# Patient Record
Sex: FEMALE | Race: WHITE | NOT HISPANIC OR LATINO | Employment: OTHER | URBAN - METROPOLITAN AREA
[De-identification: names, ages, dates, MRNs, and addresses within clinical notes are randomized per-mention and may not be internally consistent; named-entity substitution may affect disease eponyms.]

---

## 2023-09-05 ENCOUNTER — HOSPITAL ENCOUNTER (OUTPATIENT)
Dept: DATA CONVERSION | Facility: HOSPITAL | Age: 63
Discharge: HOME | End: 2023-09-05
Payer: COMMERCIAL

## 2023-09-05 DIAGNOSIS — I27.20 PULMONARY HYPERTENSION, UNSPECIFIED (MULTI): ICD-10-CM

## 2023-10-12 ENCOUNTER — PATIENT OUTREACH (OUTPATIENT)
Dept: CASE MANAGEMENT | Facility: HOSPITAL | Age: 63
End: 2023-10-12
Payer: COMMERCIAL

## 2023-10-12 NOTE — PROGRESS NOTES
Closed HF case today. No readmits for HF in past 33 days. Ronna has been doing well with managing her HF.

## 2023-11-10 ENCOUNTER — TELEPHONE (OUTPATIENT)
Dept: PULMONOLOGY | Facility: HOSPITAL | Age: 63
End: 2023-11-10
Payer: COMMERCIAL

## 2023-11-22 ENCOUNTER — APPOINTMENT (OUTPATIENT)
Dept: PULMONOLOGY | Facility: HOSPITAL | Age: 63
End: 2023-11-22
Payer: COMMERCIAL

## 2023-11-22 ENCOUNTER — HOSPITAL ENCOUNTER (OUTPATIENT)
Dept: RESPIRATORY THERAPY | Facility: HOSPITAL | Age: 63
End: 2023-11-22
Payer: COMMERCIAL

## 2024-01-07 ENCOUNTER — HOSPITAL ENCOUNTER (OUTPATIENT)
Facility: HOSPITAL | Age: 64
Setting detail: OBSERVATION
Discharge: HOME | End: 2024-01-09
Attending: EMERGENCY MEDICINE | Admitting: INTERNAL MEDICINE
Payer: COMMERCIAL

## 2024-01-07 ENCOUNTER — APPOINTMENT (OUTPATIENT)
Dept: RADIOLOGY | Facility: HOSPITAL | Age: 64
End: 2024-01-07
Payer: COMMERCIAL

## 2024-01-07 ENCOUNTER — APPOINTMENT (OUTPATIENT)
Dept: CARDIOLOGY | Facility: HOSPITAL | Age: 64
End: 2024-01-07
Payer: COMMERCIAL

## 2024-01-07 DIAGNOSIS — I21.4 NSTEMI (NON-ST ELEVATED MYOCARDIAL INFARCTION) (MULTI): ICD-10-CM

## 2024-01-07 DIAGNOSIS — R79.89 ELEVATED TROPONIN: ICD-10-CM

## 2024-01-07 DIAGNOSIS — R06.02 SHORTNESS OF BREATH: Primary | ICD-10-CM

## 2024-01-07 LAB
ALBUMIN SERPL-MCNC: 3.8 G/DL (ref 3.5–5)
ALP BLD-CCNC: 98 U/L (ref 35–125)
ALT SERPL-CCNC: 7 U/L (ref 5–40)
ANION GAP SERPL CALC-SCNC: 13 MMOL/L
AST SERPL-CCNC: 13 U/L (ref 5–40)
BILIRUB SERPL-MCNC: 1 MG/DL (ref 0.1–1.2)
BUN SERPL-MCNC: 15 MG/DL (ref 8–25)
CALCIUM SERPL-MCNC: 7.3 MG/DL (ref 8.5–10.4)
CHLORIDE SERPL-SCNC: 102 MMOL/L (ref 97–107)
CO2 SERPL-SCNC: 20 MMOL/L (ref 24–31)
CREAT SERPL-MCNC: 1.2 MG/DL (ref 0.4–1.6)
ERYTHROCYTE [DISTWIDTH] IN BLOOD BY AUTOMATED COUNT: 14.2 % (ref 11.5–14.5)
GFR SERPL CREATININE-BSD FRML MDRD: 52 ML/MIN/1.73M*2
GLUCOSE SERPL-MCNC: 149 MG/DL (ref 65–99)
HCT VFR BLD AUTO: 37.8 % (ref 36–46)
HGB BLD-MCNC: 11.9 G/DL (ref 12–16)
MCH RBC QN AUTO: 26 PG (ref 26–34)
MCHC RBC AUTO-ENTMCNC: 31.5 G/DL (ref 32–36)
MCV RBC AUTO: 83 FL (ref 80–100)
NRBC BLD-RTO: 0 /100 WBCS (ref 0–0)
NT-PROBNP SERPL-MCNC: 4260 PG/ML (ref 0–226)
PLATELET # BLD AUTO: 128 X10*3/UL (ref 150–450)
POTASSIUM SERPL-SCNC: 4.5 MMOL/L (ref 3.4–5.1)
PROT SERPL-MCNC: 6.5 G/DL (ref 5.9–7.9)
RBC # BLD AUTO: 4.57 X10*6/UL (ref 4–5.2)
SODIUM SERPL-SCNC: 135 MMOL/L (ref 133–145)
TROPONIN T SERPL-MCNC: 20 NG/L
TROPONIN T SERPL-MCNC: 60 NG/L
TROPONIN T SERPL-MCNC: 62 NG/L
TROPONIN T SERPL-MCNC: 62 NG/L
WBC # BLD AUTO: 5.2 X10*3/UL (ref 4.4–11.3)

## 2024-01-07 PROCEDURE — 2550000001 HC RX 255 CONTRASTS: Performed by: EMERGENCY MEDICINE

## 2024-01-07 PROCEDURE — 71045 X-RAY EXAM CHEST 1 VIEW: CPT

## 2024-01-07 PROCEDURE — 84484 ASSAY OF TROPONIN QUANT: CPT | Performed by: NURSE PRACTITIONER

## 2024-01-07 PROCEDURE — 99285 EMERGENCY DEPT VISIT HI MDM: CPT | Performed by: EMERGENCY MEDICINE

## 2024-01-07 PROCEDURE — 2500000001 HC RX 250 WO HCPCS SELF ADMINISTERED DRUGS (ALT 637 FOR MEDICARE OP): Performed by: NURSE PRACTITIONER

## 2024-01-07 PROCEDURE — 71275 CT ANGIOGRAPHY CHEST: CPT

## 2024-01-07 PROCEDURE — 85027 COMPLETE CBC AUTOMATED: CPT | Performed by: NURSE PRACTITIONER

## 2024-01-07 PROCEDURE — 93010 ELECTROCARDIOGRAM REPORT: CPT | Performed by: INTERNAL MEDICINE

## 2024-01-07 PROCEDURE — 80053 COMPREHEN METABOLIC PANEL: CPT | Performed by: NURSE PRACTITIONER

## 2024-01-07 PROCEDURE — 36415 COLL VENOUS BLD VENIPUNCTURE: CPT | Performed by: NURSE PRACTITIONER

## 2024-01-07 PROCEDURE — 93005 ELECTROCARDIOGRAM TRACING: CPT

## 2024-01-07 PROCEDURE — 83880 ASSAY OF NATRIURETIC PEPTIDE: CPT | Performed by: NURSE PRACTITIONER

## 2024-01-07 PROCEDURE — G0378 HOSPITAL OBSERVATION PER HR: HCPCS

## 2024-01-07 PROCEDURE — 71275 CT ANGIOGRAPHY CHEST: CPT | Mod: FOREIGN READ | Performed by: RADIOLOGY

## 2024-01-07 RX ORDER — ACETAMINOPHEN 500 MG
5 TABLET ORAL NIGHTLY
COMMUNITY

## 2024-01-07 RX ORDER — ACETAMINOPHEN 325 MG/1
650 TABLET ORAL EVERY 4 HOURS PRN
Status: DISCONTINUED | OUTPATIENT
Start: 2024-01-07 | End: 2024-01-09 | Stop reason: HOSPADM

## 2024-01-07 RX ORDER — VENLAFAXINE HYDROCHLORIDE 150 MG/1
150 CAPSULE, EXTENDED RELEASE ORAL DAILY
COMMUNITY

## 2024-01-07 RX ORDER — LOPERAMIDE HYDROCHLORIDE 2 MG/1
2 CAPSULE ORAL 4 TIMES DAILY PRN
Status: DISCONTINUED | OUTPATIENT
Start: 2024-01-07 | End: 2024-01-09 | Stop reason: HOSPADM

## 2024-01-07 RX ORDER — PANTOPRAZOLE SODIUM 40 MG/1
40 TABLET, DELAYED RELEASE ORAL
Status: DISCONTINUED | OUTPATIENT
Start: 2024-01-08 | End: 2024-01-09 | Stop reason: HOSPADM

## 2024-01-07 RX ORDER — ASPIRIN 81 MG/1
81 TABLET ORAL DAILY
Status: DISCONTINUED | OUTPATIENT
Start: 2024-01-08 | End: 2024-01-09 | Stop reason: HOSPADM

## 2024-01-07 RX ORDER — TALC
3 POWDER (GRAM) TOPICAL NIGHTLY
Status: DISCONTINUED | OUTPATIENT
Start: 2024-01-07 | End: 2024-01-09 | Stop reason: HOSPADM

## 2024-01-07 RX ORDER — CHOLESTYRAMINE 4 G/9G
4 POWDER, FOR SUSPENSION ORAL
Status: DISCONTINUED | OUTPATIENT
Start: 2024-01-08 | End: 2024-01-09 | Stop reason: HOSPADM

## 2024-01-07 RX ORDER — HYDROXYCHLOROQUINE SULFATE 200 MG/1
200 TABLET, FILM COATED ORAL 2 TIMES DAILY
COMMUNITY

## 2024-01-07 RX ORDER — HEPARIN SODIUM 5000 [USP'U]/ML
5000 INJECTION, SOLUTION INTRAVENOUS; SUBCUTANEOUS EVERY 8 HOURS SCHEDULED
Status: DISCONTINUED | OUTPATIENT
Start: 2024-01-07 | End: 2024-01-09 | Stop reason: HOSPADM

## 2024-01-07 RX ORDER — VENLAFAXINE HYDROCHLORIDE 150 MG/1
150 CAPSULE, EXTENDED RELEASE ORAL DAILY
Status: DISCONTINUED | OUTPATIENT
Start: 2024-01-08 | End: 2024-01-09 | Stop reason: HOSPADM

## 2024-01-07 RX ORDER — HYDROXYCHLOROQUINE SULFATE 200 MG/1
200 TABLET, FILM COATED ORAL 2 TIMES DAILY
Status: DISCONTINUED | OUTPATIENT
Start: 2024-01-07 | End: 2024-01-08

## 2024-01-07 RX ORDER — NAPROXEN SODIUM 220 MG/1
324 TABLET, FILM COATED ORAL ONCE
Status: COMPLETED | OUTPATIENT
Start: 2024-01-07 | End: 2024-01-07

## 2024-01-07 RX ORDER — MACITENTAN 10 MG/1
10 TABLET, FILM COATED ORAL DAILY
COMMUNITY
End: 2024-03-11

## 2024-01-07 RX ORDER — LOPERAMIDE HCL 2 MG
2 TABLET ORAL 4 TIMES DAILY PRN
COMMUNITY
End: 2024-05-28 | Stop reason: SDUPTHER

## 2024-01-07 RX ORDER — GABAPENTIN 600 MG/1
600 TABLET ORAL 3 TIMES DAILY
COMMUNITY
End: 2024-06-02 | Stop reason: HOSPADM

## 2024-01-07 RX ORDER — ONDANSETRON HYDROCHLORIDE 2 MG/ML
4 INJECTION, SOLUTION INTRAVENOUS EVERY 8 HOURS PRN
Status: DISCONTINUED | OUTPATIENT
Start: 2024-01-07 | End: 2024-01-09 | Stop reason: HOSPADM

## 2024-01-07 RX ORDER — GABAPENTIN 300 MG/1
300 CAPSULE ORAL 2 TIMES DAILY
Status: DISCONTINUED | OUTPATIENT
Start: 2024-01-07 | End: 2024-01-08

## 2024-01-07 RX ORDER — MAGNESIUM CHLORIDE 64 MG
64 TABLET, DELAYED RELEASE (ENTERIC COATED) ORAL 2 TIMES DAILY
Status: DISCONTINUED | OUTPATIENT
Start: 2024-01-07 | End: 2024-01-08

## 2024-01-07 RX ORDER — OMEPRAZOLE 40 MG/1
40 CAPSULE, DELAYED RELEASE ORAL
COMMUNITY

## 2024-01-07 RX ORDER — ASPIRIN 81 MG/1
81 TABLET ORAL DAILY
COMMUNITY

## 2024-01-07 RX ADMIN — IOHEXOL 75 ML: 350 INJECTION, SOLUTION INTRAVENOUS at 21:42

## 2024-01-07 RX ADMIN — ASPIRIN 324 MG: 81 TABLET, CHEWABLE ORAL at 20:30

## 2024-01-07 RX ADMIN — GABAPENTIN 300 MG: 300 CAPSULE ORAL at 21:20

## 2024-01-07 RX ADMIN — HYDROXYCHLOROQUINE SULFATE 200 MG: 200 TABLET ORAL at 21:20

## 2024-01-07 RX ADMIN — MAGNESIUM 64 MG (MAGNESIUM CHLORIDE) TABLET,DELAYED RELEASE 64 MG: at 21:20

## 2024-01-07 RX ADMIN — MELATONIN 3 MG: at 21:20

## 2024-01-07 ASSESSMENT — PAIN SCALES - GENERAL
PAINLEVEL_OUTOF10: 0 - NO PAIN
PAINLEVEL_OUTOF10: 0 - NO PAIN

## 2024-01-07 ASSESSMENT — COLUMBIA-SUICIDE SEVERITY RATING SCALE - C-SSRS
1. IN THE PAST MONTH, HAVE YOU WISHED YOU WERE DEAD OR WISHED YOU COULD GO TO SLEEP AND NOT WAKE UP?: NO
6. HAVE YOU EVER DONE ANYTHING, STARTED TO DO ANYTHING, OR PREPARED TO DO ANYTHING TO END YOUR LIFE?: NO
2. HAVE YOU ACTUALLY HAD ANY THOUGHTS OF KILLING YOURSELF?: NO

## 2024-01-07 ASSESSMENT — PAIN - FUNCTIONAL ASSESSMENT: PAIN_FUNCTIONAL_ASSESSMENT: 0-10

## 2024-01-07 ASSESSMENT — ENCOUNTER SYMPTOMS
SHORTNESS OF BREATH: 1
DIAPHORESIS: 1

## 2024-01-07 NOTE — ED PROVIDER NOTES
This patient was seen by the advanced practice provider.  I have personally performed a substantial portion of the encounter.  I have seen and examined the patient; agree with the workup, evaluation, MDM, management and diagnosis.  The care plan has been discussed.    I personally saw the patient and made/approve the management plan and take responsibility for the patient management.    History:    This is a 63-year-old female with a history of pulmonary hypertension who presents to the emergency department with increased shortness of breath starting about an hour prior to arrival.  Patient states she improved quickly after being placed on 100% oxygen.  The patient normally is on 4 L of oxygen via nasal cannula.  Patient is currently on a constant infusion of medication for pulmonary hypertension.  Patient states there has been no problems with the pump and administration of this medication.  Exam:  Patient appears in no acute distress  Lungs are clear to auscultation bilaterally pulse ox is 95% on 4 L nasal cannula patient is not tachypneic  MDM:  The case was reviewed with the physician assistant I agree with workup and management.  Currently we are waiting for second troponin this is negative or normal patient will be discharged home.         Marco A Figueredo,   01/08/24 0124

## 2024-01-07 NOTE — ED PROVIDER NOTES
HPI   No chief complaint on file.      HPI  See my MDM                  No data recorded                Patient History   Past Medical History:   Diagnosis Date    Personal history of other specified conditions 2020    History of seizure     Past Surgical History:   Procedure Laterality Date    OTHER SURGICAL HISTORY  2020    Cholecystectomy    OTHER SURGICAL HISTORY  2020    Esophagogastroduodenoscopy    OTHER SURGICAL HISTORY  2020    Colonoscopy    OTHER SURGICAL HISTORY  2020     section    OTHER SURGICAL HISTORY  2020    Hysterectomy     No family history on file.  Social History     Tobacco Use    Smoking status: Not on file    Smokeless tobacco: Not on file   Substance Use Topics    Alcohol use: Not on file    Drug use: Not on file       Physical Exam   ED Triage Vitals   Temp Pulse Resp BP   -- -- -- --      SpO2 Temp src Heart Rate Source Patient Position   -- -- -- --      BP Location FiO2 (%)     -- --       Physical Exam  CONSTITUTIONAL: Vital signs reviewed as charted, well-developed and in no distress  Eyes: Extraocular muscles are intact. Pupils equal round and reactive to light. Conjunctiva are pink.    ENT: Mucous membranes are moist. Tongue in the midline. Pharynx was without erythema or exudates, uvula midline  LUNGS: Breath sounds equal and clear to auscultation. Good air exchange, no wheezes rales or retractions, pulse oximetry is charted.  HEART: Regular rate and rhythm without murmur thrill or rub, strong tones, auscultation is normal.  ABDOMEN: Soft and nontender without guarding rebound rigidity or mass. Bowel sounds are present and normal in all quadrants. There is no palpable masses or aneurysms identified. No hepatosplenomegaly, normal abdominal exam.  Neuro: The patient is awake, alert and oriented ×3. Moving all 4 extremities and answering questions appropriately.   MUSCULOSKELETAL: The calves are nontender to palpation. Full gross active  range of motion.   PSYCH: Awake alert oriented, normal mood and affect.  Skin:  Dry, normal color, warm to the touch, no rash present.      ED Course & MDM   ED Course as of 01/07/24 1933   Sun Jan 07, 2024   1645 EKG from 1559 shows normal sinus rhythm with ventricular rate of 95 bpm, QTc is prolonged at 522 ms, right axis, T wave inversion in the anterior leads with some ST depression.  There is no evidence of STEMI.  This EKG was independently reviewed and interpreted by myself at 1645 [TH]   1648 Todays EKG was compared with an EKG from April 2022 which showed similar ST changes as today's EKG [TH]      ED Course User Index  [TH] Marco A Figueredo DO         Diagnoses as of 01/07/24 1933   Shortness of breath   Elevated troponin       Medical Decision Making  History obtained from: patient    Vital signs, nursing notes, current medications, past medical history, Surgical history, allergies, social history, family History were reviewed.         HPI:  Patient 63-year-old female known history of pulmonary hypertension presenting emergency room today complaining of shortness of breath started about an hour prior to arrival.  Was brought in by EMS they put her on 100% oxygen and she states she is feeling back to baseline at this time.  No other intervention.  Denies dizziness, chest pain, abdominal pain or extremity edema.  Denies cough or congestion.  Denies nausea vomiting diarrhea.  She is nontoxic well-appearing she does wear 4 L of oxygen via nasal cannula normally at home.      10 point ROS was reviewed and negative except Noted above in HPI.  DDX: as listed above    Chest x-ray interpreted by the radiologist shows  Impression:    Mild cardiomegaly and perihilar congestion and pulmonary edema,  unchanged. No consolidation.              Medications administered during this visit (name and route): Oral aspirin  EKG interpretted by my attending physician    POPEYE Summary/considerations:  I spoke with   Sheba. We thoroughly discussed the history, physical exam, laboratory and imaging studies, as well as, emergency department course. Based upon that discussion, we've decided to admit for further observation and evaluation of their chest pain.  As I have deemed necessary from their history, physical, and studies, I have considered and evaluated for the following diagnoses: ACUTE CORONARY SYNDROME, PERICARDIAL TAMPONADE, PNEUMOTHORAX, P ULMONARY EMBOLISM, and THORACIC DISSECTION.     Patient's symptoms have resolved and did just prior to coming to the ER.  Her initial troponin was 20, repeat delta was 60.  EKG nonischemic.  Given her extensive medical history and elevation of the troponin will admit for further evaluation and care.  She did tell me her cardiologist is Dr. Calvillo.        After reviewing patient's comorbidities, severity of history of presenting illness, labs and imaging if obtained in conjunction with physical exam and course in emergency department, deemed to have potential for deterioration/progression of symptoms that could lead to multiple morbidities or mortality, decision made that patient requires further observation/evaluation/treatment and patient admitted to appropriate service, patient/family understand and agree with plan.      Critical Care: Not warranted at this time    Prescriptions provided include: none    This chart was completed using voice recognition transcription software. Please excuse any errors of transcription including grammatical, punctuation, syntax and spelling errors.  Please contact me with any questions regarding this chart.    Procedure  Procedures     MICHEL Busby-CNP  01/07/24 1934

## 2024-01-08 ENCOUNTER — HOSPITAL ENCOUNTER (OUTPATIENT)
Facility: HOSPITAL | Age: 64
Setting detail: OUTPATIENT SURGERY
Discharge: ANOTHER HOSPITAL | End: 2024-01-08
Attending: INTERNAL MEDICINE | Admitting: INTERNAL MEDICINE
Payer: COMMERCIAL

## 2024-01-08 VITALS
TEMPERATURE: 98 F | SYSTOLIC BLOOD PRESSURE: 107 MMHG | DIASTOLIC BLOOD PRESSURE: 70 MMHG | HEART RATE: 85 BPM | OXYGEN SATURATION: 94 % | RESPIRATION RATE: 18 BRPM

## 2024-01-08 DIAGNOSIS — I21.4 NSTEMI (NON-ST ELEVATED MYOCARDIAL INFARCTION) (MULTI): ICD-10-CM

## 2024-01-08 LAB
ALBUMIN SERPL-MCNC: 3.5 G/DL (ref 3.5–5)
ALP BLD-CCNC: 82 U/L (ref 35–125)
ALT SERPL-CCNC: 6 U/L (ref 5–40)
ANION GAP SERPL CALC-SCNC: 8 MMOL/L
AST SERPL-CCNC: 8 U/L (ref 5–40)
BILIRUB SERPL-MCNC: 0.9 MG/DL (ref 0.1–1.2)
BUN SERPL-MCNC: 15 MG/DL (ref 8–25)
CALCIUM SERPL-MCNC: 6.9 MG/DL (ref 8.5–10.4)
CHLORIDE SERPL-SCNC: 107 MMOL/L (ref 97–107)
CO2 SERPL-SCNC: 24 MMOL/L (ref 24–31)
CREAT SERPL-MCNC: 1.2 MG/DL (ref 0.4–1.6)
ERYTHROCYTE [DISTWIDTH] IN BLOOD BY AUTOMATED COUNT: 14.3 % (ref 11.5–14.5)
GFR SERPL CREATININE-BSD FRML MDRD: 52 ML/MIN/1.73M*2
GLUCOSE SERPL-MCNC: 106 MG/DL (ref 65–99)
HCT VFR BLD AUTO: 32.3 % (ref 36–46)
HGB BLD-MCNC: 10 G/DL (ref 12–16)
MCH RBC QN AUTO: 26 PG (ref 26–34)
MCHC RBC AUTO-ENTMCNC: 31 G/DL (ref 32–36)
MCV RBC AUTO: 84 FL (ref 80–100)
NRBC BLD-RTO: 0 /100 WBCS (ref 0–0)
PLATELET # BLD AUTO: 113 X10*3/UL (ref 150–450)
POTASSIUM SERPL-SCNC: 4.5 MMOL/L (ref 3.4–5.1)
PROT SERPL-MCNC: 6 G/DL (ref 5.9–7.9)
RBC # BLD AUTO: 3.85 X10*6/UL (ref 4–5.2)
SODIUM SERPL-SCNC: 139 MMOL/L (ref 133–145)
WBC # BLD AUTO: 4 X10*3/UL (ref 4.4–11.3)

## 2024-01-08 PROCEDURE — 96372 THER/PROPH/DIAG INJ SC/IM: CPT | Performed by: NURSE PRACTITIONER

## 2024-01-08 PROCEDURE — 99221 1ST HOSP IP/OBS SF/LOW 40: CPT | Performed by: INTERNAL MEDICINE

## 2024-01-08 PROCEDURE — 2720000007 HC OR 272 NO HCPCS: Performed by: INTERNAL MEDICINE

## 2024-01-08 PROCEDURE — 99152 MOD SED SAME PHYS/QHP 5/>YRS: CPT | Performed by: INTERNAL MEDICINE

## 2024-01-08 PROCEDURE — 93458 L HRT ARTERY/VENTRICLE ANGIO: CPT | Performed by: INTERNAL MEDICINE

## 2024-01-08 PROCEDURE — 94760 N-INVAS EAR/PLS OXIMETRY 1: CPT

## 2024-01-08 PROCEDURE — G0378 HOSPITAL OBSERVATION PER HR: HCPCS

## 2024-01-08 PROCEDURE — 2500000004 HC RX 250 GENERAL PHARMACY W/ HCPCS (ALT 636 FOR OP/ED): Performed by: INTERNAL MEDICINE

## 2024-01-08 PROCEDURE — 1850000001 HC LEAVE OF ABSENCE - HOSPITAL SERVICES

## 2024-01-08 PROCEDURE — C1769 GUIDE WIRE: HCPCS | Performed by: INTERNAL MEDICINE

## 2024-01-08 PROCEDURE — 2500000001 HC RX 250 WO HCPCS SELF ADMINISTERED DRUGS (ALT 637 FOR MEDICARE OP): Performed by: NURSE PRACTITIONER

## 2024-01-08 PROCEDURE — 7100000010 HC PHASE TWO TIME - EACH INCREMENTAL 1 MINUTE: Performed by: INTERNAL MEDICINE

## 2024-01-08 PROCEDURE — 2500000001 HC RX 250 WO HCPCS SELF ADMINISTERED DRUGS (ALT 637 FOR MEDICARE OP): Performed by: INTERNAL MEDICINE

## 2024-01-08 PROCEDURE — 36415 COLL VENOUS BLD VENIPUNCTURE: CPT | Performed by: NURSE PRACTITIONER

## 2024-01-08 PROCEDURE — 2500000005 HC RX 250 GENERAL PHARMACY W/O HCPCS: Performed by: INTERNAL MEDICINE

## 2024-01-08 PROCEDURE — 85027 COMPLETE CBC AUTOMATED: CPT | Performed by: NURSE PRACTITIONER

## 2024-01-08 PROCEDURE — 7100000009 HC PHASE TWO TIME - INITIAL BASE CHARGE: Performed by: INTERNAL MEDICINE

## 2024-01-08 PROCEDURE — 2500000004 HC RX 250 GENERAL PHARMACY W/ HCPCS (ALT 636 FOR OP/ED): Performed by: NURSE PRACTITIONER

## 2024-01-08 PROCEDURE — 80053 COMPREHEN METABOLIC PANEL: CPT | Performed by: NURSE PRACTITIONER

## 2024-01-08 PROCEDURE — 2550000001 HC RX 255 CONTRASTS: Performed by: INTERNAL MEDICINE

## 2024-01-08 PROCEDURE — C1894 INTRO/SHEATH, NON-LASER: HCPCS | Performed by: INTERNAL MEDICINE

## 2024-01-08 PROCEDURE — 99232 SBSQ HOSP IP/OBS MODERATE 35: CPT | Performed by: NURSE PRACTITIONER

## 2024-01-08 RX ORDER — GABAPENTIN 300 MG/1
300 CAPSULE ORAL 2 TIMES DAILY
Status: DISCONTINUED | OUTPATIENT
Start: 2024-01-08 | End: 2024-01-09 | Stop reason: HOSPADM

## 2024-01-08 RX ORDER — IODIXANOL 320 MG/ML
INJECTION, SOLUTION INTRAVASCULAR AS NEEDED
Status: DISCONTINUED | OUTPATIENT
Start: 2024-01-08 | End: 2024-01-08 | Stop reason: HOSPADM

## 2024-01-08 RX ORDER — SODIUM CHLORIDE 9 MG/ML
INJECTION, SOLUTION INTRAVENOUS CONTINUOUS PRN
Status: COMPLETED | OUTPATIENT
Start: 2024-01-08 | End: 2024-01-08

## 2024-01-08 RX ORDER — HYDROXYCHLOROQUINE SULFATE 200 MG/1
200 TABLET, FILM COATED ORAL 2 TIMES DAILY
Status: DISCONTINUED | OUTPATIENT
Start: 2024-01-08 | End: 2024-01-09 | Stop reason: HOSPADM

## 2024-01-08 RX ORDER — NITROGLYCERIN 5 MG/ML
INJECTION, SOLUTION INTRAVENOUS AS NEEDED
Status: DISCONTINUED | OUTPATIENT
Start: 2024-01-08 | End: 2024-01-08 | Stop reason: HOSPADM

## 2024-01-08 RX ORDER — MIDAZOLAM HYDROCHLORIDE 1 MG/ML
INJECTION, SOLUTION INTRAMUSCULAR; INTRAVENOUS AS NEEDED
Status: DISCONTINUED | OUTPATIENT
Start: 2024-01-08 | End: 2024-01-08 | Stop reason: HOSPADM

## 2024-01-08 RX ORDER — LIDOCAINE HYDROCHLORIDE 10 MG/ML
INJECTION, SOLUTION EPIDURAL; INFILTRATION; INTRACAUDAL; PERINEURAL AS NEEDED
Status: DISCONTINUED | OUTPATIENT
Start: 2024-01-08 | End: 2024-01-08 | Stop reason: HOSPADM

## 2024-01-08 RX ORDER — MAGNESIUM CHLORIDE 64 MG
64 TABLET, DELAYED RELEASE (ENTERIC COATED) ORAL 2 TIMES DAILY
Status: DISCONTINUED | OUTPATIENT
Start: 2024-01-08 | End: 2024-01-09 | Stop reason: HOSPADM

## 2024-01-08 RX ORDER — VERAPAMIL HYDROCHLORIDE 2.5 MG/ML
INJECTION, SOLUTION INTRAVENOUS AS NEEDED
Status: DISCONTINUED | OUTPATIENT
Start: 2024-01-08 | End: 2024-01-08 | Stop reason: HOSPADM

## 2024-01-08 RX ORDER — FENTANYL CITRATE 50 UG/ML
INJECTION, SOLUTION INTRAMUSCULAR; INTRAVENOUS AS NEEDED
Status: DISCONTINUED | OUTPATIENT
Start: 2024-01-08 | End: 2024-01-08 | Stop reason: HOSPADM

## 2024-01-08 RX ORDER — SODIUM CHLORIDE 9 MG/ML
100 INJECTION, SOLUTION INTRAVENOUS CONTINUOUS
Status: CANCELLED | OUTPATIENT
Start: 2024-01-08

## 2024-01-08 RX ADMIN — GABAPENTIN 300 MG: 300 CAPSULE ORAL at 22:18

## 2024-01-08 RX ADMIN — PANTOPRAZOLE SODIUM 40 MG: 40 TABLET, DELAYED RELEASE ORAL at 07:25

## 2024-01-08 RX ADMIN — HYDROXYCHLOROQUINE SULFATE 200 MG: 200 TABLET ORAL at 22:16

## 2024-01-08 RX ADMIN — Medication 64 MG: at 22:17

## 2024-01-08 RX ADMIN — HEPARIN SODIUM 5000 UNITS: 5000 INJECTION, SOLUTION INTRAVENOUS; SUBCUTANEOUS at 22:00

## 2024-01-08 RX ADMIN — HEPARIN SODIUM 5000 UNITS: 5000 INJECTION, SOLUTION INTRAVENOUS; SUBCUTANEOUS at 07:25

## 2024-01-08 SDOH — SOCIAL STABILITY: SOCIAL INSECURITY: HAVE YOU HAD THOUGHTS OF HARMING ANYONE ELSE?: YES

## 2024-01-08 SDOH — SOCIAL STABILITY: SOCIAL INSECURITY: WERE YOU ABLE TO COMPLETE ALL THE BEHAVIORAL HEALTH SCREENINGS?: YES

## 2024-01-08 SDOH — SOCIAL STABILITY: SOCIAL INSECURITY: DOES ANYONE TRY TO KEEP YOU FROM HAVING/CONTACTING OTHER FRIENDS OR DOING THINGS OUTSIDE YOUR HOME?: NO

## 2024-01-08 SDOH — SOCIAL STABILITY: SOCIAL INSECURITY: ARE YOU OR HAVE YOU BEEN THREATENED OR ABUSED PHYSICALLY, EMOTIONALLY, OR SEXUALLY BY ANYONE?: NO

## 2024-01-08 SDOH — SOCIAL STABILITY: SOCIAL INSECURITY: HAS ANYONE EVER THREATENED TO HURT YOUR FAMILY OR YOUR PETS?: NO

## 2024-01-08 SDOH — SOCIAL STABILITY: SOCIAL INSECURITY: ABUSE: ADULT

## 2024-01-08 SDOH — SOCIAL STABILITY: SOCIAL INSECURITY: DO YOU FEEL ANYONE HAS EXPLOITED OR TAKEN ADVANTAGE OF YOU FINANCIALLY OR OF YOUR PERSONAL PROPERTY?: NO

## 2024-01-08 SDOH — SOCIAL STABILITY: SOCIAL INSECURITY: DO YOU FEEL UNSAFE GOING BACK TO THE PLACE WHERE YOU ARE LIVING?: NO

## 2024-01-08 SDOH — SOCIAL STABILITY: SOCIAL INSECURITY: ARE THERE ANY APPARENT SIGNS OF INJURIES/BEHAVIORS THAT COULD BE RELATED TO ABUSE/NEGLECT?: NO

## 2024-01-08 ASSESSMENT — COGNITIVE AND FUNCTIONAL STATUS - GENERAL
MOBILITY SCORE: 24
DAILY ACTIVITIY SCORE: 24
PATIENT BASELINE BEDBOUND: NO
PATIENT BASELINE BEDBOUND: NO

## 2024-01-08 ASSESSMENT — ACTIVITIES OF DAILY LIVING (ADL)
ADEQUATE_TO_COMPLETE_ADL: YES
GROOMING: INDEPENDENT
PATIENT'S MEMORY ADEQUATE TO SAFELY COMPLETE DAILY ACTIVITIES?: YES
HEARING - RIGHT EAR: FUNCTIONAL
BATHING: INDEPENDENT
LACK_OF_TRANSPORTATION: NO
TOILETING: INDEPENDENT
DRESSING YOURSELF: INDEPENDENT
WALKS IN HOME: INDEPENDENT
FEEDING YOURSELF: INDEPENDENT
HEARING - LEFT EAR: FUNCTIONAL
JUDGMENT_ADEQUATE_SAFELY_COMPLETE_DAILY_ACTIVITIES: YES

## 2024-01-08 ASSESSMENT — PAIN SCALES - GENERAL
PAINLEVEL_OUTOF10: 0 - NO PAIN

## 2024-01-08 ASSESSMENT — LIFESTYLE VARIABLES
HOW OFTEN DO YOU HAVE A DRINK CONTAINING ALCOHOL: NEVER
SKIP TO QUESTIONS 9-10: 1
HOW OFTEN DO YOU HAVE 6 OR MORE DRINKS ON ONE OCCASION: NEVER
AUDIT-C TOTAL SCORE: 0
HOW MANY STANDARD DRINKS CONTAINING ALCOHOL DO YOU HAVE ON A TYPICAL DAY: PATIENT DOES NOT DRINK
AUDIT-C TOTAL SCORE: 0

## 2024-01-08 ASSESSMENT — PATIENT HEALTH QUESTIONNAIRE - PHQ9
SUM OF ALL RESPONSES TO PHQ9 QUESTIONS 1 & 2: 0
2. FEELING DOWN, DEPRESSED OR HOPELESS: NOT AT ALL
1. LITTLE INTEREST OR PLEASURE IN DOING THINGS: NOT AT ALL

## 2024-01-08 ASSESSMENT — COLUMBIA-SUICIDE SEVERITY RATING SCALE - C-SSRS
6. HAVE YOU EVER DONE ANYTHING, STARTED TO DO ANYTHING, OR PREPARED TO DO ANYTHING TO END YOUR LIFE?: NO
2. HAVE YOU ACTUALLY HAD ANY THOUGHTS OF KILLING YOURSELF?: NO
1. IN THE PAST MONTH, HAVE YOU WISHED YOU WERE DEAD OR WISHED YOU COULD GO TO SLEEP AND NOT WAKE UP?: NO

## 2024-01-08 ASSESSMENT — PAIN - FUNCTIONAL ASSESSMENT
PAIN_FUNCTIONAL_ASSESSMENT: 0-10
PAIN_FUNCTIONAL_ASSESSMENT: 0-10

## 2024-01-08 NOTE — H&P (VIEW-ONLY)
Consults  History Of Present Illness:    Ronna Beckham is a 63 y.o. female presenting with shortness of breath.  The patient carries a history of pulmonary hypertension and follows with Dr. Prescott at [facility].  She is also followed with my partner, Dr. Christiano Calvillo, in the past as well.  She states that yesterday she had the sudden onset of shortness of breath symptoms.  It was bad enough that she called 911 and was brought to the emergency department.  She states that after they placed oxygen therapy her symptoms improved significantly.  Sensitivity troponins were measured at 20, 60 and 62.  Her twelve-lead EKG revealed a sinus rhythm with an incomplete right bundle branch block and T wave inversions in the anterior leads.  This is unchanged from her previous EKGs.  He is resting comfortably this morning with no complaints.  Last Recorded Vitals:  Vitals:    01/08/24 0400 01/08/24 0500 01/08/24 0600 01/08/24 0721   BP: 107/85 110/74 112/74 113/69   BP Location:    Right arm   Patient Position:    Lying   Pulse: 81 82 78 86   Resp: 17 18 21 16   Temp:       TempSrc:       SpO2: 91% 93% 95% 92%   Weight:       Height:           Last Labs:  CBC - 1/8/2024:  5:26 AM  4.0 10.0 113    32.3      CMP - 1/8/2024:  5:26 AM  6.9 6.0 8 --- 0.9   4.1 3.5 6 82      PTT - 9/7/2023:  5:28 PM  1.3   13.7 31.9     BNP   Date/Time Value Ref Range Status   04/13/2022 04:23  (H) 0 - 99 pg/mL Final     Comment:     .  <100 pg/mL - Heart failure unlikely  100-299 pg/mL - Intermediate probability of acute heart  .               failure exacerbation. Correlate with clinical  .               context and patient history.    >=300 pg/mL - Heart Failure likely. Correlate with clinical  .               context and patient history.   Biotin interference may cause falsely decreased results.   Patients taking a Biotin dose of up to 5 mg/day should   refrain from taking Biotin for 24 hours before sample    collection. Providers may contact their local laboratory   for further information.     03/09/2022 04:46  (H) 0 - 99 pg/mL Final     Comment:     .  <100 pg/mL - Heart failure unlikely  100-299 pg/mL - Intermediate probability of acute heart  .               failure exacerbation. Correlate with clinical  .               context and patient history.    >=300 pg/mL - Heart Failure likely. Correlate with clinical  .               context and patient history.   Biotin interference may cause falsely decreased results.   Patients taking a Biotin dose of up to 5 mg/day should   refrain from taking Biotin for 24 hours before sample   collection. Providers may contact their local laboratory   for further information.       Hemoglobin A1C   Date/Time Value Ref Range Status   08/19/2019 01:50 PM 5.6 4.0 - 6.0 % Final     Comment:     Hemoglobin A1C levels are related to mean blood glucose during the   preceding 2-3 months. The relationship table below may be used as a   general guide. Each 1% increase in HGB A1C is a reflection of an   increase in mean glucose of approximately 30 mg/dl.   Reference: Diabetes Care, volume 29, supplement 1 Jan. 2006                        HGB A1C ................. Approx. Mean Glucose   _______________________________________________   6%   ...............................  120 mg/dl   7%   ...............................  150 mg/dl   8%   ...............................  180 mg/dl   9%   ...............................  210 mg/dl   10%  ...............................  240 mg/dl  Performed at [address] [address]       LDL Calculated   Date/Time Value Ref Range Status   07/23/2019 10:40 AM  65 - 130 MG/DL Final    Unable to report calculated LDL due to increased triglycerides. Direct     Comment:      LDL to be run.     VLDL   Date/Time Value Ref Range Status   09/21/2020 09:58 PM 52 (H) 0 - 40 mg/dL Final      Last I/O:  No intake/output data recorded.    Past  "Cardiology Tests (Last 3 Years):  EKG:  Normal sinus rhythm with incomplete right bundle branch block and T wave inversions in the anterior leads    Echo:  No results found for this or any previous visit from the past 1095 days.    Ejection Fractions:  No results found for: \"EF\"  Cath:  No results found for this or any previous visit from the past 1095 days.    Stress Test:  No results found for this or any previous visit from the past 1095 days.    Cardiac Imaging:  No results found for this or any previous visit from the past 1095 days.      Past Medical History:  She has a past medical history of Anxiety, Depression, GERD (gastroesophageal reflux disease), Lupus (CMS/HCC), Personal history of other specified conditions (2020), and Pulmonary hypertension (CMS/[facility]).    Past Surgical History:  She has a past surgical history that includes Other surgical history (2020); Other surgical history (2020); Other surgical history (2020); Other surgical history (2020); Other surgical history (2020); Hysterectomy; and Cholecystectomy.      Social History:  She reports that she has quit smoking. Her smoking use included cigarettes. She has quit using smokeless tobacco. She reports current alcohol use. She reports current drug use. Drug: \"Crack\" cocaine.    Family History:  Patient's mother is alive at age 87 with no significant medical problems.  Patient's father  at age 69 with heart disease.     Allergies:  Keppra [levetiracetam]    Inpatient Medications:  Scheduled medications   Medication Dose Route Frequency    aspirin  81 mg oral Daily    cholestyramine  4 g oral BID with meals    gabapentin  300 mg oral BID    heparin (porcine)  5,000 Units subcutaneous q8h [facility]    hydroxychloroquine  200 mg oral BID    macitentan  10 mg oral Daily    magnesium chloride  64 mg oral BID    melatonin  3 mg oral Nightly    pantoprazole  40 mg oral Daily before breakfast    venlafaxine XR  150 mg oral " Daily     PRN medications   Medication    acetaminophen    loperamide    ondansetron     Continuous Medications   Medication Dose Last Rate    epoprostenol (Veletri) 5 mcg/mL in sodium chloride 0.9% 100 mL Cassette for Pump   74 ng/kg/min (Order-Specific)       Outpatient Medications:  Current Outpatient Medications   Medication Instructions    aspirin 81 mg, oral, Daily    calcium carbonate-vitamin D3 500 mg-5 mcg (200 unit) tablet TAKE 2 TABLETS BY MOUTH TWO TIMES A DAY    cholestyramine (Questran) 4 gram packet MIX 1 PACKET IN LIQUID AND DRINK BY MOUTH TWO TIMES A DAY    furosemide (Lasix) 40 mg tablet TAKE 1 TABLET BY MOUTH ONE TIME DAILY    gabapentin (NEURONTIN) 300 mg, oral, 2 times daily    hydroxychloroquine (PLAQUENIL) 200 mg, oral, 2 times daily    loperamide (IMODIUM A-D) 2 mg, oral, 4 times daily PRN    magnesium chloride (MagDelay) 64 mg EC tablet TAKE 1 TABLET BY MOUTH TWO TIMES A DAY    melatonin 3 mg, oral, Nightly    omeprazole (PRILOSEC) 40 mg, oral, Daily before breakfast, Do not crush or chew.    Opsumit 10 mg, oral, Daily    potassium chloride CR (Klor-Con M20) 20 mEq ER tablet TAKE 1 TABLET BY MOUTH TWO TIMES A DAY WITH MEALS    sodium chloride 0.9% parenteral solution 100 mL with epoprostenol 0.1 mg/mL recon soln 74 ng/kg/min, intravenous, Continuous    venlafaxine XR (EFFEXOR-XR) 150 mg, oral, Daily, Do not crush or chew.       Physical Exam:  Constitutional:       Appearance: Not in distress.   Eyes:      Conjunctiva/sclera: Conjunctivae normal.   HENT:    Mouth/Throat:      Pharynx: Oropharynx is clear.   Neck:      Vascular: No carotid bruit. JVD normal.   Pulmonary:      Breath sounds: Normal breath sounds. No wheezing. No rales.   Cardiovascular:      Regular rhythm.      Murmurs: There is no murmur.      No gallop.  No click. No rub.   Abdominal:      Palpations: Abdomen is soft.      Tenderness: There is no abdominal tenderness.   Musculoskeletal:         General: No deformity.  Neurological:      General: No focal deficit present.           Assessment/Plan   Possible non-ST elevation myocardial infarction  Severe pulmonary hypertension  Cocaine abuse  Systemic lupus erythematosus    1/8: Had acute shortness of breath symptoms with elevation in troponin suggestive of a possible non-ST elevation myocardial infarction.  T wave inversions in the anterior precordial leads are unchanged from previous EKGs.  With the patient's severe pulmonary hypertension the troponin elevation could potentially reflect increased strain on the right ventricle from the shortness of breath symptoms.  Also this could represent a standard non-ST elevation myocardial infarction secondary to obstructive coronary disease.  And also vasospastic disease secondary to her cocaine use could be a potential cause as well.  At this point have recommended a diagnostic cardiac catheterization to define her coronary anatomy.  Have discussed the risk of stroke, death, myocardial infarction, bleeding complications and renal failure.  The patient understands these risks and wishes to proceed.  Will attempt to have the patient transferred to [facility] today for a diagnostic cardiac catheterization with Dr. Calvillo.      Peripheral IV 01/07/24 20 G Antecubital (Active)   Site Assessment Clean;Dry;Intact 01/07/24 2143   Dressing Type Transparent 01/07/24 2143   Line Status Blood return noted;Flushed 01/07/24 2143   Dressing Status Clean;Dry 01/07/24 2143   Number of days: 1       Code Status:  Full Code    I spent 45 minutes in the professional and overall care of this patient.        Brett Sofia DO

## 2024-01-08 NOTE — PROGRESS NOTES
TCSW met [facility] with pt.  TCSW introduced self and discussed role.  Pt denies having any skilled needs upon discharge.

## 2024-01-08 NOTE — CONSULTS
Consults  History Of Present Illness:    Ronna Beckham is a 63 y.o. female presenting with shortness of breath.  The patient carries a history of pulmonary hypertension and follows with Dr. Prescott at [facility].  She is also followed with my partner, Dr. Christiano Calvillo, in the past as well.  She states that yesterday she had the sudden onset of shortness of breath symptoms.  It was bad enough that she called 911 and was brought to the emergency department.  She states that after they placed oxygen therapy her symptoms improved significantly.  Sensitivity troponins were measured at 20, 60 and 62.  Her twelve-lead EKG revealed a sinus rhythm with an incomplete right bundle branch block and T wave inversions in the anterior leads.  This is unchanged from her previous EKGs.  He is resting comfortably this morning with no complaints.  Last Recorded Vitals:  Vitals:    01/08/24 0400 01/08/24 0500 01/08/24 0600 01/08/24 0721   BP: 107/85 110/74 112/74 113/69   BP Location:    Right arm   Patient Position:    Lying   Pulse: 81 82 78 86   Resp: 17 18 21 16   Temp:       TempSrc:       SpO2: 91% 93% 95% 92%   Weight:       Height:           Last Labs:  CBC - 1/8/2024:  5:26 AM  4.0 10.0 113    32.3      CMP - 1/8/2024:  5:26 AM  6.9 6.0 8 --- 0.9   4.1 3.5 6 82      PTT - 9/7/2023:  5:28 PM  1.3   13.7 31.9     BNP   Date/Time Value Ref Range Status   04/13/2022 04:23  (H) 0 - 99 pg/mL Final     Comment:     .  <100 pg/mL - Heart failure unlikely  100-299 pg/mL - Intermediate probability of acute heart  .               failure exacerbation. Correlate with clinical  .               context and patient history.    >=300 pg/mL - Heart Failure likely. Correlate with clinical  .               context and patient history.   Biotin interference may cause falsely decreased results.   Patients taking a Biotin dose of up to 5 mg/day should   refrain from taking Biotin for 24 hours before sample    collection. Providers may contact their local laboratory   for further information.     03/09/2022 04:46  (H) 0 - 99 pg/mL Final     Comment:     .  <100 pg/mL - Heart failure unlikely  100-299 pg/mL - Intermediate probability of acute heart  .               failure exacerbation. Correlate with clinical  .               context and patient history.    >=300 pg/mL - Heart Failure likely. Correlate with clinical  .               context and patient history.   Biotin interference may cause falsely decreased results.   Patients taking a Biotin dose of up to 5 mg/day should   refrain from taking Biotin for 24 hours before sample   collection. Providers may contact their local laboratory   for further information.       Hemoglobin A1C   Date/Time Value Ref Range Status   08/19/2019 01:50 PM 5.6 4.0 - 6.0 % Final     Comment:     Hemoglobin A1C levels are related to mean blood glucose during the   preceding 2-3 months. The relationship table below may be used as a   general guide. Each 1% increase in HGB A1C is a reflection of an   increase in mean glucose of approximately 30 mg/dl.   Reference: Diabetes [employer], volume 29, supplement 1 Jan. 2006                        HGB A1C ................. Approx. Mean Glucose   _______________________________________________   6%   ...............................  120 mg/dl   7%   ...............................  150 mg/dl   8%   ...............................  180 mg/dl   9%   ...............................  210 mg/dl   10%  ...............................  240 mg/dl  Performed at [address] [address]       LDL Calculated   Date/Time Value Ref Range Status   07/23/2019 10:40 AM  65 - 130 MG/DL Final    Unable to report calculated LDL due to increased triglycerides. Direct     Comment:      LDL to be run.     VLDL   Date/Time Value Ref Range Status   09/21/2020 09:58 PM 52 (H) 0 - 40 mg/dL Final      Last I/O:  No intake/output data recorded.    Past  "Cardiology Tests (Last 3 Years):  EKG:  Normal sinus rhythm with incomplete right bundle branch block and T wave inversions in the anterior leads    Echo:  No results found for this or any previous visit from the past 1095 days.    Ejection Fractions:  No results found for: \"EF\"  Cath:  No results found for this or any previous visit from the past 1095 days.    Stress Test:  No results found for this or any previous visit from the past 1095 days.    Cardiac Imaging:  No results found for this or any previous visit from the past 1095 days.      Past Medical History:  She has a past medical history of Anxiety, Depression, GERD (gastroesophageal reflux disease), Lupus (CMS/HCC), Personal history of other specified conditions (2020), and Pulmonary hypertension (CMS/[facility]).    Past Surgical History:  She has a past surgical history that includes Other surgical history (2020); Other surgical history (2020); Other surgical history (2020); Other surgical history (2020); Other surgical history (2020); Hysterectomy; and Cholecystectomy.      Social History:  She reports that she has quit smoking. Her smoking use included cigarettes. She has quit using smokeless tobacco. She reports current alcohol use. She reports current drug use. Drug: \"Crack\" cocaine.    Family History:  Patient's mother is alive at age 87 with no significant medical problems.  Patient's father  at age 69 with heart disease.     Allergies:  Keppra [levetiracetam]    Inpatient Medications:  Scheduled medications   Medication Dose Route Frequency    aspirin  81 mg oral Daily    cholestyramine  4 g oral BID with meals    gabapentin  300 mg oral BID    heparin (porcine)  5,000 Units subcutaneous q8h [facility]    hydroxychloroquine  200 mg oral BID    macitentan  10 mg oral Daily    magnesium chloride  64 mg oral BID    melatonin  3 mg oral Nightly    pantoprazole  40 mg oral Daily before breakfast    venlafaxine XR  150 mg oral " Daily     PRN medications   Medication    acetaminophen    loperamide    ondansetron     Continuous Medications   Medication Dose Last Rate    epoprostenol (Veletri) 5 mcg/mL in sodium chloride 0.9% 100 mL Cassette for Pump   74 ng/kg/min (Order-Specific)       Outpatient Medications:  Current Outpatient Medications   Medication Instructions    aspirin 81 mg, oral, Daily    calcium carbonate-vitamin D3 500 mg-5 mcg (200 unit) tablet TAKE 2 TABLETS BY MOUTH TWO TIMES A DAY    cholestyramine (Questran) 4 gram packet MIX 1 PACKET IN LIQUID AND DRINK BY MOUTH TWO TIMES A DAY    furosemide (Lasix) 40 mg tablet TAKE 1 TABLET BY MOUTH ONE TIME DAILY    gabapentin (NEURONTIN) 300 mg, oral, 2 times daily    hydroxychloroquine (PLAQUENIL) 200 mg, oral, 2 times daily    loperamide (IMODIUM A-D) 2 mg, oral, 4 times daily PRN    magnesium chloride (MagDelay) 64 mg EC tablet TAKE 1 TABLET BY MOUTH TWO TIMES A DAY    melatonin 3 mg, oral, Nightly    omeprazole (PRILOSEC) 40 mg, oral, Daily before breakfast, Do not crush or chew.    Opsumit 10 mg, oral, Daily    potassium chloride CR (Klor-Con M20) 20 mEq ER tablet TAKE 1 TABLET BY MOUTH TWO TIMES A DAY WITH MEALS    sodium chloride 0.9% parenteral solution 100 mL with epoprostenol 0.1 mg/mL recon soln 74 ng/kg/min, intravenous, Continuous    venlafaxine XR (EFFEXOR-XR) 150 mg, oral, Daily, Do not crush or chew.       Physical Exam:  Constitutional:       Appearance: Not in distress.   Eyes:      Conjunctiva/sclera: Conjunctivae normal.   HENT:    Mouth/Throat:      Pharynx: Oropharynx is clear.   Neck:      Vascular: No carotid bruit. JVD normal.   Pulmonary:      Breath sounds: Normal breath sounds. No wheezing. No rales.   Cardiovascular:      Regular rhythm.      Murmurs: There is no murmur.      No gallop.  No click. No rub.   Abdominal:      Palpations: Abdomen is soft.      Tenderness: There is no abdominal tenderness.   Musculoskeletal:         General: No deformity.  Neurological:      General: No focal deficit present.           Assessment/Plan   Possible non-ST elevation myocardial infarction  Severe pulmonary hypertension  Cocaine abuse  Systemic lupus erythematosus    1/8: Had acute shortness of breath symptoms with elevation in troponin suggestive of a possible non-ST elevation myocardial infarction.  T wave inversions in the anterior precordial leads are unchanged from previous EKGs.  With the patient's severe pulmonary hypertension the troponin elevation could potentially reflect increased strain on the right ventricle from the shortness of breath symptoms.  Also this could represent a standard non-ST elevation myocardial infarction secondary to obstructive coronary disease.  And also vasospastic disease secondary to her cocaine use could be a potential cause as well.  At this point have recommended a diagnostic cardiac catheterization to define her coronary anatomy.  Have discussed the risk of stroke, death, myocardial infarction, bleeding complications and renal failure.  The patient understands these risks and wishes to proceed.  Will attempt to have the patient transferred to [facility] today for a diagnostic cardiac catheterization with Dr. Calvillo.      Peripheral IV 01/07/24 20 G Antecubital (Active)   Site Assessment Clean;Dry;Intact 01/07/24 2143   Dressing Type Transparent 01/07/24 2143   Line Status Blood return noted;Flushed 01/07/24 2143   Dressing Status Clean;Dry 01/07/24 2143   Number of days: 1       Code Status:  Full Code    I spent 45 minutes in the professional and overall care of this patient.        Brett Sofia DO

## 2024-01-08 NOTE — POST-PROCEDURE NOTE
Physician Transition of Care Summary  Invasive Cardiovascular Lab    Procedure Date: 1/8/2024  Attending:    * Christiano Calvillo - Primary  Resident/Fellow/Other Assistant: Surgeon(s) and Role:    Indications:   Pre-op Diagnosis     * NSTEMI (non-ST elevated myocardial infarction) (CMS/HCC) [I21.4]    Post-procedure diagnosis:   Post-op Diagnosis     * NSTEMI (non-ST elevated myocardial infarction) (CMS/HCC) [I21.4]    Procedure(s):     * Left Heart Cath, With LV, Angriography And Grafts      Procedure Findings:   Mild nonobstructive coronary artery disease    Description of the Procedure:   The patient was brought to the Cath Lab in fasting state and prepped and draped in sterile fashion with particular attention to the right wrist.  A formal timeout was performed to ensure proper patient as well as site indication procedure.  1% lidocaine solution was used to anesthetize the area overlying the right radial artery just proximal to wrist.  A standard radial needle was used for access and ultimately upsized to a 6 [place] Islander radial sheath.  An intra-arterial admixture of 5 mg verapamil and 200 mcg nitroglycerin was injected via the sheath.  We then proceed with the diagnostic portion exam advancing a 6 [place]er JR 5 diagnostic catheter over a hydrophilic baby J-wire to the level of the coronary cusps.  The wire was withdrawn and the catheter was aspirated and flushed.  We engaged the catheter into the right coronary artery performed angiography and disengage.  We then remove this catheter and exchanged it in standard exchange fashion for a 6 [place] Islander AL 3.5 diagnostic catheter.  The wire was withdrawn and the catheter was aspirated and flushed.  We then engaged the catheter into the left main performed angiography and disengaged.   The catheter was removed over a wire.  A TR band was placed with adequate hemostasis upon removal of the sheath.    LMCA-large-caliber vessel that bifurcates in the LAD and left circumflex arteries.   It contains luminal irregularities.    LAD-also large-caliber vessel gives rise to a moderate-sized first diagonal and subsequent small diagonal branches as well as a recurrent apical branch.  There is also an extensive septal network.  The first diagonal has a 40% stenosis in the proximal portion.  The remainder of the LAD system has mild luminal irregularities.    Circumflex-nondominant vessel giving rise to 2 obtuse marginal branches and a posterolateral branch.  There are mild luminal irregularities throughout the system.    RCA-dominant vessel giving rise to the right posterior descending artery and an extensive posterolateral network.  There are mild to moderate luminal irregularities throughout the system.    LVEDP 8 mmHg    Complications:   None    Stents/Implants:   None    Anticoagulation/Antiplatelet Plan:   None    Estimated Blood Loss:   10 mL    Anesthesia: Moderate Sedation Anesthesia Staff: No anesthesia staff entered.    Any Specimen(s) Removed:   No specimens collected during this procedure.      Electronically signed by: Christiano Calvillo DO, 1/8/2024 3:50 PM

## 2024-01-08 NOTE — Clinical Note
Patient Clipped and Prepped: right groin and right radial. Prepped with ChloraPrep, a minimum of 3 minute dry time, longer if needed, no pooling noted, patient draped in sterile fashion. Normal for race

## 2024-01-08 NOTE — PROGRESS NOTES
"Ronna Beckham is a 63 y.o. female on day 0 of admission presenting with Shortness of breath.    Subjective   Patient seen and examined.  No documented concerns/events from nursing.  Patient seen by cardiology and plan is to have heart cath at [facility].  Patient denies any dyspnea, chest pain at present time.       Objective     Physical Exam  Constitutional:       General: She is not in acute distress.     Appearance: She is ill-appearing. She is not toxic-appearing.   Cardiovascular:      Rate and Rhythm: Normal rate and regular rhythm.      Pulses: Normal pulses.      Heart sounds: Normal heart sounds.   Pulmonary:      Effort: Pulmonary effort is normal. No respiratory distress.      Breath sounds: Normal breath sounds. No wheezing or rales.      Comments: Oxygen @ 4L via N/C  Abdominal:      General: Bowel sounds are normal.      Palpations: Abdomen is soft.   Musculoskeletal:         General: Normal range of motion.   Skin:     General: Skin is warm and dry.   Neurological:      General: No focal deficit present.      Mental Status: She is alert and oriented to person, place, and time.         Last Recorded Vitals  Blood pressure 113/69, pulse 86, temperature 36.6 °C (97.9 °F), temperature source Oral, resp. rate 16, height 1.651 m (5' 5\"), weight 81.9 kg (180 lb 8.9 oz), SpO2 92 %.  Intake/Output last 3 Shifts:  No intake/output data recorded.    Relevant Results  Scheduled medications  aspirin, 81 mg, oral, Daily  cholestyramine, 4 g, oral, BID with meals  gabapentin, 300 mg, oral, BID  heparin (porcine), 5,000 Units, subcutaneous, q8h [facility]  hydroxychloroquine, 200 mg, oral, BID  macitentan, 10 mg, oral, Daily  magnesium chloride, 64 mg, oral, BID  melatonin, 3 mg, oral, Nightly  pantoprazole, 40 mg, oral, Daily before breakfast  venlafaxine XR, 150 mg, oral, Daily      Continuous medications  epoprostenol (Veletri) 5 mcg/mL in sodium chloride 0.9% 100 mL Cassette for Pump , 74 ng/kg/min " (Order-Specific)      PRN medications  PRN medications: acetaminophen, loperamide, ondansetron     Results for orders placed or performed during the hospital encounter of 01/07/24 (from the past 24 hour(s))   CBC   Result Value Ref Range    WBC 5.2 4.4 - 11.3 x10*3/uL    nRBC 0.0 0.0 - 0.0 /100 WBCs    RBC 4.57 4.00 - 5.20 x10*6/uL    Hemoglobin 11.9 (L) 12.0 - 16.0 g/dL    Hematocrit 37.8 36.0 - 46.0 %    MCV 83 80 - 100 fL    MCH 26.0 26.0 - 34.0 pg    MCHC 31.5 (L) 32.0 - 36.0 g/dL    RDW 14.2 11.5 - 14.5 %    Platelets 128 (L) 150 - 450 x10*3/uL   Comprehensive Metabolic Panel   Result Value Ref Range    Glucose 149 (H) 65 - 99 mg/dL    Sodium 135 133 - 145 mmol/L    Potassium 4.5 3.4 - 5.1 mmol/L    Chloride 102 97 - 107 mmol/L    Bicarbonate 20 (L) 24 - 31 mmol/L    Urea Nitrogen 15 8 - 25 mg/dL    Creatinine 1.20 0.40 - 1.60 mg/dL    eGFR 52 (L) >60 mL/min/1.73m*2    Calcium 7.3 (L) 8.5 - 10.4 mg/dL    Albumin 3.8 3.5 - 5.0 g/dL    Alkaline Phosphatase 98 35 - 125 U/L    Total Protein 6.5 5.9 - 7.9 g/dL    AST 13 5 - 40 U/L    Bilirubin, Total 1.0 0.1 - 1.2 mg/dL    ALT 7 5 - 40 U/L    Anion Gap 13 <=19 mmol/L   Serial Troponin, Initial ([facility])   Result Value Ref Range    Troponin T, High Sensitivity 20 (H) <=15 ng/L   NT-PROBNP   Result Value Ref Range    PROBNP 4,260 (H) 0 - 226 pg/mL   Serial Troponin, 2 Hour ([facility])   Result Value Ref Range    Troponin T, High Sensitivity 60 (H) <=15 ng/L   Serial Troponin, 6 Hour ([facility])   Result Value Ref Range    Troponin T, High Sensitivity 62 (H) <=15 ng/L   Troponin T   Result Value Ref Range    Troponin T, High Sensitivity 62 (H) <=15 ng/L   CBC   Result Value Ref Range    WBC 4.0 (L) 4.4 - 11.3 x10*3/uL    nRBC 0.0 0.0 - 0.0 /100 WBCs    RBC 3.85 (L) 4.00 - 5.20 x10*6/uL    Hemoglobin 10.0 (L) 12.0 - 16.0 g/dL    Hematocrit 32.3 (L) 36.0 - 46.0 %    MCV 84 80 - 100 fL    MCH 26.0 26.0 - 34.0 pg    MCHC 31.0 (L) 32.0 - 36.0 g/dL    RDW 14.3 11.5 - 14.5 %    Platelets  113 (L) 150 - 450 x10*3/uL   Comprehensive metabolic panel   Result Value Ref Range    Glucose 106 (H) 65 - 99 mg/dL    Sodium 139 133 - 145 mmol/L    Potassium 4.5 3.4 - 5.1 mmol/L    Chloride 107 97 - 107 mmol/L    Bicarbonate 24 24 - 31 mmol/L    Urea Nitrogen 15 8 - 25 mg/dL    Creatinine 1.20 0.40 - 1.60 mg/dL    eGFR 52 (L) >60 mL/min/1.73m*2    Calcium 6.9 (L) 8.5 - 10.4 mg/dL    Albumin 3.5 3.5 - 5.0 g/dL    Alkaline Phosphatase 82 35 - 125 U/L    Total Protein 6.0 5.9 - 7.9 g/dL    AST 8 5 - 40 U/L    Bilirubin, Total 0.9 0.1 - 1.2 mg/dL    ALT 6 5 - 40 U/L    Anion Gap 8 <=19 mmol/L                          Assessment/Plan   Principal Problem:    Shortness of breath     Dyspnea  -wears home oxygen @ 4L baseline  -CTA no evidence of PE    Pulmonary HTN  - Veletri infusion, follows at [facility]  -ECHO with an EF 55-60% (9/6/23)    Elevated troponin/NSTEMI  -Cardiology consult  -telemetry  -plan for cardiac catheterization at [facility]    Cocaine Abuse/ETOH abuse  - consult for outpt referrals  -Encourage cessation    GERD  -continue PPI    Plan  -Above plan discussed with pt and she is in agreement  -Plan for cardiac cath today       I spent 30 minutes in the professional and overall care of this patient.      Tami Self, APRN-CNP

## 2024-01-08 NOTE — PROGRESS NOTES
Pt currently off campus to [facility] for heart cath.  Will discuss with care transitions to provide resources for substance use disorder upon her return.

## 2024-01-08 NOTE — H&P
History Of Present Illness  Ronna Beckham is a 63 y.o. female presenting with dyspnea.  Patient is a 63-year-old female patient with past medical history of GERD, lupus, anxiety and depression, and pulmonary hypertension; presented at [facility] with sudden onset of shortness of breath today at noon, patient called 911, patient was placed on nonrebreather, was brought to the emergency room where she was weaned back down to her home oxygen, 4 L nasal cannula.  Patient does admit to using cocaine this morning.  Patient currently is alert oriented x 3, denies any chest pain or shortness of breath.  Patient denies ever having any chest pain, did report some diaphoresis, but denies nausea vomiting abdominal pain.  Denies any fevers or chills.  Patient with pulmonary hypertension, she is receiving Veletri infusion and Opsumit for pulmonary hypertension.  Chest x-ray showingMild cardiomegaly and perihilar congestion and pulmonary edema,unchanged. No consolidation.  Labs: BNP elevated 4260, troponin #1-20, #2-60, #3 is pending    Patient will be admitted for cardiology evaluation for elevated troponin and dyspnea/hypoxia.  Past Medical History  Past Medical History:   Diagnosis Date    Anxiety     Depression     GERD (gastroesophageal reflux disease)     Lupus (CMS/HCC)     Personal history of other specified conditions 2020    History of seizure    Pulmonary hypertension (CMS/HCC)        Surgical History  Past Surgical History:   Procedure Laterality Date    CHOLECYSTECTOMY      HYSTERECTOMY      OTHER SURGICAL HISTORY  2020    Cholecystectomy    OTHER SURGICAL HISTORY  2020    Esophagogastroduodenoscopy    OTHER SURGICAL HISTORY  2020    Colonoscopy    OTHER SURGICAL HISTORY  2020     section    OTHER SURGICAL HISTORY  2020    Hysterectomy        Social History  She reports that she has quit smoking. Her smoking use included cigarettes. She has quit using  "smokeless tobacco. She reports current alcohol use. She reports current drug use. Drug: \"Crack\" cocaine.    Family History  Family History   Problem Relation Name Age of Onset    No Known Problems Mother      No Known Problems Father          Allergies  Keppra [levetiracetam]    Review of Systems   Constitutional:  Positive for diaphoresis.   Respiratory:  Positive for shortness of breath.    All other systems reviewed and are negative.       Physical Exam  Vitals reviewed.   HENT:      Head: Normocephalic and atraumatic.      Nose: Nose normal.      Mouth/Throat:      Mouth: Mucous membranes are moist.   Eyes:      Extraocular Movements: Extraocular movements intact.   Cardiovascular:      Rate and Rhythm: Regular rhythm.   Pulmonary:      Comments: Diminished lower lobes, chronic 4 L of nasal cannula  Abdominal:      General: Bowel sounds are normal.   Musculoskeletal:         General: Normal range of motion.      Cervical back: Neck supple.   Skin:     General: Skin is warm and dry.   Neurological:      Mental Status: She is alert and oriented to person, place, and time.          Last Recorded Vitals  Blood pressure 103/72, pulse 81, temperature 36.6 °C (97.9 °F), temperature source Oral, resp. rate 23, height 1.651 m (5' 5\"), weight 81.9 kg (180 lb 8.9 oz), SpO2 95 %.    Relevant Results      Results for orders placed or performed during the hospital encounter of 01/07/24 (from the past 24 hour(s))   CBC   Result Value Ref Range    WBC 5.2 4.4 - 11.3 x10*3/uL    nRBC 0.0 0.0 - 0.0 /100 WBCs    RBC 4.57 4.00 - 5.20 x10*6/uL    Hemoglobin 11.9 (L) 12.0 - 16.0 g/dL    Hematocrit 37.8 36.0 - 46.0 %    MCV 83 80 - 100 fL    MCH 26.0 26.0 - 34.0 pg    MCHC 31.5 (L) 32.0 - 36.0 g/dL    RDW 14.2 11.5 - 14.5 %    Platelets 128 (L) 150 - 450 x10*3/uL   Comprehensive Metabolic Panel   Result Value Ref Range    Glucose 149 (H) 65 - 99 mg/dL    Sodium 135 133 - 145 mmol/L    Potassium 4.5 3.4 - 5.1 mmol/L    Chloride 102 97 " - 107 mmol/L    Bicarbonate 20 (L) 24 - 31 mmol/L    Urea Nitrogen 15 8 - 25 mg/dL    Creatinine 1.20 0.40 - 1.60 mg/dL    eGFR 52 (L) >60 mL/min/1.73m*2    Calcium 7.3 (L) 8.5 - 10.4 mg/dL    Albumin 3.8 3.5 - 5.0 g/dL    Alkaline Phosphatase 98 35 - 125 U/L    Total Protein 6.5 5.9 - 7.9 g/dL    AST 13 5 - 40 U/L    Bilirubin, Total 1.0 0.1 - 1.2 mg/dL    ALT 7 5 - 40 U/L    Anion Gap 13 <=19 mmol/L   Serial Troponin, Initial ([facility])   Result Value Ref Range    Troponin T, High Sensitivity 20 (H) <=15 ng/L   NT-PROBNP   Result Value Ref Range    PROBNP 4,260 (H) 0 - 226 pg/mL   Serial Troponin, 2 Hour ([facility])   Result Value Ref Range    Troponin T, High Sensitivity 60 (H) <=15 ng/L      XR chest 1 view    Result Date: 1/7/2024  Interpreted By:  Thad Harding, STUDY: XR CHEST 1 VIEW; 1/7/2024 4:18 pm   INDICATION: Signs/Symptoms:dyspnea   COMPARISON: 09/07/2023   ACCESSION NUMBER(S): [id number]   ORDERING CLINICIAN: BERTIN SAGASTUME   TECHNIQUE: Frontal  chest radiographs.   FINDINGS: The cardiomediastinal silhouette is enlarged.. Mild perihilar congestion and pulmonary edema, unchanged. No pleural effusion is identified.   The osseous structures are intact.       Mild cardiomegaly and perihilar congestion and pulmonary edema, unchanged. No consolidation.     Signed by: Thad Harding 1/7/2024 4:21 PM Dictation workstation:   [id number]       Assessment/Plan   Principal Problem:    Shortness of breath  -resolved  -Patient is normally on 4 L of nasal cannula, today with sudden onset of shortness of breath requiring 911, patient was placed on non-rebreather, now weaned back to her normal 4 L of nasal cannula.  Denies any chest pain or shortness of breath  -CT angio ordered for PE rule out    Pulmonary hypertension  -Continue with home medications; Veletri infusion and Opsumit-patient's home medications  - follows with Dr. Prescott  -Chronic 4 L nasal cannula    NSTEMI  -Elevated troponins;  20,60-continue to  trend  -Cardiology consultation  -Patient follows with Dr. Calvillo  -Echo 9/6/23-EF 55 to 60%, severe right ventricular enlargement    GERD  -PPI    Anxiety/depression  -Resume home medication    Drug abuse  -Patient drinks alcohol 2 large day and uses cocaine, she used cocaine today    DVT risk  -Subcu heparin    Discharge plan  -Rule out PE  -Cardiology evaluation for non-STEMI  -Follow labs/kidney function-attending slightly elevated from baseline  -Discharge home once cleared by cardiology    Case discussed with collaborating physician.       I spent 45 minutes in the professional and overall care of this patient.      John Aburto, APRN-CNP  Addendum:  I personally saw and examined Pt in the ED.  Currently in no pain.  Discussed with JAY Aburto NP. Agree with her assessment and plan.

## 2024-01-08 NOTE — PROGRESS NOTES
Pt being transferred to [facility] for cardiac cath.  Spoke with ED RN. Unable to put in SESAR order.  Called IT and they were also not able to put in order or resolve issue.  Ticket open and awaiting clinical analyst to call back.

## 2024-01-09 ENCOUNTER — APPOINTMENT (OUTPATIENT)
Dept: CARDIOLOGY | Facility: HOSPITAL | Age: 64
End: 2024-01-09
Payer: COMMERCIAL

## 2024-01-09 VITALS
TEMPERATURE: 98.1 F | OXYGEN SATURATION: 93 % | BODY MASS INDEX: 30.08 KG/M2 | WEIGHT: 180.56 LBS | HEART RATE: 94 BPM | DIASTOLIC BLOOD PRESSURE: 75 MMHG | SYSTOLIC BLOOD PRESSURE: 113 MMHG | HEIGHT: 65 IN | RESPIRATION RATE: 17 BRPM

## 2024-01-09 PROCEDURE — 93005 ELECTROCARDIOGRAM TRACING: CPT

## 2024-01-09 PROCEDURE — 94760 N-INVAS EAR/PLS OXIMETRY 1: CPT

## 2024-01-09 PROCEDURE — 2500000001 HC RX 250 WO HCPCS SELF ADMINISTERED DRUGS (ALT 637 FOR MEDICARE OP): Performed by: NURSE PRACTITIONER

## 2024-01-09 PROCEDURE — 2500000001 HC RX 250 WO HCPCS SELF ADMINISTERED DRUGS (ALT 637 FOR MEDICARE OP): Performed by: INTERNAL MEDICINE

## 2024-01-09 PROCEDURE — 99231 SBSQ HOSP IP/OBS SF/LOW 25: CPT | Performed by: NURSE PRACTITIONER

## 2024-01-09 PROCEDURE — 99232 SBSQ HOSP IP/OBS MODERATE 35: CPT | Performed by: INTERNAL MEDICINE

## 2024-01-09 PROCEDURE — G0378 HOSPITAL OBSERVATION PER HR: HCPCS

## 2024-01-09 PROCEDURE — 2500000004 HC RX 250 GENERAL PHARMACY W/ HCPCS (ALT 636 FOR OP/ED): Performed by: INTERNAL MEDICINE

## 2024-01-09 RX ADMIN — Medication 64 MG: at 09:04

## 2024-01-09 RX ADMIN — ASPIRIN 81 MG: 81 TABLET, COATED ORAL at 09:03

## 2024-01-09 RX ADMIN — GABAPENTIN 300 MG: 300 CAPSULE ORAL at 09:03

## 2024-01-09 RX ADMIN — LOPERAMIDE HYDROCHLORIDE 2 MG: 2 CAPSULE ORAL at 10:00

## 2024-01-09 RX ADMIN — VENLAFAXINE HYDROCHLORIDE 150 MG: 150 CAPSULE, EXTENDED RELEASE ORAL at 09:03

## 2024-01-09 RX ADMIN — PANTOPRAZOLE SODIUM 40 MG: 40 TABLET, DELAYED RELEASE ORAL at 05:32

## 2024-01-09 RX ADMIN — HYDROXYCHLOROQUINE SULFATE 200 MG: 200 TABLET ORAL at 09:03

## 2024-01-09 RX ADMIN — CHOLESTYRAMINE 4 G: 4 POWDER, FOR SUSPENSION ORAL at 09:05

## 2024-01-09 RX ADMIN — LOPERAMIDE HYDROCHLORIDE 2 MG: 2 CAPSULE ORAL at 06:30

## 2024-01-09 ASSESSMENT — PAIN - FUNCTIONAL ASSESSMENT: PAIN_FUNCTIONAL_ASSESSMENT: 0-10

## 2024-01-09 ASSESSMENT — PAIN SCALES - GENERAL: PAINLEVEL_OUTOF10: 0 - NO PAIN

## 2024-01-09 NOTE — PROGRESS NOTES
"Ronna Beckham is a 63 y.o. female on day 0 of admission presenting with Shortness of breath.    Subjective   Patient states she feels well with no significant shortness of breath.  She states she is ready for discharge home.       Objective     Physical Exam  Eyes:      Conjunctiva/sclera: Conjunctivae normal.   Cardiovascular:      Rate and Rhythm: Normal rate and regular rhythm.      Heart sounds:      No gallop.   Pulmonary:      Breath sounds: Normal breath sounds. No wheezing, rhonchi or rales.   Abdominal:      Palpations: Abdomen is soft.   Neurological:      General: No focal deficit present.      Mental Status: She is alert.       Constitutional:       Appearance: Not in distress.   Eyes:      Conjunctiva/sclera: Conjunctivae normal.   Neck:      Vascular: JVD normal.   Pulmonary:      Breath sounds: Normal breath sounds. No wheezing. No rhonchi. No rales.   Cardiovascular:      Normal rate. Regular rhythm.      Murmurs: There is no murmur.      No gallop.  No click. No rub.   Abdominal:      Palpations: Abdomen is soft.   Neurological:      General: No focal deficit present.      Mental Status: Alert.        Last Recorded Vitals  Blood pressure 132/81, pulse 90, temperature 36.4 °C (97.5 °F), temperature source Oral, resp. rate 19, height 1.651 m (5' 5\"), weight 81.9 kg (180 lb 8.9 oz), SpO2 93 %.  Intake/Output last 3 Shifts:  I/O last 3 completed shifts:  In: - (0 mL/kg)   Out: 10 (0.1 mL/kg) [Blood:10]  Weight: 81.9 kg     Relevant Results                             Assessment/Plan   Principal Problem:    Shortness of breath    Possible non-ST elevation myocardial infarction  Severe pulmonary hypertension  Cocaine abuse  Systemic lupus erythematosus     1/8: Had acute shortness of breath symptoms with elevation in troponin suggestive of a possible non-ST elevation myocardial infarction.  T wave inversions in the anterior precordial leads are unchanged from previous EKGs.  With the patient's severe " pulmonary hypertension the troponin elevation could potentially reflect increased strain on the right ventricle from the shortness of breath symptoms.  Also this could represent a standard non-ST elevation myocardial infarction secondary to obstructive coronary disease.  And also vasospastic disease secondary to her cocaine use could be a potential cause as well.  At this point have recommended a diagnostic cardiac catheterization to define her coronary anatomy.  Have discussed the risk of stroke, death, myocardial infarction, bleeding complications and renal failure.  The patient understands these risks and wishes to proceed.  Will attempt to have the patient transferred to [facility] today for a diagnostic cardiac catheterization with Dr. Calvillo.    1/9: Reviewed cardiac catheterization and patient had only minor nonobstructive coronary disease.  Troponin elevation likely just a type II injury secondary to myocardial strain from patient's shortness of breath and underlying pulmonary hypertension.  Clinically the patient is doing quite well today.  Would okay her discharge home and then have a routine follow-up appointment with Dr. Calvillo.       I spent 20 minutes in the professional and overall care of this patient.      Brett Sofia, DO

## 2024-01-09 NOTE — CONSULTS
Inpatient consult to Psychiatry  Consult performed by: MICHEL Alcantara-CNP  Consult ordered by: Nelia Mcconnell MD  Reason for consult: cocaine abuse      Requested Care Transitions to provide substance use disorder resources.  Pt reported using cocaine on the day of her admission and reported drinking 2 large beers a day.  Will sign off. Please reconsult if needed.

## 2024-01-09 NOTE — DISCHARGE SUMMARY
Discharge Diagnosis  Shortness of breath    Issues Requiring Follow-Up  Chest pain    Discharge Meds     Your medication list        CONTINUE taking these medications        Instructions Last Dose Given Next Dose Due   aspirin 81 mg EC tablet           cholestyramine 4 gram packet  Commonly known as: Questran      MIX 1 PACKET IN LIQUID AND DRINK BY MOUTH TWO TIMES A DAY       furosemide 40 mg tablet  Commonly known as: Lasix      TAKE 1 TABLET BY MOUTH ONE TIME DAILY       gabapentin 300 mg capsule  Commonly known as: Neurontin           hydroxychloroquine 200 mg tablet  Commonly known as: Plaquenil           Klor-Con M20 20 mEq ER tablet  Generic drug: potassium chloride CR      TAKE 1 TABLET BY MOUTH TWO TIMES A DAY WITH MEALS       loperamide 2 mg tablet  Commonly known as: Imodium A-D           Mag 64 64 mg EC tablet  Generic drug: magnesium chloride      TAKE 1 TABLET BY MOUTH TWO TIMES A DAY       melatonin 3 mg capsule           omeprazole 40 mg DR capsule  Commonly known as: PriLOSEC           Opsumit 10 mg tablet tablet  Generic drug: macitentan           Oyster Shell Calcium-Vit D3 500 mg-5 mcg (200 unit) tablet  Generic drug: calcium carbonate-vitamin D3      TAKE 2 TABLETS BY MOUTH TWO TIMES A DAY       sodium chloride 0.9% parenteral solution 100 mL with epoprostenol 0.1 mg/mL recon soln           venlafaxine  mg 24 hr capsule  Commonly known as: Effexor-XR                    Test Results Pending At Discharge  Pending Labs       No current pending labs.            Hospital Course  HPI from admission  Ronna Beckham is a 63 y.o. female presenting with dyspnea.  Patient is a 63-year-old female patient with past medical history of GERD, lupus, anxiety and depression, and pulmonary hypertension; presented at [facility] with sudden onset of shortness of breath today at noon, patient called 911, patient was placed on nonrebreather, was brought to the emergency room where she was weaned  back down to her home oxygen, 4 L nasal cannula.  Patient does admit to using cocaine this morning.  Patient currently is alert oriented x 3, denies any chest pain or shortness of breath.  Patient denies ever having any chest pain, did report some diaphoresis, but denies nausea vomiting abdominal pain.  Denies any fevers or chills.  Patient with pulmonary hypertension, she is receiving Veletri infusion and Opsumit for pulmonary hypertension.  Chest x-ray showingMild cardiomegaly and perihilar congestion and pulmonary edema,unchanged. No consolidation.  Labs: BNP elevated 4260, troponin #1-20, #2-60, #3 is pending     Patient will be admitted for cardiology evaluation for elevated troponin and dyspnea/hypoxia.    Hospital course patient was seen by cardiology and she had a cardiac catheterization done at [facility].  No intervention was done.  Patient is cleared by cardiology to be discharged.  Will follow-up with Dr. Calvillo on an outpatient basis.  Patient will be discharged home in stable condition.  She has been given resources for outpatient substance use disorders and has been advised to stop using cocaine.  She will follow-up with her PCP and cardiology on an outpatient basis    Case and plan discussed collaborating physician    Pertinent Physical Exam At Time of Discharge  Physical Exam  Constitutional:       General: She is not in acute distress.     Appearance: She is ill-appearing. She is not toxic-appearing.   Cardiovascular:      Rate and Rhythm: Normal rate and regular rhythm.      Pulses: Normal pulses.      Heart sounds: Normal heart sounds.   Pulmonary:      Effort: Pulmonary effort is normal. No respiratory distress.      Breath sounds: Normal breath sounds. No wheezing or rales.      Comments: Oxygen @ 4L via N/C  Abdominal:      General: Bowel sounds are normal.      Palpations: Abdomen is soft.   Musculoskeletal:         General: Normal range of motion.   Skin:     General: Skin is warm and  dry.   Neurological:      General: No focal deficit present.      Mental Status: She is alert and oriented to person, place, and time.     Outpatient Follow-Up  No future appointments.      MICHEL Case-CNP

## 2024-02-19 LAB
ATRIAL RATE: 95 BPM
P AXIS: 77 DEGREES
P OFFSET: 192 MS
P ONSET: 134 MS
PR INTERVAL: 158 MS
Q ONSET: 213 MS
QRS COUNT: 16 BEATS
QRS DURATION: 108 MS
QT INTERVAL: 416 MS
QTC CALCULATION(BAZETT): 522 MS
QTC FREDERICIA: 484 MS
R AXIS: 140 DEGREES
T AXIS: -5 DEGREES
T OFFSET: 421 MS
VENTRICULAR RATE: 95 BPM

## 2024-03-06 DIAGNOSIS — I27.20 PULMONARY HYPERTENSION (MULTI): Primary | ICD-10-CM

## 2024-03-11 RX ORDER — MACITENTAN 10 MG/1
TABLET, FILM COATED ORAL
Qty: 30 TABLET | Refills: 11 | Status: SHIPPED | OUTPATIENT
Start: 2024-03-11

## 2024-03-26 ENCOUNTER — DOCUMENTATION (OUTPATIENT)
Dept: PULMONOLOGY | Facility: HOSPITAL | Age: 64
End: 2024-03-26
Payer: COMMERCIAL

## 2024-03-26 ENCOUNTER — HOSPITAL ENCOUNTER (EMERGENCY)
Facility: HOSPITAL | Age: 64
Discharge: HOME | End: 2024-03-26
Payer: COMMERCIAL

## 2024-03-26 VITALS
DIASTOLIC BLOOD PRESSURE: 74 MMHG | OXYGEN SATURATION: 93 % | BODY MASS INDEX: 29.49 KG/M2 | WEIGHT: 177 LBS | RESPIRATION RATE: 20 BRPM | HEIGHT: 65 IN | SYSTOLIC BLOOD PRESSURE: 130 MMHG | TEMPERATURE: 98.4 F | HEART RATE: 100 BPM

## 2024-03-26 DIAGNOSIS — I27.20 PULMONARY HYPERTENSION (MULTI): ICD-10-CM

## 2024-03-26 DIAGNOSIS — R23.8 SKIN IRRITATION: Primary | ICD-10-CM

## 2024-03-26 PROCEDURE — 99281 EMR DPT VST MAYX REQ PHY/QHP: CPT

## 2024-03-26 RX ORDER — CELECOXIB 100 MG/1
100 CAPSULE ORAL 2 TIMES DAILY
COMMUNITY
Start: 2023-06-11 | End: 2024-05-13 | Stop reason: ALTCHOICE

## 2024-03-26 RX ORDER — EPOPROSTENOL 1.5 MG/10ML
1.5 INJECTION, POWDER, LYOPHILIZED, FOR SOLUTION INTRAVENOUS CONTINUOUS
COMMUNITY
Start: 2023-10-26 | End: 2024-03-26 | Stop reason: SDUPTHER

## 2024-03-26 RX ORDER — EPOPROSTENOL 1.5 MG/10ML
1.5 INJECTION, POWDER, LYOPHILIZED, FOR SOLUTION INTRAVENOUS CONTINUOUS
Qty: 180 EACH | Refills: 11 | Status: SHIPPED | OUTPATIENT
Start: 2024-03-26 | End: 2025-03-26

## 2024-03-26 RX ORDER — TADALAFIL 20 MG/1
20 TABLET ORAL DAILY
COMMUNITY
Start: 2021-01-28 | End: 2024-04-24

## 2024-03-26 RX ORDER — PREDNISONE 20 MG/1
40 TABLET ORAL DAILY
COMMUNITY
Start: 2023-05-09 | End: 2023-05-14

## 2024-03-26 RX ORDER — LISINOPRIL 10 MG/1
10 TABLET ORAL DAILY
COMMUNITY
Start: 2023-04-23 | End: 2024-05-13 | Stop reason: ALTCHOICE

## 2024-03-26 RX ORDER — DICYCLOMINE HYDROCHLORIDE 10 MG/1
10 CAPSULE ORAL DAILY
COMMUNITY
Start: 2023-08-11 | End: 2024-05-13 | Stop reason: ALTCHOICE

## 2024-03-26 RX ORDER — FOLIC ACID 1 MG/1
1 TABLET ORAL DAILY
COMMUNITY
Start: 2023-11-07 | End: 2024-05-13 | Stop reason: ALTCHOICE

## 2024-03-26 RX ORDER — ARIPIPRAZOLE 10 MG/1
10 TABLET ORAL EVERY EVENING
COMMUNITY
Start: 2023-06-11 | End: 2024-05-13 | Stop reason: ALTCHOICE

## 2024-03-26 RX ORDER — THIAMINE HCL 100 MG
100 TABLET ORAL DAILY
COMMUNITY
Start: 2023-07-14 | End: 2024-05-13 | Stop reason: ALTCHOICE

## 2024-03-26 RX ORDER — HYDROXYZINE HYDROCHLORIDE 25 MG/1
25 TABLET, FILM COATED ORAL EVERY 8 HOURS PRN
COMMUNITY
Start: 2023-07-10 | End: 2024-05-13 | Stop reason: ALTCHOICE

## 2024-03-26 RX ORDER — CHLORHEXIDINE GLUCONATE ORAL RINSE 1.2 MG/ML
15 SOLUTION DENTAL AS NEEDED
COMMUNITY
Start: 2023-07-12 | End: 2024-05-13 | Stop reason: ALTCHOICE

## 2024-03-26 ASSESSMENT — PAIN SCALES - GENERAL: PAINLEVEL_OUTOF10: 2

## 2024-03-26 ASSESSMENT — PAIN - FUNCTIONAL ASSESSMENT: PAIN_FUNCTIONAL_ASSESSMENT: 0-10

## 2024-03-26 ASSESSMENT — COLUMBIA-SUICIDE SEVERITY RATING SCALE - C-SSRS
6. HAVE YOU EVER DONE ANYTHING, STARTED TO DO ANYTHING, OR PREPARED TO DO ANYTHING TO END YOUR LIFE?: NO
1. IN THE PAST MONTH, HAVE YOU WISHED YOU WERE DEAD OR WISHED YOU COULD GO TO SLEEP AND NOT WAKE UP?: NO
2. HAVE YOU ACTUALLY HAD ANY THOUGHTS OF KILLING YOURSELF?: NO

## 2024-03-26 ASSESSMENT — PAIN DESCRIPTION - PROGRESSION: CLINICAL_PROGRESSION: GRADUALLY WORSENING

## 2024-03-26 ASSESSMENT — PAIN DESCRIPTION - ORIENTATION: ORIENTATION: LEFT;UPPER

## 2024-03-26 ASSESSMENT — PAIN DESCRIPTION - DESCRIPTORS: DESCRIPTORS: ACHING

## 2024-03-26 ASSESSMENT — PAIN DESCRIPTION - FREQUENCY: FREQUENCY: CONSTANT/CONTINUOUS

## 2024-03-26 ASSESSMENT — PAIN DESCRIPTION - ONSET: ONSET: GRADUAL

## 2024-03-26 ASSESSMENT — PAIN DESCRIPTION - LOCATION: LOCATION: CHEST

## 2024-03-26 ASSESSMENT — PAIN DESCRIPTION - PAIN TYPE: TYPE: ACUTE PAIN

## 2024-03-26 NOTE — ED PROVIDER NOTES
"HPI   Chief Complaint   Patient presents with    Vascular Access Problem     Around port on chest is red and itchy since cleaning, pt generally feels run down       HPI  See my MDM                  No data recorded                   Patient History   Past Medical History:   Diagnosis Date    Anxiety     Depression     GERD (gastroesophageal reflux disease)     Lupus (CMS/HCC)     Personal history of other specified conditions 2020    History of seizure    Pulmonary hypertension (CMS/HCC)      Past Surgical History:   Procedure Laterality Date    CARDIAC CATHETERIZATION N/A 2024    Procedure: Left Heart Cath, With LV, Angriography And Grafts;  Surgeon: Christiano Calvillo DO;  Location: [facility] Cardiac Cath Lab;  Service: Cardiovascular;  Laterality: N/A;    CHOLECYSTECTOMY      HYSTERECTOMY      OTHER SURGICAL HISTORY  2020    Cholecystectomy    OTHER SURGICAL HISTORY  2020    Esophagogastroduodenoscopy    OTHER SURGICAL HISTORY  2020    Colonoscopy    OTHER SURGICAL HISTORY  2020     section    OTHER SURGICAL HISTORY  2020    Hysterectomy     Family History   Problem Relation Name Age of Onset    No Known Problems Mother      No Known Problems Father       Social History     Tobacco Use    Smoking status: Former     Types: Cigarettes    Smokeless tobacco: Former   Substance Use Topics    Alcohol use: Yes     Comment: 2 large beer/day    Drug use: Yes     Types: \"Crack\" cocaine     Comment: today       Physical Exam   ED Triage Vitals [24 1502]   Temperature Heart Rate Respirations BP   36.9 °C (98.4 °F) 100 20 130/74      Pulse Ox Temp Source Heart Rate Source Patient Position   (!) 93 % Oral Monitor Sitting      BP Location FiO2 (%)     Right arm --       Physical Exam  CONSTITUTIONAL: Vital signs reviewed as charted, well-developed and in no distress  Eyes: Extraocular muscles are intact. Pupils equal round and reactive to light. Conjunctiva are pink.    ENT: Mucous " membranes are moist. Tongue in the midline. Pharynx was without erythema or exudates, uvula midline  LUNGS: Breath sounds equal and clear to auscultation. Good air exchange, no wheezes rales or retractions, pulse oximetry is charted.  HEART: Regular rate and rhythm without murmur thrill or rub, strong tones, auscultation is normal.  ABDOMEN: Soft and nontender without guarding rebound rigidity or mass. Bowel sounds are present and normal in all quadrants. There is no palpable masses or aneurysms identified. No hepatosplenomegaly, normal abdominal exam.  Neuro: The patient is awake, alert and oriented ×3. Moving all 4 extremities and answering questions appropriately.   MUSCULOSKELETAL: The calves are nontender to palpation. Full gross active range of motion.   PSYCH: Awake alert oriented, normal mood and affect.  Skin:  Dry, normal color, warm to the touch, no rash present.  Tunneled catheter in the left upper chest wall no drainage noted, no evidence of infection present.    ED Course & MDM   Diagnoses as of 03/26/24 1512   Skin irritation       Medical Decision Making  History obtained from: patient    Vital signs, nursing notes, current medications, past medical history, Surgical history, allergies, social history, family History were reviewed.         HPI:  Patient 63-year-old female whom has a tunneled catheter in the left upper chest wall she cleaned it today with alcohol swabs and then since then has been having some burning to the site.  Her PCP recommended she come here for evaluation as he does not know anything about the tunnel catheter.  Examination shows no evidence of infection.  No fever chills or night sweats.  No nausea vomiting diarrhea.  No cough or congestion.  She denies dizziness, chest pain, shortness of breath, abdominal pain or lower extremity edema.  She is nontoxic well-appearing      10 point ROS was reviewed and negative except Noted above in HPI.  DDX: as listed above          MDM  Summary/considerations:  Patient with normal examination, I do feel the irritation is more likely from the alcohol she was scrubbing her skin with.  She was discharged home stable condition will follow PCP as needed.    All of the patient's questions were answered to the best of my ability.  Patient states understanding that they have been screened for an emergency today and we have not found any etiology of symptoms that requires emergent treatment or admission to the hospital at this point. They understand that they have not had definitive care day and require follow-up for treatment of their condition. They also state understanding that they may have an emergent condition that may potentially have not of detected at this visit and they must return to the emergency department if they develop any worsening of symptoms or new complaints.        Not warranted at this time              Critical Care:        This chart was completed using voice recognition transcription software. Please excuse any errors of transcription including grammatical, punctuation, syntax and spelling errors.  Please contact me with any questions regarding this chart.    Procedure  Procedures     MICHEL Busby-OSBALDO  03/26/24 [id number]

## 2024-04-09 ENCOUNTER — TELEPHONE (OUTPATIENT)
Dept: PULMONOLOGY | Facility: HOSPITAL | Age: 64
End: 2024-04-09
Payer: COMMERCIAL

## 2024-04-22 ENCOUNTER — DOCUMENTATION (OUTPATIENT)
Dept: PULMONOLOGY | Facility: HOSPITAL | Age: 64
End: 2024-04-22
Payer: COMMERCIAL

## 2024-04-22 NOTE — PROGRESS NOTES
Returned patient call. Endorsing increasing SOB and dizziness interfering with ability to complete ADL's. Reporting that these symptoms have been increasing over the past couple of weeks. Continues that she plans on coming to [facility] ED 4/23.

## 2024-04-23 ENCOUNTER — HOSPITAL ENCOUNTER (OUTPATIENT)
Facility: HOSPITAL | Age: 64
Setting detail: OBSERVATION
LOS: 1 days | Discharge: HOME | End: 2024-04-24
Attending: EMERGENCY MEDICINE | Admitting: EMERGENCY MEDICINE
Payer: COMMERCIAL

## 2024-04-23 ENCOUNTER — APPOINTMENT (OUTPATIENT)
Dept: CARDIOLOGY | Facility: CLINIC | Age: 64
End: 2024-04-23
Payer: COMMERCIAL

## 2024-04-23 ENCOUNTER — APPOINTMENT (OUTPATIENT)
Dept: RADIOLOGY | Facility: HOSPITAL | Age: 64
End: 2024-04-23
Payer: COMMERCIAL

## 2024-04-23 ENCOUNTER — CLINICAL SUPPORT (OUTPATIENT)
Dept: EMERGENCY MEDICINE | Facility: HOSPITAL | Age: 64
End: 2024-04-23
Payer: COMMERCIAL

## 2024-04-23 DIAGNOSIS — E83.51 HYPOCALCEMIA: ICD-10-CM

## 2024-04-23 DIAGNOSIS — R06.09 DYSPNEA ON EXERTION: ICD-10-CM

## 2024-04-23 DIAGNOSIS — I27.20 PULMONARY HTN (MULTI): Primary | ICD-10-CM

## 2024-04-23 DIAGNOSIS — E83.42 HYPOMAGNESEMIA: ICD-10-CM

## 2024-04-23 PROBLEM — H02.849 EDEMA OF EYELID: Status: ACTIVE | Noted: 2024-04-23

## 2024-04-23 PROBLEM — R55 SYNCOPE AND COLLAPSE: Status: ACTIVE | Noted: 2024-04-23

## 2024-04-23 PROBLEM — T82.9XXA CENTRAL LINE COMPLICATION: Status: ACTIVE | Noted: 2024-03-26

## 2024-04-23 PROBLEM — H01.006 BLEPHARITIS OF BOTH EYES: Status: ACTIVE | Noted: 2024-04-23

## 2024-04-23 PROBLEM — I26.09 ACUTE COR PULMONALE (MULTI): Status: ACTIVE | Noted: 2024-04-23

## 2024-04-23 PROBLEM — R60.0 LOWER LEG EDEMA: Status: ACTIVE | Noted: 2024-04-23

## 2024-04-23 PROBLEM — F32.A DEPRESSIVE DISORDER: Status: ACTIVE | Noted: 2024-03-21

## 2024-04-23 PROBLEM — R92.8 ABNORMAL FINDINGS ON DIAGNOSTIC IMAGING OF BREAST: Status: ACTIVE | Noted: 2024-04-23

## 2024-04-23 PROBLEM — E87.8 ELECTROLYTE AND FLUID DISORDER: Status: ACTIVE | Noted: 2023-05-31

## 2024-04-23 PROBLEM — I10 PRIMARY HYPERTENSION: Status: ACTIVE | Noted: 2023-07-13

## 2024-04-23 PROBLEM — D69.6 ACQUIRED THROMBOCYTOPENIA (CMS-HCC): Status: ACTIVE | Noted: 2023-06-01

## 2024-04-23 PROBLEM — F19.10 DRUG ABUSE (MULTI): Status: ACTIVE | Noted: 2024-04-23

## 2024-04-23 PROBLEM — K76.9 LIVER DISEASE: Status: ACTIVE | Noted: 2024-04-23

## 2024-04-23 PROBLEM — R09.81 NASAL CONGESTION: Status: ACTIVE | Noted: 2024-04-23

## 2024-04-23 PROBLEM — J02.9 SORE THROAT: Status: ACTIVE | Noted: 2024-04-23

## 2024-04-23 PROBLEM — I50.810 RIGHT VENTRICULAR FAILURE (MULTI): Status: ACTIVE | Noted: 2024-04-23

## 2024-04-23 PROBLEM — M32.9 SYSTEMIC LUPUS ERYTHEMATOSUS (MULTI): Chronic | Status: ACTIVE | Noted: 2021-08-03

## 2024-04-23 PROBLEM — G47.00 INSOMNIA: Status: ACTIVE | Noted: 2021-08-03

## 2024-04-23 PROBLEM — H92.03 EAR PAIN, BILATERAL: Status: ACTIVE | Noted: 2024-03-21

## 2024-04-23 PROBLEM — E66.9 OBESITY: Status: ACTIVE | Noted: 2024-04-23

## 2024-04-23 PROBLEM — G40.909 SEIZURE DISORDER (MULTI): Status: ACTIVE | Noted: 2024-04-23

## 2024-04-23 PROBLEM — I27.21 PAH (PULMONARY ARTERY HYPERTENSION) (MULTI): Status: ACTIVE | Noted: 2024-04-23

## 2024-04-23 PROBLEM — K57.90 DIVERTICULOSIS: Status: ACTIVE | Noted: 2024-04-23

## 2024-04-23 PROBLEM — M70.21 OLECRANON BURSITIS, RIGHT ELBOW: Status: ACTIVE | Noted: 2021-08-03

## 2024-04-23 PROBLEM — R42 DIZZINESS: Status: ACTIVE | Noted: 2024-03-21

## 2024-04-23 PROBLEM — H60.502 ACUTE OTITIS EXTERNA OF LEFT EAR: Status: ACTIVE | Noted: 2024-03-21

## 2024-04-23 PROBLEM — K81.2 ACUTE ON CHRONIC CHOLECYSTITIS: Status: ACTIVE | Noted: 2024-04-23

## 2024-04-23 PROBLEM — R20.2 PARESTHESIA: Status: ACTIVE | Noted: 2023-05-31

## 2024-04-23 PROBLEM — E78.5 HYPERLIPIDEMIA: Status: ACTIVE | Noted: 2024-04-23

## 2024-04-23 PROBLEM — R53.83 FATIGUE: Status: ACTIVE | Noted: 2024-04-23

## 2024-04-23 PROBLEM — R94.31 ELECTROCARDIOGRAM ABNORMAL: Status: ACTIVE | Noted: 2023-05-31

## 2024-04-23 PROBLEM — K21.9 GASTROESOPHAGEAL REFLUX DISEASE: Status: ACTIVE | Noted: 2020-11-25

## 2024-04-23 PROBLEM — H01.009 BLEPHARITIS: Status: ACTIVE | Noted: 2024-04-23

## 2024-04-23 PROBLEM — M79.643 HAND PAIN: Status: ACTIVE | Noted: 2024-04-23

## 2024-04-23 PROBLEM — I51.89 RIGHT VENTRICULAR SYSTOLIC DYSFUNCTION: Status: ACTIVE | Noted: 2024-04-23

## 2024-04-23 PROBLEM — F10.11 HISTORY OF ALCOHOL ABUSE: Status: ACTIVE | Noted: 2024-03-21

## 2024-04-23 PROBLEM — Z90.710 ACQUIRED ABSENCE OF CERVIX: Status: ACTIVE | Noted: 2022-11-04

## 2024-04-23 PROBLEM — J44.9 CHRONIC OBSTRUCTIVE PULMONARY DISEASE (MULTI): Status: ACTIVE | Noted: 2023-06-01

## 2024-04-23 PROBLEM — J96.10 CHRONIC RESPIRATORY FAILURE (MULTI): Status: ACTIVE | Noted: 2022-11-11

## 2024-04-23 PROBLEM — H01.003 BLEPHARITIS OF BOTH EYES: Status: ACTIVE | Noted: 2024-04-23

## 2024-04-23 PROBLEM — R94.4 DECREASED GFR: Status: ACTIVE | Noted: 2024-03-21

## 2024-04-23 PROBLEM — F41.9 ANXIETY: Status: ACTIVE | Noted: 2023-07-13

## 2024-04-23 PROBLEM — F10.20 ALCOHOL DEPENDENCE, UNCOMPLICATED (MULTI): Chronic | Status: ACTIVE | Noted: 2021-08-03

## 2024-04-23 PROBLEM — R68.89 INCREASED OXYGEN DEMAND: Status: ACTIVE | Noted: 2024-03-21

## 2024-04-23 PROBLEM — R05.9 COUGH: Status: ACTIVE | Noted: 2024-03-21

## 2024-04-23 PROBLEM — F32.1 MAJOR DEPRESSIVE DISORDER, SINGLE EPISODE, MODERATE (MULTI): Chronic | Status: ACTIVE | Noted: 2021-08-03

## 2024-04-23 PROBLEM — F15.10 METHAMPHETAMINE ABUSE (MULTI): Status: ACTIVE | Noted: 2024-04-23

## 2024-04-23 PROBLEM — I25.2 HISTORY OF NON-ST ELEVATION MYOCARDIAL INFARCTION (NSTEMI): Status: ACTIVE | Noted: 2024-03-21

## 2024-04-23 PROBLEM — E87.20 ACIDOSIS, UNSPECIFIED: Status: ACTIVE | Noted: 2023-07-13

## 2024-04-23 PROBLEM — L03.90 CELLULITIS: Status: ACTIVE | Noted: 2024-04-23

## 2024-04-23 PROBLEM — K08.89 TOOTHACHE: Status: ACTIVE | Noted: 2024-04-23

## 2024-04-23 PROBLEM — K04.7 DENTAL INFECTION: Status: ACTIVE | Noted: 2024-04-04

## 2024-04-23 PROBLEM — G62.9 NEUROPATHY: Status: ACTIVE | Noted: 2024-03-21

## 2024-04-23 LAB
ALBUMIN SERPL BCP-MCNC: 3.7 G/DL (ref 3.4–5)
ALBUMIN SERPL BCP-MCNC: 3.7 G/DL (ref 3.4–5)
ALP SERPL-CCNC: 59 U/L (ref 33–136)
ALT SERPL W P-5'-P-CCNC: 9 U/L (ref 7–45)
AMPHETAMINES UR QL SCN: ABNORMAL
ANION GAP BLDV CALCULATED.4IONS-SCNC: 13 MMOL/L (ref 10–25)
ANION GAP SERPL CALC-SCNC: 14 MMOL/L (ref 10–20)
ANION GAP SERPL CALC-SCNC: 16 MMOL/L (ref 10–20)
APPEARANCE UR: CLEAR
AST SERPL W P-5'-P-CCNC: 11 U/L (ref 9–39)
B-HCG SERPL-ACNC: <3 MIU/ML
BARBITURATES UR QL SCN: ABNORMAL
BASE EXCESS BLDV CALC-SCNC: -0.8 MMOL/L (ref -2–3)
BASOPHILS # BLD AUTO: 0 X10*3/UL (ref 0–0.1)
BASOPHILS NFR BLD AUTO: 0 %
BENZODIAZ UR QL SCN: ABNORMAL
BILIRUB SERPL-MCNC: 1 MG/DL (ref 0–1.2)
BILIRUB UR STRIP.AUTO-MCNC: NEGATIVE MG/DL
BNP SERPL-MCNC: 384 PG/ML (ref 0–99)
BODY TEMPERATURE: 37 DEGREES CELSIUS
BUN SERPL-MCNC: 14 MG/DL (ref 6–23)
BUN SERPL-MCNC: 15 MG/DL (ref 6–23)
BZE UR QL SCN: ABNORMAL
CA-I BLDV-SCNC: 0.76 MMOL/L (ref 1.1–1.33)
CALCIUM SERPL-MCNC: 6 MG/DL (ref 8.6–10.6)
CALCIUM SERPL-MCNC: 6.3 MG/DL (ref 8.6–10.6)
CANNABINOIDS UR QL SCN: ABNORMAL
CARDIAC TROPONIN I PNL SERPL HS: 26 NG/L (ref 0–34)
CARDIAC TROPONIN I PNL SERPL HS: 31 NG/L (ref 0–34)
CHLORIDE BLDV-SCNC: 107 MMOL/L (ref 98–107)
CHLORIDE SERPL-SCNC: 107 MMOL/L (ref 98–107)
CHLORIDE SERPL-SCNC: 109 MMOL/L (ref 98–107)
CO2 SERPL-SCNC: 22 MMOL/L (ref 21–32)
CO2 SERPL-SCNC: 23 MMOL/L (ref 21–32)
COLOR UR: YELLOW
CREAT SERPL-MCNC: 0.81 MG/DL (ref 0.5–1.05)
CREAT SERPL-MCNC: 0.91 MG/DL (ref 0.5–1.05)
CRP SERPL-MCNC: 0.12 MG/DL
D DIMER PPP FEU-MCNC: 1853 NG/ML FEU
EGFRCR SERPLBLD CKD-EPI 2021: 72 ML/MIN/1.73M*2
EGFRCR SERPLBLD CKD-EPI 2021: 83 ML/MIN/1.73M*2
EOSINOPHIL # BLD AUTO: 0 X10*3/UL (ref 0–0.7)
EOSINOPHIL NFR BLD AUTO: 0 %
ERYTHROCYTE [DISTWIDTH] IN BLOOD BY AUTOMATED COUNT: 15.4 % (ref 11.5–14.5)
ERYTHROCYTE [SEDIMENTATION RATE] IN BLOOD BY WESTERGREN METHOD: 15 MM/H (ref 0–30)
ETHANOL SERPL-MCNC: <10 MG/DL
FENTANYL+NORFENTANYL UR QL SCN: ABNORMAL
FLUAV RNA RESP QL NAA+PROBE: NOT DETECTED
FLUBV RNA RESP QL NAA+PROBE: NOT DETECTED
GLUCOSE BLDV-MCNC: 97 MG/DL (ref 74–99)
GLUCOSE SERPL-MCNC: 108 MG/DL (ref 74–99)
GLUCOSE SERPL-MCNC: 92 MG/DL (ref 74–99)
GLUCOSE UR STRIP.AUTO-MCNC: NORMAL MG/DL
HCO3 BLDV-SCNC: 23.4 MMOL/L (ref 22–26)
HCT VFR BLD AUTO: 35.4 % (ref 36–46)
HCT VFR BLD EST: 35 % (ref 36–46)
HGB BLD-MCNC: 11.3 G/DL (ref 12–16)
HGB BLDV-MCNC: 11.7 G/DL (ref 12–16)
IMM GRANULOCYTES # BLD AUTO: 0.02 X10*3/UL (ref 0–0.7)
IMM GRANULOCYTES NFR BLD AUTO: 0.5 % (ref 0–0.9)
INHALED O2 CONCENTRATION: 36 %
KETONES UR STRIP.AUTO-MCNC: NEGATIVE MG/DL
LACTATE BLDV-SCNC: 0.7 MMOL/L (ref 0.4–2)
LEUKOCYTE ESTERASE UR QL STRIP.AUTO: NEGATIVE
LYMPHOCYTES # BLD AUTO: 0.97 X10*3/UL (ref 1.2–4.8)
LYMPHOCYTES NFR BLD AUTO: 22.7 %
MAGNESIUM SERPL-MCNC: 0.6 MG/DL (ref 1.6–2.4)
MAGNESIUM SERPL-MCNC: 1.46 MG/DL (ref 1.6–2.4)
MCH RBC QN AUTO: 24.9 PG (ref 26–34)
MCHC RBC AUTO-ENTMCNC: 31.9 G/DL (ref 32–36)
MCV RBC AUTO: 78 FL (ref 80–100)
METHADONE UR QL SCN: ABNORMAL
MONOCYTES # BLD AUTO: 0.21 X10*3/UL (ref 0.1–1)
MONOCYTES NFR BLD AUTO: 4.9 %
MUCOUS THREADS #/AREA URNS AUTO: NORMAL /LPF
NEUTROPHILS # BLD AUTO: 3.08 X10*3/UL (ref 1.2–7.7)
NEUTROPHILS NFR BLD AUTO: 71.9 %
NITRITE UR QL STRIP.AUTO: NEGATIVE
NRBC BLD-RTO: 0 /100 WBCS (ref 0–0)
OPIATES UR QL SCN: ABNORMAL
OXYCODONE+OXYMORPHONE UR QL SCN: ABNORMAL
OXYHGB MFR BLDV: 62.6 % (ref 45–75)
PCO2 BLDV: 36 MM HG (ref 41–51)
PCP UR QL SCN: ABNORMAL
PH BLDV: 7.42 PH (ref 7.33–7.43)
PH UR STRIP.AUTO: 5.5 [PH]
PHOSPHATE SERPL-MCNC: 4 MG/DL (ref 2.5–4.9)
PHOSPHATE SERPL-MCNC: 4.3 MG/DL (ref 2.5–4.9)
PLATELET # BLD AUTO: 140 X10*3/UL (ref 150–450)
PO2 BLDV: 43 MM HG (ref 35–45)
POTASSIUM BLDV-SCNC: 3.9 MMOL/L (ref 3.5–5.3)
POTASSIUM SERPL-SCNC: 3.4 MMOL/L (ref 3.5–5.3)
POTASSIUM SERPL-SCNC: 3.8 MMOL/L (ref 3.5–5.3)
PROT SERPL-MCNC: 6.7 G/DL (ref 6.4–8.2)
PROT UR STRIP.AUTO-MCNC: ABNORMAL MG/DL
RBC # BLD AUTO: 4.54 X10*6/UL (ref 4–5.2)
RBC # UR STRIP.AUTO: NEGATIVE /UL
RBC #/AREA URNS AUTO: NORMAL /HPF
SAO2 % BLDV: 63 % (ref 45–75)
SARS-COV-2 RNA RESP QL NAA+PROBE: NOT DETECTED
SODIUM BLDV-SCNC: 139 MMOL/L (ref 136–145)
SODIUM SERPL-SCNC: 141 MMOL/L (ref 136–145)
SODIUM SERPL-SCNC: 143 MMOL/L (ref 136–145)
SP GR UR STRIP.AUTO: 1.03
SQUAMOUS #/AREA URNS AUTO: NORMAL /HPF
UROBILINOGEN UR STRIP.AUTO-MCNC: NORMAL MG/DL
WBC # BLD AUTO: 4.3 X10*3/UL (ref 4.4–11.3)
WBC #/AREA URNS AUTO: NORMAL /HPF

## 2024-04-23 PROCEDURE — 87636 SARSCOV2 & INF A&B AMP PRB: CPT

## 2024-04-23 PROCEDURE — 2500000001 HC RX 250 WO HCPCS SELF ADMINISTERED DRUGS (ALT 637 FOR MEDICARE OP): Performed by: STUDENT IN AN ORGANIZED HEALTH CARE EDUCATION/TRAINING PROGRAM

## 2024-04-23 PROCEDURE — 2500000006 HC RX 250 W HCPCS SELF ADMINISTERED DRUGS (ALT 637 FOR ALL PAYERS)

## 2024-04-23 PROCEDURE — 82077 ASSAY SPEC XCP UR&BREATH IA: CPT

## 2024-04-23 PROCEDURE — 81001 URINALYSIS AUTO W/SCOPE: CPT

## 2024-04-23 PROCEDURE — 80069 RENAL FUNCTION PANEL: CPT | Mod: CCI

## 2024-04-23 PROCEDURE — 2550000001 HC RX 255 CONTRASTS

## 2024-04-23 PROCEDURE — 96367 TX/PROPH/DG ADDL SEQ IV INF: CPT

## 2024-04-23 PROCEDURE — 36415 COLL VENOUS BLD VENIPUNCTURE: CPT

## 2024-04-23 PROCEDURE — 84295 ASSAY OF SERUM SODIUM: CPT

## 2024-04-23 PROCEDURE — 80321 ALCOHOLS BIOMARKERS 1OR 2: CPT

## 2024-04-23 PROCEDURE — 83735 ASSAY OF MAGNESIUM: CPT

## 2024-04-23 PROCEDURE — 2500000004 HC RX 250 GENERAL PHARMACY W/ HCPCS (ALT 636 FOR OP/ED): Mod: JZ,SE

## 2024-04-23 PROCEDURE — 2500000004 HC RX 250 GENERAL PHARMACY W/ HCPCS (ALT 636 FOR OP/ED): Mod: SE

## 2024-04-23 PROCEDURE — 2500000004 HC RX 250 GENERAL PHARMACY W/ HCPCS (ALT 636 FOR OP/ED)

## 2024-04-23 PROCEDURE — 96365 THER/PROPH/DIAG IV INF INIT: CPT | Mod: 59

## 2024-04-23 PROCEDURE — 2500000006 HC RX 250 W HCPCS SELF ADMINISTERED DRUGS (ALT 637 FOR ALL PAYERS): Performed by: STUDENT IN AN ORGANIZED HEALTH CARE EDUCATION/TRAINING PROGRAM

## 2024-04-23 PROCEDURE — 83880 ASSAY OF NATRIURETIC PEPTIDE: CPT

## 2024-04-23 PROCEDURE — 82805 BLOOD GASES W/O2 SATURATION: CPT

## 2024-04-23 PROCEDURE — 99285 EMERGENCY DEPT VISIT HI MDM: CPT | Mod: 25

## 2024-04-23 PROCEDURE — 96366 THER/PROPH/DIAG IV INF ADDON: CPT

## 2024-04-23 PROCEDURE — 71046 X-RAY EXAM CHEST 2 VIEWS: CPT | Performed by: RADIOLOGY

## 2024-04-23 PROCEDURE — 84702 CHORIONIC GONADOTROPIN TEST: CPT | Performed by: STUDENT IN AN ORGANIZED HEALTH CARE EDUCATION/TRAINING PROGRAM

## 2024-04-23 PROCEDURE — 71275 CT ANGIOGRAPHY CHEST: CPT | Performed by: STUDENT IN AN ORGANIZED HEALTH CARE EDUCATION/TRAINING PROGRAM

## 2024-04-23 PROCEDURE — 86140 C-REACTIVE PROTEIN: CPT

## 2024-04-23 PROCEDURE — 71046 X-RAY EXAM CHEST 2 VIEWS: CPT

## 2024-04-23 PROCEDURE — 93308 TTE F-UP OR LMTD: CPT

## 2024-04-23 PROCEDURE — 99223 1ST HOSP IP/OBS HIGH 75: CPT

## 2024-04-23 PROCEDURE — 2500000004 HC RX 250 GENERAL PHARMACY W/ HCPCS (ALT 636 FOR OP/ED): Mod: JZ

## 2024-04-23 PROCEDURE — 71275 CT ANGIOGRAPHY CHEST: CPT

## 2024-04-23 PROCEDURE — 84484 ASSAY OF TROPONIN QUANT: CPT

## 2024-04-23 PROCEDURE — 85652 RBC SED RATE AUTOMATED: CPT

## 2024-04-23 PROCEDURE — 80307 DRUG TEST PRSMV CHEM ANLYZR: CPT

## 2024-04-23 PROCEDURE — 2500000004 HC RX 250 GENERAL PHARMACY W/ HCPCS (ALT 636 FOR OP/ED): Performed by: STUDENT IN AN ORGANIZED HEALTH CARE EDUCATION/TRAINING PROGRAM

## 2024-04-23 PROCEDURE — 93005 ELECTROCARDIOGRAM TRACING: CPT

## 2024-04-23 PROCEDURE — 1200000002 HC GENERAL ROOM WITH TELEMETRY DAILY

## 2024-04-23 PROCEDURE — 85379 FIBRIN DEGRADATION QUANT: CPT

## 2024-04-23 PROCEDURE — 85025 COMPLETE CBC W/AUTO DIFF WBC: CPT

## 2024-04-23 PROCEDURE — 84100 ASSAY OF PHOSPHORUS: CPT

## 2024-04-23 PROCEDURE — 84132 ASSAY OF SERUM POTASSIUM: CPT

## 2024-04-23 RX ORDER — TADALAFIL 20 MG/1
20 TABLET ORAL DAILY
Status: DISCONTINUED | OUTPATIENT
Start: 2024-04-23 | End: 2024-04-23

## 2024-04-23 RX ORDER — DEXTROSE 50 % IN WATER (D50W) INTRAVENOUS SYRINGE
25
Status: DISCONTINUED | OUTPATIENT
Start: 2024-04-23 | End: 2024-04-24 | Stop reason: HOSPADM

## 2024-04-23 RX ORDER — TALC
6 POWDER (GRAM) TOPICAL NIGHTLY
Status: DISCONTINUED | OUTPATIENT
Start: 2024-04-23 | End: 2024-04-24 | Stop reason: HOSPADM

## 2024-04-23 RX ORDER — LISINOPRIL 5 MG/1
10 TABLET ORAL DAILY
Status: DISCONTINUED | OUTPATIENT
Start: 2024-04-23 | End: 2024-04-24 | Stop reason: HOSPADM

## 2024-04-23 RX ORDER — FERROUS SULFATE, DRIED 160(50) MG
2 TABLET, EXTENDED RELEASE ORAL 2 TIMES DAILY
Status: DISCONTINUED | OUTPATIENT
Start: 2024-04-23 | End: 2024-04-24 | Stop reason: HOSPADM

## 2024-04-23 RX ORDER — HYDROXYCHLOROQUINE SULFATE 200 MG/1
200 TABLET, FILM COATED ORAL 2 TIMES DAILY
Status: DISCONTINUED | OUTPATIENT
Start: 2024-04-23 | End: 2024-04-24 | Stop reason: HOSPADM

## 2024-04-23 RX ORDER — ASPIRIN 81 MG/1
81 TABLET ORAL DAILY
Status: DISCONTINUED | OUTPATIENT
Start: 2024-04-23 | End: 2024-04-24 | Stop reason: HOSPADM

## 2024-04-23 RX ORDER — CELECOXIB 100 MG/1
100 CAPSULE ORAL 2 TIMES DAILY
Status: DISCONTINUED | OUTPATIENT
Start: 2024-04-23 | End: 2024-04-24 | Stop reason: HOSPADM

## 2024-04-23 RX ORDER — MAGNESIUM SULFATE HEPTAHYDRATE 40 MG/ML
4 INJECTION, SOLUTION INTRAVENOUS ONCE
Status: COMPLETED | OUTPATIENT
Start: 2024-04-23 | End: 2024-04-23

## 2024-04-23 RX ORDER — HYDROXYZINE HYDROCHLORIDE 25 MG/1
25 TABLET, FILM COATED ORAL EVERY 8 HOURS PRN
Status: DISCONTINUED | OUTPATIENT
Start: 2024-04-23 | End: 2024-04-24 | Stop reason: HOSPADM

## 2024-04-23 RX ORDER — LANOLIN ALCOHOL/MO/W.PET/CERES
100 CREAM (GRAM) TOPICAL DAILY
Status: DISCONTINUED | OUTPATIENT
Start: 2024-04-23 | End: 2024-04-24 | Stop reason: HOSPADM

## 2024-04-23 RX ORDER — POTASSIUM CHLORIDE 20 MEQ/1
20 TABLET, EXTENDED RELEASE ORAL ONCE
Status: COMPLETED | OUTPATIENT
Start: 2024-04-23 | End: 2024-04-23

## 2024-04-23 RX ORDER — POTASSIUM CHLORIDE 20 MEQ/1
20 TABLET, EXTENDED RELEASE ORAL
Status: DISCONTINUED | OUTPATIENT
Start: 2024-04-23 | End: 2024-04-24 | Stop reason: HOSPADM

## 2024-04-23 RX ORDER — LOPERAMIDE HYDROCHLORIDE 2 MG/1
2 CAPSULE ORAL 4 TIMES DAILY PRN
Status: DISCONTINUED | OUTPATIENT
Start: 2024-04-23 | End: 2024-04-24 | Stop reason: HOSPADM

## 2024-04-23 RX ORDER — ARIPIPRAZOLE 5 MG/1
10 TABLET ORAL EVERY EVENING
Status: DISCONTINUED | OUTPATIENT
Start: 2024-04-23 | End: 2024-04-24 | Stop reason: HOSPADM

## 2024-04-23 RX ORDER — CALCIUM GLUCONATE 20 MG/ML
2 INJECTION, SOLUTION INTRAVENOUS ONCE
Status: COMPLETED | OUTPATIENT
Start: 2024-04-23 | End: 2024-04-23

## 2024-04-23 RX ORDER — EPOPROSTENOL 1.5 MG/10ML
1.5 INJECTION, POWDER, LYOPHILIZED, FOR SOLUTION INTRAVENOUS CONTINUOUS
Status: DISCONTINUED | OUTPATIENT
Start: 2024-04-23 | End: 2024-04-23 | Stop reason: ENTERED-IN-ERROR

## 2024-04-23 RX ORDER — PANTOPRAZOLE SODIUM 40 MG/1
40 TABLET, DELAYED RELEASE ORAL
Status: DISCONTINUED | OUTPATIENT
Start: 2024-04-24 | End: 2024-04-23

## 2024-04-23 RX ORDER — GABAPENTIN 300 MG/1
300 CAPSULE ORAL 2 TIMES DAILY
Status: DISCONTINUED | OUTPATIENT
Start: 2024-04-23 | End: 2024-04-24 | Stop reason: HOSPADM

## 2024-04-23 RX ORDER — ENOXAPARIN SODIUM 100 MG/ML
40 INJECTION SUBCUTANEOUS EVERY 24 HOURS
Status: DISCONTINUED | OUTPATIENT
Start: 2024-04-23 | End: 2024-04-24 | Stop reason: HOSPADM

## 2024-04-23 RX ORDER — VENLAFAXINE HYDROCHLORIDE 150 MG/1
150 CAPSULE, EXTENDED RELEASE ORAL DAILY
Status: DISCONTINUED | OUTPATIENT
Start: 2024-04-23 | End: 2024-04-24 | Stop reason: HOSPADM

## 2024-04-23 RX ORDER — FOLIC ACID 1 MG/1
1 TABLET ORAL DAILY
Status: DISCONTINUED | OUTPATIENT
Start: 2024-04-23 | End: 2024-04-24 | Stop reason: HOSPADM

## 2024-04-23 RX ORDER — DEXTROSE 50 % IN WATER (D50W) INTRAVENOUS SYRINGE
12.5
Status: DISCONTINUED | OUTPATIENT
Start: 2024-04-23 | End: 2024-04-24 | Stop reason: HOSPADM

## 2024-04-23 RX ADMIN — POTASSIUM CHLORIDE 20 MEQ: 1500 TABLET, EXTENDED RELEASE ORAL at 17:15

## 2024-04-23 RX ADMIN — MACITENTAN 10 MG: 10 TABLET, FILM COATED ORAL at 17:10

## 2024-04-23 RX ADMIN — VENLAFAXINE HYDROCHLORIDE 150 MG: 150 CAPSULE, EXTENDED RELEASE ORAL at 17:11

## 2024-04-23 RX ADMIN — POTASSIUM CHLORIDE 20 MEQ: 1500 TABLET, EXTENDED RELEASE ORAL at 18:28

## 2024-04-23 RX ADMIN — MAGNESIUM SULFATE 4 G: 4 INJECTION INTRAVENOUS at 12:34

## 2024-04-23 RX ADMIN — CELECOXIB 100 MG: 100 CAPSULE ORAL at 17:10

## 2024-04-23 RX ADMIN — MAGNESIUM SULFATE 4 G: 4 INJECTION INTRAVENOUS at 18:28

## 2024-04-23 RX ADMIN — GABAPENTIN 300 MG: 300 CAPSULE ORAL at 17:10

## 2024-04-23 RX ADMIN — LISINOPRIL 10 MG: 5 TABLET ORAL at 17:09

## 2024-04-23 RX ADMIN — ARIPIPRAZOLE 10 MG: 5 TABLET ORAL at 21:05

## 2024-04-23 RX ADMIN — Medication 2 TABLET: at 17:09

## 2024-04-23 RX ADMIN — IOHEXOL 75 ML: 350 INJECTION, SOLUTION INTRAVENOUS at 20:40

## 2024-04-23 RX ADMIN — HYDROXYCHLOROQUINE SULFATE 200 MG: 200 TABLET ORAL at 17:10

## 2024-04-23 RX ADMIN — THIAMINE HCL TAB 100 MG 100 MG: 100 TAB at 17:09

## 2024-04-23 RX ADMIN — ASPIRIN 81 MG: 81 TABLET, COATED ORAL at 17:09

## 2024-04-23 RX ADMIN — ENOXAPARIN SODIUM 40 MG: 100 INJECTION SUBCUTANEOUS at 17:10

## 2024-04-23 RX ADMIN — CALCIUM GLUCONATE 2 G: 20 INJECTION, SOLUTION INTRAVENOUS at 11:39

## 2024-04-23 RX ADMIN — EPOPROSTENOL 70 NG/KG/MIN: 1.5 INJECTION, POWDER, LYOPHILIZED, FOR SOLUTION INTRAVENOUS at 16:27

## 2024-04-23 RX ADMIN — CALCIUM GLUCONATE 2 G: 20 INJECTION, SOLUTION INTRAVENOUS at 18:28

## 2024-04-23 RX ADMIN — MELATONIN 6 MG: 3 TAB ORAL at 21:05

## 2024-04-23 RX ADMIN — FOLIC ACID 1 MG: 1 TABLET ORAL at 17:10

## 2024-04-23 ASSESSMENT — ENCOUNTER SYMPTOMS
EYE PAIN: 0
APNEA: 0
DYSURIA: 0
NAUSEA: 0
PALPITATIONS: 0
DIARRHEA: 1
VOMITING: 0
ABDOMINAL PAIN: 0
SHORTNESS OF BREATH: 1
STRIDOR: 0
DIFFICULTY URINATING: 0
WHEEZING: 0
FEVER: 0

## 2024-04-23 ASSESSMENT — PAIN SCALES - GENERAL
PAINLEVEL_OUTOF10: 0 - NO PAIN
PAINLEVEL_OUTOF10: 5 - MODERATE PAIN
PAINLEVEL_OUTOF10: 0 - NO PAIN

## 2024-04-23 ASSESSMENT — LIFESTYLE VARIABLES
EVER FELT BAD OR GUILTY ABOUT YOUR DRINKING: NO
HAVE YOU EVER FELT YOU SHOULD CUT DOWN ON YOUR DRINKING: NO
TOTAL SCORE: 0
HAVE PEOPLE ANNOYED YOU BY CRITICIZING YOUR DRINKING: NO
EVER HAD A DRINK FIRST THING IN THE MORNING TO STEADY YOUR NERVES TO GET RID OF A HANGOVER: NO

## 2024-04-23 ASSESSMENT — PAIN - FUNCTIONAL ASSESSMENT
PAIN_FUNCTIONAL_ASSESSMENT: 0-10
PAIN_FUNCTIONAL_ASSESSMENT: 0-10

## 2024-04-23 ASSESSMENT — PAIN DESCRIPTION - PAIN TYPE: TYPE: ACUTE PAIN

## 2024-04-23 ASSESSMENT — PAIN DESCRIPTION - LOCATION: LOCATION: BACK

## 2024-04-23 NOTE — ED TRIAGE NOTES
"Pt reports SOB for 2 weeks at home. Reports she is in today because she is \"sick on not being able to live normally\". Pt is on 4L NC O2 which she is normally on. Pt reports previously she could do some things without her oxygen but recently she is not. States she has needed it all the time recently. Denies CP. States her lungs hurt when she is trying to breathe  "

## 2024-04-23 NOTE — H&P
"Subjective   Chief concern:   Chief Complaint   Patient presents with    Shortness of Breath       History of present illness:  Ronna Beckham is a 63 y.o. female with PMHx of PHT (on home 4L NC), non-obstructive CAD, NSTEMI, HT, lupus, substance use disorder (cocaine, methamphetamine, alcohol), GERD, anxiety and depression, epilepsy presenting with worsening dyspnea on exertion.    The patient endorses having worsening dyspnea on exertion for 3 weeks. The pt does not increase her O2 requirement and denies any missed medications. The pt also endorses chest pain in the mid chest with \"crunching\" characteristics with PS 8.5, different from her previous admission of NSTEMI. The pain only comes with exertion and improves with rest. Pt does not use any pain medications. No radiation, no palpitation, no sweating. The pain is not related to breathing. Pt is able to lie flat and denies waking up to catch breath. No swelling. Pt denies cough, phlegm, or fever. No sick contact.     Pt also endorses having worsening diarrhea for 2 weeks and has to increase taking loperamide. She has 3 bowel movements/day. Watery without any blood. The patient attributes the diarrhea to medications.    Her last dose of cocaine was 1 week ago. Pt endorses forgetting to take Mg supplements.    ED course:     - Vital Signs: 36.3 °C, /76, HR 95, RR 16, 91 % on NC LPM 4 LPM    - Labs:  CBC:  Results from last 7 days   Lab Units 04/23/24  1044   WBC AUTO x10*3/uL 4.3*   HEMOGLOBIN g/dL 11.3*   HEMATOCRIT % 35.4*   PLATELETS AUTO x10*3/uL 140*     RFP:  Results from last 7 days   Lab Units 04/23/24  1044   SODIUM mmol/L 143   POTASSIUM mmol/L 3.8   CHLORIDE mmol/L 109*   CO2 mmol/L 22   BUN mg/dL 15   CREATININE mg/dL 0.81   EGFR mL/min/1.73m*2 83   GLUCOSE mg/dL 92   CALCIUM mg/dL 6.0*   MAGNESIUM mg/dL 0.60*   PHOSPHORUS mg/dL 4.3     LFT:  Results from last 7 days   Lab Units 04/23/24  1044   AST U/L 11   ALT U/L 9   ALK PHOS U/L 59 "   BILIRUBIN TOTAL mg/dL 1.0   PROTEIN TOTAL g/dL 6.7     ABG/VBG:      Results from last 7 days   Lab Units 04/23/24  1044   POCT PH, VENOUS pH 7.42   POCT PCO2, VENOUS mm Hg 36*   POCT PO2, VENOUS mm Hg 43   POCT HCO3 CALCULATED, VENOUS mmol/L 23.4   Lactate 0.7    Other labs:        - Imaging:  XR chest 2 views   Final Result   Marked enlargement of the cardiac silhouette. No evidence of acute   cardiopulmonary process.             MACRO:   None        Signed by: Kalin Silva 4/23/2024 11:43 AM   Dictation workstation:   [id number]        Cardiac history  LHC and RHC (1/8/24)  CONCLUSIONS:   1. Mild non-obstructive coronary artery disease.    TTE (9/5/23)  CONCLUSIONS:  1. Left ventricular systolic function is normal with a 55-60% estimated  ejection fraction.  2. Severely enlarged right ventricle.  3. There is severely reduced right ventricular systolic function.  4. The right atrium is moderately to severely dilated.  5. There is no evidence of cardiac tamponade.  6. Moderate to severe tricuspid regurgitation.  7. RVSP estimated at 112 mm Hg.      In the ED: Pt received Calcium gluconate 4g, magnesium sulfate 4g   ED Medication Administration from 04/23/2024 1005 to 04/23/2024 1254         Date/Time Order Dose Route Action Action by     04/23/2024 1139 EDT calcium gluconate in NS IV 2 g 2 g intravenous New Bag Taylor, AUGUST     04/23/2024 1234 EDT magnesium sulfate IV 4 g 4 g intravenous New Bag Taylor, AUGUST     04/23/2024 1239 EDT calcium gluconate in NS IV 2 g 0 g intravenous Stopped AUGUST Thomas            Micro/culture data:  No results found for the last 90 days.      Past Medical History:  She has a past medical history of Anxiety, Depression, GERD (gastroesophageal reflux disease), Lupus (Multi), Personal history of other specified conditions (09/21/2020), and Pulmonary hypertension (Multi).    Past Surgical History:  She has a past surgical history that includes Other surgical history (09/21/2020); Other  "surgical history (09/21/2020); Other surgical history (09/21/2020); Other surgical history (09/21/2020); Other surgical history (09/21/2020); Hysterectomy; Cholecystectomy; and Cardiac catheterization (N/A, 1/8/2024).     Social history:  Living Situation: Live with grand daughter. Able to do ADLs.    Alcohol: Use alcohol about 2 large or 3 small bottles of 4% alcohol.   Alcohol Use: Not At Risk (1/8/2024)    AUDIT-C     Frequency of Alcohol Consumption: Never     Average Number of Drinks: Patient does not drink     Frequency of Binge Drinking: Never     Tobacco: Former user. Stop 10 years ago. About 28 pack year.  Tobacco Use: Medium Risk (1/7/2024)    Patient History     Smoking Tobacco Use: Former     Smokeless Tobacco Use: Former     Passive Exposure: Not on file      Illicit Drugs: Use cocaine. Denies methamphetamine, marijuana.  Social History     Substance and Sexual Activity   Drug Use Yes    Types: \"Crack\" cocaine    Comment: today       She reports that she has quit smoking. Her smoking use included cigarettes. She has quit using smokeless tobacco. She reports current alcohol use. She reports current drug use. Drug: \"Crack\" cocaine.    Family history:  Family History   Problem Relation Name Age of Onset    No Known Problems Mother      No Known Problems Father          Allergies:  Levetiracetam    Home medications:  No current facility-administered medications on file prior to encounter.     Current Outpatient Medications on File Prior to Encounter   Medication Sig Dispense Refill    ARIPiprazole (Abilify) 10 mg tablet Take 1 tablet (10 mg) by mouth once daily in the evening.      aspirin 81 mg EC tablet Take 1 tablet (81 mg) by mouth once daily.      calcium carbonate-vitamin D3 500 mg-5 mcg (200 unit) tablet TAKE 2 TABLETS BY MOUTH TWO TIMES A  tablet 0    celecoxib (CeleBREX) 100 mg capsule Take 1 capsule (100 mg) by mouth 2 times a day.      chlorhexidine (Peridex) 0.12 % solution Use 15 mL in " the mouth or throat if needed.      cholestyramine (Questran) 4 gram packet MIX 1 PACKET IN LIQUID AND DRINK BY MOUTH TWO TIMES A DAY 60 each 0    dicyclomine (Bentyl) 10 mg capsule Take 1 capsule (10 mg) by mouth once daily.      epoprostenol (Veletri) 1.5 mg recon soln 1.5 mg (1,500 mcg) by central venous line infusion route continuously. Reconstitute contents of vial with 5 ml diluent and mix cassette as directed. Infuse continuously per physician orders. Allow for priming volume. Dose range: 1-150 ng/kg/min. 180 each 11    folic acid (Folvite) 1 mg tablet Take 1 tablet (1 mg) by mouth once daily.      furosemide (Lasix) 40 mg tablet TAKE 1 TABLET BY MOUTH ONE TIME DAILY 30 tablet 0    gabapentin (Neurontin) 300 mg capsule Take 1 capsule (300 mg) by mouth 2 times a day.      hydroxychloroquine (Plaquenil) 200 mg tablet Take 1 tablet (200 mg) by mouth 2 times a day.      hydrOXYzine HCL (Atarax) 25 mg tablet Take 1 tablet (25 mg) by mouth every 8 hours if needed.      lisinopril 10 mg tablet Take 1 tablet (10 mg) by mouth once daily.      loperamide (Imodium A-D) 2 mg tablet Take 1 tablet (2 mg) by mouth 4 times a day as needed for diarrhea.      magnesium chloride (MagDelay) 64 mg EC tablet TAKE 1 TABLET BY MOUTH TWO TIMES A DAY 60 tablet 0    melatonin 3 mg capsule Take 3 mg by mouth once daily at bedtime.      omeprazole (PriLOSEC) 40 mg DR capsule Take 1 capsule (40 mg) by mouth once daily in the morning. Take before meals. Do not crush or chew.      Opsumit 10 mg tablet tablet TAKE 1 TABLET BY MOUTH DAILY. DO NOT HANDLE IF PREGNANT. DO NOT SPLIT, CRUSH, OR CHEW. REVIEW MEDICATION GUIDE. 30 tablet 11    potassium chloride CR (Klor-Con M20) 20 mEq ER tablet TAKE 1 TABLET BY MOUTH TWO TIMES A DAY WITH MEALS 60 tablet 0    tadalafil 20 mg tablet Take 1 tablet (20 mg) by mouth once daily.      venlafaxine XR (Effexor-XR) 150 mg 24 hr capsule Take 1 capsule (150 mg) by mouth once daily. Do not crush or chew.       "Vitamin B-1 100 mg tablet Take 1 tablet (100 mg) by mouth once daily.         Review of Systems   Constitutional:  Negative for fever.   Eyes:  Negative for pain and visual disturbance.   Respiratory:  Positive for shortness of breath. Negative for apnea, wheezing and stridor.    Cardiovascular:  Positive for chest pain. Negative for palpitations and leg swelling.   Gastrointestinal:  Positive for diarrhea. Negative for abdominal pain, nausea and vomiting.   Genitourinary:  Negative for difficulty urinating and dysuria.   Skin:  Positive for rash (Rash on the chest).        Objective   Vital signs:  Blood pressure 112/82, pulse 95, temperature 36.3 °C (97.3 °F), temperature source Temporal, resp. rate 16, height 1.651 m (5' 5\"), weight 74.8 kg (165 lb), SpO2 (!) 93%.    Physical Exam  Constitutional:       General: She is not in acute distress.     Appearance: Normal appearance. She is not ill-appearing or diaphoretic.   HENT:      Head: Normocephalic and atraumatic.   Eyes:      Conjunctiva/sclera: Conjunctivae normal.      Pupils: Pupils are equal, round, and reactive to light.   Cardiovascular:      Rate and Rhythm: Normal rate and regular rhythm.      Heart sounds: Murmur (Systolic ejection murmur at Tricuspid area) heard.      No friction rub. No gallop.   Pulmonary:      Effort: Pulmonary effort is normal. No respiratory distress.      Breath sounds: Normal breath sounds. No stridor. No wheezing or rhonchi.   Chest:      Chest wall: No tenderness.   Abdominal:      General: Abdomen is flat. Bowel sounds are normal. There is no distension.      Palpations: Abdomen is soft.      Tenderness: There is no abdominal tenderness.   Musculoskeletal:         General: No swelling.   Skin:     General: Skin is warm and dry.      Capillary Refill: Capillary refill takes less than 2 seconds.   Neurological:      General: No focal deficit present.      Mental Status: She is alert and oriented to person, place, and time. "   Psychiatric:         Mood and Affect: Mood normal.         Behavior: Behavior normal.         Thought Content: Thought content normal.         Judgment: Judgment normal.          Assessment/Plan   Principal Problem:    Pulmonary HTN (Multi)    Assessment/Plan:   Ronna Beckham is a 63 y.o. female with PMHx of PHT (on home 4L NC), non-obstructive CAD, NSTEMI, HT, lupus, substance use disorder (cocaine, methamphetamine, alcohol), GERD, anxiety and depression, epilepsy who is presenting to the ED with chief complaint of worsening dyspnea on exertion likely 2/2 worsening pHTN vs. Illicit drug use vs. PNA, vs. SLE flare vs. ADHF.   -----  To follow up  - TTE  - Follow up Tropo, EKG  - ESR, CRP  - D-dimer  - Peth, urine drug screen  -----  #Dyspnea  :: Pt is on 4 L NC baseline  :: CXR (4/23): enlarged cardiac silhouette, no active cardiopulmonary process  :: BNP (4/23) 384, Troponin (4/23) 31  :: Flu/ COVID: negative  :: EKG:   :: ddx: worsening pHTN (might be from cocaine use) vs. Illicit drug use vs. PNA, vs. SLE flare vs. ADHF.   - CRP, ESR  - TTE    #PHT  :: On home 4L NC  :: Home: veletri, Opsumit, lasix 40 mg  - c/w veletri, opsumit  - hold lasix    #Substance use disorder  :: use cocaine, methamphetamine, alcohol  :: alcohol <10  - Peth, urine drug screen    #Hypomagnesemia  #Hypocalcemia  :: S/P Calcium gluconate 4g, magnesium sulfate 4g in the ED  - c/w home calcium carbonate-vitamin D3  - hold home magnesium chloride    #NSTEMI  #Non obstructive CAD  #HT  - c/w home lisinopril, ASA    #Lupus  - c/w hydroxychloroquine 200mg BID    #GERD  :: On omeprazole  - Hold PPI as potential cause for hypomag and hypoCa    #Anxiety  #Depression  - c/w home abilify, venlafaxine 150mg, atarax prn    #Neuropathy  - c/w home gabapentin    #Others  - c/w home vitamin B-1100, folic acid  - c/w home potassium chloride 20 mEq BID  - c/w home melatonin    F: Replete PRN  E: Replete PRN   N: Cardiac Diet  DVT Ppx: Lovenox  GI Ppx:  Hold  Access/Lines: PIV, chest port  Abx: None  O2: 4L NC    Pain regimen: Celecoxib BID  GI Laxative: None  PT/OT: Not indicated    Code status: Full Code  Emergency contact:  Diana gage (daughter): [phone]       Alban Mccormick MD

## 2024-04-23 NOTE — ED PROVIDER NOTES
HPI   Chief Complaint   Patient presents with    Shortness of Breath       Ronna Beckham is a 63 y.o. female presenting with PMHx of pHTN (on 4L NC at [facility]), GERD, SLE, HARRIS, MDD, and polysubstance use disorder (ethanol, cocaine and amphetamines) who is presenting to the ED with chief complaint of worsening dyspnea on exertion. She has been having worsening symptoms over the last 3 weeks.  She does endorse alcohol and cocaine use, insufflation that last use was approximately 1 week ago.  She states that she has been feeling unable to complete her ADLs including walking up the stairs and doing basic tasks around her house.  She is not having increased O2 requirement from her baseline.  She has had no missed doses of her medicines.  No changes or missed doses of medications.  She has Veletri pump.  No fevers chills falls traumas nausea vomiting diarrhea no chest pain or shortness of breath at rest no cough                          [place] Coma Scale Score: 15                  Patient History   Past Medical History:   Diagnosis Date    Anxiety     Depression     GERD (gastroesophageal reflux disease)     Lupus (Multi)     Personal history of other specified conditions 2020    History of seizure    Pulmonary hypertension (Multi)      Past Surgical History:   Procedure Laterality Date    CARDIAC CATHETERIZATION N/A 2024    Procedure: Left Heart Cath, With LV, Angriography And Grafts;  Surgeon: Christiano Calvillo DO;  Location: [facility] Card[facility];  Service: Cardiovascular;  Laterality: N/A;    CHOLECYSTECTOMY      HYSTERECTOMY      OTHER SURGICAL HISTORY  2020    Cholecystectomy    OTHER SURGICAL HISTORY  2020    Esophagogastroduodenoscopy    OTHER SURGICAL HISTORY  2020    Colonoscopy    OTHER SURGICAL HISTORY  2020     section    OTHER SURGICAL HISTORY  2020    Hysterectomy     Family History   Problem Relation Name Age of Onset    No Known Problems Mother      No Known  "Problems Father       Social History     Tobacco Use    Smoking status: Former     Types: Cigarettes    Smokeless tobacco: Former   Substance Use Topics    Alcohol use: Yes     Comment: 2 large beer/day    Drug use: Yes     Types: \"Crack\" cocaine     Comment: today       Physical Exam   ED Triage Vitals [04/23/24 1015]   Temperature Heart Rate Respirations BP   36.3 °C (97.3 °F) 95 16 106/76      Pulse Ox Temp Source Heart Rate Source Patient Position   (!) 91 % Temporal Monitor Sitting      BP Location FiO2 (%)     Right arm 36 %       Physical Exam  Vitals and nursing note reviewed.   Constitutional:       General: She is not in acute distress.     Appearance: She is well-developed.   HENT:      Head: Normocephalic and atraumatic.      Mouth/Throat:      Mouth: Mucous membranes are moist.   Eyes:      Conjunctiva/sclera: Conjunctivae normal.   Cardiovascular:      Rate and Rhythm: Normal rate and regular rhythm.      Pulses: Normal pulses.      Heart sounds: Murmur heard.   Pulmonary:      Effort: Pulmonary effort is normal. No respiratory distress.      Breath sounds: Normal breath sounds. No decreased breath sounds or wheezing.   Chest:      Chest wall: No tenderness.   Abdominal:      Palpations: Abdomen is soft.      Tenderness: There is no abdominal tenderness.   Musculoskeletal:         General: No swelling.      Cervical back: Neck supple.   Skin:     General: Skin is warm and dry.      Capillary Refill: Capillary refill takes less than 2 seconds.      Comments: Plethoric and erythematous  Left chest central venous access with site clean dry and intact no surrounding erythema or fluctuance   Neurological:      General: No focal deficit present.      Mental Status: She is alert and oriented to person, place, and time.   Psychiatric:         Mood and Affect: Mood normal.         ED Course & MDM   ED Course as of 04/23/24 1337   Tue Apr 23, 2024   1039 Patient is a 63-year-old pulmonary hypertension patient on " 4 L O2 at baseline Veletri infusion who follows with Dr. Prescott.  She presents for worsening shortness of breath and fatigue.  She initial triage vital of 91% otherwise stable and on repeat vitals she does improve to 94% once settled in the room.  Cardiac and pulmonary workup obtained with chest x-ray EKG troponins BMP blood gas and basic labs including electrolytes.  Pharmacy was made aware that patient has infusion pump.  Patient was discussed with Dr. Prescott/pulmonology who recommended additional workup. [SC]   1151 Patient seen in conjunction with assigned resident, agree with management.  63-year-old female with pulmonary hypertension on continuous Veletri infusion follows with Dr. Oneal, on baseline 4 L nasal cannula, with increased dyspnea on exertion over the last several weeks.  Most likely progression of her chronic disease, does not appear grossly fluid overloaded, no chest pain and low suspicion for ACS, no pericardial effusion on point-of-care ultrasound or B-lines, obtaining cardiac labs, pulmonology consulted for further evaluation. [BK]      ED Course User Index  [BK] Fernando Wyatt MD  [SC] Eufemia Dyson DO         Diagnoses as of 04/23/24 1337   Pulmonary HTN (Multi)   Hypomagnesemia   Hypocalcemia   Dyspnea on exertion     Labs Reviewed   CBC WITH AUTO DIFFERENTIAL - Abnormal       Result Value    WBC 4.3 (*)     nRBC 0.0      RBC 4.54      Hemoglobin 11.3 (*)     Hematocrit 35.4 (*)     MCV 78 (*)     MCH 24.9 (*)     MCHC 31.9 (*)     RDW 15.4 (*)     Platelets 140 (*)     Neutrophils % 71.9      Immature Granulocytes %, Automated 0.5      Lymphocytes % 22.7      Monocytes % 4.9      Eosinophils % 0.0      Basophils % 0.0      Neutrophils Absolute 3.08      Immature Granulocytes Absolute, Automated 0.02      Lymphocytes Absolute 0.97 (*)     Monocytes Absolute 0.21      Eosinophils Absolute 0.00      Basophils Absolute 0.00     MAGNESIUM - Abnormal    Magnesium 0.60 (*)    COMPREHENSIVE  METABOLIC PANEL - Abnormal    Glucose 92      Sodium 143      Potassium 3.8      Chloride 109 (*)     Bicarbonate 22      Anion Gap 16      Urea Nitrogen 15      Creatinine 0.81      eGFR 83      Calcium 6.0 (*)     Albumin 3.7      Alkaline Phosphatase 59      Total Protein 6.7      AST 11      Bilirubin, Total 1.0      ALT 9     B-TYPE NATRIURETIC PEPTIDE - Abnormal     (*)     Narrative:        <100 pg/mL - Heart failure unlikely  100-299 pg/mL - Intermediate probability of acute heart                  failure exacerbation. Correlate with clinical                  context and patient history.    >=300 pg/mL - Heart Failure likely. Correlate with clinical                  context and patient history.     Biotin interference may cause falsely decreased results. Patients taking a Biotin dose of up to 5 mg/day should refrain from taking Biotin for 24 hours before sample  collection. Providers may contact their local laboratory for further information.   BLOOD GAS VENOUS FULL PANEL - Abnormal    POCT pH, Venous 7.42      POCT pCO2, Venous 36 (*)     POCT pO2, Venous 43      POCT SO2, Venous 63      POCT Oxy Hemoglobin, Venous 62.6      POCT Hematocrit Calculated, Venous 35.0 (*)     POCT Sodium, Venous 139      POCT Potassium, Venous 3.9      POCT Chloride, Venous 107      POCT Ionized Calicum, Venous 0.76 (*)     POCT Glucose, Venous 97      POCT Lactate, Venous 0.7      POCT Base Excess, Venous -0.8      POCT HCO3 Calculated, Venous 23.4      POCT Hemoglobin, Venous 11.7 (*)     POCT Anion Gap, Venous 13.0      Patient Temperature 37.0      FiO2 36     PHOSPHORUS - Normal    Phosphorus 4.3     SARS-COV-2 PCR - Normal    Coronavirus 2019, PCR Not Detected      Narrative:     This assay has received FDA Emergency Use Authorization (EUA) and is only authorized for the duration of time that circumstances exist to justify the authorization of the emergency use of in vitro diagnostic tests for the detection of  SARS-CoV-2 virus and/or diagnosis of COVID-19 infection under section 564(b)(1) of the Act, 21 U.S.C. 360bbb-3(b)(1). This assay is an in vitro diagnostic nucleic acid amplification test for the qualitative detection of SARS-CoV-2 from nasopharyngeal specimens and has been validated for use at [facility]. Negative results do not preclude COVID-19 infections and should not be used as the sole basis for diagnosis, treatment, or other management decisions.     INFLUENZA A AND B PCR - Normal    Flu A Result Not Detected      Flu B Result Not Detected      Narrative:     This assay is an in vitro diagnostic multiplex nucleic acid amplification test for the detection and discrimination of Influenza A & B from nasopharyngeal specimens, and has been validated for use at [facility]. Negative results do not preclude Influenza A/B infections, and should not be used as the sole basis for diagnosis, treatment, or other management decisions. If Influenza A/B and RSV PCR results are negative, testing for Parainfluenza virus, Adenovirus and Metapneumovirus is routinely performed for [facility] pediatric oncology and intensive care inpatients, and is available on other patients by placing an add-on request.   SERIAL TROPONIN-INITIAL - Normal    Troponin I, High Sensitivity 31      Narrative:     Less than 99th percentile of normal range cutoff-  Female and children under 18 years old <35 ng/L; Male <54 ng/L: Negative  Repeat testing should be performed if clinically indicated.     Female and children under 18 years old  ng/L; Male  ng/L:  Consistent with possible cardiac damage and possible increased clinical   risk. Serial measurements may help to assess extent of myocardial damage.     >120 ng/L: Consistent with cardiac damage, increased clinical risk and  myocardial infarction. Serial measurements may help assess extent of   myocardial damage.      NOTE: Children less than 1  year old may have higher baseline troponin   levels and results should be interpreted in conjunction with the overall   clinical context.    NOTE: Troponin I testing is performed using a different   testing methodology at [facility] than at other   [facility] hospitals. Direct result comparisons should only   be made within the same method.     ALCOHOL - Normal    Alcohol <10     TROPONIN SERIES- (INITIAL, 1 HR)    Narrative:     The following orders were created for panel order Troponin I Series, High Sensitivity (0, 1 HR).  Procedure                               Abnormality         Status                     ---------                               -----------         ------                     Troponin I, High Sensiti...[[id number]]  Normal              Final result               Troponin, High Sensitivi...[[id number]]                      In process                   Please view results for these tests on the individual orders.   SERIAL TROPONIN, 1 HOUR   DRUG SCREEN,URINE   URINALYSIS WITH REFLEX CULTURE AND MICROSCOPIC    Narrative:     The following orders were created for panel order Urinalysis with Reflex Culture and Microscopic.  Procedure                               Abnormality         Status                     ---------                               -----------         ------                     Urinalysis with Reflex C...[[id number]]                                                 Extra Urine Gray Tube[[id number]]                                                         Please view results for these tests on the individual orders.   URINALYSIS WITH REFLEX CULTURE AND MICROSCOPIC   EXTRA URINE GRAY TUBE   PHOSPHATIDYLETHANOL (PETH), WHOLE BLOOD, QUANTITATIVE   SEDIMENTATION RATE, AUTOMATED   C-REACTIVE PROTEIN       XR chest 2 views   Final Result   Marked enlargement of the cardiac silhouette. No evidence of acute   cardiopulmonary process.             MACRO:   None        Signed by: Kalin  Ricardo 4/23/2024 11:43 AM   Dictation workstation:   [id number]      Point of Care Ultrasound    (Results Pending)   Transthoracic Echo (TTE) Limited    (Results Pending)       Medical Decision Making  Patient is a 63-year-old female who presented for evaluation of increased dyspnea on exertion and decreased ability for ADLs.  Lab workup revealed electrolyte abnormalities including hypocalcemia and hypomagnesemia that were repleted IV.  Patient has no consolidation on chest x-ray or significant effusion concerning for significant pulmonary edema or infectious process.  Patient has no acute EKG changes concerning for acute ischemia.  Patient is hemodynamically stable.  Her oxygenation is stable at 94% on her baseline 4 L.  She did receive bedside POCUS which shows significant left heart enlargement in the setting of known pulmonary hypertension.  Patient does have infusion medications and pharmacy as well as pulmonology were made aware.  Pulm evaluated at bedside and agreed that this is likely worsening progression of her chronic pulmonary hypertension.  Dr. Oneal was made aware the patient and recommends observation for electrolyte repletion.  Patient to be admitted to medicine for further management and workup.  Patient is agreeable and all questions answered.        Procedure    Performed by: Eufemia Dyson DO  Authorized by: Eufemia Dyson DO  Cardiac Indications: orthopnea dyspnea                Procedure: Cardiac Ultrasound    Findings:   Views: parasternal long, parasternal short, apical four and subxiphoid  The pericardial space was visualized and was NEGATIVE for a significant pericardial effusion.  Activity: Ventricular contractions were visualized.  LV: LV systolic function was HYPERDYNAMIC.   RV: RV size was DILATED.    Impression:  Cardiac: The focused cardiac ultrasound exam had ABNORMAL findings as specified.    Comments: Known pulmonary HTN    Eufemia Dyson DO  PGY-2 Emergency Medicine        Pt  seen and discussed with Dr. Luis Dyson, DO  Resident  04/23/24 7919

## 2024-04-24 ENCOUNTER — APPOINTMENT (OUTPATIENT)
Dept: CARDIOLOGY | Facility: HOSPITAL | Age: 64
End: 2024-04-24
Payer: COMMERCIAL

## 2024-04-24 ENCOUNTER — CLINICAL SUPPORT (OUTPATIENT)
Dept: EMERGENCY MEDICINE | Facility: HOSPITAL | Age: 64
End: 2024-04-24
Payer: COMMERCIAL

## 2024-04-24 VITALS
HEART RATE: 87 BPM | TEMPERATURE: 97.9 F | RESPIRATION RATE: 16 BRPM | HEIGHT: 65 IN | WEIGHT: 165 LBS | SYSTOLIC BLOOD PRESSURE: 104 MMHG | BODY MASS INDEX: 27.49 KG/M2 | OXYGEN SATURATION: 92 % | DIASTOLIC BLOOD PRESSURE: 75 MMHG

## 2024-04-24 LAB
ALBUMIN SERPL BCP-MCNC: 3.3 G/DL (ref 3.4–5)
ANION GAP SERPL CALC-SCNC: 13 MMOL/L (ref 10–20)
BASOPHILS # BLD AUTO: 0 X10*3/UL (ref 0–0.1)
BASOPHILS NFR BLD AUTO: 0 %
BUN SERPL-MCNC: 13 MG/DL (ref 6–23)
CA-I BLD-SCNC: 0.96 MMOL/L (ref 1.1–1.33)
CALCIUM SERPL-MCNC: 7.2 MG/DL (ref 8.6–10.6)
CHLORIDE SERPL-SCNC: 109 MMOL/L (ref 98–107)
CO2 SERPL-SCNC: 23 MMOL/L (ref 21–32)
CREAT SERPL-MCNC: 0.82 MG/DL (ref 0.5–1.05)
EGFRCR SERPLBLD CKD-EPI 2021: 81 ML/MIN/1.73M*2
EJECTION FRACTION APICAL 4 CHAMBER: 64.4
EOSINOPHIL # BLD AUTO: 0 X10*3/UL (ref 0–0.7)
EOSINOPHIL NFR BLD AUTO: 0 %
ERYTHROCYTE [DISTWIDTH] IN BLOOD BY AUTOMATED COUNT: 15.3 % (ref 11.5–14.5)
GLUCOSE SERPL-MCNC: 98 MG/DL (ref 74–99)
HCT VFR BLD AUTO: 31.2 % (ref 36–46)
HGB BLD-MCNC: 10 G/DL (ref 12–16)
IMM GRANULOCYTES # BLD AUTO: 0.03 X10*3/UL (ref 0–0.7)
IMM GRANULOCYTES NFR BLD AUTO: 0.6 % (ref 0–0.9)
LEFT VENTRICLE INTERNAL DIMENSION DIASTOLE: 4.8 CM (ref 3.5–6)
LYMPHOCYTES # BLD AUTO: 0.78 X10*3/UL (ref 1.2–4.8)
LYMPHOCYTES NFR BLD AUTO: 14.6 %
MAGNESIUM SERPL-MCNC: 2.14 MG/DL (ref 1.6–2.4)
MCH RBC QN AUTO: 25 PG (ref 26–34)
MCHC RBC AUTO-ENTMCNC: 32.1 G/DL (ref 32–36)
MCV RBC AUTO: 78 FL (ref 80–100)
MONOCYTES # BLD AUTO: 0.41 X10*3/UL (ref 0.1–1)
MONOCYTES NFR BLD AUTO: 7.7 %
NEUTROPHILS # BLD AUTO: 4.13 X10*3/UL (ref 1.2–7.7)
NEUTROPHILS NFR BLD AUTO: 77.1 %
NRBC BLD-RTO: 0 /100 WBCS (ref 0–0)
PHOSPHATE SERPL-MCNC: 3.4 MG/DL (ref 2.5–4.9)
PLATELET # BLD AUTO: 117 X10*3/UL (ref 150–450)
POTASSIUM SERPL-SCNC: 4.6 MMOL/L (ref 3.5–5.3)
RBC # BLD AUTO: 4 X10*6/UL (ref 4–5.2)
RIGHT VENTRICLE FREE WALL PEAK S': 12.6 CM/S
RIGHT VENTRICLE PEAK SYSTOLIC PRESSURE: 88.6 MMHG
SODIUM SERPL-SCNC: 140 MMOL/L (ref 136–145)
TRICUSPID ANNULAR PLANE SYSTOLIC EXCURSION: 1.7 CM
WBC # BLD AUTO: 5.4 X10*3/UL (ref 4.4–11.3)

## 2024-04-24 PROCEDURE — 96366 THER/PROPH/DIAG IV INF ADDON: CPT

## 2024-04-24 PROCEDURE — 96361 HYDRATE IV INFUSION ADD-ON: CPT

## 2024-04-24 PROCEDURE — 85025 COMPLETE CBC W/AUTO DIFF WBC: CPT

## 2024-04-24 PROCEDURE — G0378 HOSPITAL OBSERVATION PER HR: HCPCS

## 2024-04-24 PROCEDURE — 83735 ASSAY OF MAGNESIUM: CPT

## 2024-04-24 PROCEDURE — 99233 SBSQ HOSP IP/OBS HIGH 50: CPT

## 2024-04-24 PROCEDURE — 96367 TX/PROPH/DG ADDL SEQ IV INF: CPT

## 2024-04-24 PROCEDURE — 82330 ASSAY OF CALCIUM: CPT

## 2024-04-24 PROCEDURE — 93325 DOPPLER ECHO COLOR FLOW MAPG: CPT

## 2024-04-24 PROCEDURE — 80069 RENAL FUNCTION PANEL: CPT

## 2024-04-24 PROCEDURE — 2500000001 HC RX 250 WO HCPCS SELF ADMINISTERED DRUGS (ALT 637 FOR MEDICARE OP): Performed by: STUDENT IN AN ORGANIZED HEALTH CARE EDUCATION/TRAINING PROGRAM

## 2024-04-24 PROCEDURE — 93308 TTE F-UP OR LMTD: CPT | Performed by: INTERNAL MEDICINE

## 2024-04-24 PROCEDURE — 93325 DOPPLER ECHO COLOR FLOW MAPG: CPT | Performed by: INTERNAL MEDICINE

## 2024-04-24 PROCEDURE — 93005 ELECTROCARDIOGRAM TRACING: CPT

## 2024-04-24 PROCEDURE — 93321 DOPPLER ECHO F-UP/LMTD STD: CPT | Performed by: INTERNAL MEDICINE

## 2024-04-24 PROCEDURE — 2500000004 HC RX 250 GENERAL PHARMACY W/ HCPCS (ALT 636 FOR OP/ED): Performed by: STUDENT IN AN ORGANIZED HEALTH CARE EDUCATION/TRAINING PROGRAM

## 2024-04-24 PROCEDURE — 2500000004 HC RX 250 GENERAL PHARMACY W/ HCPCS (ALT 636 FOR OP/ED): Mod: JZ

## 2024-04-24 RX ORDER — FUROSEMIDE 10 MG/ML
20 INJECTION INTRAMUSCULAR; INTRAVENOUS ONCE
Status: DISCONTINUED | OUTPATIENT
Start: 2024-04-24 | End: 2024-04-24 | Stop reason: HOSPADM

## 2024-04-24 RX ORDER — CALCIUM GLUCONATE 20 MG/ML
2 INJECTION, SOLUTION INTRAVENOUS ONCE
Status: COMPLETED | OUTPATIENT
Start: 2024-04-24 | End: 2024-04-24

## 2024-04-24 RX ORDER — FUROSEMIDE 40 MG/1
40 TABLET ORAL DAILY
Status: DISCONTINUED | OUTPATIENT
Start: 2024-04-24 | End: 2024-04-24 | Stop reason: HOSPADM

## 2024-04-24 RX ADMIN — FOLIC ACID 1 MG: 1 TABLET ORAL at 08:22

## 2024-04-24 RX ADMIN — HYDROXYCHLOROQUINE SULFATE 200 MG: 200 TABLET ORAL at 01:12

## 2024-04-24 RX ADMIN — CELECOXIB 100 MG: 100 CAPSULE ORAL at 01:12

## 2024-04-24 RX ADMIN — CALCIUM GLUCONATE 2 G: 20 INJECTION, SOLUTION INTRAVENOUS at 07:36

## 2024-04-24 RX ADMIN — LISINOPRIL 10 MG: 5 TABLET ORAL at 08:22

## 2024-04-24 RX ADMIN — VENLAFAXINE HYDROCHLORIDE 150 MG: 150 CAPSULE, EXTENDED RELEASE ORAL at 08:25

## 2024-04-24 RX ADMIN — MACITENTAN 10 MG: 10 TABLET, FILM COATED ORAL at 08:24

## 2024-04-24 RX ADMIN — Medication 2 TABLET: at 01:12

## 2024-04-24 RX ADMIN — ASPIRIN 81 MG: 81 TABLET, COATED ORAL at 08:24

## 2024-04-24 RX ADMIN — Medication 2 TABLET: at 08:32

## 2024-04-24 RX ADMIN — CELECOXIB 100 MG: 100 CAPSULE ORAL at 08:23

## 2024-04-24 RX ADMIN — EPOPROSTENOL 70 NG/KG/MIN: 1.5 INJECTION, POWDER, LYOPHILIZED, FOR SOLUTION INTRAVENOUS at 11:48

## 2024-04-24 RX ADMIN — THIAMINE HCL TAB 100 MG 100 MG: 100 TAB at 08:22

## 2024-04-24 RX ADMIN — EPOPROSTENOL 70 NG/KG/MIN: 1.5 INJECTION, POWDER, LYOPHILIZED, FOR SOLUTION INTRAVENOUS at 16:23

## 2024-04-24 RX ADMIN — GABAPENTIN 300 MG: 300 CAPSULE ORAL at 01:12

## 2024-04-24 ASSESSMENT — PAIN SCALES - GENERAL
PAINLEVEL_OUTOF10: 0 - NO PAIN
PAINLEVEL_OUTOF10: 0 - NO PAIN

## 2024-04-24 ASSESSMENT — PAIN - FUNCTIONAL ASSESSMENT: PAIN_FUNCTIONAL_ASSESSMENT: 0-10

## 2024-04-24 NOTE — HOSPITAL COURSE
Ronna Beckham is a 63 y.o. female with PMHx of PHT (on home 4L NC), non-obstructive CAD, NSTEMI, HT, lupus, substance use disorder (cocaine, methamphetamine, alcohol), GERD, anxiety and depression, epilepsy who is presenting to the ED with chief complaint of worsening dyspnea on exertion for 3 months and admitted for observation. Pt was satting well at rest on home 4L NC. Vital sign stable and physical examination remarkable for systolic ejection murmur at tricuspid area.    Work up for dyspnea negative for Troponin, ESR, CRP,  (down from 4260 1/7/24), COVID and respiratory virus. CXR (4/23) showed enlarged cardiac silhouette, no active cardiopulmonary process. CTPE (4/23) negative for PE, pulmonary artery hypertension, marked increased right heart pressure, body anasarca, hepatomegaly, 0.6 cm subsolid nodule with increased density, cannot exclude development of minimally invasive adeno CA. Urine positive for cocaine. The patient dyspnea on exertion was attributed to pulmonary hypertension I/s/o recent cocaine use. While hospitalization, pt was resumed on her pulmonary hypertension regimen. 3 min walk test (4/24) shows sat 85-88% while walking and back to 94% on 4L NC.    Pt was discharged home with veletri and opsumit.    Follow up appointments  :: Pulmonary  - To follow up on pulmonary hypertension treatment  - To follow up on pulmonary nodule in the superior segment right lower lobe (increased in density)    :: PCP  - To follow up on other chronic conditions and follow up on PETH result

## 2024-04-24 NOTE — PROGRESS NOTES
"Ronna Beckham is a 63 y.o. female on day 1 of admission presenting with Pulmonary HTN (Multi).    Subjective   Overnight: D-dimer 1800s. CTPE negative for PE.   AM: Pt still endorsed SOB on exertion. No SOB at baseline and is stable on home 4L NC. No other complains.       Objective     Physical Exam    Last Recorded Vitals  Blood pressure 104/75, pulse 88, temperature 36.6 °C (97.9 °F), temperature source Skin, resp. rate 20, height 1.651 m (5' 5\"), weight 74.8 kg (165 lb), SpO2 (!) 92%.  Intake/Output last 3 Shifts:  No intake/output data recorded.    Physical Exam  Constitutional:       General: She is not in acute distress. Lying in bed     Appearance: Normal appearance. She is not ill-appearing or diaphoretic.   HENT:      Head: Normocephalic and atraumatic.   Eyes:      Conjunctiva/sclera: Conjunctivae normal.      Pupils: Pupils are equal, round, and reactive to light.   Cardiovascular:      Rate and Rhythm: Normal rate and regular rhythm.      Heart sounds: Murmur (Systolic ejection murmur at Tricuspid area) heard.      No friction rub. No gallop.   Pulmonary:      Effort: Pulmonary effort is normal. No respiratory distress.      Breath sounds: Normal breath sounds. No stridor. No wheezing or rhonchi.   Chest:      Chest wall: No tenderness.   Abdominal:      General: Abdomen is flat. Bowel sounds are normal. There is no distension.      Palpations: Abdomen is soft.      Tenderness: There is no abdominal tenderness.   Musculoskeletal:         General: No swelling.   Skin:     General: Skin is warm and dry.      Capillary Refill: Capillary refill takes less than 2 seconds.   Neurological:      General: No focal deficit present.      Mental Status: She is alert and oriented to person, place, and time.   Psychiatric:         Mood and Affect: Mood normal.         Behavior: Behavior normal.         Thought Content: Thought content normal.         Judgment: Judgment normal.     Relevant Results  Results for " orders placed or performed during the hospital encounter of 04/23/24 (from the past 24 hour(s))   CBC and Auto Differential   Result Value Ref Range    WBC 4.3 (L) 4.4 - 11.3 x10*3/uL    nRBC 0.0 0.0 - 0.0 /100 WBCs    RBC 4.54 4.00 - 5.20 x10*6/uL    Hemoglobin 11.3 (L) 12.0 - 16.0 g/dL    Hematocrit 35.4 (L) 36.0 - 46.0 %    MCV 78 (L) 80 - 100 fL    MCH 24.9 (L) 26.0 - 34.0 pg    MCHC 31.9 (L) 32.0 - 36.0 g/dL    RDW 15.4 (H) 11.5 - 14.5 %    Platelets 140 (L) 150 - 450 x10*3/uL    Neutrophils % 71.9 40.0 - 80.0 %    Immature Granulocytes %, Automated 0.5 0.0 - 0.9 %    Lymphocytes % 22.7 13.0 - 44.0 %    Monocytes % 4.9 2.0 - 10.0 %    Eosinophils % 0.0 0.0 - 6.0 %    Basophils % 0.0 0.0 - 2.0 %    Neutrophils Absolute 3.08 1.20 - 7.70 x10*3/uL    Immature Granulocytes Absolute, Automated 0.02 0.00 - 0.70 x10*3/uL    Lymphocytes Absolute 0.97 (L) 1.20 - 4.80 x10*3/uL    Monocytes Absolute 0.21 0.10 - 1.00 x10*3/uL    Eosinophils Absolute 0.00 0.00 - 0.70 x10*3/uL    Basophils Absolute 0.00 0.00 - 0.10 x10*3/uL   Phosphorus   Result Value Ref Range    Phosphorus 4.3 2.5 - 4.9 mg/dL   Magnesium   Result Value Ref Range    Magnesium 0.60 (LL) 1.60 - 2.40 mg/dL   Comprehensive metabolic panel   Result Value Ref Range    Glucose 92 74 - 99 mg/dL    Sodium 143 136 - 145 mmol/L    Potassium 3.8 3.5 - 5.3 mmol/L    Chloride 109 (H) 98 - 107 mmol/L    Bicarbonate 22 21 - 32 mmol/L    Anion Gap 16 10 - 20 mmol/L    Urea Nitrogen 15 6 - 23 mg/dL    Creatinine 0.81 0.50 - 1.05 mg/dL    eGFR 83 >60 mL/min/1.73m*2    Calcium 6.0 (L) 8.6 - 10.6 mg/dL    Albumin 3.7 3.4 - 5.0 g/dL    Alkaline Phosphatase 59 33 - 136 U/L    Total Protein 6.7 6.4 - 8.2 g/dL    AST 11 9 - 39 U/L    Bilirubin, Total 1.0 0.0 - 1.2 mg/dL    ALT 9 7 - 45 U/L   B-Type Natriuretic Peptide   Result Value Ref Range     (H) 0 - 99 pg/mL   Blood Gas Venous Full Panel   Result Value Ref Range    POCT pH, Venous 7.42 7.33 - 7.43 pH    POCT pCO2, Venous  36 (L) 41 - 51 mm Hg    POCT pO2, Venous 43 35 - 45 mm Hg    POCT SO2, Venous 63 45 - 75 %    POCT Oxy Hemoglobin, Venous 62.6 45.0 - 75.0 %    POCT Hematocrit Calculated, Venous 35.0 (L) 36.0 - 46.0 %    POCT Sodium, Venous 139 136 - 145 mmol/L    POCT Potassium, Venous 3.9 3.5 - 5.3 mmol/L    POCT Chloride, Venous 107 98 - 107 mmol/L    POCT Ionized Calicum, Venous 0.76 (L) 1.10 - 1.33 mmol/L    POCT Glucose, Venous 97 74 - 99 mg/dL    POCT Lactate, Venous 0.7 0.4 - 2.0 mmol/L    POCT Base Excess, Venous -0.8 -2.0 - 3.0 mmol/L    POCT HCO3 Calculated, Venous 23.4 22.0 - 26.0 mmol/L    POCT Hemoglobin, Venous 11.7 (L) 12.0 - 16.0 g/dL    POCT Anion Gap, Venous 13.0 10.0 - 25.0 mmol/L    Patient Temperature 37.0 degrees Celsius    FiO2 36 %   Sars-CoV-2 PCR   Result Value Ref Range    Coronavirus 2019, PCR Not Detected Not Detected   Influenza A, and B PCR   Result Value Ref Range    Flu A Result Not Detected Not Detected    Flu B Result Not Detected Not Detected   Troponin I, High Sensitivity, Initial   Result Value Ref Range    Troponin I, High Sensitivity 31 0 - 34 ng/L   Ethanol   Result Value Ref Range    Alcohol <10 <=10 mg/dL   Sedimentation rate, automated   Result Value Ref Range    Sedimentation Rate 15 0 - 30 mm/h   C-reactive protein   Result Value Ref Range    C-Reactive Protein 0.12 <1.00 mg/dL   hCG, quantitative, pregnancy   Result Value Ref Range    HCG, Beta-Quantitative <3 <5 mIU/mL   Troponin, High Sensitivity, 1 Hour   Result Value Ref Range    Troponin I, High Sensitivity 26 0 - 34 ng/L   Drug Screen, Urine   Result Value Ref Range    Amphetamine Screen, Urine Presumptive Negative Presumptive Negative    Barbiturate Screen, Urine Presumptive Negative Presumptive Negative    Benzodiazepines Screen, Urine Presumptive Negative Presumptive Negative    Cannabinoid Screen, Urine Presumptive Negative Presumptive Negative    Cocaine Metabolite Screen, Urine Presumptive Positive (A) Presumptive Negative     Fentanyl Screen, Urine Presumptive Negative Presumptive Negative    Opiate Screen, Urine Presumptive Negative Presumptive Negative    Oxycodone Screen, Urine Presumptive Negative Presumptive Negative    PCP Screen, Urine Presumptive Negative Presumptive Negative    Methadone Screen, Urine Presumptive Negative Presumptive Negative   Urinalysis with Reflex Culture and Microscopic   Result Value Ref Range    Color, Urine Yellow Light-Yellow, Yellow, Dark-Yellow    Appearance, Urine Clear Clear    Specific Gravity, Urine 1.031 1.005 - 1.035    pH, Urine 5.5 5.0, 5.5, 6.0, 6.5, 7.0, 7.5, 8.0    Protein, Urine 50 (1+) (A) NEGATIVE, 10 (TRACE), 20 (TRACE) mg/dL    Glucose, Urine Normal Normal mg/dL    Blood, Urine NEGATIVE NEGATIVE    Ketones, Urine NEGATIVE NEGATIVE mg/dL    Bilirubin, Urine NEGATIVE NEGATIVE    Urobilinogen, Urine Normal Normal mg/dL    Nitrite, Urine NEGATIVE NEGATIVE    Leukocyte Esterase, Urine NEGATIVE NEGATIVE   Urinalysis Microscopic   Result Value Ref Range    WBC, Urine 1-5 1-5, NONE /HPF    RBC, Urine NONE NONE, 1-2, 3-5 /HPF    Squamous Epithelial Cells, Urine 1-9 (SPARSE) Reference range not established. /HPF    Mucus, Urine FEW Reference range not established. /LPF   D-dimer, VTE Exclusion   Result Value Ref Range    D-Dimer, Quantitative VTE Exclusion 1,853 (H) <=500 ng/mL FEU   Renal function panel   Result Value Ref Range    Glucose 108 (H) 74 - 99 mg/dL    Sodium 141 136 - 145 mmol/L    Potassium 3.4 (L) 3.5 - 5.3 mmol/L    Chloride 107 98 - 107 mmol/L    Bicarbonate 23 21 - 32 mmol/L    Anion Gap 14 10 - 20 mmol/L    Urea Nitrogen 14 6 - 23 mg/dL    Creatinine 0.91 0.50 - 1.05 mg/dL    eGFR 72 >60 mL/min/1.73m*2    Calcium 6.3 (L) 8.6 - 10.6 mg/dL    Phosphorus 4.0 2.5 - 4.9 mg/dL    Albumin 3.7 3.4 - 5.0 g/dL   Magnesium   Result Value Ref Range    Magnesium 1.46 (L) 1.60 - 2.40 mg/dL   Calcium, ionized   Result Value Ref Range    POCT Calcium, Ionized 0.96 (L) 1.1 - 1.33 mmol/L    Magnesium   Result Value Ref Range    Magnesium 2.14 1.60 - 2.40 mg/dL   Renal Function Panel   Result Value Ref Range    Glucose 98 74 - 99 mg/dL    Sodium 140 136 - 145 mmol/L    Potassium 4.6 3.5 - 5.3 mmol/L    Chloride 109 (H) 98 - 107 mmol/L    Bicarbonate 23 21 - 32 mmol/L    Anion Gap 13 10 - 20 mmol/L    Urea Nitrogen 13 6 - 23 mg/dL    Creatinine 0.82 0.50 - 1.05 mg/dL    eGFR 81 >60 mL/min/1.73m*2    Calcium 7.2 (L) 8.6 - 10.6 mg/dL    Phosphorus 3.4 2.5 - 4.9 mg/dL    Albumin 3.3 (L) 3.4 - 5.0 g/dL   CBC and Auto Differential   Result Value Ref Range    WBC 5.4 4.4 - 11.3 x10*3/uL    nRBC 0.0 0.0 - 0.0 /100 WBCs    RBC 4.00 4.00 - 5.20 x10*6/uL    Hemoglobin 10.0 (L) 12.0 - 16.0 g/dL    Hematocrit 31.2 (L) 36.0 - 46.0 %    MCV 78 (L) 80 - 100 fL    MCH 25.0 (L) 26.0 - 34.0 pg    MCHC 32.1 32.0 - 36.0 g/dL    RDW 15.3 (H) 11.5 - 14.5 %    Platelets 117 (L) 150 - 450 x10*3/uL    Neutrophils % 77.1 40.0 - 80.0 %    Immature Granulocytes %, Automated 0.6 0.0 - 0.9 %    Lymphocytes % 14.6 13.0 - 44.0 %    Monocytes % 7.7 2.0 - 10.0 %    Eosinophils % 0.0 0.0 - 6.0 %    Basophils % 0.0 0.0 - 2.0 %    Neutrophils Absolute 4.13 1.20 - 7.70 x10*3/uL    Immature Granulocytes Absolute, Automated 0.03 0.00 - 0.70 x10*3/uL    Lymphocytes Absolute 0.78 (L) 1.20 - 4.80 x10*3/uL    Monocytes Absolute 0.41 0.10 - 1.00 x10*3/uL    Eosinophils Absolute 0.00 0.00 - 0.70 x10*3/uL    Basophils Absolute 0.00 0.00 - 0.10 x10*3/uL     CT angio chest for pulmonary embolism    Result Date: 4/23/2024  Interpreted By:  Geoffrey Bernal and Tippareddy Charit STUDY: CT ANGIO CHEST FOR PULMONARY EMBOLISM;  4/23/2024 8:39 pm   INDICATION: Signs/Symptoms:Rule out PE. Pt presents with dyspnea. Hx of SLE and polysubstance abuse. D-dimer 1800.   COMPARISON: CT chest 01/07/2024, CT chest 04/12/2007   ACCESSION NUMBER(S): [id number]   ORDERING CLINICIAN: STEPHANE COMER   TECHNIQUE: Contiguous axial images of the chest were  obtained after the intravenous administration of 75 mL Omnipaque 350 contrast using angiographic PE protocol. Coronal and sagittal reformatted images were reconstructed from the axial data. MIP images were created on an independent workstation and reviewed.   FINDINGS: MEDIASTINUM AND LYMPH NODES: Stable prominent nonenlarged bilateral axillary lymph nodes. Stable prominent to mildly enlarged prevascular and right paratracheal lymph nodes, the largest measuring up to 1.5 cm. The esophagus is normal. No pneumomediastinum there has been an increase in edema in the mediastinal fat, likely due to overall increased anasarca.   VESSELS: Left IJ central venous catheter with the tip in the lower SVC. Mild hepatic IVC reflux which can be seen in setting of right heart dysfunction. Normal caliber aorta without dissection. No significant aortic atherosclerosis. Main pulmonary artery measures 3.4 cm in diameter, similar to prior examination. No discrete filling defects within the pulmonary arteries through the subsegmental level.   HEART: There is redemonstration of severe cardiomegaly with disproportionate severe right atrial and ventricular dilatation. Right atrial dilatation appears progressed from prior study. There is in RV-LV ratio of ~ 2, similar to prior study.  No coronary artery calcifications. Moderate pericardial effusion increased in size compared to prior examination.   LUNG, AIRWAYS, AND PLEURA: The slight worsening of segmental atelectasis in the lingula. Stable 0.4 cm ground-glass nodule at the right lung apex dating back to 09/22/2020 (axial image 40, series 404), slightly larger from 04/12/2007. Stable 0.6 cm subsolid nodule superior segment right lower lobe compared 01/07/2024; however, while unchanged in size from 04/12/2007 it is increased in density (previously purely ground-glass). Previously described nodule in the lingula is obscured by atelectasis. The lungs demonstrate mild ill-defined centrilobular  ground-glass nodularity throughout all lobes of both lungs similar to prior study. There is no significant interlobular septal thickening. There is no pleural effusion or pneumothorax.   OSSEOUS STRUCTURES: No acute osseous abnormality. Multilevel degenerative changes of the thoracic spine.   CHEST WALL SOFT TISSUES: There is increased diffuse body wall anasarca.   UPPER ABDOMEN/OTHER: Although partially visualized, the liver and spleen are enlarged. There is haziness in the upper abdominal fat similar to prior study.       1. No acute pulmonary embolism. However, dilated main pulmonary artery is again likely related to known underlying pulmonary hypertension 2. Severe cardiomegaly with severe disproportionate right atrial and ventricular dilatation, with slightly progressed with regard to right atrial dilatation and again with an RV-LV ratio of ~ 2 indicative of markedly elevated right-sided heart pressures/right heart failure. 3. Similar diffuse ill-defined ground-glass centrilobular nodules throughout the and lungs that can be seen in the setting of severe longstanding pulmonary hypertension as well as smoking-related lung disease/respiratory bronchiolitis 4. Diffusely increased body wall edema, mediastinal fat edema, and enlargement of moderate pericardial effusion that may be all related to worsening anasarca/fluid overload; however, there is no evidence of sadiq pulmonary edema. 5. 0.6 cm sub solid nodule in the superior segment right lower lobe is unchanged in size from 04/12/2007 but increased in density. Cannot exclude development of minimally invasive adenocarcinoma. Another ground-glass nodule at the right apex is stable from prior study but slightly larger from 04/12/2007. Recommend follow-up with pulmonology for appropriate management which may include continued surveillance. 6. Hepatosplenomegaly.   I personally reviewed the images/study and I agree with the findings as stated by Omar Lester MD.  This study was interpreted at [place], [place].   MACRO: Critical Finding:  See findings. Notification was initiated on 4/23/2024 at 11:52 pm by  Geoffrey Bernal.  (**-YCF-**) Instructions:   Signed by: Geoffrey Bernal 4/23/2024 11:53 PM Dictation workstation:   [id number]    XR chest 2 views    Result Date: 4/23/2024  Interpreted By:  Kalin Silva, STUDY: XR CHEST 2 VIEWS;  4/23/2024 11:42 am   INDICATION: Signs/Symptoms:pulm htn sob.   COMPARISON: 01/07/2024   ACCESSION NUMBER(S): [id number]   ORDERING CLINICIAN: EUFEMIA LAWSON   FINDINGS: Left IJ central venous line with its tip over the superior cavoatrial junction     CARDIOMEDIASTINAL SILHOUETTE: There is marked enlargement of the cardiac silhouette.   LUNGS: Lungs are clear. There is no consolidation or effusion   ABDOMEN: No remarkable upper abdominal findings.   BONES: No acute osseous changes.       Marked enlargement of the cardiac silhouette. No evidence of acute cardiopulmonary process.     MACRO: None   Signed by: Kalin Silva 4/23/2024 11:43 AM Dictation workstation:   [id number]    Point of Care Ultrasound    Result Date: 4/23/2024  Eufemia Lawson DO     4/23/2024  1:45 PM Performed by: Eufemia Lawson DO Authorized by: Eufemia Lawson DO  Cardiac Indications: orthopnea dyspnea Procedure: Cardiac Ultrasound Findings:  Views: parasternal long, parasternal short, apical four and subxiphoid The pericardial space was visualized and was NEGATIVE for a significant pericardial effusion. Activity: Ventricular contractions were visualized. LV: LV systolic function was HYPERDYNAMIC. RV: RV size was DILATED. Impression: Cardiac: The focused cardiac ultrasound exam had ABNORMAL findings as specified. Comments: Known pulmonary HTN Eufemia Lawson DO PGY-2 Emergency Medicine     Scheduled medications  ARIPiprazole, 10 mg, oral, q PM  aspirin, 81 mg, oral, Daily  calcium carbonate-vitamin D3, 2 tablet, oral,  BID  celecoxib, 100 mg, oral, BID  enoxaparin, 40 mg, subcutaneous, q24h  folic acid, 1 mg, oral, Daily  furosemide, 40 mg, oral, Daily  gabapentin, 300 mg, oral, BID  hydroxychloroquine, 200 mg, oral, BID  lisinopril, 10 mg, oral, Daily  macitentan, 10 mg, oral, Daily  melatonin, 6 mg, oral, Nightly  potassium chloride CR, 20 mEq, oral, BID with meals  thiamine, 100 mg, oral, Daily  venlafaxine XR, 150 mg, oral, Daily      Continuous medications  epoprostenol (Veletri) 7,500 mcg in sodium chloride 0.9% 100 mL (75 mcg/mL) bag, 70 ng/kg/min (Order-Specific), Last Rate: 70 ng/kg/min (04/23/24 1800)      PRN medications  PRN medications: dextrose, dextrose, glucagon, glucagon, hydrOXYzine HCL, loperamide          This patient has a central line   Reason for the central line remaining today?  For treatment of PAH     Assessment/Plan   Principal Problem:    Pulmonary HTN (Multi)    Ronna Beckham is a 63 y.o. female with PMHx of PHT (on home 4L NC), non-obstructive CAD, NSTEMI, HT, lupus, substance use disorder (cocaine, methamphetamine, alcohol), GERD, anxiety and depression, epilepsy who is presenting to the ED with chief complaint of worsening dyspnea on exertion likely 2/2 worsening pHTN vs. Illicit drug use vs. PNA, vs. SLE flare vs. ADHF.   -----  Update 4/24  - CT PE (4/24): negative for PE  - Trop 31 --> 26  - CRP 0.12, ESR 15  - Urine tox: presumptive positive for cocaine    To follow up  - TTE  - Peth    Dispo  - PT/OT: not indicated at this time  - Barrier  - Will need follow up on subsolid nodule with increased density, cannot exclude development of minimally invasive adeno CA  -----  #Dyspnea  #Likely PAD I/s/o recent cocaine use  :: ddx: worsening pHTN (might be from cocaine use) vs. Illicit drug use vs. PNA, vs. SLE flare vs. ADHF.   :: Pt is on 4 L NC baseline  :: CXR (4/23): enlarged cardiac silhouette, no active cardiopulmonary process  :: BNP (4/23) 384, Troponin (4/23) 31 --> 26  :: Flu/ COVID:  negative  :: EKG:   :: D-dimer (4/23): 1853  :: CT PE (4/24): negative for PE, pulmonary artery hypertension, marked increased right heart pressure, body anasarca, hepatomegaly, 0.6 cm subsolid nodule with increased density, cannot exclude development of minimally invasive adeno CA  :: CRP 0.12, ESR 15  - TTE     #PHT  :: On home 4L NC  :: Home: veletri, Opsumit, lasix 40 mg  - c/w veletri, opsumit  - c/w home lasix 40 mg     #Substance use disorder  :: use cocaine, methamphetamine, alcohol  :: alcohol <10  :: Urine tox (4/24): presumptive positive for cocaine  - Peth     #Hypomagnesemia  #Hypocalcemia  :: S/P Calcium gluconate 4g, magnesium sulfate 4g in the ED  - c/w home calcium carbonate-vitamin D3  - hold home magnesium chloride, replete inpatient with IV     #NSTEMI  #Non obstructive CAD  #HT  - c/w home lisinopril, ASA     #Lupus  - c/w hydroxychloroquine 200mg BID     #GERD  :: On omeprazole  - Hold PPI as potential cause for hypomag and hypoCa     #Anxiety  #Depression  - c/w home abilify, venlafaxine 150mg, atarax prn     #Neuropathy  - c/w home gabapentin     #Others  - c/w home vitamin B-1100, folic acid  - c/w home potassium chloride 20 mEq BID  - c/w home melatonin     F: Replete PRN  E: Replete PRN   N: Cardiac Diet  DVT Ppx: Lovenox  GI Ppx: Hold  Access/Lines: PIV, chest port  Abx: None  O2: 4L NC     Pain regimen: Celecoxib BID  GI Laxative: None  PT/OT: Not indicated     Code status: Full Code  Emergency contact:  Diana almonte (daughter): [phone]           Alban Mccormick MD

## 2024-04-24 NOTE — DISCHARGE SUMMARY
Discharge Diagnosis  Pulmonary HTN (Multi)    Issues Requiring Follow-Up  :: Pulmonary  - To follow up on pulmonary hypertension treatment  - To follow up on pulmonary nodule in the superior segment right lower lobe (increased in density)    :: PCP  - To follow up on other chronic conditions and follow up on PETH result    Test Results Pending At Discharge  Pending Labs       Order Current Status    Extra Urine Wallace Tube Collected (04/23/24 1350)    Phosphatidylethanol (PEth), Whole Blood, Quantitative In process    Urinalysis with Reflex Culture and Microscopic In process            Hospital Course  Ronna Beckham is a 63 y.o. female with PMHx of PHT (on home 4L NC), non-obstructive CAD, NSTEMI, HT, lupus, substance use disorder (cocaine, methamphetamine, alcohol), GERD, anxiety and depression, epilepsy who is presenting to the ED with chief complaint of worsening dyspnea on exertion for 3 months and admitted for observation. Pt was satting well at rest on home 4L NC. Vital sign stable and physical examination remarkable for systolic ejection murmur at tricuspid area.    Work up for dyspnea negative for Troponin, ESR, CRP,  (down from 4260 1/7/24), COVID and respiratory virus. CXR (4/23) showed enlarged cardiac silhouette, no active cardiopulmonary process. CTPE (4/23) negative for PE, pulmonary artery hypertension, marked increased right heart pressure, body anasarca, hepatomegaly, 0.6 cm subsolid nodule with increased density, cannot exclude development of minimally invasive adeno CA. Urine positive for cocaine. The patient dyspnea on exertion was attributed to pulmonary hypertension I/s/o recent cocaine use. While hospitalization, pt was resumed on her pulmonary hypertension regimen. 3 min walk test (4/24) shows sat 85-88% while walking and back to 94% on 4L NC.    Pt was discharged home with veletri and opsumit.    Follow up appointments  :: Pulmonary  - To follow up on pulmonary hypertension  treatment  - To follow up on pulmonary nodule in the superior segment right lower lobe (increased in density)    :: PCP  - To follow up on other chronic conditions and follow up on PETH result    Pertinent Physical Exam At Time of Discharge  Physical Exam      4/24/2024     3:15 AM 4/24/2024     4:35 AM 4/24/2024     6:00 AM 4/24/2024     7:07 AM 4/24/2024     7:26 AM 4/24/2024     8:25 AM 4/24/2024    12:18 PM   Vitals   Systolic 98 110 95 104 104     Diastolic 78 84 61 75 75     Heart Rate 85 87 81 79 90 88 87   Temp     36.6 °C (97.9 °F)     Resp 15 13 17 20 18 20 16     Physical Exam  Constitutional:       General: She is not in acute distress. Lying in bed     Appearance: Normal appearance. She is not ill-appearing or diaphoretic.   HENT:      Head: Normocephalic and atraumatic.   Eyes:      Conjunctiva/sclera: Conjunctivae normal.      Pupils: Pupils are equal, round, and reactive to light.   Cardiovascular:      Rate and Rhythm: Normal rate and regular rhythm.      Heart sounds: Murmur (Systolic ejection murmur at Tricuspid area) heard.      No friction rub. No gallop.   Pulmonary:      Effort: Pulmonary effort is normal. No respiratory distress.      Breath sounds: Normal breath sounds. No stridor. No wheezing or rhonchi.   Chest:      Chest wall: No tenderness.   Abdominal:      General: Abdomen is flat. Bowel sounds are normal. There is no distension.      Palpations: Abdomen is soft.      Tenderness: There is no abdominal tenderness.   Musculoskeletal:         General: No swelling.   Skin:     General: Skin is warm and dry.      Capillary Refill: Capillary refill takes less than 2 seconds.   Neurological:      General: No focal deficit present.      Mental Status: She is alert and oriented to person, place, and time.   Psychiatric:         Mood and Affect: Mood normal.         Behavior: Behavior normal.         Thought Content: Thought content normal.         Judgment: Judgment normal.     Home  Medications     Medication List      CONTINUE taking these medications     ARIPiprazole 10 mg tablet; Commonly known as: Abilify   aspirin 81 mg EC tablet   celecoxib 100 mg capsule; Commonly known as: CeleBREX   chlorhexidine 0.12 % solution; Commonly known as: Peridex   cholestyramine 4 gram packet; Commonly known as: Questran; MIX 1 PACKET   IN LIQUID AND DRINK BY MOUTH TWO TIMES A DAY   dicyclomine 10 mg capsule; Commonly known as: Bentyl   epoprostenol 1.5 mg recon soln; Commonly known as: Veletri; 1.5 mg   (1,500 mcg) by central venous line infusion route continuously.   Reconstitute contents of vial with 5 ml diluent and mix cassette as   directed. Infuse continuously per physician orders. Allow for priming   volume. Dose range: 1-150 ng/kg/min.   folic acid 1 mg tablet; Commonly known as: Folvite   furosemide 40 mg tablet; Commonly known as: Lasix; TAKE 1 TABLET BY   MOUTH ONE TIME DAILY   gabapentin 300 mg capsule; Commonly known as: Neurontin   hydroxychloroquine 200 mg tablet; Commonly known as: Plaquenil   hydrOXYzine HCL 25 mg tablet; Commonly known as: Atarax   Klor-Con M20 20 mEq ER tablet; Generic drug: potassium chloride CR; TAKE   1 TABLET BY MOUTH TWO TIMES A DAY WITH MEALS   lisinopril 10 mg tablet   loperamide 2 mg tablet; Commonly known as: Imodium A-D   Mag 64 64 mg EC tablet; Generic drug: magnesium chloride; TAKE 1 TABLET   BY MOUTH TWO TIMES A DAY   melatonin 3 mg capsule   omeprazole 40 mg DR capsule; Commonly known as: PriLOSEC   Opsumit 10 mg tablet tablet; Generic drug: macitentan; TAKE 1 TABLET BY   MOUTH DAILY. DO NOT HANDLE IF PREGNANT. DO NOT SPLIT, CRUSH, OR CHEW.   REVIEW MEDICATION GUIDE.   Oyster Shell Calcium-Vit D3 500 mg-5 mcg (200 unit) tablet; Generic   drug: calcium carbonate-vitamin D3; TAKE 2 TABLETS BY MOUTH TWO TIMES A   DAY   venlafaxine  mg 24 hr capsule; Commonly known as: Effexor-XR   Vitamin B-1 100 mg tablet; Generic drug: thiamine     STOP taking these  medications     tadalafil 20 mg tablet; Commonly known as: Cialis       Outpatient Follow-Up  Future Appointments   Date Time Provider Department Center   5/13/2024  1:30 PM [facility] [id number] [facility]   5/13/2024  2:30 PM Karen Dias RRT [id number] [facility]   5/13/2024  3:00 PM Chicho Prescott DO [id number] [facility]   7/10/2024  4:00 PM Bin Wade MD [id number] [facility]   8/26/2024  8:00 AM Chelsea Wood MD [id number] [facility]       Alban Mccormick MD

## 2024-04-24 NOTE — DISCHARGE INSTRUCTIONS
Dear Ms. Ronna Beckham ,    You were admitted to the hospital due to shortness of breath when you move. CT scan showed no blood clot in your lung but showed pulmonary nodule, which was shown in there prior CT. EKG and blood work did not show heart abnormality. The cause of your shortness of breath is from pulmonary hypertension, which is likely worsen by cocaine use. You were treated with your home regimen for pulmonary hypertension.    You will be discharged to home with your home regimen.     You will have a follow up appointment with:  - Pulmonary hypertension team to follow up on your hypertension and pulmonary nodule. You will be contacted to arrange the appointment.  - Primary care physician for post hospitalization and other chronic conditions. Please arrange the appointment with your PCP (Ms. Schrader)    Please take your medication as advised and attend the appointments.    It was a pleasure taking care of you.  The  Care Team

## 2024-04-26 LAB
LABORATORY REPORT: NORMAL
PETH INTERPRETATION: NORMAL
PLPETH BLD-MCNC: 11 NG/ML
POPETH BLD-MCNC: 20 NG/ML

## 2024-05-02 ENCOUNTER — DOCUMENTATION (OUTPATIENT)
Dept: PULMONOLOGY | Facility: HOSPITAL | Age: 64
End: 2024-05-02
Payer: COMMERCIAL

## 2024-05-02 NOTE — PROGRESS NOTES
"Per [facility] patient reporting raw skin next to Montez and requesting more biopatches to do dressing changes more frequent. [facility] nursing called patient and she reports she \"pulled skin too hard\" but that she was just hospitalized and it is not infected. Dr. Prescott would like picture sent into office to assess. Patient provided email and to send in the morning.   "

## 2024-05-06 ENCOUNTER — DOCUMENTATION (OUTPATIENT)
Dept: PULMONOLOGY | Facility: HOSPITAL | Age: 64
End: 2024-05-06
Payer: COMMERCIAL

## 2024-05-06 DIAGNOSIS — L08.9 SKIN INFECTION: Primary | ICD-10-CM

## 2024-05-06 RX ORDER — CEPHALEXIN 500 MG/1
500 CAPSULE ORAL EVERY 6 HOURS SCHEDULED
Status: SHIPPED | OUTPATIENT
Start: 2024-05-06 | End: 2024-05-13

## 2024-05-06 NOTE — PROGRESS NOTES
Pt sent in photo on 5/3/24 after hours of her Montez line site. Pt had gone to the ER over the weekend for a possible line infection. Was told there was no infection per the ER. Dr. Prescott reviewed photo and gave orders for prophylactic keflex 500 mg 4 times a day for 1 week. Pt agrees with plan and will call to follow up in 2-3 days. Denies fever, chills, drainage, or spreading of redness or swelling.

## 2024-05-13 ENCOUNTER — APPOINTMENT (OUTPATIENT)
Dept: CARDIOLOGY | Facility: HOSPITAL | Age: 64
End: 2024-05-13
Payer: COMMERCIAL

## 2024-05-13 ENCOUNTER — HOSPITAL ENCOUNTER (OUTPATIENT)
Dept: RESPIRATORY THERAPY | Facility: HOSPITAL | Age: 64
Discharge: HOME | End: 2024-05-13
Payer: COMMERCIAL

## 2024-05-13 ENCOUNTER — OFFICE VISIT (OUTPATIENT)
Dept: PULMONOLOGY | Facility: HOSPITAL | Age: 64
End: 2024-05-13
Payer: COMMERCIAL

## 2024-05-13 VITALS
DIASTOLIC BLOOD PRESSURE: 61 MMHG | HEIGHT: 65 IN | HEART RATE: 94 BPM | SYSTOLIC BLOOD PRESSURE: 96 MMHG | BODY MASS INDEX: 26.66 KG/M2 | WEIGHT: 160 LBS

## 2024-05-13 DIAGNOSIS — I27.20 PULMONARY HTN (MULTI): ICD-10-CM

## 2024-05-13 DIAGNOSIS — E87.8 ELECTROLYTE AND FLUID DISORDER: ICD-10-CM

## 2024-05-13 DIAGNOSIS — Z79.899 LONG-TERM USE OF HIGH-RISK MEDICATION: ICD-10-CM

## 2024-05-13 DIAGNOSIS — I27.21 PAH (PULMONARY ARTERY HYPERTENSION) (MULTI): Primary | ICD-10-CM

## 2024-05-13 DIAGNOSIS — I27.20 PULMONARY HYPERTENSION (MULTI): ICD-10-CM

## 2024-05-13 PROCEDURE — 94618 PULMONARY STRESS TESTING: CPT | Performed by: INTERNAL MEDICINE

## 2024-05-13 PROCEDURE — 3074F SYST BP LT 130 MM HG: CPT | Performed by: INTERNAL MEDICINE

## 2024-05-13 PROCEDURE — 1036F TOBACCO NON-USER: CPT | Performed by: INTERNAL MEDICINE

## 2024-05-13 PROCEDURE — 99215 OFFICE O/P EST HI 40 MIN: CPT | Performed by: INTERNAL MEDICINE

## 2024-05-13 PROCEDURE — 94618 PULMONARY STRESS TESTING: CPT

## 2024-05-13 PROCEDURE — 3078F DIAST BP <80 MM HG: CPT | Performed by: INTERNAL MEDICINE

## 2024-05-13 ASSESSMENT — ENCOUNTER SYMPTOMS
LOSS OF SENSATION IN FEET: 1
OCCASIONAL FEELINGS OF UNSTEADINESS: 0
DEPRESSION: 0

## 2024-05-13 ASSESSMENT — PATIENT HEALTH QUESTIONNAIRE - PHQ9
SUM OF ALL RESPONSES TO PHQ9 QUESTIONS 1 AND 2: 0
1. LITTLE INTEREST OR PLEASURE IN DOING THINGS: NOT AT ALL
2. FEELING DOWN, DEPRESSED OR HOPELESS: NOT AT ALL

## 2024-05-13 ASSESSMENT — PAIN SCALES - GENERAL: PAINLEVEL: 0-NO PAIN

## 2024-05-13 NOTE — PROGRESS NOTES
Diagnoses/Problems     · High risk medication use (V58.69) (Z79.899)   · Pulmonary hypertension (416.8) (I27.20)   · SOB (shortness of breath) on exertion (786.05) (R06.02)     Patient Discussion/Summary     1) Downtitrate epo for diarrhea if taking Imodium not effective.. Patient to call in weekly, use Imodium as prescribed.     2) Continue macitentan  3) Needs echo prior to next visit, patient did not get it as scheduled.  4) Labs today to monitor PAH.  5) Follow up 3 months with 6 mw  6) Echo ASAP             History of Present Illness  Patient is a 62 year old female here today for pulmonary arterial hypertension follow up. Last office visit was on 8/21/23. She is referred by Dr. Calvillo. Patient was direct admitted for infusion start after referral triage. RHC completed and Veletri infusion initiated on 9/23/20. Patient was admitted at [facility] in June and July for electrolyte abnormalities and diarrhea. Patient has been unable to be reached for follow up. She has daily diarrhea, takes anti-diarrheal when diarrhea starts Discussed taking first thing in the morning, but she is hesitant as she already takes so many morning meds.      PMH: GERD, HLD, anxiety/depression, neuropathy, epilepsy (2 questionable grand mal seizures), lupus, hysterectomy, and choley.      Patient is a former smoker with history of drug and alcohol abuse. She smoked an unknown amount for 34 years before switching to e-cigs and vaping over the last 10 years. She drank beer since she was 16 years old and used cocaine/crystal meth daily since she was 23. She quit everything in May 2020.     Treatment: Veletri 74 ng/kg/min (holding at this dose d/t palpations; followed by Dr. Calvillo), Opsumit, and diuretics. Tadalafil was started in May 2021, patient stopped due to headaches.   Symptoms: SOB on exertion unchanged. Denies chest pain. Has frequent dizziness, unsure what attributed to. No headaches. Diarrhea 4-5x day with 2 Imodium daily.  Wearing 4LPM continuously.   Montez site is without drainage or redness.     Interval history    (5/13/24)  Shortness of breath with ambulation. On O2 at 4 l nc continuous. No edema noted. Flushing to chest , arms and legs noted. Denies Stallings, dizziness, syncope.  Denies N/V. Diarrhea all the time relieved with Immodium 2 times a day.              There are no spiritual/cultural practices/values/needs that are important to know   Initial Fall Risk Screening:   CARMEN has not fallen in the last 6 months.~CARMEN does not have a fear of falling.~The patient is using an assistive device.   Pain Scale: On a scale of 0 to 10, the patient rates the pain at 0.   Advance directives:   Living Will: No living will on file.       Healthcare POA: No healthcare proxy on file.   Declaration of Mental Health Treatment: No mental health treatment on file.    Tobacco Screening: Has not used tobacco in the past 6 months.   Domestic Violence Screen: Does not feel threatened or abused physically, emotionally or sexually. Do you feel UNSAFE?   The patient feels safe in the home.   Depression/Suicide Screening:   During the past 2 weeks, the patient felt down, depressed or hopeless.~   During the past 2 weeks, the patient felt little interest or pleasure in doing things.~   She does not have a risk of suicide.~   She has not had thoughts of harming others.   Pt called pcp   Single alcohol screening question:   In the past year the patient has had 5 or more drinks (men) or 4 or more drinks (women)? 0 time(s).       Review of Systems     Constitutional: no chills,~no fever,~no night sweats~and~not feeling tired.   ENT: the ears feel full.   Neck: no mass(es)~and~no swelling.   Cardiovascular: no edema, but~no chest pain,~no palpitations,~no syncope~and~no tachycardia~. Trace amount of YOHANA bilaterally.   Respiratory: shortness of breath during exertion, but~no cough~and~no shortness of breath at rest~   The patient presents with complaints of  occasional episodes of wheezing.   Gastrointestinal: no abdominal pain,~no bloating,~no nausea~and~no vomiting. Diarrhea  all the time. Takes Immodium.   Neurological: numbness~and~tingling, but~no dizziness~. Chronic neuropathy.   Psychiatric: no anxiety,~no depression~and~no sleep disturbances.   Endocrine: no changes in appetite,~no recent weight gain~and~no recent weight loss.      Active Problems     · Acute bronchitis (466.0) (J20.9)   · At risk for complication (V49.89) (Z91.89)   · Benign essential hypertension (401.1) (I10)   · Added by Problem List Migration; 2013; Moved to Suppressed 2013 9:06PM   · Blepharitis (373.00) (H01.009)   · Blepharitis of both eyes (373.00) (H01.003,H01.006)   · Depression (311) (F32.A)   · Added by Problem List Migration; 2013; Moved to Suppressed 2013 9:06PM   · Diarrhea (787.91) (R19.7)   · Edema of eyelid (374.82) (H02.849)   · Electrolyte and fluid disorder (276.9) (E87.8)   · Esophageal reflux (530.81) (K21.9)   · Esophageal reflux (530.81) (K21.9)   · Added by Problem List Migration; 2013; Moved to Suppressed 2013 9:06PM   · High risk medication use (V58.69) (Z79.899)   · HLD (hyperlipidemia) (272.4) (E78.5)   · HLD (hyperlipidemia) (272.4) (E78.5)   · Insomnia (780.52) (G47.00)   · Neuropathy (355.9) (G62.9)   · Pulmonary hypertension (416.8) (I27.20)   · SOB (shortness of breath) on exertion (786.05) (R06.02)   · Systemic lupus erythematosus (710.0) (M32.9)     Past Medical History     · History of seizure (V13.89) (Z87.898)     Surgical History     · History of  section   · History of Cholecystectomy   · History of Colonoscopy   · History of Esophagogastroduodenoscopy   · History of Hysterectomy     Social History     · Daily alcohol use   · Quit in May 2020, drank beer daily since 16 years of age   · Ex-smoker for less than 1 year (V49.89) (Z78.9)   · Illicit drug use (305.90) (F19.90)   · Cocaine and meth use since age 23,  quit in May 2020     Allergies     · Keppra   Recorded By: Daisha Rodriguez; 4/1/2021 5:19:59 PM     Current Meds     Medication Name Instruction Reason   amLODIPine Besy-Benazepril HCl - 5-10 MG Oral Capsule TAKE 1 CAPSULE Daily Benign essential hypertension   Neomycin-Polymyxin-Dexameth 3.5-92147-4.1 Ophthalmic Ointment Apply to upper and lower eyelids of both eyes 4 times per day for one week. Blepharitis of both eyes   Loperamide HCl - 2 MG Oral Tablet Take 1 tablet daily and as needed. Diarrhea   Loperamide HCl - 2 MG Oral Capsule Take 1 tablet daily and as needed. Diarrhea, Pulmonary hypertension   Furosemide 40 MG Oral Tablet Take 1 tablet daily Electrolyte and fluid disorder   Omeprazole 40 MG Oral Capsule Delayed Release TAKE 1 CAPSULE Daily Esophageal reflux   Aspirin 325 MG Oral Tablet TAKE 1 TABLET DAILY. Health Maintenance   Vitamin D 25 MCG (1000 UT) Oral Tablet   Health Maintenance   Melatonin 3 MG Oral Tablet TAKE AS DIRECTED. Insomnia   DULoxetine HCl - 60 MG Oral Capsule Delayed Release Particles   Neuropathy   Gabapentin 300 MG Oral Capsule TAKE 1 CAPSULE TWICE DAILY. Neuropathy   Opsumit 10 MG Oral Tablet TAKE ONE TABLET BY MOUTH DAILY. Pulmonary hypertension   Veletri SOLR USE AS DIRECTED. Pulmonary hypertension   Hydroxychloroquine Sulfate 200 MG Oral Tablet TAKE 1 TABLET TWICE DAILY. Systemic lupus erythematosus   Celecoxib 100 MG Oral Capsule TAKE ONE CAPSULE BY MOUTH TWICE DAILY     Ibuprofen 800 MG Oral Tablet TAKE 1 TABLET BY MOUTH 3 TIMES A DAY        Vitals  Vital Signs     Recorded: [id number] 03:24PM   Heart Rate 95   Systolic 113   Diastolic 81   Weight 175 lb    BMI Calculated 28.68 kg/m2   BSA Calculated 1.88   O2 Saturation 95, RA      Review of Systems      Physical Exam     Physical Exam  Constitutional:       Comments: NC oxygen, wheel chair   HENT:      Head: Normocephalic.      Nose: Nose normal.   Cardiovascular:      Rate and Rhythm: Normal rate and regular rhythm.      Heart  sounds: Murmur heard.      Comments: Increased P2  Pulmonary:      Effort: Pulmonary effort is normal.      Breath sounds: Normal breath sounds.   Abdominal:      General: Bowel sounds are normal.      Palpations: Abdomen is soft.   Musculoskeletal:      Right lower leg: No edema.      Left lower leg: No edema.   Skin:     Findings: Rash (Flushed) present.   Neurological:      General: No focal deficit present.      Mental Status: She is alert.   Psychiatric:         Mood and Affect: Mood normal.         Judgment: Judgment normal.          Relevant Results    6MWT(5/13/24)  SP02- 96-96% /4 L NC  HR-   CHEIKH-0-4  Actual Meters 216 meters    Echo (4/24/24) [facility], [address] [place] [address]                 Tel [phone] and Fax [phone]     TRANSTHORACIC ECHOCARDIOGRAM REPORT        Patient Name:      CARMEN REDDYRUFF     Lucrecia Physician:    [id number] Maricarmen Mead MD  Study Date:        4/24/2024            Ordering Provider:    [id number] ROME BAGLEY  MRN/PID:           [id number]             Fellow:               [id number] Fam Deras MD  Accession#:        [id number]         Nurse:                Cely Johnson RN  Date of Birth/Age: 1960 / 63      Sonographer:          Liya Padilla RDCS                     years  Gender:            F                    Additional Staff:  Height:            180.34 cm            Admit Date:           4/23/2024  Weight:            75.30 kg             Admission Status:     Inpatient -                                                                Routine  BSA / BMI:         1.95 m2 / 23.15      Encounter#:           [id number]                     kg/m2                                          Department Location:  [facility]  Blood  Pressure: 104 /75 mmHg     Study Type:    TRANSTHORACIC ECHO (TTE) LIMITED  Diagnosis/ICD: Pulmonary hypertension, unspecified-I27.20  Indication:    pulmonary HTN, eval for worsening EF  CPT Code:      Echo Limited-01225; Color Doppler-44583; Doppler Limited-34183     Patient History:  Pertinent History: PHTN, polysubstance abuse, NSTEMI, CAD.     Study Detail: The following Echo studies were performed: 2D, Doppler and color                flow. Agitated saline used as a contrast agent for intraseptal                flow evaluation.        PHYSICIAN INTERPRETATION:  Left Ventricle: The left ventricular systolic function is normal, with an estimated ejection fraction of 60-65%. There are no regional wall motion abnormalities. The left ventricular cavity size is normal. The interventricular septum is flattened in systole and diastole, consistent with right ventricular pressure and volume overload. Left ventricular diastolic filling was not assessed.  Left Atrium: The left atrium is normal in size. A bubble study using agitated saline was performed. Bubble study is positive. A small PFO (= 10 bubbles) was demonstrated. Agitated saline contrast study was positive for small intracardiac shunt.  Right Ventricle: The right ventricle is severely enlarged. There is severely reduced right ventricular systolic function. Although the TAPSE and RVA' are normal, the fractional area change is markedly reduced consistent wtih severe RV systolic dysfunction. Still able to generate high RVSP.  Right Atrium: The right atrium is severely dilated.  Aortic Valve: The aortic valve is trileaflet. There is minimal aortic valve cusp calcification. Aortic valve regurgitation was not assessed.  Mitral Valve: The mitral valve is normal in structure. Mitral valve regurgitation was not assessed.  Tricuspid Valve: The tricuspid valve is structurally normal. There is severe tricuspid regurgitation. The Doppler estimated RVSP is severely elevated  at 88.6 mmHg.  Pulmonic Valve: The pulmonic valve is not well visualized. Pulmonic valve regurgitation was not assessed.  Pericardium: There is a small to moderate pericardial effusion. Small circumferential pericardial effusin though is small to moderate adjacent to the RA. No RV or RA collapse. The IVC is dilated, though likely due to the presence of severe pulmonary hypertension. No significant respiratory variation of the MV or TV inflows. Not diagnostic for tamponade though difficult to dx tamponade in setting of severe pulmonary hypertension.  Aorta: The aortic root is normal.  Systemic Veins: The inferior vena cava appears dilated. There is IVC inspiratory collapse greater than 50%.  In comparison to the previous echocardiogram(s): Compared with study from 3/12/2022,. Compared with the prior exam from 3/12/2022 the RV had a similar appearance with severe dilatation and reduced function. Note the TR was not asessed on that exam to allow estimation of the RVSP at that time. The pericardial effusion appears similar in size.        CONCLUSIONS:   1. Left ventricular systolic function is normal with a 60-65% estimated ejection fraction.   2. Right ventricular volume and pressure overload.   3. Although the TAPSE and RVA' are normal, the fractional area change is markedly reduced consistent wtih severe RV systolic dysfunction. Still able to generate high RVSP.   4. Severely enlarged right ventricle.   5. There is severely reduced right ventricular systolic function.   6. Agitated saline contrast study was positive for small intracardiac shunt.   7. The right atrium is severely dilated.   8. There is a small to moderate pericardial effusion.   9. Small circumferential pericardial effusin though is small to moderate adjacent to the RA. No RV or RA collapse. The IVC is dilated, though likely due to the presence of severe pulmonary hypertension. No significant respiratory variation of the MV or TV inflows. Not  diagnostic for tamponade though difficult to dx tamponade in setting of severe pulmonary hypertension.  10. Severe tricuspid regurgitation visualized.  11. Severely elevated right ventricular systolic pressure.  12. Compared with the prior exam from 3/12/2022 the RV had a similar appearance with severe dilatation and reduced function. Note the TR was not asessed on that exam to allow estimation of the RVSP at that time. The pericardial effusion appears similar in size.  13. A bubble study using agitated saline was performed. Bubble study is positive. A small PFO (= 10 bubbles) was demonstrated.     QUANTITATIVE DATA SUMMARY:  2D MEASUREMENTS:                           Normal Ranges:  IVSd:          1.00 cm   (0.6-1.1cm)  LVPWd:         0.90 cm   (0.6-1.1cm)  LVIDd:         4.80 cm   (3.9-5.9cm)  LVIDs:         3.30 cm  LV Mass Index: 81.5 g/m2  LV % FS        31.2 %     RA VOLUME BY A/L METHOD:                        Normal Ranges:  RA Area A4C: 42.2 cm2     AORTA MEASUREMENTS:                     Normal Ranges:  Asc Ao, d: 3.40 cm (2.1-3.4cm)     LV SYSTOLIC FUNCTION BY 2D PLANIMETRY (MOD):                      Normal Ranges:  EF-A4C View: 64.4 % (>=55%)        RIGHT VENTRICLE:  RV Basal 6.80 cm  RV Mid   6.10 cm  RV Major 9.4 cm  TAPSE:   17.0 mm  RV s'    0.13 m/s     TRICUSPID VALVE/RVSP:                              Normal Ranges:  Peak TR Velocity: 4.49 m/s  RV Syst Pressure: 88.6 mmHg (< 30mmHg)  IVC Diam:         3.20 cm        [id number] Maricarmen Mead MD  Electronically signed on 4/24/2024 at 5:29:35 PM    [facility] (1/8/24) [place] [id number]             Phone [phone]     Cardiovascular Catheterization Report     Patient Name:     CARMEN REDDYRUFF     Performing           [id number] Christiano Calvillo                                         Physician:             Study Date:       1/8/2024             Verifying Physician: [id number] Calvillo                                                               DO  MRN/PID:          [id number]             Cardiologist:  Accession#:       [id number]         Ordering Provider:   [id number] BRIAN RODRIGUEZ  Date of           1960 / 63      Fellow:  Birth/Age:        years  Gender:           F                    Fellow:  Encounter#:       [id number]        Study: Coronary Arteriogram        Indications:  CARMEN MADRIGAL is a 63 year old female who presents with hypertension and a chest pain assessment of typical angina. NSTE - ACS.  Stress test performed: No. CTA performed: No. Maida accessed: No. LVEF  Assessed: No.  Cardiac arrest: No.  Cardiac surgical consult: No.  Cardiovascular Instability: No  Frailty status of patient entering lab: 4 = Vulnerable.        Procedure Description:  After infiltration with 1% Lidocaine, the right radial artery was cannulated with a modified Seldinger technique. Subsequently a 6 Armenian sheath was placed in the right radial artery. Selective coronary catheterization was performed using a 6 Fr catheter(s) exchanged over a guide wire to cannulate the coronary arteries. A JL 3.5 tip catheter was used for left coronary injections. A JR 5 tip catheter was used for right coronary injections.  Multiple injections of contrast were made into the left and right coronary arteries with angiograms recorded in multiple projections.     Coronary Angiography:  The coronary circulation is right dominant.     Left Main Coronary Artery:  The left main coronary artery is a normal caliber vessel. The left main arises normally from the left coronary sinus of Valsalva and bifurcates into the LAD and circumflex coronary arteries. The left main coronary artery showed mild irregularities.     Left Anterior Descending Coronary Artery Distribution:  The left anterior descending coronary artery is a normal caliber vessel. The LAD arises normally from the left main coronary artery, gives rise to the 1st diagonal branch and  2nd diagonal branch. The LAD demonstrated mild irregularities. The proximal 1st diagonal branch revealed 40% stenosis.     Circumflex Coronary Artery Distribution:  The circumflex coronary artery is a normal caliber vessel. The circumflex arises normally from the left main coronary artery, giving rise to the first obtuse marginal, second obtuse marginal and supplies the posterolateral segments. The circumflex revealed mild irregularities.     Right Coronary Artery Distribution:     The right coronary artery is a normal caliber vessel. The RCA arises normally from the right sinus of Valsalva, supplies the right posterolateral descending artery and supplies the posterolateral system. The RCA showed mild irregularities.     Coronary Lesion Summary:  Vessel         Stenosis   Vessel Segment  1st Diagonal 40% stenosis    proximal        Hemo Personnel:  +----------------+---------+  Name            Duty       +----------------+---------+  Christiano Calvillo MD 1  +----------------+---------+        Hemodynamic Pressures:     +----+------------------+---------+-------------+-------------+------+---------+  Site    Date Time       Phase  Systolic mmHg  Diastolic    ED  Mean mmHg                          Name                    mmHg      mmHg            +----+------------------+---------+-------------+-------------+------+---------+    LV  1/8/2024 3:17:33  O2 REST           83            0     5                               PM                                                    +----+------------------+---------+-------------+-------------+------+---------+    LV  1/8/2024 3:17:45  O2 REST           87            0     8                               PM                                                    +----+------------------+---------+-------------+-------------+------+---------+   LVp  1/8/2024 3:17:49  O2 REST           88            1    10                                PM                                                    +----+------------------+---------+-------------+-------------+------+---------+   AOp  2024 3:17:56  O2 REST           88           58             72                      PM                                                    +----+------------------+---------+-------------+-------------+------+---------+        Cardiac Cath Post Procedure Notes:  Post Procedure Diagnosis: Mild CAD.  Blood Loss:               Estimated blood loss during the procedure was 20 mls.  Specimens Removed:        Number of specimen(s) removed: none.        Recommendations:  Medical management of coronary artery disease.     ____________________________________________________________________________________  CONCLUSIONS:   1. Mild non-obstructive coronary artery disease.     ICD 10 Codes:  Non ST elevation (NSTEMI) myocardial infarction-I21.4     CPT Codes:  Coronary Angiography S&I only (RHC)([facility])-35865     [id number] Christiano Calvillo DO  Performing Physician  Electronically signed by [id number] Christiano Calvillo DO on 2024 at 3:18:18 PM      6MWT (23)  SpO2: 98% - 95% on 4L  HR: 97 - 116  Valentin: 1 - 7  Actual 314m     6MWT (2022)   Hr 78 - 111  Spo2 94 - 90 on RA  Valentin 0 - 1  Meters 366      6MWT (21) on RA  SpO2: 92% - 87%  HR: 81 - 94  Valentin: 3 - 4  Actual 311m     6MWT (2021)  HR 91 -102  Spo2 97 - 95 on RA  Valentin 1- 4  Meters 287/predicted 477     RHC (20): PAp 83/26 (54), PW 8, C.O. 3.8 / C.I. 2.0     Echo from OSH (20): moderately to severely dilated RV with moderately reduced function and a moderately dilated RA.      Provider Impressions     1) Pulmonary  a. PAH associated with methamphetamine use, presented FCIV, V/Q (-) , depressed CI, now on infusion therapy with dramatic subjective improvement, still high risk. Still with hectic follow up at best, (+) cocaine during hospitalization 2024. . In past,  adjust dosing  for weight loss and follow resolution of epo rash and significant diarrhea. Patient went up without authorization. Needs to decrease today. Will decrease to 78 ml/24 hours. Call with symptoms in next.     Never picked up Keflex ordered for possible site infection. Better today. W     Intolerant to PDE5     On macitentan     2) Neuro - pre-existing neuropathy, alcohol vs ?     3) Cardiac - hypertension     4) Interval diagnosis of lupus on basis of ?      Plan:    1) Decrease pump to 78 ml/24 hours. Call with report of diarrhea, flushing etc  in 2-3 days.    2) Monitor site, if worse , fill Keflex and report. If F/C/S or line tenderness, to ED    3) Follow up in clinic. 12 weeks with 6 minute walk and routine labs.     4) Stop cocaine use.              Last signed by: Chicho Prescott DO at 8/21/2023  4:02 PM   Electronically signed by Chicho Prescott DO at 8/21/2023  4:02 PM

## 2024-05-28 DIAGNOSIS — R55 SYNCOPE AND COLLAPSE: ICD-10-CM

## 2024-05-28 DIAGNOSIS — I27.20 PULMONARY HYPERTENSION (MULTI): ICD-10-CM

## 2024-05-29 ENCOUNTER — DOCUMENTATION (OUTPATIENT)
Dept: PULMONOLOGY | Facility: HOSPITAL | Age: 64
End: 2024-05-29
Payer: COMMERCIAL

## 2024-05-29 RX ORDER — LOPERAMIDE HCL 2 MG
2 TABLET ORAL 4 TIMES DAILY PRN
Qty: 30 TABLET | Refills: 11 | Status: SHIPPED | OUTPATIENT
Start: 2024-05-29

## 2024-05-29 NOTE — PROGRESS NOTES
"Patient called in to report symptoms. Requesting prescription  for antibiotic for \"drainage\" from [place]. Reports the drainage color is gray, ignacio insertion skin is \"a little red\", and itchy. Instructed patient to return to ED for assessment.   "

## 2024-05-30 ENCOUNTER — APPOINTMENT (OUTPATIENT)
Dept: CARDIOLOGY | Facility: HOSPITAL | Age: 64
End: 2024-05-30
Payer: COMMERCIAL

## 2024-05-30 ENCOUNTER — HOSPITAL ENCOUNTER (EMERGENCY)
Facility: HOSPITAL | Age: 64
Discharge: HOME | End: 2024-05-30
Attending: EMERGENCY MEDICINE
Payer: COMMERCIAL

## 2024-05-30 ENCOUNTER — HOSPITAL ENCOUNTER (EMERGENCY)
Facility: HOSPITAL | Age: 64
Discharge: OTHER INSTITUTION | End: 2024-05-31
Attending: EMERGENCY MEDICINE
Payer: COMMERCIAL

## 2024-05-30 ENCOUNTER — APPOINTMENT (OUTPATIENT)
Dept: RADIOLOGY | Facility: HOSPITAL | Age: 64
End: 2024-05-30
Payer: COMMERCIAL

## 2024-05-30 VITALS
SYSTOLIC BLOOD PRESSURE: 114 MMHG | RESPIRATION RATE: 21 BRPM | OXYGEN SATURATION: 95 % | WEIGHT: 165 LBS | DIASTOLIC BLOOD PRESSURE: 74 MMHG | BODY MASS INDEX: 27.49 KG/M2 | HEIGHT: 65 IN | HEART RATE: 96 BPM | TEMPERATURE: 97.9 F

## 2024-05-30 DIAGNOSIS — R06.02 SHORTNESS OF BREATH: Primary | ICD-10-CM

## 2024-05-30 DIAGNOSIS — E87.6 HYPOKALEMIA: ICD-10-CM

## 2024-05-30 DIAGNOSIS — R23.8 SKIN IRRITATION: Primary | ICD-10-CM

## 2024-05-30 DIAGNOSIS — Z45.2 ENCOUNTER FOR CARE RELATED TO VASCULAR ACCESS PORT: ICD-10-CM

## 2024-05-30 DIAGNOSIS — E83.51 HYPOCALCEMIA: ICD-10-CM

## 2024-05-30 LAB
ALBUMIN SERPL-MCNC: 3.5 G/DL (ref 3.5–5)
ALBUMIN SERPL-MCNC: 3.7 G/DL (ref 3.5–5)
ALP BLD-CCNC: 100 U/L (ref 35–125)
ALP BLD-CCNC: 88 U/L (ref 35–125)
ALT SERPL-CCNC: 13 U/L (ref 5–40)
ALT SERPL-CCNC: 8 U/L (ref 5–40)
AMPHETAMINES UR QL SCN>1000 NG/ML: POSITIVE
ANION GAP SERPL CALC-SCNC: 15 MMOL/L
ANION GAP SERPL CALC-SCNC: 16 MMOL/L
APPEARANCE UR: CLEAR
AST SERPL-CCNC: 13 U/L (ref 5–40)
AST SERPL-CCNC: 16 U/L (ref 5–40)
BARBITURATES UR QL SCN>300 NG/ML: NEGATIVE
BASOPHILS # BLD AUTO: 0 X10*3/UL (ref 0–0.1)
BASOPHILS # BLD AUTO: 0 X10*3/UL (ref 0–0.1)
BASOPHILS NFR BLD AUTO: 0 %
BASOPHILS NFR BLD AUTO: 0 %
BENZODIAZ UR QL SCN>300 NG/ML: NEGATIVE
BILIRUB SERPL-MCNC: 0.9 MG/DL (ref 0.1–1.2)
BILIRUB SERPL-MCNC: 0.9 MG/DL (ref 0.1–1.2)
BILIRUB UR STRIP.AUTO-MCNC: NEGATIVE MG/DL
BUN SERPL-MCNC: 15 MG/DL (ref 8–25)
BUN SERPL-MCNC: 15 MG/DL (ref 8–25)
BZE UR QL SCN>300 NG/ML: NEGATIVE
CALCIUM SERPL-MCNC: 5.5 MG/DL (ref 8.5–10.4)
CALCIUM SERPL-MCNC: 6.1 MG/DL (ref 8.5–10.4)
CANNABINOIDS UR QL SCN>50 NG/ML: NEGATIVE
CHLORIDE SERPL-SCNC: 105 MMOL/L (ref 97–107)
CHLORIDE SERPL-SCNC: 105 MMOL/L (ref 97–107)
CO2 SERPL-SCNC: 19 MMOL/L (ref 24–31)
CO2 SERPL-SCNC: 21 MMOL/L (ref 24–31)
COLOR UR: YELLOW
CREAT SERPL-MCNC: 0.8 MG/DL (ref 0.4–1.6)
CREAT SERPL-MCNC: 0.9 MG/DL (ref 0.4–1.6)
D DIMER PPP FEU-MCNC: 1.98 MG/L FEU (ref 0.19–0.5)
EGFRCR SERPLBLD CKD-EPI 2021: 73 ML/MIN/1.73M*2
EGFRCR SERPLBLD CKD-EPI 2021: 84 ML/MIN/1.73M*2
EOSINOPHIL # BLD AUTO: 0 X10*3/UL (ref 0–0.7)
EOSINOPHIL # BLD AUTO: 0 X10*3/UL (ref 0–0.7)
EOSINOPHIL NFR BLD AUTO: 0 %
EOSINOPHIL NFR BLD AUTO: 0 %
ERYTHROCYTE [DISTWIDTH] IN BLOOD BY AUTOMATED COUNT: 16.9 % (ref 11.5–14.5)
ERYTHROCYTE [DISTWIDTH] IN BLOOD BY AUTOMATED COUNT: 16.9 % (ref 11.5–14.5)
ETHANOL SERPL-MCNC: <0.01 G/DL
FENTANYL+NORFENTANYL UR QL SCN: ABNORMAL
GLUCOSE SERPL-MCNC: 100 MG/DL (ref 65–99)
GLUCOSE SERPL-MCNC: 121 MG/DL (ref 65–99)
GLUCOSE UR STRIP.AUTO-MCNC: NORMAL MG/DL
HCG UR QL IA.RAPID: NEGATIVE
HCT VFR BLD AUTO: 30.2 % (ref 36–46)
HCT VFR BLD AUTO: 32.6 % (ref 36–46)
HGB BLD-MCNC: 10.1 G/DL (ref 12–16)
HGB BLD-MCNC: 9.4 G/DL (ref 12–16)
IMM GRANULOCYTES # BLD AUTO: 0.02 X10*3/UL (ref 0–0.7)
IMM GRANULOCYTES # BLD AUTO: 0.03 X10*3/UL (ref 0–0.7)
IMM GRANULOCYTES NFR BLD AUTO: 0.4 % (ref 0–0.9)
IMM GRANULOCYTES NFR BLD AUTO: 0.6 % (ref 0–0.9)
KETONES UR STRIP.AUTO-MCNC: NEGATIVE MG/DL
LACTATE BLDV-SCNC: 0.8 MMOL/L (ref 0.4–2)
LEUKOCYTE ESTERASE UR QL STRIP.AUTO: NEGATIVE
LYMPHOCYTES # BLD AUTO: 0.94 X10*3/UL (ref 1.2–4.8)
LYMPHOCYTES # BLD AUTO: 0.96 X10*3/UL (ref 1.2–4.8)
LYMPHOCYTES NFR BLD AUTO: 17.6 %
LYMPHOCYTES NFR BLD AUTO: 19.1 %
MCH RBC QN AUTO: 24.4 PG (ref 26–34)
MCH RBC QN AUTO: 24.5 PG (ref 26–34)
MCHC RBC AUTO-ENTMCNC: 31 G/DL (ref 32–36)
MCHC RBC AUTO-ENTMCNC: 31.1 G/DL (ref 32–36)
MCV RBC AUTO: 78 FL (ref 80–100)
MCV RBC AUTO: 79 FL (ref 80–100)
METHADONE UR QL SCN>300 NG/ML: NEGATIVE
MONOCYTES # BLD AUTO: 0.28 X10*3/UL (ref 0.1–1)
MONOCYTES # BLD AUTO: 0.3 X10*3/UL (ref 0.1–1)
MONOCYTES NFR BLD AUTO: 5.6 %
MONOCYTES NFR BLD AUTO: 5.6 %
MUCOUS THREADS #/AREA URNS AUTO: NORMAL /LPF
NEUTROPHILS # BLD AUTO: 3.76 X10*3/UL (ref 1.2–7.7)
NEUTROPHILS # BLD AUTO: 4.06 X10*3/UL (ref 1.2–7.7)
NEUTROPHILS NFR BLD AUTO: 74.9 %
NEUTROPHILS NFR BLD AUTO: 76.2 %
NITRITE UR QL STRIP.AUTO: NEGATIVE
NRBC BLD-RTO: 0 /100 WBCS (ref 0–0)
NRBC BLD-RTO: 0 /100 WBCS (ref 0–0)
OPIATES UR QL SCN>300 NG/ML: NEGATIVE
OXYCODONE UR QL: ABNORMAL
PCP UR QL SCN>25 NG/ML: POSITIVE
PH UR STRIP.AUTO: 6 [PH]
PLATELET # BLD AUTO: 115 X10*3/UL (ref 150–450)
PLATELET # BLD AUTO: 127 X10*3/UL (ref 150–450)
POTASSIUM SERPL-SCNC: 3.2 MMOL/L (ref 3.4–5.1)
POTASSIUM SERPL-SCNC: 3.3 MMOL/L (ref 3.4–5.1)
PROT SERPL-MCNC: 6.2 G/DL (ref 5.9–7.9)
PROT SERPL-MCNC: 6.8 G/DL (ref 5.9–7.9)
PROT UR STRIP.AUTO-MCNC: ABNORMAL MG/DL
RBC # BLD AUTO: 3.85 X10*6/UL (ref 4–5.2)
RBC # BLD AUTO: 4.13 X10*6/UL (ref 4–5.2)
RBC # UR STRIP.AUTO: NEGATIVE /UL
RBC #/AREA URNS AUTO: NORMAL /HPF
SODIUM SERPL-SCNC: 140 MMOL/L (ref 133–145)
SODIUM SERPL-SCNC: 141 MMOL/L (ref 133–145)
SP GR UR STRIP.AUTO: 1.03
SQUAMOUS #/AREA URNS AUTO: NORMAL /HPF
TROPONIN T SERPL-MCNC: 22 NG/L
TROPONIN T SERPL-MCNC: 24 NG/L
TROPONIN T SERPL-MCNC: 25 NG/L
TROPONIN T SERPL-MCNC: 27 NG/L
UROBILINOGEN UR STRIP.AUTO-MCNC: ABNORMAL MG/DL
WBC # BLD AUTO: 5 X10*3/UL (ref 4.4–11.3)
WBC # BLD AUTO: 5.3 X10*3/UL (ref 4.4–11.3)
WBC #/AREA URNS AUTO: NORMAL /HPF

## 2024-05-30 PROCEDURE — 84484 ASSAY OF TROPONIN QUANT: CPT | Performed by: EMERGENCY MEDICINE

## 2024-05-30 PROCEDURE — 80307 DRUG TEST PRSMV CHEM ANLYZR: CPT | Performed by: EMERGENCY MEDICINE

## 2024-05-30 PROCEDURE — 2500000004 HC RX 250 GENERAL PHARMACY W/ HCPCS (ALT 636 FOR OP/ED): Performed by: EMERGENCY MEDICINE

## 2024-05-30 PROCEDURE — 2500000002 HC RX 250 W HCPCS SELF ADMINISTERED DRUGS (ALT 637 FOR MEDICARE OP, ALT 636 FOR OP/ED): Performed by: EMERGENCY MEDICINE

## 2024-05-30 PROCEDURE — 2550000001 HC RX 255 CONTRASTS: Performed by: EMERGENCY MEDICINE

## 2024-05-30 PROCEDURE — 84075 ASSAY ALKALINE PHOSPHATASE: CPT | Performed by: EMERGENCY MEDICINE

## 2024-05-30 PROCEDURE — 36415 COLL VENOUS BLD VENIPUNCTURE: CPT | Performed by: EMERGENCY MEDICINE

## 2024-05-30 PROCEDURE — 93005 ELECTROCARDIOGRAM TRACING: CPT

## 2024-05-30 PROCEDURE — 81025 URINE PREGNANCY TEST: CPT | Performed by: EMERGENCY MEDICINE

## 2024-05-30 PROCEDURE — 93005 ELECTROCARDIOGRAM TRACING: CPT | Mod: 59

## 2024-05-30 PROCEDURE — 85300 ANTITHROMBIN III ACTIVITY: CPT | Performed by: EMERGENCY MEDICINE

## 2024-05-30 PROCEDURE — 85025 COMPLETE CBC W/AUTO DIFF WBC: CPT | Performed by: EMERGENCY MEDICINE

## 2024-05-30 PROCEDURE — 87040 BLOOD CULTURE FOR BACTERIA: CPT | Mod: 59,TRILAB | Performed by: EMERGENCY MEDICINE

## 2024-05-30 PROCEDURE — 71275 CT ANGIOGRAPHY CHEST: CPT

## 2024-05-30 PROCEDURE — 80053 COMPREHEN METABOLIC PANEL: CPT | Performed by: EMERGENCY MEDICINE

## 2024-05-30 PROCEDURE — 71046 X-RAY EXAM CHEST 2 VIEWS: CPT

## 2024-05-30 PROCEDURE — 83605 ASSAY OF LACTIC ACID: CPT | Performed by: EMERGENCY MEDICINE

## 2024-05-30 PROCEDURE — 81001 URINALYSIS AUTO W/SCOPE: CPT | Mod: 59 | Performed by: EMERGENCY MEDICINE

## 2024-05-30 PROCEDURE — 71046 X-RAY EXAM CHEST 2 VIEWS: CPT | Performed by: RADIOLOGY

## 2024-05-30 PROCEDURE — 82077 ASSAY SPEC XCP UR&BREATH IA: CPT | Performed by: EMERGENCY MEDICINE

## 2024-05-30 PROCEDURE — 99283 EMERGENCY DEPT VISIT LOW MDM: CPT | Mod: 25

## 2024-05-30 PROCEDURE — 71275 CT ANGIOGRAPHY CHEST: CPT | Performed by: RADIOLOGY

## 2024-05-30 RX ORDER — POTASSIUM CHLORIDE 20 MEQ/1
40 TABLET, EXTENDED RELEASE ORAL ONCE
Status: COMPLETED | OUTPATIENT
Start: 2024-05-30 | End: 2024-05-30

## 2024-05-30 RX ORDER — ONDANSETRON HYDROCHLORIDE 2 MG/ML
4 INJECTION, SOLUTION INTRAVENOUS ONCE
Status: COMPLETED | OUTPATIENT
Start: 2024-05-30 | End: 2024-05-30

## 2024-05-30 RX ORDER — POTASSIUM CHLORIDE 20 MEQ/1
20 TABLET, EXTENDED RELEASE ORAL ONCE
Status: COMPLETED | OUTPATIENT
Start: 2024-05-30 | End: 2024-05-30

## 2024-05-30 RX ORDER — FLUTICASONE PROPIONATE 50 MCG
1 SPRAY, SUSPENSION (ML) NASAL
Status: ON HOLD | COMMUNITY
Start: 2024-03-21 | End: 2024-05-31 | Stop reason: WASHOUT

## 2024-05-30 RX ORDER — IPRATROPIUM BROMIDE AND ALBUTEROL SULFATE 2.5; .5 MG/3ML; MG/3ML
3 SOLUTION RESPIRATORY (INHALATION) ONCE
Status: COMPLETED | OUTPATIENT
Start: 2024-05-30 | End: 2024-05-30

## 2024-05-30 RX ORDER — CALCIUM GLUCONATE 20 MG/ML
2 INJECTION, SOLUTION INTRAVENOUS ONCE
Status: COMPLETED | OUTPATIENT
Start: 2024-05-30 | End: 2024-05-31

## 2024-05-30 RX ORDER — CETIRIZINE HYDROCHLORIDE 10 MG/1
10 TABLET ORAL
Status: ON HOLD | COMMUNITY
Start: 2024-03-21 | End: 2024-05-31 | Stop reason: WASHOUT

## 2024-05-30 RX ORDER — CEFTRIAXONE 2 G/50ML
2 INJECTION, SOLUTION INTRAVENOUS ONCE
Status: COMPLETED | OUTPATIENT
Start: 2024-05-30 | End: 2024-05-30

## 2024-05-30 RX ADMIN — IOHEXOL 75 ML: 350 INJECTION, SOLUTION INTRAVENOUS at 20:59

## 2024-05-30 RX ADMIN — POTASSIUM CHLORIDE 40 MEQ: 1500 TABLET, EXTENDED RELEASE ORAL at 16:08

## 2024-05-30 RX ADMIN — ONDANSETRON 4 MG: 2 INJECTION INTRAMUSCULAR; INTRAVENOUS at 23:50

## 2024-05-30 RX ADMIN — CALCIUM GLUCONATE 2 G: 20 INJECTION, SOLUTION INTRAVENOUS at 23:10

## 2024-05-30 RX ADMIN — POTASSIUM CHLORIDE 20 MEQ: 1500 TABLET, EXTENDED RELEASE ORAL at 23:09

## 2024-05-30 RX ADMIN — CEFTRIAXONE SODIUM 2 G: 2 INJECTION, SOLUTION INTRAVENOUS at 23:09

## 2024-05-30 RX ADMIN — AZITHROMYCIN MONOHYDRATE 500 MG: 500 INJECTION, POWDER, LYOPHILIZED, FOR SOLUTION INTRAVENOUS at 23:27

## 2024-05-30 RX ADMIN — IPRATROPIUM BROMIDE AND ALBUTEROL SULFATE 3 ML: .5; 3 SOLUTION RESPIRATORY (INHALATION) at 23:09

## 2024-05-30 ASSESSMENT — COLUMBIA-SUICIDE SEVERITY RATING SCALE - C-SSRS
6. HAVE YOU EVER DONE ANYTHING, STARTED TO DO ANYTHING, OR PREPARED TO DO ANYTHING TO END YOUR LIFE?: NO
1. IN THE PAST MONTH, HAVE YOU WISHED YOU WERE DEAD OR WISHED YOU COULD GO TO SLEEP AND NOT WAKE UP?: NO
2. HAVE YOU ACTUALLY HAD ANY THOUGHTS OF KILLING YOURSELF?: NO
1. IN THE PAST MONTH, HAVE YOU WISHED YOU WERE DEAD OR WISHED YOU COULD GO TO SLEEP AND NOT WAKE UP?: NO
2. HAVE YOU ACTUALLY HAD ANY THOUGHTS OF KILLING YOURSELF?: NO
6. HAVE YOU EVER DONE ANYTHING, STARTED TO DO ANYTHING, OR PREPARED TO DO ANYTHING TO END YOUR LIFE?: NO

## 2024-05-30 ASSESSMENT — PAIN SCALES - GENERAL: PAINLEVEL_OUTOF10: 0 - NO PAIN

## 2024-05-30 ASSESSMENT — PAIN - FUNCTIONAL ASSESSMENT: PAIN_FUNCTIONAL_ASSESSMENT: 0-10

## 2024-05-30 NOTE — ED PROVIDER NOTES
HPI   Chief Complaint   Patient presents with    Vascular Access Problem     Has medication pump and thinks it is leaking also short of breath       HPI  Patient is a 63-year-old female with history of pulmonary hypertension presenting for evaluation of a vascular access problem.  Patient states that she had a port put in multiple years ago for her pulmonary hypertension and cleaned it a few months ago and recently has noticed some leaking from the port.  Was instructed to come to ER from her pulmonary doctor Dr. Prescott office for further assessment.  The patient also states that she does have an appointment with him later today.  She otherwise denies recent fever or chills, no chest pain or shortness of breath.                  [place] Coma Scale Score: 15                     Patient History   Past Medical History:   Diagnosis Date    Anxiety     Depression     GERD (gastroesophageal reflux disease)     Lupus (Multi)     Personal history of other specified conditions 2020    History of seizure    Pulmonary hypertension (Multi)      Past Surgical History:   Procedure Laterality Date    CARDIAC CATHETERIZATION N/A 2024    Procedure: Left Heart Cath, With LV, Angriography And Grafts;  Surgeon: Christiano Calvillo DO;  Location: [facility];  Service: Cardiovascular;  Laterality: N/A;    CHOLECYSTECTOMY      HYSTERECTOMY      OTHER SURGICAL HISTORY  2020    Cholecystectomy    OTHER SURGICAL HISTORY  2020    Esophagogastroduodenoscopy    OTHER SURGICAL HISTORY  2020    Colonoscopy    OTHER SURGICAL HISTORY  2020     section    OTHER SURGICAL HISTORY  2020    Hysterectomy     Family History   Problem Relation Name Age of Onset    No Known Problems Mother      No Known Problems Father       Social History     Tobacco Use    Smoking status: Former     Types: Cigarettes    Smokeless tobacco: Former   Substance Use Topics    Alcohol use: Yes     Comment: 2 large beer/day  "   Drug use: Yes     Types: \"Crack\" cocaine     Comment: today       Physical Exam   ED Triage Vitals [05/30/24 1119]   Temperature Heart Rate Respirations BP   36.6 °C (97.9 °F) (!) 102 18 105/70      Pulse Ox Temp Source Heart Rate Source Patient Position   (!) 92 % Oral Monitor Sitting      BP Location FiO2 (%)     Right arm --       Physical Exam  Vitals and nursing note reviewed.   Constitutional:       General: She is not in acute distress.     Appearance: She is well-developed.   HENT:      Head: Normocephalic and atraumatic.   Eyes:      Conjunctiva/sclera: Conjunctivae normal.   Cardiovascular:      Rate and Rhythm: Normal rate and regular rhythm.      Heart sounds: No murmur heard.  Pulmonary:      Effort: Pulmonary effort is normal. No respiratory distress.      Breath sounds: Normal breath sounds.   Abdominal:      Palpations: Abdomen is soft.      Tenderness: There is no abdominal tenderness.   Musculoskeletal:         General: No swelling.      Cervical back: Neck supple.   Skin:     General: Skin is warm and dry.      Capillary Refill: Capillary refill takes less than 2 seconds.      Comments: Left chest vascular access site without surrounding erythema and without tenderness.  Clean and dry.  Appropriately dressed.   Neurological:      Mental Status: She is alert.   Psychiatric:         Mood and Affect: Mood normal.         ED Course & MDM   Diagnoses as of 05/30/24 1723   Skin irritation   Encounter for care related to vascular access port       Medical Decision Making  Parts of this chart have been completed using voice recognition software. Please excuse any errors of transcription.  My thought process and reason for plan has been formulated from the time that I saw the patient until the time of disposition and is not specific to one specific moment during their visit and furthermore my MDM encompasses this entire chart and not only this text box.      HPI: Detailed above.    Exam: A medically " appropriate exam performed, outlined above, given the known history and presentation.    History obtained from: Patient    Medications given during visit:  Medications   potassium chloride CR (Klor-Con M20) ER tablet 40 mEq (40 mEq oral Given 5/30/24 1608)        Diagnostic/tests  Labs Reviewed   DRUG SCREEN,URINE - Abnormal       Result Value    Amphetamine Screen, Urine Positive (*)     Barbiturate Screen, Urine Negative      Benzodiazepines Screen, Urine Negative      Cannabinoid Screen, Urine Negative      Cocaine Metabolite Screen, Urine Negative      Fentanyl Screen, Urine        Methadone Screen, Urine Negative      Opiate Screen, Urine Negative      Oxycodone Screen, Urine        PCP Screen, Urine Positive (*)     Narrative:     These toxicological screening tests provide unconfirmed qualitative measurements to aid in treatment and diagnosis in cases of drug use or overdose. This test is used only for medical purposes. A positive result does not indicate or measure intoxication. For specific test performance or pathologist consultation, please contact the Laboratory.    The following threshold concentrations are used for these analyses.Values at or above the threshold concentration are reported as positive. Values below the threshold are reported as negative.    Drug /Screening Threshold                                                                                                 THC/CANNABINOIDS................50 ng/ml  METHADONE......................300 ng/ml  COCAINE METABOLITES............300 ng/ml  BENZODIAZEPINE.................300 ng/ml  PCP.............................25 ng/ml  OPIATE.........................300 ng/ml  AMPHETAMINE/ECSTASY...........1000 ng/ml  BARBITURATE....................200 ng/ml  OXYCODONE......................100 ng/ml  FENTANYL.........................5 ng/ml       URINALYSIS WITH REFLEX CULTURE AND MICROSCOPIC - Abnormal    Color, Urine Yellow      Appearance, Urine Clear       Specific Gravity, Urine 1.031      pH, Urine 6.0      Protein, Urine 100 (2+) (*)     Glucose, Urine Normal      Blood, Urine NEGATIVE      Ketones, Urine NEGATIVE      Bilirubin, Urine NEGATIVE      Urobilinogen, Urine 2 (1+) (*)     Nitrite, Urine NEGATIVE      Leukocyte Esterase, Urine NEGATIVE     CBC WITH AUTO DIFFERENTIAL - Abnormal    WBC 5.3      nRBC 0.0      RBC 4.13      Hemoglobin 10.1 (*)     Hematocrit 32.6 (*)     MCV 79 (*)     MCH 24.5 (*)     MCHC 31.0 (*)     RDW 16.9 (*)     Platelets 127 (*)     Neutrophils % 76.2      Immature Granulocytes %, Automated 0.6      Lymphocytes % 17.6      Monocytes % 5.6      Eosinophils % 0.0      Basophils % 0.0      Neutrophils Absolute 4.06      Immature Granulocytes Absolute, Automated 0.03      Lymphocytes Absolute 0.94 (*)     Monocytes Absolute 0.30      Eosinophils Absolute 0.00      Basophils Absolute 0.00     COMPREHENSIVE METABOLIC PANEL - Abnormal    Glucose 100 (*)     Sodium 141      Potassium 3.2 (*)     Chloride 105      Bicarbonate 21 (*)     Urea Nitrogen 15      Creatinine 0.80      eGFR 84      Calcium 6.1 (*)     Albumin 3.7      Alkaline Phosphatase 100      Total Protein 6.8      AST 16      Bilirubin, Total 0.9      ALT 13      Anion Gap 15     TROPONIN T, HIGH SENSITIVITY - Abnormal    Troponin T, High Sensitivity 27 (*)    TROPONIN T, HIGH SENSITIVITY - Abnormal    Troponin T, High Sensitivity 25 (*)    BLOOD GAS LACTIC ACID, VENOUS - Normal    POCT Lactate, Venous 0.8     HCG, URINE, QUALITATIVE - Normal    HCG, Urine NEGATIVE     ALCOHOL - Normal    Alcohol <0.010     BLOOD CULTURE   BLOOD CULTURE   URINALYSIS WITH REFLEX CULTURE AND MICROSCOPIC    Narrative:     The following orders were created for panel order Urinalysis with Reflex Culture and Microscopic.  Procedure                               Abnormality         Status                     ---------                               -----------         ------                      Urinalysis with Reflex C...[[id number]]  Abnormal            Final result               Extra Urine Gray Tube[[id number]]                                                         Please view results for these tests on the individual orders.   EXTRA URINE GRAY TUBE   URINALYSIS MICROSCOPIC WITH REFLEX CULTURE    WBC, Urine 1-5      RBC, Urine 1-2      Squamous Epithelial Cells, Urine 1-9 (SPARSE)      Mucus, Urine 2+        XR chest 2 views   Final Result   1.  No evidence of acute cardiopulmonary process.             Signed by: George Miranda 5/30/2024 12:26 PM   Dictation workstation:   [id number]           Considerations/further MDM:  63-year-old female presenting for evaluation of Port-A-Cath leaking.  During the ER visit the patient is well-appearing in no distress.  She does utilize oxygen for treatment of pulmonary hypertension.  She is in no acute respiratory distress during the visit.  She has no evidence of pretibial edema.  Lungs are clear to auscultation bilaterally.  She does have a port in place on her left anterior chest wall without evidence of surrounding erythema or significant drainage or swelling.  No surrounding cellulitis.  No evidence of abscess.  The Port-A-Cath does appear to be working properly.  EKG without acute ischemia.  Diagnostic labs with anemia similar to baseline and mild hypokalemia which was supplemented orally but otherwise unremarkable.  Chest x-ray without acute process such as pneumonia or pneumothorax.  Cardiac enzymes flat.  Urine drug screen was remarkable for amphetamines and PCP.  No evidence of urinary tract infection.  Patient was instructed to follow-up with her pulmonologist for further evaluation and management of her Port-A-Cath.  I saw this patient in conjunction with Dr. Solis. I discussed the laboratory and imaging findings with the patient at bedside. Patient's questions and concerns were addressed. Patient was released in good condition, discharged with  instructions to follow up with primary care provider and appropriate specialist, and to return to ED at any time for worsening symptoms or any other concerns. Patient demonstrates understanding of the findings and the importance of appropriate follow up care.        Procedure  Procedures     Silvana Pinto PA-C  05/30/24 1724

## 2024-05-30 NOTE — ED TRIAGE NOTES
Pt states that she was just discharged from the ED pt states that she went home and crawled up the steps and became SOB and had a dizzy episode while crawling denies hitting head denies LOC, wears 4LNC baseline, history of pulmonary HTN

## 2024-05-30 NOTE — DISCHARGE INSTRUCTIONS
Please follow-up with your pulmonary doctor regarding your issues with the report.  Present back to ER for any new onset or worsening of symptoms.  Please refer to the Port-A-Cath discharge instructions for further information on how to care for your Port-A-Cath.

## 2024-05-30 NOTE — ED PROVIDER NOTES
"HPI   Chief Complaint   Patient presents with    Shortness of Breath    Dizziness         History provided by:  Patient   used: No        63-year-old female with a history of pulmonary artery disease that presents emergency department for the second time today for shortness of breath.    She was seen earlier today and had a workup that was largely unremarkable.    She sees a pulmonologist at the [facility] and thinks that she need not need to be transferred to them.    Today she became lightheaded but did not pass out.    She denies any fevers, cough or chest pain                  [place] Coma Scale Score: 15                     Patient History   Past Medical History:   Diagnosis Date    Anxiety     Depression     GERD (gastroesophageal reflux disease)     Lupus (Multi)     Personal history of other specified conditions 2020    History of seizure    Pulmonary hypertension (Multi)      Past Surgical History:   Procedure Laterality Date    CARDIAC CATHETERIZATION N/A 2024    Procedure: Left Heart Cath, With LV, Angriography And Grafts;  Surgeon: Christiano Calvillo DO;  Location: [facility];  Service: Cardiovascular;  Laterality: N/A;    CHOLECYSTECTOMY      HYSTERECTOMY      OTHER SURGICAL HISTORY  2020    Cholecystectomy    OTHER SURGICAL HISTORY  2020    Esophagogastroduodenoscopy    OTHER SURGICAL HISTORY  2020    Colonoscopy    OTHER SURGICAL HISTORY  2020     section    OTHER SURGICAL HISTORY  2020    Hysterectomy     Family History   Problem Relation Name Age of Onset    No Known Problems Mother      No Known Problems Father       Social History     Tobacco Use    Smoking status: Former     Types: Cigarettes    Smokeless tobacco: Former   Substance Use Topics    Alcohol use: Yes     Comment: 2 large beer/day    Drug use: Yes     Types: \"Crack\" cocaine     Comment: today       Physical Exam   ED Triage Vitals   Temp Pulse Resp BP "   -- -- -- --      SpO2 Temp src Heart Rate Source Patient Position   -- -- -- --      BP Location FiO2 (%)     -- --       Physical Exam  Vitals and nursing note reviewed.   Constitutional:       General: She is not in acute distress.     Appearance: She is well-developed.      Comments: 63-year-old pleasant white female sitting quietly and speaking clearly.  She is not toxic   HENT:      Head: Normocephalic and atraumatic.   Eyes:      Conjunctiva/sclera: Conjunctivae normal.   Cardiovascular:      Rate and Rhythm: Regular rhythm. Tachycardia present.      Heart sounds: No murmur heard.  Pulmonary:      Effort: Pulmonary effort is normal. No respiratory distress.      Breath sounds: Normal breath sounds.   Abdominal:      Palpations: Abdomen is soft.      Tenderness: There is no abdominal tenderness.   Musculoskeletal:         General: No swelling.      Cervical back: Neck supple.   Skin:     General: Skin is warm and dry.      Capillary Refill: Capillary refill takes less than 2 seconds.   Neurological:      Mental Status: She is alert.   Psychiatric:         Mood and Affect: Mood normal.         ED Course & MDM   ED Course as of 05/31/24 0052   Thu May 30, 2024   1934 Sinus tachycardia rate of 103.  NC interval is 200.  QRS duration is 116  Pulmonary disease pattern.  Right bundle branch block.  Septal infarct age undetermined [JN]      ED Course User Index  [JN] Clint Mcgee MD         Diagnoses as of 05/31/24 0052   Shortness of breath   Hypokalemia   Hypocalcemia       Medical Decision Making        63-year-old female with a history of pulmonary artery disease that presents emergency department for the second time today for shortness of breath.  She was seen earlier today and had a workup that was largely unremarkable.  She sees a pulmonologist at the [facility] and thinks that she need not need to be transferred to them.  Today she became lightheaded but did not pass out.  She denies any  fevers, cough or chest pain      Physical Exam  Vitals and nursing note reviewed.   Constitutional:       General: She is not in acute distress.     Appearance: She is well-developed.      Comments: 63-year-old pleasant white female sitting quietly and speaking clearly.  She is not toxic   HENT:      Head: Normocephalic and atraumatic.   Eyes:      Conjunctiva/sclera: Conjunctivae normal.   Cardiovascular:      Rate and Rhythm: Regular rhythm. Tachycardia present.      Heart sounds: No murmur heard.  Pulmonary:      Effort: Pulmonary effort is normal. No respiratory distress.      Breath sounds: Normal breath sounds.       12:48 AM  No evidence of pulmonary embolism on CT scan  She has evidence of marked cardiac enlargement  Bronchial thickening and areas of groundglass opacity seen in the lungs possibly reflecting inflammatory etiologies  There was a concern that report might be infected but there is minimal erythema around the site  Blood cultures are drawn  Patient is started on Rocephin and Zithromax  Potassium was 3.3 so she is given p.o. potassium  Calcium is 5.5 so she is given calcium gluconate  High-sensitivity troponin levels are unremarkable  EKG does not show any evidence of ischemia    She is given a nebulized breathing treatment and for her nausea received 4 mg of Zofran    Patient has a very complicated lung regimen because of her pulmonary artery hypertension    She will be transferred to [facility] for input from her usual pulmonologist    Dr. Boo Gonzales accepts for [facility] Team             CT angio chest for pulmonary embolism   Final Result   1. No findings of acute pulmonary embolism.   2. Persistent marked cardiac enlargement. Hepatic heterogeneity and   splenomegaly suggesting right heart dysfunction with associated not   make liver and portal hypertension.   3. Bronchial thickening. There are couple of other stable pulmonary   nodules when compared to most recent examination, however  there are   areas of ground-glass opacity seen in the lungs possibly reflecting   inflammatory etiologies. Findings of mild pulmonary edema and volume   overload        MACRO:   None        Signed by: Brett Brown 5/30/2024 9:50 PM   Dictation workstation:   [id number]          Labs Reviewed   CBC WITH AUTO DIFFERENTIAL - Abnormal       Result Value    WBC 5.0      nRBC 0.0      RBC 3.85 (*)     Hemoglobin 9.4 (*)     Hematocrit 30.2 (*)     MCV 78 (*)     MCH 24.4 (*)     MCHC 31.1 (*)     RDW 16.9 (*)     Platelets 115 (*)     Neutrophils % 74.9      Immature Granulocytes %, Automated 0.4      Lymphocytes % 19.1      Monocytes % 5.6      Eosinophils % 0.0      Basophils % 0.0      Neutrophils Absolute 3.76      Immature Granulocytes Absolute, Automated 0.02      Lymphocytes Absolute 0.96 (*)     Monocytes Absolute 0.28      Eosinophils Absolute 0.00      Basophils Absolute 0.00     COMPREHENSIVE METABOLIC PANEL - Abnormal    Glucose 121 (*)     Sodium 140      Potassium 3.3 (*)     Chloride 105      Bicarbonate 19 (*)     Urea Nitrogen 15      Creatinine 0.90      eGFR 73      Calcium 5.5 (*)     Albumin 3.5      Alkaline Phosphatase 88      Total Protein 6.2      AST 13      Bilirubin, Total 0.9      ALT 8      Anion Gap 16     D-DIMER, NON VTE - Abnormal    D-Dimer Non VTE, Quant (mg/L FEU) 1.98 (*)     Narrative:     THROMBOEMBOLIC EVENTS CANNOT BE EXCLUDED SOLELY ON THE BASIS OF THE D-DIMER LEVEL BEING WITHIN THE NORMAL REFERENCE RANGE. D-DIMER LEVELS LESS THAN 0.5 MG/L FEU IN CONJUNCTION WITH A LOW CLINICAL PROBABILITY HAVE AN EXCELLENT NEGATIVE PREDICTIVE VALUE IN EXCLUDING A DIAGNOSIS OF PULMONARY EMBOLUS (PE) OR DEEP VEIN THROMBOSIS (DVT). ELEVATED D-DIMER LEVELS ARE NOT SPECIFIC TO PE OR DVT, AND MAY BE SEEN IN PATIENTS WITH DIC, ADVANCED AGE, PREGNANCY, MALIGNANCY, LIVER DISEASE, INFECTION, AND INFLAMMATORY CONDITIONS AMONG OTHERS. D-DIMER LEVELS MAY BE DECREASED IN PATIENTS RECEIVING  ANTI-COAGULATION THERAPY.   SERIAL TROPONIN, INITIAL ([facility]) - Abnormal    Troponin T, High Sensitivity 24 (*)    SERIAL TROPONIN,  2 HOUR ([facility]) - Abnormal    Troponin T, High Sensitivity 22 (*)    BLOOD CULTURE   BLOOD CULTURE   TROPONIN T SERIES, HIGH SENSITIVITY (0, 2 HR, 6 HR)    Narrative:     The following orders were created for panel order Troponin T Series, High Sensitivity (0, 2HR, 6HR).  Procedure                               Abnormality         Status                     ---------                               -----------         ------                     Serial Troponin, Initial...[[id number]]  Abnormal            Final result               Serial Troponin, 2 Hour ...[[id number]]  Abnormal            Final result               Serial Troponin, 6 Hour ...[[id number]]                                                   Please view results for these tests on the individual orders.   SERIAL TROPONIN, 6 HOUR ([facility])         Procedure  Procedures     Clint Mcgee MD  05/31/24 0052

## 2024-05-30 NOTE — PROGRESS NOTES
Attestation/Supervisory note for ALAN Pinto      The patient is a 63-year-old female presenting to the emergency department for evaluation of a [place] line evaluation.  The patient reportedly has some erythema and drainage from the line.  She states that she has had the line in for the past several years and it was cleaned last a few months ago.  She states that it is to treat her for pulmonary hypertension.  She states that she has an appointment with her doctor later today but came to this emergency room instead.  She states that she was directed by him to come to this emergency room for evaluation of the line before the appointment.  She denies any headache or visual changes.  No focal weakness or numbness.  No fever or chills.  No chest pain or shortness of breath.  No recent injury or trauma.  No abdominal pain.  No nausea vomiting.  No diarrhea or constipation.  No urinary complaints.  All pertinent positives and negatives are recorded above.  All other systems reviewed and otherwise negative.  Vital signs with borderline tachycardia and mild hypoxia but otherwise within normal limits.  Physical exam with a well-nourished well-developed female in no acute distress.  HEENT exam within normal limits.  She has no evidence of airway compromise or respiratory distress.  Abdominal exam is benign.  She does have a port in place on the left anterior chest wall.  No surrounding erythema or edema and no drainage from the port site.      EKG with normal sinus rhythm at 99 bpm, right bundle branch block pattern, normal axis, normal voltage, normal ST segment, and a slight diffuse flattening of the T waves      Diagnostic labs with anemia, evidence of polysubstance abuse, proteinuria, and mild electrolyte imbalance but otherwise unremarkable.      Initial trop T 27. Repeat trop T 25      XR chest 2 views   Final Result   1.  No evidence of acute cardiopulmonary process.             Signed by: George Miranda 5/30/2024  12:26 PM   Dictation workstation:   [id number]           The patient does not have any evidence of ischemia on EKG.  She does have a mildly elevated troponin T but it is flat.  No events on telemetry.  Diagnostic labs with evidence of polysubstance abuse and anemia with mild electrolyte imbalance but no other significant abnormality.  The patient does not have any evidence of hemodynamic instability.  She does not have any evidence of sepsis.  There is no evidence of line infection based on exam.      The patient was released in good condition.  She was instructed to follow-up with her primary care physician within 1 to 2 days for further management of her current symptoms.  She will return to the emergency department sooner with worsening of symptoms or onset of new symptoms.      Impression/diagnosis:  Polysubstance abuse  Anemia, unspecified  Electrolyte imbalance with hypokalemia      I personally saw the patient and made/approve the management plan and take responsibility for the patient management.      I independently interpreted the following study (S) EKG and diagnostic labs      I personally discussed the patient's management with the patient      I reviewed the results of the diagnostic labs and diagnostic imaging.  Formal radiology read was completed by the radiologist.      Holley Solis MD

## 2024-05-31 ENCOUNTER — APPOINTMENT (OUTPATIENT)
Dept: CARDIOLOGY | Facility: HOSPITAL | Age: 64
End: 2024-05-31
Payer: COMMERCIAL

## 2024-05-31 ENCOUNTER — HOSPITAL ENCOUNTER (INPATIENT)
Facility: HOSPITAL | Age: 64
LOS: 2 days | Discharge: HOME | End: 2024-06-02
Attending: STUDENT IN AN ORGANIZED HEALTH CARE EDUCATION/TRAINING PROGRAM | Admitting: INTERNAL MEDICINE
Payer: COMMERCIAL

## 2024-05-31 VITALS
TEMPERATURE: 96.8 F | RESPIRATION RATE: 20 BRPM | BODY MASS INDEX: 27.49 KG/M2 | DIASTOLIC BLOOD PRESSURE: 74 MMHG | HEART RATE: 101 BPM | HEIGHT: 65 IN | SYSTOLIC BLOOD PRESSURE: 103 MMHG | OXYGEN SATURATION: 93 % | WEIGHT: 165 LBS

## 2024-05-31 DIAGNOSIS — G62.9 NEUROPATHY: ICD-10-CM

## 2024-05-31 DIAGNOSIS — F32.A DEPRESSIVE DISORDER: ICD-10-CM

## 2024-05-31 DIAGNOSIS — I27.20 PULMONARY HYPERTENSION (MULTI): Primary | ICD-10-CM

## 2024-05-31 DIAGNOSIS — F41.9 ANXIETY: ICD-10-CM

## 2024-05-31 DIAGNOSIS — R11.2 NAUSEA AND VOMITING, UNSPECIFIED VOMITING TYPE: ICD-10-CM

## 2024-05-31 DIAGNOSIS — F10.11 HISTORY OF ALCOHOL ABUSE: ICD-10-CM

## 2024-05-31 DIAGNOSIS — K81.2 ACUTE ON CHRONIC CHOLECYSTITIS: ICD-10-CM

## 2024-05-31 DIAGNOSIS — I27.20 PULMONARY HTN (MULTI): ICD-10-CM

## 2024-05-31 LAB
ALBUMIN SERPL BCP-MCNC: 3.5 G/DL (ref 3.4–5)
ALBUMIN SERPL BCP-MCNC: 3.7 G/DL (ref 3.4–5)
ALP SERPL-CCNC: 74 U/L (ref 33–136)
ALT SERPL W P-5'-P-CCNC: 13 U/L (ref 7–45)
ANION GAP BLDV CALCULATED.4IONS-SCNC: 13 MMOL/L (ref 10–25)
ANION GAP SERPL CALC-SCNC: 17 MMOL/L
ANION GAP SERPL CALC-SCNC: 18 MMOL/L (ref 10–20)
AST SERPL W P-5'-P-CCNC: 34 U/L (ref 9–39)
ATRIAL RATE: 103 BPM
ATRIAL RATE: 99 BPM
BASE EXCESS BLDV CALC-SCNC: -4.3 MMOL/L (ref -2–3)
BASOPHILS # BLD AUTO: 0 X10*3/UL (ref 0–0.1)
BASOPHILS NFR BLD AUTO: 0 %
BILIRUB DIRECT SERPL-MCNC: 0.1 MG/DL (ref 0–0.3)
BILIRUB SERPL-MCNC: 0.7 MG/DL (ref 0–1.2)
BNP SERPL-MCNC: 430 PG/ML (ref 0–99)
BODY TEMPERATURE: 37 DEGREES CELSIUS
BUN SERPL-MCNC: 14 MG/DL (ref 6–23)
BUN SERPL-MCNC: 16 MG/DL (ref 6–23)
CA-I BLDV-SCNC: 0.77 MMOL/L (ref 1.1–1.33)
CALCIUM SERPL-MCNC: 5.9 MG/DL (ref 8.6–10.6)
CALCIUM SERPL-MCNC: 6.8 MG/DL (ref 8.6–10.6)
CHLORIDE BLDV-SCNC: 108 MMOL/L (ref 98–107)
CHLORIDE SERPL-SCNC: 105 MMOL/L (ref 98–107)
CHLORIDE SERPL-SCNC: 107 MMOL/L (ref 98–107)
CO2 SERPL-SCNC: 19 MMOL/L (ref 21–32)
CO2 SERPL-SCNC: 23 MMOL/L (ref 21–32)
CREAT SERPL-MCNC: 0.75 MG/DL (ref 0.5–1.05)
CREAT SERPL-MCNC: 0.91 MG/DL (ref 0.5–1.05)
EGFRCR SERPLBLD CKD-EPI 2021: 72 ML/MIN/1.73M*2
EGFRCR SERPLBLD CKD-EPI 2021: >90 ML/MIN/1.73M*2
EOSINOPHIL # BLD AUTO: 0 X10*3/UL (ref 0–0.7)
EOSINOPHIL NFR BLD AUTO: 0 %
ERYTHROCYTE [DISTWIDTH] IN BLOOD BY AUTOMATED COUNT: 17 % (ref 11.5–14.5)
GLUCOSE BLDV-MCNC: 100 MG/DL (ref 74–99)
GLUCOSE SERPL-MCNC: 101 MG/DL (ref 74–99)
GLUCOSE SERPL-MCNC: 92 MG/DL (ref 74–99)
HCO3 BLDV-SCNC: 20 MMOL/L (ref 22–26)
HCT VFR BLD AUTO: 29.3 % (ref 36–46)
HCT VFR BLD EST: 29 % (ref 36–46)
HGB BLD-MCNC: 9.1 G/DL (ref 12–16)
HGB BLDV-MCNC: 9.7 G/DL (ref 12–16)
IMM GRANULOCYTES # BLD AUTO: 0.03 X10*3/UL (ref 0–0.7)
IMM GRANULOCYTES NFR BLD AUTO: 0.6 % (ref 0–0.9)
INHALED O2 CONCENTRATION: 1 %
LACTATE BLDV-SCNC: 0.8 MMOL/L (ref 0.4–2)
LYMPHOCYTES # BLD AUTO: 0.94 X10*3/UL (ref 1.2–4.8)
LYMPHOCYTES NFR BLD AUTO: 18.3 %
MAGNESIUM SERPL-MCNC: 0.64 MG/DL (ref 1.6–2.4)
MAGNESIUM SERPL-MCNC: 1.36 MG/DL (ref 1.6–2.4)
MCH RBC QN AUTO: 25.1 PG (ref 26–34)
MCHC RBC AUTO-ENTMCNC: 31.1 G/DL (ref 32–36)
MCV RBC AUTO: 81 FL (ref 80–100)
MONOCYTES # BLD AUTO: 0.28 X10*3/UL (ref 0.1–1)
MONOCYTES NFR BLD AUTO: 5.5 %
NEUTROPHILS # BLD AUTO: 3.88 X10*3/UL (ref 1.2–7.7)
NEUTROPHILS NFR BLD AUTO: 75.6 %
NRBC BLD-RTO: 0 /100 WBCS (ref 0–0)
OXYHGB MFR BLDV: 82.2 % (ref 45–75)
P AXIS: 77 DEGREES
P AXIS: 91 DEGREES
P OFFSET: 179 MS
P ONSET: 127 MS
PCO2 BLDV: 33 MM HG (ref 41–51)
PH BLDV: 7.39 PH (ref 7.33–7.43)
PHOSPHATE SERPL-MCNC: 4.4 MG/DL (ref 2.5–4.9)
PHOSPHATE SERPL-MCNC: 5.2 MG/DL (ref 2.5–4.9)
PLATELET # BLD AUTO: 124 X10*3/UL (ref 150–450)
PO2 BLDV: 56 MM HG (ref 35–45)
POTASSIUM BLDV-SCNC: 5.1 MMOL/L (ref 3.5–5.3)
POTASSIUM SERPL-SCNC: 3.2 MMOL/L (ref 3.5–5.3)
POTASSIUM SERPL-SCNC: 5 MMOL/L (ref 3.5–5.3)
PR INTERVAL: 166 MS
PR INTERVAL: 200 MS
PROT SERPL-MCNC: 6.1 G/DL (ref 6.4–8.2)
Q ONSET: 205 MS
Q ONSET: 210 MS
QRS COUNT: 16 BEATS
QRS COUNT: 17 BEATS
QRS DURATION: 112 MS
QRS DURATION: 116 MS
QT INTERVAL: 410 MS
QT INTERVAL: 436 MS
QTC CALCULATION(BAZETT): 537 MS
QTC CALCULATION(BAZETT): 559 MS
QTC FREDERICIA: 491 MS
QTC FREDERICIA: 515 MS
R AXIS: 146 DEGREES
R AXIS: 152 DEGREES
RBC # BLD AUTO: 3.63 X10*6/UL (ref 4–5.2)
SAO2 % BLDV: 84 % (ref 45–75)
SODIUM BLDV-SCNC: 136 MMOL/L (ref 136–145)
SODIUM SERPL-SCNC: 139 MMOL/L (ref 136–145)
SODIUM SERPL-SCNC: 142 MMOL/L (ref 136–145)
T AXIS: -13 DEGREES
T AXIS: -20 DEGREES
T OFFSET: 410 MS
T OFFSET: 428 MS
VENTRICULAR RATE: 103 BPM
VENTRICULAR RATE: 99 BPM
WBC # BLD AUTO: 5.1 X10*3/UL (ref 4.4–11.3)

## 2024-05-31 PROCEDURE — 85025 COMPLETE CBC W/AUTO DIFF WBC: CPT

## 2024-05-31 PROCEDURE — 2500000001 HC RX 250 WO HCPCS SELF ADMINISTERED DRUGS (ALT 637 FOR MEDICARE OP): Performed by: INTERNAL MEDICINE

## 2024-05-31 PROCEDURE — 2500000004 HC RX 250 GENERAL PHARMACY W/ HCPCS (ALT 636 FOR OP/ED)

## 2024-05-31 PROCEDURE — 84132 ASSAY OF SERUM POTASSIUM: CPT

## 2024-05-31 PROCEDURE — 2500000001 HC RX 250 WO HCPCS SELF ADMINISTERED DRUGS (ALT 637 FOR MEDICARE OP): Performed by: EMERGENCY MEDICINE

## 2024-05-31 PROCEDURE — 83735 ASSAY OF MAGNESIUM: CPT

## 2024-05-31 PROCEDURE — 83880 ASSAY OF NATRIURETIC PEPTIDE: CPT

## 2024-05-31 PROCEDURE — 2500000004 HC RX 250 GENERAL PHARMACY W/ HCPCS (ALT 636 FOR OP/ED): Performed by: INTERNAL MEDICINE

## 2024-05-31 PROCEDURE — 2500000004 HC RX 250 GENERAL PHARMACY W/ HCPCS (ALT 636 FOR OP/ED): Performed by: HOSPITALIST

## 2024-05-31 PROCEDURE — 2500000002 HC RX 250 W HCPCS SELF ADMINISTERED DRUGS (ALT 637 FOR MEDICARE OP, ALT 636 FOR OP/ED)

## 2024-05-31 PROCEDURE — 82435 ASSAY OF BLOOD CHLORIDE: CPT

## 2024-05-31 PROCEDURE — 84075 ASSAY ALKALINE PHOSPHATASE: CPT

## 2024-05-31 PROCEDURE — 2500000001 HC RX 250 WO HCPCS SELF ADMINISTERED DRUGS (ALT 637 FOR MEDICARE OP)

## 2024-05-31 PROCEDURE — 1200000002 HC GENERAL ROOM WITH TELEMETRY DAILY

## 2024-05-31 PROCEDURE — 84100 ASSAY OF PHOSPHORUS: CPT

## 2024-05-31 PROCEDURE — 2500000002 HC RX 250 W HCPCS SELF ADMINISTERED DRUGS (ALT 637 FOR MEDICARE OP, ALT 636 FOR OP/ED): Performed by: INTERNAL MEDICINE

## 2024-05-31 PROCEDURE — 99223 1ST HOSP IP/OBS HIGH 75: CPT | Performed by: INTERNAL MEDICINE

## 2024-05-31 PROCEDURE — 93005 ELECTROCARDIOGRAM TRACING: CPT

## 2024-05-31 PROCEDURE — 93010 ELECTROCARDIOGRAM REPORT: CPT | Performed by: INTERNAL MEDICINE

## 2024-05-31 RX ORDER — ENOXAPARIN SODIUM 100 MG/ML
40 INJECTION SUBCUTANEOUS EVERY 24 HOURS
Status: DISCONTINUED | OUTPATIENT
Start: 2024-05-31 | End: 2024-06-02 | Stop reason: HOSPADM

## 2024-05-31 RX ORDER — LOPERAMIDE HYDROCHLORIDE 2 MG/1
2 CAPSULE ORAL 4 TIMES DAILY PRN
Status: DISCONTINUED | OUTPATIENT
Start: 2024-05-31 | End: 2024-05-31 | Stop reason: HOSPADM

## 2024-05-31 RX ORDER — HYDROXYCHLOROQUINE SULFATE 200 MG/1
200 TABLET, FILM COATED ORAL 2 TIMES DAILY
Status: DISCONTINUED | OUTPATIENT
Start: 2024-05-31 | End: 2024-06-02 | Stop reason: HOSPADM

## 2024-05-31 RX ORDER — ASPIRIN 81 MG/1
81 TABLET ORAL DAILY
Status: DISCONTINUED | OUTPATIENT
Start: 2024-05-31 | End: 2024-06-02 | Stop reason: HOSPADM

## 2024-05-31 RX ORDER — LORATADINE 10 MG/1
10 TABLET ORAL DAILY
Status: DISCONTINUED | OUTPATIENT
Start: 2024-05-31 | End: 2024-06-02 | Stop reason: HOSPADM

## 2024-05-31 RX ORDER — LOPERAMIDE HYDROCHLORIDE 2 MG/1
2 CAPSULE ORAL 4 TIMES DAILY PRN
Status: DISCONTINUED | OUTPATIENT
Start: 2024-05-31 | End: 2024-06-02 | Stop reason: HOSPADM

## 2024-05-31 RX ORDER — FLUTICASONE PROPIONATE 50 MCG
1 SPRAY, SUSPENSION (ML) NASAL DAILY
Status: DISCONTINUED | OUTPATIENT
Start: 2024-05-31 | End: 2024-06-02 | Stop reason: HOSPADM

## 2024-05-31 RX ORDER — VENLAFAXINE HYDROCHLORIDE 150 MG/1
150 CAPSULE, EXTENDED RELEASE ORAL DAILY
Status: DISCONTINUED | OUTPATIENT
Start: 2024-05-31 | End: 2024-06-02 | Stop reason: HOSPADM

## 2024-05-31 RX ORDER — CALCIUM GLUCONATE 20 MG/ML
2 INJECTION, SOLUTION INTRAVENOUS ONCE
Status: COMPLETED | OUTPATIENT
Start: 2024-05-31 | End: 2024-05-31

## 2024-05-31 RX ORDER — POTASSIUM CHLORIDE 14.9 MG/ML
20 INJECTION INTRAVENOUS
Status: COMPLETED | OUTPATIENT
Start: 2024-05-31 | End: 2024-05-31

## 2024-05-31 RX ORDER — MAGNESIUM CHLORIDE 64 MG
64 TABLET, DELAYED RELEASE (ENTERIC COATED) ORAL 2 TIMES DAILY
Status: DISCONTINUED | OUTPATIENT
Start: 2024-05-31 | End: 2024-06-02 | Stop reason: HOSPADM

## 2024-05-31 RX ORDER — PANTOPRAZOLE SODIUM 40 MG/1
40 TABLET, DELAYED RELEASE ORAL
Status: DISCONTINUED | OUTPATIENT
Start: 2024-05-31 | End: 2024-05-31

## 2024-05-31 RX ORDER — TALC
3 POWDER (GRAM) TOPICAL NIGHTLY PRN
Status: DISCONTINUED | OUTPATIENT
Start: 2024-05-31 | End: 2024-06-02 | Stop reason: HOSPADM

## 2024-05-31 RX ORDER — MAGNESIUM SULFATE HEPTAHYDRATE 40 MG/ML
4 INJECTION, SOLUTION INTRAVENOUS ONCE
Status: COMPLETED | OUTPATIENT
Start: 2024-05-31 | End: 2024-05-31

## 2024-05-31 RX ORDER — GABAPENTIN 300 MG/1
600 CAPSULE ORAL 2 TIMES DAILY
Status: DISCONTINUED | OUTPATIENT
Start: 2024-05-31 | End: 2024-06-02 | Stop reason: HOSPADM

## 2024-05-31 RX ORDER — POTASSIUM CHLORIDE 20 MEQ/1
40 TABLET, EXTENDED RELEASE ORAL ONCE
Status: COMPLETED | OUTPATIENT
Start: 2024-05-31 | End: 2024-05-31

## 2024-05-31 RX ORDER — FERROUS SULFATE, DRIED 160(50) MG
2 TABLET, EXTENDED RELEASE ORAL 2 TIMES DAILY
Status: DISCONTINUED | OUTPATIENT
Start: 2024-05-31 | End: 2024-06-02 | Stop reason: HOSPADM

## 2024-05-31 RX ORDER — MAGNESIUM SULFATE HEPTAHYDRATE 40 MG/ML
4 INJECTION, SOLUTION INTRAVENOUS ONCE
Status: COMPLETED | OUTPATIENT
Start: 2024-05-31 | End: 2024-06-01

## 2024-05-31 RX ORDER — CHOLESTYRAMINE 4 G/9G
1 POWDER, FOR SUSPENSION ORAL DAILY
Status: DISCONTINUED | OUTPATIENT
Start: 2024-05-31 | End: 2024-06-02 | Stop reason: HOSPADM

## 2024-05-31 RX ORDER — FUROSEMIDE 10 MG/ML
40 INJECTION INTRAMUSCULAR; INTRAVENOUS ONCE
Status: COMPLETED | OUTPATIENT
Start: 2024-05-31 | End: 2024-05-31

## 2024-05-31 RX ORDER — FUROSEMIDE 40 MG/1
40 TABLET ORAL DAILY
Status: DISCONTINUED | OUTPATIENT
Start: 2024-05-31 | End: 2024-05-31

## 2024-05-31 RX ADMIN — POTASSIUM CHLORIDE 20 MEQ: 14.9 INJECTION, SOLUTION INTRAVENOUS at 21:47

## 2024-05-31 RX ADMIN — MAGNESIUM SULFATE 4 G: 4 INJECTION INTRAVENOUS at 21:21

## 2024-05-31 RX ADMIN — VENLAFAXINE HYDROCHLORIDE 150 MG: 150 CAPSULE, EXTENDED RELEASE ORAL at 10:27

## 2024-05-31 RX ADMIN — HYDROXYCHLOROQUINE SULFATE 200 MG: 200 TABLET, FILM COATED ORAL at 20:26

## 2024-05-31 RX ADMIN — MAGNESIUM 64 MG (MAGNESIUM CHLORIDE) TABLET,DELAYED RELEASE 64 MG: at 20:26

## 2024-05-31 RX ADMIN — ENOXAPARIN SODIUM 40 MG: 100 INJECTION SUBCUTANEOUS at 06:37

## 2024-05-31 RX ADMIN — POTASSIUM CHLORIDE 40 MEQ: 1500 TABLET, EXTENDED RELEASE ORAL at 20:19

## 2024-05-31 RX ADMIN — LORATADINE 10 MG: 10 TABLET ORAL at 10:27

## 2024-05-31 RX ADMIN — GABAPENTIN 600 MG: 300 CAPSULE ORAL at 20:19

## 2024-05-31 RX ADMIN — Medication 2 TABLET: at 10:27

## 2024-05-31 RX ADMIN — ASPIRIN 81 MG: 81 TABLET, COATED ORAL at 10:27

## 2024-05-31 RX ADMIN — FLUTICASONE PROPIONATE 1 SPRAY: 50 SPRAY, METERED NASAL at 10:46

## 2024-05-31 RX ADMIN — POTASSIUM CHLORIDE 20 MEQ: 14.9 INJECTION, SOLUTION INTRAVENOUS at 20:20

## 2024-05-31 RX ADMIN — MACITENTAN 10 MG: 10 TABLET, FILM COATED ORAL at 10:27

## 2024-05-31 RX ADMIN — MAGNESIUM 64 MG (MAGNESIUM CHLORIDE) TABLET,DELAYED RELEASE 64 MG: at 10:27

## 2024-05-31 RX ADMIN — CHOLESTYRAMINE POWDER FOR SUSPENSION 4 G: 4 POWDER, FOR SUSPENSION ORAL at 10:27

## 2024-05-31 RX ADMIN — LOPERAMIDE HYDROCHLORIDE 2 MG: 2 CAPSULE ORAL at 20:29

## 2024-05-31 RX ADMIN — MAGNESIUM SULFATE 4 G: 4 INJECTION INTRAVENOUS at 06:00

## 2024-05-31 RX ADMIN — GABAPENTIN 600 MG: 300 CAPSULE ORAL at 10:27

## 2024-05-31 RX ADMIN — EPOPROSTENOL 68 NG/KG/MIN: 1.5 INJECTION, POWDER, LYOPHILIZED, FOR SOLUTION INTRAVENOUS at 13:04

## 2024-05-31 RX ADMIN — HYDROXYCHLOROQUINE SULFATE 200 MG: 200 TABLET, FILM COATED ORAL at 10:28

## 2024-05-31 RX ADMIN — Medication 2 TABLET: at 20:19

## 2024-05-31 RX ADMIN — CALCIUM GLUCONATE 2 G: 20 INJECTION, SOLUTION INTRAVENOUS at 10:26

## 2024-05-31 RX ADMIN — FUROSEMIDE 40 MG: 10 INJECTION, SOLUTION INTRAMUSCULAR; INTRAVENOUS at 10:27

## 2024-05-31 RX ADMIN — LOPERAMIDE HYDROCHLORIDE 2 MG: 2 CAPSULE ORAL at 00:15

## 2024-05-31 SDOH — SOCIAL STABILITY: SOCIAL INSECURITY: ARE THERE ANY APPARENT SIGNS OF INJURIES/BEHAVIORS THAT COULD BE RELATED TO ABUSE/NEGLECT?: NO

## 2024-05-31 SDOH — SOCIAL STABILITY: SOCIAL INSECURITY: HAVE YOU HAD THOUGHTS OF HARMING ANYONE ELSE?: NO

## 2024-05-31 SDOH — SOCIAL STABILITY: SOCIAL INSECURITY: DO YOU FEEL UNSAFE GOING BACK TO THE PLACE WHERE YOU ARE LIVING?: NO

## 2024-05-31 SDOH — SOCIAL STABILITY: SOCIAL INSECURITY: ABUSE: ADULT

## 2024-05-31 SDOH — SOCIAL STABILITY: SOCIAL INSECURITY: HAVE YOU HAD ANY THOUGHTS OF HARMING ANYONE ELSE?: NO

## 2024-05-31 SDOH — SOCIAL STABILITY: SOCIAL INSECURITY: WERE YOU ABLE TO COMPLETE ALL THE BEHAVIORAL HEALTH SCREENINGS?: YES

## 2024-05-31 SDOH — SOCIAL STABILITY: SOCIAL INSECURITY: DOES ANYONE TRY TO KEEP YOU FROM HAVING/CONTACTING OTHER FRIENDS OR DOING THINGS OUTSIDE YOUR HOME?: NO

## 2024-05-31 SDOH — SOCIAL STABILITY: SOCIAL INSECURITY: ARE YOU OR HAVE YOU BEEN THREATENED OR ABUSED PHYSICALLY, EMOTIONALLY, OR SEXUALLY BY ANYONE?: NO

## 2024-05-31 SDOH — SOCIAL STABILITY: SOCIAL INSECURITY: HAS ANYONE EVER THREATENED TO HURT YOUR FAMILY OR YOUR PETS?: NO

## 2024-05-31 SDOH — SOCIAL STABILITY: SOCIAL INSECURITY: DO YOU FEEL ANYONE HAS EXPLOITED OR TAKEN ADVANTAGE OF YOU FINANCIALLY OR OF YOUR PERSONAL PROPERTY?: NO

## 2024-05-31 ASSESSMENT — ACTIVITIES OF DAILY LIVING (ADL)
TOILETING: NEEDS ASSISTANCE
HEARING - LEFT EAR: DIFFICULTY WITH NOISE
ADEQUATE_TO_COMPLETE_ADL: YES
BATHING: NEEDS ASSISTANCE
LACK_OF_TRANSPORTATION: YES
JUDGMENT_ADEQUATE_SAFELY_COMPLETE_DAILY_ACTIVITIES: YES
FEEDING YOURSELF: NEEDS ASSISTANCE
WALKS IN HOME: NEEDS ASSISTANCE
LACK_OF_TRANSPORTATION: NO
GROOMING: NEEDS ASSISTANCE
ASSISTIVE_DEVICE: OTHER (COMMENT)
DRESSING YOURSELF: NEEDS ASSISTANCE
PATIENT'S MEMORY ADEQUATE TO SAFELY COMPLETE DAILY ACTIVITIES?: YES
HEARING - RIGHT EAR: DIFFICULTY WITH NOISE

## 2024-05-31 ASSESSMENT — PATIENT HEALTH QUESTIONNAIRE - PHQ9
2. FEELING DOWN, DEPRESSED OR HOPELESS: SEVERAL DAYS
SUM OF ALL RESPONSES TO PHQ9 QUESTIONS 1 & 2: 2
1. LITTLE INTEREST OR PLEASURE IN DOING THINGS: SEVERAL DAYS

## 2024-05-31 ASSESSMENT — COGNITIVE AND FUNCTIONAL STATUS - GENERAL
DRESSING REGULAR UPPER BODY CLOTHING: A LITTLE
HELP NEEDED FOR BATHING: A LITTLE
TOILETING: A LITTLE
EATING MEALS: A LITTLE
CLIMB 3 TO 5 STEPS WITH RAILING: A LITTLE
MOVING TO AND FROM BED TO CHAIR: A LITTLE
EATING MEALS: A LITTLE
MOVING TO AND FROM BED TO CHAIR: A LITTLE
DRESSING REGULAR UPPER BODY CLOTHING: A LITTLE
MOBILITY SCORE: 21
CLIMB 3 TO 5 STEPS WITH RAILING: A LITTLE
MOVING TO AND FROM BED TO CHAIR: A LITTLE
WALKING IN HOSPITAL ROOM: A LITTLE
CLIMB 3 TO 5 STEPS WITH RAILING: A LITTLE
DAILY ACTIVITIY SCORE: 18
EATING MEALS: A LITTLE
DAILY ACTIVITIY SCORE: 18
WALKING IN HOSPITAL ROOM: A LITTLE
DRESSING REGULAR LOWER BODY CLOTHING: A LITTLE
HELP NEEDED FOR BATHING: A LITTLE
WALKING IN HOSPITAL ROOM: A LITTLE
MOBILITY SCORE: 21
TOILETING: A LITTLE
DAILY ACTIVITIY SCORE: 18
PERSONAL GROOMING: A LITTLE
PATIENT BASELINE BEDBOUND: NO
DRESSING REGULAR LOWER BODY CLOTHING: A LITTLE
MOBILITY SCORE: 21
HELP NEEDED FOR BATHING: A LITTLE
DRESSING REGULAR UPPER BODY CLOTHING: A LITTLE
PERSONAL GROOMING: A LITTLE
DRESSING REGULAR LOWER BODY CLOTHING: A LITTLE
PERSONAL GROOMING: A LITTLE
TOILETING: A LITTLE

## 2024-05-31 ASSESSMENT — PAIN - FUNCTIONAL ASSESSMENT: PAIN_FUNCTIONAL_ASSESSMENT: 0-10

## 2024-05-31 ASSESSMENT — COLUMBIA-SUICIDE SEVERITY RATING SCALE - C-SSRS
1. IN THE PAST MONTH, HAVE YOU WISHED YOU WERE DEAD OR WISHED YOU COULD GO TO SLEEP AND NOT WAKE UP?: NO
2. HAVE YOU ACTUALLY HAD ANY THOUGHTS OF KILLING YOURSELF?: NO
6. HAVE YOU EVER DONE ANYTHING, STARTED TO DO ANYTHING, OR PREPARED TO DO ANYTHING TO END YOUR LIFE?: NO

## 2024-05-31 ASSESSMENT — LIFESTYLE VARIABLES
HOW OFTEN DO YOU HAVE 6 OR MORE DRINKS ON ONE OCCASION: NEVER
AUDIT-C TOTAL SCORE: 3
SKIP TO QUESTIONS 9-10: 0
AUDIT-C TOTAL SCORE: 3
HOW MANY STANDARD DRINKS CONTAINING ALCOHOL DO YOU HAVE ON A TYPICAL DAY: 3 OR 4
HOW OFTEN DO YOU HAVE A DRINK CONTAINING ALCOHOL: 2-4 TIMES A MONTH

## 2024-05-31 ASSESSMENT — PAIN SCALES - GENERAL: PAINLEVEL_OUTOF10: 0 - NO PAIN

## 2024-05-31 NOTE — H&P
MEDICINE ADMISSION NOTE    History of Present Illness  Ronna Beckham is a 63 y.o. female with PMHx of pulmonary hypertension (on Velitri, opsumit, and home 4L NC), non-obstructive CAD, HTN, lupus, substance use disorder (cocaine, methamphetamine, PCP, alcohol), GERD, anxiety and depression, and epilepsy who is presenting to the [facility] ED with chief complaint of worsening dyspnea on exertion associated with a syncopal event.     Patient reports her SOB started shortly after her most recent visit with Dr. Chavez at which time, her Velitri infusion rate was decreased (adjusted due to recent weight loss). Since that time, she has been feeling more dyspneic especially with exertion. She has also had associated jaw pain as well as nausea, vomiting, and diarrhea over the past 3 days.     The day prior to presentation, patient was climbing the stairs in her home on all-fours due to her dyspnea. She remembers starting to climb the stairs but then the next thing she remembers is waking up laying on her stomach at the top of the stairs. She denies any trauma, loss of bowel/bladder control, tongue biting.      Per records, patient stated that she was having leaking from her port; however, evaluation showed no drainage from the port site.  She was discharged home, but she presented again that evening after the event on the staircase.    Patient was evaluated with chest x-ray which was unremarkable and CT PE which showed no acute pulmonary embolism but persistence of marked cardiac enlargement and evidence of right heart dysfunction.  Also noted were stable pulmonary nodules as well as areas of groundglass opacity which could reflect inflammation. She was treated with one dose of CTX and azithromycin as well as DuoNeb before transfer to [facility].     Patient denies any fever, chills, cough, chest pain, abdominal pain, lower extremity pain/swelling, or orthopnea.    Upon arrival to [facility]:   /77 (BP Location: Right arm, Patient  Position: Lying)   Pulse 100   Temp 36.1 °C (97 °F) (Temporal)   Resp 22   Wt 76.7 kg (169 lb 1.5 oz)   SpO2 95%   BMI 28.14 kg/m²      Labs:  Recent Results (from the past 48 hour(s))   Blood Culture    Collection Time: 05/30/24 11:50 AM    Specimen: Peripheral Venipuncture; Blood culture   Result Value Ref Range    Blood Culture Loaded on Instrument - Culture in progress    Blood Culture    Collection Time: 05/30/24 11:50 AM    Specimen: Peripheral Venipuncture; Blood culture   Result Value Ref Range    Blood Culture Loaded on Instrument - Culture in progress    Ethanol    Collection Time: 05/30/24 11:50 AM   Result Value Ref Range    Alcohol <0.010 0.000 - 0.010 g/dL   CBC and Auto Differential    Collection Time: 05/30/24 11:50 AM   Result Value Ref Range    WBC 5.3 4.4 - 11.3 x10*3/uL    nRBC 0.0 0.0 - 0.0 /100 WBCs    RBC 4.13 4.00 - 5.20 x10*6/uL    Hemoglobin 10.1 (L) 12.0 - 16.0 g/dL    Hematocrit 32.6 (L) 36.0 - 46.0 %    MCV 79 (L) 80 - 100 fL    MCH 24.5 (L) 26.0 - 34.0 pg    MCHC 31.0 (L) 32.0 - 36.0 g/dL    RDW 16.9 (H) 11.5 - 14.5 %    Platelets 127 (L) 150 - 450 x10*3/uL    Neutrophils % 76.2 40.0 - 80.0 %    Immature Granulocytes %, Automated 0.6 0.0 - 0.9 %    Lymphocytes % 17.6 13.0 - 44.0 %    Monocytes % 5.6 2.0 - 10.0 %    Eosinophils % 0.0 0.0 - 6.0 %    Basophils % 0.0 0.0 - 2.0 %    Neutrophils Absolute 4.06 1.20 - 7.70 x10*3/uL    Immature Granulocytes Absolute, Automated 0.03 0.00 - 0.70 x10*3/uL    Lymphocytes Absolute 0.94 (L) 1.20 - 4.80 x10*3/uL    Monocytes Absolute 0.30 0.10 - 1.00 x10*3/uL    Eosinophils Absolute 0.00 0.00 - 0.70 x10*3/uL    Basophils Absolute 0.00 0.00 - 0.10 x10*3/uL   Comprehensive metabolic panel    Collection Time: 05/30/24 11:50 AM   Result Value Ref Range    Glucose 100 (H) 65 - 99 mg/dL    Sodium 141 133 - 145 mmol/L    Potassium 3.2 (L) 3.4 - 5.1 mmol/L    Chloride 105 97 - 107 mmol/L    Bicarbonate 21 (L) 24 - 31 mmol/L    Urea Nitrogen 15 8 - 25  mg/dL    Creatinine 0.80 0.40 - 1.60 mg/dL    eGFR 84 >60 mL/min/1.73m*2    Calcium 6.1 (L) 8.5 - 10.4 mg/dL    Albumin 3.7 3.5 - 5.0 g/dL    Alkaline Phosphatase 100 35 - 125 U/L    Total Protein 6.8 5.9 - 7.9 g/dL    AST 16 5 - 40 U/L    Bilirubin, Total 0.9 0.1 - 1.2 mg/dL    ALT 13 5 - 40 U/L    Anion Gap 15 <=19 mmol/L   Troponin T    Collection Time: 05/30/24 11:50 AM   Result Value Ref Range    Troponin T, High Sensitivity 27 (HH) <=14 ng/L   BLOOD GAS LACTIC ACID, VENOUS    Collection Time: 05/30/24 11:57 AM   Result Value Ref Range    POCT Lactate, Venous 0.8 0.4 - 2.0 mmol/L   Urinalysis with Reflex Culture and Microscopic    Collection Time: 05/30/24  1:51 PM   Result Value Ref Range    Color, Urine Yellow Light-Yellow, Yellow, Dark-Yellow    Appearance, Urine Clear Clear    Specific Gravity, Urine 1.031 1.005 - 1.035    pH, Urine 6.0 5.0, 5.5, 6.0, 6.5, 7.0, 7.5, 8.0    Protein, Urine 100 (2+) (A) NEGATIVE, 10 (TRACE), 20 (TRACE) mg/dL    Glucose, Urine Normal Normal mg/dL    Blood, Urine NEGATIVE NEGATIVE    Ketones, Urine NEGATIVE NEGATIVE mg/dL    Bilirubin, Urine NEGATIVE NEGATIVE    Urobilinogen, Urine 2 (1+) (A) Normal mg/dL    Nitrite, Urine NEGATIVE NEGATIVE    Leukocyte Esterase, Urine NEGATIVE NEGATIVE   Urinalysis Microscopic    Collection Time: 05/30/24  1:51 PM   Result Value Ref Range    WBC, Urine 1-5 1-5, NONE /HPF    RBC, Urine 1-2 NONE, 1-2, 3-5 /HPF    Squamous Epithelial Cells, Urine 1-9 (SPARSE) Reference range not established. /HPF    Mucus, Urine 2+ Reference range not established. /LPF   hCG, Urine, Qualitative    Collection Time: 05/30/24  1:53 PM   Result Value Ref Range    HCG, Urine NEGATIVE NEGATIVE   Drug Screen, Urine    Collection Time: 05/30/24  1:53 PM   Result Value Ref Range    Amphetamine Screen, Urine Positive (A)      Barbiturate Screen, Urine Negative      Benzodiazepines Screen, Urine Negative      Cannabinoid Screen, Urine Negative      Cocaine Metabolite Screen,  Urine Negative      Fentanyl Screen, Urine      Methadone Screen, Urine Negative      Opiate Screen, Urine Negative      Oxycodone Screen, Urine      PCP Screen, Urine Positive (A)     Troponin T    Collection Time: 05/30/24  4:02 PM   Result Value Ref Range    Troponin T, High Sensitivity 25 (HH) <=14 ng/L   CBC and Auto Differential    Collection Time: 05/30/24  7:40 PM   Result Value Ref Range    WBC 5.0 4.4 - 11.3 x10*3/uL    nRBC 0.0 0.0 - 0.0 /100 WBCs    RBC 3.85 (L) 4.00 - 5.20 x10*6/uL    Hemoglobin 9.4 (L) 12.0 - 16.0 g/dL    Hematocrit 30.2 (L) 36.0 - 46.0 %    MCV 78 (L) 80 - 100 fL    MCH 24.4 (L) 26.0 - 34.0 pg    MCHC 31.1 (L) 32.0 - 36.0 g/dL    RDW 16.9 (H) 11.5 - 14.5 %    Platelets 115 (L) 150 - 450 x10*3/uL    Neutrophils % 74.9 40.0 - 80.0 %    Immature Granulocytes %, Automated 0.4 0.0 - 0.9 %    Lymphocytes % 19.1 13.0 - 44.0 %    Monocytes % 5.6 2.0 - 10.0 %    Eosinophils % 0.0 0.0 - 6.0 %    Basophils % 0.0 0.0 - 2.0 %    Neutrophils Absolute 3.76 1.20 - 7.70 x10*3/uL    Immature Granulocytes Absolute, Automated 0.02 0.00 - 0.70 x10*3/uL    Lymphocytes Absolute 0.96 (L) 1.20 - 4.80 x10*3/uL    Monocytes Absolute 0.28 0.10 - 1.00 x10*3/uL    Eosinophils Absolute 0.00 0.00 - 0.70 x10*3/uL    Basophils Absolute 0.00 0.00 - 0.10 x10*3/uL   Comprehensive metabolic panel    Collection Time: 05/30/24  7:40 PM   Result Value Ref Range    Glucose 121 (H) 65 - 99 mg/dL    Sodium 140 133 - 145 mmol/L    Potassium 3.3 (L) 3.4 - 5.1 mmol/L    Chloride 105 97 - 107 mmol/L    Bicarbonate 19 (L) 24 - 31 mmol/L    Urea Nitrogen 15 8 - 25 mg/dL    Creatinine 0.90 0.40 - 1.60 mg/dL    eGFR 73 >60 mL/min/1.73m*2    Calcium 5.5 (LL) 8.5 - 10.4 mg/dL    Albumin 3.5 3.5 - 5.0 g/dL    Alkaline Phosphatase 88 35 - 125 U/L    Total Protein 6.2 5.9 - 7.9 g/dL    AST 13 5 - 40 U/L    Bilirubin, Total 0.9 0.1 - 1.2 mg/dL    ALT 8 5 - 40 U/L    Anion Gap 16 <=19 mmol/L   D-dimer, quantitative    Collection Time:  05/30/24  7:40 PM   Result Value Ref Range    D-Dimer Non VTE, Quant (mg/L FEU) 1.98 (H) 0.19 - 0.50 mg/L FEU   Serial Troponin, Initial ([facility])    Collection Time: 05/30/24  7:40 PM   Result Value Ref Range    Troponin T, High Sensitivity 24 (HH) <=14 ng/L   Serial Troponin, 2 Hour ([facility])    Collection Time: 05/30/24  9:42 PM   Result Value Ref Range    Troponin T, High Sensitivity 22 (HH) <=14 ng/L   CBC and Auto Differential    Collection Time: 05/31/24  4:30 AM   Result Value Ref Range    WBC 5.1 4.4 - 11.3 x10*3/uL    nRBC 0.0 0.0 - 0.0 /100 WBCs    RBC 3.63 (L) 4.00 - 5.20 x10*6/uL    Hemoglobin 9.1 (L) 12.0 - 16.0 g/dL    Hematocrit 29.3 (L) 36.0 - 46.0 %    MCV 81 80 - 100 fL    MCH 25.1 (L) 26.0 - 34.0 pg    MCHC 31.1 (L) 32.0 - 36.0 g/dL    RDW 17.0 (H) 11.5 - 14.5 %    Platelets 124 (L) 150 - 450 x10*3/uL    Neutrophils % 75.6 40.0 - 80.0 %    Immature Granulocytes %, Automated 0.6 0.0 - 0.9 %    Lymphocytes % 18.3 13.0 - 44.0 %    Monocytes % 5.5 2.0 - 10.0 %    Eosinophils % 0.0 0.0 - 6.0 %    Basophils % 0.0 0.0 - 2.0 %    Neutrophils Absolute 3.88 1.20 - 7.70 x10*3/uL    Immature Granulocytes Absolute, Automated 0.03 0.00 - 0.70 x10*3/uL    Lymphocytes Absolute 0.94 (L) 1.20 - 4.80 x10*3/uL    Monocytes Absolute 0.28 0.10 - 1.00 x10*3/uL    Eosinophils Absolute 0.00 0.00 - 0.70 x10*3/uL    Basophils Absolute 0.00 0.00 - 0.10 x10*3/uL   Blood Gas Venous Full Panel    Collection Time: 05/31/24  4:30 AM   Result Value Ref Range    POCT pH, Venous 7.39 7.33 - 7.43 pH    POCT pCO2, Venous 33 (L) 41 - 51 mm Hg    POCT pO2, Venous 56 (H) 35 - 45 mm Hg    POCT SO2, Venous 84 (H) 45 - 75 %    POCT Oxy Hemoglobin, Venous 82.2 (H) 45.0 - 75.0 %    POCT Hematocrit Calculated, Venous 29.0 (L) 36.0 - 46.0 %    POCT Sodium, Venous 136 136 - 145 mmol/L    POCT Potassium, Venous 5.1 3.5 - 5.3 mmol/L    POCT Chloride, Venous 108 (H) 98 - 107 mmol/L    POCT Ionized Calicum, Venous 0.77 (L) 1.10 - 1.33 mmol/L    POCT  Glucose, Venous 100 (H) 74 - 99 mg/dL    POCT Lactate, Venous 0.8 0.4 - 2.0 mmol/L    POCT Base Excess, Venous -4.3 (L) -2.0 - 3.0 mmol/L    POCT HCO3 Calculated, Venous 20.0 (L) 22.0 - 26.0 mmol/L    POCT Hemoglobin, Venous 9.7 (L) 12.0 - 16.0 g/dL    POCT Anion Gap, Venous 13.0 10.0 - 25.0 mmol/L    Patient Temperature 37.0 degrees Celsius    FiO2 1 %   Magnesium    Collection Time: 05/31/24  4:30 AM   Result Value Ref Range    Magnesium 0.64 (L) 1.60 - 2.40 mg/dL   Hepatic Function Panel    Collection Time: 05/31/24  4:30 AM   Result Value Ref Range    Albumin 3.5 3.4 - 5.0 g/dL    Bilirubin, Total 0.7 0.0 - 1.2 mg/dL    Bilirubin, Direct 0.1 0.0 - 0.3 mg/dL    Alkaline Phosphatase 74 33 - 136 U/L    ALT 13 7 - 45 U/L    AST 34 9 - 39 U/L    Total Protein 6.1 (L) 6.4 - 8.2 g/dL   B-Type Natriuretic Peptide    Collection Time: 05/31/24  4:30 AM   Result Value Ref Range     (H) 0 - 99 pg/mL   Phosphorus    Collection Time: 05/31/24  4:30 AM   Result Value Ref Range    Phosphorus 5.2 (H) 2.5 - 4.9 mg/dL   Basic Metabolic Panel    Collection Time: 05/31/24  4:30 AM   Result Value Ref Range    Glucose 101 (H) 74 - 99 mg/dL    Sodium 139 136 - 145 mmol/L    Potassium 5.0 3.5 - 5.3 mmol/L    Chloride 107 98 - 107 mmol/L    Bicarbonate 19 (L) 21 - 32 mmol/L    Anion Gap 18 10 - 20 mmol/L    Urea Nitrogen 16 6 - 23 mg/dL    Creatinine 0.75 0.50 - 1.05 mg/dL    eGFR >90 >60 mL/min/1.73m*2    Calcium 5.9 (L) 8.6 - 10.6 mg/dL        Imaging:  XR chest 2 views 5/30/2024    1.  No evidence of acute cardiopulmonary process.      CT angio chest for pulmonary embolism 5/30/2024    1. No findings of acute pulmonary embolism.   2. Persistent marked cardiac enlargement. Hepatic heterogeneity and splenomegaly suggesting right heart dysfunction with associated not make liver and portal hypertension.   3. Bronchial thickening. There are couple of other stable pulmonary nodules when compared to most recent examination, however  there are areas of ground-glass opacity seen in the lungs possibly reflecting inflammatory etiologies. Findings of mild pulmonary edema and volume overload        Past Medical History  Past Medical History:   Diagnosis Date    Anxiety     Depression     GERD (gastroesophageal reflux disease)     Lupus (Multi)     Personal history of other specified conditions 2020    History of seizure    Pulmonary hypertension (Multi)        Surgical History  Past Surgical History:   Procedure Laterality Date    CARDIAC CATHETERIZATION N/A 2024    Procedure: Left Heart Cath, With LV, Angriography And Grafts;  Surgeon: Christiano Calvillo DO;  Location: [facility] Cardiac Cath Lab;  Service: Cardiovascular;  Laterality: N/A;    CHOLECYSTECTOMY      HYSTERECTOMY      OTHER SURGICAL HISTORY  2020    Cholecystectomy    OTHER SURGICAL HISTORY  2020    Esophagogastroduodenoscopy    OTHER SURGICAL HISTORY  2020    Colonoscopy    OTHER SURGICAL HISTORY  2020     section    OTHER SURGICAL HISTORY  2020    Hysterectomy       Social History  Tobacco: quit 10 years ago, prev < 1 ppd x ~30 years  EtOH: denies use in last 2 weeks  IDU: denies use in last 2 weeks (prev cocaine and meth)     Family History  No pertinent family hx      Allergies  Levetiracetam     Physical Exam   Constitutional: awake, alert, no acute distress   HEENT: Normocephalic, atraumatic, EOM grossly intact  Cardiovascular: RRR, normal S1/S2, loud systolic murmur best heard L lower sternal border, JVD noted to earlobe (pt has severe TR on most recent TTE)   Pulmonary: Clear to auscultation b/l, no wheezes/crackles/rhonchi, on supplemental oxygen 4L NC  GI: Soft, non-tender, non-distended, normoactive bowel sounds  Lower extremities: Warm and well perfused, no lower extremity edema  Neuro: A&O x3, able to move all 4 extremities spontaneously  Psych: Appropriate mood and affect     Medications  Scheduled medications  aspirin, 81 mg, oral,  Daily  calcium carbonate-vitamin D3, 2 tablet, oral, BID  cholestyramine, 1 packet, oral, Daily  enoxaparin, 40 mg, subcutaneous, q24h  fluticasone, 1 spray, Each Nostril, Daily  [Held by provider] furosemide, 40 mg, oral, Daily  gabapentin, 600 mg, oral, BID  hydroxychloroquine, 200 mg, oral, BID  loratadine, 10 mg, oral, Daily  macitentan, 10 mg, oral, Daily  magnesium chloride, 64 mg, oral, BID  venlafaxine XR, 150 mg, oral, Daily      Continuous medications     PRN medications  PRN medications: melatonin      Assessment/Plan     Ronna Beckham is a 63 y.o. female with PMHx of PHT (on home 4L NC), non-obstructive CAD, NSTEMI, HTN, lupus, substance use disorder (cocaine, methamphetamine, PCP, alcohol), GERD, anxiety and depression, and epilepsy who is presenting to the OSH with chief complaint of worsening dyspnea on exertion associated with a near-syncopal event. Given sx started after her Velitiri infusion rate was decreased (on 05/13), suspect her symptoms are related to uncontrolled pulmonary hypertension.     #Pulmonary Arterial Hypertension   #Chronic hypoxemic respiratory failure  :: On home 4L NC  :: Home: veletri, Opsumit, lasix 40 mg  - Discussed with Pulm fellow, will continue Veletri at current rate via home pump  - Conflicting info in pulm HTN patient database regarding her dose which will need to be clarified with Dr. Chavez team (currently no order in place)   - Consider increasing dose (previous dose ws 74 ng/kg/min)  - Continue Opsumit 10 mg daily   - Hold home lasix 40 mg and diurese PRN     #Substance use disorder  :: Hx of cocaine, methamphetamine, PCP, and alcohol use  :: POPEth 20 and PLPEth 11 (4/23) suggestive of light alcohol use   :: Urine tox (4/24): presumptive positive for cocaine  :: Urine tox (5/30): presumptive positive for amphetamine and PCP   :: Alcohol <10  :: Pt denies use since recent change in veliti dose (05/13)     #CAD  #HTN  - c/w home lisinopril,  ASA    #Hypocalcemia  #Hypomagnesemia  - c/w home calcium carbonate-vitamin D3  - Home magnesium chloride  - Mg sulfate 4g IV     #Lupus  - c/w hydroxychloroquine 200mg BID     #GERD  :: On omeprazole  - Hold PPI as potential cause for hypomag and hypoCa     #Anxiety  #Depression  - c/w home venlafaxine 150mg     #Neuropathy  - c/w home gabapentin     #Others  - c/w home vitamin B-1100, folic acid  - c/w home melatonin    F : PRN  E: PRN  N: Regular  A: PIV, Montez     DVT prophylaxis: Lovenox subq    Code Status: Full Code (confirmed on admission)   NOK: Diana almonte (sister): [phone]        Cruzito Tao MD  Internal Medicine, PGY-2     ======  I saw and evaluated the patient. I personally obtained the key and critical portions of the history and physical exam or was physically present for key and critical portions performed by the resident/fellow. I reviewed the resident/fellow's documentation and discussed the patient with the resident/fellow. I agree with the resident/fellow's medical decision making as documented in the note.    Tylor Hadley MD

## 2024-05-31 NOTE — SIGNIFICANT EVENT
05/31/24 1553   Onset Documentation   Rapid Response Initiated By Radar auto page   Location/Room [facility]  (LT [id number])   Pager Time 1553   Arrival Time 1610   Event End Time 1615   Level II Called No   Primary Reason for Call Radar auto page     Rapid Response Note    Radar auto-page received for a radar score of 8 with the following vital signs: 35.2, 112, 18, 109/71, 92%.  Vital signs were confirmed and reviewed with primary RN.  There have been no acute changes.  There are no indications for interventions by Rapid Response at this time.  RN to contact Rapid Response with any future concerns or signs of clinical decompensation.

## 2024-05-31 NOTE — PROGRESS NOTES
Ronna Beckham is a 63 y.o. female on day 0 of admission presenting with Pulmonary hypertension (Multi).    Subjective   Patient reports no worsening SOB currently. She reports intermittent jaw pain and tingling in fingers.        Objective   Constitutional: Well-developed female in no acute distress.  HEENT: Normocephalic, atraumatic. PERRL. EOMI. No cervical lymphadenopathy.  Respiratory: Decreased air movement. CTA bilaterally. No wheezes, rales, or rhonchi. Normal respiratory effort.  Cardiovascular: RRR. No murmurs, gallops, or rubs. + JVD. Radial pulses 2+.  Abdominal: Soft, nondistended, nontender to palpation. Bowel sounds present. No hepatosplenomegaly or masses. No CVA tenderness.  Neuro: alert and oriented x 4  MSK: trace b/l pedal/ankle edema  Skin: Warm, dry. No rashes or wounds.  Psych: Appropriate mood and affect.     Last Recorded Vitals  Blood pressure 110/74, pulse 93, temperature 35.8 °C (96.4 °F), resp. rate 18, weight 76.7 kg (169 lb 1.5 oz), SpO2 93%.  Intake/Output last 3 Shifts:  No intake/output data recorded.    Relevant Results          This patient has a central line   Reason for the central line remaining today? Parenteral medication           Assessment/Plan   Principal Problem:    Pulmonary hypertension (Multi)    63 y.o. female w/ pHTN on veletri/opsumit presenting with worsening ALAMO after recent dose change in veletri in setting of recent weight loss, has history of noncompliance.     Updates 5/31  -Per Dr. Prescott: Veletri dose calculated 68 ng/kg/min, weight 73 kg. This corresponds to her previous pump rate that is observed of 80.  -recent methamphetamine use +utox  -on 5 L NC (baseline 4 L at home)  -replaced Mg and Ca  -evening RFP    #Pulmonary Arterial Hypertension   #Chronic hypoxemic respiratory failure  -Currently at 5 L NC, baseline is 4L NC  -Home: veletri, Opsumit, lasix 40 mg  PLAN  - c/w veletri at 68ng/kg/min  - c/w Opsumit 10 mg daily   - Hold home lasix 40  mg  -given IV lasix 40 mg, good UOP     #Substance use disorder  :: Hx of cocaine, methamphetamine, PCP, and alcohol use  :: POPEth 20 and PLPEth 11 (4/23) suggestive of light alcohol use   :: Urine tox (4/24): presumptive positive for cocaine  :: Urine tox (5/30): presumptive positive for amphetamine and PCP   :: Alcohol <10     #CAD  #HTN  - c/w home lisinopril, ASA     #Hypocalcemia  #Hypomagnesemia  - c/w home calcium carbonate-vitamin D3  - Home magnesium chloride  - Mg sulfate 4g IV  -calcium gluconate 5/31     #Lupus  - c/w hydroxychloroquine 200mg BID     #GERD  :: On omeprazole  - Hold PPI as potential cause for hypomag and hypoCa     #Anxiety  #Depression  - c/w home venlafaxine 150mg     #Neuropathy  - c/w home gabapentin     #Others  - c/w home vitamin B-1100, folic acid  - c/w home melatonin     F : PRN  E: PRN  N: Regular  A: PIV, Montez      DVT prophylaxis: Lovenox subq     Code Status: Full Code (confirmed on admission)   NOK: Diana almonte (sister): [phone]        Marty Buck MD    ======  I saw and evaluated the patient. I personally obtained the key and critical portions of the history and physical exam or was physically present for key and critical portions performed by the resident/fellow. I reviewed the resident/fellow's documentation and discussed the patient with the resident/fellow. I agree with the resident/fellow's medical decision making as documented in the note.    Tylor Hadley MD

## 2024-05-31 NOTE — SIGNIFICANT EVENT
Rapid Response RN Note     05/31/24 0352   Onset Documentation   Rapid Response Initiated By Radar auto page   Location/Room [facility]   Pager Time 0349   Arrival Time 0352   Event End Time 0356   Level II Called No   Primary Reason for Call Radar auto page       Rapid response RN at bedside for RADAR score 7 due to the following VS: T 36.1 °Celsius; ; RR 22; /77; SPO2 92% on 5LNC     Reviewed above VS with bedside RN.  VS within patient's current trends.  Per bedside RN, patient just arrived from [facility] ED, provider at bedside for admission, no concerns from provider or bedside RN at this time.  No interventions by rapid response team indicated at this time.      Staff to page rapid response for any concerns or acute change in condition/VS.

## 2024-05-31 NOTE — PROGRESS NOTES
Pharmacy Medication History Review    Ronna Beckham is a 63 y.o. female admitted for Pulmonary hypertension (Multi). Pharmacy reviewed the patient's [id number] medications and allergies for accuracy.    The list below reflects the updated PTA list. Comments regarding how patient may be taking medications differently can be found in the Admit Orders Activity  Prior to Admission Medications   Prescriptions Last Dose Informant   Opsumit 10 mg tablet tablet 2024 Self   Sig: TAKE 1 TABLET BY MOUTH DAILY. DO NOT HANDLE IF PREGNANT. DO NOT SPLIT, CRUSH, OR CHEW. REVIEW MEDICATION GUIDE.   aspirin 81 mg EC tablet Unknown Self   Sig: Take 1 tablet (81 mg) by mouth once daily.   calcium carbonate-vitamin D3 500 mg-5 mcg (200 unit) tablet Past Month Self   Sig: TAKE 2 TABLETS BY MOUTH TWO TIMES A DAY   cholestyramine (Questran) 4 gram packet Unknown Self   Sig: MIX 1 PACKET IN LIQUID AND DRINK BY MOUTH TWO TIMES A DAY   Patient taking differently: Take 1 packet (4 g) by mouth once daily.   epoprostenol (Veletri) 1.5 mg recon soln 2024 Self   Si.5 mg (1,500 mcg) by central venous line infusion route continuously. Reconstitute contents of vial with 5 ml diluent and mix cassette as directed. Infuse continuously per physician orders. Allow for priming volume. Dose range: 1-150 ng/kg/min.   furosemide (Lasix) 40 mg tablet Unknown Self   Sig: TAKE 1 TABLET BY MOUTH ONE TIME DAILY   gabapentin (Neurontin) 600 mg tablet Unknown Self   Sig: Take 1 tablet (600 mg) by mouth 3 times a day.  *Taking BID   hydroxychloroquine (Plaquenil) 200 mg tablet Unknown Self   Sig: Take 1 tablet (200 mg) by mouth 2 times a day.   loperamide (Imodium A-D) 2 mg tablet Unknown Self   Sig: Take 1 tablet (2 mg) by mouth 4 times a day as needed for diarrhea.   magnesium chloride (MagDelay) 64 mg EC tablet Not Taking Self   Sig: TAKE 1 TABLET BY MOUTH TWO TIMES A DAY   Patient not taking: Reported on 2024   melatonin 5 mg tablet  Unknown Self   Sig: Take 1 tablet (5 mg) by mouth once daily at bedtime.   omeprazole (PriLOSEC) 40 mg DR capsule Unknown Self   Sig: Take 1 capsule (40 mg) by mouth once daily in the morning. Take before meals. Do not crush or chew.   potassium chloride CR (Klor-Con M20) 20 mEq ER tablet Past Month Self   Sig: TAKE 1 TABLET BY MOUTH TWO TIMES A DAY WITH MEALS   venlafaxine XR (Effexor-XR) 150 mg 24 hr capsule Unknown Self   Sig: Take 1 capsule (150 mg) by mouth once daily. Do not crush or chew.      Facility-Administered Medications: None        The list below reflects the updated allergy list. Please review each documented allergy for additional clarification and justification.  Allergies  Reviewed by Shireen Mandujano PharmD on 5/31/2024        Severity Reactions Comments    Levetiracetam Medium Other Weakness, unable to walk            Patient accepts M2B at discharge. Pharmacy has been updated to [facility].    Sources used to complete the med history include: Patient interview - good historian of medications/ Pharmacy - [facility]/ Chart review - Pulmonology 5/13/24/ [facility] - gabapentin 600mg #90/30 filled 5/6/24    Shireen Mandujano PharmD  Transitions of Care Pharmacist  [facility]  Please reach out via Secure Chat for questions, or if no response call [employer] or [facility]

## 2024-05-31 NOTE — PROGRESS NOTES
05/31/24 1337   Discharge Planning   Living Arrangements Family members   Support Systems Family members   Assistance Needed None   Type of Residence Private residence   Number of Stairs to Enter Residence 5   Number of Stairs Within Residence 12   Do you have animals or pets at home? No   Who is requesting discharge planning? Provider   Home or Post Acute Services None   Patient expects to be discharged to: Home   Does the patient need discharge transport arranged? No   Financial Resource Strain   How hard is it for you to pay for the very basics like food, housing, medical care, and heating? Not very   Housing Stability   In the last 12 months, was there a time when you were not able to pay the mortgage or rent on time? N   In the last 12 months, how many places have you lived? 1   In the last 12 months, was there a time when you did not have a steady place to sleep or slept in a shelter (including now)? N   Transportation Needs   In the past 12 months, has lack of transportation kept you from medical appointments or from getting medications? no   In the past 12 months, has lack of transportation kept you from meetings, work, or from getting things needed for daily living? No   Patient Choice   Provider Choice list and CMS website (https://medicare.gov/care-compare#search) for post-acute Quality and Resource Measure Data were provided and reviewed with: Patient   Patient / Family choosing to utilize agency / facility established prior to hospitalization No     Will continue to monitor for all discharging needs

## 2024-05-31 NOTE — CARE PLAN
Problem: Pain  Goal: My pain/discomfort is manageable  Outcome: Progressing     Problem: Safety  Goal: Patient will be injury free during hospitalization  Outcome: Progressing     Problem: Daily Care  Goal: Daily care needs are met  Outcome: Progressing     Problem: Psychosocial Needs  Goal: Demonstrates ability to cope with hospitalization/illness  Outcome: Progressing     Problem: Fall/Injury  Goal: Be free from injury by end of the shift  Outcome: Progressing  Goal: Verbalize understanding of personal risk factors for fall in the hospital  Outcome: Progressing  Goal: Verbalize understanding of risk factor reduction measures to prevent injury from fall in the home  Outcome: Progressing     Problem: Respiratory  Goal: Clear secretions with interventions this shift  Outcome: Progressing  Goal: Minimize anxiety/maximize coping throughout shift  Outcome: Progressing  Goal: Minimal/no exertional discomfort or dyspnea this shift  Outcome: Progressing  Goal: No signs of respiratory distress (eg. Use of accessory muscles. Peds grunting)  Outcome: Progressing  Goal: Verbalize decreased shortness of breath this shift  Outcome: Progressing     Problem: ACS/CP/NSTEMI/STEMI  Goal: Chest pain managed (free from pain or at acceptable level)  Outcome: Progressing     Problem: Arrythmia/Dysrhythmia  Goal: Lab values return to normal range  Outcome: Progressing  Goal: Serial ECG will return to baseline  Outcome: Progressing  Goal: Vital signs return to baseline  Outcome: Progressing     Problem: Cardiac catheterization  Goal: Free from dysrhythmias  Outcome: Progressing       The clinical goals for the shift include pt will remain free of SOB throughout the shift      05/31/24 at 5:44 PM - ALESSIA DANIELS RN

## 2024-06-01 LAB
ALBUMIN SERPL BCP-MCNC: 3.5 G/DL (ref 3.4–5)
ANION GAP SERPL CALC-SCNC: 13 MMOL/L (ref 10–20)
BASOPHILS # BLD AUTO: 0.01 X10*3/UL (ref 0–0.1)
BASOPHILS NFR BLD AUTO: 0.1 %
BUN SERPL-MCNC: 12 MG/DL (ref 6–23)
CALCIUM SERPL-MCNC: 7 MG/DL (ref 8.6–10.6)
CHLORIDE SERPL-SCNC: 108 MMOL/L (ref 98–107)
CO2 SERPL-SCNC: 23 MMOL/L (ref 21–32)
CREAT SERPL-MCNC: 0.7 MG/DL (ref 0.5–1.05)
EGFRCR SERPLBLD CKD-EPI 2021: >90 ML/MIN/1.73M*2
EOSINOPHIL # BLD AUTO: 0 X10*3/UL (ref 0–0.7)
EOSINOPHIL NFR BLD AUTO: 0 %
ERYTHROCYTE [DISTWIDTH] IN BLOOD BY AUTOMATED COUNT: 16.8 % (ref 11.5–14.5)
GLUCOSE SERPL-MCNC: 150 MG/DL (ref 74–99)
HCT VFR BLD AUTO: 31.5 % (ref 36–46)
HGB BLD-MCNC: 9.7 G/DL (ref 12–16)
IMM GRANULOCYTES # BLD AUTO: 0.06 X10*3/UL (ref 0–0.7)
IMM GRANULOCYTES NFR BLD AUTO: 0.8 % (ref 0–0.9)
LACTATE SERPL-SCNC: 0.5 MMOL/L (ref 0.4–2)
LYMPHOCYTES # BLD AUTO: 1.18 X10*3/UL (ref 1.2–4.8)
LYMPHOCYTES NFR BLD AUTO: 15.8 %
MAGNESIUM SERPL-MCNC: 1.93 MG/DL (ref 1.6–2.4)
MCH RBC QN AUTO: 24.8 PG (ref 26–34)
MCHC RBC AUTO-ENTMCNC: 30.8 G/DL (ref 32–36)
MCV RBC AUTO: 81 FL (ref 80–100)
MONOCYTES # BLD AUTO: 0.42 X10*3/UL (ref 0.1–1)
MONOCYTES NFR BLD AUTO: 5.6 %
NEUTROPHILS # BLD AUTO: 5.8 X10*3/UL (ref 1.2–7.7)
NEUTROPHILS NFR BLD AUTO: 77.7 %
NRBC BLD-RTO: 0 /100 WBCS (ref 0–0)
PHOSPHATE SERPL-MCNC: 3 MG/DL (ref 2.5–4.9)
PLATELET # BLD AUTO: 147 X10*3/UL (ref 150–450)
POTASSIUM SERPL-SCNC: 4.3 MMOL/L (ref 3.5–5.3)
RBC # BLD AUTO: 3.91 X10*6/UL (ref 4–5.2)
SODIUM SERPL-SCNC: 140 MMOL/L (ref 136–145)
WBC # BLD AUTO: 7.5 X10*3/UL (ref 4.4–11.3)

## 2024-06-01 PROCEDURE — 84132 ASSAY OF SERUM POTASSIUM: CPT

## 2024-06-01 PROCEDURE — 85025 COMPLETE CBC W/AUTO DIFF WBC: CPT

## 2024-06-01 PROCEDURE — 2500000004 HC RX 250 GENERAL PHARMACY W/ HCPCS (ALT 636 FOR OP/ED)

## 2024-06-01 PROCEDURE — 80069 RENAL FUNCTION PANEL: CPT

## 2024-06-01 PROCEDURE — 2500000004 HC RX 250 GENERAL PHARMACY W/ HCPCS (ALT 636 FOR OP/ED): Performed by: HOSPITALIST

## 2024-06-01 PROCEDURE — 83735 ASSAY OF MAGNESIUM: CPT

## 2024-06-01 PROCEDURE — 1200000002 HC GENERAL ROOM WITH TELEMETRY DAILY

## 2024-06-01 PROCEDURE — 2500000001 HC RX 250 WO HCPCS SELF ADMINISTERED DRUGS (ALT 637 FOR MEDICARE OP): Performed by: INTERNAL MEDICINE

## 2024-06-01 PROCEDURE — 2500000001 HC RX 250 WO HCPCS SELF ADMINISTERED DRUGS (ALT 637 FOR MEDICARE OP)

## 2024-06-01 PROCEDURE — 99232 SBSQ HOSP IP/OBS MODERATE 35: CPT

## 2024-06-01 PROCEDURE — 83605 ASSAY OF LACTIC ACID: CPT

## 2024-06-01 RX ORDER — CALCIUM GLUCONATE 20 MG/ML
2 INJECTION, SOLUTION INTRAVENOUS ONCE
Status: COMPLETED | OUTPATIENT
Start: 2024-06-01 | End: 2024-06-01

## 2024-06-01 RX ORDER — PROCHLORPERAZINE EDISYLATE 5 MG/ML
10 INJECTION INTRAMUSCULAR; INTRAVENOUS ONCE AS NEEDED
Status: COMPLETED | OUTPATIENT
Start: 2024-06-01 | End: 2024-06-01

## 2024-06-01 RX ADMIN — GABAPENTIN 600 MG: 300 CAPSULE ORAL at 08:10

## 2024-06-01 RX ADMIN — VENLAFAXINE HYDROCHLORIDE 150 MG: 150 CAPSULE, EXTENDED RELEASE ORAL at 08:17

## 2024-06-01 RX ADMIN — MAGNESIUM 64 MG (MAGNESIUM CHLORIDE) TABLET,DELAYED RELEASE 64 MG: at 08:17

## 2024-06-01 RX ADMIN — ASPIRIN 81 MG: 81 TABLET, COATED ORAL at 08:10

## 2024-06-01 RX ADMIN — MAGNESIUM 64 MG (MAGNESIUM CHLORIDE) TABLET,DELAYED RELEASE 64 MG: at 21:27

## 2024-06-01 RX ADMIN — Medication 2 TABLET: at 08:10

## 2024-06-01 RX ADMIN — CALCIUM GLUCONATE 2 G: 20 INJECTION, SOLUTION INTRAVENOUS at 12:49

## 2024-06-01 RX ADMIN — LOPERAMIDE HYDROCHLORIDE 2 MG: 2 CAPSULE ORAL at 19:12

## 2024-06-01 RX ADMIN — EPOPROSTENOL 68 NG/KG/MIN: 1.5 INJECTION, POWDER, LYOPHILIZED, FOR SOLUTION INTRAVENOUS at 09:21

## 2024-06-01 RX ADMIN — GABAPENTIN 600 MG: 300 CAPSULE ORAL at 21:26

## 2024-06-01 RX ADMIN — EPOPROSTENOL 68 NG/KG/MIN: 1.5 INJECTION, POWDER, LYOPHILIZED, FOR SOLUTION INTRAVENOUS at 15:36

## 2024-06-01 RX ADMIN — MACITENTAN 10 MG: 10 TABLET, FILM COATED ORAL at 08:17

## 2024-06-01 RX ADMIN — FLUTICASONE PROPIONATE 1 SPRAY: 50 SPRAY, METERED NASAL at 08:19

## 2024-06-01 RX ADMIN — LORATADINE 10 MG: 10 TABLET ORAL at 08:10

## 2024-06-01 RX ADMIN — SODIUM CHLORIDE, POTASSIUM CHLORIDE, SODIUM LACTATE AND CALCIUM CHLORIDE 500 ML: 600; 310; 30; 20 INJECTION, SOLUTION INTRAVENOUS at 11:05

## 2024-06-01 RX ADMIN — PROCHLORPERAZINE EDISYLATE 10 MG: 5 INJECTION INTRAMUSCULAR; INTRAVENOUS at 10:28

## 2024-06-01 RX ADMIN — HYDROXYCHLOROQUINE SULFATE 200 MG: 200 TABLET, FILM COATED ORAL at 08:17

## 2024-06-01 RX ADMIN — HYDROXYCHLOROQUINE SULFATE 200 MG: 200 TABLET, FILM COATED ORAL at 21:27

## 2024-06-01 RX ADMIN — LOPERAMIDE HYDROCHLORIDE 2 MG: 2 CAPSULE ORAL at 08:10

## 2024-06-01 RX ADMIN — Medication 2 TABLET: at 21:26

## 2024-06-01 RX ADMIN — CHOLESTYRAMINE POWDER FOR SUSPENSION 4 G: 4 POWDER, FOR SUSPENSION ORAL at 08:17

## 2024-06-01 RX ADMIN — ENOXAPARIN SODIUM 40 MG: 100 INJECTION SUBCUTANEOUS at 05:15

## 2024-06-01 ASSESSMENT — PAIN SCALES - GENERAL
PAINLEVEL_OUTOF10: 0 - NO PAIN
PAINLEVEL_OUTOF10: 0 - NO PAIN

## 2024-06-01 ASSESSMENT — COGNITIVE AND FUNCTIONAL STATUS - GENERAL
DAILY ACTIVITIY SCORE: 24
MOBILITY SCORE: 22
MOVING TO AND FROM BED TO CHAIR: A LITTLE
MOBILITY SCORE: 22
CLIMB 3 TO 5 STEPS WITH RAILING: A LITTLE
MOVING TO AND FROM BED TO CHAIR: A LITTLE
CLIMB 3 TO 5 STEPS WITH RAILING: A LITTLE
DAILY ACTIVITIY SCORE: 24

## 2024-06-01 ASSESSMENT — PAIN - FUNCTIONAL ASSESSMENT
PAIN_FUNCTIONAL_ASSESSMENT: 0-10
PAIN_FUNCTIONAL_ASSESSMENT: 0-10

## 2024-06-01 NOTE — SIGNIFICANT EVENT
06/01/24 0955   Onset Documentation   Rapid Response Initiated By RN   Location/Room [facility]  (LT 5028)   Pager Time 0950   Arrival Time 0955   Event End Time 1050   Level II Called No   Primary Reason for Call Staff concern;SBP less than or equal to 90 mmHg     Rapid Response Note    I responded to Rapid Response called for possible Veletri reaction.  Per report new bag with new tubing was hung and patient became flushed, hypotensive (74/51), nauseous.    Upon my examination at the bedside patient is dyspneic, extremities cool to the touch, breath sounds clear, and complaining of nausea but without evidence of emesis.    Dr. Johnnie Spencer ([facility]) and Dr. Hardy ([facility]) arrived at the bedside.    500 mL LR bolus ordered  EKG ordered - Sinus Tach    With bolus BP improved to 100/67.    CBC, RFP, Mag, VBG drawn.    Recommended CT PE and Echo.  Dr. Johnnie Spencer will discuss with Dr. Prescott, attending physician.     Patient will remain on LT5 for now.  RN to contact Rapid Response with any future concerns or signs of clinical decompensation.

## 2024-06-01 NOTE — CARE PLAN
Problem: Pain  Goal: My pain/discomfort is manageable  Outcome: Progressing     Problem: Safety  Goal: Patient will be injury free during hospitalization  Outcome: Progressing     Problem: Daily Care  Goal: Daily care needs are met  Outcome: Progressing     Problem: Psychosocial Needs  Goal: Demonstrates ability to cope with hospitalization/illness  Outcome: Progressing     Problem: Fall/Injury  Goal: Be free from injury by end of the shift  Outcome: Progressing  Goal: Verbalize understanding of personal risk factors for fall in the hospital  Outcome: Progressing  Goal: Verbalize understanding of risk factor reduction measures to prevent injury from fall in the home  Outcome: Progressing     Problem: Respiratory  Goal: Clear secretions with interventions this shift  Outcome: Progressing  Goal: Minimize anxiety/maximize coping throughout shift  Outcome: Progressing  Goal: Minimal/no exertional discomfort or dyspnea this shift  Outcome: Progressing  Goal: No signs of respiratory distress (eg. Use of accessory muscles. Peds grunting)  Outcome: Progressing  Goal: Verbalize decreased shortness of breath this shift  Outcome: Progressing     Problem: ACS/CP/NSTEMI/STEMI  Goal: Chest pain managed (free from pain or at acceptable level)  Outcome: Progressing     Problem: Arrythmia/Dysrhythmia  Goal: Lab values return to normal range  Outcome: Progressing  Goal: Serial ECG will return to baseline  Outcome: Progressing  Goal: Vital signs return to baseline  Outcome: Progressing     Problem: Cardiac catheterization  Goal: Free from dysrhythmias  Outcome: Progressing       The clinical goals for the shift include Pt will remain free of SOB throughout the shift      06/01/24 at 6:24 PM - ALESSIA DANIELS RN

## 2024-06-01 NOTE — PROGRESS NOTES
Ronna Beckham is a 63 y.o. female on day 1 of admission presenting with Pulmonary hypertension (Multi).    Subjective   Pt was seen early in the morning and was doing better with improved SOB and resolved jaw pain.  Rapid response was called around 9:50 am as nurse just reconnected her veletri and BP started to drop, pt had a sensation of going to pass out, felt nauseated and skin was flushed.  I evaluated patient at bedside, her skin was flush and clammy, lungs CTAB, heart with regular rhythm. 500 ml IVF started and SOB improved from 74/51 to 101/66. EKG done: , sinus rhythm. SpO2 at baseline. CBC, RFP, Mg unremarkable except for low Ca. VBG: ph 7.4, pCO2 35, pO2 54, lactate 2.6. Suspect that symptoms were related to accidental bolus of Veletri given by pump after bag change. Pt's symptoms improved after compazine for nausea and IVF. Will continue to monitor.      Objective   Physical exam:  Constitutional: Well-developed female in no acute distress.  HEENT: Normocephalic, atraumatic. No scleral icterus.  Respiratory: Decreased air movement. CTA bilaterally. No wheezes, rales, or rhonchi. Normal respiratory effort.  Cardiovascular: RRR. + JVD. Murmur on the apex  Abdominal: Soft, nondistended, nontender to palpation.   Neuro: alert and oriented x 4  MSK: trace b/l pedal/ankle edema    Last Recorded Vitals  Blood pressure 110/74, pulse 100, temperature 36.2 °C (97.2 °F), temperature source Temporal, resp. rate 20, weight 77.1 kg (169 lb 15.6 oz), SpO2 98%.  Intake/Output last 3 Shifts:  I/O last 3 completed shifts:  In: 591 (7.7 mL/kg) [P.O.:480; I.V.:111 (1.4 mL/kg)]  Out: 1600 (20.8 mL/kg) [Urine:1600 (0.6 mL/kg/hr)]  Weight: 77.1 kg     Relevant Results              Scheduled medications  aspirin, 81 mg, oral, Daily  calcium carbonate-vitamin D3, 2 tablet, oral, BID  cholestyramine, 1 packet, oral, Daily  enoxaparin, 40 mg, subcutaneous, q24h  fluticasone, 1 spray, Each Nostril, Daily  gabapentin, 600  mg, oral, BID  hydroxychloroquine, 200 mg, oral, BID  loratadine, 10 mg, oral, Daily  macitentan, 10 mg, oral, Daily  magnesium chloride, 64 mg, oral, BID  venlafaxine XR, 150 mg, oral, Daily      Continuous medications  epoprostenol (Veletri) 7,500 mcg in sodium chloride 0.9% 100 mL (75 mcg/mL) bag, 68 ng/kg/min (Order-Specific), Last Rate: 68 ng/kg/min (06/01/24 1241)      PRN medications  PRN medications: loperamide, melatonin     Results for orders placed or performed during the hospital encounter of 05/31/24 (from the past 24 hour(s))   Renal Function Panel   Result Value Ref Range    Glucose 92 74 - 99 mg/dL    Sodium 142 136 - 145 mmol/L    Potassium 3.2 (L) 3.5 - 5.3 mmol/L    Chloride 105 98 - 107 mmol/L    Bicarbonate 23 21 - 32 mmol/L    Anion Gap 17 mmol/L    Urea Nitrogen 14 6 - 23 mg/dL    Creatinine 0.91 0.50 - 1.05 mg/dL    eGFR 72 >60 mL/min/1.73m*2    Calcium 6.8 (L) 8.6 - 10.6 mg/dL    Phosphorus 4.4 2.5 - 4.9 mg/dL    Albumin 3.7 3.4 - 5.0 g/dL   Magnesium   Result Value Ref Range    Magnesium 1.36 (L) 1.60 - 2.40 mg/dL   CBC and Auto Differential   Result Value Ref Range    WBC 7.5 4.4 - 11.3 x10*3/uL    nRBC 0.0 0.0 - 0.0 /100 WBCs    RBC 3.91 (L) 4.00 - 5.20 x10*6/uL    Hemoglobin 9.7 (L) 12.0 - 16.0 g/dL    Hematocrit 31.5 (L) 36.0 - 46.0 %    MCV 81 80 - 100 fL    MCH 24.8 (L) 26.0 - 34.0 pg    MCHC 30.8 (L) 32.0 - 36.0 g/dL    RDW 16.8 (H) 11.5 - 14.5 %    Platelets 147 (L) 150 - 450 x10*3/uL    Neutrophils % 77.7 40.0 - 80.0 %    Immature Granulocytes %, Automated 0.8 0.0 - 0.9 %    Lymphocytes % 15.8 13.0 - 44.0 %    Monocytes % 5.6 2.0 - 10.0 %    Eosinophils % 0.0 0.0 - 6.0 %    Basophils % 0.1 0.0 - 2.0 %    Neutrophils Absolute 5.80 1.20 - 7.70 x10*3/uL    Immature Granulocytes Absolute, Automated 0.06 0.00 - 0.70 x10*3/uL    Lymphocytes Absolute 1.18 (L) 1.20 - 4.80 x10*3/uL    Monocytes Absolute 0.42 0.10 - 1.00 x10*3/uL    Eosinophils Absolute 0.00 0.00 - 0.70 x10*3/uL    Basophils  Absolute 0.01 0.00 - 0.10 x10*3/uL   Renal function panel   Result Value Ref Range    Glucose 150 (H) 74 - 99 mg/dL    Sodium 140 136 - 145 mmol/L    Potassium 4.3 3.5 - 5.3 mmol/L    Chloride 108 (H) 98 - 107 mmol/L    Bicarbonate 23 21 - 32 mmol/L    Anion Gap 13 10 - 20 mmol/L    Urea Nitrogen 12 6 - 23 mg/dL    Creatinine 0.70 0.50 - 1.05 mg/dL    eGFR >90 >60 mL/min/1.73m*2    Calcium 7.0 (L) 8.6 - 10.6 mg/dL    Phosphorus 3.0 2.5 - 4.9 mg/dL    Albumin 3.5 3.4 - 5.0 g/dL   Magnesium   Result Value Ref Range    Magnesium 1.93 1.60 - 2.40 mg/dL                Assessment/Plan   Principal Problem:    Pulmonary hypertension (Multi)    63 y.o. female w/ pHTN on veletri/opsumit presenting with worsening ALAMO after recent dose change in veletri in setting of recent weight loss and recent methamphetamine use +utox.     Updates 6/1  -Rapid response as above described   -Replaced Ca  -Repeat lactate  -Order routine TTE for pulmonary hypertension follow-up     #Pulmonary Arterial Hypertension   #Chronic hypoxemic respiratory failure  -Currently at 5 L NC, baseline is 4L NC  -Home: veletri, Opsumit, lasix 40 mg  PLAN  - c/w veletri at 68ng/kg/min  - c/w Opsumit 10 mg daily   - Hold home lasix 40 mg  -given IV lasix 40 mg 5/31, good UOP     #Substance use disorder  :: Hx of cocaine, methamphetamine, PCP, and alcohol use  :: POPEth 20 and PLPEth 11 (4/23) suggestive of light alcohol use   :: Urine tox (4/24): presumptive positive for cocaine  :: Urine tox (5/30): presumptive positive for amphetamine and PCP   :: Alcohol <10     #CAD  #HTN  - c/w home ASA     #Hypocalcemia  #Hypomagnesemia  - c/w home calcium carbonate-vitamin D3  - Home magnesium chloride  - calcium gluconate 5/31     #Lupus  - c/w hydroxychloroquine 200mg BID     #GERD  :: On omeprazole  - Hold PPI as potential cause for hypomag and hypoCa     #Anxiety  #Depression  - c/w home venlafaxine 150mg     #Neuropathy  - c/w home gabapentin     #Others  - c/w home  vitamin B-1100, folic acid  - c/w home melatonin     F : PRN  E: PRN  N: Regular  A: PIV, Monte[place]      DVT prophylaxis: Lovenox subq     Code Status: Full Code (confirmed on admission)   NOK: Dianaross almonte (sister): [phone]                 Archana Lyman MD

## 2024-06-02 VITALS
TEMPERATURE: 97.2 F | SYSTOLIC BLOOD PRESSURE: 136 MMHG | WEIGHT: 171.74 LBS | RESPIRATION RATE: 18 BRPM | OXYGEN SATURATION: 96 % | DIASTOLIC BLOOD PRESSURE: 82 MMHG | BODY MASS INDEX: 28.58 KG/M2 | HEART RATE: 95 BPM

## 2024-06-02 LAB
ALBUMIN SERPL BCP-MCNC: 3.3 G/DL (ref 3.4–5)
ALBUMIN SERPL BCP-MCNC: 3.5 G/DL (ref 3.4–5)
ANION GAP SERPL CALC-SCNC: 11 MMOL/L
ANION GAP SERPL CALC-SCNC: 12 MMOL/L (ref 10–20)
BASOPHILS # BLD AUTO: 0.01 X10*3/UL (ref 0–0.1)
BASOPHILS NFR BLD AUTO: 0.2 %
BUN SERPL-MCNC: 11 MG/DL (ref 6–23)
BUN SERPL-MCNC: 12 MG/DL (ref 6–23)
CALCIUM SERPL-MCNC: 7.6 MG/DL (ref 8.6–10.6)
CALCIUM SERPL-MCNC: 7.9 MG/DL (ref 8.6–10.6)
CHLORIDE SERPL-SCNC: 108 MMOL/L (ref 98–107)
CHLORIDE SERPL-SCNC: 109 MMOL/L (ref 98–107)
CO2 SERPL-SCNC: 21 MMOL/L (ref 21–32)
CO2 SERPL-SCNC: 22 MMOL/L (ref 21–32)
CREAT SERPL-MCNC: 0.59 MG/DL (ref 0.5–1.05)
CREAT SERPL-MCNC: 0.6 MG/DL (ref 0.5–1.05)
EGFRCR SERPLBLD CKD-EPI 2021: >90 ML/MIN/1.73M*2
EGFRCR SERPLBLD CKD-EPI 2021: >90 ML/MIN/1.73M*2
EOSINOPHIL # BLD AUTO: 0 X10*3/UL (ref 0–0.7)
EOSINOPHIL NFR BLD AUTO: 0 %
ERYTHROCYTE [DISTWIDTH] IN BLOOD BY AUTOMATED COUNT: 16.9 % (ref 11.5–14.5)
GLUCOSE SERPL-MCNC: 96 MG/DL (ref 74–99)
GLUCOSE SERPL-MCNC: 99 MG/DL (ref 74–99)
HCT VFR BLD AUTO: 30.2 % (ref 36–46)
HGB BLD-MCNC: 9.1 G/DL (ref 12–16)
IMM GRANULOCYTES # BLD AUTO: 0.04 X10*3/UL (ref 0–0.7)
IMM GRANULOCYTES NFR BLD AUTO: 0.7 % (ref 0–0.9)
LYMPHOCYTES # BLD AUTO: 0.88 X10*3/UL (ref 1.2–4.8)
LYMPHOCYTES NFR BLD AUTO: 15.2 %
MAGNESIUM SERPL-MCNC: 1.96 MG/DL (ref 1.6–2.4)
MCH RBC QN AUTO: 24.6 PG (ref 26–34)
MCHC RBC AUTO-ENTMCNC: 30.1 G/DL (ref 32–36)
MCV RBC AUTO: 82 FL (ref 80–100)
MONOCYTES # BLD AUTO: 0.29 X10*3/UL (ref 0.1–1)
MONOCYTES NFR BLD AUTO: 5 %
NEUTROPHILS # BLD AUTO: 4.57 X10*3/UL (ref 1.2–7.7)
NEUTROPHILS NFR BLD AUTO: 78.9 %
NRBC BLD-RTO: 0 /100 WBCS (ref 0–0)
PHOSPHATE SERPL-MCNC: 3.1 MG/DL (ref 2.5–4.9)
PHOSPHATE SERPL-MCNC: 3.3 MG/DL (ref 2.5–4.9)
PLATELET # BLD AUTO: 148 X10*3/UL (ref 150–450)
POTASSIUM SERPL-SCNC: 4.7 MMOL/L (ref 3.5–5.3)
POTASSIUM SERPL-SCNC: 6.4 MMOL/L (ref 3.5–5.3)
RBC # BLD AUTO: 3.7 X10*6/UL (ref 4–5.2)
SODIUM SERPL-SCNC: 135 MMOL/L (ref 136–145)
SODIUM SERPL-SCNC: 137 MMOL/L (ref 136–145)
WBC # BLD AUTO: 5.8 X10*3/UL (ref 4.4–11.3)

## 2024-06-02 PROCEDURE — 80069 RENAL FUNCTION PANEL: CPT | Performed by: INTERNAL MEDICINE

## 2024-06-02 PROCEDURE — 2500000001 HC RX 250 WO HCPCS SELF ADMINISTERED DRUGS (ALT 637 FOR MEDICARE OP): Performed by: INTERNAL MEDICINE

## 2024-06-02 PROCEDURE — 2500000001 HC RX 250 WO HCPCS SELF ADMINISTERED DRUGS (ALT 637 FOR MEDICARE OP)

## 2024-06-02 PROCEDURE — 85025 COMPLETE CBC W/AUTO DIFF WBC: CPT

## 2024-06-02 PROCEDURE — 83735 ASSAY OF MAGNESIUM: CPT

## 2024-06-02 PROCEDURE — 2500000004 HC RX 250 GENERAL PHARMACY W/ HCPCS (ALT 636 FOR OP/ED)

## 2024-06-02 PROCEDURE — 2500000004 HC RX 250 GENERAL PHARMACY W/ HCPCS (ALT 636 FOR OP/ED): Performed by: HOSPITALIST

## 2024-06-02 PROCEDURE — 80069 RENAL FUNCTION PANEL: CPT

## 2024-06-02 RX ORDER — PROCHLORPERAZINE MALEATE 5 MG
5 TABLET ORAL EVERY 6 HOURS PRN
Qty: 30 TABLET | Refills: 0 | Status: SHIPPED | OUTPATIENT
Start: 2024-06-02

## 2024-06-02 RX ORDER — GABAPENTIN 600 MG/1
600 TABLET ORAL 2 TIMES DAILY
Qty: 60 TABLET | Refills: 0 | Status: SHIPPED | OUTPATIENT
Start: 2024-06-02 | End: 2024-07-02

## 2024-06-02 RX ORDER — CHOLESTYRAMINE 4 G/9G
1 POWDER, FOR SUSPENSION ORAL DAILY
Qty: 30 PACKET | Refills: 0 | Status: SHIPPED | OUTPATIENT
Start: 2024-06-03 | End: 2024-07-03

## 2024-06-02 RX ORDER — PROCHLORPERAZINE MALEATE 5 MG
5 TABLET ORAL EVERY 6 HOURS PRN
Status: DISCONTINUED | OUTPATIENT
Start: 2024-06-02 | End: 2024-06-02 | Stop reason: HOSPADM

## 2024-06-02 RX ADMIN — LOPERAMIDE HYDROCHLORIDE 2 MG: 2 CAPSULE ORAL at 09:19

## 2024-06-02 RX ADMIN — Medication 2 TABLET: at 09:11

## 2024-06-02 RX ADMIN — ASPIRIN 81 MG: 81 TABLET, COATED ORAL at 09:11

## 2024-06-02 RX ADMIN — MAGNESIUM 64 MG (MAGNESIUM CHLORIDE) TABLET,DELAYED RELEASE 64 MG: at 09:11

## 2024-06-02 RX ADMIN — PROCHLORPERAZINE MALEATE 5 MG: 5 TABLET ORAL at 12:54

## 2024-06-02 RX ADMIN — VENLAFAXINE HYDROCHLORIDE 150 MG: 150 CAPSULE, EXTENDED RELEASE ORAL at 09:11

## 2024-06-02 RX ADMIN — LORATADINE 10 MG: 10 TABLET ORAL at 09:11

## 2024-06-02 RX ADMIN — ENOXAPARIN SODIUM 40 MG: 100 INJECTION SUBCUTANEOUS at 05:53

## 2024-06-02 RX ADMIN — FLUTICASONE PROPIONATE 1 SPRAY: 50 SPRAY, METERED NASAL at 09:15

## 2024-06-02 RX ADMIN — EPOPROSTENOL 68 NG/KG/MIN: 1.5 INJECTION, POWDER, LYOPHILIZED, FOR SOLUTION INTRAVENOUS at 12:45

## 2024-06-02 RX ADMIN — CHOLESTYRAMINE POWDER FOR SUSPENSION 4 G: 4 POWDER, FOR SUSPENSION ORAL at 09:12

## 2024-06-02 RX ADMIN — HYDROXYCHLOROQUINE SULFATE 200 MG: 200 TABLET, FILM COATED ORAL at 09:11

## 2024-06-02 RX ADMIN — MACITENTAN 10 MG: 10 TABLET, FILM COATED ORAL at 09:11

## 2024-06-02 RX ADMIN — GABAPENTIN 600 MG: 300 CAPSULE ORAL at 09:11

## 2024-06-02 ASSESSMENT — COGNITIVE AND FUNCTIONAL STATUS - GENERAL
CLIMB 3 TO 5 STEPS WITH RAILING: A LITTLE
MOBILITY SCORE: 22
DAILY ACTIVITIY SCORE: 24
MOVING TO AND FROM BED TO CHAIR: A LITTLE

## 2024-06-02 ASSESSMENT — PAIN SCALES - GENERAL: PAINLEVEL_OUTOF10: 0 - NO PAIN

## 2024-06-02 NOTE — DISCHARGE INSTRUCTIONS
Dear Ms. Beckham,    It was a pleasure to care for you. You were hospitalized for worsening shortness of breath.    -It's crucial to maintain the prescribed dosage of Veletri without interruptions in your treatment. You should resume the regular rate of 80 mL per day. Please refer to the instructions on the cassettes.  -Please refrain from using any illegal drugs. The  Substance Use Resource Line is available to provide assistance. For information or resources, please call [phone].  -As we talked about, we encourage you to engage in social activities, such as those at the senior center you used to visit.  -Please arrange a follow-up appointment in 2-3 weeks with Dr. Prescott for further pulmonary hypertension care.    Sincerely,  Your  Team

## 2024-06-02 NOTE — CARE PLAN
The patient's goals for the shift include      The clinical goals for the shift include patient will have no SOB throughout shift    Over the shift, the patient remained stable and with no complain of SOB. She tolerated continuous IV Veletri throughout the shift.

## 2024-06-02 NOTE — NURSING NOTE
Patient discharged home from LT5 this afternoon. Patient was taken off of telemetry monitoring and IVs were removed intact. Prescriptions were sent to patient's outpatient pharmacy. Discharge instructions were given to and reviewed with patient at bedside. Veletri cassette was hooked up to patient. Patient was taken off floor in wheelchair by staff to meet ride out front.   Elisa Seo RN

## 2024-06-02 NOTE — DISCHARGE SUMMARY
Discharge Diagnosis  Pulmonary hypertension (Multi)    Issues Requiring Follow-Up  [ ] pulmonary hypertension follow up OP  [ ] Substance use disorder follow up OP    Test Results Pending At Discharge  Pending Labs       No current pending labs.            Hospital Course  Ronna Beckham is a 63 y.o. female with pulmonary hypertension (on Velitri, opsumit, and home 4L NC), non-obstructive CAD, HTN, lupus, substance use disorder (cocaine, methamphetamine, alcohol), GERD, anxiety and depression, and epilepsy who presented to the [facility] ED with chief complaint of worsening dyspnea on exertion, jaw pain, tingling in fingers and a syncopal event while climbing stairs in home. She started to have these symptoms following her most recent pulm outpatient visit at which time, her Velitri infusion rate was decreased (adjusted due to recent weight loss). Patient was evaluated with chest x-ray which was unremarkable and CT PE which showed no acute pulmonary embolism but persistence of marked cardiac enlargement and evidence of right heart dysfunction.  Also noted were stable pulmonary nodules as well as areas of groundglass opacity which could reflect inflammation. She was treated with one dose of CTX and azithromycin as well as DuoNeb before transfer to [facility]. At [facility], veletri at 68ng/kg/min (80 ml/day) was resumed. She remained stable on 5 L NC. Rapid response was called on 6/1 as nurse just reconnected her veletri and BP started to drop, pt had a sensation of going to pass out, felt nauseated and skin was flushed. At bedside, her skin was flush and clammy, lungs CTAB, heart with regular rhythm. 500 ml IVF started and SOB improved from 74/51 to 101/66. EKG done: , sinus rhythm. SpO2 at baseline. CBC, RFP, Mg unremarkable except for low Ca. VBG: ph 7.4, pCO2 35, pO2 54, lactate 2.6. Suspect that symptoms were related to accidental bolus of Veletri given by pump after bag change. Pt's symptoms improved after compazine for  nausea and IVF. She further improved the next day. Utox was positive for recent methamphetamine use corroborating with patient history. The patient received counseling on abstaining from illegal drug use. She was educated on how such behavior can adversely affect her pulmonary and overall health. She was also given access to outpatient substance use resources.    Pertinent Physical Exam At Time of Discharge  Constitutional: Well-developed female in no acute distress.  HEENT: Normocephalic, atraumatic. No scleral icterus.  Respiratory: Decreased air movement. CTA bilaterally. No wheezes, rales, or rhonchi. Normal respiratory effort. 5 L NC  Cardiovascular: RRR. + JVD. Murmur L lower sternal border  Abdominal: Soft, nondistended, nontender to palpation.   Neuro: alert and oriented x 4  MSK: trace b/l pedal/ankle edema      Home Medications     Medication List      START taking these medications     prochlorperazine 5 mg tablet; Commonly known as: Compazine; Take 1   tablet (5 mg) by mouth every 6 hours if needed for nausea or vomiting.     CHANGE how you take these medications     gabapentin 600 mg tablet; Commonly known as: Neurontin; Take 1 tablet   (600 mg) by mouth 2 times a day.; What changed: when to take this     CONTINUE taking these medications     aspirin 81 mg EC tablet   cholestyramine 4 gram packet; Commonly known as: Questran; Take 1 packet   (4 g) by mouth once daily.; Start taking on: Esme 3, 2024   epoprostenol 1.5 mg recon soln; Commonly known as: Veletri; 1.5 mg   (1,500 mcg) by central venous line infusion route continuously.   Reconstitute contents of vial with 5 ml diluent and mix cassette as   directed. Infuse continuously per physician orders. Allow for priming   volume. Dose range: 1-150 ng/kg/min.   furosemide 40 mg tablet; Commonly known as: Lasix; TAKE 1 TABLET BY   MOUTH ONE TIME DAILY   hydroxychloroquine 200 mg tablet; Commonly known as: Plaquenil   Klor-Con M20 20 mEq ER tablet; Generic  drug: potassium chloride CR; TAKE   1 TABLET BY MOUTH TWO TIMES A DAY WITH MEALS   loperamide 2 mg tablet; Commonly known as: Imodium A-D; Take 1 tablet (2   mg) by mouth 4 times a day as needed for diarrhea.   melatonin 5 mg tablet   omeprazole 40 mg DR capsule; Commonly known as: PriLOSEC   Opsumit 10 mg tablet tablet; Generic drug: macitentan; TAKE 1 TABLET BY   MOUTH DAILY. DO NOT HANDLE IF PREGNANT. DO NOT SPLIT, CRUSH, OR CHEW.   REVIEW MEDICATION GUIDE.   Oyster Shell Calcium-Vit D3 500 mg-5 mcg (200 unit) tablet; Generic   drug: calcium carbonate-vitamin D3; TAKE 2 TABLETS BY MOUTH TWO TIMES A   DAY   venlafaxine  mg 24 hr capsule; Commonly known as: Effexor-XR     STOP taking these medications     Mag 64 64 mg EC tablet; Generic drug: magnesium chloride       Outpatient Follow-Up  Future Appointments   Date Time Provider Department Center   7/10/2024  4:00 PM Bin Wade MD [id number] [facility]   8/12/2024  1:30 PM [facility] [id number] [facility] [id number] [facility]   8/12/2024  2:30 PM Chicho Prescott DO [id number] [facility]   8/26/2024  8:00 AM Chelsea Wood MD [id number] [facility]       Marty Buck MD

## 2024-06-02 NOTE — SIGNIFICANT EVENT
Rapid Response RN Note    Rapid response RN at bedside for RADAR score 6 due to the recent VS listed below:     Vitals:    06/01/24 1030 06/01/24 1100 06/01/24 1542 06/01/24 2049   BP: 107/72 110/74 125/86 126/72   BP Location:  Right arm Right arm Right arm   Patient Position:  Lying Lying Lying   Pulse:  100 95    Resp:  20 20 22   Temp:  36.2 °C (97.2 °F) 36.4 °C (97.5 °F) 36.6 °C (97.9 °F)   TempSrc:  Temporal Temporal Temporal   SpO2:  98% 97% 93%   Weight:            Reviewed above VS with bedside RN.  VS within patient's current trends.  No interventions by rapid response team indicated at this time.  Patient denied pain, shortness of breath, dizziness or lightheadedness.  Staff to page rapid response for any concerns or acute change in condition/VS.

## 2024-06-02 NOTE — HOSPITAL COURSE
Ronna Beckham is a 63 y.o. female with pulmonary hypertension (on Velitri, opsumit, and home 4L NC), non-obstructive CAD, HTN, lupus, substance use disorder (cocaine, methamphetamine, alcohol), GERD, anxiety and depression, and epilepsy who presented to the [facility] ED with chief complaint of worsening dyspnea on exertion, jaw pain, tingling in fingers and a syncopal event while climbing stairs in home. She started to have these symptoms following her most recent pulm outpatient visit at which time, her Velitri infusion rate was decreased (adjusted due to recent weight loss). Patient was evaluated with chest x-ray which was unremarkable and CT PE which showed no acute pulmonary embolism but persistence of marked cardiac enlargement and evidence of right heart dysfunction.  Also noted were stable pulmonary nodules as well as areas of groundglass opacity which could reflect inflammation. She was treated with one dose of CTX and azithromycin as well as DuoNeb before transfer to [facility]. At [facility] [place] veletri at 68ng/kg/min (80 ml/day) was resumed. She remained stable on 5 L NC. Rapid response was called on 6/1 as nurse just reconnected her veletri and BP started to drop, pt had a sensation of going to pass out, felt nauseated and skin was flushed. At bedside, her skin was flush and clammy, lungs CTAB, heart with regular rhythm. 500 ml IVF started and SOB improved from 74/51 to 101/66. EKG done: , sinus rhythm. SpO2 at baseline. CBC, RFP, Mg unremarkable except for low Ca. VBG: ph 7.4, pCO2 35, pO2 54, lactate 2.6. Suspect that symptoms were related to accidental bolus of Veletri given by pump after bag change. Pt's symptoms improved after compazine for nausea and IVF. She further improved the next day. Utox was positive for recent methamphetamine use corroborating with patient history. The patient received counseling on abstaining from illegal drug use. She was educated on how such behavior can adversely affect her  pulmonary and overall health. She was also given access to outpatient substance use resources.

## 2024-06-02 NOTE — PROGRESS NOTES
Ronna Beckham is a 63 y.o. female on day 2 of admission presenting with Pulmonary hypertension (Multi).    Subjective   Patient with persistent ALAMO. She denies worsening of SOB at rest. Currently on 5 L NC. She reports that yesterday was a frightening experience for her. She is doing well today otherwise.          Objective     Constitutional: Well-developed female in no acute distress.  HEENT: Normocephalic, atraumatic. No scleral icterus.  Respiratory: Decreased air movement. CTA bilaterally. No wheezes, rales, or rhonchi. Normal respiratory effort. 5 L NC  Cardiovascular: RRR. + JVD. Murmur L lower sternal border  Abdominal: Soft, nondistended, nontender to palpation.   Neuro: alert and oriented x 4  MSK: trace b/l pedal/ankle edema         Last Recorded Vitals  Blood pressure 128/83, pulse 95, temperature 36.1 °C (97 °F), temperature source Temporal, resp. rate 18, weight 77.9 kg (171 lb 11.8 oz), SpO2 97%.  Intake/Output last 3 Shifts:  I/O last 3 completed shifts:  In: 1349.1 (17.3 mL/kg) [P.O.:600; I.V.:249.1 (3.2 mL/kg); IV Piggyback:500]  Out: 350 (4.5 mL/kg) [Urine:350 (0.1 mL/kg/hr)]  Weight: 77.9 kg     Relevant Results              This patient has a central line   Reason for the central line remaining today? Parenteral medication                 Assessment/Plan   Principal Problem:    Pulmonary hypertension (Multi)    63 y.o. female w/ pHTN on veletri/opsumit presenting with worsening ALAMO after recent dose change in veletri in setting of recent weight loss and recent methamphetamine use +utox.     Updates 6/2:  -The patient was given access to outpatient substance use resources and received counseling on the critical significance of abstaining from illegal drug use. She was educated on how such behavior can adversely affect her pulmonary and overall health.  -Discharge home on regular rate of 80 mL per day of Veletri       #Pulmonary Arterial Hypertension   #Chronic hypoxemic respiratory  failure  -Currently at 5 L NC, baseline is 4L NC  -Home: veletri, Opsumit, lasix 40 mg  PLAN  - c/w veletri at 68ng/kg/min (80 ml/day)  - c/w Opsumit 10 mg daily   - Hold home lasix 40 mg     #Substance use disorder  :: Hx of cocaine, methamphetamine, PCP, and alcohol use  :: POPEth 20 and PLPEth 11 (4/23) suggestive of light alcohol use   :: Urine tox (4/24): presumptive positive for cocaine  :: Urine tox (5/30): presumptive positive for amphetamine and PCP   :: Alcohol <10     #CAD  #HTN  - c/w home ASA     #Hypocalcemia  #Hypomagnesemia  - c/w home calcium carbonate-vitamin D3  - Home magnesium chloride  - calcium gluconate 5/31     #Lupus  - c/w hydroxychloroquine 200mg BID     #GERD  :: On omeprazole  - Hold PPI as potential cause for hypomag and hypoCa     #Anxiety  #Depression  - c/w home venlafaxine 150mg     #Neuropathy  - c/w home gabapentin     #Others  - c/w home vitamin B-1100, folic acid  - c/w home melatonin     F : PRN  E: PRN  N: Regular  A: PIV, Montez      DVT prophylaxis: Lovenox subq     Code Status: Full Code (confirmed on admission)   NOK: Diana gage (sister): [phone]            Marty Buck MD

## 2024-06-03 ENCOUNTER — DOCUMENTATION (OUTPATIENT)
Dept: PULMONOLOGY | Facility: HOSPITAL | Age: 64
End: 2024-06-03
Payer: COMMERCIAL

## 2024-06-03 DIAGNOSIS — R06.02 SOB (SHORTNESS OF BREATH): ICD-10-CM

## 2024-06-03 DIAGNOSIS — I27.20 PULMONARY HYPERTENSION (MULTI): ICD-10-CM

## 2024-06-03 LAB
ANION GAP BLDV CALCULATED.4IONS-SCNC: 12 MMOL/L (ref 10–25)
ATRIAL RATE: 107 BPM
BACTERIA BLD CULT: NORMAL
BACTERIA BLD CULT: NORMAL
BASE EXCESS BLDV CALC-SCNC: -2.7 MMOL/L (ref -2–3)
BODY TEMPERATURE: ABNORMAL
CA-I BLDV-SCNC: 0.99 MMOL/L (ref 1.1–1.33)
CHLORIDE BLDV-SCNC: 108 MMOL/L (ref 98–107)
GLUCOSE BLDV-MCNC: 150 MG/DL (ref 74–99)
HCO3 BLDV-SCNC: 21.7 MMOL/L (ref 22–26)
HCT VFR BLD EST: 29 % (ref 36–46)
HGB BLDV-MCNC: 9.7 G/DL (ref 12–16)
LACTATE BLDV-SCNC: 2.6 MMOL/L (ref 0.4–2)
OXYHGB MFR BLDV: 83.1 % (ref 45–75)
P AXIS: 68 DEGREES
P OFFSET: 176 MS
P ONSET: 123 MS
PCO2 BLDV: 35 MM HG (ref 41–51)
PH BLDV: 7.4 PH (ref 7.33–7.43)
PO2 BLDV: 54 MM HG (ref 35–45)
POTASSIUM BLDV-SCNC: 4.2 MMOL/L (ref 3.5–5.3)
PR INTERVAL: 164 MS
Q ONSET: 205 MS
QRS COUNT: 17 BEATS
QRS DURATION: 114 MS
QT INTERVAL: 388 MS
QTC CALCULATION(BAZETT): 517 MS
QTC FREDERICIA: 470 MS
R AXIS: 149 DEGREES
SAO2 % BLDV: 84 % (ref 45–75)
SODIUM BLDV-SCNC: 137 MMOL/L (ref 136–145)
T AXIS: -3 DEGREES
T OFFSET: 399 MS
VENTRICULAR RATE: 107 BPM

## 2024-06-03 NOTE — PROGRESS NOTES
5/30/2024 Patient called reporting symptoms related to remodulin. She states her SOB is worsening, experienced nausea overnight, denies vomiting, increased jaw pain, increased neuropathy to feet. Endorses that she had an episode that she slept through her alarm for her cartridge change for unknown time period two days ago. States that she has not visited ED as counseled during prior calls. Reenforced the need for patient to seek emergency medical treatment for line check and symptom evaluation.

## 2024-06-04 LAB
BACTERIA BLD CULT: NORMAL
BACTERIA BLD CULT: NORMAL

## 2024-06-26 DIAGNOSIS — T80.212S: Primary | ICD-10-CM

## 2024-06-26 RX ORDER — CEPHALEXIN 500 MG/1
500 CAPSULE ORAL 4 TIMES DAILY
Qty: 40 CAPSULE | Refills: 0 | Status: SHIPPED | OUTPATIENT
Start: 2024-06-26 | End: 2024-07-07 | Stop reason: HOSPADM

## 2024-06-26 NOTE — PROGRESS NOTES
Pt called with c/o Montez catheter site redness, pus, swelling and  tenderness. Pt states she never took previously ordered antibiotics and states her line site is unchanged. Dr. Prescott notified and orders to have pt started course of keflex 500 mg 4 times a day for 10 days. Pt instructed to call and send a photo of site every 2 days. Pt agrees and will call us on Friday.

## 2024-06-29 ENCOUNTER — TELEPHONE (OUTPATIENT)
Dept: EMERGENCY MEDICINE | Facility: HOSPITAL | Age: 64
End: 2024-06-29

## 2024-06-29 NOTE — ED NOTES
Critical care time in this patient was 17 minutes due to concern for respiratory deterioration.     This was separate from other billable procedures.

## 2024-07-03 ENCOUNTER — TELEPHONE (OUTPATIENT)
Dept: PULMONOLOGY | Facility: HOSPITAL | Age: 64
End: 2024-07-03
Payer: COMMERCIAL

## 2024-07-03 NOTE — TELEPHONE ENCOUNTER
Patient called into office requesting assistance with pain medications. Informed that pulmonary hypertension clinic is unable to prescribe narcotics and advised to follow up with PCP. Patient endorses she has been taking antibiotics and Montez site has improved, no redness or swelling.

## 2024-07-05 ENCOUNTER — APPOINTMENT (OUTPATIENT)
Dept: CARDIOLOGY | Facility: HOSPITAL | Age: 64
End: 2024-07-05
Payer: COMMERCIAL

## 2024-07-05 ENCOUNTER — HOSPITAL ENCOUNTER (INPATIENT)
Facility: HOSPITAL | Age: 64
LOS: 2 days | Discharge: HOME | End: 2024-07-07
Attending: EMERGENCY MEDICINE | Admitting: INTERNAL MEDICINE
Payer: COMMERCIAL

## 2024-07-05 ENCOUNTER — APPOINTMENT (OUTPATIENT)
Dept: RADIOLOGY | Facility: HOSPITAL | Age: 64
End: 2024-07-05
Payer: COMMERCIAL

## 2024-07-05 DIAGNOSIS — I27.20 PULMONARY HTN (MULTI): ICD-10-CM

## 2024-07-05 DIAGNOSIS — E83.51 HYPOCALCEMIA: ICD-10-CM

## 2024-07-05 DIAGNOSIS — E83.42 HYPOMAGNESEMIA: ICD-10-CM

## 2024-07-05 DIAGNOSIS — I47.10 SVT (SUPRAVENTRICULAR TACHYCARDIA) (CMS-HCC): Primary | ICD-10-CM

## 2024-07-05 LAB
ANION GAP SERPL CALC-SCNC: 13 MMOL/L
BASOPHILS # BLD AUTO: 0.01 X10*3/UL (ref 0–0.1)
BASOPHILS NFR BLD AUTO: 0.2 %
BUN SERPL-MCNC: 12 MG/DL (ref 8–25)
CA-I BLD-SCNC: 0.76 MMOL/L (ref 1.1–1.33)
CALCIUM SERPL-MCNC: 5.7 MG/DL (ref 8.5–10.4)
CHLORIDE SERPL-SCNC: 105 MMOL/L (ref 97–107)
CO2 SERPL-SCNC: 20 MMOL/L (ref 24–31)
CREAT SERPL-MCNC: 0.9 MG/DL (ref 0.4–1.6)
EGFRCR SERPLBLD CKD-EPI 2021: 73 ML/MIN/1.73M*2
EOSINOPHIL # BLD AUTO: 0.01 X10*3/UL (ref 0–0.7)
EOSINOPHIL NFR BLD AUTO: 0.2 %
ERYTHROCYTE [DISTWIDTH] IN BLOOD BY AUTOMATED COUNT: 16.8 % (ref 11.5–14.5)
GLUCOSE SERPL-MCNC: 98 MG/DL (ref 65–99)
HCT VFR BLD AUTO: 28.5 % (ref 36–46)
HGB BLD-MCNC: 8.6 G/DL (ref 12–16)
IMM GRANULOCYTES # BLD AUTO: 0.04 X10*3/UL (ref 0–0.7)
IMM GRANULOCYTES NFR BLD AUTO: 0.6 % (ref 0–0.9)
LYMPHOCYTES # BLD AUTO: 1.13 X10*3/UL (ref 1.2–4.8)
LYMPHOCYTES NFR BLD AUTO: 18.1 %
MAGNESIUM SERPL-MCNC: 0.4 MG/DL (ref 1.6–3.1)
MCH RBC QN AUTO: 23.2 PG (ref 26–34)
MCHC RBC AUTO-ENTMCNC: 30.2 G/DL (ref 32–36)
MCV RBC AUTO: 77 FL (ref 80–100)
MONOCYTES # BLD AUTO: 0.2 X10*3/UL (ref 0.1–1)
MONOCYTES NFR BLD AUTO: 3.2 %
NEUTROPHILS # BLD AUTO: 4.85 X10*3/UL (ref 1.2–7.7)
NEUTROPHILS NFR BLD AUTO: 77.7 %
NRBC BLD-RTO: 0 /100 WBCS (ref 0–0)
PLATELET # BLD AUTO: 103 X10*3/UL (ref 150–450)
POTASSIUM SERPL-SCNC: 3.7 MMOL/L (ref 3.4–5.1)
RBC # BLD AUTO: 3.71 X10*6/UL (ref 4–5.2)
SODIUM SERPL-SCNC: 138 MMOL/L (ref 133–145)
TROPONIN T SERPL-MCNC: 19 NG/L
TROPONIN T SERPL-MCNC: 26 NG/L
TSH SERPL DL<=0.05 MIU/L-ACNC: 2.62 MIU/L (ref 0.27–4.2)
WBC # BLD AUTO: 6.2 X10*3/UL (ref 4.4–11.3)

## 2024-07-05 PROCEDURE — 93005 ELECTROCARDIOGRAM TRACING: CPT

## 2024-07-05 PROCEDURE — 71046 X-RAY EXAM CHEST 2 VIEWS: CPT

## 2024-07-05 PROCEDURE — 99291 CRITICAL CARE FIRST HOUR: CPT | Mod: 25

## 2024-07-05 PROCEDURE — 83735 ASSAY OF MAGNESIUM: CPT | Performed by: EMERGENCY MEDICINE

## 2024-07-05 PROCEDURE — 84484 ASSAY OF TROPONIN QUANT: CPT | Performed by: EMERGENCY MEDICINE

## 2024-07-05 PROCEDURE — 2500000004 HC RX 250 GENERAL PHARMACY W/ HCPCS (ALT 636 FOR OP/ED): Performed by: PHYSICIAN ASSISTANT

## 2024-07-05 PROCEDURE — 96366 THER/PROPH/DIAG IV INF ADDON: CPT

## 2024-07-05 PROCEDURE — 80048 BASIC METABOLIC PNL TOTAL CA: CPT | Performed by: EMERGENCY MEDICINE

## 2024-07-05 PROCEDURE — 81001 URINALYSIS AUTO W/SCOPE: CPT | Performed by: INTERNAL MEDICINE

## 2024-07-05 PROCEDURE — 96365 THER/PROPH/DIAG IV INF INIT: CPT

## 2024-07-05 PROCEDURE — 84443 ASSAY THYROID STIM HORMONE: CPT | Performed by: EMERGENCY MEDICINE

## 2024-07-05 PROCEDURE — 71046 X-RAY EXAM CHEST 2 VIEWS: CPT | Performed by: STUDENT IN AN ORGANIZED HEALTH CARE EDUCATION/TRAINING PROGRAM

## 2024-07-05 PROCEDURE — 2060000001 HC INTERMEDIATE ICU ROOM DAILY

## 2024-07-05 PROCEDURE — 2500000005 HC RX 250 GENERAL PHARMACY W/O HCPCS: Performed by: INTERNAL MEDICINE

## 2024-07-05 PROCEDURE — 96361 HYDRATE IV INFUSION ADD-ON: CPT

## 2024-07-05 PROCEDURE — 85025 COMPLETE CBC W/AUTO DIFF WBC: CPT | Performed by: EMERGENCY MEDICINE

## 2024-07-05 PROCEDURE — 93010 ELECTROCARDIOGRAM REPORT: CPT | Performed by: INTERNAL MEDICINE

## 2024-07-05 PROCEDURE — 2500000004 HC RX 250 GENERAL PHARMACY W/ HCPCS (ALT 636 FOR OP/ED): Performed by: EMERGENCY MEDICINE

## 2024-07-05 PROCEDURE — 82330 ASSAY OF CALCIUM: CPT | Performed by: EMERGENCY MEDICINE

## 2024-07-05 PROCEDURE — 80307 DRUG TEST PRSMV CHEM ANLYZR: CPT | Performed by: INTERNAL MEDICINE

## 2024-07-05 RX ORDER — ONDANSETRON 4 MG/1
4 TABLET, ORALLY DISINTEGRATING ORAL EVERY 8 HOURS PRN
Status: DISCONTINUED | OUTPATIENT
Start: 2024-07-05 | End: 2024-07-07 | Stop reason: HOSPADM

## 2024-07-05 RX ORDER — ACETAMINOPHEN 325 MG/1
975 TABLET ORAL ONCE
Status: COMPLETED | OUTPATIENT
Start: 2024-07-05 | End: 2024-07-05

## 2024-07-05 RX ORDER — CALCIUM GLUCONATE 20 MG/ML
2 INJECTION, SOLUTION INTRAVENOUS ONCE
Status: COMPLETED | OUTPATIENT
Start: 2024-07-05 | End: 2024-07-05

## 2024-07-05 RX ORDER — ACETAMINOPHEN 650 MG/1
650 SUPPOSITORY RECTAL EVERY 4 HOURS PRN
Status: DISCONTINUED | OUTPATIENT
Start: 2024-07-05 | End: 2024-07-07 | Stop reason: HOSPADM

## 2024-07-05 RX ORDER — POLYETHYLENE GLYCOL 3350 17 G/17G
17 POWDER, FOR SOLUTION ORAL DAILY PRN
Status: DISCONTINUED | OUTPATIENT
Start: 2024-07-05 | End: 2024-07-07 | Stop reason: HOSPADM

## 2024-07-05 RX ORDER — PANTOPRAZOLE SODIUM 40 MG/1
40 TABLET, DELAYED RELEASE ORAL
Status: DISCONTINUED | OUTPATIENT
Start: 2024-07-06 | End: 2024-07-07 | Stop reason: HOSPADM

## 2024-07-05 RX ORDER — MAGNESIUM SULFATE HEPTAHYDRATE 40 MG/ML
4 INJECTION, SOLUTION INTRAVENOUS ONCE
Status: COMPLETED | OUTPATIENT
Start: 2024-07-05 | End: 2024-07-05

## 2024-07-05 RX ORDER — ONDANSETRON HYDROCHLORIDE 2 MG/ML
4 INJECTION, SOLUTION INTRAVENOUS EVERY 8 HOURS PRN
Status: DISCONTINUED | OUTPATIENT
Start: 2024-07-05 | End: 2024-07-07 | Stop reason: HOSPADM

## 2024-07-05 RX ORDER — ASPIRIN 81 MG/1
81 TABLET ORAL DAILY
Status: DISCONTINUED | OUTPATIENT
Start: 2024-07-05 | End: 2024-07-07 | Stop reason: HOSPADM

## 2024-07-05 RX ORDER — HYDROXYCHLOROQUINE SULFATE 200 MG/1
200 TABLET, FILM COATED ORAL 2 TIMES DAILY
Status: DISCONTINUED | OUTPATIENT
Start: 2024-07-05 | End: 2024-07-07 | Stop reason: HOSPADM

## 2024-07-05 RX ORDER — HEPARIN SODIUM 5000 [USP'U]/ML
5000 INJECTION, SOLUTION INTRAVENOUS; SUBCUTANEOUS EVERY 8 HOURS SCHEDULED
Status: DISCONTINUED | OUTPATIENT
Start: 2024-07-05 | End: 2024-07-06

## 2024-07-05 RX ORDER — VENLAFAXINE HYDROCHLORIDE 150 MG/1
150 CAPSULE, EXTENDED RELEASE ORAL DAILY
Status: DISCONTINUED | OUTPATIENT
Start: 2024-07-05 | End: 2024-07-07 | Stop reason: HOSPADM

## 2024-07-05 RX ORDER — ACETAMINOPHEN 160 MG/5ML
650 SOLUTION ORAL EVERY 4 HOURS PRN
Status: DISCONTINUED | OUTPATIENT
Start: 2024-07-05 | End: 2024-07-07 | Stop reason: HOSPADM

## 2024-07-05 RX ORDER — FERROUS SULFATE, DRIED 160(50) MG
2 TABLET, EXTENDED RELEASE ORAL 2 TIMES DAILY
Status: DISCONTINUED | OUTPATIENT
Start: 2024-07-05 | End: 2024-07-07 | Stop reason: HOSPADM

## 2024-07-05 RX ORDER — ACETAMINOPHEN 325 MG/1
650 TABLET ORAL EVERY 4 HOURS PRN
Status: DISCONTINUED | OUTPATIENT
Start: 2024-07-05 | End: 2024-07-07 | Stop reason: HOSPADM

## 2024-07-05 RX ORDER — GUAIFENESIN/DEXTROMETHORPHAN 100-10MG/5
5 SYRUP ORAL EVERY 4 HOURS PRN
Status: DISCONTINUED | OUTPATIENT
Start: 2024-07-05 | End: 2024-07-07 | Stop reason: HOSPADM

## 2024-07-05 RX ORDER — GUAIFENESIN 600 MG/1
600 TABLET, EXTENDED RELEASE ORAL EVERY 12 HOURS PRN
Status: DISCONTINUED | OUTPATIENT
Start: 2024-07-05 | End: 2024-07-07 | Stop reason: HOSPADM

## 2024-07-05 RX ADMIN — SODIUM CHLORIDE 500 ML: 900 INJECTION, SOLUTION INTRAVENOUS at 17:34

## 2024-07-05 RX ADMIN — Medication 4 L/MIN: at 21:20

## 2024-07-05 RX ADMIN — ACETAMINOPHEN 975 MG: 325 TABLET ORAL at 19:34

## 2024-07-05 RX ADMIN — MAGNESIUM SULFATE HEPTAHYDRATE 4 G: 40 INJECTION, SOLUTION INTRAVENOUS at 18:44

## 2024-07-05 RX ADMIN — CALCIUM GLUCONATE 2 G: 20 INJECTION, SOLUTION INTRAVENOUS at 20:06

## 2024-07-05 ASSESSMENT — PAIN SCALES - GENERAL
PAINLEVEL_OUTOF10: 0 - NO PAIN
PAINLEVEL_OUTOF10: 0 - NO PAIN

## 2024-07-05 ASSESSMENT — PAIN - FUNCTIONAL ASSESSMENT
PAIN_FUNCTIONAL_ASSESSMENT: 0-10
PAIN_FUNCTIONAL_ASSESSMENT: 0-10

## 2024-07-05 NOTE — ED PROVIDER NOTES
HPI   Chief Complaint   Patient presents with    Shortness of Breath     Pt called 911 stating she was feeling short of breath. EMS found pt was in svt and gave adenosine x2. EMS states conversion to ST at 2nd dose.        HPI  This is a 63-year-old female past medical history of pulmonary hypertension on chronic Velitri infusions presenting to the emergency department with complaints of shortness of breath.  Patient states that started suddenly today.  She has had similar episodes prior but she states that these episodes are typically gone away on their own.  States this episode of shortness of breath persisted leading her to call EMS.  Per EMS, the patient was in SVT with a heart rate in the 200s.  They did give adenosine 6 mg with no response tachycardia followed by 12 mg with conversion to sinus tachycardia.  Patient states after adenosine she was feeling much better.  She does not know of any previous history of arrhythmias.  Patient denies chest pain, palpitations, fever, chills, coughing.     Please see HPI for pertinent positive and negative ROS.                   Feliciano Coma Scale Score: 15                     Patient History   Past Medical History:   Diagnosis Date    Anxiety     Depression     GERD (gastroesophageal reflux disease)     Lupus (Multi)     Personal history of other specified conditions 2020    History of seizure    Pulmonary hypertension (Multi)      Past Surgical History:   Procedure Laterality Date    CARDIAC CATHETERIZATION N/A 2024    Procedure: Left Heart Cath, With LV, Angriography And Grafts;  Surgeon: Christiano Calvillo DO;  Location: [facility] Cardiac [facility] Lab;  Service: Cardiovascular;  Laterality: N/A;    CHOLECYSTECTOMY      HYSTERECTOMY      OTHER SURGICAL HISTORY  2020    Cholecystectomy    OTHER SURGICAL HISTORY  2020    Esophagogastroduodenoscopy    OTHER SURGICAL HISTORY  2020    Colonoscopy    OTHER SURGICAL HISTORY  2020     section     OTHER SURGICAL HISTORY  09/21/2020    Hysterectomy     Family History   Problem Relation Name Age of Onset    No Known Problems Mother      No Known Problems Father       Social History     Tobacco Use    Smoking status: Former     Types: Cigarettes    Smokeless tobacco: Former   Vaping Use    Vaping status: Never Used   Substance Use Topics    Alcohol use: Not Currently    Drug use: Not Currently       Physical Exam   ED Triage Vitals [07/05/24 1730]   Temp Heart Rate Respirations BP   -- (!) 112 (!) 23 110/72      Pulse Ox Temp src Heart Rate Source Patient Position   (!) 93 % -- -- --      BP Location FiO2 (%)     -- --       Physical Exam  GENERAL APPEARANCE: Awake and alert. No acute respiratory distress.  Patient does wear 3 L nasal cannula at baseline, currently on 3 L nasal cannula.  VITAL SIGNS: As per the nurses' triage record.  HEENT: Normocephalic, atraumatic. Extraocular muscles are intact. Conjunctiva are pink. Negative scleral icterus. Mucous membranes are moist. Tongue in the midline. Oropharynx clear, uvula midline.   NECK: Soft, nontender and supple, full gross ROM, no meningeal signs.  CHEST: Nontender to palpation.  Decreased air movement with diminished breath sounds bilaterally, symmetric rise and fall of chest wall.   HEART: Clear S1 and S2.  Tachycardic rate and regular rhythm. Strong and equal pulses in the extremities.  ABDOMEN: Soft, nontender, nondistended,  MUSCULOSKELETAL: The calves are nontender to palpation. Full gross active range of motion.   NEUROLOGICAL: Awake, alert and oriented x 3. Motor power intact in the upper and lower extremities. Sensation is intact to light touch in the upper and lower extremities. Patient answering questions appropriately.   IMMUNOLOGICAL: No lymphatic streaking noted  DERMATOLOGIC: Warm and dry  PYSCH: Cooperative with appropriate mood and affect.  ED Course & MDM   Diagnoses as of 07/07/24 5338   SVT (supraventricular tachycardia) (CMS-[facility])    Hypomagnesemia   Pulmonary HTN (Multi)   Hypocalcemia       Medical Decision Making  Parts of this chart have been completed using voice recognition software. Please excuse any errors of transcription.  My thought process and reason for plan has been formulated from the time that I saw the patient until the time of disposition and is not specific to one specific moment during their visit and furthermore my MDM encompasses this entire chart and not only this text box.      HPI: Detailed above.    Exam: A medically appropriate exam performed, outlined above, given the known history and presentation.    History obtained from: Patient and EMS    EKG: See my supervising physician's EKG interpretation    Medications given during visit:  Medications   sodium chloride 0.9 % bolus 500 mL (0 mL intravenous Stopped 7/5/24 1825)   magnesium sulfate IV 4 g (0 g intravenous Stopped 7/5/24 2242)   acetaminophen (Tylenol) tablet 975 mg (975 mg oral Given 7/5/24 1934)   calcium gluconate in NS IV 2 g (0 g intravenous Stopped 7/5/24 2115)   magnesium sulfate IV 2 g (0 g intravenous Stopped 7/6/24 1226)   magnesium oxide (Mag-Ox) tablet 400 mg (400 mg oral Given 7/7/24 1040)        Diagnostic/tests  Labs Reviewed   CBC WITH AUTO DIFFERENTIAL - Abnormal       Result Value    WBC 6.2      nRBC 0.0      RBC 3.71 (*)     Hemoglobin 8.6 (*)     Hematocrit 28.5 (*)     MCV 77 (*)     MCH 23.2 (*)     MCHC 30.2 (*)     RDW 16.8 (*)     Platelets 103 (*)     Neutrophils % 77.7      Immature Granulocytes %, Automated 0.6      Lymphocytes % 18.1      Monocytes % 3.2      Eosinophils % 0.2      Basophils % 0.2      Neutrophils Absolute 4.85      Immature Granulocytes Absolute, Automated 0.04      Lymphocytes Absolute 1.13 (*)     Monocytes Absolute 0.20      Eosinophils Absolute 0.01      Basophils Absolute 0.01     MAGNESIUM - Abnormal    Magnesium 0.40 (*)    BASIC METABOLIC PANEL - Abnormal    Glucose 98      Sodium 138      Potassium 3.7       Chloride 105      Bicarbonate 20 (*)     Urea Nitrogen 12      Creatinine 0.90      eGFR 73      Calcium 5.7 (*)     Anion Gap 13     SERIAL TROPONIN, INITIAL ([facility]) - Abnormal    Troponin T, High Sensitivity 19 (*)    SERIAL TROPONIN,  2 HOUR ([facility]) - Abnormal    Troponin T, High Sensitivity 26 (*)    CALCIUM, IONIZED - Abnormal    POCT Calcium, Ionized 0.76 (*)    CBC WITH AUTO DIFFERENTIAL - Abnormal    WBC 6.6      nRBC 0.0      RBC 3.50 (*)     Hemoglobin 8.1 (*)     Hematocrit 27.5 (*)     MCV 79 (*)     MCH 23.1 (*)     MCHC 29.5 (*)     RDW 16.8 (*)     Platelets 116 (*)     Neutrophils % 82.2      Immature Granulocytes %, Automated 0.6      Lymphocytes % 10.9      Monocytes % 6.1      Eosinophils % 0.0      Basophils % 0.2      Neutrophils Absolute 5.43      Immature Granulocytes Absolute, Automated 0.04      Lymphocytes Absolute 0.72 (*)     Monocytes Absolute 0.40      Eosinophils Absolute 0.00      Basophils Absolute 0.01     COMPREHENSIVE METABOLIC PANEL - Abnormal    Glucose 102 (*)     Sodium 138      Potassium 4.0      Chloride 106      Bicarbonate 21 (*)     Urea Nitrogen 13      Creatinine 0.90      eGFR 73      Calcium 6.6 (*)     Albumin 3.4 (*)     Alkaline Phosphatase 72      Total Protein 6.3      AST 10      Bilirubin, Total 0.8      ALT 7      Anion Gap 11     MAGNESIUM - Abnormal    Magnesium 1.40 (*)    CALCIUM, IONIZED - Abnormal    POCT Calcium, Ionized 0.83 (*)    DRUG SCREEN,URINE - Abnormal    Amphetamine Screen, Urine Negative      Barbiturate Screen, Urine Negative      Benzodiazepines Screen, Urine Negative      Cannabinoid Screen, Urine Positive (*)     Cocaine Metabolite Screen, Urine Negative      Fentanyl Screen, Urine Negative      Methadone Screen, Urine Negative      Opiate Screen, Urine Negative      Oxycodone Screen, Urine Negative      PCP Screen, Urine Positive (*)     Narrative:     These toxicological screening tests provide unconfirmed qualitative measurements to  aid in treatment and diagnosis in cases of drug use or overdose. This test is used only for medical purposes. A positive result does not indicate or measure intoxication. For specific test performance or pathologist consultation, please contact the Laboratory.    The following threshold concentrations are used for these analyses.Values at or above the threshold concentration are reported as positive. Values below the threshold are reported as negative.    Drug /Screening Threshold                                                                                                 THC/CANNABINOIDS................50 ng/ml  METHADONE......................300 ng/ml  COCAINE METABOLITES............300 ng/ml  BENZODIAZEPINE.................300 ng/ml  PCP.............................25 ng/ml  OPIATE.........................300 ng/ml  AMPHETAMINE/ECSTASY...........1000 ng/ml  BARBITURATE....................200 ng/ml  OXYCODONE......................100 ng/ml  FENTANYL.........................5 ng/ml       IRON AND TIBC - Abnormal    Iron <20 (*)     UIBC 337      TIBC        % Saturation       URINALYSIS WITH REFLEX MICROSCOPIC - Abnormal    Color, Urine Yellow      Appearance, Urine Clear      Specific Gravity, Urine 1.031      pH, Urine 5.5      Protein, Urine 100 (2+) (*)     Glucose, Urine Normal      Blood, Urine NEGATIVE      Ketones, Urine NEGATIVE      Bilirubin, Urine NEGATIVE      Urobilinogen, Urine 2 (1+) (*)     Nitrite, Urine NEGATIVE      Leukocyte Esterase, Urine NEGATIVE     MICROSCOPIC ONLY, URINE - Abnormal    WBC, Urine 6-10 (*)     RBC, Urine 1-2      Squamous Epithelial Cells, Urine 1-9 (SPARSE)      Mucus, Urine 1+      Hyaline Casts, Urine 1+ (*)    BASIC METABOLIC PANEL - Abnormal    Glucose 111 (*)     Sodium 136      Potassium 3.9      Chloride 104      Bicarbonate 24      Urea Nitrogen 10      Creatinine 0.70      eGFR >90      Calcium 7.1 (*)     Anion Gap 8     TSH WITH REFLEX TO FREE T4 IF ABNORMAL  - Normal    Thyroid Stimulating Hormone 2.62      Narrative:     TSH testing is performed using different testing methodology at [facility] than at other [facility] hospitals. Direct result comparisons should only be made within the same method.     FERRITIN - Normal    Ferritin 15     MAGNESIUM - Normal    Magnesium 1.60     TROPONIN T SERIES, HIGH SENSITIVITY (0, 2 HR, 6 HR)    Narrative:     The following orders were created for panel order Troponin T Series, High Sensitivity (0, 2HR, 6HR).  Procedure                               Abnormality         Status                     ---------                               -----------         ------                     Serial Troponin, Initial...[[id number]]  Abnormal            Final result               Serial Troponin, 2 Hour ...[[id number]]                                                 Serial Troponin, 6 Hour ...[[id number]]                                                   Please view results for these tests on the individual orders.   SERIAL TROPONIN, 6 HOUR ([facility])      XR chest 2 views   Final Result   1. Pulmonary vascular congestion and interstitial edema without   evidence of consolidation or sizable pleural effusion.   2. Marked cardiomegaly is similar in appearance to prior exam on   05/30/2024.                  MACRO:   None        Signed by: Roc Higgins 7/5/2024 6:32 PM   Dictation workstation:   [id number]           Considerations/further MDM:  Patient was seen in conjucntion with my supervising physician,  Dr. Lyn. Please refer to her note.    Patient presents with tachycardia with a ventricular rate of 112 beats minute, respirations 23, 93% on 3 L nasal cannula, blood pressure 110/72    Laboratory evaluation shows hypomagnesia with a magnesium of 0.4.  Calcium is also low at 5.7.  TSH 2.6.  CBC shows persisting microcytic anemia.  Troponin T 19.     Chest x-ray shows pulmonary vascular congestion with interstitial edema.   Patient does have known history of pulmonary hypertension.  Marked cardiomegaly similar in appearance to prior exam on 5/30/2024.    Patient states that she was on a magnesium supplementation but recently was told to stop the supplementation and this may be contributing to her significant hypomagnesia.     The patient was evaluated in the emergency department and an etiology requiring emergent treatment or admission to hospital was identified.  The patient/family was counseled on clinical expression, expectations, and plan along with recommendations for admission.  All questions were answered and involved parties were understanding and agreeable to course of treatment.  Case was discussed with admitting physician and any consultants.  Bed type ED treatment and further ED work-up decided by joint decision making with admitting team and any consultants.  Patient stable for admission per my assessment and further management of patient will be deferred to the inpatient setting.    Critical care time was billed at 31 minutes by me and is nonconcurrent with other providers involved.  Time excludes other separately billable procedures.  Critical care time billed due to patient presenting for SVT, need for frequent reassessment, need for interpretation of lab and imaging results, concern for acute decompensation.    Procedure  Procedures     Marla Soria PA-C  07/07/24 4021

## 2024-07-05 NOTE — ED PROVIDER NOTES
The patient was seen in conjunction with the advanced practice provider, and I performed a substantive portion of the visit. I personally saw the patient and made/approved the management plan and take responsibility for the patient management. All medical decision making was performed by me. All laboratory studies, radiology, and EKGs were reviewed by me.     History: 63-year-old female with history of pulmonary hypertension on chronic Velitri infusion as well as chronic 3 L nasal cannula oxygen presents for evaluation of shortness of breath.  States this began suddenly today.  She has had similar episodes in the past but they typically go away this persisted prompting her to call EMS.  Per EMS who assisted with independent history report patient was in SVT in the 200s they gave adenosine 6 mg with no response, 12 mg and patient converted to sinus tachycardia.  Patient now reports she is feeling much better.  Denies known history of arrhythmia.  Physical exam:  Constitutional:  Awake, alert, well appearing, nontoxic  HEENT:  Normocephalic, atraumatic  Neck: Trachea midline, no stridor  Respiratory/Chest: Decreased air movement throughout but otherwise clear to auscultation bilaterally, no focal deficits, no wheezes rhonchi or rales, port in left upper chest  CV: Tachycardic rate and regular rhythm, no murmurs, gallops, or rubs  Neuro: A/O, normal speech  Skin:  Warm and dry    MDM: 63-year-old female with history of pulmonary hypertension on chronic oxygen and Velitri infusion presents for shortness of breath found to be in SVT by EMS converted with 6 followed by 12 mg of adenosine.  Tachycardic on arrival but no other acute ischemic changes.  She is saturating well on her chronic liters nasal cannula.  Consider primary arrhythmia, electrolyte imbalance among others.  No chest pain to strongly suggest ACS.  Labs obtained notable for significant hypomagnesemia at 0.4 and hypocalcemia at 5.7.  Patient does report she  was previously on magnesium supplement but this was discontinued recently may be contributory to her recurrent electrolyte imbalance.  Given her severe electrolyte imbalances will start on IV supplementation and given these were likely contributory to her arrhythmia putting her at high risk will admit for further workup and management.    CRITICAL CARE NOTE:    Upon my evaluation, this patient had a high probability of imminent or life-threatening deterioration due to severe electrolyte imbalance, new onset tachyarrhythmia, which required my direct attention, intervention, and personal management    33 total minutes of critical care were personally provided which excludes and was non concurrent with other providers and all other billable procedures.  This was for time at the bedside, re-evaluations, interpretation of lab and imaging results, discussions with consultants, and monitoring for potential decompensation.  Intervention were performed as documented above.         Isela Lyn MD  07/05/24 5623

## 2024-07-05 NOTE — H&P
History Of Present Illness      Ronna Beckham is a 63 y.o. female presenting with Dyspnea.        Per our ED Physician she is a 63-year-old Female with a PMHx of pulmonary hypertension on chronic Velitri infusion as well as chronic 3 L nasal cannula oxygen presents for evaluation of shortness of breath.  States this began suddenly today.  She has had similar episodes in the past but they typically go away this persisted prompting her to call EMS.  Per EMS who assisted with independent history report patient was in SVT in the 200s they gave adenosine 6 mg with no response, 12 mg and patient converted to sinus tachycardia.  Patient now reports she is feeling much better.  Denies known history of arrhythmia.       Patient's vital signs in the ED were noted for a heart rate of 112, respiratory rate 23, /72.  She was saturating 93% on room air.      Her ED diagnostic workup was noted for a CBC with differential that suggested anemia with an H&H of 8.6/28.5.  Her platelet count was low at 103.  Her differential was within normal limits.  Blood chemistry noted for a bicarbonate level of 20.  Calcium was 5.7.  Isolated magnesium level was low at 0.4.  TSH level was within normal limits.  Patient's denies calcium level was obtained and noted to be low at 0.76.  First set troponin level was 19.  Her chest x-ray was a PA and lateral and noted for pulmonary vascular congestion and interstitial edema without evidence of consolidation or sizable pleural effusion.  MAR cardiomegaly and similar appearance to prior exam on May 30, 2024.      Off note:      2D echocardiogram from 2024 noted for the followin. Left ventricular systolic function is normal with a 60-65% estimated ejection fraction.   2. Right ventricular volume and pressure overload.   3. Although the TAPSE and RVA' are normal, the fractional area change is markedly reduced consistent wtih severe RV systolic dysfunction. Still able to generate  high RVSP.   4. Severely enlarged right ventricle.   5. There is severely reduced right ventricular systolic function.   6. Agitated saline contrast study was positive for small intracardiac shunt.   7. The right atrium is severely dilated.   8. There is a small to moderate pericardial effusion.   9. Small circumferential pericardial effusin though is small to moderate adjacent to the RA. No RV or RA collapse. The IVC is dilated, though likely due to the presence of severe pulmonary hypertension. No significant respiratory variation of the MV or TV inflows. Not diagnostic for tamponade though difficult to dx tamponade in setting of severe pulmonary hypertension.  10. Severe tricuspid regurgitation visualized.  11. Severely elevated right ventricular systolic pressure.  12. Compared with the prior exam from 3/12/2022 the RV had a similar appearance with severe dilatation and reduced function. Note the TR was not asessed on that exam to allow estimation of the RVSP at that time. The pericardial effusion appears similar in size.  13. A bubble study using agitated saline was performed. Bubble study is positive. A small PFO (= 10 bubbles) was demonstrated.       Patient was discharged roughly 1 month ago from [facility].  That time she presented with dyspnea on exertion.  He was treated with IV antibiotics.  Was treated with advanced therapy for PAH.  It appears patient has a history of substance use disorder, specifically cocaine, methamphetamine, and alcohol.      Post second adenosine administration patient's EKG noted for sinus tachycardia low 111 bpm.  Incomplete right bundle branch block.  Right ventricular hypertrophy.  Cannot rule out inferior infarct.  QTc 486 ms.    Patient states she was resting at home sitting in a chair when her symptoms began.  She was eating some ice.        Past Medical History        Past Medical History:   Diagnosis Date    Anxiety     Depression     GERD (gastroesophageal reflux  "disease)     Lupus (Multi)     Personal history of other specified conditions 2020    History of seizure    Pulmonary hypertension (Multi)          Surgical History        Past Surgical History:   Procedure Laterality Date    CARDIAC CATHETERIZATION N/A 2024    Procedure: Left Heart Cath, With LV, Angriography And Grafts;  Surgeon: Christiano Calvillo DO;  Location: [facility];  Service: Cardiovascular;  Laterality: N/A;    CHOLECYSTECTOMY      HYSTERECTOMY      OTHER SURGICAL HISTORY  2020    Cholecystectomy    OTHER SURGICAL HISTORY  2020    Esophagogastroduodenoscopy    OTHER SURGICAL HISTORY  2020    Colonoscopy    OTHER SURGICAL HISTORY  2020     section    OTHER SURGICAL HISTORY  2020    Hysterectomy            Social History        She reports that she has quit smoking. Her smoking use included cigarettes. She has quit using smokeless tobacco. She reports current alcohol use. She reports current drug use. Drug: \"Crack\" cocaine.        Family History        Family History   Problem Relation Name Age of Onset    No Known Problems Mother      No Known Problems Father              Allergies        Levetiracetam            Review of Systems      14-point ROS otherwise negative, as per HPI/Interval History.    General: No change in weight. No weakenss. No Fevers/Chills/Night Sweats   Skin: No skin/hair/nail changes. No rashes or sores.  Head:  No trauma. No Headache/nasuea/vomitting.   Eyes: No visual changes. No tearing. No itching.   Ears: No hearing loss. No tinnitus. No vertigo. No discharge.  Nose, Sinuses: No rhinorrhea, No nasal congestion. No epistaxis.  Mouth, Throat, Neck: No bleeding gums, hoarseness, sore throat or swollen neck  Cardiac: No palpitations. No ALAMO. No PND. No Orthopnea.   Respiratory: No Shortness of Breath. No wheezing. No cough. No hemoptysis.   GI: No nausea/vomiting. No indigestion. No diarrhea. No constipation.   Extremities: No " "numbness or tingling. No paresthesias.   Urinary: No change in urinary frequency. No change in hesitancy. No hematuria. No incontinence.           Physical Exam      Constitutional:  Pleasant  Eyes: PERRL, EOMI,   ENMT: mucous membranes moist.  Wearing supplemental oxygen by nasal cannula at 4 L  Head/Neck: Neck supple, No JVD,   Respiratory/Thorax: Patent airways, CTAB,   Cardiovascular: Sinus tachycardia no murmurs  Gastrointestinal: Soft, non-distended, +BS.  Patient has her pump medication on her torso  Musculoskeletal: ROM intact, no joint swelling, normal strength  Extremities: peripheral pulses intact; no edema  Neurological: Alert and Oriented x 3; no focal deficits; gross motor and sensation intact; CN II-XII intact. No asterixis.  Psychological: Appropriate mood and behavior  Skin: No lesions, No rashes.  Left anterior chest wall cuffed catheter           Last Recorded Vitals  Blood pressure 103/83, pulse (!) 104, resp. rate (!) 22, height 1.651 m (5' 5\"), weight 77.5 kg (170 lb 13.7 oz), SpO2 94%.      Relevant Results    Lab Results   Component Value Date    WBC 6.2 07/05/2024    HGB 8.6 (L) 07/05/2024    HCT 28.5 (L) 07/05/2024    MCV 77 (L) 07/05/2024     (L) 07/05/2024       Lab Results   Component Value Date    GLUCOSE 98 07/05/2024    CALCIUM 5.7 (LL) 07/05/2024     07/05/2024    K 3.7 07/05/2024    CO2 20 (L) 07/05/2024     07/05/2024    BUN 12 07/05/2024    CREATININE 0.90 07/05/2024       Lab Results   Component Value Date    HGBA1C 5.6 08/19/2019         No CT head results found for the past 12 months      Scheduled medications  calcium gluconate, 2 g, intravenous, Once  magnesium sulfate, 4 g, intravenous, Once      Continuous medications     PRN medications          Assessment/Plan   Principal Problem:    SVT (supraventricular tachycardia) (CMS-[facility])        Ronna Beckham is a 63 y.o. Female presenting with Dyspnea.  Patient admitted for further evaluation and " management.        Dyspnea 2/2 Presumed Supraventricular Tachycardia      Admit patient to Telemetry service   Continuous Cardiac Monitor and BP Monitor Placement   Cardiology evaluation in AM.   Monitor Electrolytes, Keep K+>4 + Mg++>2.   Patient received adenosine IV push x 2  Will keep patient NPO after midnight  Will order lipid profile and hemoglobin A1c in the a.m.  Recent 2D-Echocardiography to evaluate for LVEF, Regional Wall motion abnormalities, and any Valvular defects noted in the HPI  Unclear source other than hypomagnesemia  She denies any excessive caffeine intake or any whatsoever  Awaiting urine toxicology screen  Cardiology consultation       Hypomagnesemia    Replace and recheck levels      Hypocalcemia    Replace and recheck levels      Anemia      -- Follow up Anemia Work-up and labs  (Iron Profile, Ferritin, Transferrin, Retic Count, Vit. B12 + Folate)      Thrombocytopenia    Monitor platelet count closely      Epilepsy     Seizure precautions  She is currently not taking any AED medication      Chronic respiratory failure    Patient remains on her baseline supplemental oxygen level at 4 L      Pulmonary Arterial Hypertension    I believe the patient follows with Dr. Chicho Prescott  She is taking advanced therapy for PAH (Veletri) Via cuffed catheter left subclavian w/ continuous Home pump.  She has all of her supplies with her.  She refuses to have the pump discontinued.  Will allow her to self administer her medication.  Case discussed with pharmacy doctor.   She is also taking Opsumit 10 mg p.o. daily  Patient remains on her baseline supplemental oxygen level at 3-4 L  Will consult pulmonary service to evaluate the patient      Systemic lupus erythematosus    Continue home hydroxychloroquine dose      GI + DVT Prophylaxis    Home oral PPI  Bilateral Sequential Compression Devices                   This Dictation was Transcribed using a Nuance Dragon Voice Recognition System Device (with  Compatible Computer + Software) and as such may contain Grammatical Errors and Unintentional Typing Misprints.      I spent 32 minutes in the professional and overall care of this patient.      Ryan Tao MD

## 2024-07-06 LAB
ALBUMIN SERPL-MCNC: 3.4 G/DL (ref 3.5–5)
ALP BLD-CCNC: 72 U/L (ref 35–125)
ALT SERPL-CCNC: 7 U/L (ref 5–40)
AMPHETAMINES UR QL SCN>1000 NG/ML: NEGATIVE
ANION GAP SERPL CALC-SCNC: 11 MMOL/L
APPEARANCE UR: CLEAR
AST SERPL-CCNC: 10 U/L (ref 5–40)
BARBITURATES UR QL SCN>300 NG/ML: NEGATIVE
BASOPHILS # BLD AUTO: 0.01 X10*3/UL (ref 0–0.1)
BASOPHILS NFR BLD AUTO: 0.2 %
BENZODIAZ UR QL SCN>300 NG/ML: NEGATIVE
BILIRUB SERPL-MCNC: 0.8 MG/DL (ref 0.1–1.2)
BILIRUB UR STRIP.AUTO-MCNC: NEGATIVE MG/DL
BUN SERPL-MCNC: 13 MG/DL (ref 8–25)
BZE UR QL SCN>300 NG/ML: NEGATIVE
CA-I BLD-SCNC: 0.83 MMOL/L (ref 1.1–1.33)
CALCIUM SERPL-MCNC: 6.6 MG/DL (ref 8.5–10.4)
CANNABINOIDS UR QL SCN>50 NG/ML: POSITIVE
CHLORIDE SERPL-SCNC: 106 MMOL/L (ref 97–107)
CO2 SERPL-SCNC: 21 MMOL/L (ref 24–31)
COLOR UR: YELLOW
CREAT SERPL-MCNC: 0.9 MG/DL (ref 0.4–1.6)
EGFRCR SERPLBLD CKD-EPI 2021: 73 ML/MIN/1.73M*2
EOSINOPHIL # BLD AUTO: 0 X10*3/UL (ref 0–0.7)
EOSINOPHIL NFR BLD AUTO: 0 %
ERYTHROCYTE [DISTWIDTH] IN BLOOD BY AUTOMATED COUNT: 16.8 % (ref 11.5–14.5)
FENTANYL+NORFENTANYL UR QL SCN: NEGATIVE
FERRITIN SERPL-MCNC: 15 NG/ML (ref 13–150)
GLUCOSE SERPL-MCNC: 102 MG/DL (ref 65–99)
GLUCOSE UR STRIP.AUTO-MCNC: NORMAL MG/DL
HCT VFR BLD AUTO: 27.5 % (ref 36–46)
HGB BLD-MCNC: 8.1 G/DL (ref 12–16)
HYALINE CASTS #/AREA URNS AUTO: ABNORMAL /LPF
IMM GRANULOCYTES # BLD AUTO: 0.04 X10*3/UL (ref 0–0.7)
IMM GRANULOCYTES NFR BLD AUTO: 0.6 % (ref 0–0.9)
IRON SATN MFR SERPL: ABNORMAL %
IRON SERPL-MCNC: <20 UG/DL (ref 30–160)
KETONES UR STRIP.AUTO-MCNC: NEGATIVE MG/DL
LEUKOCYTE ESTERASE UR QL STRIP.AUTO: NEGATIVE
LYMPHOCYTES # BLD AUTO: 0.72 X10*3/UL (ref 1.2–4.8)
LYMPHOCYTES NFR BLD AUTO: 10.9 %
MAGNESIUM SERPL-MCNC: 1.4 MG/DL (ref 1.6–3.1)
MCH RBC QN AUTO: 23.1 PG (ref 26–34)
MCHC RBC AUTO-ENTMCNC: 29.5 G/DL (ref 32–36)
MCV RBC AUTO: 79 FL (ref 80–100)
METHADONE UR QL SCN>300 NG/ML: NEGATIVE
MONOCYTES # BLD AUTO: 0.4 X10*3/UL (ref 0.1–1)
MONOCYTES NFR BLD AUTO: 6.1 %
MUCOUS THREADS #/AREA URNS AUTO: ABNORMAL /LPF
NEUTROPHILS # BLD AUTO: 5.43 X10*3/UL (ref 1.2–7.7)
NEUTROPHILS NFR BLD AUTO: 82.2 %
NITRITE UR QL STRIP.AUTO: NEGATIVE
NRBC BLD-RTO: 0 /100 WBCS (ref 0–0)
OPIATES UR QL SCN>300 NG/ML: NEGATIVE
OXYCODONE UR QL: NEGATIVE
PCP UR QL SCN>25 NG/ML: POSITIVE
PH UR STRIP.AUTO: 5.5 [PH]
PLATELET # BLD AUTO: 116 X10*3/UL (ref 150–450)
POTASSIUM SERPL-SCNC: 4 MMOL/L (ref 3.4–5.1)
PROT SERPL-MCNC: 6.3 G/DL (ref 5.9–7.9)
PROT UR STRIP.AUTO-MCNC: ABNORMAL MG/DL
RBC # BLD AUTO: 3.5 X10*6/UL (ref 4–5.2)
RBC # UR STRIP.AUTO: NEGATIVE /UL
RBC #/AREA URNS AUTO: ABNORMAL /HPF
SODIUM SERPL-SCNC: 138 MMOL/L (ref 133–145)
SP GR UR STRIP.AUTO: 1.03
SQUAMOUS #/AREA URNS AUTO: ABNORMAL /HPF
TIBC SERPL-MCNC: ABNORMAL UG/DL
UIBC SERPL-MCNC: 337 UG/DL (ref 110–370)
UROBILINOGEN UR STRIP.AUTO-MCNC: ABNORMAL MG/DL
WBC # BLD AUTO: 6.6 X10*3/UL (ref 4.4–11.3)
WBC #/AREA URNS AUTO: ABNORMAL /HPF

## 2024-07-06 PROCEDURE — 2060000001 HC INTERMEDIATE ICU ROOM DAILY

## 2024-07-06 PROCEDURE — 85025 COMPLETE CBC W/AUTO DIFF WBC: CPT | Performed by: INTERNAL MEDICINE

## 2024-07-06 PROCEDURE — 82728 ASSAY OF FERRITIN: CPT | Performed by: INTERNAL MEDICINE

## 2024-07-06 PROCEDURE — 2500000004 HC RX 250 GENERAL PHARMACY W/ HCPCS (ALT 636 FOR OP/ED): Performed by: INTERNAL MEDICINE

## 2024-07-06 PROCEDURE — 2500000001 HC RX 250 WO HCPCS SELF ADMINISTERED DRUGS (ALT 637 FOR MEDICARE OP): Performed by: STUDENT IN AN ORGANIZED HEALTH CARE EDUCATION/TRAINING PROGRAM

## 2024-07-06 PROCEDURE — 97161 PT EVAL LOW COMPLEX 20 MIN: CPT | Mod: GP

## 2024-07-06 PROCEDURE — 83735 ASSAY OF MAGNESIUM: CPT | Performed by: INTERNAL MEDICINE

## 2024-07-06 PROCEDURE — 2500000001 HC RX 250 WO HCPCS SELF ADMINISTERED DRUGS (ALT 637 FOR MEDICARE OP): Performed by: INTERNAL MEDICINE

## 2024-07-06 PROCEDURE — 2500000005 HC RX 250 GENERAL PHARMACY W/O HCPCS: Performed by: INTERNAL MEDICINE

## 2024-07-06 PROCEDURE — 80053 COMPREHEN METABOLIC PANEL: CPT | Performed by: INTERNAL MEDICINE

## 2024-07-06 PROCEDURE — 82330 ASSAY OF CALCIUM: CPT | Performed by: INTERNAL MEDICINE

## 2024-07-06 PROCEDURE — 83540 ASSAY OF IRON: CPT | Performed by: INTERNAL MEDICINE

## 2024-07-06 PROCEDURE — 99222 1ST HOSP IP/OBS MODERATE 55: CPT | Performed by: INTERNAL MEDICINE

## 2024-07-06 PROCEDURE — 97165 OT EVAL LOW COMPLEX 30 MIN: CPT | Mod: GO

## 2024-07-06 RX ORDER — LANOLIN ALCOHOL/MO/W.PET/CERES
400 CREAM (GRAM) TOPICAL 2 TIMES DAILY
Status: DISCONTINUED | OUTPATIENT
Start: 2024-07-06 | End: 2024-07-07

## 2024-07-06 RX ORDER — LOPERAMIDE HYDROCHLORIDE 2 MG/1
2 CAPSULE ORAL 2 TIMES DAILY PRN
Status: DISCONTINUED | OUTPATIENT
Start: 2024-07-06 | End: 2024-07-07 | Stop reason: HOSPADM

## 2024-07-06 RX ORDER — MAGNESIUM SULFATE HEPTAHYDRATE 40 MG/ML
2 INJECTION, SOLUTION INTRAVENOUS ONCE
Status: COMPLETED | OUTPATIENT
Start: 2024-07-06 | End: 2024-07-06

## 2024-07-06 RX ORDER — DEXTROSE 50 % IN WATER (D50W) INTRAVENOUS SYRINGE
12.5
Status: DISCONTINUED | OUTPATIENT
Start: 2024-07-06 | End: 2024-07-07 | Stop reason: HOSPADM

## 2024-07-06 RX ORDER — DILTIAZEM HYDROCHLORIDE 180 MG/1
180 CAPSULE, COATED, EXTENDED RELEASE ORAL DAILY
Status: DISCONTINUED | OUTPATIENT
Start: 2024-07-06 | End: 2024-07-07 | Stop reason: HOSPADM

## 2024-07-06 RX ORDER — TALC
3 POWDER (GRAM) TOPICAL NIGHTLY PRN
Status: DISCONTINUED | OUTPATIENT
Start: 2024-07-06 | End: 2024-07-07 | Stop reason: HOSPADM

## 2024-07-06 RX ORDER — DEXTROSE 50 % IN WATER (D50W) INTRAVENOUS SYRINGE
25
Status: DISCONTINUED | OUTPATIENT
Start: 2024-07-06 | End: 2024-07-07 | Stop reason: HOSPADM

## 2024-07-06 RX ORDER — METOPROLOL TARTRATE 1 MG/ML
5 INJECTION, SOLUTION INTRAVENOUS EVERY 6 HOURS PRN
Status: DISCONTINUED | OUTPATIENT
Start: 2024-07-06 | End: 2024-07-07 | Stop reason: HOSPADM

## 2024-07-06 RX ORDER — FERROUS SULFATE 325(65) MG
65 TABLET ORAL
Status: DISCONTINUED | OUTPATIENT
Start: 2024-07-07 | End: 2024-07-07 | Stop reason: HOSPADM

## 2024-07-06 RX ADMIN — Medication 400 MG: at 21:13

## 2024-07-06 RX ADMIN — Medication 2 TABLET: at 09:58

## 2024-07-06 RX ADMIN — LOPERAMIDE HYDROCHLORIDE 2 MG: 2 CAPSULE ORAL at 16:47

## 2024-07-06 RX ADMIN — PANTOPRAZOLE SODIUM 40 MG: 40 TABLET, DELAYED RELEASE ORAL at 09:58

## 2024-07-06 RX ADMIN — HEPARIN SODIUM 5000 UNITS: 5000 INJECTION, SOLUTION INTRAVENOUS; SUBCUTANEOUS at 00:27

## 2024-07-06 RX ADMIN — HYDROXYCHLOROQUINE SULFATE 200 MG: 200 TABLET ORAL at 09:58

## 2024-07-06 RX ADMIN — VENLAFAXINE HYDROCHLORIDE 150 MG: 150 CAPSULE, EXTENDED RELEASE ORAL at 00:28

## 2024-07-06 RX ADMIN — ASPIRIN 81 MG: 81 TABLET, COATED ORAL at 09:58

## 2024-07-06 RX ADMIN — Medication 2 TABLET: at 00:27

## 2024-07-06 RX ADMIN — HYDROXYCHLOROQUINE SULFATE 200 MG: 200 TABLET ORAL at 00:27

## 2024-07-06 RX ADMIN — ASPIRIN 81 MG: 81 TABLET, COATED ORAL at 00:28

## 2024-07-06 RX ADMIN — Medication 3 MG: at 21:12

## 2024-07-06 RX ADMIN — Medication 2 TABLET: at 21:12

## 2024-07-06 RX ADMIN — Medication 4 L/MIN: at 20:00

## 2024-07-06 RX ADMIN — Medication 4 L/MIN: at 08:00

## 2024-07-06 RX ADMIN — MAGNESIUM SULFATE HEPTAHYDRATE 2 G: 40 INJECTION, SOLUTION INTRAVENOUS at 10:26

## 2024-07-06 RX ADMIN — HYDROXYCHLOROQUINE SULFATE 200 MG: 200 TABLET ORAL at 21:13

## 2024-07-06 RX ADMIN — Medication 400 MG: at 11:25

## 2024-07-06 RX ADMIN — DILTIAZEM HYDROCHLORIDE 180 MG: 180 CAPSULE, COATED, EXTENDED RELEASE ORAL at 11:25

## 2024-07-06 SDOH — HEALTH STABILITY: PHYSICAL HEALTH: ON AVERAGE, HOW MANY MINUTES DO YOU ENGAGE IN EXERCISE AT THIS LEVEL?: PATIENT DECLINED

## 2024-07-06 SDOH — SOCIAL STABILITY: SOCIAL INSECURITY: ABUSE: ADULT

## 2024-07-06 SDOH — SOCIAL STABILITY: SOCIAL INSECURITY: HAVE YOU HAD ANY THOUGHTS OF HARMING ANYONE ELSE?: NO

## 2024-07-06 SDOH — ECONOMIC STABILITY: HOUSING INSECURITY
IN THE LAST 12 MONTHS, WAS THERE A TIME WHEN YOU DID NOT HAVE A STEADY PLACE TO SLEEP OR SLEPT IN A SHELTER (INCLUDING NOW)?: NO

## 2024-07-06 SDOH — SOCIAL STABILITY: SOCIAL INSECURITY: ARE YOU OR HAVE YOU BEEN THREATENED OR ABUSED PHYSICALLY, EMOTIONALLY, OR SEXUALLY BY ANYONE?: NO

## 2024-07-06 SDOH — SOCIAL STABILITY: SOCIAL INSECURITY: HAVE YOU HAD THOUGHTS OF HARMING ANYONE ELSE?: NO

## 2024-07-06 SDOH — SOCIAL STABILITY: SOCIAL INSECURITY: DO YOU FEEL ANYONE HAS EXPLOITED OR TAKEN ADVANTAGE OF YOU FINANCIALLY OR OF YOUR PERSONAL PROPERTY?: NO

## 2024-07-06 SDOH — ECONOMIC STABILITY: INCOME INSECURITY: HOW HARD IS IT FOR YOU TO PAY FOR THE VERY BASICS LIKE FOOD, HOUSING, MEDICAL CARE, AND HEATING?: SOMEWHAT HARD

## 2024-07-06 SDOH — ECONOMIC STABILITY: FOOD INSECURITY: WITHIN THE PAST 12 MONTHS, THE FOOD YOU BOUGHT JUST DIDN'T LAST AND YOU DIDN'T HAVE MONEY TO GET MORE.: NEVER TRUE

## 2024-07-06 SDOH — ECONOMIC STABILITY: TRANSPORTATION INSECURITY
IN THE PAST 12 MONTHS, HAS LACK OF TRANSPORTATION KEPT YOU FROM MEETINGS, WORK, OR FROM GETTING THINGS NEEDED FOR DAILY LIVING?: NO

## 2024-07-06 SDOH — SOCIAL STABILITY: SOCIAL INSECURITY: WERE YOU ABLE TO COMPLETE ALL THE BEHAVIORAL HEALTH SCREENINGS?: YES

## 2024-07-06 SDOH — ECONOMIC STABILITY: HOUSING INSECURITY: IN THE LAST 12 MONTHS, HOW MANY PLACES HAVE YOU LIVED?: 1

## 2024-07-06 SDOH — SOCIAL STABILITY: SOCIAL INSECURITY: HAS ANYONE EVER THREATENED TO HURT YOUR FAMILY OR YOUR PETS?: NO

## 2024-07-06 SDOH — ECONOMIC STABILITY: INCOME INSECURITY: IN THE LAST 12 MONTHS, WAS THERE A TIME WHEN YOU WERE NOT ABLE TO PAY THE MORTGAGE OR RENT ON TIME?: PATIENT DECLINED

## 2024-07-06 SDOH — SOCIAL STABILITY: SOCIAL INSECURITY: DO YOU FEEL UNSAFE GOING BACK TO THE PLACE WHERE YOU ARE LIVING?: NO

## 2024-07-06 SDOH — ECONOMIC STABILITY: FOOD INSECURITY: WITHIN THE PAST 12 MONTHS, YOU WORRIED THAT YOUR FOOD WOULD RUN OUT BEFORE YOU GOT MONEY TO BUY MORE.: NEVER TRUE

## 2024-07-06 SDOH — HEALTH STABILITY: PHYSICAL HEALTH: ON AVERAGE, HOW MANY DAYS PER WEEK DO YOU ENGAGE IN MODERATE TO STRENUOUS EXERCISE (LIKE A BRISK WALK)?: 0 DAYS

## 2024-07-06 SDOH — SOCIAL STABILITY: SOCIAL INSECURITY: DOES ANYONE TRY TO KEEP YOU FROM HAVING/CONTACTING OTHER FRIENDS OR DOING THINGS OUTSIDE YOUR HOME?: NO

## 2024-07-06 SDOH — ECONOMIC STABILITY: TRANSPORTATION INSECURITY
IN THE PAST 12 MONTHS, HAS THE LACK OF TRANSPORTATION KEPT YOU FROM MEDICAL APPOINTMENTS OR FROM GETTING MEDICATIONS?: NO

## 2024-07-06 SDOH — SOCIAL STABILITY: SOCIAL INSECURITY: ARE THERE ANY APPARENT SIGNS OF INJURIES/BEHAVIORS THAT COULD BE RELATED TO ABUSE/NEGLECT?: NO

## 2024-07-06 ASSESSMENT — ACTIVITIES OF DAILY LIVING (ADL)
PATIENT'S MEMORY ADEQUATE TO SAFELY COMPLETE DAILY ACTIVITIES?: YES
DRESSING YOURSELF: INDEPENDENT
ADL_ASSISTANCE: INDEPENDENT
GROOMING: INDEPENDENT
BATHING_ASSISTANCE: STAND BY
ADL_ASSISTANCE: INDEPENDENT
JUDGMENT_ADEQUATE_SAFELY_COMPLETE_DAILY_ACTIVITIES: YES
BATHING: INDEPENDENT
WALKS IN HOME: INDEPENDENT
ADEQUATE_TO_COMPLETE_ADL: YES
HEARING - RIGHT EAR: FUNCTIONAL
TOILETING: INDEPENDENT
HEARING - LEFT EAR: FUNCTIONAL
FEEDING YOURSELF: INDEPENDENT

## 2024-07-06 ASSESSMENT — COGNITIVE AND FUNCTIONAL STATUS - GENERAL
MOBILITY SCORE: 20
DRESSING REGULAR LOWER BODY CLOTHING: A LITTLE
PATIENT BASELINE BEDBOUND: NO
HELP NEEDED FOR BATHING: A LITTLE
STANDING UP FROM CHAIR USING ARMS: A LITTLE
WALKING IN HOSPITAL ROOM: A LITTLE
DAILY ACTIVITIY SCORE: 19
DAILY ACTIVITIY SCORE: 19
MOBILITY SCORE: 20
DRESSING REGULAR LOWER BODY CLOTHING: A LITTLE
WALKING IN HOSPITAL ROOM: A LITTLE
MOVING TO AND FROM BED TO CHAIR: A LITTLE
STANDING UP FROM CHAIR USING ARMS: A LITTLE
MOBILITY SCORE: 24
DRESSING REGULAR UPPER BODY CLOTHING: A LITTLE
TOILETING: A LITTLE
TOILETING: A LITTLE
PERSONAL GROOMING: A LITTLE
MOBILITY SCORE: 24
DRESSING REGULAR UPPER BODY CLOTHING: A LITTLE
HELP NEEDED FOR BATHING: A LITTLE
CLIMB 3 TO 5 STEPS WITH RAILING: A LITTLE
CLIMB 3 TO 5 STEPS WITH RAILING: A LITTLE
MOVING TO AND FROM BED TO CHAIR: A LITTLE
DAILY ACTIVITIY SCORE: 24
DAILY ACTIVITIY SCORE: 24
PERSONAL GROOMING: A LITTLE

## 2024-07-06 ASSESSMENT — PAIN - FUNCTIONAL ASSESSMENT
PAIN_FUNCTIONAL_ASSESSMENT: 0-10

## 2024-07-06 ASSESSMENT — LIFESTYLE VARIABLES
AUDIT-C TOTAL SCORE: 0
HOW OFTEN DO YOU HAVE A DRINK CONTAINING ALCOHOL: NEVER
AUDIT-C TOTAL SCORE: 0
HOW OFTEN DO YOU HAVE 6 OR MORE DRINKS ON ONE OCCASION: NEVER
SUBSTANCE_ABUSE_PAST_12_MONTHS: NO
HOW MANY STANDARD DRINKS CONTAINING ALCOHOL DO YOU HAVE ON A TYPICAL DAY: PATIENT DOES NOT DRINK
SKIP TO QUESTIONS 9-10: 1
PRESCIPTION_ABUSE_PAST_12_MONTHS: NO

## 2024-07-06 ASSESSMENT — PAIN SCALES - GENERAL
PAINLEVEL_OUTOF10: 0 - NO PAIN
PAINLEVEL_OUTOF10: 0 - NO PAIN
PAINLEVEL_OUTOF10: 8
PAINLEVEL_OUTOF10: 8

## 2024-07-06 NOTE — PROGRESS NOTES
Physical Therapy    Physical Therapy Evaluation    Patient Name: Ronna Beckham  MRN: [id number]  Today's Date: 7/6/2024   Time Calculation  Start Time: 1036  Stop Time: 1056  Time Calculation (min): 20 min    Assessment/Plan   PT Assessment  PT Assessment Results: Decreased strength, Decreased endurance, Impaired balance, Decreased mobility, Decreased safety awareness  Rehab Prognosis: Good  Barriers to Discharge: stairs in home, no first floor bathroom, premorbid had difficulty managing stairs, stays up in bedroom most of time  Evaluation/Treatment Tolerance: Patient limited by fatigue  Medical Staff Made Aware: Yes  Strengths: Ability to acquire knowledge, Attitude of self  Barriers to Participation: Comorbidities  End of Session Communication: Bedside nurse  Assessment Comment: 63 yr old female admitted with sinus tachycardia who requires close Supervision for safe mobility. Patient is limited due to HR and breathing. Recommend low intensity rehab follow up post acute discharge for continued strengthening, endurance, and functional activity tolerance.  End of Session Patient Position: Bed, 2 rail up, Alarm off, not on at start of session  IP OR SWING BED PT PLAN  Inpatient or Swing Bed: Inpatient  PT Plan  Treatment/Interventions: Bed mobility, Transfer training, Gait training, Balance training, Strengthening, Endurance training, Therapeutic exercise, Therapeutic activity  PT Plan: Ongoing PT  PT Frequency: 4 times per week  PT Discharge Recommendations: Low intensity level of continued care  Equipment Recommended upon Discharge: Wheeled walker (for energy conservation)  PT Recommended Transfer Status: Stand by assist  PT - OK to Discharge: Yes      Subjective   General Visit Information:  General  Reason for Referral: Impaired mobility, to ED for SOB, sinus tachycardia  Referred By: Dr. Taylor  Past Medical History Relevant to Rehab: HTN, lupus, substance use disorder, GERD, anxiety , depression,  epilepsy  Prior to Session Communication: Bedside nurse  Patient Position Received: Bed, 2 rail up, Alarm off, not on at start of session (nursing student with patient in bathroom)  Preferred Learning Style: auditory, verbal  General Comment: 63 year old female with hx of pulmonary HTN an on chronic oxygen present ot ED for SOB, sinus tachycardia  Home Living:  Home Living  Type of Home: [facility]  Lives With:  (16 yr old granddaughter)  Home Adaptive Equipment:  (none)  Home Layout: Multi-level, Laundry in basement  Home Access: Stairs to enter with rails  Entrance Stairs-Rails: None  Entrance Stairs-Number of Steps: 2  Bathroom Shower/Tub: Tub/shower unit  Bathroom Toilet: Standard  Bathroom Equipment: None  Home Living Comments: lives with 16 year old granddaughter that she cares for, in [facility]  Prior Level of Function:  Prior Function Per Pt/Caregiver Report  Level of [place]: Independent with ADLs and functional transfers  Receives Help From: Family  ADL Assistance: Independent  Homemaking Assistance: Needs assistance  Shopping:  (shares with daughter and granddaughter)  Ambulatory Assistance: Independent  Prior Function Comments: Independent for mobility, granddaughter drives, patient assists as able with homemaking, does grocery shopping with use of grocery cart 1x a week, granddaughter and daughter assist  Precautions:  Precautions  Hearing/Visual Limitations: none  Medical Precautions: Fall precautions, Seizure precautions  Vital Signs:  Vital Signs  Heart Rate: 105  Heart Rate Source: Monitor  SpO2: 95 %  BP: 106/83  MAP (mmHg): 91  Patient Position: Other (Comment) (post ambulation)    Objective   Pain:  Pain Assessment  Pain Assessment: 0-10  0-10 (Numeric) Pain Score: 8  Pain Type: Chronic pain (RN is aware)  Pain Location:  (band around abdomen.)  Cognition:  Cognition  Overall Cognitive Status: Within Functional Limits    General Assessments:  General Observation  General Observation: fatigues with  activity, +SOB               Activity Tolerance  Endurance: Decreased tolerance for upright activites  Early Mobility/Exercise Safety Screen: Proceed with mobilization - No exclusion criteria met    Sensation  Light Touch: No apparent deficits  Sensation Comment: denies paresthesias B Ue and B LE    Strength  Strength Comments: BLE grossly 3+/5, ROM is WFL  Coordination  Movements are Fluid and Coordinated: Yes  Coordination Comment: Intact    Postural Control  Postural Control: Within Functional Limits    Static Sitting Balance  Static Sitting-Level of Assistance: Independent  Dynamic Sitting Balance  Dynamic Sitting-Balance:  (Independent)    Static Standing Balance  Static Standing-Level of Assistance: Close supervision  Static Standing-Comment/Number of Minutes: no device, no UE support  Dynamic Standing Balance  Dynamic Standing-Balance: Turning  Dynamic Standing-Comments: no UE support  Functional Assessments:  Bed Mobility  Bed Mobility: Yes  Bed Mobility 1  Bed Mobility 1: Supine to sitting, Sitting to supine (HOB elevated to 40 degrees, upon return to bed, patient climbs in onto hands and knees and then transitions to back)  Level of Assistance 1: Modified independent    Transfer 1  Technique 1: Sit to stand, Stand to sit  Transfer Level of Assistance 1: Close supervision  Trials/Comments 1: moves quickly, cues for safe technique    Ambulation/Gait Training 1  Surface 1: Level tile  Device 1: No device  Assistance 1: Close supervision  Quality of Gait 1: Soft knee(s)  Comments/Distance (ft) 1: fatigues with distance, +SOB, spO2 95% and  bpm post ambulation, recovers to 95 bpm  Extremity/Trunk Assessments:  RLE   RLE :  (hip, knee, ankle 3+/5, ROM WFL)  LLE   LLE :  (hip, knee, ankle 3+/5, ROM WFL)  Outcome Measures:  [facility] Basic Mobility  Turning from your back to your side while in a flat bed without using bedrails: None  Moving from lying on your back to sitting on the side of a flat bed without  using bedrails: None  Moving to and from bed to chair (including a wheelchair): A little  Standing up from a chair using your arms (e.g. wheelchair or bedside chair): A little  To walk in hospital room: A little  Climbing 3-5 steps with railing: A little  Basic Mobility - Total Score: 20    Encounter Problems       Encounter Problems (Active)       PT Goals       Patient will transfer bed to chair and chair to bed with independent assist to facilitate mobility.  (Progressing)       Start:  07/06/24            Patient will amb 150 feet with least restrictive device including two turns on even surface with independent assist to facilitate safe mobility maintaining HR below 100 bpm and spO2 95% with supplemental oxygen. (Progressing)       Start:  07/06/24                   Education Documentation  Mobility Training, taught by Tabatha Davidson, PT at 7/6/2024 11:42 AM.  Learner: Patient  Readiness: Acceptance  Method: Explanation  Response: Verbalizes Understanding  Comment: Education PT purpose, POC, safe mobility, energy conservation techniques    Education Comments  No comments found.

## 2024-07-06 NOTE — CONSULTS
"Inpatient consult to Pulmonology  Consult performed by: Froy Nelson MD  Consult ordered by: Ryan Tao MD          Pulmonary Consult    Reason For Consult    History Of Present Illness  Ronna Beckham is a 63 y.o. female presenting with SVT (supraventricular tachycardia) (CMS-[facility]).     Past Medical History  She has a past medical history of Anxiety, Depression, GERD (gastroesophageal reflux disease), Lupus (Multi), Personal history of other specified conditions (09/21/2020), and Pulmonary hypertension (Multi).    Surgical History  She has a past surgical history that includes Other surgical history (09/21/2020); Other surgical history (09/21/2020); Other surgical history (09/21/2020); Other surgical history (09/21/2020); Other surgical history (09/21/2020); Hysterectomy; Cholecystectomy; and Cardiac catheterization (N/A, 1/8/2024).     Social History  She reports that she has quit smoking. Her smoking use included cigarettes. She has quit using smokeless tobacco. She reports that she does not currently use alcohol. She reports that she does not currently use drugs.      Family History  Family History   Problem Relation Name Age of Onset    No Known Problems Mother      No Known Problems Father          Allergies  Levetiracetam    Review of Systems   All other systems reviewed and are negative.      Last Recorded Vitals  Blood pressure 116/87, pulse 87, temperature 37 °C (98.6 °F), temperature source Temporal, resp. rate 19, height 1.651 m (5' 5\"), weight 77.5 kg (170 lb 13.7 oz), SpO2 95%.    Intake/Output    Intake/Output Summary (Last 24 hours) at 7/6/2024 0905  Last data filed at 7/6/2024 0500  Gross per 24 hour   Intake 740.64 ml   Output --   Net 740.64 ml       Physical Exam  Vitals reviewed.   Constitutional:       Appearance: Normal appearance.   HENT:      Head: Normocephalic and atraumatic.      Mouth/Throat:      Mouth: Mucous membranes are moist.   Eyes:      Extraocular Movements: " Extraocular movements intact.      Pupils: Pupils are equal, round, and reactive to light.   Cardiovascular:      Rate and Rhythm: Normal rate and regular rhythm.   Pulmonary:      Effort: Pulmonary effort is normal.      Breath sounds: Normal breath sounds and air entry.   Abdominal:      General: Abdomen is flat. Bowel sounds are normal.      Palpations: Abdomen is soft.   Musculoskeletal:         General: Normal range of motion.      Cervical back: Normal range of motion and neck supple.   Skin:     General: Skin is warm and dry.   Neurological:      General: No focal deficit present.      Mental Status: She is alert and oriented to person, place, and time. Mental status is at baseline.      ...    Oxygen Therapy  SpO2: 95 %  Medical Gas Therapy: Supplemental oxygen  O2 Delivery Method: Nasal cannula (4L)       Lines and Tubes:  CVC 04/23/24 Left Subclavian (Active)   Placement Date/Time: 04/23/24 1500   Orientation: Left  Location: Subclavian   Number of days: 73       Peripheral IV 07/05/24 20 G Left;Ventral Hand (Active)   Placement Date/Time: 07/05/24 1729   Placed by External Staff?: EMS  Size (Gauge): 20 G  Orientation: Left;Ventral  Location: Hand   Number of days: 0       Peripheral IV 07/05/24 20 G Left Antecubital (Active)   Placement Date/Time: 07/05/24 2000   Hand Hygiene Completed: Yes  Size (Gauge): 20 G  Orientation: Left  Location: Antecubital   Number of days: 0         Scheduled medications  aspirin, 81 mg, oral, Daily  calcium carbonate-vitamin D3, 2 tablet, oral, BID  hydroxychloroquine, 200 mg, oral, BID  macitentan, 10 mg, oral, Daily  magnesium sulfate, 2 g, intravenous, Once  oxygen, , inhalation, Continuous - Inhalation  pantoprazole, 40 mg, oral, Daily before breakfast  venlafaxine XR, 150 mg, oral, Daily      Continuous medications  epoprostenol, 11 ng/kg/min (Order-Specific), Last Rate: 11 ng/kg/min (07/06/24 0500)      PRN medications  PRN medications: acetaminophen **OR**  acetaminophen **OR** acetaminophen, benzocaine-menthol, dextromethorphan-guaifenesin, dextrose, dextrose, glucagon, glucagon, guaiFENesin, melatonin, metoprolol, ondansetron ODT **OR** ondansetron, polyethylene glycol    Relevant Results  Results from last 7 days   Lab Units 07/06/24  0546 07/05/24  1730   WBC AUTO x10*3/uL 6.6 6.2   HEMOGLOBIN g/dL 8.1* 8.6*   HEMATOCRIT % 27.5* 28.5*   PLATELETS AUTO x10*3/uL 116* 103*      Results from last 7 days   Lab Units 07/06/24  0546 07/05/24  1818   SODIUM mmol/L 138 138   POTASSIUM mmol/L 4.0 3.7   CHLORIDE mmol/L 106 105   CO2 mmol/L 21* 20*   BUN mg/dL 13 12   CREATININE mg/dL 0.90 0.90   GLUCOSE mg/dL 102* 98   CALCIUM mg/dL 6.6* 5.7*          XR chest 2 views 07/05/2024    Narrative  Interpreted By:  Roc Higgins,  STUDY:  XR CHEST 2 VIEWS;  7/5/2024 6:06 pm    INDICATION:  Signs/Symptoms:SOB.    COMPARISON:  Radiographs dated 05/30/2024.    ACCESSION NUMBER(S):  [id number]    ORDERING CLINICIAN:  KORI ONTIVEROS    FINDINGS:  PA and lateral radiographs of the chest were provided.    Left jugular central line overlies the superior cavoatrial junction.    CARDIOMEDIASTINAL SILHOUETTE:  Cardiomediastinal silhouette is markedly enlarged, similar in  appearance to prior exam.    LUNGS:  Pulmonary vascular congestion with some interstitial edema is  present. No consolidation or sizable pleural effusion.    ABDOMEN:  No remarkable upper abdominal findings.    BONES:  No acute osseous changes.    Impression  1. Pulmonary vascular congestion and interstitial edema without  evidence of consolidation or sizable pleural effusion.  2. Marked cardiomegaly is similar in appearance to prior exam on  05/30/2024.        MACRO:  None    Signed by: Roc Higgins 7/5/2024 6:32 PM  Dictation workstation:   [id number]       Assessment/Plan   Principal Problem:    SVT (supraventricular tachycardia) (CMS-HCC)  Active Problems:    Systemic lupus erythematosus (Multi)     Seizure disorder (Multi)    PAH (pulmonary artery hypertension) (Multi)    Hypocalcemia    Pulmonary HTN (Multi)    Presents with shortness of breath and SVT. On flolan pump at a stable dose. Suspect SVT from low magnesium and PCP ingestion.    Supraventricular Tachycardia  Question PCP ingestion and low mag  Replace Mag and electrolytes  Recent 2D-Echocardiography to evaluate for LVEF,   Cardiology consultation      Chronic respiratory failure  She is on her baseline supplemental oxygen level at 4 L     Pulmonary Arterial Hypertension   - (Veletri) Via cuffed catheter left subclavian w/ continuous Home pump.     - Opsumit 10 mg p.o. daily   - Remains on her baseline supplemental oxygen level at 3-4 L     Systemic lupus erythematosus   - Continue home hydroxychloroquine dose    Hypomagnesemia  Replace and recheck levels     Hypocalcemia  Replace and recheck levels    Monitor in ICU for another 12 hours  Possible dc in AM    Froy Nelson MD     This critically ill patient continues to be at-risk for deterioration / failure due to the above mentioned dysfunctional unstable organ systems.   Critical care time is spent at bedside includes review of diagnostic tests, labs, and radiographs, serial assessments and management of hemodynamics, respiratory status, and coordination of care with different members of interdisciplinary team  Assessment, impression and plans are reflected in the note above as well as the orders.     Critical concerns addressed:  SVT  Pulmonary HTN  Electrolyte Abnormalities     Time Spent in critical Care, exclusive of procedures : 30 mins.     Disclaimer: Parts of this chart were dictated with voice recognition software. Please excuse any errors of grammar, spelling, or transcription which are not corrected. Please contact me with any questions regarding documentation.

## 2024-07-06 NOTE — PROGRESS NOTES
Ronna Beckham is a 63 y.o. female on day 1 of admission presenting with SVT (supraventricular tachycardia) (CMS-[facility]).      Subjective   Patient overall doing okay, complains of chronic pain.  No new chest pain or shortness of breath this morning.     Objective     Last Recorded Vitals  /85   Pulse 86   Temp 36 °C (96.8 °F)   Resp 19   Wt 77.5 kg (170 lb 13.7 oz)   SpO2 95%   Intake/Output last 3 Shifts:    Intake/Output Summary (Last 24 hours) at 7/6/2024 1030  Last data filed at 7/6/2024 0500  Gross per 24 hour   Intake 740.64 ml   Output --   Net 740.64 ml       Admission Weight  Weight: 77.5 kg (170 lb 13.7 oz) (07/05/24 1736)    Daily Weight  07/05/24 : 77.5 kg (170 lb 13.7 oz)    Image Results  XR chest 2 views  Narrative: Interpreted By:  Roc Higgins,   STUDY:  XR CHEST 2 VIEWS;  7/5/2024 6:06 pm      INDICATION:  Signs/Symptoms:SOB.      COMPARISON:  Radiographs dated 05/30/2024.      ACCESSION NUMBER(S):  [id number]      ORDERING CLINICIAN:  KORI ONTIVEROS      FINDINGS:  PA and lateral radiographs of the chest were provided.      Left jugular central line overlies the superior cavoatrial junction.      CARDIOMEDIASTINAL SILHOUETTE:  Cardiomediastinal silhouette is markedly enlarged, similar in  appearance to prior exam.      LUNGS:  Pulmonary vascular congestion with some interstitial edema is  present. No consolidation or sizable pleural effusion.      ABDOMEN:  No remarkable upper abdominal findings.      BONES:  No acute osseous changes.      Impression: 1. Pulmonary vascular congestion and interstitial edema without  evidence of consolidation or sizable pleural effusion.  2. Marked cardiomegaly is similar in appearance to prior exam on  05/30/2024.              MACRO:  None      Signed by: Roc Higgins 7/5/2024 6:32 PM  Dictation workstation:   [id number]      Physical Exam  Generally no acute distress  HEENT PERRL EOMI  Cardiovascular S1-S2 RRR with a 2 out of 6  systolic ejection murmur  Lungs generally clear to auscultation bilaterally  Abdomen nontender bowel sounds present   Extremities no clubbing cyanosis edema    Relevant Results  Scheduled medications  aspirin, 81 mg, oral, Daily  calcium carbonate-vitamin D3, 2 tablet, oral, BID  dilTIAZem CD, 180 mg, oral, Daily  [START ON 7/7/2024] ferrous sulfate (325 mg ferrous sulfate), 65 mg of iron, oral, Daily with breakfast  hydroxychloroquine, 200 mg, oral, BID  macitentan, 10 mg, oral, Daily  magnesium oxide, 400 mg, oral, BID  magnesium sulfate, 2 g, intravenous, Once  oxygen, , inhalation, Continuous - Inhalation  pantoprazole, 40 mg, oral, Daily before breakfast  venlafaxine XR, 150 mg, oral, Daily      Continuous medications  epoprostenol, 11 ng/kg/min (Order-Specific), Last Rate: 11 ng/kg/min (07/06/24 0500)      PRN medications  PRN medications: acetaminophen **OR** acetaminophen **OR** acetaminophen, benzocaine-menthol, dextromethorphan-guaifenesin, dextrose, dextrose, glucagon, glucagon, guaiFENesin, melatonin, metoprolol, ondansetron ODT **OR** ondansetron, polyethylene glycol  Results for orders placed or performed during the hospital encounter of 07/05/24 (from the past 24 hour(s))   CBC and Auto Differential   Result Value Ref Range    WBC 6.2 4.4 - 11.3 x10*3/uL    nRBC 0.0 0.0 - 0.0 /100 WBCs    RBC 3.71 (L) 4.00 - 5.20 x10*6/uL    Hemoglobin 8.6 (L) 12.0 - 16.0 g/dL    Hematocrit 28.5 (L) 36.0 - 46.0 %    MCV 77 (L) 80 - 100 fL    MCH 23.2 (L) 26.0 - 34.0 pg    MCHC 30.2 (L) 32.0 - 36.0 g/dL    RDW 16.8 (H) 11.5 - 14.5 %    Platelets 103 (L) 150 - 450 x10*3/uL    Neutrophils % 77.7 40.0 - 80.0 %    Immature Granulocytes %, Automated 0.6 0.0 - 0.9 %    Lymphocytes % 18.1 13.0 - 44.0 %    Monocytes % 3.2 2.0 - 10.0 %    Eosinophils % 0.2 0.0 - 6.0 %    Basophils % 0.2 0.0 - 2.0 %    Neutrophils Absolute 4.85 1.20 - 7.70 x10*3/uL    Immature Granulocytes Absolute, Automated 0.04 0.00 - 0.70 x10*3/uL     Lymphocytes Absolute 1.13 (L) 1.20 - 4.80 x10*3/uL    Monocytes Absolute 0.20 0.10 - 1.00 x10*3/uL    Eosinophils Absolute 0.01 0.00 - 0.70 x10*3/uL    Basophils Absolute 0.01 0.00 - 0.10 x10*3/uL   Magnesium   Result Value Ref Range    Magnesium 0.40 (L) 1.60 - 3.10 mg/dL   TSH with reflex to Free T4 if abnormal   Result Value Ref Range    Thyroid Stimulating Hormone 2.62 0.27 - 4.20 mIU/L   Basic metabolic panel   Result Value Ref Range    Glucose 98 65 - 99 mg/dL    Sodium 138 133 - 145 mmol/L    Potassium 3.7 3.4 - 5.1 mmol/L    Chloride 105 97 - 107 mmol/L    Bicarbonate 20 (L) 24 - 31 mmol/L    Urea Nitrogen 12 8 - 25 mg/dL    Creatinine 0.90 0.40 - 1.60 mg/dL    eGFR 73 >60 mL/min/1.73m*2    Calcium 5.7 (LL) 8.5 - 10.4 mg/dL    Anion Gap 13 <=19 mmol/L   Serial Troponin, Initial ([facility])   Result Value Ref Range    Troponin T, High Sensitivity 19 (HH) <=14 ng/L   Serial Troponin, 2 Hour ([facility])   Result Value Ref Range    Troponin T, High Sensitivity 26 (HH) <=14 ng/L   Calcium, Ionized   Result Value Ref Range    POCT Calcium, Ionized 0.76 (L) 1.1 - 1.33 mmol/L   Drug Screen, Urine   Result Value Ref Range    Amphetamine Screen, Urine Negative      Barbiturate Screen, Urine Negative      Benzodiazepines Screen, Urine Negative      Cannabinoid Screen, Urine Positive (A)      Cocaine Metabolite Screen, Urine Negative      Fentanyl Screen, Urine Negative       Methadone Screen, Urine Negative      Opiate Screen, Urine Negative      Oxycodone Screen, Urine Negative      PCP Screen, Urine Positive (A)     Urinalysis with Reflex Microscopic   Result Value Ref Range    Color, Urine Yellow Light-Yellow, Yellow, Dark-Yellow    Appearance, Urine Clear Clear    Specific Gravity, Urine 1.031 1.005 - 1.035    pH, Urine 5.5 5.0, 5.5, 6.0, 6.5, 7.0, 7.5, 8.0    Protein, Urine 100 (2+) (A) NEGATIVE, 10 (TRACE), 20 (TRACE) mg/dL    Glucose, Urine Normal Normal mg/dL    Blood, Urine NEGATIVE NEGATIVE    Ketones, Urine NEGATIVE  NEGATIVE mg/dL    Bilirubin, Urine NEGATIVE NEGATIVE    Urobilinogen, Urine 2 (1+) (A) Normal mg/dL    Nitrite, Urine NEGATIVE NEGATIVE    Leukocyte Esterase, Urine NEGATIVE NEGATIVE   Microscopic Only, Urine   Result Value Ref Range    WBC, Urine 6-10 (A) 1-5, NONE /HPF    RBC, Urine 1-2 NONE, 1-2, 3-5 /HPF    Squamous Epithelial Cells, Urine 1-9 (SPARSE) Reference range not established. /HPF    Mucus, Urine 1+ Reference range not established. /LPF    Hyaline Casts, Urine 1+ (A) NONE /LPF   CBC and Auto Differential   Result Value Ref Range    WBC 6.6 4.4 - 11.3 x10*3/uL    nRBC 0.0 0.0 - 0.0 /100 WBCs    RBC 3.50 (L) 4.00 - 5.20 x10*6/uL    Hemoglobin 8.1 (L) 12.0 - 16.0 g/dL    Hematocrit 27.5 (L) 36.0 - 46.0 %    MCV 79 (L) 80 - 100 fL    MCH 23.1 (L) 26.0 - 34.0 pg    MCHC 29.5 (L) 32.0 - 36.0 g/dL    RDW 16.8 (H) 11.5 - 14.5 %    Platelets 116 (L) 150 - 450 x10*3/uL    Neutrophils % 82.2 40.0 - 80.0 %    Immature Granulocytes %, Automated 0.6 0.0 - 0.9 %    Lymphocytes % 10.9 13.0 - 44.0 %    Monocytes % 6.1 2.0 - 10.0 %    Eosinophils % 0.0 0.0 - 6.0 %    Basophils % 0.2 0.0 - 2.0 %    Neutrophils Absolute 5.43 1.20 - 7.70 x10*3/uL    Immature Granulocytes Absolute, Automated 0.04 0.00 - 0.70 x10*3/uL    Lymphocytes Absolute 0.72 (L) 1.20 - 4.80 x10*3/uL    Monocytes Absolute 0.40 0.10 - 1.00 x10*3/uL    Eosinophils Absolute 0.00 0.00 - 0.70 x10*3/uL    Basophils Absolute 0.01 0.00 - 0.10 x10*3/uL   Comprehensive metabolic panel   Result Value Ref Range    Glucose 102 (H) 65 - 99 mg/dL    Sodium 138 133 - 145 mmol/L    Potassium 4.0 3.4 - 5.1 mmol/L    Chloride 106 97 - 107 mmol/L    Bicarbonate 21 (L) 24 - 31 mmol/L    Urea Nitrogen 13 8 - 25 mg/dL    Creatinine 0.90 0.40 - 1.60 mg/dL    eGFR 73 >60 mL/min/1.73m*2    Calcium 6.6 (L) 8.5 - 10.4 mg/dL    Albumin 3.4 (L) 3.5 - 5.0 g/dL    Alkaline Phosphatase 72 35 - 125 U/L    Total Protein 6.3 5.9 - 7.9 g/dL    AST 10 5 - 40 U/L    Bilirubin, Total 0.8 0.1 -  1.2 mg/dL    ALT 7 5 - 40 U/L    Anion Gap 11 <=19 mmol/L   Magnesium   Result Value Ref Range    Magnesium 1.40 (L) 1.60 - 3.10 mg/dL   Calcium, ionized   Result Value Ref Range    POCT Calcium, Ionized 0.83 (L) 1.1 - 1.33 mmol/L   Iron and TIBC   Result Value Ref Range    Iron <20 (L) 30 - 160 ug/dL    UIBC 337 110 - 370 ug/dL    TIBC      % Saturation     Ferritin   Result Value Ref Range    Ferritin 15 13 - 150 ng/mL     Impression: 63-year-old woman admitted with SVT in the setting of severe pulmonary hypertension.    Plan:    1.  SVT  -Appreciate cardiology input, p.o. Cardizem started  -Patient with urine tox positive for PCP, patient denied knowing anything about this.  She states that she did take a gummy yesterday but pretty adamant that it was not laced with anything, in any case discussion around amphetamine use and cocaine use discussed as patient does have history.  -Replace magnesium this morning, continue to follow electrolytes    2.  Severe pulm hypertension  -Appreciate pulmonary input, continue with current infusion of Veletri as well as Opsumit 10 mg daily and home oxygen    3.  Iron deficiency anemia  -Iron replacement           Assessment/Plan   This patient currently has cardiac telemetry ordered; if you would like to modify or discontinue the telemetry order, click here to go to the orders activity to modify/discontinue the order.              Principal Problem:    SVT (supraventricular tachycardia) (CMS-[facility])  Active Problems:    Systemic lupus erythematosus (Multi)    Seizure disorder (Multi)    PAH (pulmonary artery hypertension) (Multi)    Hypocalcemia    Pulmonary HTN (Multi)                  Tato Fried MD

## 2024-07-06 NOTE — CONSULTS
Consults  History Of Present Illness:    Ronna Beckham is a 63 y.o. female presenting with shortness of breath and SVT.    The patient is a unfortunate upper middle-aged white female who does have severe pulmonary hypertension with COPD requiring continuous nasal oxygen.  The patient is being followed by the pulmonary service at [facility].  The patient has been on chronic Veletri infusions for the pulmonary hypertension.  The patient was admitted to [facility] on 1/8/2024 with shortness of breath slight troponin elevation.  At that time the patient had evidently been utilizing cocaine.  Her EKG showed T wave inversions in the anterior precordial leads possibly related to her pulmonary hypertension and right ventricular hypertrophy with strain versus demand ischemia.  The patient underwent a cardiac catheterization on 1/8/2024 which demonstrated the following results consistent with nonobstructive coronary artery disease:     LMCA-large-caliber vessel that bifurcates in the LAD and left circumflex arteries.  It contains luminal irregularities.     LAD-also large-caliber vessel gives rise to a moderate-sized first diagonal and subsequent small diagonal branches as well as a recurrent apical branch.  There is also an extensive septal network.  The first diagonal has a 40% stenosis in the proximal portion.  The remainder of the LAD system has mild luminal irregularities.     Circumflex-nondominant vessel giving rise to 2 obtuse marginal branches and a posterolateral branch.  There are mild luminal irregularities throughout the system.     RCA-dominant vessel giving rise to the right posterior descending artery and an extensive posterolateral network.  There are mild to moderate luminal irregularities throughout the system.     LVEDP 8 mmHg    The patient's last echocardiogram performed at [facility] 4/24/2024 showed a LV ejection fraction of 60-65% but severe right ventricular chamber  enlargement and a severely reduced right ventricular systolic function.  The agitated saline study was positive for a small intracardiac shunt.  The right atrium is also severely enlarged and there was a small to moderate pericardial effusion.  There was severe tricuspid valve regurgitation and severe pulmonary hypertension with an estimated PA systolic pressure 88 mmHg.    The patient has been seeking some assistance with pain management has been taking Gummies with THC for pain relief.  Yesterday the patient developed sudden increase in her shortness of breath that did not improve EMS was called.  Patient was identified to have an SVT of greater than 200/min and she was given 6 mg of adenosine with no response and then 12 mg of adenosine with conversion to sinus tachycardia at 100/min with resolution of her symptoms.  Her evaluation in the emergency room included a CBC with hematocrit of 28.5 platelet count 103,000.  Electrolyte panel was notable for magnesium level of only 0.4.  TSH was normal.  The initial high-sensitivity troponin was 19.  Chest x-ray showed pulmonary vascular congestion interstitial edema no pleural effusion or consolidation.  Patient's EKG currently shows sinus rhythm bordering on sinus tachycardia with increased anterior forces and anterior ST-T abnormality consistent with right ventricular hypertrophy with a strain pattern.  Repeat lab work from this morning includes a CBC hemoglobin 8.1 hematocrit 27.5.  Comprehensive metabolic panel unremarkable creatinine 0.9 potassium of 4.0 calcium is low at 6.6 and was given a supplement for lower value 5.7 earlier.  Ionized calcium is low at 0.83.  Magnesium values increased from 0.4-1.4.  Patient's serum iron is less than 20.     Last Recorded Vitals:  Vitals:    07/06/24 0400 07/06/24 0500 07/06/24 0700 07/06/24 0800   BP: 112/80 126/76 116/87 118/85   BP Location:       Patient Position:       Pulse: 103 95 87 86   Resp: 25 18 19 19   Temp:    36  °C (96.8 °F)   TempSrc:       SpO2: 97% 96% 95%    Weight:       Height:           Last Labs:  CBC - 7/6/2024:  5:46 AM  6.6 8.1 116    27.5      CMP - 7/6/2024:  5:46 AM  6.6 6.3 10 --- 0.8   3.1 3.4 7 72      PTT - 9/7/2023:  5:28 PM  1.3   13.7 31.9     Troponin I, High Sensitivity   Date/Time Value Ref Range Status   04/23/2024 12:27 PM 26 0 - 34 ng/L Final   04/23/2024 10:44 AM 31 0 - 34 ng/L Final     BNP   Date/Time Value Ref Range Status   05/31/2024 04:30  (H) 0 - 99 pg/mL Final   04/23/2024 10:44  (H) 0 - 99 pg/mL Final     Hemoglobin A1C   Date/Time Value Ref Range Status   08/19/2019 01:50 PM 5.6 4.0 - 6.0 % Final     Comment:     Hemoglobin A1C levels are related to mean blood glucose during the   preceding 2-3 months. The relationship table below may be used as a   general guide. Each 1% increase in HGB A1C is a reflection of an   increase in mean glucose of approximately 30 mg/dl.   Reference: Diabetes Care, volume 29, supplement 1 Jan. 2006                        HGB A1C ................. Approx. Mean Glucose   _______________________________________________   6%   ...............................  120 mg/dl   7%   ...............................  150 mg/dl   8%   ...............................  180 mg/dl   9%   ...............................  210 mg/dl   10%  ...............................  240 mg/dl  Performed at [address] [address]       LDL Calculated   Date/Time Value Ref Range Status   07/23/2019 10:40 AM  65 - 130 MG/DL Final    Unable to report calculated LDL due to increased triglycerides. Direct     Comment:      LDL to be run.     VLDL   Date/Time Value Ref Range Status   09/21/2020 09:58 PM 52 (H) 0 - 40 mg/dL Final      Last I/O:  I/O last 3 completed shifts:  In: 740.6 (9.6 mL/kg) [I.V.:140.6 (1.8 mL/kg); IV Piggyback:600]  Out: - (0 mL/kg)   Weight: 77.5 kg     Past Cardiology Tests (Last 3 Years):  EKG:  Electrocardiogram, 12-lead PRN ACS  "symptoms 05/31/2024      ECG 12 lead 05/30/2024      ECG 12 lead 05/30/2024      ECG 12 Lead 01/07/2024    Echo:  Transthoracic Echo (TTE) Limited 04/24/2024    Ejection Fractions:  No results found for: \"EF\"  Cath:  Cardiac catheterization - coronary 01/08/2024    Stress Test:  No results found for this or any previous visit from the past 1095 days.    Cardiac Imaging:  No results found for this or any previous visit from the past 1095 days.      Past Medical History:  She has a past medical history of Anxiety, Depression, GERD (gastroesophageal reflux disease), Lupus (Multi), Personal history of other specified conditions (09/21/2020), and Pulmonary hypertension (Multi).    Past Surgical History:  She has a past surgical history that includes Other surgical history (09/21/2020); Other surgical history (09/21/2020); Other surgical history (09/21/2020); Other surgical history (09/21/2020); Other surgical history (09/21/2020); Hysterectomy; Cholecystectomy; and Cardiac catheterization (N/A, 1/8/2024).      Social History:  She reports that she has quit smoking. Her smoking use included cigarettes. She has quit using smokeless tobacco. She reports that she does not currently use alcohol. She reports that she does not currently use drugs.    Family History:  Family History   Problem Relation Name Age of Onset    No Known Problems Mother      No Known Problems Father          Allergies:  Levetiracetam    Inpatient Medications:  Scheduled medications   Medication Dose Route Frequency    aspirin  81 mg oral Daily    calcium carbonate-vitamin D3  2 tablet oral BID    hydroxychloroquine  200 mg oral BID    macitentan  10 mg oral Daily    magnesium sulfate  2 g intravenous Once    oxygen   inhalation Continuous - Inhalation    pantoprazole  40 mg oral Daily before breakfast    venlafaxine XR  150 mg oral Daily     PRN medications   Medication    acetaminophen    Or    acetaminophen    Or    acetaminophen    " benzocaine-menthol    dextromethorphan-guaifenesin    dextrose    dextrose    glucagon    glucagon    guaiFENesin    melatonin    metoprolol    ondansetron ODT    Or    ondansetron    polyethylene glycol     Continuous Medications   Medication Dose Last Rate    epoprostenol  11 ng/kg/min (Order-Specific) 11 ng/kg/min (07/06/24 0500)     Outpatient Medications:  Current Outpatient Medications   Medication Instructions    aspirin 81 mg, oral, Daily    calcium carbonate-vitamin D3 500 mg-5 mcg (200 unit) tablet TAKE 2 TABLETS BY MOUTH TWO TIMES A DAY    cephalexin (KEFLEX) 500 mg, oral, 4 times daily    cholestyramine (Questran) 4 gram packet 4 g, oral, Daily    epoprostenol (VELETRI) 1,500 mcg, central venous line infusion, Continuous, Reconstitute contents of vial with 5 ml diluent and mix cassette as directed. Infuse continuously per physician orders. Allow for priming volume. Dose range: 1-150 ng/kg/min.    furosemide (Lasix) 40 mg tablet TAKE 1 TABLET BY MOUTH ONE TIME DAILY    gabapentin (NEURONTIN) 600 mg, oral, 2 times daily    hydroxychloroquine (PLAQUENIL) 200 mg, oral, 2 times daily    loperamide (IMODIUM A-D) 2 mg, oral, 4 times daily PRN    melatonin 5 mg, oral, Nightly    omeprazole (PRILOSEC) 40 mg, oral, Daily before breakfast, Do not crush or chew.    Opsumit 10 mg tablet tablet TAKE 1 TABLET BY MOUTH DAILY. DO NOT HANDLE IF PREGNANT. DO NOT SPLIT, CRUSH, OR CHEW. REVIEW MEDICATION GUIDE.    potassium chloride CR (Klor-Con M20) 20 mEq ER tablet TAKE 1 TABLET BY MOUTH TWO TIMES A DAY WITH MEALS    prochlorperazine (COMPAZINE) 5 mg, oral, Every 6 hours PRN    venlafaxine XR (EFFEXOR-XR) 150 mg, oral, Daily, Do not crush or chew.       Physical Exam:  The patient is a slightly overweight upper middle-aged white female sitting upright in bed not short of breath a low-flow O2 nasal cannula  JVP not elevated.  Carotid and pulses 2+  Chest diminished air movement breath sounds are decreased but clear no  wheezes rales  Cardiac rhythm is regular slightly tachycardic normal S1-S2 no gallop murmur rub  Abdomen soft and benign no focal tenderness  No peripheral edema.     Assessment/Plan   7/6: This patient is a 63-year-old white female with a history of smoking, substance abuse including cocaine methamphetamines and alcohol, severe pulmonary hypertension with severe right-sided chamber dilatation and severe tricuspid valve regurgitation, low-grade coronary artery disease by cardiac cath in 1/2024.  Patient admitted with an episode of shortness of breath with documented SVT that converted with 12 mg of adenosine administered by EMS personnel.  Patient with significant electrolyte abnormalities on admission including hypomagnesemia and hypocalcemia both of which have been partially corrected.  Patient will be started on low-dose diltiazem CD1 100 mg daily for prophylaxis of any recurrent SVT.  Will continue magnesium and calcium supplementation.  Patient also requires iron replacement therapy as well.    CVC 04/23/24 Left Subclavian (Active)   Line Necessity Intravenous medication therapy 07/06/24 0132   Line Necessity Reviewed With Dr. Tao 07/06/24 0132   Site Assessment Pink;Other (Comment) 07/06/24 0132   Tubing Change Tubing changed 07/06/24 0132   Dressing Type Transparent 07/06/24 0132   Dressing Status Clean;Dry;Occlusive 07/06/24 0132   Dressing Intervention Dressing changed 07/06/24 0132   Number of days: 74       Peripheral IV 07/05/24 20 G Left;Ventral Hand (Active)   Site Assessment Clean;Dry;Intact 07/06/24 0100   Dressing Type Transparent 07/06/24 0100   Line Status Flushed;Blood return noted;Saline locked 07/06/24 0100   Dressing Status Clean;Dry;Occlusive 07/06/24 0100   Number of days: 1       Peripheral IV 07/05/24 20 G Left Antecubital (Active)   Site Assessment Clean;Dry;Intact 07/06/24 0100   Dressing Type Transparent 07/06/24 0100   Line Status Flushed;Blood return noted;Saline locked 07/06/24  0100   Dressing Status Clean;Dry;Occlusive 07/06/24 0100   Number of days: 1       Code Status:  Full Code    I spent 30 minutes in the professional and overall care of this patient.        Anderson Niño MD

## 2024-07-06 NOTE — PROGRESS NOTES
Evaluation    Patient Name: Ronna Beckham  MRN: [id number]  Today's Date: 7/6/2024  Time Calculation  Start Time: 1037  Stop Time: 1058  Time Calculation (min): 21 min    Assessment  IP OT Assessment  OT Assessment: 64 yo female with history of COPD and multi-substance abuse admites with SVT (AJ=280u) and low magnesium.  On eval, patient presents slightly below baseline functional status d/t impaired activity tolerance and generalized weakness.  OT to address deficits by providing ADL retraining utilizing compensatory techniques and progressive strengthening in-house.  Do not anticipate need for OT services following discharge at this time but will monitor progress and update recommendations as needed.  Prognosis: Good  Barriers to Discharge: None  Evaluation/Treatment Tolerance: Patient limited by fatigue  Medical Staff Made Aware: Yes  End of Session Communication: Bedside nurse  End of Session Patient Position: Bed, 2 rail up, Alarm off, not on at start of session    Plan:  Treatment Interventions: ADL retraining, Functional transfer training, UE strengthening/ROM, Endurance training, Patient/family training, Equipment evaluation/education, Compensatory technique education  OT Frequency: 3 times per week  OT Discharge Recommendations: No OT needed after discharge  Equipment Recommended upon Discharge: Wheeled walker (for energy conservation)  OT Recommended Transfer Status: Stand by assist  OT - OK to Discharge: Yes        Subjective   Current Problem:  1. SVT (supraventricular tachycardia) (CMS-HCC)        2. Hypomagnesemia        3. Pulmonary HTN (Multi)        4. Hypocalcemia          General:  General  Reason for Referral: Impaired ADLs  Referred By: Dr Taylor  Past Medical History Relevant to Rehab: HTN, lupus, substance use disorder, GERD, anxiety , depression, epilepsy  Family/Caregiver Present: No  Co-Treatment: PT  Co-Treatment Reason: to optomize safety with mobility and outcome  Prior to Session  "Communication: Bedside nurse  Patient Position Received: Bed, 2 rail up, Alarm off, not on at start of session  Preferred Learning Style: verbal, visual  General Comment: 62 yo female admitted with SVT was cleared by nursing for therapy and agreeable to same.    Precautions:  Medical Precautions: Fall precautions, Seizure precautions, Oxygen therapy device and L/min (4L (baseline)    Vital Signs:  Heart Rate:  (95 at rest, 105 with activity)  SpO2:  (95%)    Pain:  Pain Assessment  Pain Assessment: 0-10  0-10 (Numeric) Pain Score: 8  Pain Type: Chronic pain  Pain Location:  (\"all over\" especially abdomen)        Objective   Cognition:  Overall Cognitive Status: Within Functional Limits  Orientation Level: Oriented X4    Home Living:  Type of Home: [place]  Lives With:  (15 yo granddaughter)  Home Adaptive Equipment: None  Home Layout: Multi-level, Bed/bath upstairs, Stairs to alternate level with rails, Laundry in basement  Alternate Level Stairs-Rails: Left  Alternate Level Stairs-Number of Steps: flight  Home Access: Stairs to enter with rails  Entrance Stairs-Rails: None  Entrance Stairs-Number of Steps: 2  Bathroom Shower/Tub: Tub/shower unit  Bathroom Toilet: Standard  Home Living Comments: Pt resides with 15 yo granddaughter whom she is the caregiver.  Stays on the second floor most of the time d/t respiratory distress with stairs and relies on granddaughter for cooking, cleaning, laundry d/t same.     Prior Function:  Level of [place]: Independent with ADLs and functional transfers, Needs assistance with homemaking  Receives Help From: Family (15 yo granddaughter and daughter)  ADL Assistance: Independent  Homemaking Assistance: Needs assistance  Ambulatory Assistance: Independent    ADL:  Eating Assistance: Independent  Grooming Assistance: Stand by  Grooming Deficit: Setup  Bathing Assistance: Stand by (washing hands)  Bathing Deficit: Setup, Supervision/safety  UE Dressing Assistance: Stand by (per " clinical judgement)  UE Dressing Deficit: Setup  LE Dressing Assistance: Stand by (per clinical judgement)  LE Dressing Deficit: Setup, Supervision/safety (per clinical judgement)  Toileting Assistance with Device: Stand by  Toileting Deficit: Supervison/safety    Activity Tolerance:  Endurance: Decreased tolerance for upright activites  Activity Tolerance Comments: SOB with minimal activity on 4L of O2.  Required intermittent and prolonged rest breaks to recover.  Bed Mobility/Transfers: Bed Mobility  Bed Mobility: Yes  Bed Mobility 1  Bed Mobility 1: Supine to sitting  Level of Assistance 1: Modified independent  Bed Mobility Comments 1: toward the right side of bed with head elevated ~40 degrees  Bed Mobility 2  Bed Mobility  2: Sitting to supine  Level of Assistance 2: Modified independent    Transfer 1  Transfer From 1: Commode-standard to  Transfer to 1: Stand  Technique 1: Sit to stand  Transfer Device 1:  (grab bar)  Transfer Level of Assistance 1: Close supervision  Transfers 2  Transfer From 2: Bed to  Transfer to 2: Stand  Technique 2: Sit to stand, Stand to sit  Transfer Level of Assistance 2: Close supervision    Functional Mobility:  Functional Mobility  Functional Mobility Performed: Yes (Close supervision ~50' without an A.D.  Gait steady but effortful.)    Sensation:  Light Touch: No apparent deficits  Sensation Comment: denies paresthesias B Ue and B LE    Strength:  Strength Comments: BUEs=~4/5 grossly    Hand Function:  Hand Function  Gross Grasp: Functional  Coordination: Functional    Extremities: RUE   RUE : Within Functional Limits and LUE   LUE: Within Functional Limits    Outcome Measures: [facility] Daily Activity  Putting on and taking off regular lower body clothing: A little  Bathing (including washing, rinsing, drying): A little  Putting on and taking off regular upper body clothing: A little  Toileting, which includes using toilet, bedpan or urinal: A little  Taking care of personal  grooming such as brushing teeth: A little  Eating Meals: None  Daily Activity - Total Score: 19    Education Documentation  ADL Training, taught by Vianney Smith OT at 7/6/2024 12:02 PM.  Learner: Patient  Readiness: Acceptance  Method: Explanation, Demonstration  Response: Demonstrated Understanding, Needs Reinforcement  Comment: ADL and functional transfer/mobility retraining initiated    Goals:   Encounter Problems       Encounter Problems (Active)       OT Goals       ADLs (Progressing)       Start:  07/06/24    Expected End:  07/13/24       Pt will complete bathing, dressing, and toileting tasks independently with increased time as needed.         Functional transfers (Progressing)       Start:  07/06/24    Expected End:  07/13/24       Pt will complete toilet, bed, and chair transfers independently using arm supports as needed.         Activity tolerance (Progressing)       Start:  07/06/24    Expected End:  07/13/24       Pt will tolerate 25 minutes of therapeutic activity in order to increase functional endurance needed for I/ADLs.

## 2024-07-06 NOTE — CARE PLAN
The patient's goals for the shift include      The clinical goals for the shift include Remain hemodynamically stable    Over the shift, the patient did not make progress toward the following goals. Barriers to progression include N/A. Recommendations to address these barriers include None.

## 2024-07-07 VITALS
DIASTOLIC BLOOD PRESSURE: 70 MMHG | HEIGHT: 65 IN | OXYGEN SATURATION: 96 % | RESPIRATION RATE: 18 BRPM | WEIGHT: 172.4 LBS | SYSTOLIC BLOOD PRESSURE: 101 MMHG | TEMPERATURE: 97.5 F | HEART RATE: 90 BPM | BODY MASS INDEX: 28.72 KG/M2

## 2024-07-07 LAB
ANION GAP SERPL CALC-SCNC: 8 MMOL/L
BUN SERPL-MCNC: 10 MG/DL (ref 8–25)
CALCIUM SERPL-MCNC: 7.1 MG/DL (ref 8.5–10.4)
CHLORIDE SERPL-SCNC: 104 MMOL/L (ref 97–107)
CO2 SERPL-SCNC: 24 MMOL/L (ref 24–31)
CREAT SERPL-MCNC: 0.7 MG/DL (ref 0.4–1.6)
EGFRCR SERPLBLD CKD-EPI 2021: >90 ML/MIN/1.73M*2
GLUCOSE SERPL-MCNC: 111 MG/DL (ref 65–99)
MAGNESIUM SERPL-MCNC: 1.6 MG/DL (ref 1.6–3.1)
POTASSIUM SERPL-SCNC: 3.9 MMOL/L (ref 3.4–5.1)
SODIUM SERPL-SCNC: 136 MMOL/L (ref 133–145)

## 2024-07-07 PROCEDURE — 99232 SBSQ HOSP IP/OBS MODERATE 35: CPT | Performed by: INTERNAL MEDICINE

## 2024-07-07 PROCEDURE — 80048 BASIC METABOLIC PNL TOTAL CA: CPT | Performed by: INTERNAL MEDICINE

## 2024-07-07 PROCEDURE — 2500000005 HC RX 250 GENERAL PHARMACY W/O HCPCS: Performed by: INTERNAL MEDICINE

## 2024-07-07 PROCEDURE — 2500000001 HC RX 250 WO HCPCS SELF ADMINISTERED DRUGS (ALT 637 FOR MEDICARE OP): Performed by: INTERNAL MEDICINE

## 2024-07-07 PROCEDURE — 83735 ASSAY OF MAGNESIUM: CPT | Performed by: INTERNAL MEDICINE

## 2024-07-07 PROCEDURE — 2500000004 HC RX 250 GENERAL PHARMACY W/ HCPCS (ALT 636 FOR OP/ED): Performed by: INTERNAL MEDICINE

## 2024-07-07 RX ORDER — DILTIAZEM HYDROCHLORIDE 180 MG/1
180 CAPSULE, COATED, EXTENDED RELEASE ORAL DAILY
Qty: 30 CAPSULE | Refills: 0 | Status: SHIPPED | OUTPATIENT
Start: 2024-07-07 | End: 2024-08-06

## 2024-07-07 RX ORDER — LANOLIN ALCOHOL/MO/W.PET/CERES
400 CREAM (GRAM) TOPICAL ONCE
Status: COMPLETED | OUTPATIENT
Start: 2024-07-07 | End: 2024-07-07

## 2024-07-07 RX ORDER — LANOLIN ALCOHOL/MO/W.PET/CERES
400 CREAM (GRAM) TOPICAL DAILY
Qty: 5 TABLET | Refills: 0 | Status: SHIPPED | OUTPATIENT
Start: 2024-07-07 | End: 2024-07-12

## 2024-07-07 RX ORDER — FERROUS SULFATE 325(65) MG
65 TABLET ORAL
Qty: 30 TABLET | Refills: 0 | Status: SHIPPED | OUTPATIENT
Start: 2024-07-08 | End: 2024-08-07

## 2024-07-07 RX ADMIN — Medication 4 L/MIN: at 08:00

## 2024-07-07 RX ADMIN — MACITENTAN 10 MG: 10 TABLET, FILM COATED ORAL at 10:40

## 2024-07-07 RX ADMIN — VENLAFAXINE HYDROCHLORIDE 150 MG: 150 CAPSULE, EXTENDED RELEASE ORAL at 10:40

## 2024-07-07 RX ADMIN — PANTOPRAZOLE SODIUM 40 MG: 40 TABLET, DELAYED RELEASE ORAL at 08:00

## 2024-07-07 RX ADMIN — Medication 2 TABLET: at 10:40

## 2024-07-07 RX ADMIN — DILTIAZEM HYDROCHLORIDE 180 MG: 180 CAPSULE, COATED, EXTENDED RELEASE ORAL at 10:40

## 2024-07-07 RX ADMIN — ASPIRIN 81 MG: 81 TABLET, COATED ORAL at 10:40

## 2024-07-07 RX ADMIN — FERROUS SULFATE TAB 325 MG (65 MG ELEMENTAL FE) 1 TABLET: 325 (65 FE) TAB at 08:00

## 2024-07-07 RX ADMIN — Medication 400 MG: at 10:40

## 2024-07-07 RX ADMIN — HYDROXYCHLOROQUINE SULFATE 200 MG: 200 TABLET ORAL at 10:40

## 2024-07-07 ASSESSMENT — PAIN - FUNCTIONAL ASSESSMENT
PAIN_FUNCTIONAL_ASSESSMENT: 0-10
PAIN_FUNCTIONAL_ASSESSMENT: 0-10

## 2024-07-07 ASSESSMENT — PAIN SCALES - GENERAL
PAINLEVEL_OUTOF10: 0 - NO PAIN

## 2024-07-07 NOTE — PROGRESS NOTES
Pulmonary Progress Note    Ronna Beckham is a 63 y.o. female on day 2 of admission presenting with SVT (supraventricular tachycardia) (CMS-HCC).    Subjective   No acute events    Objective   Vital Signs      7/6/2024     9:00 PM 7/6/2024    10:05 PM 7/6/2024    11:52 PM 7/7/2024    12:00 AM 7/7/2024     1:00 AM 7/7/2024     1:15 AM 7/7/2024     3:16 AM   Vitals   Systolic 124   116 118  101   Diastolic 88   77 97  70   Heart Rate 101   90 98 98 90   Temp   36.2 °C (97.2 °F)    36.4 °C (97.5 °F)   Resp 22   23 20 21 18   Weight (lb)  172.4        BMI  28.69 kg/m2        BSA (m2)  1.89 m2            Oxygen Therapy  SpO2: 96 %  Medical Gas Therapy: Supplemental oxygen  O2 Delivery Method: Nasal cannula         Intake/Output previous 24 hours:    Intake/Output Summary (Last 24 hours) at 7/7/2024 0830  Last data filed at 7/7/2024 0605  Gross per 24 hour   Intake 304.85 ml   Output --   Net 304.85 ml       Physical Exam  Vitals reviewed.   Constitutional:       Appearance: Normal appearance.   HENT:      Head: Normocephalic and atraumatic.      Mouth/Throat:      Mouth: Mucous membranes are moist.   Eyes:      Extraocular Movements: Extraocular movements intact.      Pupils: Pupils are equal, round, and reactive to light.   Cardiovascular:      Rate and Rhythm: Normal rate and regular rhythm.   Pulmonary:      Effort: Pulmonary effort is normal.      Breath sounds: Normal breath sounds and air entry.   Abdominal:      General: Abdomen is flat. Bowel sounds are normal.      Palpations: Abdomen is soft.   Musculoskeletal:         General: Normal range of motion.      Cervical back: Normal range of motion and neck supple.   Skin:     General: Skin is warm and dry.   Neurological:      General: No focal deficit present.      Mental Status: She is alert and oriented to person, place, and time. Mental status is at baseline.       ...  Lines and Tubes:  CVC 04/23/24 Left Subclavian (Active)   Placement Date/Time: 04/23/24  1500   Orientation: Left  Location: Subclavian   Number of days: 74       Peripheral IV 07/05/24 20 G Left;Ventral Hand (Active)   Placement Date/Time: 07/05/24 1729   Placed by External Staff?: EMS  Size (Gauge): 20 G  Orientation: Left;Ventral  Location: Hand   Number of days: 1       Peripheral IV 07/05/24 20 G Left Antecubital (Active)   Placement Date/Time: 07/05/24 2000   Hand Hygiene Completed: Yes  Size (Gauge): 20 G  Orientation: Left  Location: Antecubital   Number of days: 1         Scheduled medications  aspirin, 81 mg, oral, Daily  calcium carbonate-vitamin D3, 2 tablet, oral, BID  dilTIAZem CD, 180 mg, oral, Daily  ferrous sulfate (325 mg ferrous sulfate), 65 mg of iron, oral, Daily with breakfast  hydroxychloroquine, 200 mg, oral, BID  macitentan, 10 mg, oral, Daily  magnesium oxide, 400 mg, oral, BID  oxygen, , inhalation, Continuous - Inhalation  pantoprazole, 40 mg, oral, Daily before breakfast  venlafaxine XR, 150 mg, oral, Daily      Continuous medications  epoprostenol, 11 ng/kg/min (Order-Specific), Last Rate: 11 ng/kg/min (07/07/24 0605)      PRN medications  PRN medications: acetaminophen **OR** acetaminophen **OR** acetaminophen, benzocaine-menthol, dextromethorphan-guaifenesin, dextrose, dextrose, glucagon, glucagon, guaiFENesin, loperamide, melatonin, metoprolol, ondansetron ODT **OR** ondansetron, polyethylene glycol    Relevant Results  Results from last 7 days   Lab Units 07/06/24  0546 07/05/24  1730   WBC AUTO x10*3/uL 6.6 6.2   HEMOGLOBIN g/dL 8.1* 8.6*   HEMATOCRIT % 27.5* 28.5*   PLATELETS AUTO x10*3/uL 116* 103*      Results from last 7 days   Lab Units 07/06/24  0546 07/05/24  1818   SODIUM mmol/L 138 138   POTASSIUM mmol/L 4.0 3.7   CHLORIDE mmol/L 106 105   CO2 mmol/L 21* 20*   BUN mg/dL 13 12   CREATININE mg/dL 0.90 0.90   GLUCOSE mg/dL 102* 98   CALCIUM mg/dL 6.6* 5.7*          XR chest 2 views 07/05/2024    Narrative  Interpreted By:  Roc Higgins,  STUDY:  XR CHEST  2 VIEWS;  7/5/2024 6:06 pm    INDICATION:  Signs/Symptoms:SOB.    COMPARISON:  Radiographs dated 05/30/2024.    ACCESSION NUMBER(S):  [id number]    ORDERING CLINICIAN:  KORI ONTIVEROS    FINDINGS:  PA and lateral radiographs of the chest were provided.    Left jugular central line overlies the superior cavoatrial junction.    CARDIOMEDIASTINAL SILHOUETTE:  Cardiomediastinal silhouette is markedly enlarged, similar in  appearance to prior exam.    LUNGS:  Pulmonary vascular congestion with some interstitial edema is  present. No consolidation or sizable pleural effusion.    ABDOMEN:  No remarkable upper abdominal findings.    BONES:  No acute osseous changes.    Impression  1. Pulmonary vascular congestion and interstitial edema without  evidence of consolidation or sizable pleural effusion.  2. Marked cardiomegaly is similar in appearance to prior exam on  05/30/2024.        MACRO:  None    Signed by: Roc Higgins 7/5/2024 6:32 PM  Dictation workstation:   [id number]      Patient Active Problem List   Diagnosis    Shortness of breath    NSTEMI (non-ST elevated myocardial infarction) (Multi)    Toothache    Systemic lupus erythematosus (Multi)    Syncope and collapse    Sore throat    Seizure disorder (Multi)    Right ventricular systolic dysfunction    Right ventricular failure (Multi)    Pulmonary hypertension (Multi)    Paresthesia    Hand pain    PAH (pulmonary artery hypertension) (Multi)    Olecranon bursitis, right elbow    Obesity    Neuropathy    Nasal congestion    Methamphetamine abuse (Multi)    Major depressive disorder, single episode, moderate (Multi)    Liver disease    Insomnia    Increased oxygen demand    Hypomagnesemia    Hypocalcemia    Hyperlipidemia    History of non-ST elevation myocardial infarction (NSTEMI)    History of alcohol abuse    Fatigue    Electrolyte and fluid disorder    Electrocardiogram abnormal    Lower leg edema    Edema of eyelid    Ear pain, bilateral    Drug  abuse (Multi)    Dizziness    Diverticulosis    Depressive disorder    Dental infection    Decreased GFR    Cough    Chronic obstructive pulmonary disease (Multi)    Central line complication    Cellulitis    Blepharitis of both eyes    Blepharitis    Primary hypertension    Anxiety    Alcohol dependence, uncomplicated (Multi)    Acute otitis externa of left ear    Acute on chronic cholecystitis    Acute cor pulmonale (Multi)    Gastroesophageal reflux disease    Chronic respiratory failure (Multi)    Acquired thrombocytopenia (CMS-HCC)    Acquired absence of cervix    Acidosis, unspecified    Abnormal findings on diagnostic imaging of breast    Pulmonary HTN (Multi)    Long-term use of high-risk medication    SVT (supraventricular tachycardia) (CMS-HCC)       2D echo 4/23/2024    CONCLUSIONS:   1. Left ventricular systolic function is normal with a 60-65% estimated ejection fraction.   2. Right ventricular volume and pressure overload.   3. Although the TAPSE and RVA' are normal, the fractional area change is markedly reduced consistent wtih severe RV systolic dysfunction. Still able to generate high RVSP.   4. Severely enlarged right ventricle.   5. There is severely reduced right ventricular systolic function.   6. Agitated saline contrast study was positive for small intracardiac shunt.   7. The right atrium is severely dilated.   8. There is a small to moderate pericardial effusion.   9. Small circumferential pericardial effusin though is small to moderate adjacent to the RA. No RV or RA collapse. The IVC is dilated, though likely due to the presence of severe pulmonary hypertension. No significant respiratory variation of the MV or TV inflows. Not diagnostic for tamponade though difficult to dx tamponade in setting of severe pulmonary hypertension.  10. Severe tricuspid regurgitation visualized.  11. Severely elevated right ventricular systolic pressure.  12. Compared with the prior exam from 3/12/2022 the RV  had a similar appearance with severe dilatation and reduced function. Note the TR was not asessed on that exam to allow estimation of the RVSP at that time. The pericardial effusion appears similar in size.  13. A bubble study using agitated saline was performed. Bubble study is positive. A small PFO (= 10 bubbles) was demonstrated.      Assessment/Plan   Principal Problem:    SVT (supraventricular tachycardia) (CMS-[facility])  Active Problems:    Systemic lupus erythematosus (Multi)    Seizure disorder (Multi)    PAH (pulmonary artery hypertension) (Multi)    Hypocalcemia    Pulmonary HTN (Multi)     Presents with shortness of breath and SVT. On flolan pump at a stable dose. Suspect SVT from low magnesium and PCP ingestion.     Supraventricular Tachycardia  Question PCP ingestion and low mag  Replace Mag 0.4 -> 1.4 today  K 4.0   Cardiology following.  See echo results above     Chronic respiratory failure  She is on her baseline supplemental oxygen level at 4 L     Pulmonary Arterial Hypertension   - (Veletri) Via cuffed catheter left subclavian w/ continuous Home pump.     - Opsumit 10 mg p.o. daily   - Remains on her baseline supplemental oxygen level at 3-4 L     Systemic lupus erythematosus   - Continue home hydroxychloroquine dose     Hypomagnesemia  Replace and recheck levels     Hypocalcemia  Replace and recheck levels     Stable for dc from a pulmonary standpoint  Follow up with pulmonary at [facility]    Froy Nelson MD

## 2024-07-07 NOTE — CONSULTS
"Nutrition Assessement Note    Nutrition Assessment    Reason for Assessment: Admission nursing screening (MST 3)    Pt admitted for SOB. Pt not available at time of visit. Pt w/ active discharge order.    Reason for Hospital Admission:  Ronna Beckham is a 63 y.o. female who is admitted for SVT.     Past Medical History:   Diagnosis Date    Anxiety     Depression     GERD (gastroesophageal reflux disease)     Lupus (Multi)     Personal history of other specified conditions 2020    History of seizure    Pulmonary hypertension (Multi)       Past Surgical History:   Procedure Laterality Date    CARDIAC CATHETERIZATION N/A 2024    Procedure: Left Heart Cath, With LV, Angriography And Grafts;  Surgeon: Christiano Calvillo DO;  Location: [facility] Cardiac Cath Lab;  Service: Cardiovascular;  Laterality: N/A;    CHOLECYSTECTOMY      HYSTERECTOMY      OTHER SURGICAL HISTORY  2020    Cholecystectomy    OTHER SURGICAL HISTORY  2020    Esophagogastroduodenoscopy    OTHER SURGICAL HISTORY  2020    Colonoscopy    OTHER SURGICAL HISTORY  2020     section    OTHER SURGICAL HISTORY  2020    Hysterectomy       Nutrition History:  Food and Nutrient History: N/a     Food Allergies/Intolerances:  None    Anthropometrics:  Ht: 165.1 cm (5' 5\"), Wt: 78.2 kg (172 lb 6.4 oz), BMI: 28.69  IBW/kg (Dietitian Calculated): 56.82 kg          Weight Change:  Daily Weight  24 : 78.2 kg (172 lb 6.4 oz)  24 : 77.9 kg (171 lb 11.8 oz)  24 : 74.8 kg (165 lb)  24 : 74.8 kg (165 lb)  24 : 72.6 kg (160 lb)  24 : 74.8 kg (165 lb)  24 : 80.3 kg (177 lb)  24 : 81.9 kg (180 lb 8.9 oz)  23 : 79.4 kg (175 lb)  22 : 73.1 kg (161 lb 4 oz)     Weight History / % Weight Change: Per chart, pt has gained 7# (4.2%) over 1 month  Significant Weight Loss: No          Nutrition Focused Physical Exam Findings:                       Nutrition Significant Labs:  Lab Results "   Component Value Date    WBC 6.6 07/06/2024    HGB 8.1 (L) 07/06/2024    HCT 27.5 (L) 07/06/2024     (L) 07/06/2024    CHOL 174 09/21/2020    TRIG 259 (H) 09/21/2020    HDL 29.5 (A) 09/21/2020    LDLDIRECT 172 (H) 07/23/2019    ALT 7 07/06/2024    AST 10 07/06/2024     07/07/2024    K 3.9 07/07/2024     07/07/2024    CREATININE 0.70 07/07/2024    BUN 10 07/07/2024    CO2 24 07/07/2024    TSH 2.62 07/05/2024    INR 1.3 (H) 09/07/2023    HGBA1C 5.6 08/19/2019     Nutrition Specific Medications:  aspirin, 81 mg, oral, Daily  calcium carbonate-vitamin D3, 2 tablet, oral, BID  dilTIAZem CD, 180 mg, oral, Daily  ferrous sulfate (325 mg ferrous sulfate), 65 mg of iron, oral, Daily with breakfast  hydroxychloroquine, 200 mg, oral, BID  macitentan, 10 mg, oral, Daily  oxygen, , inhalation, Continuous - Inhalation  pantoprazole, 40 mg, oral, Daily before breakfast  venlafaxine XR, 150 mg, oral, Daily      epoprostenol, 11 ng/kg/min (Order-Specific), Last Rate: 11 ng/kg/min (07/07/24 0937)      Dietary Orders (From admission, onward)       Start     Ordered    07/06/24 0939  Adult diet Regular  Diet effective now        Question:  Diet type  Answer:  Regular    07/06/24 0938                   Estimated Needs:   Estimated Energy Needs  Total Energy Estimated Needs (kCal): 1420 kCal  Total Estimated Energy Need per Day (kCal/kg): 25 kCal/kg  Method for Estimating Needs: IBW    Estimated Protein Needs  Total Protein Estimated Needs (g): 57 g  Total Protein Estimated Needs (g/kg): 1 g/kg  Method for Estimating Needs: IBW    Estimated Fluid Needs  Total Fluid Estimated Needs (mL): 1420 mL  Method for Estimating Needs: 1 mL/kcal        Nutrition Diagnosis   Nutrition Diagnosis:       Nutrition Diagnosis  Patient has Nutrition Diagnosis: No       Nutrition Interventions/Recommendations   Nutrition Interventions and Recommendations:    Nutrition Prescription:  Individualized Nutrition Prescription Provided for :  1420 kcals, 57 g protein via diet    Nutrition Interventions:   Food and/or Nutrient Delivery Interventions  Interventions: Meals and snacks  Meals and Snacks: General healthful diet  Goal: provide diet as ordered    Education Documentation  No documentation found.           Nutrition Monitoring and Evaluation   Monitoring/Evaluation:   Food/Nutrient Related History Monitoring  Monitoring and Evaluation Plan: Energy intake  Energy Intake: Estimated energy intake  Criteria: pt to consume >/= 75% estimated needs            Time Spent/Follow-up:   Follow Up  Time Spent (min): 30 minutes  Last Date of Nutrition Visit: 07/07/24  Nutrition Follow-Up Needed?: 5-7 days  Follow up Comment: 7/12/24

## 2024-07-07 NOTE — PROGRESS NOTES
Subjective Data:      Overnight Events:         Objective Data:  Last Recorded Vitals:  Vitals:    07/07/24 0316 07/07/24 0800 07/07/24 0900 07/07/24 0938   BP: 101/70      BP Location: Right arm      Patient Position: Lying      Pulse: 90      Resp: 18      Temp: 36.4 °C (97.5 °F)  36.4 °C (97.5 °F)    TempSrc: Temporal  Temporal    SpO2: 96% 96%     Weight:    78.2 kg (172 lb 6.4 oz)   Height:           Last Labs:  CBC - 7/6/2024:  5:46 AM  6.6 8.1 116    27.5      CMP - 7/7/2024:  8:08 AM  7.1 6.3 10 --- 0.8   3.1 3.4 7 72      PTT - 9/7/2023:  5:28 PM  1.3   13.7 31.9     TROPHS   Date/Time Value Ref Range Status   04/23/2024 12:27 PM 26 0 - 34 ng/L Final   04/23/2024 10:44 AM 31 0 - 34 ng/L Final     BNP   Date/Time Value Ref Range Status   05/31/2024 04:30  0 - 99 pg/mL Final   04/23/2024 10:44  0 - 99 pg/mL Final     HGBA1C   Date/Time Value Ref Range Status   08/19/2019 01:50 PM 5.6 4.0 - 6.0 % Final     Comment:     Hemoglobin A1C levels are related to mean blood glucose during the   preceding 2-3 months. The relationship table below may be used as a   general guide. Each 1% increase in HGB A1C is a reflection of an   increase in mean glucose of approximately 30 mg/dl.   Reference: Diabetes [employer], volume 29, supplement 1 Jan. 2006                        HGB A1C ................. Approx. Mean Glucose   _______________________________________________   6%   ...............................  120 mg/dl   7%   ...............................  150 mg/dl   8%   ...............................  180 mg/dl   9%   ...............................  210 mg/dl   10%  ...............................  240 mg/dl  Performed at [address] [address]       LDLCALC   Date/Time Value Ref Range Status   07/23/2019 10:40 AM  65 - 130 MG/DL Final    Unable to report calculated LDL due to increased triglycerides. Direct     Comment:      LDL to be run.     VLDL   Date/Time Value Ref Range Status  "  09/21/2020 09:58 PM 52 0 - 40 mg/dL Final      Last I/O:  I/O last 3 completed shifts:  In: 545.5 (7 mL/kg) [I.V.:445.5 (5.7 mL/kg); IV Piggyback:100]  Out: - (0 mL/kg)   Weight: 78.2 kg     Past Cardiology Tests (Last 3 Years):  EKG:  Electrocardiogram, 12-lead PRN ACS symptoms 05/31/2024      ECG 12 lead 05/30/2024      ECG 12 lead 05/30/2024      ECG 12 Lead 01/07/2024    Echo:  Transthoracic Echo (TTE) Limited 04/24/2024    Ejection Fractions:  No results found for: \"EF\"  Cath:  Cardiac catheterization - coronary 01/08/2024    Stress Test:  No results found for this or any previous visit from the past 1095 days.    Cardiac Imaging:  No results found for this or any previous visit from the past 1095 days.      Inpatient Medications:  Scheduled medications   Medication Dose Route Frequency    aspirin  81 mg oral Daily    calcium carbonate-vitamin D3  2 tablet oral BID    dilTIAZem CD  180 mg oral Daily    ferrous sulfate (325 mg ferrous sulfate)  65 mg of iron oral Daily with breakfast    hydroxychloroquine  200 mg oral BID    macitentan  10 mg oral Daily    magnesium oxide  400 mg oral Once    oxygen   inhalation Continuous - Inhalation    pantoprazole  40 mg oral Daily before breakfast    venlafaxine XR  150 mg oral Daily     PRN medications   Medication    acetaminophen    Or    acetaminophen    Or    acetaminophen    benzocaine-menthol    dextromethorphan-guaifenesin    dextrose    dextrose    glucagon    glucagon    guaiFENesin    loperamide    melatonin    metoprolol    ondansetron ODT    Or    ondansetron    polyethylene glycol     Continuous Medications   Medication Dose Last Rate    epoprostenol  11 ng/kg/min (Order-Specific) 11 ng/kg/min (07/07/24 0937)       Physical Exam:  The patient is a slightly overweight upper middle-aged white female sitting upright in bed not short of breath a low-flow O2 nasal cannula  JVP not elevated.  Carotid and pulses 2+  Chest diminished air movement breath sounds are " decreased but clear no wheezes rales  Cardiac rhythm is regular slightly tachycardic normal S1-S2 no gallop murmur rub  Abdomen soft and benign no focal tenderness  No peripheral edema.     Assessment/Plan     7/6: This patient is a 63-year-old white female with a history of smoking, substance abuse including cocaine methamphetamines and alcohol, severe pulmonary hypertension with severe right-sided chamber dilatation and severe tricuspid valve regurgitation, low-grade coronary artery disease by cardiac cath in 1/2024. Patient admitted with an episode of shortness of breath with documented SVT that converted with 12 mg of adenosine administered by EMS personnel. Patient with significant electrolyte abnormalities on admission including hypomagnesemia and hypocalcemia both of which have been partially corrected. Patient will be started on low-dose diltiazem CD1 100 mg daily for prophylaxis of any recurrent SVT. Will continue magnesium and calcium supplementation. Patient also requires iron replacement therapy as well.     7/7 patient is resting comfortably not short of breath.  Telemetry monitor shows a stable sinus rhythm to very slight sinus tachycardia but without any recurrence of the SVT on the diltiazem CD1 180 mg daily.  Systolic blood pressures remain in the lower 100 mmHg range.  Electrolytes are in normal range potassium 3.9 creatinine 0.7. Magnesium has been restored to normal value 1.60.  She will require oral supplement at 400 mg twice daily.  Patient appears to be prepared for discharge later today follow-up with Dr. Calvillo  CVC 04/23/24 Left Subclavian (Active)   Line Necessity Intravenous medication therapy 07/07/24 0900   Site Assessment Clean;Dry;Intact 07/07/24 0900   Dressing Status Clean;Dry 07/07/24 0900   Number of days: 75       Peripheral IV 07/05/24 20 G Left;Ventral Hand (Active)   Site Assessment Clean;Dry;Intact 07/07/24 0900   Dressing Status Clean;Dry 07/07/24 0900   Number of days: 2        Peripheral IV 07/05/24 20 G Left Antecubital (Active)   Site Assessment Clean;Dry;Intact 07/07/24 0900   Dressing Status Clean;Dry 07/07/24 0900   Number of days: 2       Code Status:  Full Code    I spent 20 minutes in the professional and overall care of this patient.        Anderson Niño MD

## 2024-07-07 NOTE — PROGRESS NOTES
07/07/24 1130   Discharge Planning   Living Arrangements Spouse/significant other   Support Systems Spouse/significant other;Family members   Type of Residence Private residence   Number of Stairs to Enter Residence 5   Number of Stairs Within Residence 12   Do you have animals or pets at home? No   Who is requesting discharge planning? Provider   Home or Post Acute Services None   Patient expects to be discharged to: Home with no needs   Does the patient need discharge transport arranged? No

## 2024-07-07 NOTE — DISCHARGE SUMMARY
Discharge Diagnosis  SVT (supraventricular tachycardia) (CMS-[facility])    Issues Requiring Follow-Up  Pulmonology and cardiology    Discharge Meds     Your medication list        START taking these medications        Instructions Last Dose Given Next Dose Due   dilTIAZem  mg 24 hr capsule  Commonly known as: Cardizem CD      Take 1 capsule (180 mg) by mouth once daily.       ferrous sulfate (325 mg ferrous sulfate) tablet  Start taking on: July 8, 2024      Take 1 tablet by mouth once daily with breakfast.              CONTINUE taking these medications        Instructions Last Dose Given Next Dose Due   aspirin 81 mg EC tablet           cholestyramine 4 gram packet  Commonly known as: Questran      Take 1 packet (4 g) by mouth once daily.       epoprostenol 1.5 mg recon soln  Commonly known as: Veletri      1.5 mg (1,500 mcg) by central venous line infusion route continuously. Reconstitute contents of vial with 5 ml diluent and mix cassette as directed. Infuse continuously per physician orders. Allow for priming volume. Dose range: 1-150 ng/kg/min.       furosemide 40 mg tablet  Commonly known as: Lasix      TAKE 1 TABLET BY MOUTH ONE TIME DAILY       gabapentin 600 mg tablet  Commonly known as: Neurontin      Take 1 tablet (600 mg) by mouth 2 times a day.       hydroxychloroquine 200 mg tablet  Commonly known as: Plaquenil           Klor-Con M20 20 mEq ER tablet  Generic drug: potassium chloride CR      TAKE 1 TABLET BY MOUTH TWO TIMES A DAY WITH MEALS       loperamide 2 mg tablet  Commonly known as: Imodium A-D      Take 1 tablet (2 mg) by mouth 4 times a day as needed for diarrhea.       melatonin 5 mg tablet           omeprazole 40 mg DR capsule  Commonly known as: PriLOSEC           Opsumit 10 mg tablet tablet  Generic drug: macitentan      TAKE 1 TABLET BY MOUTH DAILY. DO NOT HANDLE IF PREGNANT. DO NOT SPLIT, CRUSH, OR CHEW. REVIEW MEDICATION GUIDE.       Oyster Shell Calcium-Vit D3 500 mg-5 mcg (200 unit)  tablet  Generic drug: calcium carbonate-vitamin D3      TAKE 2 TABLETS BY MOUTH TWO TIMES A DAY       prochlorperazine 5 mg tablet  Commonly known as: Compazine      Take 1 tablet (5 mg) by mouth every 6 hours if needed for nausea or vomiting.       venlafaxine  mg 24 hr capsule  Commonly known as: Effexor-XR                  STOP taking these medications      cephalexin 500 mg capsule  Commonly known as: Keflex                  Where to Get Your Medications        These medications were sent to [facility]/pharmacy #[id number] - [place], [place] - [address] [place] [address], [place] [id number]      Phone: [phone]   dilTIAZem  mg 24 hr capsule  ferrous sulfate (325 mg ferrous sulfate) tablet         Test Results Pending At Discharge  Pending Labs       Order Current Status    Troponin T Series, High Sensitivity (0, 2HR, 6HR) In process            Hospital Course   Patient with history of severe pulmonary arterial hypertension admitted for SVT.  Of note patient's urine tox screen was positive for PCP although patient denied use.  Patient does admit to using marijuana.  In any case patient was brought in hospital to EMS who gave her 2 doses of adenosine 6 mg with no response and then 12 mg.  Patient converted to sinus tachycardia.  Patient was evaluated by cardiology.  Recent echocardiogram showed normal ejection fraction and evidence of severe RV dysfunction.  Patient was started on Cardizem  every 24 hours.  Otherwise patient was hypomagnesemic and this was replaced and normal on discharge.  Patient will follow-up with pulmonary and cardiology as outpatient but otherwise is stable for discharge today.  Patient also was evaluated by pulmonary given patient's severe pulmonary hypertension and on chronic Veletri.    Pertinent Physical Exam At Time of Discharge  Physical Exam  Generally no acute distress  HEENT PERRL EOMI  Cardiovascular S1-S2 regular rate rhythm 2 out of 6  systolic ejection murmur slight parasternal lift  Lungs generally clear to auscultation bilaterally  Abdomen nontender bowel sounds present  Extremities no clubbing cyanosis edema  Outpatient Follow-Up  Future Appointments   Date Time Provider Department Center   7/10/2024  4:00 PM Bin Wade MD [id number] [facility]   7/22/2024  3:00 PM [facility] [id number]  [id number] [facility]   7/22/2024  3:30 PM Chicho Prescott DO [id number] [facility]   8/26/2024  8:00 AM Chelsea Wood MD [id number] [facility]   Total time spent on discharge was 45 minutes      Tato Fried MD

## 2024-07-07 NOTE — CARE PLAN
The patient's goals for the shift include  maintaining good respirations    The clinical goals for the shift include maintain  SPO2 above 92%, remain hemodynamically stable    Over the shift, the patient did make progress toward the following goals. Barriers to progression include rapid heart rate. Recommendations to address these barriers include adequate hydration.

## 2024-07-07 NOTE — CARE PLAN
The patient's goals for the shift include      The clinical goals for the shift include Remain hemodynamically stable    Over the shift, the patient did not make progress toward the following goals. Barriers to progression include   Problem: Respiratory  Goal: Clear secretions with interventions this shift  Outcome: Progressing  Goal: Minimize anxiety/maximize coping throughout shift  Outcome: Progressing  Goal: Minimal/no exertional discomfort or dyspnea this shift  Outcome: Progressing  Goal: No signs of respiratory distress (eg. Use of accessory muscles. Peds grunting)  Outcome: Progressing  Goal: Patent airway maintained this shift  Outcome: Progressing  Goal: Tolerate mechanical ventilation evidenced by VS/agitation level this shift  Outcome: Progressing  Goal: Tolerate pulmonary toileting this shift  Outcome: Progressing  Goal: Verbalize decreased shortness of breath this shift  Outcome: Progressing  Goal: Wean oxygen to maintain O2 saturation per order/standard this shift  Outcome: Progressing  Goal: Increase self care and/or family involvement in next 24 hours  Outcome: Progressing   . Recommendations to address these barriers include N/A.

## 2024-07-08 ENCOUNTER — DOCUMENTATION (OUTPATIENT)
Dept: PULMONOLOGY | Facility: HOSPITAL | Age: 64
End: 2024-07-08

## 2024-07-08 ENCOUNTER — APPOINTMENT (OUTPATIENT)
Dept: CARDIOLOGY | Facility: HOSPITAL | Age: 64
End: 2024-07-08
Payer: COMMERCIAL

## 2024-07-08 ENCOUNTER — APPOINTMENT (OUTPATIENT)
Dept: RADIOLOGY | Facility: HOSPITAL | Age: 64
End: 2024-07-08
Payer: COMMERCIAL

## 2024-07-08 ENCOUNTER — HOSPITAL ENCOUNTER (EMERGENCY)
Facility: HOSPITAL | Age: 64
Discharge: HOME | End: 2024-07-08
Attending: STUDENT IN AN ORGANIZED HEALTH CARE EDUCATION/TRAINING PROGRAM
Payer: COMMERCIAL

## 2024-07-08 VITALS
RESPIRATION RATE: 22 BRPM | WEIGHT: 172.4 LBS | HEIGHT: 65 IN | OXYGEN SATURATION: 93 % | TEMPERATURE: 97.9 F | HEART RATE: 103 BPM | SYSTOLIC BLOOD PRESSURE: 115 MMHG | DIASTOLIC BLOOD PRESSURE: 79 MMHG | BODY MASS INDEX: 28.72 KG/M2

## 2024-07-08 DIAGNOSIS — Z91.148 HISTORY OF MEDICATION NONCOMPLIANCE: ICD-10-CM

## 2024-07-08 DIAGNOSIS — I47.10 SVT (SUPRAVENTRICULAR TACHYCARDIA) (CMS-HCC): Primary | ICD-10-CM

## 2024-07-08 LAB
ALBUMIN SERPL-MCNC: 3.7 G/DL (ref 3.5–5)
ALP BLD-CCNC: 106 U/L (ref 35–125)
ALT SERPL-CCNC: 10 U/L (ref 5–40)
ANION GAP SERPL CALC-SCNC: 11 MMOL/L
AST SERPL-CCNC: 21 U/L (ref 5–40)
ATRIAL RATE: 111 BPM
BASOPHILS # BLD AUTO: 0.01 X10*3/UL (ref 0–0.1)
BASOPHILS NFR BLD AUTO: 0.2 %
BILIRUB SERPL-MCNC: 1.1 MG/DL (ref 0.1–1.2)
BUN SERPL-MCNC: 9 MG/DL (ref 8–25)
CALCIUM SERPL-MCNC: 7.6 MG/DL (ref 8.5–10.4)
CHLORIDE SERPL-SCNC: 106 MMOL/L (ref 97–107)
CO2 SERPL-SCNC: 22 MMOL/L (ref 24–31)
CREAT SERPL-MCNC: 0.8 MG/DL (ref 0.4–1.6)
EGFRCR SERPLBLD CKD-EPI 2021: 84 ML/MIN/1.73M*2
EOSINOPHIL # BLD AUTO: 0 X10*3/UL (ref 0–0.7)
EOSINOPHIL NFR BLD AUTO: 0 %
ERYTHROCYTE [DISTWIDTH] IN BLOOD BY AUTOMATED COUNT: 16.9 % (ref 11.5–14.5)
GLUCOSE SERPL-MCNC: 93 MG/DL (ref 65–99)
HCG SERPL-ACNC: <1 MIU/ML
HCT VFR BLD AUTO: 29.2 % (ref 36–46)
HGB BLD-MCNC: 8.6 G/DL (ref 12–16)
IMM GRANULOCYTES # BLD AUTO: 0.03 X10*3/UL (ref 0–0.7)
IMM GRANULOCYTES NFR BLD AUTO: 0.5 % (ref 0–0.9)
LYMPHOCYTES # BLD AUTO: 0.62 X10*3/UL (ref 1.2–4.8)
LYMPHOCYTES NFR BLD AUTO: 9.9 %
MAGNESIUM SERPL-MCNC: 1.4 MG/DL (ref 1.6–3.1)
MCH RBC QN AUTO: 23.1 PG (ref 26–34)
MCHC RBC AUTO-ENTMCNC: 29.5 G/DL (ref 32–36)
MCV RBC AUTO: 79 FL (ref 80–100)
MONOCYTES # BLD AUTO: 0.27 X10*3/UL (ref 0.1–1)
MONOCYTES NFR BLD AUTO: 4.3 %
NEUTROPHILS # BLD AUTO: 5.31 X10*3/UL (ref 1.2–7.7)
NEUTROPHILS NFR BLD AUTO: 85.1 %
NRBC BLD-RTO: 0 /100 WBCS (ref 0–0)
P AXIS: 70 DEGREES
P OFFSET: 182 MS
P ONSET: 134 MS
PLATELET # BLD AUTO: 143 X10*3/UL (ref 150–450)
POTASSIUM SERPL-SCNC: 3.9 MMOL/L (ref 3.4–5.1)
PR INTERVAL: 156 MS
PROT SERPL-MCNC: 6.7 G/DL (ref 5.9–7.9)
Q ONSET: 212 MS
QRS COUNT: 18 BEATS
QRS DURATION: 102 MS
QT INTERVAL: 358 MS
QTC CALCULATION(BAZETT): 486 MS
QTC FREDERICIA: 439 MS
R AXIS: 140 DEGREES
RBC # BLD AUTO: 3.72 X10*6/UL (ref 4–5.2)
SODIUM SERPL-SCNC: 139 MMOL/L (ref 133–145)
T AXIS: -12 DEGREES
T OFFSET: 391 MS
TROPONIN T SERPL-MCNC: 18 NG/L
TROPONIN T SERPL-MCNC: 20 NG/L
VENTRICULAR RATE: 111 BPM
WBC # BLD AUTO: 6.2 X10*3/UL (ref 4.4–11.3)

## 2024-07-08 PROCEDURE — 96365 THER/PROPH/DIAG IV INF INIT: CPT

## 2024-07-08 PROCEDURE — 80053 COMPREHEN METABOLIC PANEL: CPT | Performed by: STUDENT IN AN ORGANIZED HEALTH CARE EDUCATION/TRAINING PROGRAM

## 2024-07-08 PROCEDURE — 84484 ASSAY OF TROPONIN QUANT: CPT | Performed by: STUDENT IN AN ORGANIZED HEALTH CARE EDUCATION/TRAINING PROGRAM

## 2024-07-08 PROCEDURE — 71045 X-RAY EXAM CHEST 1 VIEW: CPT | Performed by: RADIOLOGY

## 2024-07-08 PROCEDURE — 99284 EMERGENCY DEPT VISIT MOD MDM: CPT | Mod: 25

## 2024-07-08 PROCEDURE — 93005 ELECTROCARDIOGRAM TRACING: CPT | Mod: 59

## 2024-07-08 PROCEDURE — 71045 X-RAY EXAM CHEST 1 VIEW: CPT

## 2024-07-08 PROCEDURE — 84702 CHORIONIC GONADOTROPIN TEST: CPT | Performed by: STUDENT IN AN ORGANIZED HEALTH CARE EDUCATION/TRAINING PROGRAM

## 2024-07-08 PROCEDURE — 96366 THER/PROPH/DIAG IV INF ADDON: CPT

## 2024-07-08 PROCEDURE — 2500000004 HC RX 250 GENERAL PHARMACY W/ HCPCS (ALT 636 FOR OP/ED): Performed by: STUDENT IN AN ORGANIZED HEALTH CARE EDUCATION/TRAINING PROGRAM

## 2024-07-08 PROCEDURE — 83735 ASSAY OF MAGNESIUM: CPT | Performed by: STUDENT IN AN ORGANIZED HEALTH CARE EDUCATION/TRAINING PROGRAM

## 2024-07-08 PROCEDURE — 93005 ELECTROCARDIOGRAM TRACING: CPT

## 2024-07-08 PROCEDURE — 2500000001 HC RX 250 WO HCPCS SELF ADMINISTERED DRUGS (ALT 637 FOR MEDICARE OP): Performed by: STUDENT IN AN ORGANIZED HEALTH CARE EDUCATION/TRAINING PROGRAM

## 2024-07-08 PROCEDURE — 85025 COMPLETE CBC W/AUTO DIFF WBC: CPT | Performed by: STUDENT IN AN ORGANIZED HEALTH CARE EDUCATION/TRAINING PROGRAM

## 2024-07-08 PROCEDURE — 2500000001 HC RX 250 WO HCPCS SELF ADMINISTERED DRUGS (ALT 637 FOR MEDICARE OP)

## 2024-07-08 RX ORDER — FOLIC ACID 1 MG/1
1 TABLET ORAL DAILY
COMMUNITY

## 2024-07-08 RX ORDER — CALCIUM CARBONATE 200(500)MG
1000 TABLET,CHEWABLE ORAL ONCE
Status: COMPLETED | OUTPATIENT
Start: 2024-07-08 | End: 2024-07-08

## 2024-07-08 RX ORDER — DILTIAZEM HYDROCHLORIDE 180 MG/1
180 CAPSULE, COATED, EXTENDED RELEASE ORAL ONCE
Status: COMPLETED | OUTPATIENT
Start: 2024-07-08 | End: 2024-07-08

## 2024-07-08 RX ORDER — GABAPENTIN 800 MG/1
800 TABLET ORAL 2 TIMES DAILY
COMMUNITY

## 2024-07-08 RX ORDER — MAGNESIUM SULFATE HEPTAHYDRATE 40 MG/ML
2 INJECTION, SOLUTION INTRAVENOUS ONCE
Status: COMPLETED | OUTPATIENT
Start: 2024-07-08 | End: 2024-07-08

## 2024-07-08 ASSESSMENT — PAIN - FUNCTIONAL ASSESSMENT: PAIN_FUNCTIONAL_ASSESSMENT: 0-10

## 2024-07-08 ASSESSMENT — PAIN SCALES - GENERAL: PAINLEVEL_OUTOF10: 0 - NO PAIN

## 2024-07-08 NOTE — ED PROVIDER NOTES
"HPI   Chief Complaint   Patient presents with    Rapid Heart Rate       HPI  Patient is a 63-year-old female with history of pulmonary hypertension on 4 L nasal cannula at baseline presenting for evaluation of rapid heart rate.  Patient was given 6 and 12 of adenosine via EMS and did convert back to sinus rhythm in the field.  States that she \"felt a spacing sensation in her chest.\"  States that she also felt it was difficult for her to catch her breath thus she called EMS.  States that she was just discharged from [facility] for the same thing a few days ago.  Otherwise denies flulike symptoms, fever, chills, chest pain, abdominal pain, vomiting, recent falls or injuries.  States that she was feeling a bit nauseous earlier but it has resolved upon arrival.  Patient states that she did not yet  her prescription for Cardizem that she was prescribed to start taking after discharge.                  No data recorded                   Patient History   Past Medical History:   Diagnosis Date    Anxiety     Depression     GERD (gastroesophageal reflux disease)     Lupus (Multi)     Personal history of other specified conditions 2020    History of seizure    Pulmonary hypertension (Multi)      Past Surgical History:   Procedure Laterality Date    CARDIAC CATHETERIZATION N/A 2024    Procedure: Left Heart Cath, With LV, Angriography And Grafts;  Surgeon: Christiano Calvillo DO;  Location: [facility] Cardiac Cath Lab;  Service: Cardiovascular;  Laterality: N/A;    CHOLECYSTECTOMY      HYSTERECTOMY      OTHER SURGICAL HISTORY  2020    Cholecystectomy    OTHER SURGICAL HISTORY  2020    Esophagogastroduodenoscopy    OTHER SURGICAL HISTORY  2020    Colonoscopy    OTHER SURGICAL HISTORY  2020     section    OTHER SURGICAL HISTORY  2020    Hysterectomy     Family History   Problem Relation Name Age of Onset    No Known Problems Mother      No Known Problems Father       Social " History     Tobacco Use    Smoking status: Former     Types: Cigarettes    Smokeless tobacco: Former   Vaping Use    Vaping status: Never Used   Substance Use Topics    Alcohol use: Not Currently    Drug use: Not Currently       Physical Exam   ED Triage Vitals   Temp Pulse Resp BP   -- -- -- --      SpO2 Temp src Heart Rate Source Patient Position   -- -- -- --      BP Location FiO2 (%)     -- --       Physical Exam  Vitals and nursing note reviewed.   Constitutional:       General: She is not in acute distress.     Appearance: She is well-developed.   HENT:      Head: Normocephalic and atraumatic.   Eyes:      Conjunctiva/sclera: Conjunctivae normal.   Cardiovascular:      Rate and Rhythm: Regular rhythm. Tachycardia present.      Heart sounds: No murmur heard.  Pulmonary:      Effort: Pulmonary effort is normal. No respiratory distress.      Breath sounds: Normal breath sounds.      Comments: In no apparent respiratory distress on her 4 L nasal cannula.  Lungs clear to auscultation bilaterally  Abdominal:      Palpations: Abdomen is soft.      Tenderness: There is no abdominal tenderness.   Musculoskeletal:         General: No swelling.      Cervical back: Neck supple.   Skin:     General: Skin is warm and dry.      Capillary Refill: Capillary refill takes less than 2 seconds.   Neurological:      General: No focal deficit present.      Mental Status: She is alert and oriented to person, place, and time.   Psychiatric:         Mood and Affect: Mood normal.         ED Course & MDM   ED Course as of 07/08/24 1851   Mon Jul 08, 2024   1148 EKG interpreted by me: Sinus tachycardia, rate 112.  Right bundle branch block morphology but normal QRS duration.  T wave inversions noted in leads V2 through V4. [ML]   4088 I had a shared decision-making discussion with the patient.  I offered observation in the hospital for continuous telemetry and cardiac monitoring.  Patient states she would much rather be discharged home.   She demonstrates decision-making capacity.  States that her sister is already picking up her diltiazem prescription for tomorrow morning and she plans to call her cardiologist tomorrow for follow-up.  Strict return precautions were discussed. [JJ]      ED Course User Index  [JJ] Silvana Pinto PA-C  [ML] Juan Diego Garcia MD         Diagnoses as of 07/08/24 1851   SVT (supraventricular tachycardia) (CMS-[facility])   History of medication noncompliance       Medical Decision Making  Parts of this chart have been completed using voice recognition software. Please excuse any errors of transcription.  My thought process and reason for plan has been formulated from the time that I saw the patient until the time of disposition and is not specific to one specific moment during their visit and furthermore my MDM encompasses this entire chart and not only this text box.      HPI: Detailed above.    Exam: A medically appropriate exam performed, outlined above, given the known history and presentation.    History obtained from: Patient    EKG: Interpreted by attending physician and reviewed by me    Social Determinants of Health considered during this visit: Lives independently, receives help from her sister    Medications given during visit:  Medications   calcium carbonate (Tums) chewable tablet 1,000 mg (1,000 mg oral Given 7/8/24 1413)   magnesium sulfate IV 2 g (0 g intravenous Stopped 7/8/24 1612)   dilTIAZem CD (Cardizem CD) 24 hr capsule 180 mg (180 mg oral Given 7/8/24 1416)        Diagnostic/tests  Labs Reviewed   CBC WITH AUTO DIFFERENTIAL - Abnormal       Result Value    WBC 6.2      nRBC 0.0      RBC 3.72 (*)     Hemoglobin 8.6 (*)     Hematocrit 29.2 (*)     MCV 79 (*)     MCH 23.1 (*)     MCHC 29.5 (*)     RDW 16.9 (*)     Platelets 143 (*)     Neutrophils % 85.1      Immature Granulocytes %, Automated 0.5      Lymphocytes % 9.9      Monocytes % 4.3      Eosinophils % 0.0      Basophils % 0.2      Neutrophils Absolute  5.31      Immature Granulocytes Absolute, Automated 0.03      Lymphocytes Absolute 0.62 (*)     Monocytes Absolute 0.27      Eosinophils Absolute 0.00      Basophils Absolute 0.01     COMPREHENSIVE METABOLIC PANEL - Abnormal    Glucose 93      Sodium 139      Potassium 3.9      Chloride 106      Bicarbonate 22 (*)     Urea Nitrogen 9      Creatinine 0.80      eGFR 84      Calcium 7.6 (*)     Albumin 3.7      Alkaline Phosphatase 106      Total Protein 6.7      AST 21      Bilirubin, Total 1.1      ALT 10      Anion Gap 11     SERIAL TROPONIN, INITIAL ([facility]) - Abnormal    Troponin T, High Sensitivity 18 (*)    SERIAL TROPONIN,  2 HOUR ([facility]) - Abnormal    Troponin T, High Sensitivity 20 (*)    MAGNESIUM - Abnormal    Magnesium 1.40 (*)    HUMAN CHORIONIC GONADOTROPIN, SERUM QUANTITATIVE    HCG, Beta-Quantitative <1     TROPONIN T SERIES, HIGH SENSITIVITY (0, 2 HR, 6 HR)    Narrative:     The following orders were created for panel order Troponin T Series, High Sensitivity (0, 2HR, 6HR).  Procedure                               Abnormality         Status                     ---------                               -----------         ------                     Serial Troponin, Initial...[[id number]]  Abnormal            Final result               Serial Troponin, 2 Hour ...[[id number]]  Abnormal            Final result               Serial Troponin, 6 Hour ...[[id number]]                                                   Please view results for these tests on the individual orders.   SERIAL TROPONIN, 6 HOUR ([facility])      XR chest 1 view   Final Result   No acute cardiopulmonary disease. Mild-moderate cardiomegaly, grossly   stable.        Signed by: Dennis Darling 7/8/2024 1:07 PM   Dictation workstation:   [id number]           Considerations/further MDM:  Patient is a 63-year-old female presenting for evaluation of rapid heart rate    Differential diagnosis associated with the patient presentation includes: Atrial  fibrillation, pulseless arrhythmia, SVT, pulmonary embolism, ACS, sepsis    Patient with tachycardia and tachypnea upon arrival to the ER.  EKG performed upon arrival with sinus tachycardia without arrhythmia.  Patient was provided adenosine 12 mg which converted her back to sinus rhythm in the field.  Patient provided her home dose of Cardizem that she missed today 180 mg p.o.  Laboratory workup and chest x-ray pursued.  Laboratory workup with anemia consistent with baseline and with hypomagnesemia which was supplemented with 2 g IV and with mild hypocalcemia which was supplemented orally.  Beta-hCG negative.  Cardiac enzymes elevated but flat upon repeat and improved compared to previous studies.  Chest x-ray is without actionable process such as pneumonia, pneumothorax or findings consistent with CHF.  Patient remained stable in sinus rhythm without acute events on telemetry.  Her heart rate did improve drastically but remained slightly tachycardic around 103 beats per minute.  I did have a shared decision-making discussion with the patient regarding observation in the hospital for further monitoring on telemetry versus outpatient therapy with her diltiazem and following up with her cardiologist within the next day via phone call for further evaluation of symptoms.  Patient did opt to be discharged home at this time with close follow-up.  She was released in good condition.  I saw this patient in conjunction with Dr. Garcia. I discussed the laboratory and imaging findings with the patient at bedside. Patient's questions and concerns were addressed. Patient was released in good condition, discharged with instructions to follow up with primary care provider and appropriate specialist, and to return to ED at any time for worsening symptoms or any other concerns. Patient demonstrates understanding of the findings and the importance of appropriate follow up care.   I utilized an evidence-based risk rating tool (CMT)  along with my training and experience to weigh the risk of discharge against the risks of further testing, imaging, or hospitalization. At this time I estimate the risks of additional testing, imaging, or hospitalization to be equal to or greater than the risk of discharge. I discussed my risk assessment with the patient and the patient consents to the risk of discharge as well as the risk of uncertainty in estimating outcomes. [id number]            Procedure  Procedures     Silvana Pinto PA-C  07/08/24 1854

## 2024-07-08 NOTE — ED TRIAGE NOTES
Pt had an episode of elevated hr. No cp. Ems gave 6mg adenosine without relief. 12 mg adenosine and she converted back to nsr. Pt had this happened the other day too and went to [facility].

## 2024-07-08 NOTE — PROGRESS NOTES
"Patient called in to report she was in [facility] from 7/5-7/7 for \"racing heart\". Inquiring if she was on a transplant list. Encouraged patient to discuss options during upcoming appointment.   "

## 2024-07-08 NOTE — PROGRESS NOTES
Pharmacy Medication History Review    Ronna Beckham is a 63 y.o. female. Pharmacy reviewed the patient's [id number] medications and allergies for accuracy.    Medications ADDED:  Folic acid 1mg  Gabapentin 800mg  Medications CHANGED:  none  Medications REMOVED:   none     The list below reflects the updated PTA list. Comments regarding how patient may be taking medications differently can be found in the Admit Orders Activity  Prior to Admission Medications   Prescriptions Last Dose Informant   Opsumit 10 mg tablet tablet 2024 Self   Sig: TAKE 1 TABLET BY MOUTH DAILY. DO NOT HANDLE IF PREGNANT. DO NOT SPLIT, CRUSH, OR CHEW. REVIEW MEDICATION GUIDE.   aspirin 81 mg EC tablet 2024 Self   Sig: Take 1 tablet (81 mg) by mouth once daily.   calcium carbonate-vitamin D3 500 mg-5 mcg (200 unit) tablet Unknown Self   Sig: TAKE 2 TABLETS BY MOUTH TWO TIMES A DAY   cholestyramine (Questran) 4 gram packet     Sig: Take 1 packet (4 g) by mouth once daily.   dilTIAZem CD (Cardizem CD) 180 mg 24 hr capsule  Self   Sig: Take 1 capsule (180 mg) by mouth once daily.   epoprostenol (Veletri) 1.5 mg recon soln 2024 Self   Si.5 mg (1,500 mcg) by central venous line infusion route continuously. Reconstitute contents of vial with 5 ml diluent and mix cassette as directed. Infuse continuously per physician orders. Allow for priming volume. Dose range: 1-150 ng/kg/min.   ferrous sulfate, 325 mg ferrous sulfate, tablet Unknown Self   Sig: Take 1 tablet by mouth once daily with breakfast.   folic acid (Folvite) 1 mg tablet Past Week Self   Sig: Take 1 tablet (1 mg) by mouth once daily.   furosemide (Lasix) 40 mg tablet 2024 Self   Sig: TAKE 1 TABLET BY MOUTH ONE TIME DAILY   gabapentin (Neurontin) 600 mg tablet     Sig: Take 1 tablet (600 mg) by mouth 2 times a day.   gabapentin (Neurontin) 800 mg tablet 2024 Self   Sig: Take 1 tablet (800 mg) by mouth 2 times a day.   hydroxychloroquine (Plaquenil) 200 mg  tablet 7/7/2024 Self   Sig: Take 1 tablet (200 mg) by mouth 2 times a day.   loperamide (Imodium A-D) 2 mg tablet 7/8/2024 Self   Sig: Take 1 tablet (2 mg) by mouth 4 times a day as needed for diarrhea.   magnesium oxide (Mag-Ox) 400 mg (241.3 mg magnesium) tablet Unknown Self   Sig: Take 1 tablet (400 mg) by mouth once daily for 5 doses.   melatonin 5 mg tablet 7/7/2024 Self   Sig: Take 1 tablet (5 mg) by mouth once daily at bedtime.   omeprazole (PriLOSEC) 40 mg DR capsule Past Week Self   Sig: Take 1 capsule (40 mg) by mouth once daily in the morning. Take before meals. Do not crush or chew.   potassium chloride CR (Klor-Con M20) 20 mEq ER tablet Past Week Self   Sig: TAKE 1 TABLET BY MOUTH TWO TIMES A DAY WITH MEALS   prochlorperazine (Compazine) 5 mg tablet Unknown Self   Sig: Take 1 tablet (5 mg) by mouth every 6 hours if needed for nausea or vomiting.   venlafaxine XR (Effexor-XR) 150 mg 24 hr capsule 7/7/2024 Self   Sig: Take 1 capsule (150 mg) by mouth once daily. Do not crush or chew.      Facility-Administered Medications: None        The list below reflects the updated allergy list. Please review each documented allergy for additional clarification and justification.  Allergies  Reviewed by Tabitha Pugh, RN on 7/8/2024        Severity Reactions Comments    Levetiracetam Medium Other Weakness, unable to walk            Pharmacy has been updated to [facility].    Sources used to complete the med history include PTA medication list, dispense history, patient interview. Patient is a fair historian.    Below are additional concerns with the patient's PTA list.  none    Edith Zamudio, [facility]  Please reach out via [employer] Secure Chat for questions

## 2024-07-08 NOTE — DISCHARGE INSTRUCTIONS
Please follow-up with your primary care provider and your cardiologist within the next day via phone call or in person visit for further evaluation of your symptoms.  Present back to ER for any new onset or worsening of symptoms spite treatment.  Please be sure to  your diltiazem prescription and take daily as directed by your cardiologist.

## 2024-07-08 NOTE — ED PROVIDER NOTES
"Supervisory note:  Patient seen in conjunction with ALAN Estrada.  Patient presents with rapid heart rate.  She felt a sensation of \"space\" in her chest which is not usual for her which made her feel frightened.  She was just admitted for SVT and saw cardiology at that time.  She reports that she now feels better.  EMS gave patient adenosine which caused conversion of SVT to sinus rhythm.  On examination, patient is now mildly tachycardic but heart is regular.    Electrolytes noted to be mildly abnormal.  Patient given dose of her rate limiting medication which she has not yet obtained from pharmacy.  Patient advised to follow-up with primary care physician and cardiology and given return precautions for any worsening symptoms.    I personally saw the patient and made/approved the management plan and take responsiblity for the patient management.  Parts of this chart were completed with dictation software, please excuse any errors in transcription.     Juan Diego Garcia MD  07/09/24 0609    "

## 2024-07-09 ENCOUNTER — TELEPHONE (OUTPATIENT)
Dept: INPATIENT UNIT | Facility: HOSPITAL | Age: 64
End: 2024-07-09
Payer: COMMERCIAL

## 2024-07-10 ENCOUNTER — APPOINTMENT (OUTPATIENT)
Dept: NEPHROLOGY | Facility: CLINIC | Age: 64
End: 2024-07-10
Payer: COMMERCIAL

## 2024-07-10 VITALS
DIASTOLIC BLOOD PRESSURE: 73 MMHG | RESPIRATION RATE: 18 BRPM | BODY MASS INDEX: 28.52 KG/M2 | HEIGHT: 65 IN | WEIGHT: 171.2 LBS | OXYGEN SATURATION: 87 % | SYSTOLIC BLOOD PRESSURE: 110 MMHG | TEMPERATURE: 98.2 F | HEART RATE: 102 BPM

## 2024-07-10 DIAGNOSIS — E83.51 HYPOCALCEMIA: ICD-10-CM

## 2024-07-10 DIAGNOSIS — E83.42 HYPOMAGNESEMIA: Primary | ICD-10-CM

## 2024-07-10 LAB
ATRIAL RATE: 112 BPM
CREAT UR-MCNC: 219.2 MG/DL (ref 20–320)
P AXIS: 68 DEGREES
P OFFSET: 179 MS
P ONSET: 123 MS
POC APPEARANCE, URINE: ABNORMAL
POC BILIRUBIN, URINE: ABNORMAL
POC BLOOD, URINE: NEGATIVE
POC COLOR, URINE: ABNORMAL
POC GLUCOSE, URINE: NEGATIVE MG/DL
POC KETONES, URINE: ABNORMAL MG/DL
POC LEUKOCYTES, URINE: NEGATIVE
POC NITRITE,URINE: NEGATIVE
POC PH, URINE: 5.5 PH
POC PROTEIN, URINE: ABNORMAL MG/DL
POC SPECIFIC GRAVITY, URINE: >=1.03
POC UROBILINOGEN, URINE: 0.2 EU/DL
PR INTERVAL: 176 MS
PROT UR-ACNC: 46 MG/DL (ref 5–24)
PROT/CREAT UR: 0.21 MG/MG CREAT (ref 0–0.17)
Q ONSET: 211 MS
QRS COUNT: 19 BEATS
QRS DURATION: 104 MS
QT INTERVAL: 358 MS
QTC CALCULATION(BAZETT): 488 MS
QTC FREDERICIA: 440 MS
R AXIS: 151 DEGREES
T AXIS: 11 DEGREES
T OFFSET: 390 MS
VENTRICULAR RATE: 112 BPM

## 2024-07-10 PROCEDURE — 82570 ASSAY OF URINE CREATININE: CPT

## 2024-07-10 PROCEDURE — 84156 ASSAY OF PROTEIN URINE: CPT

## 2024-07-10 PROCEDURE — 81003 URINALYSIS AUTO W/O SCOPE: CPT | Performed by: INTERNAL MEDICINE

## 2024-07-10 PROCEDURE — 82043 UR ALBUMIN QUANTITATIVE: CPT

## 2024-07-10 PROCEDURE — 3078F DIAST BP <80 MM HG: CPT | Performed by: INTERNAL MEDICINE

## 2024-07-10 PROCEDURE — 99205 OFFICE O/P NEW HI 60 MIN: CPT | Performed by: INTERNAL MEDICINE

## 2024-07-10 PROCEDURE — 3074F SYST BP LT 130 MM HG: CPT | Performed by: INTERNAL MEDICINE

## 2024-07-10 RX ORDER — CALCIUM CARBONATE 200(500)MG
1 TABLET,CHEWABLE ORAL 3 TIMES DAILY
Qty: 90 TABLET | Refills: 11 | Status: SHIPPED | OUTPATIENT
Start: 2024-07-10 | End: 2025-07-10

## 2024-07-10 RX ORDER — ERGOCALCIFEROL 1.25 MG/1
50000 CAPSULE ORAL
Qty: 4 CAPSULE | Refills: 11 | Status: SHIPPED | OUTPATIENT
Start: 2024-07-14 | End: 2025-07-14

## 2024-07-10 NOTE — PROGRESS NOTES
Subjective       Ronna Beckham is a 63 y.o. female who has past medical history of anxiety, depression, GERD, Lupus, seizure disorder, and pulmonary hypertension presents for initial visit for hypomagnesia, and hypocalcemia.    Ronna presents with her daughter Laine today. States she is here for her low magnesium and calcium. We will give a new prescription for a magnesium supplement, calcium supplement, and a once weekly Vitamin D supplement. We discussed that she takes a PPI which can cause decreased absorption of magnesium. She had bad reflux and does not feel that she can stop taking it. We discussed maybe trying Pepcid or milk of magnesia instead. We discussed concerns that she may be malnourished. She states that she is malnourished because she does not eat much at all because everything makes her nauseous. Discussed adding foods like meat, fish, milk, cheese, and nuts that are high in magnesium and calcium. We discussed that we would like to get a 24 hour urine sample to assess if there is a magnesium leak in the urine. She takes Lasix because she develops pulmonary edema due to her pulmonary hypertension. The lasix can cause her pee out magnesium, but we will leave those on for now.      Objective   There were no vitals taken for this visit.  Wt Readings from Last 3 Encounters:   07/08/24 78.2 kg (172 lb 6.4 oz)   07/07/24 78.2 kg (172 lb 6.4 oz)   06/02/24 77.9 kg (171 lb 11.8 oz)       Physical Exam    General appearance: no distress awake and alert on room air, euvolemic on exam  Eyes: non-icteric  HEENT: atrumatic head, PEERLA, moist mucosa  Skin: no apparent rash  Heart: Tachycardic, S1, S2 normal, no murmur or gallop  Lungs: Symmetrical expansion,CTA bilat no wheezing/crackles  Abdomen: firm- possible fluid collection, non-tender  Extremities: some mild swelling in the ankles  Neuro: No FND,asterixis, no focal deficits noticed        Review of Systems     Constitutional: no fever, no chills, no  "recent weight gain and no recent weight loss.   Eyes: no blurred vision and no diplopia.   ENT: no hearing loss, no earache, no sore throat, no swollen glands in the neck and no nasal discharge.   Cardiovascular: no chest pain, no palpitations and no lower extremity edema.   Respiratory: Endorses shortness of breath, worse with exertion, no chronic cough   Gastrointestinal: no abdominal pain, no constipation, no heartburn, no vomiting, no bloody stools and no change in bowel movements.   Genitourinary: no dysuria and no hematuria.   Musculoskeletal: no arthralgias and no myalgias.   Skin: no rashes and no skin lesions.   Neurological: no headaches and no dizziness.   Psychiatric: no confusion, no depression and no anxiety.   Endocrine: no heat intolerance, no cold intolerance, appetite not increased, no thyroid disorder, no increased urinary frequency and no dry skin.   Hematologic/Lymphatic: does not bleed easily and does not bruise easily.   All other systems have been reviewed and are negative for complaint.         Data Review        Results from last 7 days   Lab Units 07/08/24  1139 07/06/24  0546 07/05/24  1730   WBC AUTO x10*3/uL 6.2 6.6 6.2   HEMOGLOBIN g/dL 8.6* 8.1* 8.6*   HEMATOCRIT % 29.2* 27.5* 28.5*   PLATELETS AUTO x10*3/uL 143* 116* 103*            Lab Results   Component Value Date    URICACID 7.3 (H) 09/29/2020           Lab Results   Component Value Date    HGBA1C 5.6 08/19/2019           Results from last 7 days   Lab Units 07/08/24  1139 07/07/24  0808 07/06/24  0546 07/05/24  1818   SODIUM mmol/L 139 136 138 138   POTASSIUM mmol/L 3.9 3.9 4.0 3.7   CHLORIDE mmol/L 106 104 106 105   CO2 mmol/L 22* 24 21* 20*   BUN mg/dL 9 10 13 12   GLUCOSE mg/dL 93 111* 102* 98   CALCIUM mg/dL 7.6* 7.1* 6.6* 5.7*   ANION GAP mmol/L 11 8 11 13   EGFR mL/min/1.73m*2 84 >90 73 73           No results found for: \"MICROALBCREA\"         RFP  Recent Labs     07/08/24  1139 07/07/24  0808 07/06/24  0546 07/05/24  1818 " 06/02/24  0710 06/02/24  0601 06/01/24  1010 05/31/24  1631 05/31/24  0430 05/30/24  1150 04/24/24  0625 04/23/24  1528    136 138 138 137 135* 140 142 139   < > 140 141   K 3.9 3.9 4.0 3.7 4.7 6.4* 4.3 3.2* 5.0   < > 4.6 3.4*    104 106 105 109* 108* 108* 105 107   < > 109* 107   CO2 22* 24 21* 20* 21 22 23 23 19*   < > 23 23   BUN 9 10 13 12 11 12 12 14 16   < > 13 14   CREATININE 0.80 0.70 0.90 0.90 0.60 0.59 0.70 0.91 0.75   < > 0.82 0.91   GLUCOSE 93 111* 102* 98 96 99 150* 92 101*   < > 98 108*   CALCIUM 7.6* 7.1* 6.6* 5.7* 7.6* 7.9* 7.0* 6.8* 5.9*   < > 7.2* 6.3*   PHOS  --   --   --   --  3.1 3.3 3.0 4.4 5.2*  --  3.4 4.0   EGFR 84 >90 73 73 >90 >90 >90 72 >90   < > 81 72   ANIONGAP 11 8 11 13 12 11 13 17 18   < > 13 14    < > = values in this interval not displayed.        Urineanalysis  Recent Labs     07/05/24  2354 05/30/24  1351 04/23/24  1350 11/11/22  2108 03/10/22  0407 09/23/21  2348   COLORU Yellow Yellow Yellow PALE YELLOW YELLOW YELLOW   APPEARANCEU Clear Clear Clear CLEAR CLEAR CLEAR   SPECGRAVU 1.031 1.031 1.031 1.008 1.047* 1.023   KYA 5.5 6.0 5.5 6.0 5.0 5.0   PROTUR 100 (2+)* 100 (2+)* 50 (1+)* NEGATIVE NEGATIVE 30 (1+)*   GLUCOSEU Normal Normal Normal NEGATIVE NEGATIVE NEGATIVE   BLOODU NEGATIVE NEGATIVE NEGATIVE NEGATIVE NEGATIVE NEGATIVE   KETONESU NEGATIVE NEGATIVE NEGATIVE NEGATIVE NEGATIVE NEGATIVE   BILIRUBINU NEGATIVE NEGATIVE NEGATIVE NEGATIVE NEGATIVE NEGATIVE   NITRITEU NEGATIVE NEGATIVE NEGATIVE NEGATIVE NEGATIVE NEGATIVE   LEUKOCYTESU NEGATIVE NEGATIVE NEGATIVE NEGATIVE TRACE* TRACE*       Urine Electrolytes  Recent Labs     07/05/24 2354 05/30/24  1351 04/23/24  1350 11/11/22 2108 03/10/22  0407 09/23/21  2348   PROTUR 100 (2+)* 100 (2+)* 50 (1+)* NEGATIVE NEGATIVE 30 (1+)*        Urine Micro  Recent Labs     07/05/24 2354 05/30/24  1351 04/23/24  1350 11/11/22 2108 03/10/22  0407 09/23/21  2348   WBCU 6-10* 1-5 1-5 1 4 0-5   RBCU 1-2 1-2 NONE NONE SEEN 2 0-5    HYALCASTU 1+*  --   --  NONE SEEN  --   --    SQUAMEPIU 1-9 (SPARSE) 1-9 (SPARSE) 1-9 (SPARSE) NONE SEEN 1  --    BACTERIAU  --   --   --  NEGATIVE  --   --    MUCUSU 1+ 2+ FEW  --  1+  --         Iron  Recent Labs     07/06/24  0546 08/14/20  1758   IRON <20* 59   TIBC  --  420   IRONSAT  --  14.0   FERRITIN 15 25          Current Outpatient Medications on File Prior to Visit   Medication Sig Dispense Refill    aspirin 81 mg EC tablet Take 1 tablet (81 mg) by mouth once daily.      calcium carbonate-vitamin D3 500 mg-5 mcg (200 unit) tablet TAKE 2 TABLETS BY MOUTH TWO TIMES A  tablet 0    cholestyramine (Questran) 4 gram packet Take 1 packet (4 g) by mouth once daily. 30 packet 0    dilTIAZem CD (Cardizem CD) 180 mg 24 hr capsule Take 1 capsule (180 mg) by mouth once daily. 30 capsule 0    epoprostenol (Veletri) 1.5 mg recon soln 1.5 mg (1,500 mcg) by central venous line infusion route continuously. Reconstitute contents of vial with 5 ml diluent and mix cassette as directed. Infuse continuously per physician orders. Allow for priming volume. Dose range: 1-150 ng/kg/min. 180 each 11    ferrous sulfate, 325 mg ferrous sulfate, tablet Take 1 tablet by mouth once daily with breakfast. 30 tablet 0    folic acid (Folvite) 1 mg tablet Take 1 tablet (1 mg) by mouth once daily.      furosemide (Lasix) 40 mg tablet TAKE 1 TABLET BY MOUTH ONE TIME DAILY 30 tablet 0    gabapentin (Neurontin) 600 mg tablet Take 1 tablet (600 mg) by mouth 2 times a day. 60 tablet 0    gabapentin (Neurontin) 800 mg tablet Take 1 tablet (800 mg) by mouth 2 times a day.      hydroxychloroquine (Plaquenil) 200 mg tablet Take 1 tablet (200 mg) by mouth 2 times a day.      loperamide (Imodium A-D) 2 mg tablet Take 1 tablet (2 mg) by mouth 4 times a day as needed for diarrhea. 30 tablet 11    magnesium oxide (Mag-Ox) 400 mg (241.3 mg magnesium) tablet Take 1 tablet (400 mg) by mouth once daily for 5 doses. 5 tablet 0    melatonin 5 mg tablet  Take 1 tablet (5 mg) by mouth once daily at bedtime.      omeprazole (PriLOSEC) 40 mg DR capsule Take 1 capsule (40 mg) by mouth once daily in the morning. Take before meals. Do not crush or chew.      Opsumit 10 mg tablet tablet TAKE 1 TABLET BY MOUTH DAILY. DO NOT HANDLE IF PREGNANT. DO NOT SPLIT, CRUSH, OR CHEW. REVIEW MEDICATION GUIDE. 30 tablet 11    potassium chloride CR (Klor-Con M20) 20 mEq ER tablet TAKE 1 TABLET BY MOUTH TWO TIMES A DAY WITH MEALS 60 tablet 0    prochlorperazine (Compazine) 5 mg tablet Take 1 tablet (5 mg) by mouth every 6 hours if needed for nausea or vomiting. 30 tablet 0    venlafaxine XR (Effexor-XR) 150 mg 24 hr capsule Take 1 capsule (150 mg) by mouth once daily. Do not crush or chew.      [DISCONTINUED] cephalexin (Keflex) 500 mg capsule Take 1 capsule (500 mg) by mouth 4 times a day for 10 days. 40 capsule 0     No current facility-administered medications on file prior to visit.           Assessment and Plan     Ronna Beckham is a 63 y.o. female who has past medical history of anxiety, depression, GERD, Lupus, seizure disorder, and pulmonary hypertension presents for initial visit for hypomagnesia and hypocalcemia.      #Hypomagnesia  -Chronically low magnesium  -Most recent 1.40 with history of magnesium levels below 1  -Has heart arrhythmias when magnesium too low  -Will add magnesium glycinate to be take twice daily  -24 urine analysis to check for magnesium leak  -Pre albumin ordered to assess for possible malnutrition  -Encouraged diet high in magnesium and calcium of meat, fish, milk, eggs, and nuts    #Hypocalcemia  #Vitamin D deficiency  -Chronically low calcium levels largely between 5.5 and 8  -Most recent 7.6  -TUMS TID scheduled in between meals  -Will add weekly high-dose vitamin D supplement  -24 urine analysis to check for magnesium leak  -Pre albumin ordered to assess for possible malnutrition  -Encouraged diet high in magnesium and calcium of meat, fish, milk,  eggs, and nuts  -Repeat PTH and vitamin D ordered    # Chronic kidney disease - G2  - Baseline Scr 0.6 in 0.9  - Most recent Scr 0.8   - Urine dipstick in office today showed 1+ Albumin,  - Prior urine analysis showed 2+ protien in urine  - No prior kidney ultrasound  -Spot albumin/creatinine ratio ordered    # BP - today at office  110/73   negative orthostatic vitals   - Current meds: Lasix 40 mg  - Considered stopping Lasix as this may lead to magnesium loss in the urine, but seems she has fluid retention from her pulmonary hypertension, so will leave        Follow-up in 3 months with repeat blood work and urinalysis before appointment.

## 2024-07-10 NOTE — PATIENT INSTRUCTIONS
Dear Ronna-it was nice meeting you nephrology clinic today discussed the following    # Low magnesium, low calcium-currently you are not taking supplementations.  I will give you prescription for Tums 3 times a day in between meals, weekly vitamin D and magnesium glycinate  twice daily.  I will check 24-hour urine analysis to look for calcium/magnesium leak in the urine.  I will check your vitamin D level and parathyroid hormone levels.  I will rule out malnutrition.  Today we discussed possible substituting omeprazole with milk of magnesia for your reflux disease.  Will continue Lasix due to leg swelling and holding fluid around the lungs.  Today we discussed high calcium/high magnesium diet including dairy products, fresh fish, fresh meat, nuts etc.    Follow-up in 3 months with 24-hour urine analysis, repeat blood work and urinalysis prior to next visit  Dr. Mauro AMBROSE

## 2024-07-11 LAB
CREAT UR-MCNC: 217 MG/DL (ref 20–320)
MICROALBUMIN UR-MCNC: 115.6 MG/L
MICROALBUMIN/CREAT UR: 53.3 UG/MG CREAT

## 2024-07-15 NOTE — PROGRESS NOTES
"History Of Present Illness  Ronna Beckham is a 63 y.o. female presenting with pulmonary hypertension follow up. Patient is NYHA Functional Class 3 and WHO Group 1. Last office visit was on 5/13/2024. She is referred by Dr. Calvillo on 9/11/2020. Recent medical history includes concern for line infection on 5/3/2024. Called with infection concern again 5/29/2024, reported had not completed antibiotics. Another prescription was ordered. On 7/3/2024, Pt reported improvement at site . Recent hospitalization 7/5 - 7/7 for \"racing heart.\"    PAH Treatment: Opsumit, Remodulin, 4L O2  Infusion site: [facility] with dressing intact. Slight yellow drainage on biopatch. Changed last Sunday  Treatment history: Tadalafil (5/4/2021-5/6/2021)     07/15/24 - testing today includes 6 MWT     Interval History   Patient reports SOB is increasing. Is getting harder to walk up and down stairs and to car. C/O LLE pain from hip to foot. Endorses multiple GI symptoms. Patient was started on Cardizem following recent hospitalization. Patient reports she was unable to complete entire course of antibiotics due to hospitalization. Dressing intact with slight yellow drainage on biopatch.     Past Medical History  Patient Active Problem List   Diagnosis    Shortness of breath    NSTEMI (non-ST elevated myocardial infarction) (Multi)    Toothache    Systemic lupus erythematosus (Multi)    Syncope and collapse    Sore throat    Seizure disorder (Multi)    Right ventricular systolic dysfunction    Right ventricular failure (Multi)    Pulmonary hypertension (Multi)    Paresthesia    Hand pain    PAH (pulmonary artery hypertension) (Multi)    Olecranon bursitis, right elbow    Obesity    Neuropathy    Nasal congestion    Methamphetamine abuse (Multi)    Major depressive disorder, single episode, moderate (Multi)    Liver disease    Insomnia    Increased oxygen demand    Hypomagnesemia    Hypocalcemia    Hyperlipidemia    History of non-ST elevation " myocardial infarction (NSTEMI)    History of alcohol abuse    Fatigue    Electrolyte and fluid disorder    Electrocardiogram abnormal    Lower leg edema    Edema of eyelid    Ear pain, bilateral    Drug abuse (Multi)    Dizziness    Diverticulosis    Depressive disorder    Dental infection    Decreased GFR    Cough    Chronic obstructive pulmonary disease (Multi)    Central line complication    Cellulitis    Blepharitis of both eyes    Blepharitis    Primary hypertension    Anxiety    Alcohol dependence, uncomplicated (Multi)    Acute otitis externa of left ear    Acute on chronic cholecystitis    Acute cor pulmonale (Multi)    Gastroesophageal reflux disease    Chronic respiratory failure (Multi)    Acquired thrombocytopenia (CMS-[facility])    Acquired absence of cervix    Acidosis, unspecified    Abnormal findings on diagnostic imaging of breast    Pulmonary HTN (Multi)    Long-term use of high-risk medication    SVT (supraventricular tachycardia) (CMS-[facility])        Surgical History  She has a past surgical history that includes Other surgical history (09/21/2020); Other surgical history (09/21/2020); Other surgical history (09/21/2020); Other surgical history (09/21/2020); Other surgical history (09/21/2020); Hysterectomy; Cholecystectomy; and Cardiac catheterization (N/A, 1/8/2024).     Social History  She reports that she has quit smoking. Her smoking use included cigarettes. She has quit using smokeless tobacco. She reports that she does not currently use alcohol. She reports that she does not currently use drugs.    Family History  Family History   Problem Relation Name Age of Onset    No Known Problems Mother      No Known Problems Father          Medications      Current Outpatient Medications:     aspirin 81 mg EC tablet, Take 1 tablet (81 mg) by mouth once daily., Disp: , Rfl:     calcium carbonate (Tums) 200 mg calcium chewable tablet, Chew 1 tablet (500 mg) 3 times a day. In between meals, Disp: 90 tablet, Rfl:  11    calcium carbonate-vitamin D3 500 mg-5 mcg (200 unit) tablet, TAKE 2 TABLETS BY MOUTH TWO TIMES A DAY, Disp: 150 tablet, Rfl: 0    cholestyramine (Questran) 4 gram packet, Take 1 packet (4 g) by mouth once daily., Disp: 30 packet, Rfl: 0    dilTIAZem CD (Cardizem CD) 180 mg 24 hr capsule, Take 1 capsule (180 mg) by mouth once daily., Disp: 30 capsule, Rfl: 0    epoprostenol (Veletri) 1.5 mg recon soln, 1.5 mg (1,500 mcg) by central venous line infusion route continuously. Reconstitute contents of vial with 5 ml diluent and mix cassette as directed. Infuse continuously per physician orders. Allow for priming volume. Dose range: 1-150 ng/kg/min., Disp: 180 each, Rfl: 11    ergocalciferol (Vitamin D-2) 1.25 MG (28781 UT) capsule, Take 1 capsule (50,000 Units) by mouth 1 (one) time per week., Disp: 4 capsule, Rfl: 11    ferrous sulfate, 325 mg ferrous sulfate, tablet, Take 1 tablet by mouth once daily with breakfast., Disp: 30 tablet, Rfl: 0    folic acid (Folvite) 1 mg tablet, Take 1 tablet (1 mg) by mouth once daily. Needs to resume, Disp: , Rfl:     furosemide (Lasix) 40 mg tablet, TAKE 1 TABLET BY MOUTH ONE TIME DAILY, Disp: 30 tablet, Rfl: 0    gabapentin (Neurontin) 600 mg tablet, Take 1 tablet (600 mg) by mouth 2 times a day., Disp: 60 tablet, Rfl: 0    gabapentin (Neurontin) 800 mg tablet, Take 1 tablet (800 mg) by mouth 2 times a day., Disp: , Rfl:     hydroxychloroquine (Plaquenil) 200 mg tablet, Take 1 tablet (200 mg) by mouth 2 times a day., Disp: , Rfl:     loperamide (Imodium A-D) 2 mg tablet, Take 1 tablet (2 mg) by mouth 4 times a day as needed for diarrhea., Disp: 30 tablet, Rfl: 11    magnesium glycinate 100 mg tablet, Take 100 mg by mouth 2 times a day., Disp: 60 tablet, Rfl: 11    melatonin 5 mg tablet, Take 1 tablet (5 mg) by mouth once daily at bedtime., Disp: , Rfl:     omeprazole (PriLOSEC) 40 mg DR capsule, Take 1 capsule (40 mg) by mouth once daily in the morning. Take before meals. Do not  crush or chew., Disp: , Rfl:     Opsumit 10 mg tablet tablet, TAKE 1 TABLET BY MOUTH DAILY. DO NOT HANDLE IF PREGNANT. DO NOT SPLIT, CRUSH, OR CHEW. REVIEW MEDICATION GUIDE., Disp: 30 tablet, Rfl: 11    potassium chloride CR (Klor-Con M20) 20 mEq ER tablet, TAKE 1 TABLET BY MOUTH TWO TIMES A DAY WITH MEALS, Disp: 60 tablet, Rfl: 0    prochlorperazine (Compazine) 5 mg tablet, Take 1 tablet (5 mg) by mouth every 6 hours if needed for nausea or vomiting., Disp: 30 tablet, Rfl: 0    venlafaxine XR (Effexor-XR) 150 mg 24 hr capsule, Take 1 capsule (150 mg) by mouth once daily. Do not crush or chew., Disp: , Rfl:      Allergies  Levetiracetam    Review of Systems   Constitutional:  Positive for activity change and appetite change. Negative for chills, fatigue, fever and unexpected weight change.   Respiratory:  Positive for cough, chest tightness, shortness of breath and wheezing.    Cardiovascular:  Negative for chest pain, palpitations and leg swelling.   Gastrointestinal:  Positive for constipation, diarrhea, nausea and vomiting. Negative for abdominal distention and abdominal pain.   Neurological:  Negative for dizziness, syncope, facial asymmetry and headaches.       Last Recorded Vitals  There were no vitals taken for this visit.  Vitals:    07/22/24 1420   BP: 111/75   Pulse: 91   SpO2: 91%          Physical Exam  Constitutional:       Comments: Wheelchair oxygen   HENT:      Nose: Nose normal.   Cardiovascular:      Rate and Rhythm: Normal rate and regular rhythm.      Heart sounds: Murmur heard.      Comments: Increased P2  Musculoskeletal:      Cervical back: Neck supple.   Neurological:      Mental Status: She is alert.            Relevant Results    6MWT (7/22/2024)  SP02-98%/96% on 4L  HR-106/114  CHEIKH-2/2  Actual Meters- 183 meters    6MWT(5/13/24)  SP02- 96-96% /4 L NC  HR-   CHEIKH-0-4  Actual Meters 216 meters     CT angio chest for PE (5/30/2024)  1. No findings of acute pulmonary embolism.  2.  Persistent marked cardiac enlargement. Hepatic heterogeneity and splenomegaly suggesting right heart dysfunction with associated not make liver and portal hypertension.  3. Bronchial thickening. There are couple of other stable pulmonary nodules when compared to most recent examination, however there are areas of ground-glass opacity seen in the lungs possibly reflecting inflammatory etiologies. Findings of mild pulmonary edema and volume  Overload    Echo (4/24/24) [facility]  Left Ventricle: The left ventricular systolic function is normal, with an estimated ejection fraction of 60-65%. There are no regional wall motion abnormalities. The left ventricular cavity size is normal. The interventricular septum is flattened in systole and diastole, consistent with right ventricular pressure and volume overload. Left ventricular diastolic filling was not assessed.  Left Atrium: The left atrium is normal in size. A bubble study using agitated saline was performed. Bubble study is positive. A small PFO (= 10 bubbles) was demonstrated. Agitated saline contrast study was positive for small intracardiac shunt.  Right Ventricle: The right ventricle is severely enlarged. There is severely reduced right ventricular systolic function. Although the TAPSE and RVA' are normal, the fractional area change is markedly reduced consistent wtih severe RV systolic dysfunction. Still able to generate high RVSP.  Right Atrium: The right atrium is severely dilated.  Aortic Valve: The aortic valve is trileaflet. There is minimal aortic valve cusp calcification. Aortic valve regurgitation was not assessed.  Mitral Valve: The mitral valve is normal in structure. Mitral valve regurgitation was not assessed.  Tricuspid Valve: The tricuspid valve is structurally normal. There is severe tricuspid regurgitation. The Doppler estimated RVSP is severely elevated at 88.6 mmHg.  Pulmonic Valve: The pulmonic valve is not well visualized. Pulmonic valve  regurgitation was not assessed.  Pericardium: There is a small to moderate pericardial effusion. Small circumferential pericardial effusin though is small to moderate adjacent to the RA. No RV or RA collapse. The IVC is dilated, though likely due to the presence of severe pulmonary hypertension. No significant respiratory variation of the MV or TV inflows. Not diagnostic for tamponade though difficult to dx tamponade in setting of severe pulmonary hypertension.  Aorta: The aortic root is normal.  Systemic Veins: The inferior vena cava appears dilated. There is IVC inspiratory collapse greater than 50%.  In comparison to the previous echocardiogram(s): Compared with study from 3/12/2022,. Compared with the prior exam from 3/12/2022 the RV had a similar appearance with severe dilatation and reduced function. Note the TR was not asessed on that exam to allow estimation of the RVSP at that time. The pericardial effusion appears similar in size.  CONCLUSIONS:   1. Left ventricular systolic function is normal with a 60-65% estimated ejection fraction.   2. Right ventricular volume and pressure overload.   3. Although the TAPSE and RVA' are normal, the fractional area change is markedly reduced consistent wtih severe RV systolic dysfunction. Still able to generate high RVSP.   4. Severely enlarged right ventricle.   5. There is severely reduced right ventricular systolic function.   6. Agitated saline contrast study was positive for small intracardiac shunt.   7. The right atrium is severely dilated.   8. There is a small to moderate pericardial effusion.   9. Small circumferential pericardial effusin though is small to moderate adjacent to the RA. No RV or RA collapse. The IVC is dilated, though likely due to the presence of severe pulmonary hypertension. No significant respiratory variation of the MV or TV inflows. Not diagnostic for tamponade though difficult to dx tamponade in setting of severe pulmonary  hypertension.  10. Severe tricuspid regurgitation visualized.  11. Severely elevated right ventricular systolic pressure.  12. Compared with the prior exam from 3/12/2022 the RV had a similar appearance with severe dilatation and reduced function. Note the TR was not asessed on that exam to allow estimation of the RVSP at that time. The pericardial effusion appears similar in size.  13. A bubble study using agitated saline was performed. Bubble study is positive. A small PFO (= 10 bubbles) was demonstrated.    6MWT (23)  SpO2: 98% - 95% on 4L  HR: 97 - 116  Valentin: 1 - 7  Actual 314m     6MWT (2022)   Hr 78 - 111  Spo2 94 - 90 on RA  Valentin 0 - 1  Meters 366      6MWT (21) on RA  SpO2: 92% - 87%  HR: 81 - 94  Valentin: 3 - 4  Actual 311m     6MWT (2021)  HR 91 -102  Spo2 97 - 95 on RA  Valentin 1- 4  Meters 287/predicted 477    RHC (2020)   PAP- 73/31 (47)  PCWP- 8  CO/CI- 3.8 - 2.0   RHC (20): PAp 83/26 (54), PW 8, C.O. 3.8 / C.I. 2.0     Echo from OSH (20)  moderately to severely dilated RV with moderately reduced function and a moderately dilated RA.         Assessment/Plan     1) Pulmonary  a. PAH associated with methamphetamine use, presented FCIV, V/Q (-) , depressed CI, now on infusion therapy with dramatic subjective improvement, still high risk. Still with hectic follow up at best, (+) cocaine during hospitalization 2024. (+) Cannabinoid and amphetamine 2024.  In past,  adjust dosing for weight loss and follow resolution of epo rash and significant diarrhea. Patient went up without authorization. Seems stable from PAH standpoint,      Did not finish Keflex ordered for possible site infection. Better today. W     Intolerant to PDE5     On macitentan     2) Neuro - pre-existing neuropathy, alcohol vs ? Today complaining of classic sciatic pain onset after riding horse.      3) Cardiac - hypertension     4) Interval diagnosis of lupus on basis of ?     Plan    1) Continue current  meds     2) Monitor site, change dressing twice as often , finish Keflex and report. If F/C/S or line tenderness, to ED     3) Follow up in clinic. 8-12 weeks with 6 minute walk and routine labs.    4) Today complaining of classic sciatic pain onset after riding horse. Suggested PCP follow up ,

## 2024-07-22 ENCOUNTER — OFFICE VISIT (OUTPATIENT)
Dept: PULMONOLOGY | Facility: HOSPITAL | Age: 64
End: 2024-07-22
Payer: COMMERCIAL

## 2024-07-22 ENCOUNTER — HOSPITAL ENCOUNTER (OUTPATIENT)
Dept: RESPIRATORY THERAPY | Facility: HOSPITAL | Age: 64
Discharge: HOME | End: 2024-07-22
Payer: COMMERCIAL

## 2024-07-22 VITALS
OXYGEN SATURATION: 91 % | DIASTOLIC BLOOD PRESSURE: 75 MMHG | WEIGHT: 162 LBS | SYSTOLIC BLOOD PRESSURE: 111 MMHG | HEIGHT: 65 IN | BODY MASS INDEX: 26.99 KG/M2 | HEART RATE: 91 BPM

## 2024-07-22 DIAGNOSIS — R06.02 SOB (SHORTNESS OF BREATH): ICD-10-CM

## 2024-07-22 DIAGNOSIS — I27.20 PULMONARY HYPERTENSION (MULTI): ICD-10-CM

## 2024-07-22 DIAGNOSIS — Z79.899 LONG-TERM USE OF HIGH-RISK MEDICATION: ICD-10-CM

## 2024-07-22 DIAGNOSIS — I27.20 PULMONARY HTN (MULTI): ICD-10-CM

## 2024-07-22 PROCEDURE — 94618 PULMONARY STRESS TESTING: CPT

## 2024-07-22 PROCEDURE — 94618 PULMONARY STRESS TESTING: CPT | Performed by: INTERNAL MEDICINE

## 2024-07-22 PROCEDURE — 99215 OFFICE O/P EST HI 40 MIN: CPT | Performed by: INTERNAL MEDICINE

## 2024-07-22 PROCEDURE — 3074F SYST BP LT 130 MM HG: CPT | Performed by: INTERNAL MEDICINE

## 2024-07-22 PROCEDURE — 3008F BODY MASS INDEX DOCD: CPT | Performed by: INTERNAL MEDICINE

## 2024-07-22 PROCEDURE — 3078F DIAST BP <80 MM HG: CPT | Performed by: INTERNAL MEDICINE

## 2024-07-22 ASSESSMENT — ENCOUNTER SYMPTOMS
FATIGUE: 0
CHILLS: 0
WHEEZING: 1
ACTIVITY CHANGE: 1
CHEST TIGHTNESS: 1
LOSS OF SENSATION IN FEET: 0
DIZZINESS: 0
UNEXPECTED WEIGHT CHANGE: 0
OCCASIONAL FEELINGS OF UNSTEADINESS: 0
DIARRHEA: 1
PALPITATIONS: 0
COUGH: 1
CONSTIPATION: 1
APPETITE CHANGE: 1
FEVER: 0
ABDOMINAL PAIN: 0
SHORTNESS OF BREATH: 1
VOMITING: 1
HEADACHES: 0
DEPRESSION: 0
NAUSEA: 1
FACIAL ASYMMETRY: 0
ABDOMINAL DISTENTION: 0

## 2024-07-22 ASSESSMENT — PAIN SCALES - GENERAL: PAINLEVEL: 10-WORST PAIN EVER

## 2024-07-23 RX ORDER — LOPERAMIDE HCL 2 MG
2 TABLET ORAL 4 TIMES DAILY PRN
Qty: 30 TABLET | Refills: 11 | Status: SHIPPED | OUTPATIENT
Start: 2024-07-23

## 2024-08-03 ENCOUNTER — HOSPITAL ENCOUNTER (EMERGENCY)
Facility: HOSPITAL | Age: 64
Discharge: HOME | End: 2024-08-03
Payer: COMMERCIAL

## 2024-08-03 VITALS
HEART RATE: 100 BPM | OXYGEN SATURATION: 94 % | BODY MASS INDEX: 27.16 KG/M2 | WEIGHT: 163 LBS | SYSTOLIC BLOOD PRESSURE: 97 MMHG | RESPIRATION RATE: 18 BRPM | TEMPERATURE: 99.3 F | DIASTOLIC BLOOD PRESSURE: 75 MMHG | HEIGHT: 65 IN

## 2024-08-03 DIAGNOSIS — K08.89 PAIN, DENTAL: Primary | ICD-10-CM

## 2024-08-03 LAB
ALBUMIN SERPL-MCNC: 3.6 G/DL (ref 3.5–5)
ALP BLD-CCNC: 92 U/L (ref 35–125)
ALT SERPL-CCNC: 5 U/L (ref 5–40)
ANION GAP SERPL CALC-SCNC: 11 MMOL/L
AST SERPL-CCNC: 7 U/L (ref 5–40)
BASOPHILS # BLD AUTO: 0 X10*3/UL (ref 0–0.1)
BASOPHILS NFR BLD AUTO: 0 %
BILIRUB SERPL-MCNC: 0.7 MG/DL (ref 0.1–1.2)
BUN SERPL-MCNC: 18 MG/DL (ref 8–25)
CALCIUM SERPL-MCNC: 8.4 MG/DL (ref 8.5–10.4)
CHLORIDE SERPL-SCNC: 105 MMOL/L (ref 97–107)
CO2 SERPL-SCNC: 23 MMOL/L (ref 24–31)
CREAT SERPL-MCNC: 0.9 MG/DL (ref 0.4–1.6)
EGFRCR SERPLBLD CKD-EPI 2021: 73 ML/MIN/1.73M*2
EOSINOPHIL # BLD AUTO: 0 X10*3/UL (ref 0–0.7)
EOSINOPHIL NFR BLD AUTO: 0 %
ERYTHROCYTE [DISTWIDTH] IN BLOOD BY AUTOMATED COUNT: 20.9 % (ref 11.5–14.5)
GLUCOSE SERPL-MCNC: 89 MG/DL (ref 65–99)
HCT VFR BLD AUTO: 32.1 % (ref 36–46)
HGB BLD-MCNC: 9.3 G/DL (ref 12–16)
IMM GRANULOCYTES # BLD AUTO: 0.02 X10*3/UL (ref 0–0.7)
IMM GRANULOCYTES NFR BLD AUTO: 0.5 % (ref 0–0.9)
LYMPHOCYTES # BLD AUTO: 0.6 X10*3/UL (ref 1.2–4.8)
LYMPHOCYTES NFR BLD AUTO: 13.6 %
MCH RBC QN AUTO: 23.6 PG (ref 26–34)
MCHC RBC AUTO-ENTMCNC: 29 G/DL (ref 32–36)
MCV RBC AUTO: 82 FL (ref 80–100)
MONOCYTES # BLD AUTO: 0.28 X10*3/UL (ref 0.1–1)
MONOCYTES NFR BLD AUTO: 6.4 %
NEUTROPHILS # BLD AUTO: 3.5 X10*3/UL (ref 1.2–7.7)
NEUTROPHILS NFR BLD AUTO: 79.5 %
NRBC BLD-RTO: 0 /100 WBCS (ref 0–0)
PLATELET # BLD AUTO: 126 X10*3/UL (ref 150–450)
POTASSIUM SERPL-SCNC: 4.2 MMOL/L (ref 3.4–5.1)
PROT SERPL-MCNC: 6.6 G/DL (ref 5.9–7.9)
RBC # BLD AUTO: 3.94 X10*6/UL (ref 4–5.2)
SODIUM SERPL-SCNC: 139 MMOL/L (ref 133–145)
WBC # BLD AUTO: 4.4 X10*3/UL (ref 4.4–11.3)

## 2024-08-03 PROCEDURE — 2500000001 HC RX 250 WO HCPCS SELF ADMINISTERED DRUGS (ALT 637 FOR MEDICARE OP)

## 2024-08-03 PROCEDURE — 96361 HYDRATE IV INFUSION ADD-ON: CPT

## 2024-08-03 PROCEDURE — 99284 EMERGENCY DEPT VISIT MOD MDM: CPT

## 2024-08-03 PROCEDURE — 2500000004 HC RX 250 GENERAL PHARMACY W/ HCPCS (ALT 636 FOR OP/ED)

## 2024-08-03 PROCEDURE — 96375 TX/PRO/DX INJ NEW DRUG ADDON: CPT

## 2024-08-03 PROCEDURE — 84075 ASSAY ALKALINE PHOSPHATASE: CPT

## 2024-08-03 PROCEDURE — 96365 THER/PROPH/DIAG IV INF INIT: CPT

## 2024-08-03 PROCEDURE — 85025 COMPLETE CBC W/AUTO DIFF WBC: CPT

## 2024-08-03 RX ORDER — OXYCODONE AND ACETAMINOPHEN 5; 325 MG/1; MG/1
1 TABLET ORAL EVERY 6 HOURS PRN
Qty: 12 TABLET | Refills: 0 | Status: SHIPPED | OUTPATIENT
Start: 2024-08-03 | End: 2024-08-06

## 2024-08-03 RX ORDER — OXYCODONE AND ACETAMINOPHEN 5; 325 MG/1; MG/1
1 TABLET ORAL ONCE
Status: COMPLETED | OUTPATIENT
Start: 2024-08-03 | End: 2024-08-03

## 2024-08-03 RX ORDER — AMOXICILLIN AND CLAVULANATE POTASSIUM 875; 125 MG/1; MG/1
1 TABLET, FILM COATED ORAL EVERY 12 HOURS
Qty: 14 TABLET | Refills: 0 | Status: SHIPPED | OUTPATIENT
Start: 2024-08-03 | End: 2024-08-03

## 2024-08-03 RX ORDER — AMOXICILLIN AND CLAVULANATE POTASSIUM 875; 125 MG/1; MG/1
1 TABLET, FILM COATED ORAL EVERY 12 HOURS
Qty: 14 TABLET | Refills: 0 | Status: SHIPPED | OUTPATIENT
Start: 2024-08-03 | End: 2024-08-10

## 2024-08-03 RX ORDER — OXYCODONE AND ACETAMINOPHEN 5; 325 MG/1; MG/1
1 TABLET ORAL EVERY 6 HOURS PRN
Qty: 12 TABLET | Refills: 0 | Status: SHIPPED | OUTPATIENT
Start: 2024-08-03 | End: 2024-08-03

## 2024-08-03 RX ORDER — KETOROLAC TROMETHAMINE 30 MG/ML
30 INJECTION, SOLUTION INTRAMUSCULAR; INTRAVENOUS ONCE
Status: COMPLETED | OUTPATIENT
Start: 2024-08-03 | End: 2024-08-03

## 2024-08-03 RX ADMIN — KETOROLAC TROMETHAMINE 30 MG: 30 INJECTION, SOLUTION INTRAMUSCULAR at 10:39

## 2024-08-03 RX ADMIN — OXYCODONE HYDROCHLORIDE AND ACETAMINOPHEN 1 TABLET: 5; 325 TABLET ORAL at 10:41

## 2024-08-03 RX ADMIN — SODIUM CHLORIDE 1000 ML: 900 INJECTION, SOLUTION INTRAVENOUS at 10:48

## 2024-08-03 RX ADMIN — SODIUM CHLORIDE 3 G: 900 INJECTION INTRAVENOUS at 10:55

## 2024-08-03 ASSESSMENT — PAIN SCALES - GENERAL
PAINLEVEL_OUTOF10: 10 - WORST POSSIBLE PAIN
PAINLEVEL_OUTOF10: 10 - WORST POSSIBLE PAIN
PAINLEVEL_OUTOF10: 7

## 2024-08-03 ASSESSMENT — PAIN DESCRIPTION - ORIENTATION: ORIENTATION: RIGHT;LOWER

## 2024-08-03 ASSESSMENT — PAIN DESCRIPTION - FREQUENCY: FREQUENCY: CONSTANT/CONTINUOUS

## 2024-08-03 ASSESSMENT — PAIN DESCRIPTION - LOCATION
LOCATION: MOUTH
LOCATION: MOUTH

## 2024-08-03 ASSESSMENT — PAIN DESCRIPTION - DESCRIPTORS: DESCRIPTORS: STABBING;SHARP

## 2024-08-03 ASSESSMENT — PAIN DESCRIPTION - PROGRESSION
CLINICAL_PROGRESSION: GRADUALLY IMPROVING
CLINICAL_PROGRESSION: NOT CHANGED

## 2024-08-03 ASSESSMENT — PAIN DESCRIPTION - PAIN TYPE: TYPE: ACUTE PAIN

## 2024-08-03 ASSESSMENT — PAIN DESCRIPTION - ONSET: ONSET: SUDDEN

## 2024-08-03 ASSESSMENT — PAIN - FUNCTIONAL ASSESSMENT
PAIN_FUNCTIONAL_ASSESSMENT: 0-10
PAIN_FUNCTIONAL_ASSESSMENT: 0-10

## 2024-08-03 NOTE — ED PROVIDER NOTES
HPI   Chief Complaint   Patient presents with    Dental Pain     For the past 2 days I have had tooth abscess in my rt lower jaw the pain is sharp stabbing I was given amoxicillin       HPI  Patient is a 63-year-old female with history of right-sided dental infection who presents to ED for worsening swelling and pain.  Patient was put on amoxicillin for this by her primary provider.  Patient does have a follow-up with her dentist next week.  She denies any specific complaints of fever or chills, cough or congestion, headache or dizziness, chest pain or shortness of breath, abdominal pain or NVD, urinary symptoms.  Her primary complaint is severe pain on the right side of her face with associated swelling.  She denies any recent hospitalizations or surgeries.  Denies any recent travel or sick contacts.      Patient History   Past Medical History:   Diagnosis Date    Anxiety     Depression     GERD (gastroesophageal reflux disease)     Lupus (Multi)     Personal history of other specified conditions 2020    History of seizure    Pulmonary hypertension (Multi)      Past Surgical History:   Procedure Laterality Date    CARDIAC CATHETERIZATION N/A 2024    Procedure: Left Heart Cath, With LV, Angriography And Grafts;  Surgeon: Christiano Calvillo DO;  Location: [facility] Cardiac Cath Lab;  Service: Cardiovascular;  Laterality: N/A;    CHOLECYSTECTOMY      HYSTERECTOMY      OTHER SURGICAL HISTORY  2020    Cholecystectomy    OTHER SURGICAL HISTORY  2020    Esophagogastroduodenoscopy    OTHER SURGICAL HISTORY  2020    Colonoscopy    OTHER SURGICAL HISTORY  2020     section    OTHER SURGICAL HISTORY  2020    Hysterectomy     Family History   Problem Relation Name Age of Onset    No Known Problems Mother      No Known Problems Father       Social History     Tobacco Use    Smoking status: Former     Types: Cigarettes    Smokeless tobacco: Former   Vaping Use    Vaping status: Never Used    Substance Use Topics    Alcohol use: Not Currently    Drug use: Not Currently       Physical Exam   ED Triage Vitals [08/03/24 0904]   Temperature Heart Rate Respirations BP   37.4 °C (99.3 °F) (!) 104 18 111/67      Pulse Ox Temp Source Heart Rate Source Patient Position   (!) 91 % Temporal Monitor Sitting      BP Location FiO2 (%)     Left arm --       Physical Exam  Vitals reviewed.   Constitutional:       Appearance: Normal appearance.   HENT:      Head: Normocephalic and atraumatic.      Mouth/Throat:      Dentition: Abnormal dentition. Dental tenderness and dental caries present.   Eyes:      Extraocular Movements: Extraocular movements intact.   Cardiovascular:      Rate and Rhythm: Normal rate and regular rhythm.      Heart sounds: Normal heart sounds.   Pulmonary:      Effort: Pulmonary effort is normal.      Breath sounds: Normal breath sounds.   Abdominal:      General: Abdomen is flat.      Palpations: Abdomen is soft.      Tenderness: There is no abdominal tenderness.   Musculoskeletal:         General: Normal range of motion.      Cervical back: Normal range of motion and neck supple.   Skin:     General: Skin is warm and dry.   Neurological:      General: No focal deficit present.      Mental Status: She is alert and oriented to person, place, and time.   Psychiatric:         Mood and Affect: Mood normal.         Behavior: Behavior normal.           ED Course & MDM   Diagnoses as of 08/03/24 1311   Pain, dental                       Feliciano Coma Scale Score: 15                        Medical Decision Making  Parts of this chart have been completed using voice recognition software. Please excuse any errors of transcription.  My thought process and reason for plan has been formulated from the time that I saw the patient until the time of disposition and is not specific to one specific moment during their visit and furthermore my MDM encompasses this entire chart and not only this text box.    HPI:    Detailed above.    Exam:   A medically appropriate exam performed, outlined above, given the known history and presentation.    History obtained from:   Patient    EKG/Cardiac monitor:     Social Determinants of Health considered during this visit:       Medications given during visit:  Medications   ampicillin-sulbactam (Unasyn) in sodium chloride 0.9 % 100 mL 3 g (0 g intravenous Stopped 8/3/24 1132)   ketorolac (Toradol) injection 30 mg (30 mg intravenous Given 8/3/24 1039)   sodium chloride 0.9 % bolus 1,000 mL (0 mL intravenous Stopped 8/3/24 1241)   oxyCODONE-acetaminophen (Percocet) 5-325 mg per tablet 1 tablet (1 tablet oral Given 8/3/24 1041)        Diagnostic/tests:  Labs Reviewed   CBC WITH AUTO DIFFERENTIAL - Abnormal       Result Value    WBC 4.4      nRBC 0.0      RBC 3.94 (*)     Hemoglobin 9.3 (*)     Hematocrit 32.1 (*)     MCV 82      MCH 23.6 (*)     MCHC 29.0 (*)     RDW 20.9 (*)     Platelets 126 (*)     Neutrophils % 79.5      Immature Granulocytes %, Automated 0.5      Lymphocytes % 13.6      Monocytes % 6.4      Eosinophils % 0.0      Basophils % 0.0      Neutrophils Absolute 3.50      Immature Granulocytes Absolute, Automated 0.02      Lymphocytes Absolute 0.60 (*)     Monocytes Absolute 0.28      Eosinophils Absolute 0.00      Basophils Absolute 0.00     COMPREHENSIVE METABOLIC PANEL - Abnormal    Glucose 89      Sodium 139      Potassium 4.2      Chloride 105      Bicarbonate 23 (*)     Urea Nitrogen 18      Creatinine 0.90      eGFR 73      Calcium 8.4 (*)     Albumin 3.6      Alkaline Phosphatase 92      Total Protein 6.6      AST 7      Bilirubin, Total 0.7      ALT 5      Anion Gap 11        No orders to display        Differential Diagnosis:       Consultations:      Procedures:      Critical Care:      Referrals:      Discharge Prescriptions:      MDM Summary:  Patient has been on amoxicillin for 2 days and reports no improvement.  Patient given IV Toradol for inflammation,  Percocet for pain, IV Unasyn for empiric coverage.  Will prescribe patient Augmentin to go home with today.  Strict return precautions were discussed.  Patient will follow-up with the dentist as soon as possible.    We have discussed the diagnosis and risks, and we agree with discharging home to follow-up with appropriate physician as directed. We also discussed returning to the Emergency Department immediately if new or worsening symptoms occur. We have discussed the symptoms which are most concerning that necessitate immediate return. Pt symptoms have been well controlled here and the patient is safe for discharge with appropriate outpatient follow up. The patient has verbalized understanding to return to ER without delay for new or worsening pains or for any other symptoms or concerns. I utilized the discharge clinical management tool provided [employer] to help estimate risk of negative outcome for this patient.      Disposition:  The patient was discharged      Procedure  Procedures     Mando Galindo PA-C  08/03/24 9840

## 2024-08-12 ENCOUNTER — APPOINTMENT (OUTPATIENT)
Dept: RESPIRATORY THERAPY | Facility: HOSPITAL | Age: 64
End: 2024-08-12
Payer: COMMERCIAL

## 2024-08-12 ENCOUNTER — APPOINTMENT (OUTPATIENT)
Dept: PULMONOLOGY | Facility: HOSPITAL | Age: 64
End: 2024-08-12
Payer: COMMERCIAL

## 2024-08-14 ENCOUNTER — HOSPITAL ENCOUNTER (OUTPATIENT)
Dept: RADIOLOGY | Facility: HOSPITAL | Age: 64
Discharge: HOME | End: 2024-08-14
Payer: COMMERCIAL

## 2024-08-14 VITALS — WEIGHT: 162 LBS | HEIGHT: 60 IN | BODY MASS INDEX: 31.8 KG/M2

## 2024-08-14 DIAGNOSIS — Z12.31 ENCOUNTER FOR SCREENING MAMMOGRAM FOR MALIGNANT NEOPLASM OF BREAST: ICD-10-CM

## 2024-08-14 PROCEDURE — 77067 SCR MAMMO BI INCL CAD: CPT | Performed by: RADIOLOGY

## 2024-08-14 PROCEDURE — 77063 BREAST TOMOSYNTHESIS BI: CPT | Performed by: RADIOLOGY

## 2024-08-14 PROCEDURE — 77067 SCR MAMMO BI INCL CAD: CPT

## 2024-08-22 ENCOUNTER — APPOINTMENT (OUTPATIENT)
Dept: PAIN MEDICINE | Facility: CLINIC | Age: 64
End: 2024-08-22
Payer: COMMERCIAL

## 2024-08-22 VITALS
DIASTOLIC BLOOD PRESSURE: 63 MMHG | HEIGHT: 65 IN | BODY MASS INDEX: 26.99 KG/M2 | RESPIRATION RATE: 16 BRPM | HEART RATE: 106 BPM | SYSTOLIC BLOOD PRESSURE: 96 MMHG | WEIGHT: 162 LBS

## 2024-08-22 DIAGNOSIS — G62.9 NEUROPATHY: Primary | ICD-10-CM

## 2024-08-22 PROCEDURE — 3078F DIAST BP <80 MM HG: CPT | Performed by: PHYSICAL MEDICINE & REHABILITATION

## 2024-08-22 PROCEDURE — 3008F BODY MASS INDEX DOCD: CPT | Performed by: PHYSICAL MEDICINE & REHABILITATION

## 2024-08-22 PROCEDURE — 99204 OFFICE O/P NEW MOD 45 MIN: CPT | Performed by: PHYSICAL MEDICINE & REHABILITATION

## 2024-08-22 PROCEDURE — 3074F SYST BP LT 130 MM HG: CPT | Performed by: PHYSICAL MEDICINE & REHABILITATION

## 2024-08-22 RX ORDER — TOPIRAMATE 25 MG/1
TABLET ORAL
Qty: 256 TABLET | Refills: 0 | Status: SHIPPED | OUTPATIENT
Start: 2024-08-22

## 2024-08-22 ASSESSMENT — PAIN SCALES - GENERAL: PAINLEVEL: 7

## 2024-08-22 NOTE — PROGRESS NOTES
Subjective   Patient ID: Ronna Beckham is a 63 y.o. female who presents for Generalized Body Aches.  HPI    Patient has a history of pulmonary hypertension, alcohol dependence for several years, cocaine use, methamphetamine abuse, peripheral neuropathy in the bilateral upper and lower extremities.  She states that she has tingling and numbness in both her feet that have progressively worsened in intensity over the years, and has ascended to the level of the knee bilaterally.  She also has the same sensation that are worse in intensity in the bilateral hands, and have ascended up to the level of the elbows bilaterally.  She was placed on gabapentin, and uptitrated to 800 mg 3 times daily, but she is only taking it twice daily due to convenience with her other pills scheduling.  She has not tried any other medications for this.  She denies history of diabetes.    Last Urine Drug Screen / ordered today: No  Recent Results (from the past 8760 hour(s))   Drug Screen, Urine    Collection Time: 07/05/24 11:54 PM   Result Value Ref Range    Amphetamine Screen, Urine Negative      Barbiturate Screen, Urine Negative      Benzodiazepines Screen, Urine Negative      Cannabinoid Screen, Urine Positive (A)      Cocaine Metabolite Screen, Urine Negative      Fentanyl Screen, Urine Negative       Methadone Screen, Urine Negative      Opiate Screen, Urine Negative      Oxycodone Screen, Urine Negative      PCP Screen, Urine Positive (A)         Review of Systems     Current Outpatient Medications   Medication Instructions    aspirin 81 mg, oral, Daily    calcium carbonate (TUMS) 500 mg, oral, 3 times daily, In between meals    calcium carbonate-vitamin D3 500 mg-5 mcg (200 unit) tablet TAKE 2 TABLETS BY MOUTH TWO TIMES A DAY    cholestyramine (Questran) 4 gram packet 4 g, oral, Daily    dilTIAZem CD (CARDIZEM CD) 180 mg, oral, Daily    epoprostenol (VELETRI) 1,500 mcg, central venous line infusion, Continuous, Reconstitute  contents of vial with 5 ml diluent and mix cassette as directed. Infuse continuously per physician orders. Allow for priming volume. Dose range: 1-150 ng/kg/min.    ergocalciferol (VITAMIN D-2) 50,000 Units, oral, Once Weekly    folic acid (FOLVITE) 1 mg, oral, Daily, Needs to resume    furosemide (Lasix) 40 mg tablet TAKE 1 TABLET BY MOUTH ONE TIME DAILY    gabapentin (NEURONTIN) 600 mg, oral, 2 times daily    gabapentin (NEURONTIN) 800 mg, oral, 2 times daily    hydroxychloroquine (PLAQUENIL) 200 mg, oral, 2 times daily    loperamide (IMODIUM A-D) 2 mg, oral, 4 times daily PRN    magnesium glycinate 100 mg, oral, 2 times daily    melatonin 5 mg, oral, Nightly    omeprazole (PRILOSEC) 40 mg, oral, Daily before breakfast, Do not crush or chew.    Opsumit 10 mg tablet tablet TAKE 1 TABLET BY MOUTH DAILY. DO NOT HANDLE IF PREGNANT. DO NOT SPLIT, CRUSH, OR CHEW. REVIEW MEDICATION GUIDE.    potassium chloride CR (Klor-Con M20) 20 mEq ER tablet TAKE 1 TABLET BY MOUTH TWO TIMES A DAY WITH MEALS    prochlorperazine (COMPAZINE) 5 mg, oral, Every 6 hours PRN    topiramate (Topamax) 25 mg tablet Week 1 25mg at bedtime, week 2 25mg BID, week 3 25mg qAM, 50mg at bedtime, week 4 50 mg BID, week 5 50mg qAM 75mg at bedtime, week 6 75 mg BID, week 7 75mg qam 100mg at bedtime, week 8 100mg BID    venlafaxine XR (EFFEXOR-XR) 150 mg, oral, Daily, Do not crush or chew.        Past Medical History:   Diagnosis Date    Anxiety     Depression     GERD (gastroesophageal reflux disease)     Lupus (Multi)     Personal history of other specified conditions 09/21/2020    History of seizure    Pulmonary hypertension (Multi)         Past Surgical History:   Procedure Laterality Date    CARDIAC CATHETERIZATION N/A 1/8/2024    Procedure: Left Heart Cath, With LV, Angriography And Grafts;  Surgeon: hCristiano Calvillo DO;  Location: [facility] Cardiac Cath Lab;  Service: Cardiovascular;  Laterality: N/A;    CHOLECYSTECTOMY      HYSTERECTOMY      OTHER  SURGICAL HISTORY  2020    Cholecystectomy    OTHER SURGICAL HISTORY  2020    Esophagogastroduodenoscopy    OTHER SURGICAL HISTORY  2020    Colonoscopy    OTHER SURGICAL HISTORY  2020     section    OTHER SURGICAL HISTORY  2020    Hysterectomy        Family History   Problem Relation Name Age of Onset    No Known Problems Mother      No Known Problems Father          Allergies   Allergen Reactions    Levetiracetam Other     Weakness, unable to walk        Objective     Vitals:    24 1029   BP: 96/63   Pulse: 106   Resp: 16        Physical Exam    GENERAL EXAM  Vital Signs: Vital signs to include heart rate, respiration rate, blood pressure, and temperature were reviewed.  General Appearance:  Awake, alert, healthy appearing, well developed, No acute distress.  Head: Normocephalic without evidence of head injury.  Neck: The appearance of the neck was normal without swelling with a midline trachea.  Eyes: The eyelids and eyebrows exhibited no abnormalities.  Pupils were not pin-point.  Sclera was without icterus.  Lungs: Respiration rhythm and depth was normal.  Respiratory movements were normal without labored breathing.  Cardiovascular: No peripheral edema was present.    Neurological: Patient was oriented to time, place, and person.  Speech was normal.  Balance, gait, and stance were unremarkable.    Psychiatric: Appearance was normal with appropriate dress.  Mood was euthymic and affect was normal.  Skin: Affected regions were without ecchymosis or skin lesions.     Neurologic:   Cranial nerves grossly intact.   Strength: 5/5 and symmetric throughout.   Sensation: Normal sensation to light touch, but diminished sensation to pinprick intact throughout.  DTRs: 2+ throughout and symmetric at biceps/brachioradialis/patella/achilles        Assessment/Plan   Diagnoses and all orders for this visit:  Neuropathy  -     topiramate (Topamax) 25 mg tablet; Week 1 25mg at bedtime,  week 2 25mg BID, week 3 25mg qAM, 50mg at bedtime, week 4 50 mg BID, week 5 50mg qAM 75mg at bedtime, week 6 75 mg BID, week 7 75mg qam 100mg at bedtime, week 8 100mg BID    Ms. Ronna Beckham is a 63-year-old female with a history of pulmonary hypertension, alcohol dependence for several years, cocaine use, methamphetamine abuse, peripheral neuropathy in the bilateral upper and lower extremities.  Patient's peripheral neuropathy is likely alcohol induced, and she has not been compliant with her gabapentin use as recommended.  She did also mention abdominal pain today, which we advised her to follow-up with her PCP or to consider seeing her GI specialist for.    Our plan is as follows:  - Encouraged patient to continue taking gabapentin 800 mg 3 times daily.  - Start topiramate with recommended up titration schedule provided to her.  No history of kidney stones or glaucoma.       This note was generated with the aid of dictation software, there may be typos despite my attempts at proofreading.     I saw and evaluated the patient. I personally obtained the key and critical portions of the history and physical exam or was physically present for key and critical portions performed by the resident/fellow. I reviewed the resident/fellow's documentation and discussed the patient with the resident/fellow. I agree with the resident/fellow's medical decision making as documented in the note.    Brett Abdul MD

## 2024-08-23 ENCOUNTER — DOCUMENTATION (OUTPATIENT)
Dept: PULMONOLOGY | Facility: HOSPITAL | Age: 64
End: 2024-08-23
Payer: COMMERCIAL

## 2024-08-23 NOTE — PROGRESS NOTES
Patient called office on 8/22/2024 with concern about new swelling on her right ankle and top of right foot. Patient stated she ran out of lasix and missed a week of medicine. She has restarted taking for last 3 days.    Pt advised to continue taking her lasix as prescribed, watch her salt and fluid intake and try to elevate her leg when possible. Pt to call office next week with update. Patient verbalized understanding.

## 2024-08-26 ENCOUNTER — APPOINTMENT (OUTPATIENT)
Dept: RADIOLOGY | Facility: HOSPITAL | Age: 64
End: 2024-08-26
Payer: COMMERCIAL

## 2024-08-26 ENCOUNTER — APPOINTMENT (OUTPATIENT)
Dept: NEUROLOGY | Facility: CLINIC | Age: 64
End: 2024-08-26
Payer: COMMERCIAL

## 2024-08-26 ENCOUNTER — HOSPITAL ENCOUNTER (INPATIENT)
Facility: HOSPITAL | Age: 64
LOS: 3 days | Discharge: HOME | End: 2024-08-29
Attending: INTERNAL MEDICINE | Admitting: INTERNAL MEDICINE
Payer: COMMERCIAL

## 2024-08-26 ENCOUNTER — LAB (OUTPATIENT)
Dept: LAB | Facility: LAB | Age: 64
End: 2024-08-26
Payer: COMMERCIAL

## 2024-08-26 ENCOUNTER — APPOINTMENT (OUTPATIENT)
Dept: CARDIOLOGY | Facility: HOSPITAL | Age: 64
End: 2024-08-26
Payer: COMMERCIAL

## 2024-08-26 VITALS
WEIGHT: 162 LBS | RESPIRATION RATE: 18 BRPM | DIASTOLIC BLOOD PRESSURE: 80 MMHG | HEART RATE: 108 BPM | HEIGHT: 65 IN | BODY MASS INDEX: 26.99 KG/M2 | SYSTOLIC BLOOD PRESSURE: 106 MMHG

## 2024-08-26 DIAGNOSIS — I27.20 PULMONARY HTN (MULTI): ICD-10-CM

## 2024-08-26 DIAGNOSIS — J96.21 ACUTE ON CHRONIC HYPOXIC RESPIRATORY FAILURE (MULTI): Primary | ICD-10-CM

## 2024-08-26 DIAGNOSIS — I50.9 CONGESTIVE HEART FAILURE, UNSPECIFIED HF CHRONICITY, UNSPECIFIED HEART FAILURE TYPE (MULTI): ICD-10-CM

## 2024-08-26 DIAGNOSIS — G62.9 NEUROPATHY: ICD-10-CM

## 2024-08-26 DIAGNOSIS — G62.9 NEUROPATHY: Primary | ICD-10-CM

## 2024-08-26 DIAGNOSIS — R06.02 SHORTNESS OF BREATH: ICD-10-CM

## 2024-08-26 PROBLEM — J18.9 PNEUMONIA OF LEFT LOWER LOBE DUE TO INFECTIOUS ORGANISM: Status: ACTIVE | Noted: 2024-08-26

## 2024-08-26 LAB
ALBUMIN SERPL-MCNC: 3.5 G/DL (ref 3.5–5)
ALP BLD-CCNC: 94 U/L (ref 35–125)
ALT SERPL-CCNC: 8 U/L (ref 5–40)
ANION GAP BLDA CALCULATED.4IONS-SCNC: 13 MMO/L (ref 10–25)
ANION GAP SERPL CALC-SCNC: 14 MMOL/L
APPARATUS: ABNORMAL
APPEARANCE UR: CLEAR
ARTERIAL PATENCY WRIST A: POSITIVE
AST SERPL-CCNC: 9 U/L (ref 5–40)
BASE EXCESS BLDA CALC-SCNC: -1.9 MMOL/L (ref -2–3)
BASOPHILS # BLD AUTO: 0 X10*3/UL (ref 0–0.1)
BASOPHILS NFR BLD AUTO: 0 %
BILIRUB SERPL-MCNC: 0.8 MG/DL (ref 0.1–1.2)
BILIRUB UR STRIP.AUTO-MCNC: NEGATIVE MG/DL
BODY TEMPERATURE: 37 DEGREES CELSIUS
BUN SERPL-MCNC: 16 MG/DL (ref 8–25)
CA-I BLDA-SCNC: 1.03 MMOL/L (ref 1.1–1.33)
CALCIUM SERPL-MCNC: 7.6 MG/DL (ref 8.5–10.4)
CENTROMERE B AB SER-ACNC: <0.2 AI
CHLORIDE BLDA-SCNC: 105 MMOL/L (ref 98–107)
CHLORIDE SERPL-SCNC: 103 MMOL/L (ref 97–107)
CHROMATIN AB SERPL-ACNC: <0.2 AI
CO2 SERPL-SCNC: 18 MMOL/L (ref 24–31)
COLOR UR: COLORLESS
CREAT SERPL-MCNC: 0.9 MG/DL (ref 0.4–1.6)
DSDNA AB SER-ACNC: 1 IU/ML
EGFRCR SERPLBLD CKD-EPI 2021: 73 ML/MIN/1.73M*2
ENA JO1 AB SER QL IA: <0.2 AI
ENA RNP AB SER IA-ACNC: <0.2 AI
ENA SCL70 AB SER QL IA: <0.2 AI
ENA SM AB SER IA-ACNC: <0.2 AI
ENA SM+RNP AB SER QL IA: <0.2 AI
ENA SS-A AB SER IA-ACNC: <0.2 AI
ENA SS-B AB SER IA-ACNC: <0.2 AI
EOSINOPHIL # BLD AUTO: 0 X10*3/UL (ref 0–0.7)
EOSINOPHIL NFR BLD AUTO: 0 %
ERYTHROCYTE [DISTWIDTH] IN BLOOD BY AUTOMATED COUNT: 17.8 % (ref 11.5–14.5)
EST. AVERAGE GLUCOSE BLD GHB EST-MCNC: 108 MG/DL
GLIADIN PEPTIDE IGA SER IA-ACNC: 2.9 U/ML
GLUCOSE BLDA-MCNC: 90 MG/DL (ref 74–99)
GLUCOSE SERPL-MCNC: 86 MG/DL (ref 65–99)
GLUCOSE UR STRIP.AUTO-MCNC: NORMAL MG/DL
HBA1C MFR BLD: 5.4 %
HCO3 BLDA-SCNC: 21.1 MMOL/L (ref 22–26)
HCT VFR BLD AUTO: 30.9 % (ref 36–46)
HCT VFR BLD EST: 28 % (ref 36–46)
HGB BLD-MCNC: 9.2 G/DL (ref 12–16)
HGB BLDA-MCNC: 9.4 G/DL (ref 12–16)
IMM GRANULOCYTES # BLD AUTO: 0.03 X10*3/UL (ref 0–0.7)
IMM GRANULOCYTES NFR BLD AUTO: 0.6 % (ref 0–0.9)
INHALED O2 CONCENTRATION: 36 %
KETONES UR STRIP.AUTO-MCNC: NEGATIVE MG/DL
LACTATE BLDA-SCNC: 0.7 MMOL/L (ref 0.4–2)
LEUKOCYTE ESTERASE UR QL STRIP.AUTO: NEGATIVE
LYMPHOCYTES # BLD AUTO: 0.64 X10*3/UL (ref 1.2–4.8)
LYMPHOCYTES NFR BLD AUTO: 12 %
MCH RBC QN AUTO: 23.7 PG (ref 26–34)
MCHC RBC AUTO-ENTMCNC: 29.8 G/DL (ref 32–36)
MCV RBC AUTO: 79 FL (ref 80–100)
MONOCYTES # BLD AUTO: 0.29 X10*3/UL (ref 0.1–1)
MONOCYTES NFR BLD AUTO: 5.4 %
NEUTROPHILS # BLD AUTO: 4.37 X10*3/UL (ref 1.2–7.7)
NEUTROPHILS NFR BLD AUTO: 82 %
NITRITE UR QL STRIP.AUTO: NEGATIVE
NRBC BLD-RTO: 0 /100 WBCS (ref 0–0)
NT-PROBNP SERPL-MCNC: 6865 PG/ML (ref 0–226)
OXYHGB MFR BLDA: 91.5 % (ref 94–98)
PCO2 BLDA: 29 MM HG (ref 38–42)
PH BLDA: 7.47 PH (ref 7.38–7.42)
PH UR STRIP.AUTO: 5 [PH]
PLATELET # BLD AUTO: 102 X10*3/UL (ref 150–450)
PO2 BLDA: 68 MM HG (ref 85–95)
POTASSIUM BLDA-SCNC: 4.3 MMOL/L (ref 3.5–5.3)
POTASSIUM SERPL-SCNC: 4.3 MMOL/L (ref 3.4–5.1)
PROT SERPL-MCNC: 6.3 G/DL (ref 6.4–8.2)
PROT SERPL-MCNC: 6.5 G/DL (ref 5.9–7.9)
PROT UR STRIP.AUTO-MCNC: NEGATIVE MG/DL
RBC # BLD AUTO: 3.89 X10*6/UL (ref 4–5.2)
RBC # UR STRIP.AUTO: NEGATIVE /UL
RIBOSOMAL P AB SER-ACNC: <0.2 AI
SAO2 % BLDA: 94 % (ref 94–100)
SODIUM BLDA-SCNC: 135 MMOL/L (ref 136–145)
SODIUM SERPL-SCNC: 135 MMOL/L (ref 133–145)
SP GR UR STRIP.AUTO: 1
SPECIMEN DRAWN FROM PATIENT: ABNORMAL
TROPONIN T SERPL-MCNC: 20 NG/L
TTG IGA SER IA-ACNC: <1 U/ML
UROBILINOGEN UR STRIP.AUTO-MCNC: NORMAL MG/DL
VIT B12 SERPL-MCNC: 304 PG/ML (ref 211–911)
WBC # BLD AUTO: 5.3 X10*3/UL (ref 4.4–11.3)

## 2024-08-26 PROCEDURE — 83516 IMMUNOASSAY NONANTIBODY: CPT

## 2024-08-26 PROCEDURE — 99205 OFFICE O/P NEW HI 60 MIN: CPT | Performed by: PSYCHIATRY & NEUROLOGY

## 2024-08-26 PROCEDURE — 2500000004 HC RX 250 GENERAL PHARMACY W/ HCPCS (ALT 636 FOR OP/ED): Mod: JZ | Performed by: INTERNAL MEDICINE

## 2024-08-26 PROCEDURE — 86334 IMMUNOFIX E-PHORESIS SERUM: CPT

## 2024-08-26 PROCEDURE — 93005 ELECTROCARDIOGRAM TRACING: CPT

## 2024-08-26 PROCEDURE — 86320 SERUM IMMUNOELECTROPHORESIS: CPT | Performed by: PSYCHIATRY & NEUROLOGY

## 2024-08-26 PROCEDURE — 3074F SYST BP LT 130 MM HG: CPT | Performed by: PSYCHIATRY & NEUROLOGY

## 2024-08-26 PROCEDURE — 83921 ORGANIC ACID SINGLE QUANT: CPT

## 2024-08-26 PROCEDURE — 84484 ASSAY OF TROPONIN QUANT: CPT | Performed by: PHYSICIAN ASSISTANT

## 2024-08-26 PROCEDURE — 82607 VITAMIN B-12: CPT

## 2024-08-26 PROCEDURE — 84165 PROTEIN E-PHORESIS SERUM: CPT | Performed by: PSYCHIATRY & NEUROLOGY

## 2024-08-26 PROCEDURE — 2500000005 HC RX 250 GENERAL PHARMACY W/O HCPCS: Performed by: INTERNAL MEDICINE

## 2024-08-26 PROCEDURE — 86225 DNA ANTIBODY NATIVE: CPT

## 2024-08-26 PROCEDURE — 83036 HEMOGLOBIN GLYCOSYLATED A1C: CPT

## 2024-08-26 PROCEDURE — 1036F TOBACCO NON-USER: CPT | Performed by: PSYCHIATRY & NEUROLOGY

## 2024-08-26 PROCEDURE — 84165 PROTEIN E-PHORESIS SERUM: CPT

## 2024-08-26 PROCEDURE — 85025 COMPLETE CBC W/AUTO DIFF WBC: CPT | Performed by: PHYSICIAN ASSISTANT

## 2024-08-26 PROCEDURE — 2500000004 HC RX 250 GENERAL PHARMACY W/ HCPCS (ALT 636 FOR OP/ED): Performed by: PHYSICIAN ASSISTANT

## 2024-08-26 PROCEDURE — 93010 ELECTROCARDIOGRAM REPORT: CPT | Performed by: INTERNAL MEDICINE

## 2024-08-26 PROCEDURE — 2500000001 HC RX 250 WO HCPCS SELF ADMINISTERED DRUGS (ALT 637 FOR MEDICARE OP): Performed by: INTERNAL MEDICINE

## 2024-08-26 PROCEDURE — 2060000001 HC INTERMEDIATE ICU ROOM DAILY

## 2024-08-26 PROCEDURE — 84075 ASSAY ALKALINE PHOSPHATASE: CPT | Performed by: PHYSICIAN ASSISTANT

## 2024-08-26 PROCEDURE — 82435 ASSAY OF BLOOD CHLORIDE: CPT | Performed by: PHYSICIAN ASSISTANT

## 2024-08-26 PROCEDURE — 80053 COMPREHEN METABOLIC PANEL: CPT

## 2024-08-26 PROCEDURE — 99285 EMERGENCY DEPT VISIT HI MDM: CPT

## 2024-08-26 PROCEDURE — 71045 X-RAY EXAM CHEST 1 VIEW: CPT

## 2024-08-26 PROCEDURE — 3008F BODY MASS INDEX DOCD: CPT | Performed by: PSYCHIATRY & NEUROLOGY

## 2024-08-26 PROCEDURE — 86038 ANTINUCLEAR ANTIBODIES: CPT

## 2024-08-26 PROCEDURE — 83880 ASSAY OF NATRIURETIC PEPTIDE: CPT | Performed by: PHYSICIAN ASSISTANT

## 2024-08-26 PROCEDURE — 81003 URINALYSIS AUTO W/O SCOPE: CPT | Performed by: INTERNAL MEDICINE

## 2024-08-26 PROCEDURE — 3079F DIAST BP 80-89 MM HG: CPT | Performed by: PSYCHIATRY & NEUROLOGY

## 2024-08-26 PROCEDURE — 86235 NUCLEAR ANTIGEN ANTIBODY: CPT

## 2024-08-26 PROCEDURE — 36600 WITHDRAWAL OF ARTERIAL BLOOD: CPT

## 2024-08-26 PROCEDURE — 71045 X-RAY EXAM CHEST 1 VIEW: CPT | Performed by: RADIOLOGY

## 2024-08-26 RX ORDER — ACETAMINOPHEN 650 MG/1
650 SUPPOSITORY RECTAL EVERY 4 HOURS PRN
Status: DISCONTINUED | OUTPATIENT
Start: 2024-08-26 | End: 2024-08-29 | Stop reason: HOSPADM

## 2024-08-26 RX ORDER — FUROSEMIDE 40 MG/1
40 TABLET ORAL DAILY
Status: DISCONTINUED | OUTPATIENT
Start: 2024-08-27 | End: 2024-08-28

## 2024-08-26 RX ORDER — ACETAMINOPHEN 325 MG/1
650 TABLET ORAL EVERY 4 HOURS PRN
Status: DISCONTINUED | OUTPATIENT
Start: 2024-08-26 | End: 2024-08-29 | Stop reason: HOSPADM

## 2024-08-26 RX ORDER — VENLAFAXINE HYDROCHLORIDE 150 MG/1
150 CAPSULE, EXTENDED RELEASE ORAL DAILY
Status: DISCONTINUED | OUTPATIENT
Start: 2024-08-27 | End: 2024-08-29 | Stop reason: HOSPADM

## 2024-08-26 RX ORDER — DILTIAZEM HYDROCHLORIDE 180 MG/1
180 CAPSULE, COATED, EXTENDED RELEASE ORAL DAILY
Status: DISCONTINUED | OUTPATIENT
Start: 2024-08-27 | End: 2024-08-29 | Stop reason: HOSPADM

## 2024-08-26 RX ORDER — LOPERAMIDE HYDROCHLORIDE 2 MG/1
2 CAPSULE ORAL 4 TIMES DAILY PRN
Status: DISCONTINUED | OUTPATIENT
Start: 2024-08-26 | End: 2024-08-29 | Stop reason: HOSPADM

## 2024-08-26 RX ORDER — ACETAMINOPHEN 500 MG
5 TABLET ORAL NIGHTLY PRN
Status: DISCONTINUED | OUTPATIENT
Start: 2024-08-26 | End: 2024-08-29 | Stop reason: HOSPADM

## 2024-08-26 RX ORDER — ONDANSETRON HYDROCHLORIDE 2 MG/ML
4 INJECTION, SOLUTION INTRAVENOUS EVERY 8 HOURS PRN
Status: DISCONTINUED | OUTPATIENT
Start: 2024-08-26 | End: 2024-08-29 | Stop reason: HOSPADM

## 2024-08-26 RX ORDER — CALCIUM GLUCONATE 20 MG/ML
2 INJECTION, SOLUTION INTRAVENOUS ONCE
Status: COMPLETED | OUTPATIENT
Start: 2024-08-26 | End: 2024-08-26

## 2024-08-26 RX ORDER — GABAPENTIN 600 MG/1
600 TABLET ORAL 2 TIMES DAILY PRN
Status: DISCONTINUED | OUTPATIENT
Start: 2024-08-26 | End: 2024-08-29 | Stop reason: HOSPADM

## 2024-08-26 RX ORDER — CEFTRIAXONE 1 G/50ML
1 INJECTION, SOLUTION INTRAVENOUS EVERY 24 HOURS
Status: DISCONTINUED | OUTPATIENT
Start: 2024-08-26 | End: 2024-08-29

## 2024-08-26 RX ORDER — CHOLESTYRAMINE 4 G/9G
1 POWDER, FOR SUSPENSION ORAL DAILY
Status: DISCONTINUED | OUTPATIENT
Start: 2024-08-27 | End: 2024-08-29 | Stop reason: HOSPADM

## 2024-08-26 RX ORDER — POTASSIUM CHLORIDE 20 MEQ/1
20 TABLET, EXTENDED RELEASE ORAL
Status: DISCONTINUED | OUTPATIENT
Start: 2024-08-27 | End: 2024-08-28

## 2024-08-26 RX ORDER — FUROSEMIDE 10 MG/ML
40 INJECTION INTRAMUSCULAR; INTRAVENOUS ONCE
Status: COMPLETED | OUTPATIENT
Start: 2024-08-27 | End: 2024-08-27

## 2024-08-26 RX ORDER — GUAIFENESIN 600 MG/1
600 TABLET, EXTENDED RELEASE ORAL EVERY 12 HOURS PRN
Status: DISCONTINUED | OUTPATIENT
Start: 2024-08-26 | End: 2024-08-29 | Stop reason: HOSPADM

## 2024-08-26 RX ORDER — ACETAMINOPHEN 160 MG/5ML
650 SOLUTION ORAL EVERY 4 HOURS PRN
Status: DISCONTINUED | OUTPATIENT
Start: 2024-08-26 | End: 2024-08-29 | Stop reason: HOSPADM

## 2024-08-26 RX ORDER — GUAIFENESIN/DEXTROMETHORPHAN 100-10MG/5
5 SYRUP ORAL EVERY 4 HOURS PRN
Status: DISCONTINUED | OUTPATIENT
Start: 2024-08-26 | End: 2024-08-29 | Stop reason: HOSPADM

## 2024-08-26 RX ORDER — HYDROXYCHLOROQUINE SULFATE 200 MG/1
200 TABLET, FILM COATED ORAL 2 TIMES DAILY
Status: DISCONTINUED | OUTPATIENT
Start: 2024-08-26 | End: 2024-08-29 | Stop reason: HOSPADM

## 2024-08-26 RX ORDER — ACETAMINOPHEN 500 MG
5 TABLET ORAL NIGHTLY
Status: DISCONTINUED | OUTPATIENT
Start: 2024-08-26 | End: 2024-08-26

## 2024-08-26 RX ORDER — FUROSEMIDE 10 MG/ML
60 INJECTION INTRAMUSCULAR; INTRAVENOUS ONCE
Status: COMPLETED | OUTPATIENT
Start: 2024-08-26 | End: 2024-08-26

## 2024-08-26 RX ORDER — ASPIRIN 81 MG/1
81 TABLET ORAL DAILY
Status: DISCONTINUED | OUTPATIENT
Start: 2024-08-27 | End: 2024-08-29 | Stop reason: HOSPADM

## 2024-08-26 RX ORDER — POLYETHYLENE GLYCOL 3350 17 G/17G
17 POWDER, FOR SOLUTION ORAL DAILY PRN
Status: DISCONTINUED | OUTPATIENT
Start: 2024-08-26 | End: 2024-08-29 | Stop reason: HOSPADM

## 2024-08-26 RX ORDER — EPOPROSTENOL 1.5 MG/10ML
1.5 INJECTION, POWDER, LYOPHILIZED, FOR SOLUTION INTRAVENOUS CONTINUOUS
Status: DISCONTINUED | OUTPATIENT
Start: 2024-08-26 | End: 2024-08-29 | Stop reason: HOSPADM

## 2024-08-26 RX ORDER — FOLIC ACID 1 MG/1
1 TABLET ORAL DAILY
Status: DISCONTINUED | OUTPATIENT
Start: 2024-08-27 | End: 2024-08-29 | Stop reason: HOSPADM

## 2024-08-26 RX ORDER — ONDANSETRON 4 MG/1
4 TABLET, ORALLY DISINTEGRATING ORAL EVERY 8 HOURS PRN
Status: DISCONTINUED | OUTPATIENT
Start: 2024-08-26 | End: 2024-08-29 | Stop reason: HOSPADM

## 2024-08-26 RX ORDER — FERROUS SULFATE, DRIED 160(50) MG
2 TABLET, EXTENDED RELEASE ORAL 2 TIMES DAILY
Status: DISCONTINUED | OUTPATIENT
Start: 2024-08-26 | End: 2024-08-29 | Stop reason: HOSPADM

## 2024-08-26 RX ORDER — PANTOPRAZOLE SODIUM 40 MG/1
40 TABLET, DELAYED RELEASE ORAL
Status: DISCONTINUED | OUTPATIENT
Start: 2024-08-27 | End: 2024-08-29 | Stop reason: HOSPADM

## 2024-08-26 SDOH — SOCIAL STABILITY: SOCIAL INSECURITY: ARE THERE ANY APPARENT SIGNS OF INJURIES/BEHAVIORS THAT COULD BE RELATED TO ABUSE/NEGLECT?: NO

## 2024-08-26 SDOH — SOCIAL STABILITY: SOCIAL INSECURITY: WERE YOU ABLE TO COMPLETE ALL THE BEHAVIORAL HEALTH SCREENINGS?: YES

## 2024-08-26 SDOH — SOCIAL STABILITY: SOCIAL INSECURITY: ABUSE: ADULT

## 2024-08-26 SDOH — SOCIAL STABILITY: SOCIAL INSECURITY: HAS ANYONE EVER THREATENED TO HURT YOUR FAMILY OR YOUR PETS?: NO

## 2024-08-26 SDOH — SOCIAL STABILITY: SOCIAL INSECURITY: HAVE YOU HAD THOUGHTS OF HARMING ANYONE ELSE?: NO

## 2024-08-26 SDOH — SOCIAL STABILITY: SOCIAL INSECURITY: DO YOU FEEL ANYONE HAS EXPLOITED OR TAKEN ADVANTAGE OF YOU FINANCIALLY OR OF YOUR PERSONAL PROPERTY?: NO

## 2024-08-26 SDOH — SOCIAL STABILITY: SOCIAL INSECURITY: DO YOU FEEL UNSAFE GOING BACK TO THE PLACE WHERE YOU ARE LIVING?: NO

## 2024-08-26 SDOH — SOCIAL STABILITY: SOCIAL INSECURITY: ARE YOU OR HAVE YOU BEEN THREATENED OR ABUSED PHYSICALLY, EMOTIONALLY, OR SEXUALLY BY ANYONE?: NO

## 2024-08-26 SDOH — SOCIAL STABILITY: SOCIAL INSECURITY: DOES ANYONE TRY TO KEEP YOU FROM HAVING/CONTACTING OTHER FRIENDS OR DOING THINGS OUTSIDE YOUR HOME?: NO

## 2024-08-26 SDOH — SOCIAL STABILITY: SOCIAL INSECURITY: HAVE YOU HAD ANY THOUGHTS OF HARMING ANYONE ELSE?: NO

## 2024-08-26 ASSESSMENT — ACTIVITIES OF DAILY LIVING (ADL)
DRESSING YOURSELF: INDEPENDENT
BATHING: INDEPENDENT
JUDGMENT_ADEQUATE_SAFELY_COMPLETE_DAILY_ACTIVITIES: YES
HEARING - RIGHT EAR: FUNCTIONAL
FEEDING YOURSELF: INDEPENDENT
LACK_OF_TRANSPORTATION: NO
ADEQUATE_TO_COMPLETE_ADL: YES
GROOMING: INDEPENDENT
WALKS IN HOME: INDEPENDENT
HEARING - LEFT EAR: FUNCTIONAL
PATIENT'S MEMORY ADEQUATE TO SAFELY COMPLETE DAILY ACTIVITIES?: YES
TOILETING: INDEPENDENT

## 2024-08-26 ASSESSMENT — LIFESTYLE VARIABLES
HOW OFTEN DO YOU HAVE 6 OR MORE DRINKS ON ONE OCCASION: NEVER
SKIP TO QUESTIONS 9-10: 1
HOW OFTEN DO YOU HAVE A DRINK CONTAINING ALCOHOL: NEVER
AUDIT-C TOTAL SCORE: 0
AUDIT-C TOTAL SCORE: 0
HOW MANY STANDARD DRINKS CONTAINING ALCOHOL DO YOU HAVE ON A TYPICAL DAY: PATIENT DOES NOT DRINK

## 2024-08-26 ASSESSMENT — COGNITIVE AND FUNCTIONAL STATUS - GENERAL
MOBILITY SCORE: 22
CLIMB 3 TO 5 STEPS WITH RAILING: A LITTLE
DAILY ACTIVITIY SCORE: 23
TOILETING: A LITTLE
PATIENT BASELINE BEDBOUND: NO
WALKING IN HOSPITAL ROOM: A LITTLE

## 2024-08-26 ASSESSMENT — PAIN - FUNCTIONAL ASSESSMENT: PAIN_FUNCTIONAL_ASSESSMENT: 0-10

## 2024-08-26 ASSESSMENT — PATIENT HEALTH QUESTIONNAIRE - PHQ9
SUM OF ALL RESPONSES TO PHQ9 QUESTIONS 1 & 2: 0
1. LITTLE INTEREST OR PLEASURE IN DOING THINGS: NOT AT ALL
2. FEELING DOWN, DEPRESSED OR HOPELESS: NOT AT ALL

## 2024-08-26 ASSESSMENT — PAIN SCALES - GENERAL
PAINLEVEL: 7
PAINLEVEL_OUTOF10: 0 - NO PAIN

## 2024-08-26 NOTE — PROGRESS NOTES
ate of Service: 8/26/2024  Patient: Ronna Beckham  MRN: [id number]  Referring Provider: No ref. provider found  Primary Care Physician: NANCY Liz      History of Present Illness:   Ms. Beckham is a 63 y.o. female who is here for evaluation of burning and numbness in legs as well as numbness in the hands.  She was accompanied by her daughter.    She reports that she has had numbness, tingling and burning pain in both feet.  The symptoms started about 5 years ago and has slowly ascended up to her knees.  More recently she has noted numbness in the fingers.  This is symmetrical.  He has had no imbalance or weakness.  She reports occasional orthostatic dizziness and had remote syncopal episodes.  She denies any bladder or bowel control difficulties.    She has had dry mouth but no dry eyes.  She has had chronic diarrhea which he believes related to her current treatment.  She has a history of alcohol, cocaine and meth abuse.  She has several year history of pulmonary arterial hypertension felt to be associated with methamphetamine abuse.  She is currently on Opsumit and Remodulin for pulmonary arterial hypertension as well as on nasal 4L O2 24/7.  She also has a history of mild systemic lupus erythematosus on hydroxychloroquine.  She has no history of diabetes mellitus.    She was seen by pain management and is taking now gabapentin 800 mg 3 times daily.  Since the effect was not optimal she was started recently on topiramate which she has not started yet.    Review of Systems:  The systems were reviewed with pertinent positives and negatives documented in the HPI.      Past Medical & Surgical History  Past Medical History:   Diagnosis Date    Anxiety     Depression     GERD (gastroesophageal reflux disease)     Lupus (Multi)     Personal history of other specified conditions 09/21/2020    History of seizure    Pulmonary hypertension (Multi)      Past Surgical History:   Procedure Laterality Date     CARDIAC CATHETERIZATION N/A 2024    Procedure: Left Heart Cath, With LV, Angriography And Grafts;  Surgeon: Christiano Calvillo DO;  Location: [facility] Cardiac Cath Lab;  Service: Cardiovascular;  Laterality: N/A;    CHOLECYSTECTOMY      HYSTERECTOMY      OTHER SURGICAL HISTORY  2020    Cholecystectomy    OTHER SURGICAL HISTORY  2020    Esophagogastroduodenoscopy    OTHER SURGICAL HISTORY  2020    Colonoscopy    OTHER SURGICAL HISTORY  2020     section    OTHER SURGICAL HISTORY  2020    Hysterectomy       Social History:   Social History     Tobacco Use    Smoking status: Former     Types: Cigarettes    Smokeless tobacco: Former   Substance Use Topics    Alcohol use: Not Currently        Family History:   There is no family history of peripheral neuropathy or other neurologic disease.     Medications:     Current Outpatient Medications:     aspirin 81 mg EC tablet, Take 1 tablet (81 mg) by mouth once daily., Disp: , Rfl:     calcium carbonate (Tums) 200 mg calcium chewable tablet, Chew 1 tablet (500 mg) 3 times a day. In between meals (Patient not taking: Reported on 2024), Disp: 90 tablet, Rfl: 11    calcium carbonate-vitamin D3 500 mg-5 mcg (200 unit) tablet, TAKE 2 TABLETS BY MOUTH TWO TIMES A DAY (Patient not taking: Reported on 2024), Disp: 150 tablet, Rfl: 0    cholestyramine (Questran) 4 gram packet, Take 1 packet (4 g) by mouth once daily., Disp: 30 packet, Rfl: 0    dilTIAZem CD (Cardizem CD) 180 mg 24 hr capsule, Take 1 capsule (180 mg) by mouth once daily. (Patient not taking: Reported on 2024), Disp: 30 capsule, Rfl: 0    epoprostenol (Veletri) 1.5 mg recon soln, 1.5 mg (1,500 mcg) by central venous line infusion route continuously. Reconstitute contents of vial with 5 ml diluent and mix cassette as directed. Infuse continuously per physician orders. Allow for priming volume. Dose range: 1-150 ng/kg/min. (Patient not taking: Reported on 2024), Disp:  180 each, Rfl: 11    ergocalciferol (Vitamin D-2) 1.25 MG (64561 UT) capsule, Take 1 capsule (50,000 Units) by mouth 1 (one) time per week. (Patient not taking: Reported on 7/22/2024), Disp: 4 capsule, Rfl: 11    folic acid (Folvite) 1 mg tablet, Take 1 tablet (1 mg) by mouth once daily. Needs to resume, Disp: , Rfl:     furosemide (Lasix) 40 mg tablet, TAKE 1 TABLET BY MOUTH ONE TIME DAILY (Patient not taking: Reported on 7/22/2024), Disp: 30 tablet, Rfl: 0    gabapentin (Neurontin) 600 mg tablet, Take 1 tablet (600 mg) by mouth 2 times a day., Disp: 60 tablet, Rfl: 0    gabapentin (Neurontin) 800 mg tablet, Take 1 tablet (800 mg) by mouth 2 times a day., Disp: , Rfl:     hydroxychloroquine (Plaquenil) 200 mg tablet, Take 1 tablet (200 mg) by mouth 2 times a day., Disp: , Rfl:     loperamide (Imodium A-D) 2 mg tablet, Take 1 tablet (2 mg) by mouth 4 times a day as needed for diarrhea., Disp: 30 tablet, Rfl: 11    magnesium glycinate 100 mg tablet, Take 100 mg by mouth 2 times a day. (Patient not taking: Reported on 7/22/2024), Disp: 60 tablet, Rfl: 11    melatonin 5 mg tablet, Take 1 tablet (5 mg) by mouth once daily at bedtime., Disp: , Rfl:     omeprazole (PriLOSEC) 40 mg DR capsule, Take 1 capsule (40 mg) by mouth once daily in the morning. Take before meals. Do not crush or chew., Disp: , Rfl:     Opsumit 10 mg tablet tablet, TAKE 1 TABLET BY MOUTH DAILY. DO NOT HANDLE IF PREGNANT. DO NOT SPLIT, CRUSH, OR CHEW. REVIEW MEDICATION GUIDE. (Patient not taking: Reported on 7/22/2024), Disp: 30 tablet, Rfl: 11    potassium chloride CR (Klor-Con M20) 20 mEq ER tablet, TAKE 1 TABLET BY MOUTH TWO TIMES A DAY WITH MEALS (Patient not taking: Reported on 7/22/2024), Disp: 60 tablet, Rfl: 0    prochlorperazine (Compazine) 5 mg tablet, Take 1 tablet (5 mg) by mouth every 6 hours if needed for nausea or vomiting. (Patient not taking: Reported on 7/22/2024), Disp: 30 tablet, Rfl: 0    topiramate (Topamax) 25 mg tablet, Week 1  25mg at bedtime, week 2 25mg BID, week 3 25mg qAM, 50mg at bedtime, week 4 50 mg BID, week 5 50mg qAM 75mg at bedtime, week 6 75 mg BID, week 7 75mg qam 100mg at bedtime, week 8 100mg BID, Disp: 256 tablet, Rfl: 0    venlafaxine XR (Effexor-XR) 150 mg 24 hr capsule, Take 1 capsule (150 mg) by mouth once daily. Do not crush or chew., Disp: , Rfl:      General Physical Exam:     She looks well and is mild respiratory distress using nasal oxygen.  She came in a wheelchair because of shortness of breath.     Neurological Exam:   Mental status reveals her to be alert and oriented to person, place, and date. Speech is intact to conversation. Fund of knowledge is normal.     Cranial nerves:  CN 2   Visual fields full to confrontation.   CN 3, 4, 6   Pupils round, 4 mm in diameter, equally reactive to light. Lids symmetric; no ptosis. EOMs normal alignment, full range.   No nystagmus.   CN 5   Facial sensation intact bilaterally.   CN 7   Normal and symmetric facial strength. Nasolabial folds symmetric.   CN 8   Hearing intact to conversation.   CN 9   Palate elevates symmetrically.   CN 11   Normal strength of shoulder shrug and neck turning.   CN 12   Tongue midline, with normal bulk and strength; no fasciculations.      Motor:  Muscle bulk and tone are normal. There are no scapular winging, fasciculations, tremor or other abnormal movement.    Neck flexion and neck extension are normal (MRC 5/5).    MANUAL MUSCLE TESTING IS AS FOLLOWS:    R L      5 5 Shoulder abduction   5 5 Elbow flexion   5 5 Elbow extension   5 5 Wrist flexion   5 5 Wrist extension   5 5 Finger flexion   5 5 Finger extension   5 5 Finger abduction   5 5 Thumb distal flexion   5 5 Thumb abduction     5 5 Hip flexion   5 5 Hip adduction   5 5 Hip abduction   5 5 Knee flexion   5 5 Knee extension   5 5 Ankle dorsiflexion   5 5 Ankle plantarflexion   5 5 Eversion   5 5 Inversion   5 5 Big toe extension   5 5 Toe flexion       Reflexes   R L    1 1 Biceps     1 1 Brachioradialis    1 1 Triceps    1 1 Patellar   0 0 Achilles     Plantars toes downgoing to plantar stimulation. No clonus or other pathologic reflexes present.      Sensory:   Pinprick sensation was reduced significantly in both legs symmetrically with a distal to proximal gradient up to the knees.  There is no definite loss of sensation to pinprick in the hands.  Vibration is absent at the toes, slightly reduced at the ankles and normal elsewhere.  Position sense is normal at the toes.  Temperature sensation is slightly reduced at the feet.     Coordination:  Finger-nose-finger is normal without dysmetria or overshoot and heel-to-shin is normal. Rapid alternating movements in hands and feet are normal.     Gait:  Station is stable with a normal base. Gait is stable with a normal arm swing and speed. There is no ataxia, shuffling, steppage or waddling gait. Tandem gait is intact.  With Romberg maneuver he swings but does not fall.    Results:    I reviewed recent blood work from July and August 2024.  Comprehensive metabolic panel was normal except for slightly reduced calcium at 7.6.  CBC revealed significant microcytic anemia with a hemoglobin of 8.6 and MCV of 79.  Platelets were reduced at 143,000.  TSH was normal.    CBC:   Lab Results   Component Value Date    WBC 4.4 08/03/2024    HGB 9.3 (L) 08/03/2024    HCT 32.1 (L) 08/03/2024     (L) 08/03/2024     BMP:   Lab Results   Component Value Date     08/03/2024    K 4.2 08/03/2024     08/03/2024    CO2 23 (L) 08/03/2024    BUN 18 08/03/2024    CREATININE 0.90 08/03/2024    CALCIUM 8.4 (L) 08/03/2024    MG 1.40 (L) 07/08/2024    PHOS 3.1 06/02/2024     LFT:   Lab Results   Component Value Date    ALKPHOS 92 08/03/2024    BILITOT 0.7 08/03/2024    BILIDIR 0.1 05/31/2024    PROT 6.6 08/03/2024    ALBUMIN 3.6 08/03/2024    ALT 5 08/03/2024    AST 7 08/03/2024     (H) 07/23/2019     Impression:  Ronna Beckham is a 63 y.o. woman  who has had a 4 to 5-year history of numbness, reduced sensation and pain in both feet ascended to the knees.  She also has had more recent numbness in the hands with no pain.  Her neurological examination reveals mostly a sensory polyneuropathy with reduced vibration, pinprick and temperature sensation in the distal to proximal gradient.    She has a history of pulmonary arterial hypertension possibly due to amphetamine use.  She has also history of excessive alcohol consumption which she stopped 2 months ago.     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  However we need to exclude other possibilities including paraproteinemia, vitamin B12 deficiency, Sjogren syndrome and celiac disease.    Plan:    1.  I will order blood work to look for common causation of polyneuropathy including hemoglobin A1c serum immunofixation, vitamin B12, methylmalonic acid, JAREK panel and celiac antibody panel.  2.  I have ordered an EMG study to assess her polyneuropathy.  3.  She will continue gabapentin 8 mg 3 times daily and will start topiramate as started by pain management.    I will see her after testing.  I discussed this with him in details.         I personally spent 60 minutes on the day of the visit completing the review of the medical record and outside records, obtaining history and performing an appropriate physical exam, patient care, counseling and education, placing orders, independently reviewing results, communicating with the patient/family and other providers, coordinating care and performing appropriate clinical documentation.

## 2024-08-26 NOTE — ED PROVIDER NOTES
HPI   Chief Complaint   Patient presents with   • Shortness of Breath     Pt to ER for eval of increased SOB and BLE that is new. Pt wears 4 L NC normally for pulmonary HTN. Pt states she did miss a week of lasix. Pt denies any chest pain.        63-year-old female presented emergency department the chief complaint of lower extremity edema shortness of breath.  Chronically wears 4 L nasal cannula history of pulmonary hypertension.  She missed a week of her furosemide resulting in her volume overload.  She is around 90% on room air on arrival.  Her symptoms are significantly worse with exertion.  She denies chest pain or pressure.  Nontoxic.  Denies cough or infectious symptoms.  No other complaint.              Patient History   Past Medical History:   Diagnosis Date   • Anxiety    • Depression    • GERD (gastroesophageal reflux disease)    • Lupus (Multi)    • Personal history of other specified conditions 2020    History of seizure   • Pulmonary hypertension (Multi)      Past Surgical History:   Procedure Laterality Date   • CARDIAC CATHETERIZATION N/A 2024    Procedure: Left Heart Cath, With LV, Angriography And Grafts;  Surgeon: Christiano Calvillo DO;  Location: [facility] Cardiac [facility] Lab;  Service: Cardiovascular;  Laterality: N/A;   • CHOLECYSTECTOMY     • HYSTERECTOMY     • OTHER SURGICAL HISTORY  2020    Cholecystectomy   • OTHER SURGICAL HISTORY  2020    Esophagogastroduodenoscopy   • OTHER SURGICAL HISTORY  2020    Colonoscopy   • OTHER SURGICAL HISTORY  2020     section   • OTHER SURGICAL HISTORY  2020    Hysterectomy     Family History   Problem Relation Name Age of Onset   • No Known Problems Mother     • No Known Problems Father       Social History     Tobacco Use   • Smoking status: Former     Types: Cigarettes   • Smokeless tobacco: Former   Vaping Use   • Vaping status: Never Used   Substance Use Topics   • Alcohol use: Not Currently   • Drug use: Not  Currently     Types: Cocaine, Other     Comment: Meth       Physical Exam   ED Triage Vitals   Temperature Heart Rate Respirations BP   08/26/24 1630 08/26/24 1630 08/26/24 1630 08/26/24 1631   36.3 °C (97.3 °F) (!) 104 16 109/64      Pulse Ox Temp src Heart Rate Source Patient Position   08/26/24 1630 -- -- --   (!) 91 %         BP Location FiO2 (%)     -- --             Physical Exam  Vitals and nursing note reviewed.   Constitutional:       Appearance: She is well-developed.   HENT:      Head: Normocephalic.   Cardiovascular:      Comments: Systolic murmur  Pulmonary:      Comments: Crackles lower lung bases  Abdominal:      Palpations: Abdomen is soft.   Musculoskeletal:         General: Normal range of motion.      Cervical back: Normal range of motion.   Skin:     General: Skin is warm.   Neurological:      General: No focal deficit present.      Mental Status: She is alert and oriented to person, place, and time.   Psychiatric:         Mood and Affect: Mood normal.           ED Course & MDM   ED Course as of 08/26/24 1954   Mon Aug 26, 2024   1724 EKG interpreted by myself independently, EKG shows a sinus tachycardia, rate of 102 bpm, IL interval 190, , , QTc 495, patient has a right bundle branch block, left anterior fascicular block, bifascicular block, no ST elevations or depressions, negative for acute MI. [YOUSIF]      ED Course User Index  [YOUSIF] Michael Manning DO         Diagnoses as of 08/26/24 1954   Acute on chronic hypoxic respiratory failure (Multi)   Shortness of breath   Congestive heart failure, unspecified HF chronicity, unspecified heart failure type (Multi)                 No data recorded     [place] Coma Scale Score: 15 (08/26/24 1631 : Ese Barron, CHARITY)                           Medical Decision Making  I have seen and evaluated this patient.  Physician available for consultation.  Vital signs have been reviewed.  All laboratory and diagnostic imaging is reviewed by myself and  interpreted by myself unless otherwise stated.  Additionally imaging is interpreted by radiologist.    CBC without significant leukocytosis or anemia, metabolic panel without significant renal impairment or electrolyte abnormality.  Troponin moderately elevated.  proBNP near 7000, chest x-ray with questionable infiltrate no infectious symptoms.  Clinically the patient is volume overloaded.  Given IV diuretic.  Hypoxic on ABG.  Has 5 L oxygen requirement now up from 4.  Admitted for further diuresis and management.    I have seen and evaluated this patient and independently provided 31 minutes of nonconcurrent critical care time. This does not include separately billable procedures. Patient with high potential for deterioration, required frequent monitoring and assessment.    Labs Reviewed  CBC WITH AUTO DIFFERENTIAL - Abnormal     WBC                           5.3                    nRBC                          0.0                    RBC                           3.89 (*)               Hemoglobin                    9.2 (*)                Hematocrit                    30.9 (*)               MCV                           79 (*)                 MCH                           23.7 (*)               MCHC                          29.8 (*)               RDW                           17.8 (*)               Platelets                     102 (*)                Neutrophils %                 82.0                   Immature Granulocytes %, Automated   0.6                    Lymphocytes %                 12.0                   Monocytes %                   5.4                    Eosinophils %                 0.0                    Basophils %                   0.0                    Neutrophils Absolute          4.37                   Immature Granulocytes Absolute, Au*   0.03                   Lymphocytes Absolute          0.64 (*)               Monocytes Absolute            0.29                   Eosinophils Absolute           0.00                   Basophils Absolute            0.00                COMPREHENSIVE METABOLIC PANEL - Abnormal     Glucose                       85                     Sodium                        132 (*)                Potassium                     4.1                    Chloride                      100                    Bicarbonate                   21 (*)                 Urea Nitrogen                                        Creatinine                    0.90                   eGFR                          73                     Calcium                       7.5 (*)                Albumin                       3.5                    Alkaline Phosphatase          94                     Total Protein                 6.5                    AST                           9                      Bilirubin, Total              0.8                    ALT                           8                      Anion Gap                     11                  N-TERMINAL PROBNP - Abnormal     PROBNP                        6,865 (*)                 Narrative: Reference ranges are based on clinical submission data. These ranges represent the 95th percentile of normal cut-off points. As NT Pro- BNP values approach 1000 pg/ml, clinical symptoms are more likely associated with CHF.  BLOOD GAS ARTERIAL FULL PANEL - Abnormal     POCT pH, Arterial             7.47 (*)               POCT pCO2, Arterial           29 (*)                 POCT pO2, Arterial            68 (*)                 POCT SO2, Arterial            94                     POCT Oxy Hemoglobin, Arterial   91.5 (*)               POCT Hematocrit Calculated, Arteri*   28.0 (*)               POCT Sodium, Arterial         135 (*)                POCT Potassium, Arterial      4.3                    POCT Chloride, Arterial       105                    POCT Ionized Calcium, Arterial   1.03 (*)               POCT Glucose, Arterial        90                     POCT Lactate,  Arterial        0.7                    POCT Base Excess, Arterial    -1.9                   POCT HCO3 Calculated, Arterial   21.1 (*)               POCT Hemoglobin, Arterial     9.4 (*)                POCT Anion Gap, Arterial      13                     Patient Temperature           37.0                   FiO2                          36                     Apparatus                     CANNULA                Site of Arterial Puncture                            Rivera's Test                  Positive             SERIAL TROPONIN, INITIAL ([facility]) - Abnormal     Troponin T, High Sensitivity   20 (*)              TROPONIN T SERIES, HIGH SENSITIVITY (0, 2 HR, 6 HR)       Narrative: The following orders were created for panel order Troponin T Series, High Sensitivity (0, 2HR, 6HR).                Procedure                               Abnormality         Status                                   ---------                               -----------         ------                                   Serial Troponin, Initial...[[id number]]  Abnormal            Final result                             Serial Troponin, 2 Hour ...[[id number]]                                                                               Please view results for these tests on the individual orders.  URINALYSIS WITH REFLEX CULTURE AND MICROSCOPIC       Narrative: The following orders were created for panel order Urinalysis with Reflex Culture and Microscopic.                Procedure                               Abnormality         Status                                   ---------                               -----------         ------                                   Urinalysis with Reflex C...[[id number]]                                                               Extra Urine Gray Tube[[id number]]                                                                                     Please view results for these tests on the individual  orders.  URINALYSIS WITH REFLEX CULTURE AND MICROSCOPIC  EXTRA URINE GRAY TUBE  SERIAL TROPONIN,  2 HOUR ([facility])  XR chest 1 view   Final Result    1.  Left retrocardiac density may be due to overlapping normal    structures although left basilar pneumonia not excluded.                      MACRO:    None          Signed by: George Miranda 8/26/2024 5:35 PM    Dictation workstation:   [id number]     Medications  furosemide (Lasix) injection 60 mg (60 mg intravenous Given 8/26/24 1488)  New Prescriptions  No medications on file            Procedure  Procedures     Clint De La Rosa PA-C  08/26/24 1954

## 2024-08-27 LAB
ALBUMIN SERPL-MCNC: 3.5 G/DL (ref 3.5–5)
ALP BLD-CCNC: 91 U/L (ref 35–125)
ALT SERPL-CCNC: 6 U/L (ref 5–40)
ANION GAP SERPL CALC-SCNC: 13 MMOL/L
AST SERPL-CCNC: 11 U/L (ref 5–40)
BASOPHILS # BLD AUTO: 0 X10*3/UL (ref 0–0.1)
BASOPHILS NFR BLD AUTO: 0 %
BILIRUB SERPL-MCNC: 0.8 MG/DL (ref 0.1–1.2)
BUN SERPL-MCNC: 16 MG/DL (ref 8–25)
CA-I BLD-SCNC: 1.04 MMOL/L (ref 1.1–1.33)
CALCIUM SERPL-MCNC: 8.1 MG/DL (ref 8.5–10.4)
CHLORIDE SERPL-SCNC: 102 MMOL/L (ref 97–107)
CO2 SERPL-SCNC: 22 MMOL/L (ref 24–31)
CREAT SERPL-MCNC: 0.9 MG/DL (ref 0.4–1.6)
EGFRCR SERPLBLD CKD-EPI 2021: 73 ML/MIN/1.73M*2
EOSINOPHIL # BLD AUTO: 0 X10*3/UL (ref 0–0.7)
EOSINOPHIL NFR BLD AUTO: 0 %
ERYTHROCYTE [DISTWIDTH] IN BLOOD BY AUTOMATED COUNT: 17.9 % (ref 11.5–14.5)
FERRITIN SERPL-MCNC: 19 NG/ML (ref 13–150)
GLIADIN PEPTIDE IGG SER IA-ACNC: <0.56 FLU (ref 0–4.99)
GLUCOSE SERPL-MCNC: 93 MG/DL (ref 65–99)
HCT VFR BLD AUTO: 30 % (ref 36–46)
HGB BLD-MCNC: 8.7 G/DL (ref 12–16)
IMM GRANULOCYTES # BLD AUTO: 0.02 X10*3/UL (ref 0–0.7)
IMM GRANULOCYTES NFR BLD AUTO: 0.4 % (ref 0–0.9)
IRON SATN MFR SERPL: 8 % (ref 12–50)
IRON SERPL-MCNC: 26 UG/DL (ref 30–160)
LYMPHOCYTES # BLD AUTO: 0.57 X10*3/UL (ref 1.2–4.8)
LYMPHOCYTES NFR BLD AUTO: 11.2 %
MAGNESIUM SERPL-MCNC: 0.8 MG/DL (ref 1.6–3.1)
MCH RBC QN AUTO: 23.2 PG (ref 26–34)
MCHC RBC AUTO-ENTMCNC: 29 G/DL (ref 32–36)
MCV RBC AUTO: 80 FL (ref 80–100)
MONOCYTES # BLD AUTO: 0.41 X10*3/UL (ref 0.1–1)
MONOCYTES NFR BLD AUTO: 8 %
NEUTROPHILS # BLD AUTO: 4.11 X10*3/UL (ref 1.2–7.7)
NEUTROPHILS NFR BLD AUTO: 80.4 %
NRBC BLD-RTO: 0 /100 WBCS (ref 0–0)
PLATELET # BLD AUTO: 117 X10*3/UL (ref 150–450)
POTASSIUM SERPL-SCNC: 3.7 MMOL/L (ref 3.4–5.1)
PROCALCITONIN SERPL-MCNC: 0.04 NG/ML
PROT SERPL-MCNC: 6.3 G/DL (ref 5.9–7.9)
RBC # BLD AUTO: 3.75 X10*6/UL (ref 4–5.2)
SODIUM SERPL-SCNC: 137 MMOL/L (ref 133–145)
TIBC SERPL-MCNC: 323 UG/DL (ref 228–428)
TROPONIN T SERPL-MCNC: 20 NG/L
TTG IGG SER IA-ACNC: <0.82 FLU (ref 0–4.99)
UIBC SERPL-MCNC: 297 UG/DL (ref 110–370)
WBC # BLD AUTO: 5.1 X10*3/UL (ref 4.4–11.3)

## 2024-08-27 PROCEDURE — 2500000004 HC RX 250 GENERAL PHARMACY W/ HCPCS (ALT 636 FOR OP/ED): Performed by: INTERNAL MEDICINE

## 2024-08-27 PROCEDURE — 85025 COMPLETE CBC W/AUTO DIFF WBC: CPT | Performed by: INTERNAL MEDICINE

## 2024-08-27 PROCEDURE — 82728 ASSAY OF FERRITIN: CPT | Performed by: NURSE PRACTITIONER

## 2024-08-27 PROCEDURE — 2500000001 HC RX 250 WO HCPCS SELF ADMINISTERED DRUGS (ALT 637 FOR MEDICARE OP): Performed by: INTERNAL MEDICINE

## 2024-08-27 PROCEDURE — 2060000001 HC INTERMEDIATE ICU ROOM DAILY

## 2024-08-27 PROCEDURE — 2500000002 HC RX 250 W HCPCS SELF ADMINISTERED DRUGS (ALT 637 FOR MEDICARE OP, ALT 636 FOR OP/ED): Performed by: INTERNAL MEDICINE

## 2024-08-27 PROCEDURE — 84145 PROCALCITONIN (PCT): CPT | Mod: TRILAB,WESLAB | Performed by: INTERNAL MEDICINE

## 2024-08-27 PROCEDURE — 99254 IP/OBS CNSLTJ NEW/EST MOD 60: CPT | Performed by: INTERNAL MEDICINE

## 2024-08-27 PROCEDURE — 94760 N-INVAS EAR/PLS OXIMETRY 1: CPT

## 2024-08-27 PROCEDURE — 99232 SBSQ HOSP IP/OBS MODERATE 35: CPT | Performed by: NURSE PRACTITIONER

## 2024-08-27 PROCEDURE — 97165 OT EVAL LOW COMPLEX 30 MIN: CPT | Mod: GO

## 2024-08-27 PROCEDURE — 99254 IP/OBS CNSLTJ NEW/EST MOD 60: CPT | Performed by: NURSE PRACTITIONER

## 2024-08-27 PROCEDURE — 82330 ASSAY OF CALCIUM: CPT | Performed by: INTERNAL MEDICINE

## 2024-08-27 PROCEDURE — 83735 ASSAY OF MAGNESIUM: CPT | Performed by: INTERNAL MEDICINE

## 2024-08-27 PROCEDURE — 2500000005 HC RX 250 GENERAL PHARMACY W/O HCPCS: Performed by: INTERNAL MEDICINE

## 2024-08-27 PROCEDURE — 83540 ASSAY OF IRON: CPT | Performed by: NURSE PRACTITIONER

## 2024-08-27 PROCEDURE — 80053 COMPREHEN METABOLIC PANEL: CPT | Performed by: INTERNAL MEDICINE

## 2024-08-27 PROCEDURE — 84484 ASSAY OF TROPONIN QUANT: CPT | Performed by: PHYSICIAN ASSISTANT

## 2024-08-27 SDOH — SOCIAL STABILITY: SOCIAL NETWORK
DO YOU BELONG TO ANY CLUBS OR ORGANIZATIONS SUCH AS CHURCH GROUPS UNIONS, FRATERNAL OR ATHLETIC GROUPS, OR SCHOOL GROUPS?: YES

## 2024-08-27 SDOH — HEALTH STABILITY: MENTAL HEALTH: HOW OFTEN DO YOU HAVE A DRINK CONTAINING ALCOHOL?: NEVER

## 2024-08-27 SDOH — ECONOMIC STABILITY: FOOD INSECURITY: WITHIN THE PAST 12 MONTHS, THE FOOD YOU BOUGHT JUST DIDN'T LAST AND YOU DIDN'T HAVE MONEY TO GET MORE.: NEVER TRUE

## 2024-08-27 SDOH — HEALTH STABILITY: PHYSICAL HEALTH: ON AVERAGE, HOW MANY MINUTES DO YOU ENGAGE IN EXERCISE AT THIS LEVEL?: 0 MIN

## 2024-08-27 SDOH — ECONOMIC STABILITY: INCOME INSECURITY: IN THE PAST 12 MONTHS, HAS THE ELECTRIC, GAS, OIL, OR WATER COMPANY THREATENED TO SHUT OFF SERVICE IN YOUR HOME?: NO

## 2024-08-27 SDOH — HEALTH STABILITY: MENTAL HEALTH: HOW OFTEN DO YOU HAVE 6 OR MORE DRINKS ON ONE OCCASION?: NEVER

## 2024-08-27 SDOH — SOCIAL STABILITY: SOCIAL INSECURITY
WITHIN THE LAST YEAR, HAVE TO BEEN RAPED OR FORCED TO HAVE ANY KIND OF SEXUAL ACTIVITY BY YOUR PARTNER OR EX-PARTNER?: NO

## 2024-08-27 SDOH — SOCIAL STABILITY: SOCIAL NETWORK: HOW OFTEN DO YOU ATTEND CHURCH OR RELIGIOUS SERVICES?: MORE THAN 4 TIMES PER YEAR

## 2024-08-27 SDOH — HEALTH STABILITY: MENTAL HEALTH
STRESS IS WHEN SOMEONE FEELS TENSE, NERVOUS, ANXIOUS, OR CAN'T SLEEP AT NIGHT BECAUSE THEIR MIND IS TROUBLED. HOW STRESSED ARE YOU?: NOT AT ALL

## 2024-08-27 SDOH — ECONOMIC STABILITY: INCOME INSECURITY: IN THE LAST 12 MONTHS, WAS THERE A TIME WHEN YOU WERE NOT ABLE TO PAY THE MORTGAGE OR RENT ON TIME?: NO

## 2024-08-27 SDOH — HEALTH STABILITY: MENTAL HEALTH
HOW OFTEN DO YOU NEED TO HAVE SOMEONE HELP YOU WHEN YOU READ INSTRUCTIONS, PAMPHLETS, OR OTHER WRITTEN MATERIAL FROM YOUR DOCTOR OR PHARMACY?: NEVER

## 2024-08-27 SDOH — ECONOMIC STABILITY: INCOME INSECURITY: HOW HARD IS IT FOR YOU TO PAY FOR THE VERY BASICS LIKE FOOD, HOUSING, MEDICAL CARE, AND HEATING?: SOMEWHAT HARD

## 2024-08-27 SDOH — SOCIAL STABILITY: SOCIAL NETWORK
IN A TYPICAL WEEK, HOW MANY TIMES DO YOU TALK ON THE PHONE WITH FAMILY, FRIENDS, OR NEIGHBORS?: MORE THAN THREE TIMES A WEEK

## 2024-08-27 SDOH — ECONOMIC STABILITY: FOOD INSECURITY: WITHIN THE PAST 12 MONTHS, YOU WORRIED THAT YOUR FOOD WOULD RUN OUT BEFORE YOU GOT MONEY TO BUY MORE.: NEVER TRUE

## 2024-08-27 SDOH — ECONOMIC STABILITY: HOUSING INSECURITY: AT ANY TIME IN THE PAST 12 MONTHS, WERE YOU HOMELESS OR LIVING IN A SHELTER (INCLUDING NOW)?: NO

## 2024-08-27 SDOH — SOCIAL STABILITY: SOCIAL NETWORK: HOW OFTEN DO YOU ATTENT MEETINGS OF THE CLUB OR ORGANIZATION YOU BELONG TO?: 1 TO 4 TIMES PER YEAR

## 2024-08-27 SDOH — SOCIAL STABILITY: SOCIAL NETWORK: ARE YOU MARRIED, WIDOWED, DIVORCED, SEPARATED, NEVER MARRIED, OR LIVING WITH A PARTNER?: WIDOWED

## 2024-08-27 SDOH — SOCIAL STABILITY: SOCIAL INSECURITY
WITHIN THE LAST YEAR, HAVE YOU BEEN KICKED, HIT, SLAPPED, OR OTHERWISE PHYSICALLY HURT BY YOUR PARTNER OR EX-PARTNER?: NO

## 2024-08-27 SDOH — SOCIAL STABILITY: SOCIAL INSECURITY: WITHIN THE LAST YEAR, HAVE YOU BEEN AFRAID OF YOUR PARTNER OR EX-PARTNER?: NO

## 2024-08-27 SDOH — SOCIAL STABILITY: SOCIAL INSECURITY: WITHIN THE LAST YEAR, HAVE YOU BEEN HUMILIATED OR EMOTIONALLY ABUSED IN OTHER WAYS BY YOUR PARTNER OR EX-PARTNER?: NO

## 2024-08-27 SDOH — HEALTH STABILITY: MENTAL HEALTH: HOW MANY STANDARD DRINKS CONTAINING ALCOHOL DO YOU HAVE ON A TYPICAL DAY?: PATIENT DOES NOT DRINK

## 2024-08-27 SDOH — SOCIAL STABILITY: SOCIAL NETWORK: HOW OFTEN DO YOU GET TOGETHER WITH FRIENDS OR RELATIVES?: MORE THAN THREE TIMES A WEEK

## 2024-08-27 SDOH — HEALTH STABILITY: PHYSICAL HEALTH: ON AVERAGE, HOW MANY DAYS PER WEEK DO YOU ENGAGE IN MODERATE TO STRENUOUS EXERCISE (LIKE A BRISK WALK)?: 0 DAYS

## 2024-08-27 ASSESSMENT — ACTIVITIES OF DAILY LIVING (ADL)
BATHING_ASSISTANCE: STAND BY
ADL_ASSISTANCE: INDEPENDENT
LACK_OF_TRANSPORTATION: NO

## 2024-08-27 ASSESSMENT — PAIN - FUNCTIONAL ASSESSMENT: PAIN_FUNCTIONAL_ASSESSMENT: 0-10

## 2024-08-27 ASSESSMENT — COGNITIVE AND FUNCTIONAL STATUS - GENERAL
HELP NEEDED FOR BATHING: A LITTLE
EATING MEALS: A LITTLE
PERSONAL GROOMING: A LITTLE
TOILETING: A LITTLE
DRESSING REGULAR UPPER BODY CLOTHING: A LITTLE
DAILY ACTIVITIY SCORE: 18
DRESSING REGULAR LOWER BODY CLOTHING: A LITTLE

## 2024-08-27 ASSESSMENT — ENCOUNTER SYMPTOMS
CONSTITUTIONAL NEGATIVE: 1
HEMATOLOGIC/LYMPHATIC NEGATIVE: 1
PSYCHIATRIC NEGATIVE: 1
MUSCULOSKELETAL NEGATIVE: 1
EYES NEGATIVE: 1
NEUROLOGICAL NEGATIVE: 1
GASTROINTESTINAL NEGATIVE: 1
ALLERGIC/IMMUNOLOGIC NEGATIVE: 1
SHORTNESS OF BREATH: 1
ENDOCRINE NEGATIVE: 1

## 2024-08-27 ASSESSMENT — PAIN SCALES - GENERAL
PAINLEVEL_OUTOF10: 0 - NO PAIN
PAINLEVEL_OUTOF10: 0 - NO PAIN

## 2024-08-27 ASSESSMENT — LIFESTYLE VARIABLES
AUDIT-C TOTAL SCORE: 0
SKIP TO QUESTIONS 9-10: 1

## 2024-08-27 NOTE — NURSING NOTE
Patient with Lt chest tunneled line, current dressing D&I but not dated, patient wanted dressing changed, dressing change done using sterile technique, slight redness noted on skin at tegaderm edges, patient states it is itchy, otherwise no redness, drainage or swelling, no sutures visible. Patient infusing her home iv medication for pulmonary htn and did not want cap changed. She states does her own  at home.

## 2024-08-27 NOTE — PROGRESS NOTES
"   08/27/24 1435   Discharge Planning   Living Arrangements Family members;Children   Support Systems Family members;Children   Assistance Needed Level of [place]: Independent with ADLs and functional transfers, Needs assistance with homemaking  Receives Help From: Family (granddaughter and daughter)  ADL Assistance: Independent  Homemaking Assistance: Needs assistance (granddaughter manages most IADLS)  Ambulatory Assistance: Independent (pt reports being able to do short distances within her home, uses a scooter for prolonged community distances, crawls up/down stairs on \"her hands and feet\")  Vocational: Unemployed  Leisure: watches TV  Hand Dominance: Right  Prior Function Comments: reports 2 syncopal episodes in the last 6 months   Type of Residence Private residence  (multi-level home, lives on the 2nd floor.)   Number of Stairs to Enter Residence 5   Number of Stairs Within Residence 12   Do you have animals or pets at home? No   Who is requesting discharge planning? Provider   Home or Post Acute Services None   Expected Discharge Disposition Home   Does the patient need discharge transport arranged? No   Financial Resource Strain   How hard is it for you to pay for the very basics like food, housing, medical care, and heating? Somewhat   Housing Stability   In the last 12 months, was there a time when you were not able to pay the mortgage or rent on time? N   In the past 12 months, how many times have you moved where you were living? 0   At any time in the past 12 months, were you homeless or living in a shelter (including now)? N   Transportation Needs   In the past 12 months, has lack of transportation kept you from medical appointments or from getting medications? no   In the past 12 months, has lack of transportation kept you from meetings, work, or from getting things needed for daily living? No     62 y/o female presenting with LE edema, numbness and SOB.   Past Medical History Relevant to Rehab: " "anxiety, depression, GERD, lupus, seizure, pulmonary HTN wears 4L at baseline.  Patient lives with Grandchildren (lives with granddaughter) in a Two level, Stairs to alternate level with rails. Number of Steps: full flight to 2nd story where pt spends most of her time on the 2nd floor. Home Living Comments: patient has full flight of stairs to her primary living space, reports she has a \"fridge in her room,\" does not go up/down stairs often; full bath on 2nd story  Home Adaptive Equipment: Wheelchair-manual, Scooter  Independent with ADLs and functional transfers, Needs assistance with homemaking  Receives Help From: Family (granddaughter and daughter)  ADL Assistance: Independent  Homemaking Assistance: Needs assistance (granddaughter manages most IADLS)  Ambulatory Assistance: Independent (pt reports being able to do short distances within her home, uses a scooter for prolonged community distances, crawls up/down stairs on \"her hands and feet\")  Vocational: Unemployed  Leisure: watches TV  Prior Function Comments: reports 2 syncopal episodes in the last 6 months.  Patient has no outside services.   Patient has home oxygen through [employer].   Patient can drive but has her grand daughter drive.     PLAN: Discharge to home with no needs. Patient will start outpatient therapy at [facility] on 9/5/2024  "

## 2024-08-27 NOTE — CONSULTS
Inpatient consult to Pulmonology  Consult performed by: MICHEL Miller-CNP  Consult ordered by: Ryan Tao MD          Reason For Consult  Pulmonary arterial hypertension    History Of Present Illness  Ronna Beckham is a 63 y.o. female with a past medical history of pulmonary hypertension; on 4 L nasal cannula oxygen continuously and systemic lupus erythematous who presented to [facility] Emergency Department last night for evaluation of shortness of breath, worse with exertion and lower extremity edema.  Patient admits to missing 1 week of Lasix and was given 60 mg IV x 1 dose in the ED.  She was hypoxic upon arrival; 91% on her baseline oxygen and it was increased to 5 L to maintain O2 sats greater than or equal to 92%.  She was discharged from [facility]  after a 2-day hospitalization for SVT and had a urine toxicology screen that was positive for PCP which the patient denied using.  She denies cough, fever, chills, nausea, vomiting, diarrhea, chest or abdominal pain.     Past Medical History  Past Medical History:   Diagnosis Date    Anxiety     Depression     GERD (gastroesophageal reflux disease)     Lupus (Multi)     Personal history of other specified conditions 2020    History of seizure    Pulmonary hypertension (Multi)        Surgical History  Past Surgical History:   Procedure Laterality Date    CARDIAC CATHETERIZATION N/A 2024    Procedure: Left Heart Cath, With LV, Angriography And Grafts;  Surgeon: Christiano Calvillo DO;  Location: [facility] Cardiac Cath Lab;  Service: Cardiovascular;  Laterality: N/A;    CHOLECYSTECTOMY      HYSTERECTOMY      OTHER SURGICAL HISTORY  2020    Cholecystectomy    OTHER SURGICAL HISTORY  2020    Esophagogastroduodenoscopy    OTHER SURGICAL HISTORY  2020    Colonoscopy    OTHER SURGICAL HISTORY  2020     section    OTHER SURGICAL HISTORY  2020    Hysterectomy        Social History  Social History      Tobacco Use    Smoking status: Former     Types: Cigarettes    Smokeless tobacco: Former   Vaping Use    Vaping status: Never Used   Substance Use Topics    Alcohol use: Not Currently    Drug use: Not Currently     Types: Cocaine, Other     Comment: Meth       Family History  Family History   Problem Relation Name Age of Onset    No Known Problems Mother      No Known Problems Father          Allergies  Levetiracetam    Review of Systems   Constitutional: Negative.    HENT: Negative.     Eyes: Negative.    Respiratory:  Positive for shortness of breath.    Cardiovascular:  Positive for leg swelling.   Gastrointestinal: Negative.    Endocrine: Negative.    Genitourinary: Negative.    Musculoskeletal: Negative.    Allergic/Immunologic: Negative.    Neurological: Negative.    Hematological: Negative.    Psychiatric/Behavioral: Negative.          Physical Exam  Vitals and nursing note reviewed.   Constitutional:       Appearance: She is ill-appearing.   HENT:      Head: Normocephalic and atraumatic.      Nose: Nose normal.      Mouth/Throat:      Mouth: Mucous membranes are moist.   Eyes:      Extraocular Movements: Extraocular movements intact.      Conjunctiva/sclera: Conjunctivae normal.      Pupils: Pupils are equal, round, and reactive to light.   Cardiovascular:      Rate and Rhythm: Regular rhythm. Tachycardia present.      Pulses: Normal pulses.      Heart sounds: Normal heart sounds.   Pulmonary:      Effort: Pulmonary effort is normal.      Breath sounds: Rales present.      Comments: On 2 L nasal cannula oxygen; oxygen sats 94%.   Abdominal:      General: Bowel sounds are normal.      Palpations: Abdomen is soft.   Musculoskeletal:         General: Normal range of motion.      Right lower leg: Edema present.      Left lower leg: Edema present.   Skin:     General: Skin is warm and dry.      Capillary Refill: Capillary refill takes less than 2 seconds.   Neurological:      General: No focal deficit  "present.      Mental Status: She is alert and oriented to person, place, and time.   Psychiatric:         Mood and Affect: Mood normal.         Behavior: Behavior normal.          Vital Signs  Blood pressure 106/66, pulse 102, temperature 36.4 °C (97.5 °F), temperature source Oral, resp. rate 18, height 1.651 m (5' 5\"), weight 73.5 kg (162 lb), SpO2 97%.  Oxygen Therapy  SpO2: 97 %  Medical Gas Therapy: Supplemental oxygen (2L)  Medical Gas Delivery Method: Nasal cannula       Intake/Output Summary (Last 24 hours) at 8/27/2024 0826  Last data filed at 8/27/2024 0637  Gross per 24 hour   Intake 340 ml   Output 1400 ml   Net -1060 ml     Scheduled medications:  aspirin, 81 mg, oral, Daily  calcium carbonate-vitamin D3, 2 tablet, oral, BID  cefTRIAXone, 1 g, intravenous, q24h  cholestyramine, 1 packet, oral, Daily  dilTIAZem CD, 180 mg, oral, Daily  doxycycline, 100 mg, intravenous, q12h  folic acid, 1 mg, oral, Daily  furosemide, 40 mg, intravenous, Once  [Held by provider] furosemide, 40 mg, oral, Daily  hydroxychloroquine, 200 mg, oral, BID  macitentan, 10 mg, oral, Daily  oxygen, , inhalation, Continuous - Inhalation  pantoprazole, 40 mg, oral, Daily before breakfast  potassium chloride CR, 20 mEq, oral, BID  venlafaxine XR, 150 mg, oral, Daily     PRN medications: acetaminophen **OR** acetaminophen **OR** acetaminophen, benzocaine-menthol, dextromethorphan-guaifenesin, gabapentin, guaiFENesin, loperamide, melatonin, ondansetron ODT **OR** ondansetron, polyethylene glycol     epoprostenol, 1.5 mg       Relevant Results  Results for orders placed or performed during the hospital encounter of 08/26/24 (from the past 24 hour(s))   CBC and Auto Differential   Result Value Ref Range    WBC 5.3 4.4 - 11.3 x10*3/uL    nRBC 0.0 0.0 - 0.0 /100 WBCs    RBC 3.89 (L) 4.00 - 5.20 x10*6/uL    Hemoglobin 9.2 (L) 12.0 - 16.0 g/dL    Hematocrit 30.9 (L) 36.0 - 46.0 %    MCV 79 (L) 80 - 100 fL    MCH 23.7 (L) 26.0 - 34.0 pg    MCHC " 29.8 (L) 32.0 - 36.0 g/dL    RDW 17.8 (H) 11.5 - 14.5 %    Platelets 102 (L) 150 - 450 x10*3/uL    Neutrophils % 82.0 40.0 - 80.0 %    Immature Granulocytes %, Automated 0.6 0.0 - 0.9 %    Lymphocytes % 12.0 13.0 - 44.0 %    Monocytes % 5.4 2.0 - 10.0 %    Eosinophils % 0.0 0.0 - 6.0 %    Basophils % 0.0 0.0 - 2.0 %    Neutrophils Absolute 4.37 1.20 - 7.70 x10*3/uL    Immature Granulocytes Absolute, Automated 0.03 0.00 - 0.70 x10*3/uL    Lymphocytes Absolute 0.64 (L) 1.20 - 4.80 x10*3/uL    Monocytes Absolute 0.29 0.10 - 1.00 x10*3/uL    Eosinophils Absolute 0.00 0.00 - 0.70 x10*3/uL    Basophils Absolute 0.00 0.00 - 0.10 x10*3/uL   Comprehensive metabolic panel   Result Value Ref Range    Glucose 86 65 - 99 mg/dL    Sodium 135 133 - 145 mmol/L    Potassium 4.3 3.4 - 5.1 mmol/L    Chloride 103 97 - 107 mmol/L    Bicarbonate 18 (L) 24 - 31 mmol/L    Urea Nitrogen 16 8 - 25 mg/dL    Creatinine 0.90 0.40 - 1.60 mg/dL    eGFR 73 >60 mL/min/1.73m*2    Calcium 7.6 (L) 8.5 - 10.4 mg/dL    Albumin 3.5 3.5 - 5.0 g/dL    Alkaline Phosphatase 94 35 - 125 U/L    Total Protein 6.5 5.9 - 7.9 g/dL    AST 9 5 - 40 U/L    Bilirubin, Total 0.8 0.1 - 1.2 mg/dL    ALT 8 5 - 40 U/L    Anion Gap 14 <=19 mmol/L   NT Pro-BNP   Result Value Ref Range    PROBNP 6,865 (H) 0 - 226 pg/mL   Serial Troponin, Initial ([facility])   Result Value Ref Range    Troponin T, High Sensitivity 20 (HH) <=14 ng/L   BLOOD GAS ARTERIAL FULL PANEL   Result Value Ref Range    POCT pH, Arterial 7.47 (H) 7.38 - 7.42 pH    POCT pCO2, Arterial 29 (L) 38 - 42 mm Hg    POCT pO2, Arterial 68 (L) 85 - 95 mm Hg    POCT SO2, Arterial 94 94 - 100 %    POCT Oxy Hemoglobin, Arterial 91.5 (L) 94.0 - 98.0 %    POCT Hematocrit Calculated, Arterial 28.0 (L) 36.0 - 46.0 %    POCT Sodium, Arterial 135 (L) 136 - 145 mmol/L    POCT Potassium, Arterial 4.3 3.5 - 5.3 mmol/L    POCT Chloride, Arterial 105 98 - 107 mmol/L    POCT Ionized Calcium, Arterial 1.03 (L) 1.10 - 1.33 mmol/L    POCT  Glucose, Arterial 90 74 - 99 mg/dL    POCT Lactate, Arterial 0.7 0.4 - 2.0 mmol/L    POCT Base Excess, Arterial -1.9 -2.0 - 3.0 mmol/L    POCT HCO3 Calculated, Arterial 21.1 (L) 22.0 - 26.0 mmol/L    POCT Hemoglobin, Arterial 9.4 (L) 12.0 - 16.0 g/dL    POCT Anion Gap, Arterial 13 10 - 25 mmo/L    Patient Temperature 37.0 degrees Celsius    FiO2 36 %    Apparatus CANNULA     Site of Arterial Puncture Radial Right     Rivera's Test Positive    ECG 12 lead   Result Value Ref Range    Ventricular Rate 102 BPM    Atrial Rate 102 BPM    VT Interval 190 ms    QRS Duration 112 ms    QT Interval 380 ms    QTC Calculation(Bazett) 495 ms    R Axis 148 degrees    T Axis -10 degrees    QRS Count 16 beats    Q Onset 207 ms    P Onset 112 ms    P Offset 171 ms    T Offset 397 ms    QTC Fredericia 453 ms   Urinalysis with Reflex Microscopic   Result Value Ref Range    Color, Urine Colorless (N) Light-Yellow, Yellow, Dark-Yellow    Appearance, Urine Clear Clear    Specific Gravity, Urine 1.005 1.005 - 1.035    pH, Urine 5.0 5.0, 5.5, 6.0, 6.5, 7.0, 7.5, 8.0    Protein, Urine NEGATIVE NEGATIVE, 10 (TRACE), 20 (TRACE) mg/dL    Glucose, Urine Normal Normal mg/dL    Blood, Urine NEGATIVE NEGATIVE    Ketones, Urine NEGATIVE NEGATIVE mg/dL    Bilirubin, Urine NEGATIVE NEGATIVE    Urobilinogen, Urine Normal Normal mg/dL    Nitrite, Urine NEGATIVE NEGATIVE    Leukocyte Esterase, Urine NEGATIVE NEGATIVE   Serial Troponin, 6 Hour ([facility])   Result Value Ref Range    Troponin T, High Sensitivity 20 (HH) <=14 ng/L   Comprehensive metabolic panel   Result Value Ref Range    Glucose 100 (H) 65 - 99 mg/dL    Sodium 137 133 - 145 mmol/L    Potassium 3.7 3.4 - 5.1 mmol/L    Chloride 101 97 - 107 mmol/L    Bicarbonate 24 24 - 31 mmol/L    Urea Nitrogen      Creatinine 0.90 0.40 - 1.60 mg/dL    eGFR 73 >60 mL/min/1.73m*2    Calcium 7.9 (L) 8.5 - 10.4 mg/dL    Albumin 3.3 (L) 3.5 - 5.0 g/dL    Alkaline Phosphatase 95 35 - 125 U/L    Total Protein 6.0 5.9  - 7.9 g/dL    AST 8 5 - 40 U/L    Bilirubin, Total 0.8 0.1 - 1.2 mg/dL    ALT 6 5 - 40 U/L    Anion Gap 12 <=19 mmol/L   CBC and Auto Differential   Result Value Ref Range    WBC 5.1 4.4 - 11.3 x10*3/uL    nRBC 0.0 0.0 - 0.0 /100 WBCs    RBC 3.75 (L) 4.00 - 5.20 x10*6/uL    Hemoglobin 8.7 (L) 12.0 - 16.0 g/dL    Hematocrit 30.0 (L) 36.0 - 46.0 %    MCV 80 80 - 100 fL    MCH 23.2 (L) 26.0 - 34.0 pg    MCHC 29.0 (L) 32.0 - 36.0 g/dL    RDW 17.9 (H) 11.5 - 14.5 %    Platelets 117 (L) 150 - 450 x10*3/uL    Neutrophils % 80.4 40.0 - 80.0 %    Immature Granulocytes %, Automated 0.4 0.0 - 0.9 %    Lymphocytes % 11.2 13.0 - 44.0 %    Monocytes % 8.0 2.0 - 10.0 %    Eosinophils % 0.0 0.0 - 6.0 %    Basophils % 0.0 0.0 - 2.0 %    Neutrophils Absolute 4.11 1.20 - 7.70 x10*3/uL    Immature Granulocytes Absolute, Automated 0.02 0.00 - 0.70 x10*3/uL    Lymphocytes Absolute 0.57 (L) 1.20 - 4.80 x10*3/uL    Monocytes Absolute 0.41 0.10 - 1.00 x10*3/uL    Eosinophils Absolute 0.00 0.00 - 0.70 x10*3/uL    Basophils Absolute 0.00 0.00 - 0.10 x10*3/uL   Magnesium   Result Value Ref Range    Magnesium 0.80 (L) 1.60 - 3.10 mg/dL   Calcium, ionized   Result Value Ref Range    POCT Calcium, Ionized 1.04 (L) 1.1 - 1.33 mmol/L        XR chest 1 view  Result Date: 8/26/2024  FINDINGS: AP radiograph of the chest was provided.   Apical lordotic position. Similar position left IJ catheter. The tip is at the cavoatrial junction region.   CARDIOMEDIASTINAL SILHOUETTE: Similar prominence of the cardiomediastinal silhouette.   LUNGS: Marginated left retrocardiac density is indeterminate potentially related to overlapping body wall. Left basilar pneumonia not excluded. Otherwise similar expansion without evident  sadiq edema or effusion.   ABDOMEN: No remarkable upper abdominal findings.   BONES: No acute osseous changes.   1.  Left retrocardiac density may be due to overlapping normal structures although left basilar pneumonia not excluded.         Assessment/Plan   Acute on chronic hypoxic respiratory failure  Oxygen at baseline  Continuous pulse oximetry  Incentive spirometry/pulmonary hygiene    Pulmonary arterial hypertension  Currently on advanced therapy for pulmonary artery hypertension (Veletri) via cuffed catheter left subclavian w/ continuous home pump; will allow her to self administer    Decompensated congestive heart failure  Oral Lasix   40 mg IV Lasix x 1 dose today, 60 mg IV given in the ED yesterday  Cardiology consultation    Suspicion for left basilar pneumonia  Continue empiric ceftriaxone, doxycycline  Procalcitonin pending  PA and lateral chest x-ray    Anemia  Folic acid    Systemic lupus erythematous  Continue home hydroxychloroquine     History of epilepsy  Seizure precautions  Not on any antiseizure medications    Prophylaxis  Oral Protonix  SCDs    Thank you for this consultation    Lindsey Angela, APRN-CNP

## 2024-08-27 NOTE — PROGRESS NOTES
08/27/24 1445   Physical Activity   On average, how many days per week do you engage in moderate to strenuous exercise (like a brisk walk)? 0 days   On average, how many minutes do you engage in exercise at this level? 0 min   Financial Resource Strain   How hard is it for you to pay for the very basics like food, housing, medical care, and heating? Somewhat   Housing Stability   In the last 12 months, was there a time when you were not able to pay the mortgage or rent on time? N   At any time in the past 12 months, were you homeless or living in a shelter (including now)? N   Transportation Needs   In the past 12 months, has lack of transportation kept you from medical appointments or from getting medications? no   In the past 12 months, has lack of transportation kept you from meetings, work, or from getting things needed for daily living? No   Food Insecurity   Within the past 12 months, you worried that your food would run out before you got the money to buy more. Never true   Within the past 12 months, the food you bought just didn't last and you didn't have money to get more. Never true   Stress   Do you feel stress - tense, restless, nervous, or anxious, or unable to sleep at night because your mind is troubled all the time - these days? Not at all   Social Connections   In a typical week, how many times do you talk on the phone with family, friends, or neighbors? More than 3   How often do you get together with friends or relatives? More than 3   How often do you attend Orthodox or Latter day services? More than 4   Do you belong to any clubs or organizations such as Orthodox groups, unions, fraternal or athletic groups, or school groups? Yes  (Belongs to [facility] but has been difficulty getting there lately)   How often do you attend meetings of the clubs or organizations you belong to? 1 to 4   Are you , , , , never , or living with a partner?     Intimate Partner Violence   Within the last year, have you been afraid of your partner or ex-partner? No   Within the last year, have you been humiliated or emotionally abused in other ways by your partner or ex-partner? No   Within the last year, have you been kicked, hit, slapped, or otherwise physically hurt by your partner or ex-partner? No   Within the last year, have you been raped or forced to have any kind of sexual activity by your partner or ex-partner? No   Alcohol Use   Q1: How often do you have a drink containing alcohol? Never   Q2: How many drinks containing alcohol do you have on a typical day when you are drinking? None   Q3: How often do you have six or more drinks on one occasion? Never   Utilities   In the past 12 months has the electric, gas, oil, or water company threatened to shut off services in your home? No   Health Literacy   How often do you need to have someone help you when you read instructions, pamphlets, or other written material from your doctor or pharmacy? Never

## 2024-08-27 NOTE — PROGRESS NOTES
Therapy Communication Note    Patient Name: Ronna Beckham  MRN: [id number]  Today's Date: 8/27/2024    Discipline: Physical Therapy    Missed Visit Reason:      Missed Time: Cancel    Comment: PT orders received this morning. During chart review, PT eval orders discontinued. Per Anisha Jasmine NP, no PT needs at this time.

## 2024-08-27 NOTE — CONSULTS
"Nutrition Assessement Note    Nutrition Assessment    Reason for Assessment: Dietitian rohit ([facility])    Spoke with pt and sister at bedside. Pt declined the need for diet education because she \"wants to eat whatever she wants'. Pt reported poor appetite and stated that she has taste disturbances due to having COVID. Pt stated that she craves salty and sweet foods. Pt agreeable to Ensure plus high protein.     Reason for Hospital Admission:  Ronna Beckham is a 63 y.o. female who is admitted for acute chronic hypoxic respiratory failure.     Past Medical History:   Diagnosis Date    Anxiety     Depression     GERD (gastroesophageal reflux disease)     Lupus (Multi)     Personal history of other specified conditions 2020    History of seizure    Pulmonary hypertension (Multi)       Past Surgical History:   Procedure Laterality Date    CARDIAC CATHETERIZATION N/A 2024    Procedure: Left Heart Cath, With LV, Angriography And Grafts;  Surgeon: Christiano Calvillo DO;  Location: [facility] Cardiac [facility] Lab;  Service: Cardiovascular;  Laterality: N/A;    CHOLECYSTECTOMY      HYSTERECTOMY      OTHER SURGICAL HISTORY  2020    Cholecystectomy    OTHER SURGICAL HISTORY  2020    Esophagogastroduodenoscopy    OTHER SURGICAL HISTORY  2020    Colonoscopy    OTHER SURGICAL HISTORY  2020     section    OTHER SURGICAL HISTORY  2020    Hysterectomy       Nutrition History:  Food and Nutrient History: Pt reported decreased appetite and PO intake for the past 3 months.     Food Allergies/Intolerances:  None  GI Symptoms: Constipation  Oral Problems: None    Anthropometrics:  Ht: 165.1 cm (5' 5\"), Wt: 73.5 kg (162 lb), BMI: 26.96  IBW/kg (Dietitian Calculated): 56.8 kg          Weight Change:  Daily Weight  24 : 73.5 kg (162 lb)  24 : 73.5 kg (162 lb)  24 : 73.5 kg (162 lb)  24 : 73.5 kg (162 lb)  24 : 73.9 kg (163 lb)  24 : 73.5 kg (162 lb)  07/10/24 : 77.7 kg " (171 lb 3.2 oz)  07/08/24 : 78.2 kg (172 lb 6.4 oz)  07/07/24 : 78.2 kg (172 lb 6.4 oz)  06/02/24 : 77.9 kg (171 lb 11.8 oz)     Weight History / % Weight Change: Pt reported that her UBW was 200# about 2 years ago and lost weight due to COVID. Pt reported 40# weight loss over 2 years. chart shows a weight loss of 9# (5.3%) over 2 months  Significant Weight Loss: No          Nutrition Focused Physical Exam Findings:   Subcutaneous Fat Loss  Orbital Fat Pads: Well nourished (slightly bulging fat pads)  Buccal Fat Pads: Well nourished (full, rounded cheeks)    Muscle Wasting  Temporalis: Well nourished (well-defined muscle)  Pectoralis (Clavicular Region): Well nourished (clavicle not visible)              Nutrition Significant Labs:  Lab Results   Component Value Date    WBC 5.1 08/27/2024    HGB 8.7 (L) 08/27/2024    HCT 30.0 (L) 08/27/2024     (L) 08/27/2024    CHOL 174 09/21/2020    TRIG 259 (H) 09/21/2020    HDL 29.5 (A) 09/21/2020    LDLDIRECT 172 (H) 07/23/2019    ALT 6 08/27/2024    AST 11 08/27/2024     08/27/2024    K 3.7 08/27/2024     08/27/2024    CREATININE 0.90 08/27/2024    BUN 16 08/27/2024    CO2 22 (L) 08/27/2024    TSH 2.62 07/05/2024    INR 1.3 (H) 09/07/2023    HGBA1C 5.4 08/26/2024     Nutrition Specific Medications:  aspirin, 81 mg, oral, Daily  calcium carbonate-vitamin D3, 2 tablet, oral, BID  cefTRIAXone, 1 g, intravenous, q24h  cholestyramine, 1 packet, oral, Daily  dilTIAZem CD, 180 mg, oral, Daily  doxycycline, 100 mg, intravenous, q12h  folic acid, 1 mg, oral, Daily  [Held by provider] furosemide, 40 mg, oral, Daily  hydroxychloroquine, 200 mg, oral, BID  macitentan, 10 mg, oral, Daily  oxygen, , inhalation, Continuous - Inhalation  pantoprazole, 40 mg, oral, Daily before breakfast  potassium chloride CR, 20 mEq, oral, BID  venlafaxine XR, 150 mg, oral, Daily      epoprostenol, 1.5 mg      Dietary Orders (From admission, onward)       Start     Ordered    08/27/24 0295   Oral nutritional supplements  Until discontinued        Comments: strawberry   Question Answer Comment   Deliver with Breakfast    Select supplement: Ensure Plus High Protein        08/27/24 1417    08/26/24 2347  Adult diet Regular, Cardiac; 70 gm fat; 2 - 3 grams Sodium; 1800 mL fluid  Diet effective now        Question Answer Comment   Diet type Regular    Diet type Cardiac    Fat restriction: 70 gm fat    Sodium restriction: 2 - 3 grams Sodium    Dietary fluid restriction / 24h: 1800 mL fluid        08/26/24 2346                   Estimated Needs:   Estimated Energy Needs  Total Energy Estimated Needs (kCal): 1420 kCal  Total Estimated Energy Need per Day (kCal/kg): 25 kCal/kg  Method for Estimating Needs: IBW    Estimated Protein Needs  Total Protein Estimated Needs (g):  (57-85)  Total Protein Estimated Needs (g/kg):  (1-1.5)  Method for Estimating Needs: IBW    Estimated Fluid Needs  Total Fluid Estimated Needs (mL): 1800 mL  Method for Estimating Needs: fluid restriction        Nutrition Diagnosis   Nutrition Diagnosis:       Nutrition Diagnosis  Patient has Nutrition Diagnosis: Yes  Diagnosis Status (1): New  Nutrition Diagnosis 1: Increased nutrient needs  Related to (1): increased demand for nutrient  As Evidenced by (1): conditions associated with diagnosis       Nutrition Interventions/Recommendations   Nutrition Interventions and Recommendations:    Nutrition Prescription:  Individualized Nutrition Prescription Provided for : 1420 kcals, 57-85 g protein via diet    Nutrition Interventions:   Food and/or Nutrient Delivery Interventions  Interventions: Meals and snacks, Medical food supplement  Meals and Snacks: Fat-modified diet, Mineral-modified diet  Goal: provide diet as ordered  Medical Food Supplement: Commercial beverage  Goal: ensure plus high protein once daily to provide 350 kcals and 20g protein    Education Documentation  No documentation found.           Nutrition Monitoring and Evaluation    Monitoring/Evaluation:   Food/Nutrient Related History Monitoring  Monitoring and Evaluation Plan: Energy intake  Energy Intake: Estimated energy intake  Criteria: pt to consume >/= 75% estimated needs            Time Spent/Follow-up:   Follow Up  Time Spent (min): 30 minutes  Last Date of Nutrition Visit: 08/27/24  Nutrition Follow-Up Needed?: 5-7 days  Follow up Comment: 9/3/24

## 2024-08-27 NOTE — H&P
History Of Present Illness        Ronna Beckham is a 63 y.o. female presenting with Dyspnea.      Patient presented to the emergency room for increasing dyspnea and bilateral lower extremity edema that is getting worse.  At baseline patient wears 4 L supplemental oxygen by nasal cannula secondary to pulmonary hypertension.  She does admit to missing a 1 week duration of her home Lasix therapy.  She denies any chest pain.      Upon arrival to emergency room patient's vital signs noted for Tmax 36.3, pulse rate 104, respiratory rate 16, /64.  Patient was saturating 91% on her supplemental oxygen by nasal cannula.      Patient's ED diagnostic workup noted for a CBC with differential was consistent with a H&H of 9.2/30.9.  The patient's platelet count was low at 102.  Absolute lymphocyte count noted to be 0.64.  Patient's blood chemistry noted for a sodium of 132.  The bicarbonate was 21.  The calcium was 7.5 with an albumin of 3.5.  Her hemoglobin A1c was noted to be 5.4 the day prior.  Patient underwent an arterial blood gas.  This was noted for the following:      pH 7.47, pCO2 29, pO2 68, HCO3 21.1, saturation 94%, base excess -1.9.      Patient's proBNP was elevated 6,865.  Patient's troponin level was 20.  A chest x-ray was obtained and read as left retrocardiac density may be due to overlapping normal structures of the left basilar pneumonia not excluded.        Last month I admitted the patient on July 5th, 2024 with similar symptoms.  She was discharged on July 7, 2024 by my partner.  At that time she was actually admitted for SVT.  At that time her urine toxicology screen was positive for PCP but she denied use.  She did admit to marijuana use at that time.  Wearing that hospitalization she was given adenosine 6 mg followed by 12 mg with suboptimal response.  She was started on Cardizem 180 mg daily.  She was administering her own believes 3 medication in the hospital as she does at home.  The  pharmacist was assisting with this.        The patient's EKG today is noted for the following:      EKG shows a sinus tachycardia, rate of 102 bpm, ID interval 190, , , QTc 495, patient has a right bundle branch block, left anterior fascicular block, bifascicular block, no ST elevations or depressions, negative for acute MI.       Off note a 2D echocardiogram from April 24, 2024 was noted for the following:      ONCLUSIONS:  1. Left ventricular systolic function is normal with a 60-65% estimated ejection fraction.  2. Right ventricular volume and pressure overload.  3. Although the TAPSE and RVA' are normal, the fractional area change is markedly reduced consistent wtih severe RV systolic dysfunction. Still able to generate high RVSP.  4. Severely enlarged right ventricle.  5. There is severely reduced right ventricular systolic function.  6. Agitated saline contrast study was positive for small intracardiac shunt.  7. The right atrium is severely dilated.  8. There is a small to moderate pericardial effusion.  9. Small circumferential pericardial effusin though is small to moderate adjacent to the RA. No RV or RA collapse. The IVC is dilated, though likely due to the presence of severe pulmonary hypertension. No significant respiratory variation of the MV or TV inflows. Not diagnostic for tamponade though difficult to dx tamponade in setting of severe pulmonary hypertension.  10. Severe tricuspid regurgitation visualized.  11. Severely elevated right ventricular systolic pressure.  12. Compared with the prior exam from 3/12/2022 the RV had a similar appearance with severe dilatation and reduced function. Note the TR was not asessed on that exam to allow estimation of the RVSP at that time. The pericardial effusion appears similar in size.  13. A bubble study using agitated saline was performed. Bubble study is positive. A small PFO (= 10 bubbles) was demonstrated.      The patient's daughter was  present during my encounter at the bedside.  The patient tells me that she is blaming her primary care provider for not calling in her prescriptions at this time.        Past Medical History        Past Medical History:   Diagnosis Date    Anxiety     Depression     GERD (gastroesophageal reflux disease)     Lupus (Multi)     Personal history of other specified conditions 2020    History of seizure    Pulmonary hypertension (Multi)          Surgical History        Past Surgical History:   Procedure Laterality Date    CARDIAC CATHETERIZATION N/A 2024    Procedure: Left Heart Cath, With LV, Angriography And Grafts;  Surgeon: Christiano Calvillo DO;  Location: [facility];  Service: Cardiovascular;  Laterality: N/A;    CHOLECYSTECTOMY      HYSTERECTOMY      OTHER SURGICAL HISTORY  2020    Cholecystectomy    OTHER SURGICAL HISTORY  2020    Esophagogastroduodenoscopy    OTHER SURGICAL HISTORY  2020    Colonoscopy    OTHER SURGICAL HISTORY  2020     section    OTHER SURGICAL HISTORY  2020    Hysterectomy          Social History      She reports that she has quit smoking. Her smoking use included cigarettes. She has quit using smokeless tobacco. She reports that she does not currently use alcohol. She reports that she does not currently use drugs after having used the following drugs: Cocaine and Other.        Family History        Family History   Problem Relation Name Age of Onset    No Known Problems Mother      No Known Problems Father            Allergies        Levetiracetam        Review of Systems    14-point ROS otherwise negative, as per HPI/Interval History.    General: No change in weight. No weakenss. No Fevers/Chills/Night Sweats   Skin: No skin/hair/nail changes. No rashes or sores.  Head:  No trauma. No Headache/nasuea/vomitting.   Eyes: No visual changes. No tearing. No itching.   Ears: No hearing loss. No tinnitus. No vertigo. No discharge.  Nose,  "Sinuses: No rhinorrhea, No nasal congestion. No epistaxis.  Mouth, Throat, Neck: No bleeding gums, hoarseness, sore throat or swollen neck  Cardiac: No palpitations. No ALAMO. No PND. No Orthopnea.   Respiratory: Yes Shortness of Breath. No wheezing. No cough. No hemoptysis.   GI: No nausea/vomiting. No indigestion. No diarrhea. No constipation.   Extremities: No numbness or tingling. No paresthesias.   Urinary: No change in urinary frequency. No change in hesitancy. No hematuria. No incontinence.         Physical Exam        Constitutional:  Pleasant  Eyes: PERRL, EOMI,   ENMT: mucous membranes moist  Head/Neck: Neck supple, No JVD,   Respiratory/Thorax: Crackles Bilaterally.  Diminished breath sound left lung base  Cardiovascular: Regular, rate and rhythm, no murmurs  Gastrointestinal: Soft, non-distended, +BS.  Musculoskeletal: ROM intact, no joint swelling, normal strength  Extremities: peripheral pulses intact; pitting edema bilateral ankles  Neurological: Alert and Oriented x 3; no focal deficits; gross motor and sensation intact; CN II-XII intact. No asterixis.  Psychological: Appropriate mood and behavior  Skin: No lesions, No rashes.         Last Recorded Vitals  Blood pressure 107/68, pulse 102, temperature 36.8 °C (98.2 °F), temperature source Oral, resp. rate 18, height 1.651 m (5' 5\"), weight 73.5 kg (162 lb), SpO2 93%.    Relevant Results    Lab Results   Component Value Date    WBC 5.3 08/26/2024    HGB 9.2 (L) 08/26/2024    HCT 30.9 (L) 08/26/2024    MCV 79 (L) 08/26/2024     (L) 08/26/2024       Lab Results   Component Value Date    GLUCOSE 85 08/26/2024    CALCIUM 7.5 (L) 08/26/2024     (L) 08/26/2024    K 4.1 08/26/2024    CO2 21 (L) 08/26/2024     08/26/2024    BUN 18 08/03/2024    CREATININE 0.90 08/26/2024       Lab Results   Component Value Date    HGBA1C 5.4 08/26/2024         No CT head results found for the past 12 months      Scheduled medications    Continuous " medications    PRN medications          Assessment/Plan   Principal Problem:    Acute on chronic hypoxic respiratory failure (Multi)  Active Problems:    Systemic lupus erythematosus (Multi)    Seizure disorder (Multi)    PAH (pulmonary artery hypertension) (Multi)    Methamphetamine abuse (Multi)    Major depressive disorder, single episode, moderate (Multi)    Hypocalcemia    Hyperlipidemia    History of alcohol abuse    Chronic obstructive pulmonary disease (Multi)    Acquired thrombocytopenia (CMS-HCC)    Pulmonary HTN (Multi)    SVT (supraventricular tachycardia) (CMS-HCC)    Pneumonia of left lower lobe due to infectious organism          Dyspnea 2/2 Multifactorial etiology.  Presumably secondary to Decompensated heart failure.        Admit patient to Telemetry service   Continuous Cardiac Monitor and BP Monitor Placement   Cardiology evaluation in AM.   Monitor Electrolytes, Keep K+>4 + Mg++>2.   Patient has not taken her home oral Lasix therapy for 1 week in duration  Patient given IV Lasix therapy in the ER  We will continue the Lasix therapy  Continue with Fluid Restriction   Patient wears 4 L supplemental oxygen by nasal cannula at baseline  Currently requiring 5 L nasal cannula.  Will titrate down as tolerated      Potential Left Basilar Pneumonia    Based off of chest x-ray reading  Will start patient empiric IV antibiotics  Pulmonary service to consider continuing  Will order procalcitonin level for a.m.       Hypocalcemia     Replace and recheck levels  Will order repeat levels in a.m.  Will add magnesium level        Anemia     Continue folic acid therapy       Thrombocytopenia     Peers to be chronic in nature  Monitor platelet count closely       H/o Epilepsy      Seizure precautions  She is currently not taking any AED medication        Chronic respiratory failure     Patient wears baseline supplemental oxygen level at 4 L        Pulmonary Arterial Hypertension     NYHA Functional Class 3 and WHO  Group 1   Patient follows with Dr. Chicho Prescott  She is taking advanced therapy for PAH (Veletri) Via cuffed catheter left subclavian w/ continuous Home pump.  She has all of her supplies with her.   Will allow her to self administer her medication as she did last hospitalization. Case discussed with pharmacy doctor.   She is also taking Opsumit 10 mg p.o. daily  Will consult pulmonary service to evaluate the patient  Intolerant to PDE5   Recent notes show pulmonary follow-up with Dr. Prescott on July 22, 2024        Systemic lupus erythematosus     Continue home hydroxychloroquine dose        GI + DVT Prophylaxis     Home oral PPI  Bilateral Sequential Compression Devices               This Dictation was Transcribed using a Nuance Dragon Voice Recognition System Device (with Compatible Computer + Software) and as such may contain Grammatical Errors and Unintentional Typing Misprints.      I spent 45 minutes in the professional and overall care of this patient.      Ryan Tao MD

## 2024-08-27 NOTE — PROGRESS NOTES
08/27/24 1447   [facility] Disability Status   Are you deaf or do you have serious difficulty hearing? N   Are you blind or do you have serious difficulty seeing, even when wearing glasses? N   Because of a physical, mental, or emotional condition, do you have serious difficulty concentrating, remembering, or making decisions? (5 years old or older) N   Do you have serious difficulty walking or climbing stairs? Y   Do you have serious difficulty dressing or bathing? N   Because of a physical, mental, or emotional condition, do you have serious difficulty doing errands alone such as visiting the doctor? Y

## 2024-08-27 NOTE — PROGRESS NOTES
Occupational Therapy    Evaluation    Patient Name: Ronna Beckham  MRN: [id number]  Today's Date: 8/27/2024  Time Calculation  Start Time: 0734  Stop Time: 0749  Time Calculation (min): 15 min    Assessment  IP OT Assessment  OT Assessment: 64 y/o female presenting with LE edema, numbness and SOB. On eval, she requires increased assist for xfers, mobility and self care d/t decreased strength, balance, activity tolerance. Skilled OT services are required to maximize safety/IND prior to DC.  Prognosis: Good  Barriers to Discharge: Other (Comment) (full flight of stairs to bed/bath)  Evaluation/Treatment Tolerance: Patient limited by fatigue  Medical Staff Made Aware: Yes  End of Session Communication: Bedside nurse  End of Session Patient Position: Up in chair, Alarm on  Plan:  Treatment Interventions: ADL retraining, Functional transfer training, UE strengthening/ROM, Endurance training, Cognitive reorientation, Patient/family training, Equipment evaluation/education, Neuromuscular reeducation, Compensatory technique education  OT Frequency: 3 times per week  OT Discharge Recommendations: Low intensity level of continued care  Equipment Recommended upon Discharge:  (TBD)  OT Recommended Transfer Status: Assist of 1  OT - OK to Discharge: Yes    Subjective   Current Problem:  1. Acute on chronic hypoxic respiratory failure (Multi)        2. Shortness of breath        3. Congestive heart failure, unspecified HF chronicity, unspecified heart failure type (Multi)          General:  General  Reason for Referral: Decreased ADLs, LE edema, SOB  Referred By: Dr. Tao  Past Medical History Relevant to Rehab: anxiety, depression, GERD, lupus, seizure, pulmonary HTN wears 4L at baseline  Family/Caregiver Present: No  Prior to Session Communication: Bedside nurse  Patient Position Received: Bed, 3 rail up, Alarm on  Preferred Learning Style: visual, verbal  General Comment: Cleared by nsg, pt met in supine, agreeable to OT  "session  Precautions:  Hearing/Visual Limitations: wears glasses  Medical Precautions: Fall precautions, Oxygen therapy device and L/min, Seizure precautions (on 3-4L O2)  Vital Signs:  Heart Rate: 107  Heart Rate Source: Monitor  Pain:  Pain Assessment  Pain Assessment: 0-10  0-10 (Numeric) Pain Score: 0 - No pain    Objective   Cognition:  Overall Cognitive Status: Within Functional Limits  Orientation Level: Oriented X4  Cognition Comments: decreased insight into functional status at times       Home Living:  Type of Home: House  Lives With: Grandchildren (lives with granddaughter)  Home Adaptive Equipment: Wheelchair-manual, Scooter  Home Layout: Two level, Stairs to alternate level with rails  Alternate Level Stairs-Rails:  (unilateral)  Alternate Level Stairs-Number of Steps: full flight to 2nd story where pt spends most of her time  Home Access: Stairs to enter without rails  Entrance Stairs-Rails: None  Entrance Stairs-Number of Steps: 2+1  Bathroom Accessibility: full bath on 2nd story  Home Living Comments: patient has full flight of stairs to her primary living space, reports she has a \"fridge in her room,\" does not go up/down stairs often.     Prior Function:  Level of [place]: Independent with ADLs and functional transfers, Needs assistance with homemaking  Receives Help From: Family (granddaughter and daughter)  ADL Assistance: Independent  Homemaking Assistance: Needs assistance (granddaughter manages most IADLS)  Ambulatory Assistance: Independent (pt reports being able to do short distances within her home, uses a scooter for prolonged community distances, crawls up/down stairs on \"her hands and feet\")  Vocational: Unemployed  Leisure: watches TV  Hand Dominance: Right  Prior Function Comments: reports 2 syncopal episodes in the last 6 months    ADL:  Eating Assistance: Stand by  Eating Deficit: Setup  Grooming Assistance: Stand by  Grooming Deficit: Setup  Bathing Assistance: Stand by  Bathing " Deficit: Supervision/safety (d/t decreased activity tolerance)  UE Dressing Assistance: Stand by  UE Dressing Deficit: Setup  LE Dressing Assistance: Stand by  LE Dressing Deficit: Don/doff L sock, Don/doff R sock, Setup (figure 4 in long sitting in supine)  Toileting Assistance with Device: Stand by  Toileting Deficit: Supervison/safety  ADL Comments: requires supervision d/t decreased activity tolerance and mild balance impairments    Activity Tolerance:  Endurance: Decreased tolerance for upright activites  Activity Tolerance Comments: + ALAMO with mobility  Rate of Perceived Exertion (RPE): 3/10    Bed Mobility/Transfers:   Bed Mobility  Bed Mobility: Yes  Bed Mobility 1  Bed Mobility 1: Supine to sitting  Level of Assistance 1: Close supervision  Bed Mobility Comments 1: HOB slightly elevated    Transfers  Transfer: Yes  Transfer 1  Technique 1: Sit to stand, Stand to sit  Transfer Level of Assistance 1: Close supervision  Trials/Comments 1: STS to/from EOB and bedside chair, no device, supervision for safety, pacing, and activity tolerance    Functional Mobility:  Functional Mobility  Functional Mobility Performed: Yes  Functional Mobility 1  Surface 1: Level tile  Device 1: No device  Assistance 1: Close supervision  Comments 1: completes household distance mobility, no device, 3L donned, supervision for safety, stability and activity tolerance.  no overt LOB although she fatigues quickly    Sitting Balance:  Static Sitting Balance  Static Sitting-Balance Support: Feet supported, Bilateral upper extremity supported  Static Sitting-Level of Assistance: Distant supervision    Vision: Vision - Basic Assessment  Current Vision: Wears glasses all the time  Sensation:  Light Touch: Partial deficits in the RLE, Partial deficits in the LLE (chronic numbness/tingling in LEs)  Strength:  Strength Comments: BUEs at least >/= 3/5, observed functionally  Coordination:  Movements are Fluid and Coordinated: Yes   Hand  Function:  Hand Function  Gross Grasp: Functional  Coordination: Functional  Extremities: RUE   RUE : Within Functional Limits and LUE   LUE: Within Functional Limits    Outcome Measures: [facility] Daily Activity  Putting on and taking off regular lower body clothing: A little  Bathing (including washing, rinsing, drying): A little  Putting on and taking off regular upper body clothing: A little  Toileting, which includes using toilet, bedpan or urinal: A little  Taking care of personal grooming such as brushing teeth: A little  Eating Meals: A little  Daily Activity - Total Score: 18      Education Documentation  Body Mechanics, taught by Lars Chatterjee OT at 8/27/2024  8:41 AM.  Learner: Patient  Readiness: Acceptance  Method: Explanation  Response: Needs Reinforcement    Precautions, taught by Lars Chatterjee OT at 8/27/2024  8:41 AM.  Learner: Patient  Readiness: Acceptance  Method: Explanation  Response: Needs Reinforcement    ADL Training, taught by Lars Chatterjee OT at 8/27/2024  8:41 AM.  Learner: Patient  Readiness: Acceptance  Method: Explanation  Response: Needs Reinforcement    Education Comments  No comments found.      Goals:   Encounter Problems       Encounter Problems (Active)       OT Goals       ADLS (Progressing)       Start:  08/27/24    Expected End:  09/03/24       Patient will complete ADLs with MOD I using AE/compensatory techniques as needed.          Functional Transfers (Progressing)       Start:  08/27/24    Expected End:  09/03/24       Patient will perform functional transfers including but not limited to: bed, chair, toilet with MOD I using LRD.          Activity Tolerance (Progressing)       Start:  08/27/24    Expected End:  09/03/24       Patient will complete functional activity for >/= 15 minutes to improve overall strength/activity tolerance in preparation for ADLS.

## 2024-08-27 NOTE — CONSULTS
Consults  History Of Present Illness:    Ronna Beckham is a 63 y.o. female presenting with shortness of breath.  The patient is a unfortunate upper middle-aged white female who does have severe pulmonary hypertension with COPD requiring continuous nasal oxygen.  The patient is being followed by the pulmonary service at [facility].  The patient has been on chronic Veletri infusions for the pulmonary hypertension.  The patient was admitted to [facility] on 1/8/2024 with shortness of breath slight troponin elevation.  At that time the patient had evidently been utilizing cocaine.  Her EKG showed T wave inversions in the anterior precordial leads possibly related to her pulmonary hypertension and right ventricular hypertrophy with strain versus demand ischemia.  The patient underwent a cardiac catheterization on 1/8/2024 which demonstrated the following results consistent with nonobstructive coronary artery disease:      LMCA-large-caliber vessel that bifurcates in the LAD and left circumflex arteries.  It contains luminal irregularities.     LAD-also large-caliber vessel gives rise to a moderate-sized first diagonal and subsequent small diagonal branches as well as a recurrent apical branch.  There is also an extensive septal network.  The first diagonal has a 40% stenosis in the proximal portion.  The remainder of the LAD system has mild luminal irregularities.     Circumflex-nondominant vessel giving rise to 2 obtuse marginal branches and a posterolateral branch.  There are mild luminal irregularities throughout the system.     RCA-dominant vessel giving rise to the right posterior descending artery and an extensive posterolateral network.  There are mild to moderate luminal irregularities throughout the system.     LVEDP 8 mmHg     The patient's last echocardiogram performed at [facility] 4/24/2024 showed a LV ejection fraction of 60-65% but severe right ventricular chamber enlargement  and a severely reduced right ventricular systolic function.  The agitated saline study was positive for a small intracardiac shunt.  The right atrium is also severely enlarged and there was a small to moderate pericardial effusion.  There was severe tricuspid valve regurgitation and severe pulmonary hypertension with an estimated PA systolic pressure 88 mmHg.     The patient has been seeking some assistance with pain management has been taking Gummies with THC for pain relief.  On 7/25/2024 the patient developed sudden increase in her shortness of breath that did not improve EMS was called.  Patient was identified to have an SVT of greater than 200/min and she was given 6 mg of adenosine with no response and then 12 mg of adenosine with conversion to sinus tachycardia at 100/min with resolution of her symptoms.  Her evaluation in the emergency room included a CBC with hematocrit of 28.5 platelet count 103,000.  Electrolyte panel was notable for magnesium level of only 0.4.  TSH was normal.  The initial high-sensitivity troponin was 19.  Chest x-ray showed pulmonary vascular congestion interstitial edema no pleural effusion or consolidation.  Patient's EKG  showed sinus rhythm bordering on sinus tachycardia with increased anterior forces and anterior ST-T abnormality consistent with right ventricular hypertrophy with a strain pattern.  Repeat lab work  included a CBC hemoglobin 8.1 hematocrit 27.5.  Comprehensive metabolic panel unremarkable creatinine 0.9 potassium of 4.0 calcium is low at 6.6 and was given a supplement for lower value 5.7 earlier.  Ionized calcium is low at 0.83.  Magnesium values increased from 0.4-1.4.  Patient's serum iron was less than 20.  The patient was started on low-dose Cardizem CD1 180 mg daily for prophylaxis of SVT and both the hypokalemia and hypomagnesemia were corrected by time of discharge.    Patient is presently readmitted through the [facility] emergency room 8/26/2024 with  increasing shortness of breath and worsening lower extremity edema.  The patient had been wearing continuous nasal oxygen at 4 L/min due to her severe pulmonary hypertension.  She may have missed 1 week of Lasix therapy prior to admission.  Evaluation in the emergency room included a chest x-ray demonstrating a left retrocardiac density possibly related to overlapping normal structures although a left basilar pneumonia could not be excluded.  Arterial blood gases included a pH of 7.47 pCO2 29 pO2 of 68 O2 saturation 94%.  CBC was notable for hematocrit 30.9 platelet count 102,000.  Electrolyte panel relatively unremarkable with a serum sodium of 132.  Glycohemoglobin was 5.4%.  Patient's EKG showed sinus tachycardia at 102/min with right bundle branch block left anterior hemiblock.  The patient's proBNP was elevated at 6865.  The patient has been seen by neurology due to complaints of numbness and burning in the hands and feet and is being assessed for various causes of polyneuropathy with an EMG various labs and has been started on gabapentin and topiramate.  The patient has been given 1 dose of IV Lasix yesterday and again today.       Last Recorded Vitals:  Vitals:    08/27/24 0321 08/27/24 0734 08/27/24 0800 08/27/24 0804   BP: 102/66   106/66   BP Location: Right arm   Left arm   Patient Position: Lying   Lying   Pulse: 88 107  102   Resp: 16   18   Temp: 36.9 °C (98.4 °F)   36.4 °C (97.5 °F)   TempSrc: Oral   Oral   SpO2: 91%  96% 97%   Weight:       Height:           Last Labs:  CBC - 8/27/2024:  5:00 AM  5.1 8.7 117    30.0      CMP - 8/27/2024:  5:00 AM  8.1 6.3 11 --- 0.8   3.1 3.5 6 91      PTT - 9/7/2023:  5:28 PM  1.3   13.7 31.9     Troponin I, High Sensitivity   Date/Time Value Ref Range Status   04/23/2024 12:27 PM 26 0 - 34 ng/L Final   04/23/2024 10:44 AM 31 0 - 34 ng/L Final     BNP   Date/Time Value Ref Range Status   05/31/2024 04:30  (H) 0 - 99 pg/mL Final   04/23/2024 10:44  (H) 0  "- 99 pg/mL Final     Hemoglobin A1C   Date/Time Value Ref Range Status   08/26/2024 09:34 AM 5.4 see below % Final   08/19/2019 01:50 PM 5.6 4.0 - 6.0 % Final     Comment:     Hemoglobin A1C levels are related to mean blood glucose during the   preceding 2-3 months. The relationship table below may be used as a   general guide. Each 1% increase in HGB A1C is a reflection of an   increase in mean glucose of approximately 30 mg/dl.   Reference: Diabetes [employer], volume 29, supplement 1 Jan. 2006                        HGB A1C ................. Approx. Mean Glucose   _______________________________________________   6%   ...............................  120 mg/dl   7%   ...............................  150 mg/dl   8%   ...............................  180 mg/dl   9%   ...............................  210 mg/dl   10%  ...............................  240 mg/dl  Performed at [address] [address]       LDL Calculated   Date/Time Value Ref Range Status   07/23/2019 10:40 AM  65 - 130 MG/DL Final    Unable to report calculated LDL due to increased triglycerides. Direct     Comment:      LDL to be run.     VLDL   Date/Time Value Ref Range Status   09/21/2020 09:58 PM 52 (H) 0 - 40 mg/dL Final      Last I/O:  I/O last 3 completed shifts:  In: 340 (4.6 mL/kg) [P.O.:240; IV Piggyback:100]  Out: 1400 (19.1 mL/kg) [Urine:1400 (0.5 mL/kg/hr)]  Weight: 73.5 kg     Past Cardiology Tests (Last 3 Years):  EKG:  ECG 12 lead 08/26/2024 (Preliminary)      ECG 12 lead 07/08/2024      ECG 12 lead 07/05/2024      Electrocardiogram, 12-lead PRN ACS symptoms 05/31/2024      ECG 12 lead 05/30/2024      ECG 12 lead 05/30/2024      ECG 12 Lead 01/07/2024    Echo:  Transthoracic Echo (TTE) Limited 04/24/2024    Ejection Fractions:  No results found for: \"EF\"  Cath:  Cardiac catheterization - coronary 01/08/2024    Stress Test:  No results found for this or any previous visit from the past 1095 days.    Cardiac Imaging:  No " results found for this or any previous visit from the past 1095 days.      Past Medical History:  She has a past medical history of Anxiety, Depression, GERD (gastroesophageal reflux disease), Lupus (Multi), Personal history of other specified conditions (09/21/2020), and Pulmonary hypertension (Multi).    Past Surgical History:  She has a past surgical history that includes Other surgical history (09/21/2020); Other surgical history (09/21/2020); Other surgical history (09/21/2020); Other surgical history (09/21/2020); Other surgical history (09/21/2020); Hysterectomy; Cholecystectomy; and Cardiac catheterization (N/A, 1/8/2024).      Social History:  She reports that she has quit smoking. Her smoking use included cigarettes. She has quit using smokeless tobacco. She reports that she does not currently use alcohol. She reports that she does not currently use drugs after having used the following drugs: Cocaine and Other.    Family History:  Family History   Problem Relation Name Age of Onset    No Known Problems Mother      No Known Problems Father          Allergies:  Levetiracetam    Inpatient Medications:  Scheduled medications   Medication Dose Route Frequency    aspirin  81 mg oral Daily    calcium carbonate-vitamin D3  2 tablet oral BID    cefTRIAXone  1 g intravenous q24h    cholestyramine  1 packet oral Daily    dilTIAZem CD  180 mg oral Daily    doxycycline  100 mg intravenous q12h    folic acid  1 mg oral Daily    furosemide  40 mg intravenous Once    [Held by provider] furosemide  40 mg oral Daily    hydroxychloroquine  200 mg oral BID    macitentan  10 mg oral Daily    oxygen   inhalation Continuous - Inhalation    pantoprazole  40 mg oral Daily before breakfast    potassium chloride CR  20 mEq oral BID    venlafaxine XR  150 mg oral Daily     PRN medications   Medication    acetaminophen    Or    acetaminophen    Or    acetaminophen    benzocaine-menthol    dextromethorphan-guaifenesin    gabapentin     guaiFENesin    loperamide    melatonin    ondansetron ODT    Or    ondansetron    polyethylene glycol     Continuous Medications   Medication Dose Last Rate    epoprostenol  1.5 mg       Outpatient Medications:  Current Outpatient Medications   Medication Instructions    aspirin 81 mg, oral, Daily    calcium carbonate (TUMS) 500 mg, oral, 3 times daily, In between meals    calcium carbonate-vitamin D3 500 mg-5 mcg (200 unit) tablet TAKE 2 TABLETS BY MOUTH TWO TIMES A DAY    cholestyramine (Questran) 4 gram packet 4 g, oral, Daily    dilTIAZem CD (CARDIZEM CD) 180 mg, oral, Daily    epoprostenol (VELETRI) 1,500 mcg, central venous line infusion, Continuous, Reconstitute contents of vial with 5 ml diluent and mix cassette as directed. Infuse continuously per physician orders. Allow for priming volume. Dose range: 1-150 ng/kg/min.    ergocalciferol (VITAMIN D-2) 50,000 Units, oral, Once Weekly    folic acid (FOLVITE) 1 mg, oral, Daily, Needs to resume    furosemide (Lasix) 40 mg tablet TAKE 1 TABLET BY MOUTH ONE TIME DAILY    gabapentin (NEURONTIN) 800 mg, oral, 3 times daily    hydroxychloroquine (PLAQUENIL) 200 mg, oral, 2 times daily    loperamide (IMODIUM A-D) 2 mg, oral, 4 times daily PRN    magnesium glycinate 100 mg, oral, 2 times daily    melatonin 5 mg, oral, Nightly    omeprazole (PRILOSEC) 40 mg, oral, Daily before breakfast, Do not crush or chew.    Opsumit 10 mg tablet tablet TAKE 1 TABLET BY MOUTH DAILY. DO NOT HANDLE IF PREGNANT. DO NOT SPLIT, CRUSH, OR CHEW. REVIEW MEDICATION GUIDE.    potassium chloride CR (Klor-Con M20) 20 mEq ER tablet TAKE 1 TABLET BY MOUTH TWO TIMES A DAY WITH MEALS    prochlorperazine (COMPAZINE) 5 mg, oral, Every 6 hours PRN    topiramate (Topamax) 25 mg tablet Week 1 25mg at bedtime, week 2 25mg BID, week 3 25mg qAM, 50mg at bedtime, week 4 50 mg BID, week 5 50mg qAM 75mg at bedtime, week 6 75 mg BID, week 7 75mg qam 100mg at bedtime, week 8 100mg BID    venlafaxine XR  (EFFEXOR-XR) 150 mg, oral, Daily, Do not crush or chew.       Physical Exam:  The patient is a slightly overweight upper middle-aged white female lying in bed not short of breath a low-flow O2 nasal cannula  JVP not elevated.  Carotid and pulses 2+  Chest diminished air movement breath sounds are decreased but clear no wheezes rales  Cardiac rhythm is regular slightly tachycardic normal S1-S2 no gallop murmur rub  Abdomen soft and benign no focal tenderness  There is mild bilateral left greater than right peripheral edema.     Assessment/Plan     8/27: This patient is a 63-year-old white female with a history of smoking, substance abuse including cocaine methamphetamines and alcohol, severe pulmonary hypertension with severe right-sided chamber dilatation and severe tricuspid valve regurgitation, low-grade coronary artery disease by cardiac cath in 1/2024. The patient's last echocardiogram performed at [facility] 4/24/2024 showed a LV ejection fraction of 60-65% but severe right ventricular chamber enlargement and a severely reduced right ventricular systolic function. The agitated saline study was positive for a small intracardiac shunt. The right atrium is also severely enlarged and there was a small to moderate pericardial effusion. There was severe tricuspid valve regurgitation and severe pulmonary hypertension with an estimated PA systolic pressure 88 mmHg.  She was admitted to [facility] 7/5/2024 with an episode of SVT that converted utilizing IV adenosine and she subsequently was started on Cardizem  mg daily.  At that time the patient had significant electrolyte abnormalities including hypomagnesemia and hypokalemia.  The patient had been on Lasix 40 mg daily for peripheral edema.  Patient currently admitted with a slight increase in shortness of breath peripheral edema most likely due to a delay in receiving her refill for Lasix 40 mg daily.  Agree with the empiric IV Lasix for the  first 48 to 72 hours then converting back to oral Lasix.  She currently has a borderline sinus tachycardia no recurrent SVT on the Cardizem CD1 100 mg daily which will be kept unchanged.  Patient currently on empiric IV Rocephin and vancomycin for?  Retrocardiac left lower lobe pneumonia which at this point is still somewhat's tentative.  The patient will self administer her Veletri for her overall previously known severe pulmonary hypertension.    Peripheral IV 20 G Left Antecubital (Active)   Site Assessment Clean;Dry;Intact 08/27/24 0836   Dressing Status Dry;Clean 08/27/24 0836   Number of days: 1       Code Status:  Full Code    I spent 30 minutes in the professional and overall care of this patient.        Anderson Niño MD

## 2024-08-27 NOTE — PROGRESS NOTES
Ronna Beckham is a 63 y.o. female on day 1 of admission presenting with Acute on chronic hypoxic respiratory failure (Multi).      Subjective   Admitted last night with acute on chronic respiratory failure and CHF. Pt tells me stopped her Lasix because PCP would not refill. She received IV Lasix on admit and diuresed with negative fluid balance over 1 liter. Breathing and edema have improved.        Objective     Last Recorded Vitals  /66 (BP Location: Left arm, Patient Position: Lying)   Pulse 102   Temp 36.4 °C (97.5 °F) (Oral)   Resp 18   Wt 73.5 kg (162 lb)   SpO2 97%   Intake/Output last 3 Shifts:    Intake/Output Summary (Last 24 hours) at 8/27/2024 0843  Last data filed at 8/27/2024 0637  Gross per 24 hour   Intake 340 ml   Output 1400 ml   Net -1060 ml       Admission Weight  Weight: 73.5 kg (162 lb) (08/26/24 1628)    Daily Weight  08/26/24 : 73.5 kg (162 lb)    Image Results      Physical Exam  HENT:      Head: Normocephalic and atraumatic.      Nose: Nose normal.      Mouth/Throat:      Mouth: Mucous membranes are moist.      Pharynx: Oropharynx is clear.   Eyes:      Extraocular Movements: Extraocular movements intact.      Pupils: Pupils are equal, round, and reactive to light.   Cardiovascular:      Rate and Rhythm: Normal rate and regular rhythm.      Pulses: Normal pulses.   Pulmonary:      Effort: Pulmonary effort is normal.      Breath sounds: Normal breath sounds.      Comments: Faint bibasilar rales  Abdominal:      General: Abdomen is flat. Bowel sounds are normal.      Palpations: Abdomen is soft.   Musculoskeletal:         General: Normal range of motion.   Skin:     General: Skin is warm and dry.      Capillary Refill: Capillary refill takes less than 2 seconds.   Neurological:      General: No focal deficit present.      Mental Status: She is oriented to person, place, and time.   Psychiatric:         Mood and Affect: Mood normal.         Relevant Results  ECG 12 lead    Result  Date: 8/27/2024  Sinus tachycardia Right bundle branch block Left posterior fascicular block  Bifascicular block  Cannot rule out Inferior infarct (cited on or before 08-JUL-2024) Abnormal ECG When compared with ECG of 08-JUL-2024 11:38, No significant change was found    XR chest 1 view    Result Date: 8/26/2024  Interpreted By:  George Miranda, STUDY: XR CHEST 1 VIEW;  8/26/2024 5:25 pm   INDICATION: Signs/Symptoms:sob.   COMPARISON: May 30, 2024 chest CT and July 8, 2024 chest radiograph.   ACCESSION NUMBER(S): [id number]   ORDERING CLINICIAN: SHEN ALBA   FINDINGS: AP radiograph of the chest was provided.   Apical lordotic position. Similar position left IJ catheter. The tip is at the cavoatrial junction region.   CARDIOMEDIASTINAL SILHOUETTE: Similar prominence of the cardiomediastinal silhouette.   LUNGS: Marginated left retrocardiac density is indeterminate potentially related to overlapping body wall. Left basilar pneumonia not excluded. Otherwise similar expansion without evident  sadiq edema or effusion.   ABDOMEN: No remarkable upper abdominal findings.   BONES: No acute osseous changes.       1.  Left retrocardiac density may be due to overlapping normal structures although left basilar pneumonia not excluded.       MACRO: None   Signed by: George Miranda 8/26/2024 5:35 PM Dictation workstation:   [id number]    Results for orders placed or performed during the hospital encounter of 08/26/24 (from the past 24 hour(s))   CBC and Auto Differential   Result Value Ref Range    WBC 5.3 4.4 - 11.3 x10*3/uL    nRBC 0.0 0.0 - 0.0 /100 WBCs    RBC 3.89 (L) 4.00 - 5.20 x10*6/uL    Hemoglobin 9.2 (L) 12.0 - 16.0 g/dL    Hematocrit 30.9 (L) 36.0 - 46.0 %    MCV 79 (L) 80 - 100 fL    MCH 23.7 (L) 26.0 - 34.0 pg    MCHC 29.8 (L) 32.0 - 36.0 g/dL    RDW 17.8 (H) 11.5 - 14.5 %    Platelets 102 (L) 150 - 450 x10*3/uL    Neutrophils % 82.0 40.0 - 80.0 %    Immature Granulocytes %, Automated 0.6 0.0 - 0.9 %     Lymphocytes % 12.0 13.0 - 44.0 %    Monocytes % 5.4 2.0 - 10.0 %    Eosinophils % 0.0 0.0 - 6.0 %    Basophils % 0.0 0.0 - 2.0 %    Neutrophils Absolute 4.37 1.20 - 7.70 x10*3/uL    Immature Granulocytes Absolute, Automated 0.03 0.00 - 0.70 x10*3/uL    Lymphocytes Absolute 0.64 (L) 1.20 - 4.80 x10*3/uL    Monocytes Absolute 0.29 0.10 - 1.00 x10*3/uL    Eosinophils Absolute 0.00 0.00 - 0.70 x10*3/uL    Basophils Absolute 0.00 0.00 - 0.10 x10*3/uL   Comprehensive metabolic panel   Result Value Ref Range    Glucose 86 65 - 99 mg/dL    Sodium 135 133 - 145 mmol/L    Potassium 4.3 3.4 - 5.1 mmol/L    Chloride 103 97 - 107 mmol/L    Bicarbonate 18 (L) 24 - 31 mmol/L    Urea Nitrogen 16 8 - 25 mg/dL    Creatinine 0.90 0.40 - 1.60 mg/dL    eGFR 73 >60 mL/min/1.73m*2    Calcium 7.6 (L) 8.5 - 10.4 mg/dL    Albumin 3.5 3.5 - 5.0 g/dL    Alkaline Phosphatase 94 35 - 125 U/L    Total Protein 6.5 5.9 - 7.9 g/dL    AST 9 5 - 40 U/L    Bilirubin, Total 0.8 0.1 - 1.2 mg/dL    ALT 8 5 - 40 U/L    Anion Gap 14 <=19 mmol/L   NT Pro-BNP   Result Value Ref Range    PROBNP 6,865 (H) 0 - 226 pg/mL   Serial Troponin, Initial ([facility])   Result Value Ref Range    Troponin T, High Sensitivity 20 (HH) <=14 ng/L   BLOOD GAS ARTERIAL FULL PANEL   Result Value Ref Range    POCT pH, Arterial 7.47 (H) 7.38 - 7.42 pH    POCT pCO2, Arterial 29 (L) 38 - 42 mm Hg    POCT pO2, Arterial 68 (L) 85 - 95 mm Hg    POCT SO2, Arterial 94 94 - 100 %    POCT Oxy Hemoglobin, Arterial 91.5 (L) 94.0 - 98.0 %    POCT Hematocrit Calculated, Arterial 28.0 (L) 36.0 - 46.0 %    POCT Sodium, Arterial 135 (L) 136 - 145 mmol/L    POCT Potassium, Arterial 4.3 3.5 - 5.3 mmol/L    POCT Chloride, Arterial 105 98 - 107 mmol/L    POCT Ionized Calcium, Arterial 1.03 (L) 1.10 - 1.33 mmol/L    POCT Glucose, Arterial 90 74 - 99 mg/dL    POCT Lactate, Arterial 0.7 0.4 - 2.0 mmol/L    POCT Base Excess, Arterial -1.9 -2.0 - 3.0 mmol/L    POCT HCO3 Calculated, Arterial 21.1 (L) 22.0 - 26.0  mmol/L    POCT Hemoglobin, Arterial 9.4 (L) 12.0 - 16.0 g/dL    POCT Anion Gap, Arterial 13 10 - 25 mmo/L    Patient Temperature 37.0 degrees Celsius    FiO2 36 %    Apparatus CANNULA     Site of Arterial Puncture Radial Right     Rivera's Test Positive    ECG 12 lead   Result Value Ref Range    Ventricular Rate 102 BPM    Atrial Rate 102 BPM    ID Interval 190 ms    QRS Duration 112 ms    QT Interval 380 ms    QTC Calculation(Bazett) 495 ms    R Axis 148 degrees    T Axis -10 degrees    QRS Count 16 beats    Q Onset 207 ms    P Onset 112 ms    P Offset 171 ms    T Offset 397 ms    QTC Fredericia 453 ms   Urinalysis with Reflex Microscopic   Result Value Ref Range    Color, Urine Colorless (N) Light-Yellow, Yellow, Dark-Yellow    Appearance, Urine Clear Clear    Specific Gravity, Urine 1.005 1.005 - 1.035    pH, Urine 5.0 5.0, 5.5, 6.0, 6.5, 7.0, 7.5, 8.0    Protein, Urine NEGATIVE NEGATIVE, 10 (TRACE), 20 (TRACE) mg/dL    Glucose, Urine Normal Normal mg/dL    Blood, Urine NEGATIVE NEGATIVE    Ketones, Urine NEGATIVE NEGATIVE mg/dL    Bilirubin, Urine NEGATIVE NEGATIVE    Urobilinogen, Urine Normal Normal mg/dL    Nitrite, Urine NEGATIVE NEGATIVE    Leukocyte Esterase, Urine NEGATIVE NEGATIVE   Serial Troponin, 6 Hour ([facility])   Result Value Ref Range    Troponin T, High Sensitivity 20 (HH) <=14 ng/L   Comprehensive metabolic panel   Result Value Ref Range    Glucose 100 (H) 65 - 99 mg/dL    Sodium 137 133 - 145 mmol/L    Potassium 3.7 3.4 - 5.1 mmol/L    Chloride 101 97 - 107 mmol/L    Bicarbonate 24 24 - 31 mmol/L    Urea Nitrogen      Creatinine 0.90 0.40 - 1.60 mg/dL    eGFR 73 >60 mL/min/1.73m*2    Calcium 7.9 (L) 8.5 - 10.4 mg/dL    Albumin 3.3 (L) 3.5 - 5.0 g/dL    Alkaline Phosphatase 95 35 - 125 U/L    Total Protein 6.0 5.9 - 7.9 g/dL    AST 8 5 - 40 U/L    Bilirubin, Total 0.8 0.1 - 1.2 mg/dL    ALT 6 5 - 40 U/L    Anion Gap 12 <=19 mmol/L   CBC and Auto Differential   Result Value Ref Range    WBC 5.1 4.4 -  11.3 x10*3/uL    nRBC 0.0 0.0 - 0.0 /100 WBCs    RBC 3.75 (L) 4.00 - 5.20 x10*6/uL    Hemoglobin 8.7 (L) 12.0 - 16.0 g/dL    Hematocrit 30.0 (L) 36.0 - 46.0 %    MCV 80 80 - 100 fL    MCH 23.2 (L) 26.0 - 34.0 pg    MCHC 29.0 (L) 32.0 - 36.0 g/dL    RDW 17.9 (H) 11.5 - 14.5 %    Platelets 117 (L) 150 - 450 x10*3/uL    Neutrophils % 80.4 40.0 - 80.0 %    Immature Granulocytes %, Automated 0.4 0.0 - 0.9 %    Lymphocytes % 11.2 13.0 - 44.0 %    Monocytes % 8.0 2.0 - 10.0 %    Eosinophils % 0.0 0.0 - 6.0 %    Basophils % 0.0 0.0 - 2.0 %    Neutrophils Absolute 4.11 1.20 - 7.70 x10*3/uL    Immature Granulocytes Absolute, Automated 0.02 0.00 - 0.70 x10*3/uL    Lymphocytes Absolute 0.57 (L) 1.20 - 4.80 x10*3/uL    Monocytes Absolute 0.41 0.10 - 1.00 x10*3/uL    Eosinophils Absolute 0.00 0.00 - 0.70 x10*3/uL    Basophils Absolute 0.00 0.00 - 0.10 x10*3/uL   Magnesium   Result Value Ref Range    Magnesium 0.80 (L) 1.60 - 3.10 mg/dL   Calcium, ionized   Result Value Ref Range    POCT Calcium, Ionized 1.04 (L) 1.1 - 1.33 mmol/L       Assessment/Plan   Acute on Chronic Respiratory Failure  -Likely secondary to CHFE  -Home oxygen 4 liters, currently on 4 liters    Decompensated Heart Failure  -Last ECHO 4/23: EF 60-65%, severe reduced RV systolic fx   -Continue IV Lasix  -Fluid restriction  -Cardiology to see    Severe Pulmonary Hypertension  -Follows with   -Pt on continuous Veletri infusion, continue   -Pulmonology to see    Possible Pneumonia  -Continue empiric Abx    Anemia  -Secondary to chronic medical disease  -Will check iron stores  -Continue folic acid  -Monitor     SLE  -Continue Plaquenil    DVT Prophylaxis  -Low risk, SCD's    Dispo  To home on discharge with no needs.     Anisha Jasmine, APRN-CNP

## 2024-08-28 ENCOUNTER — APPOINTMENT (OUTPATIENT)
Dept: RADIOLOGY | Facility: HOSPITAL | Age: 64
End: 2024-08-28
Payer: COMMERCIAL

## 2024-08-28 LAB
ANA PATTERN: ABNORMAL
ANA SER QL HEP2 SUBST: POSITIVE
ANA TITR SER IF: ABNORMAL {TITER}
ANION GAP SERPL CALC-SCNC: 11 MMOL/L
ATRIAL RATE: 102 BPM
BUN SERPL-MCNC: 14 MG/DL (ref 8–25)
C DIF TOX TCDA+TCDB STL QL NAA+PROBE: NOT DETECTED
CALCIUM SERPL-MCNC: 7.2 MG/DL (ref 8.5–10.4)
CHLORIDE SERPL-SCNC: 101 MMOL/L (ref 97–107)
CO2 SERPL-SCNC: 23 MMOL/L (ref 24–31)
CREAT SERPL-MCNC: 0.9 MG/DL (ref 0.4–1.6)
EGFRCR SERPLBLD CKD-EPI 2021: 73 ML/MIN/1.73M*2
ERYTHROCYTE [DISTWIDTH] IN BLOOD BY AUTOMATED COUNT: 17.6 % (ref 11.5–14.5)
GLUCOSE SERPL-MCNC: 104 MG/DL (ref 65–99)
HCT VFR BLD AUTO: 29.7 % (ref 36–46)
HGB BLD-MCNC: 8.8 G/DL (ref 12–16)
MCH RBC QN AUTO: 23.4 PG (ref 26–34)
MCHC RBC AUTO-ENTMCNC: 29.6 G/DL (ref 32–36)
MCV RBC AUTO: 79 FL (ref 80–100)
NRBC BLD-RTO: 0 /100 WBCS (ref 0–0)
P OFFSET: 171 MS
P ONSET: 112 MS
PLATELET # BLD AUTO: 112 X10*3/UL (ref 150–450)
POTASSIUM SERPL-SCNC: 3.6 MMOL/L (ref 3.4–5.1)
PR INTERVAL: 190 MS
Q ONSET: 207 MS
QRS COUNT: 16 BEATS
QRS DURATION: 112 MS
QT INTERVAL: 380 MS
QTC CALCULATION(BAZETT): 495 MS
QTC FREDERICIA: 453 MS
R AXIS: 148 DEGREES
RBC # BLD AUTO: 3.76 X10*6/UL (ref 4–5.2)
SODIUM SERPL-SCNC: 135 MMOL/L (ref 133–145)
T AXIS: -10 DEGREES
T OFFSET: 397 MS
VENTRICULAR RATE: 102 BPM
WBC # BLD AUTO: 4.9 X10*3/UL (ref 4.4–11.3)

## 2024-08-28 PROCEDURE — 87493 C DIFF AMPLIFIED PROBE: CPT | Performed by: NURSE PRACTITIONER

## 2024-08-28 PROCEDURE — 2500000004 HC RX 250 GENERAL PHARMACY W/ HCPCS (ALT 636 FOR OP/ED): Performed by: INTERNAL MEDICINE

## 2024-08-28 PROCEDURE — 71046 X-RAY EXAM CHEST 2 VIEWS: CPT

## 2024-08-28 PROCEDURE — 2550000001 HC RX 255 CONTRASTS: Performed by: INTERNAL MEDICINE

## 2024-08-28 PROCEDURE — 74177 CT ABD & PELVIS W/CONTRAST: CPT

## 2024-08-28 PROCEDURE — 74177 CT ABD & PELVIS W/CONTRAST: CPT | Performed by: RADIOLOGY

## 2024-08-28 PROCEDURE — 85027 COMPLETE CBC AUTOMATED: CPT | Performed by: NURSE PRACTITIONER

## 2024-08-28 PROCEDURE — 2500000005 HC RX 250 GENERAL PHARMACY W/O HCPCS: Performed by: INTERNAL MEDICINE

## 2024-08-28 PROCEDURE — 2500000004 HC RX 250 GENERAL PHARMACY W/ HCPCS (ALT 636 FOR OP/ED): Performed by: NURSE PRACTITIONER

## 2024-08-28 PROCEDURE — 2060000001 HC INTERMEDIATE ICU ROOM DAILY

## 2024-08-28 PROCEDURE — 94760 N-INVAS EAR/PLS OXIMETRY 1: CPT

## 2024-08-28 PROCEDURE — 71046 X-RAY EXAM CHEST 2 VIEWS: CPT | Performed by: RADIOLOGY

## 2024-08-28 PROCEDURE — 99232 SBSQ HOSP IP/OBS MODERATE 35: CPT | Performed by: INTERNAL MEDICINE

## 2024-08-28 PROCEDURE — 99232 SBSQ HOSP IP/OBS MODERATE 35: CPT | Performed by: NURSE PRACTITIONER

## 2024-08-28 PROCEDURE — 82374 ASSAY BLOOD CARBON DIOXIDE: CPT | Performed by: NURSE PRACTITIONER

## 2024-08-28 PROCEDURE — 87506 IADNA-DNA/RNA PROBE TQ 6-11: CPT | Mod: TRILAB,WESLAB | Performed by: NURSE PRACTITIONER

## 2024-08-28 PROCEDURE — 2500000001 HC RX 250 WO HCPCS SELF ADMINISTERED DRUGS (ALT 637 FOR MEDICARE OP): Performed by: INTERNAL MEDICINE

## 2024-08-28 RX ORDER — FUROSEMIDE 40 MG/1
40 TABLET ORAL DAILY
Status: DISCONTINUED | OUTPATIENT
Start: 2024-08-29 | End: 2024-08-29 | Stop reason: HOSPADM

## 2024-08-28 RX ORDER — FUROSEMIDE 10 MG/ML
40 INJECTION INTRAMUSCULAR; INTRAVENOUS ONCE
Status: COMPLETED | OUTPATIENT
Start: 2024-08-28 | End: 2024-08-28

## 2024-08-28 RX ORDER — POTASSIUM CHLORIDE 20 MEQ/1
20 TABLET, EXTENDED RELEASE ORAL DAILY
Status: DISCONTINUED | OUTPATIENT
Start: 2024-08-29 | End: 2024-08-29 | Stop reason: HOSPADM

## 2024-08-28 ASSESSMENT — COGNITIVE AND FUNCTIONAL STATUS - GENERAL
MOBILITY SCORE: 24
DAILY ACTIVITIY SCORE: 24

## 2024-08-28 ASSESSMENT — PAIN SCALES - GENERAL
PAINLEVEL_OUTOF10: 0 - NO PAIN
PAINLEVEL_OUTOF10: 0 - NO PAIN

## 2024-08-28 ASSESSMENT — PAIN - FUNCTIONAL ASSESSMENT: PAIN_FUNCTIONAL_ASSESSMENT: 0-10

## 2024-08-28 NOTE — PROGRESS NOTES
Subjective Data:  No new complaints other than for some vague abdominal discomfort    Overnight Events:    As described below.     Objective Data:  Last Recorded Vitals:  Vitals:    08/28/24 0037 08/28/24 0448 08/28/24 0715 08/28/24 0741   BP: 109/65 98/64 93/60    BP Location: Right arm Right arm Right arm    Patient Position: Lying Lying Lying    Pulse: 99 101 99    Resp: 20 20 16    Temp: 36.8 °C (98.2 °F) 36.8 °C (98.2 °F) 36.7 °C (98.1 °F)    TempSrc: Oral Oral Oral    SpO2: 91% 91% 93% 90%   Weight:       Height:           Last Labs:  CBC - 8/28/2024:  4:32 AM  4.9 8.8 112    29.7      CMP - 8/28/2024:  4:32 AM  7.2 6.3 11 --- 0.8   3.1 3.5 6 91      PTT - 9/7/2023:  5:28 PM  1.3   13.7 31.9     TROPHS   Date/Time Value Ref Range Status   04/23/2024 12:27 PM 26 0 - 34 ng/L Final   04/23/2024 10:44 AM 31 0 - 34 ng/L Final     BNP   Date/Time Value Ref Range Status   05/31/2024 04:30  0 - 99 pg/mL Final   04/23/2024 10:44  0 - 99 pg/mL Final     HGBA1C   Date/Time Value Ref Range Status   08/26/2024 09:34 AM 5.4 see below % Final   08/19/2019 01:50 PM 5.6 4.0 - 6.0 % Final     Comment:     Hemoglobin A1C levels are related to mean blood glucose during the   preceding 2-3 months. The relationship table below may be used as a   general guide. Each 1% increase in HGB A1C is a reflection of an   increase in mean glucose of approximately 30 mg/dl.   Reference: Diabetes Care, volume 29, supplement 1 Jan. 2006                        HGB A1C ................. Approx. Mean Glucose   _______________________________________________   6%   ...............................  120 mg/dl   7%   ...............................  150 mg/dl   8%   ...............................  180 mg/dl   9%   ...............................  210 mg/dl   10%  ...............................  240 mg/dl  Performed at [facility] [address] [facility] [address]       LDLCALC   Date/Time Value Ref Range Status   07/23/2019 10:40 AM  65  "- 130 MG/DL Final    Unable to report calculated LDL due to increased triglycerides. Direct     Comment:      LDL to be run.     VLDL   Date/Time Value Ref Range Status   09/21/2020 09:58 PM 52 0 - 40 mg/dL Final      Last I/O:  I/O last 3 completed shifts:  In: 440 (6 mL/kg) [P.O.:240; IV Piggyback:200]  Out: 2100 (28.6 mL/kg) [Urine:2100 (0.8 mL/kg/hr)]  Weight: 73.5 kg     Past Cardiology Tests (Last 3 Years):  EKG:  ECG 12 lead 08/26/2024 (Preliminary)      ECG 12 lead 07/08/2024      ECG 12 lead 07/05/2024      Electrocardiogram, 12-lead PRN ACS symptoms 05/31/2024      ECG 12 lead 05/30/2024      ECG 12 lead 05/30/2024      ECG 12 Lead 01/07/2024    Echo:  Transthoracic Echo (TTE) Limited 04/24/2024    Ejection Fractions:  No results found for: \"EF\"  Cath:  Cardiac catheterization - coronary 01/08/2024    Stress Test:  No results found for this or any previous visit from the past 1095 days.    Cardiac Imaging:  No results found for this or any previous visit from the past 1095 days.      Inpatient Medications:  Scheduled medications   Medication Dose Route Frequency    aspirin  81 mg oral Daily    calcium carbonate-vitamin D3  2 tablet oral BID    cefTRIAXone  1 g intravenous q24h    cholestyramine  1 packet oral Daily    dilTIAZem CD  180 mg oral Daily    doxycycline  100 mg intravenous q12h    folic acid  1 mg oral Daily    [Held by provider] furosemide  40 mg oral Daily    hydroxychloroquine  200 mg oral BID    macitentan  10 mg oral Daily    oxygen   inhalation Continuous - Inhalation    pantoprazole  40 mg oral Daily before breakfast    potassium chloride CR  20 mEq oral BID    venlafaxine XR  150 mg oral Daily     PRN medications   Medication    acetaminophen    Or    acetaminophen    Or    acetaminophen    benzocaine-menthol    dextromethorphan-guaifenesin    gabapentin    guaiFENesin    loperamide    melatonin    ondansetron ODT    Or    ondansetron    polyethylene glycol     Continuous Medications "   Medication Dose Last Rate    epoprostenol  1.5 mg         Physical Exam:  The patient is a slightly overweight upper middle-aged white female lying in bed not short of breath a low-flow O2 nasal cannula  JVP not elevated.  Carotid and pulses 2+  Chest diminished air movement breath sounds are decreased but clear no wheezes rales  Cardiac rhythm is regular slightly tachycardic normal S1-S2 no gallop murmur rub  Abdomen soft and benign no focal tenderness  There is mild bilateral left greater than right peripheral edema.        Assessment/Plan   8/27: This patient is a 63-year-old white female with a history of smoking, substance abuse including cocaine methamphetamines and alcohol, severe pulmonary hypertension with severe right-sided chamber dilatation and severe tricuspid valve regurgitation, low-grade coronary artery disease by cardiac cath in 1/2024. The patient's last echocardiogram performed at [facility] 4/24/2024 showed a LV ejection fraction of 60-65% but severe right ventricular chamber enlargement and a severely reduced right ventricular systolic function. The agitated saline study was positive for a small intracardiac shunt. The right atrium is also severely enlarged and there was a small to moderate pericardial effusion. There was severe tricuspid valve regurgitation and severe pulmonary hypertension with an estimated PA systolic pressure 88 mmHg.  She was admitted to [facility] 7/5/2024 with an episode of SVT that converted utilizing IV adenosine and she subsequently was started on Cardizem  mg daily.  At that time the patient had significant electrolyte abnormalities including hypomagnesemia and hypokalemia.  The patient had been on Lasix 40 mg daily for peripheral edema.  Patient currently admitted with a slight increase in shortness of breath peripheral edema most likely due to a delay in receiving her refill for Lasix 40 mg daily.  Agree with the empiric IV Lasix for the  first 48 to 72 hours then converting back to oral Lasix.  She currently has a borderline sinus tachycardia no recurrent SVT on the Cardizem CD1 100 mg daily which will be kept unchanged.  Patient currently on empiric IV Rocephin and vancomycin for?  Retrocardiac left lower lobe pneumonia which at this point is still somewhat's tentative.  The patient will self administer her Veletri for her overall previously known severe pulmonary hypertension    8/28: Patient remains relatively stable clinically not short of breath on usual nasal oxygen.  She does have some persistent lower extremity edema extending into the thighs and will give an additional dose of IV Lasix today and then resume oral Lasix tomorrow.  Initial chest x-ray on admission did not show any evidence of CHF although the patient does have right ventricular dysfunction by echo with severe pulmonary hypertension and severe tricuspid valve regurgitation.  The patient complains of some firmness and some vague discomfort of the abdomen.  The patient did have an abdominal ultrasound in 7/2023 negative for ascites.  Her CT angiogram of the chest 5/30/2024 was negative for pulmonary embolism but showed persistent marked cardiomegaly hepatic heterogeneity and splenomegaly consistent with right heart dysfunction.  Will check abdominal ultrasound to rule out ascites.    .     CVC 04/23/24 Left Subclavian (Active)   Line Necessity Intravenous medication therapy 08/28/24 0832   Site Assessment Clean;Dry;Intact 08/28/24 0832   Dressing Status Clean;Dry 08/28/24 0832   Number of days: 127       Peripheral IV 20 G Left Antecubital (Active)   Site Assessment Clean;Dry;Intact 08/27/24 2100   Dressing Status Clean;Dry 08/27/24 2100   Number of days: 2       Code Status:  Full Code    I spent 20 minutes in the professional and overall care of this patient.        Anderson Niño MD

## 2024-08-28 NOTE — NURSING NOTE
Pt having diarrhea, which she states is normal with her Veletri infusions however pt has new nausea and abdominal cramping. Notified NP Anisha Jasmine per pt request.

## 2024-08-28 NOTE — PROGRESS NOTES
Ronna Beckham is a 63 y.o. female on day 2 of admission presenting with Acute on chronic hypoxic respiratory failure (Multi).      Subjective   Pt on IV Lasix and continues to diurese. Edema has improved. Pt c/o nausea and diarrhea that started during the night. Endorses some abdominal distention. CT shows enlarged liver and spleen with mild acute uncomplicated sigmoid diverticulitis.       Objective     Last Recorded Vitals  /79 (BP Location: Right arm, Patient Position: Lying)   Pulse 100   Temp 36.4 °C (97.5 °F) (Oral)   Resp 16   Wt 73.5 kg (162 lb)   SpO2 96%   Intake/Output last 3 Shifts:    Intake/Output Summary (Last 24 hours) at 8/28/2024 1331  Last data filed at 8/28/2024 1219  Gross per 24 hour   Intake 750 ml   Output --   Net 750 ml       Admission Weight  Weight: 73.5 kg (162 lb) (08/26/24 1628)    Daily Weight  08/26/24 : 73.5 kg (162 lb)    Image Results      Physical Exam  HENT:      Head: Normocephalic and atraumatic.      Nose: Nose normal.      Mouth/Throat:      Mouth: Mucous membranes are moist.      Pharynx: Oropharynx is clear.   Eyes:      Extraocular Movements: Extraocular movements intact.      Pupils: Pupils are equal, round, and reactive to light.   Cardiovascular:      Rate and Rhythm: Normal rate and regular rhythm.      Pulses: Normal pulses.   Pulmonary:      Effort: Pulmonary effort is normal.      Breath sounds: Normal breath sounds.      Comments: Lungs clear today  Abdominal:      General: Abdomen is flat. Bowel sounds are normal.      Palpations: Abdomen is soft.   Musculoskeletal:         General: Normal range of motion.   Skin:     General: Skin is warm and dry.      Capillary Refill: Capillary refill takes less than 2 seconds.   Neurological:      General: No focal deficit present.      Mental Status: She is oriented to person, place, and time.   Psychiatric:         Mood and Affect: Mood normal.         Relevant Results  ECG 12 lead    Result Date:  8/27/2024  Sinus tachycardia Right bundle branch block Left posterior fascicular block  Bifascicular block  Cannot rule out Inferior infarct (cited on or before 08-JUL-2024) Abnormal ECG When compared with ECG of 08-JUL-2024 11:38, No significant change was found    XR chest 1 view    Result Date: 8/26/2024  Interpreted By:  George Miranda, STUDY: XR CHEST 1 VIEW;  8/26/2024 5:25 pm   INDICATION: Signs/Symptoms:sob.   COMPARISON: May 30, 2024 chest CT and July 8, 2024 chest radiograph.   ACCESSION NUMBER(S): [id number]   ORDERING CLINICIAN: SHEN ALBA   FINDINGS: AP radiograph of the chest was provided.   Apical lordotic position. Similar position left IJ catheter. The tip is at the cavoatrial junction region.   CARDIOMEDIASTINAL SILHOUETTE: Similar prominence of the cardiomediastinal silhouette.   LUNGS: Marginated left retrocardiac density is indeterminate potentially related to overlapping body wall. Left basilar pneumonia not excluded. Otherwise similar expansion without evident  sadiq edema or effusion.   ABDOMEN: No remarkable upper abdominal findings.   BONES: No acute osseous changes.       1.  Left retrocardiac density may be due to overlapping normal structures although left basilar pneumonia not excluded.       MACRO: None   Signed by: George Miranda 8/26/2024 5:35 PM Dictation workstation:   [id number]    Results for orders placed or performed during the hospital encounter of 08/26/24 (from the past 24 hour(s))   CBC   Result Value Ref Range    WBC 4.9 4.4 - 11.3 x10*3/uL    nRBC 0.0 0.0 - 0.0 /100 WBCs    RBC 3.76 (L) 4.00 - 5.20 x10*6/uL    Hemoglobin 8.8 (L) 12.0 - 16.0 g/dL    Hematocrit 29.7 (L) 36.0 - 46.0 %    MCV 79 (L) 80 - 100 fL    MCH 23.4 (L) 26.0 - 34.0 pg    MCHC 29.6 (L) 32.0 - 36.0 g/dL    RDW 17.6 (H) 11.5 - 14.5 %    Platelets 112 (L) 150 - 450 x10*3/uL   Basic metabolic panel   Result Value Ref Range    Glucose 104 (H) 65 - 99 mg/dL    Sodium 135 133 - 145 mmol/L    Potassium  3.6 3.4 - 5.1 mmol/L    Chloride 101 97 - 107 mmol/L    Bicarbonate 23 (L) 24 - 31 mmol/L    Urea Nitrogen 14 8 - 25 mg/dL    Creatinine 0.90 0.40 - 1.60 mg/dL    eGFR 73 >60 mL/min/1.73m*2    Calcium 7.2 (L) 8.5 - 10.4 mg/dL    Anion Gap 11 <=19 mmol/L       Assessment/Plan   Acute on Chronic Respiratory Failure  -Likely secondary to CHFE  -Home oxygen 4 liters, currently on 4 liters    Decompensated Heart Failure  -Last ECHO 4/23: EF 60-65%, severe reduced RV systolic fx   -Continue Lasix, switched to oral tomorrow per Cards  -Fluid restriction  -Cardiology follows  -Repeat CXR pending    Severe Pulmonary Hypertension  -Follows with   -Pt on continuous Veletri infusion, continue   -Pulmonology follows    Diarrhea/Nausea  -No vomiting or abd pain. Possibly secondary to IV Abx  -CT abd/pelvis shows enlarged liver and spleen, slightly increased in size since previous. Shows mild acute uncomplicated sigmoid diverticulitis  -Abd U/s ordered by Cards and is pending   -Stool studies/Stool for Cdiff pending    Possible Pneumonia  -Continue empiric Abx pending CXR    Anemia  -Secondary to chronic medical disease and iron deficiency  -Iron stores low, will give IV Venofer  -Continue folic acid  -Monitor     SLE  -Continue Plaquenil    DVT Prophylaxis  -Low risk, SCD's    Dispo  To home on discharge with no needs.     Anisha Jasmine, APRN-CNP

## 2024-08-28 NOTE — CARE PLAN
Problem: Pain - Adult  Goal: Verbalizes/displays adequate comfort level or baseline comfort level  Outcome: Progressing     Problem: Safety - Adult  Goal: Free from fall injury  Outcome: Progressing     Problem: Discharge Planning  Goal: Discharge to home or other facility with appropriate resources  Outcome: Progressing     Problem: Chronic Conditions and Co-morbidities  Goal: Patient's chronic conditions and co-morbidity symptoms are monitored and maintained or improved  Outcome: Progressing     Problem: Nutrition  Goal: Oral intake greater 75%  Outcome: Progressing     Problem: Fall/Injury  Goal: Not fall by end of shift  Outcome: Progressing  Goal: Be free from injury by end of the shift  Outcome: Progressing  Goal: Verbalize understanding of personal risk factors for fall in the hospital  Outcome: Progressing  Goal: Verbalize understanding of risk factor reduction measures to prevent injury from fall in the home  Outcome: Progressing  Goal: Use assistive devices by end of the shift  Outcome: Progressing  Goal: Pace activities to prevent fatigue by end of the shift  Outcome: Progressing

## 2024-08-28 NOTE — CARE PLAN
The patient's goals for the shift include  rest    The clinical goals for the shift include monitor O2

## 2024-08-28 NOTE — NURSING NOTE
Patient with Lt chest tunneled line placed prior to admission, dressing D&I, infusing without difficulty.

## 2024-08-28 NOTE — DOCUMENTATION CLARIFICATION NOTE
"    PATIENT:               CARMEN MADRIGAL  ACCT #:                  [id number]  MRN:                       [id number]  :                       1960  ADMIT DATE:       2024 4:28 PM  DISCH DATE:  RESPONDING PROVIDER #:        [id number]          PROVIDER RESPONSE TEXT:    Right sided-heart fialure    CDI QUERY TEXT:    Clarification      Instruction:    Based on your assessment of the patient and the clinical information, please provide the requested documentation by clicking on the appropriate radio button and enter any additional information if prompted.    Question: Please further clarify the type and acuity of congestive heart failure    When answering this query, please exercise your independent professional judgment. The fact that a question is being asked, does not imply that any particular answer is desired or expected.    The patient's clinical indicators include:  Clinical Information: 63 y.o. female with a past medical history of pulmonary hypertension; on 4 L nasal cannula oxygen continuously and systemic lupus erythematous who presented to [facility] Emergency Department last night for evaluation of shortness of breath, worse with exertion and lower extremity edema.    Clinical Indicators:  -  History and physical: \"Physical exam: Respiratory/Thorax: Crackles Bilaterally.  Diminished breath sound left lung base, Extremities: pitting edema bilateral ankles...Dyspnea 2/2 Multifactorial etiology.  Presumably secondary to Decompensated heart failure\"     IM PN: \"Admitted last night with acute on chronic respiratory failure and CHF. Pt tells me stopped her Lasix because PCP would not refill. She received IV Lasix on admit and diuresed with negative fluid balance over 1 liter. Breathing and edema have improved...Assessment/Plan: Acute on Chronic Respiratory Failure-Likely secondary to CHFE\"      - Cardiology consult: \"The patient's last echocardiogram performed at [facility] 4/24/2024 showed a LV ejection fraction of 60-65% but severe right ventricular chamber enlargement and a severely reduced right ventricular systolic function.\"    -826 PROBNP: 6,865    Treatment:  -Lasix IV  -Cardiac consult    Risk Factors: 63 y.o female with CHF, former smoker, hx of substance abuse, pulmonary hypertension  Options provided:  -- Acute on Chronic Systolic Congestive Heart Failure  -- Other - I will add my own diagnosis  -- Refer to Clinical Documentation Reviewer    Query created by: Kathrin Slaughter on 8/28/2024 8:30 AM      Electronically signed by:  NABEEL CRUZ 8/28/2024 12:41 PM          "

## 2024-08-28 NOTE — PROGRESS NOTES
08/28/24 1346   Discharge Planning   Living Arrangements Family members;Children   Support Systems Family members;Children   Type of Residence Private residence   Who is requesting discharge planning? Provider   Home or Post Acute Services None   Expected Discharge Disposition Home   Does the patient need discharge transport arranged? No     Per MD Notes: Will check abdominal ultrasound to rule out ascites. Patient is being diuresed. Discharge 1-2 days.     PLAN: Home with no needs

## 2024-08-28 NOTE — PROGRESS NOTES
"Ronna Beckham is a 63 y.o. female on day 2 of admission seen in follow-up for    Subjective   On 3 L nasal cannula oxygen; oxygen sats 96%. Endorses nausea and abdominal discomfort. Afebrile.       Objective     Physical Exam  Vitals and nursing note reviewed.   Constitutional:       Appearance: Normal appearance.   HENT:      Head: Normocephalic and atraumatic.      Nose: Nose normal.      Mouth/Throat:      Mouth: Mucous membranes are moist.   Eyes:      Extraocular Movements: Extraocular movements intact.      Conjunctiva/sclera: Conjunctivae normal.      Pupils: Pupils are equal, round, and reactive to light.   Cardiovascular:      Rate and Rhythm: Regular rhythm. Tachycardia present.      Pulses: Normal pulses.      Heart sounds: Normal heart sounds.   Pulmonary:      Effort: Pulmonary effort is normal.      Comments: Bibasilar rales.  Abdominal:      General: Bowel sounds are normal.      Palpations: Abdomen is soft.   Musculoskeletal:         General: Normal range of motion.      Right lower leg: Edema present.      Left lower leg: Edema present.   Skin:     General: Skin is warm and dry.      Capillary Refill: Capillary refill takes less than 2 seconds.   Neurological:      General: No focal deficit present.      Mental Status: She is alert and oriented to person, place, and time.   Psychiatric:         Mood and Affect: Mood normal.         Behavior: Behavior normal.         Last Recorded Vitals  Blood pressure 93/60, pulse 99, temperature 36.7 °C (98.1 °F), temperature source Oral, resp. rate 16, height 1.651 m (5' 5\"), weight 73.5 kg (162 lb), SpO2 90%.  Intake/Output last 3 Shifts:  I/O last 3 completed shifts:  In: 440 (6 mL/kg) [P.O.:240; IV Piggyback:200]  Out: 2100 (28.6 mL/kg) [Urine:2100 (0.8 mL/kg/hr)]  Weight: 73.5 kg       Intake/Output Summary (Last 24 hours) at 8/28/2024 0903  Last data filed at 8/28/2024 0832  Gross per 24 hour   Intake 550 ml   Output 700 ml   Net -150 ml     Scheduled " medications:  aspirin, 81 mg, oral, Daily  calcium carbonate-vitamin D3, 2 tablet, oral, BID  cefTRIAXone, 1 g, intravenous, q24h  cholestyramine, 1 packet, oral, Daily  dilTIAZem CD, 180 mg, oral, Daily  doxycycline, 100 mg, intravenous, q12h  folic acid, 1 mg, oral, Daily  [Held by provider] furosemide, 40 mg, oral, Daily  hydroxychloroquine, 200 mg, oral, BID  macitentan, 10 mg, oral, Daily  oxygen, , inhalation, Continuous - Inhalation  pantoprazole, 40 mg, oral, Daily before breakfast  potassium chloride CR, 20 mEq, oral, BID  venlafaxine XR, 150 mg, oral, Daily       PRN medications: acetaminophen **OR** acetaminophen **OR** acetaminophen, benzocaine-menthol, dextromethorphan-guaifenesin, gabapentin, guaiFENesin, loperamide, melatonin, ondansetron ODT **OR** ondansetron, polyethylene glycol   epoprostenol, 1.5 mg       Relevant Results  Results for orders placed or performed during the hospital encounter of 08/26/24 (from the past 24 hour(s))   Iron and TIBC   Result Value Ref Range    Iron 26 (L) 30 - 160 ug/dL    UIBC 297 110 - 370 ug/dL    TIBC 323 228 - 428 ug/dL    % Saturation 8 (L) 12 - 50 %   Ferritin   Result Value Ref Range    Ferritin 19 13 - 150 ng/mL   CBC   Result Value Ref Range    WBC 4.9 4.4 - 11.3 x10*3/uL    nRBC 0.0 0.0 - 0.0 /100 WBCs    RBC 3.76 (L) 4.00 - 5.20 x10*6/uL    Hemoglobin 8.8 (L) 12.0 - 16.0 g/dL    Hematocrit 29.7 (L) 36.0 - 46.0 %    MCV 79 (L) 80 - 100 fL    MCH 23.4 (L) 26.0 - 34.0 pg    MCHC 29.6 (L) 32.0 - 36.0 g/dL    RDW 17.6 (H) 11.5 - 14.5 %    Platelets 112 (L) 150 - 450 x10*3/uL   Basic metabolic panel   Result Value Ref Range    Glucose 104 (H) 65 - 99 mg/dL    Sodium 135 133 - 145 mmol/L    Potassium 3.6 3.4 - 5.1 mmol/L    Chloride 101 97 - 107 mmol/L    Bicarbonate 23 (L) 24 - 31 mmol/L    Urea Nitrogen 14 8 - 25 mg/dL    Creatinine 0.90 0.40 - 1.60 mg/dL    eGFR 73 >60 mL/min/1.73m*2    Calcium 7.2 (L) 8.5 - 10.4 mg/dL    Anion Gap 11 <=19 mmol/L      CT  abdomen pelvis w IV contrast  Result Date: 8/18/2024  FINDINGS: LOWER CHEST: No acute airspace disease or pleural effusion. Marked  cardiomegaly and moderate pericardial effusion both about the same as  on CT 30 May 2024. There was a dramatic increase in the heart size  sometime between CT abdomen and pelvis 1 June 2023 and CT chest with  contrast 7 January 2024. BONES: No acute skeletal findings.LIVER: Craniocaudal long-axis diameter 19.5 cm, at least mildly enlarged, measured 3 cm shorter and within normal limits on 1 June. 2023. Heterogeneous perfusion suggesting underlying hepatocellular disease. The entire portal venous system including the splenic vein, SMV and its major tributaries, main and intrahepatic portal veins are all patent; no acute portal venous thrombosis. No hepatic abnormality  suspect for focal lesions such as mass. SPLEEN: Normal. No enlargement, mass or evidence of splenic vein thrombosis. PANCREAS: Normal. No CT evidence of acute or chronic pancreatitis. No duct dilation. No mass. GALLBLADDER: Surgically absent. Craniocaudal long-axis diameter now 13.7 cm, mildly enlarged but 1.5 cm longer than it was 1 June 2023. The vein is patent. No mass  BILE DUCTS: Normal. No biliary duct dilation. ADRENAL GLANDS: Normal. No nodule or mass. KIDNEYS AND URETERS: Normal.  No hydronephrosis on either side.  No mass.  Symmetric enhancement.  No infarct or CT evidence of acute  pyelonephritis.  No substantial radiodense stone.  Tiny stones and  radiolucent stones could be occult on CT. LYMPH NODES: No adenopathy, intraperitoneal, retroperitoneal, pelvic or otherwise. APPENDIX: Normal.  Not dilated, thick walled or in any other way inflamed in appearance.  No inflammatory change about the appendix. COLON: Most loops not well enough distended for optimal specificity. Question of true pathologic sigmoid wall thickening. No obstruction or intussusception associated with 13 mm sigmoid lipoma which is of course  unchanged. SMALL BOWEL: Normal. No small bowel dilation or any other sign of small bowel obstruction. No sign of active inflammatory bowel disease. STOMACH / DUODENUM: Grossly normal by CT which has limited sensitivity and specificity for the stomach and duodenum.  RETROPERITONEUM: Normal.  No acute hemorrhage or inflammatory change.  Lymph nodes in a separate dedicated section. OMENTUM, MESENTERY AND PERITONEAL SPACES: Free intraperitoneal air: Negative Free intraperitoneal fluid: Trace to small volume not acutely hemorrhagic but nevertheless abnormal. There was no free fluid about the liver on CT chest 30 May 2024 but today there is. There was no free fluid anywhere in the abdomen or pelvis on 1 June 2023 Abscess Negative Other: n/a. URINARY BLADDER: Normal. No wall thickening, large diverticula, radiodense stone or surrounding inflammatory change. PELVIS: No obvious acute adnexal abnormality by CT. VASCULATURE: Large venous collaterals in the omentum draining into the portal venous system, unchanged from 1 June 2023. ABDOMINAL WALL. Hernia: Negative  Other: Subcutaneous edema. IMPRESSION: Sometime between CT abdomen and pelvis 1 June 2023 and CT chest with contrast 7 January 2024, this patient's cardiac size dramatically increased, including impressive right atrial dilation, and an at least small pericardial effusion developed as well. Those findings  are unchanged in the included lung bases on today's exam. Question of  impaired right heart function accounting for other findings described  above and immediately below. Impressive, heterogeneous perfusion throughout the liver could be related to backflow pressure from right heart impairment and/or hepatocellular disease. The liver is now mildly enlarged at 19.5 cm craniocaudal long-axis diameter. On the most recent exam the liver  was included in its entirety 1 June 2023, the liver was within normal  limits for size at 16.5 cm craniocaudal long-axis. The spleen  was also normal in size on 1 June 2023; today, mildly enlarged, having increased in craniocaudal long-axis by about 1.5 cm since 1 June 2023. Questionable mild acute uncomplicated sigmoid diverticulitis. Note most of the colon is empty, limiting specificity for true pathologic wall thickening. Furthermore, in the presence of suspected portal hypertension, colonic wall thickening may simply be from portal hypertensive/congestive colopathy but that is usually isolated to the  right colon. Any perceived abdominal distension is probably due to a combination of hepatomegaly, the trace to small volume not acutely  hemorrhagic ascites (too small for paracentesis) and subcutaneous edema. No small bowel obstruction, perforation or abscess. Normal appendix. The entire portal venous system including the splenic vein, SMV and its major tributaries, main and intrahepatic portal veins are all patent; no acute portal venous thrombosis.    XR chest 1 view  Result Date: 8/26/2024  FINDINGS: AP radiograph of the chest was provided.   Apical lordotic position. Similar position left IJ catheter. The tip is at the cavoatrial junction region.   CARDIOMEDIASTINAL SILHOUETTE: Similar prominence of the cardiomediastinal silhouette.   LUNGS: Marginated left retrocardiac density is indeterminate potentially related to overlapping body wall. Left basilar pneumonia not excluded. Otherwise similar expansion without evident  sadiq edema or effusion.   ABDOMEN: No remarkable upper abdominal findings.   BONES: No acute osseous changes.  1.  Left retrocardiac density may be due to overlapping normal structures although left basilar pneumonia not excluded.                Assessment/Plan   Acute on chronic hypoxic respiratory failure  Oxygen at baseline  Continuous pulse oximetry  Incentive spirometry/pulmonary hygiene     Pulmonary arterial hypertension  Currently on advanced therapy for pulmonary artery hypertension (Veletri) via cuffed catheter  left subclavian w/ continuous home pump; will allow her to self administer     Decompensated congestive heart failure  Oral Lasix   40 mg IV Lasix given x 1 dose today  Cardiology follow-up     Suspicion for left basilar pneumonia  Emperic IV doxycycline, ceftriaxone  Procalcitonin 0.04 ng/mL  PA and lateral chest x-ray pending    Abdominal discomfort  Abdominal ultrasound to rule out ascites pending     Anemia  Folic acid     Systemic lupus erythematous  Continue home hydroxychloroquine      History of epilepsy  Seizure precautions  Not on any antiseizure medications     Prophylaxis  Oral Protonix  SCDs    PT/OT/out of bed      Lindsey Angela, APRN-CNP

## 2024-08-28 NOTE — NURSING NOTE
Patient refusing to have bed alarm on. This RN educated patient on using the bed alarm for patient safety, patient states she does not need it and does not want it on.

## 2024-08-29 VITALS
OXYGEN SATURATION: 93 % | SYSTOLIC BLOOD PRESSURE: 105 MMHG | BODY MASS INDEX: 26.99 KG/M2 | DIASTOLIC BLOOD PRESSURE: 68 MMHG | HEIGHT: 65 IN | TEMPERATURE: 97.7 F | RESPIRATION RATE: 18 BRPM | WEIGHT: 162 LBS | HEART RATE: 94 BPM

## 2024-08-29 LAB
ALBUMIN: 3.2 G/DL (ref 3.4–5)
ALPHA 1 GLOBULIN: 0.5 G/DL (ref 0.2–0.6)
ALPHA 2 GLOBULIN: 0.7 G/DL (ref 0.4–1.1)
ANION GAP SERPL CALC-SCNC: 13 MMOL/L
BETA GLOBULIN: 0.9 G/DL (ref 0.5–1.2)
BUN SERPL-MCNC: 12 MG/DL (ref 8–25)
C COLI+JEJ+UPSA DNA STL QL NAA+PROBE: NOT DETECTED
CALCIUM SERPL-MCNC: 7 MG/DL (ref 8.5–10.4)
CHLORIDE SERPL-SCNC: 103 MMOL/L (ref 97–107)
CO2 SERPL-SCNC: 23 MMOL/L (ref 24–31)
CREAT SERPL-MCNC: 0.8 MG/DL (ref 0.4–1.6)
EC STX1 GENE STL QL NAA+PROBE: NOT DETECTED
EC STX2 GENE STL QL NAA+PROBE: NOT DETECTED
EGFRCR SERPLBLD CKD-EPI 2021: 84 ML/MIN/1.73M*2
ERYTHROCYTE [DISTWIDTH] IN BLOOD BY AUTOMATED COUNT: 17.7 % (ref 11.5–14.5)
GAMMA GLOBULIN: 1 G/DL (ref 0.5–1.4)
GLUCOSE SERPL-MCNC: 89 MG/DL (ref 65–99)
HCT VFR BLD AUTO: 30.4 % (ref 36–46)
HGB BLD-MCNC: 8.8 G/DL (ref 12–16)
IMMUNOFIXATION COMMENT: ABNORMAL
MCH RBC QN AUTO: 23 PG (ref 26–34)
MCHC RBC AUTO-ENTMCNC: 28.9 G/DL (ref 32–36)
MCV RBC AUTO: 80 FL (ref 80–100)
NOROVIRUS GI + GII RNA STL NAA+PROBE: NOT DETECTED
NRBC BLD-RTO: 0 /100 WBCS (ref 0–0)
PATH REVIEW - SERUM IMMUNOFIXATION: ABNORMAL
PATH REVIEW-SERUM PROTEIN ELECTROPHORESIS: ABNORMAL
PLATELET # BLD AUTO: 101 X10*3/UL (ref 150–450)
POTASSIUM SERPL-SCNC: 3.4 MMOL/L (ref 3.4–5.1)
PROTEIN ELECTROPHORESIS COMMENT: ABNORMAL
RBC # BLD AUTO: 3.82 X10*6/UL (ref 4–5.2)
RV RNA STL NAA+PROBE: NOT DETECTED
SALMONELLA DNA STL QL NAA+PROBE: NOT DETECTED
SHIGELLA DNA SPEC QL NAA+PROBE: NOT DETECTED
SODIUM SERPL-SCNC: 139 MMOL/L (ref 133–145)
V CHOLERAE DNA STL QL NAA+PROBE: NOT DETECTED
WBC # BLD AUTO: 4.3 X10*3/UL (ref 4.4–11.3)
Y ENTEROCOL DNA STL QL NAA+PROBE: NOT DETECTED

## 2024-08-29 PROCEDURE — 2500000001 HC RX 250 WO HCPCS SELF ADMINISTERED DRUGS (ALT 637 FOR MEDICARE OP): Performed by: INTERNAL MEDICINE

## 2024-08-29 PROCEDURE — 2500000005 HC RX 250 GENERAL PHARMACY W/O HCPCS: Performed by: INTERNAL MEDICINE

## 2024-08-29 PROCEDURE — 85027 COMPLETE CBC AUTOMATED: CPT | Performed by: NURSE PRACTITIONER

## 2024-08-29 PROCEDURE — 2500000002 HC RX 250 W HCPCS SELF ADMINISTERED DRUGS (ALT 637 FOR MEDICARE OP, ALT 636 FOR OP/ED): Performed by: INTERNAL MEDICINE

## 2024-08-29 PROCEDURE — 80048 BASIC METABOLIC PNL TOTAL CA: CPT | Performed by: INTERNAL MEDICINE

## 2024-08-29 PROCEDURE — 99239 HOSP IP/OBS DSCHRG MGMT >30: CPT | Performed by: NURSE PRACTITIONER

## 2024-08-29 PROCEDURE — 94760 N-INVAS EAR/PLS OXIMETRY 1: CPT

## 2024-08-29 PROCEDURE — 99232 SBSQ HOSP IP/OBS MODERATE 35: CPT | Performed by: NURSE PRACTITIONER

## 2024-08-29 PROCEDURE — 99232 SBSQ HOSP IP/OBS MODERATE 35: CPT | Performed by: INTERNAL MEDICINE

## 2024-08-29 RX ORDER — FUROSEMIDE 40 MG/1
40 TABLET ORAL DAILY
Qty: 30 TABLET | Refills: 0 | Status: ON HOLD | OUTPATIENT
Start: 2024-08-29

## 2024-08-29 ASSESSMENT — COGNITIVE AND FUNCTIONAL STATUS - GENERAL
DAILY ACTIVITIY SCORE: 24
MOBILITY SCORE: 24

## 2024-08-29 ASSESSMENT — PAIN SCALES - GENERAL: PAINLEVEL_OUTOF10: 0 - NO PAIN

## 2024-08-29 NOTE — PROGRESS NOTES
Spiritual Care Visit    Clinical Encounter Type  Visited With: Patient  Routine Visit: Introduction    Quaker Encounters  Quaker Needs: Prayer     Annotation:  offered patient support while rounding.  introduced the spiritual care services of [facility]. Patient expressed appreciation for the visit. Patient requested a Bible and the  was able to give her a New Testament. No other needs were identified.                                   Taxonomy  Intended Effects: Demonstrate caring and concern, Build relationship of care and support, Establish rapport and connectedness  Methods: Encourage self reflection, Explore omer and values, Offer spiritual/Mandaeism support  Interventions: Active listening, Facilitate life review, Prayer for healing, Provide spiritual/Mandaeism resources, Share words of hope and inspiration

## 2024-08-29 NOTE — DISCHARGE SUMMARY
Discharge Diagnosis  Acute on chronic hypoxic respiratory failure (Multi)    Issues Requiring Follow-Up  Pulmonary HTN    Discharge Meds     Your medication list        CONTINUE taking these medications        Instructions Last Dose Given Next Dose Due   aspirin 81 mg EC tablet           cholestyramine 4 gram packet  Commonly known as: Questran      Take 1 packet (4 g) by mouth once daily.       dilTIAZem  mg 24 hr capsule  Commonly known as: Cardizem CD      Take 1 capsule (180 mg) by mouth once daily.       epoprostenol 1.5 mg recon soln  Commonly known as: Veletri      1.5 mg (1,500 mcg) by central venous line infusion route continuously. Reconstitute contents of vial with 5 ml diluent and mix cassette as directed. Infuse continuously per physician orders. Allow for priming volume. Dose range: 1-150 ng/kg/min.       folic acid 1 mg tablet  Commonly known as: Folvite           furosemide 40 mg tablet  Commonly known as: Lasix      TAKE 1 TABLET BY MOUTH ONE TIME DAILY       gabapentin 800 mg tablet  Commonly known as: Neurontin           hydroxychloroquine 200 mg tablet  Commonly known as: Plaquenil           Klor-Con M20 20 mEq ER tablet  Generic drug: potassium chloride CR      TAKE 1 TABLET BY MOUTH TWO TIMES A DAY WITH MEALS       loperamide 2 mg tablet  Commonly known as: Imodium A-D      Take 1 tablet (2 mg) by mouth 4 times a day as needed for diarrhea.       melatonin 5 mg tablet           omeprazole 40 mg DR capsule  Commonly known as: PriLOSEC           Opsumit 10 mg tablet tablet  Generic drug: macitentan      TAKE 1 TABLET BY MOUTH DAILY. DO NOT HANDLE IF PREGNANT. DO NOT SPLIT, CRUSH, OR CHEW. REVIEW MEDICATION GUIDE.       prochlorperazine 5 mg tablet  Commonly known as: Compazine      Take 1 tablet (5 mg) by mouth every 6 hours if needed for nausea or vomiting.       topiramate 25 mg tablet  Commonly known as: Topamax      Week 1 25mg at bedtime, week 2 25mg BID, week 3 25mg qAM, 50mg at  bedtime, week 4 50 mg BID, week 5 50mg qAM 75mg at bedtime, week 6 75 mg BID, week 7 75mg qam 100mg at bedtime, week 8 100mg BID       venlafaxine  mg 24 hr capsule  Commonly known as: Effexor-XR                     Where to Get Your Medications        These medications were sent to [facility]  [address], [address], [place] [facility] [address]      Hours: 9 AM to 6 PM Mon-Fri, 9 AM to 1 PM Sat Phone: [phone]   furosemide 40 mg tablet         Test Results Pending At Discharge  Pending Labs       Order Current Status    Extra Urine Gray Tube Collected (08/26/24 2010)    Serial Troponin, 2 Hour ([facility]) Collected (08/26/24 2011)    Urinalysis with Reflex Culture and Microscopic Collected (08/26/24 2010)    Urinalysis with Reflex Culture and Microscopic Collected (08/26/24 2010)    Stool Pathogen Panel, PCR In process    Troponin T Series, High Sensitivity (0, 2HR, 6HR) In process            Hospital Course   Pt with severe pulmonary hypertension admitted for CHF exacerbation. Was having issues with outside physician refilling her Lasix. Was diuresed and has improved. Was empirically treated for pneumonia however repeat CXR is WNL, no infiltrates or effusions. No further Abx needed on discharge. Pt to follow-up outpatient with her Cardiologist/Pulmonologist. Requesting discharge. Medically stable for discharge.     Pertinent Physical Exam At Time of Discharge  Physical Exam  HENT:      Head: Normocephalic and atraumatic.      Nose: Nose normal.      Mouth/Throat:      Mouth: Mucous membranes are moist.      Pharynx: Oropharynx is clear.   Eyes:      Extraocular Movements: Extraocular movements intact.      Pupils: Pupils are equal, round, and reactive to light.   Cardiovascular:      Rate and Rhythm: Normal rate and regular rhythm.      Pulses: Normal pulses.   Pulmonary:      Effort: Pulmonary effort is normal.      Breath sounds: Normal breath sounds.   Abdominal:      General: Abdomen is flat.  Bowel sounds are normal.      Palpations: Abdomen is soft.   Musculoskeletal:         General: Normal range of motion.   Skin:     General: Skin is warm and dry.      Capillary Refill: Capillary refill takes less than 2 seconds.   Neurological:      General: No focal deficit present.      Mental Status: She is oriented to person, place, and time.   Psychiatric:         Mood and Affect: Mood normal.         Outpatient Follow-Up  Future Appointments   Date Time Provider Department Center   9/5/2024 12:45 PM Marco A Heredia PT [id number] [facility]   9/30/2024 10:30 AM Chelsea Wood MD [id number] [facility]   9/30/2024 10:30 AM [facility] [id number] NEURODG EMG EQUIP 1 [id number] [facility]   10/22/2024  1:00 PM TRI [id number] PFT ROOM [id number] [facility]   10/30/2024  4:00 PM Bin Wade MD [id number] [facility]         Anisha Jasmine, APRN-CNP

## 2024-08-29 NOTE — PROGRESS NOTES
Subjective Data:  Patient lying in bed feeling baseline not short of breath on usual nasal O2 cannula    Overnight Events:    As described below.     Objective Data:  Last Recorded Vitals:  Vitals:    08/29/24 0020 08/29/24 0351 08/29/24 0732 08/29/24 0822   BP: 96/67 106/54 105/68    BP Location: Right arm Right arm Right arm    Patient Position: Lying Lying Lying    Pulse: 96 96 94    Resp: 17 17 18    Temp: 36.6 °C (97.9 °F) 36.4 °C (97.5 °F) 36.5 °C (97.7 °F)    TempSrc: Oral Oral Oral    SpO2: 92% 93% 98% 93%   Weight:       Height:           Last Labs:  CBC - 8/29/2024:  4:39 AM  4.3 8.8 101    30.4      CMP - 8/29/2024:  4:39 AM  7.0 6.3 11 --- 0.8   3.1 3.5 6 91      PTT - 9/7/2023:  5:28 PM  1.3   13.7 31.9     TROPHS   Date/Time Value Ref Range Status   04/23/2024 12:27 PM 26 0 - 34 ng/L Final   04/23/2024 10:44 AM 31 0 - 34 ng/L Final     BNP   Date/Time Value Ref Range Status   05/31/2024 04:30  0 - 99 pg/mL Final   04/23/2024 10:44  0 - 99 pg/mL Final     HGBA1C   Date/Time Value Ref Range Status   08/26/2024 09:34 AM 5.4 see below % Final   08/19/2019 01:50 PM 5.6 4.0 - 6.0 % Final     Comment:     Hemoglobin A1C levels are related to mean blood glucose during the   preceding 2-3 months. The relationship table below may be used as a   general guide. Each 1% increase in HGB A1C is a reflection of an   increase in mean glucose of approximately 30 mg/dl.   Reference: Diabetes Care, volume 29, supplement 1 Jan. 2006                        HGB A1C ................. Approx. Mean Glucose   _______________________________________________   6%   ...............................  120 mg/dl   7%   ...............................  150 mg/dl   8%   ...............................  180 mg/dl   9%   ...............................  210 mg/dl   10%  ...............................  240 mg/dl  Performed at [facility] [address] [facility] [address]       LDLCALC   Date/Time Value Ref Range Status  "  07/23/2019 10:40 AM  65 - 130 MG/DL Final    Unable to report calculated LDL due to increased triglycerides. Direct     Comment:      LDL to be run.     VLDL   Date/Time Value Ref Range Status   09/21/2020 09:58 PM 52 0 - 40 mg/dL Final      Last I/O:  I/O last 3 completed shifts:  In: 850 (11.6 mL/kg) [P.O.:600; IV Piggyback:250]  Out: - (0 mL/kg)   Weight: 73.5 kg     Past Cardiology Tests (Last 3 Years):  EKG:  ECG 12 lead 08/26/2024      ECG 12 lead 07/08/2024      ECG 12 lead 07/05/2024      Electrocardiogram, 12-lead PRN ACS symptoms 05/31/2024      ECG 12 lead 05/30/2024      ECG 12 lead 05/30/2024      ECG 12 Lead 01/07/2024    Echo:  Transthoracic Echo (TTE) Limited 04/24/2024    Ejection Fractions:  No results found for: \"EF\"  Cath:  Cardiac catheterization - coronary 01/08/2024    Stress Test:  No results found for this or any previous visit from the past 1095 days.    Cardiac Imaging:  No results found for this or any previous visit from the past 1095 days.      Inpatient Medications:  Scheduled medications   Medication Dose Route Frequency    aspirin  81 mg oral Daily    calcium carbonate-vitamin D3  2 tablet oral BID    cholestyramine  1 packet oral Daily    dilTIAZem CD  180 mg oral Daily    folic acid  1 mg oral Daily    furosemide  40 mg oral Daily    hydroxychloroquine  200 mg oral BID    iron sucrose  300 mg intravenous Daily    macitentan  10 mg oral Daily    oxygen   inhalation Continuous - Inhalation    pantoprazole  40 mg oral Daily before breakfast    potassium chloride CR  20 mEq oral Daily    venlafaxine XR  150 mg oral Daily     PRN medications   Medication    acetaminophen    Or    acetaminophen    Or    acetaminophen    benzocaine-menthol    dextromethorphan-guaifenesin    gabapentin    guaiFENesin    loperamide    melatonin    ondansetron ODT    Or    ondansetron    polyethylene glycol     Continuous Medications   Medication Dose Last Rate    epoprostenol  1.5 mg         Physical " Exam:  The patient is a slightly overweight upper middle-aged white female lying in bed not short of breath a low-flow O2 nasal cannula  JVP not elevated.  Carotid and pulses 2+  Chest diminished air movement breath sounds are decreased but clear no wheezes rales  Cardiac rhythm is regular slightly tachycardic normal S1-S2 no gallop murmur rub  Abdomen soft and benign no focal tenderness  There is mild bilateral left greater than right peripheral edema.     Assessment/Plan   8/27: This patient is a 63-year-old white female with a history of smoking, substance abuse including cocaine methamphetamines and alcohol, severe pulmonary hypertension with severe right-sided chamber dilatation and severe tricuspid valve regurgitation, low-grade coronary artery disease by cardiac cath in 1/2024. The patient's last echocardiogram performed at [facility] 4/24/2024 showed a LV ejection fraction of 60-65% but severe right ventricular chamber enlargement and a severely reduced right ventricular systolic function. The agitated saline study was positive for a small intracardiac shunt. The right atrium is also severely enlarged and there was a small to moderate pericardial effusion. There was severe tricuspid valve regurgitation and severe pulmonary hypertension with an estimated PA systolic pressure 88 mmHg.  She was admitted to [facility] 7/5/2024 with an episode of SVT that converted utilizing IV adenosine and she subsequently was started on Cardizem  mg daily.  At that time the patient had significant electrolyte abnormalities including hypomagnesemia and hypokalemia.  The patient had been on Lasix 40 mg daily for peripheral edema.  Patient currently admitted with a slight increase in shortness of breath peripheral edema most likely due to a delay in receiving her refill for Lasix 40 mg daily.  Agree with the empiric IV Lasix for the first 48 to 72 hours then converting back to oral Lasix.  She currently has  a borderline sinus tachycardia no recurrent SVT on the Cardizem CD1 100 mg daily which will be kept unchanged.  Patient currently on empiric IV Rocephin and vancomycin for?  Retrocardiac left lower lobe pneumonia which at this point is still somewhat's tentative.  The patient will self administer her Veletri for her overall previously known severe pulmonary hypertension     8/28: Patient remains relatively stable clinically not short of breath on usual nasal oxygen.  She does have some persistent lower extremity edema extending into the thighs and will give an additional dose of IV Lasix today and then resume oral Lasix tomorrow.  Initial chest x-ray on admission did not show any evidence of CHF although the patient does have right ventricular dysfunction by echo with severe pulmonary hypertension and severe tricuspid valve regurgitation.  The patient complains of some firmness and some vague discomfort of the abdomen.  The patient did have an abdominal ultrasound in 7/2023 negative for ascites.  Her CT angiogram of the chest 5/30/2024 was negative for pulmonary embolism but showed persistent marked cardiomegaly hepatic heterogeneity and splenomegaly consistent with right heart dysfunction.  Will check abdominal ultrasound to rule out ascites.    8/29: The patient had a abdominal and pelvic CT scan performed yesterday rather than an ultrasound.  It does demonstrate a dramatic increase in cardiac size from a study of 6/1/2023 with marked right atrial dilatation as expected from the patient's known severe pulmonary hypertension.  She also has a small pericardial effusion.  There is heterogeneous perfusion throughout the liver due to backflow pressure from right heart impairment and there is mild hepatomegaly.  There is also mild splenomegaly.  There is only a trace amount of ascites with subcutaneous edema.  Patient's abdominal distention thought to be related to the hepatomegaly.  Patient remains in sinus rhythm  sinus tachycardia no atrial tachyarrhythmias on diltiazem CD1 100 mg daily.  She has been restarted on her usual Lasix 40 mg daily and is being prepared for discharge.  Electrolytes remain acceptable creatinine of 0.80 with a potassium of 3.4.  She is on potassium supplementation.  She continues to have a baseline pancytopenia WBC of 4300 hematocrit 30.4 platelet count 101,000 related to her chronic liver disease and splenomegaly.       CVC 04/23/24 Left Subclavian (Active)   Line Necessity Intravenous medication therapy 08/29/24 0900   Site Assessment Clean;Dry;Intact 08/29/24 0900   Dressing Status Clean;Dry 08/29/24 0900   Number of days: 128       Code Status:  Full Code    I spent 20 minutes in the professional and overall care of this patient.        Anderson Niño MD

## 2024-08-29 NOTE — CARE PLAN
The patient's goals for the shift include  rest    The clinical goals for the shift include IV ABX

## 2024-08-29 NOTE — NURSING NOTE
Pt has a L chest tunneled line, drsg dry and intact without any redness, swelling or drainage, pt receiving her own medications through it.

## 2024-08-29 NOTE — CARE PLAN
Problem: Pain - Adult  Goal: Verbalizes/displays adequate comfort level or baseline comfort level  Outcome: Met     Problem: Safety - Adult  Goal: Free from fall injury  Outcome: Met     Problem: Discharge Planning  Goal: Discharge to home or other facility with appropriate resources  Outcome: Met     Problem: Chronic Conditions and Co-morbidities  Goal: Patient's chronic conditions and co-morbidity symptoms are monitored and maintained or improved  Outcome: Met     Problem: Nutrition  Goal: Oral intake greater 75%  Outcome: Met     Problem: Fall/Injury  Goal: Not fall by end of shift  Outcome: Met  Goal: Be free from injury by end of the shift  Outcome: Met  Goal: Verbalize understanding of personal risk factors for fall in the hospital  Outcome: Met  Goal: Verbalize understanding of risk factor reduction measures to prevent injury from fall in the home  Outcome: Met  Goal: Use assistive devices by end of the shift  Outcome: Met  Goal: Pace activities to prevent fatigue by end of the shift  Outcome: Met

## 2024-08-29 NOTE — PROGRESS NOTES
"Ronna Beckham is a 63 y.o. female on day 3 of admission seen in follow-up for acute on chronic hypoxic respiratory failure, pulmonary arterial hypertension    Subjective   On 3 L nasal cannula oxygen; oxygen sats 93%.  No respiratory complaints.  Denies pain.  Afebrile.     Objective     Physical Exam  Vitals and nursing note reviewed.   Constitutional:       Appearance: Normal appearance.   HENT:      Head: Normocephalic and atraumatic.      Nose: Nose normal.      Mouth/Throat:      Mouth: Mucous membranes are moist.   Eyes:      Extraocular Movements: Extraocular movements intact.      Conjunctiva/sclera: Conjunctivae normal.      Pupils: Pupils are equal, round, and reactive to light.   Cardiovascular:      Rate and Rhythm: Normal rate and regular rhythm.      Pulses: Normal pulses.      Heart sounds: Normal heart sounds.   Pulmonary:      Effort: Pulmonary effort is normal.      Comments: Bibasilar rales.  Abdominal:      General: Bowel sounds are normal.      Palpations: Abdomen is soft.   Musculoskeletal:         General: Normal range of motion.      Right lower leg: Edema present.      Left lower leg: Edema present.   Skin:     General: Skin is warm and dry.      Capillary Refill: Capillary refill takes less than 2 seconds.   Neurological:      General: No focal deficit present.      Mental Status: She is alert and oriented to person, place, and time.   Psychiatric:         Mood and Affect: Mood normal.         Behavior: Behavior normal.         Last Recorded Vitals  Blood pressure 105/68, pulse 94, temperature 36.5 °C (97.7 °F), temperature source Oral, resp. rate 18, height 1.651 m (5' 5\"), weight 73.5 kg (162 lb), SpO2 93%.  Intake/Output last 3 Shifts:  I/O last 3 completed shifts:  In: 850 (11.6 mL/kg) [P.O.:600; IV Piggyback:250]  Out: - (0 mL/kg)   Weight: 73.5 kg       Intake/Output Summary (Last 24 hours) at 8/29/2024 0911  Last data filed at 8/28/2024 1500  Gross per 24 hour   Intake 400 ml "   Output --   Net 400 ml     Scheduled medications:  aspirin, 81 mg, oral, Daily  calcium carbonate-vitamin D3, 2 tablet, oral, BID  cholestyramine, 1 packet, oral, Daily  dilTIAZem CD, 180 mg, oral, Daily  folic acid, 1 mg, oral, Daily  furosemide, 40 mg, oral, Daily  hydroxychloroquine, 200 mg, oral, BID  iron sucrose, 300 mg, intravenous, Daily  macitentan, 10 mg, oral, Daily  oxygen, , inhalation, Continuous - Inhalation  pantoprazole, 40 mg, oral, Daily before breakfast  potassium chloride CR, 20 mEq, oral, Daily  venlafaxine XR, 150 mg, oral, Daily       PRN medications: acetaminophen **OR** acetaminophen **OR** acetaminophen, benzocaine-menthol, dextromethorphan-guaifenesin, gabapentin, guaiFENesin, loperamide, melatonin, ondansetron ODT **OR** ondansetron, polyethylene glycol   epoprostenol, 1.5 mg       Relevant Results  Results for orders placed or performed during the hospital encounter of 08/26/24 (from the past 24 hour(s))   C. difficile, PCR    Specimen: Stool   Result Value Ref Range    C. difficile, PCR Not Detected Not Detected   Basic Metabolic Panel   Result Value Ref Range    Glucose 89 65 - 99 mg/dL    Sodium 139 133 - 145 mmol/L    Potassium 3.4 3.4 - 5.1 mmol/L    Chloride 103 97 - 107 mmol/L    Bicarbonate 23 (L) 24 - 31 mmol/L    Urea Nitrogen 12 8 - 25 mg/dL    Creatinine 0.80 0.40 - 1.60 mg/dL    eGFR 84 >60 mL/min/1.73m*2    Calcium 7.0 (L) 8.5 - 10.4 mg/dL    Anion Gap 13 <=19 mmol/L   CBC   Result Value Ref Range    WBC 4.3 (L) 4.4 - 11.3 x10*3/uL    nRBC 0.0 0.0 - 0.0 /100 WBCs    RBC 3.82 (L) 4.00 - 5.20 x10*6/uL    Hemoglobin 8.8 (L) 12.0 - 16.0 g/dL    Hematocrit 30.4 (L) 36.0 - 46.0 %    MCV 80 80 - 100 fL    MCH 23.0 (L) 26.0 - 34.0 pg    MCHC 28.9 (L) 32.0 - 36.0 g/dL    RDW 17.7 (H) 11.5 - 14.5 %    Platelets 101 (L) 150 - 450 x10*3/uL      XR chest 2 views  Result Date: 8/28/2028  FINDINGS: Cardiac silhouette is markedly enlarged likely secondary to  four-chamber cardiomegaly  based on previous imaging. Central venous  catheter is present the tip at the SVC right atrial junction level. Right hemithorax is clear. Left hemithorax is also clear.   No infiltrates or effusions are identified. IMPRESSION: Cardiac silhouette enlargement likely from cardiomegaly. No infiltrates or effusions.    CT abdomen pelvis w IV contrast  Result Date: 8/18/2024  FINDINGS: LOWER CHEST: No acute airspace disease or pleural effusion. Marked  cardiomegaly and moderate pericardial effusion both about the same as  on CT 30 May 2024. There was a dramatic increase in the heart size  sometime between CT abdomen and pelvis 1 June 2023 and CT chest with  contrast 7 January 2024. BONES: No acute skeletal findings.LIVER: Craniocaudal long-axis diameter 19.5 cm, at least mildly enlarged, measured 3 cm shorter and within normal limits on 1 June. 2023. Heterogeneous perfusion suggesting underlying hepatocellular disease. The entire portal venous system including the splenic vein, SMV and its major tributaries, main and intrahepatic portal veins are all patent; no acute portal venous thrombosis. No hepatic abnormality  suspect for focal lesions such as mass. SPLEEN: Normal. No enlargement, mass or evidence of splenic vein thrombosis. PANCREAS: Normal. No CT evidence of acute or chronic pancreatitis. No duct dilation. No mass. GALLBLADDER: Surgically absent. Craniocaudal long-axis diameter now 13.7 cm, mildly enlarged but 1.5 cm longer than it was 1 June 2023. The vein is patent. No mass  BILE DUCTS: Normal. No biliary duct dilation. ADRENAL GLANDS: Normal. No nodule or mass. KIDNEYS AND URETERS: Normal.  No hydronephrosis on either side.  No mass.  Symmetric enhancement.  No infarct or CT evidence of acute  pyelonephritis.  No substantial radiodense stone.  Tiny stones and  radiolucent stones could be occult on CT. LYMPH NODES: No adenopathy, intraperitoneal, retroperitoneal, pelvic or otherwise. APPENDIX: Normal.  Not  dilated, thick walled or in any other way inflamed in appearance.  No inflammatory change about the appendix. COLON: Most loops not well enough distended for optimal specificity. Question of true pathologic sigmoid wall thickening. No obstruction or intussusception associated with 13 mm sigmoid lipoma which is of course unchanged. SMALL BOWEL: Normal. No small bowel dilation or any other sign of small bowel obstruction. No sign of active inflammatory bowel disease. STOMACH / DUODENUM: Grossly normal by CT which has limited sensitivity and specificity for the stomach and duodenum.  RETROPERITONEUM: Normal.  No acute hemorrhage or inflammatory change.  Lymph nodes in a separate dedicated section. OMENTUM, MESENTERY AND PERITONEAL SPACES: Free intraperitoneal air: Negative Free intraperitoneal fluid: Trace to small volume not acutely hemorrhagic but nevertheless abnormal. There was no free fluid about the liver on CT chest 30 May 2024 but today there is. There was no free fluid anywhere in the abdomen or pelvis on 1 June 2023 Abscess Negative Other: n/a. URINARY BLADDER: Normal. No wall thickening, large diverticula, radiodense stone or surrounding inflammatory change. PELVIS: No obvious acute adnexal abnormality by CT. VASCULATURE: Large venous collaterals in the omentum draining into the portal venous system, unchanged from 1 June 2023. ABDOMINAL WALL. Hernia: Negative  Other: Subcutaneous edema. IMPRESSION: Sometime between CT abdomen and pelvis 1 June 2023 and CT chest with contrast 7 January 2024, this patient's cardiac size dramatically increased, including impressive right atrial dilation, and an at least small pericardial effusion developed as well. Those findings  are unchanged in the included lung bases on today's exam. Question of  impaired right heart function accounting for other findings described  above and immediately below. Impressive, heterogeneous perfusion throughout the liver could be related to  backflow pressure from right heart impairment and/or hepatocellular disease. The liver is now mildly enlarged at 19.5 cm craniocaudal long-axis diameter. On the most recent exam the liver  was included in its entirety 1 June 2023, the liver was within normal  limits for size at 16.5 cm craniocaudal long-axis. The spleen was also normal in size on 1 June 2023; today, mildly enlarged, having increased in craniocaudal long-axis by about 1.5 cm since 1 June 2023. Questionable mild acute uncomplicated sigmoid diverticulitis. Note most of the colon is empty, limiting specificity for true pathologic wall thickening. Furthermore, in the presence of suspected portal hypertension, colonic wall thickening may simply be from portal hypertensive/congestive colopathy but that is usually isolated to the  right colon. Any perceived abdominal distension is probably due to a combination of hepatomegaly, the trace to small volume not acutely  hemorrhagic ascites (too small for paracentesis) and subcutaneous edema. No small bowel obstruction, perforation or abscess. Normal appendix. The entire portal venous system including the splenic vein, SMV and its major tributaries, main and intrahepatic portal veins are all patent; no acute portal venous thrombosis.    XR chest 1 view  Result Date: 8/26/2024  FINDINGS: AP radiograph of the chest was provided.   Apical lordotic position. Similar position left IJ catheter. The tip is at the cavoatrial junction region.   CARDIOMEDIASTINAL SILHOUETTE: Similar prominence of the cardiomediastinal silhouette.   LUNGS: Marginated left retrocardiac density is indeterminate potentially related to overlapping body wall. Left basilar pneumonia not excluded. Otherwise similar expansion without evident  sadiq edema or effusion.   ABDOMEN: No remarkable upper abdominal findings.   BONES: No acute osseous changes.  1.  Left retrocardiac density may be due to overlapping normal structures although left basilar  pneumonia not excluded.                Assessment/Plan   Acute on chronic hypoxic respiratory failure  Oxygen at baseline  Continuous pulse oximetry  Incentive spirometry/pulmonary hygiene     Pulmonary arterial hypertension  Currently on advanced therapy for pulmonary artery hypertension (Veletri) via cuffed catheter left subclavian w/ continuous home pump; will allow her to self administer     Decompensated congestive heart failure  Oral Lasix   Fluid restriction  Cardiology follow-up     Suspicion for left basilar pneumonia  Emperic IV doxycycline, ceftriaxone  Procalcitonin 0.04 ng/mL    Abdominal discomfort  Abdominal ultrasound to rule out ascites pending     Anemia  IV Venofer x 3 doses  Folic acid     Systemic lupus erythematous  Continue home hydroxychloroquine      History of epilepsy  Seizure precautions  Not on any antiseizure medications     Prophylaxis  Oral Protonix  SCDs    PT/OT/out of bed  Stable for discharge from a pulmonary standpoint  Patient already has an appointment with her PAH specialist    MICHEL Miller-CNP

## 2024-08-31 LAB — METHYLMALONATE SERPL-SCNC: 0.22 UMOL/L (ref 0–0.4)

## 2024-09-04 ENCOUNTER — TELEPHONE (OUTPATIENT)
Dept: INPATIENT UNIT | Facility: HOSPITAL | Age: 64
End: 2024-09-04
Payer: COMMERCIAL

## 2024-09-05 ENCOUNTER — HOSPITAL ENCOUNTER (EMERGENCY)
Facility: HOSPITAL | Age: 64
Discharge: HOME | End: 2024-09-05
Payer: COMMERCIAL

## 2024-09-05 ENCOUNTER — APPOINTMENT (OUTPATIENT)
Dept: PHYSICAL THERAPY | Facility: CLINIC | Age: 64
End: 2024-09-05
Payer: COMMERCIAL

## 2024-09-05 VITALS
HEIGHT: 62 IN | RESPIRATION RATE: 22 BRPM | TEMPERATURE: 98.2 F | OXYGEN SATURATION: 94 % | SYSTOLIC BLOOD PRESSURE: 98 MMHG | BODY MASS INDEX: 27.6 KG/M2 | HEART RATE: 101 BPM | DIASTOLIC BLOOD PRESSURE: 69 MMHG | WEIGHT: 150 LBS

## 2024-09-05 DIAGNOSIS — T14.8XXA HEMATOMA: Primary | ICD-10-CM

## 2024-09-05 PROCEDURE — 99283 EMERGENCY DEPT VISIT LOW MDM: CPT

## 2024-09-05 ASSESSMENT — PAIN SCALES - GENERAL: PAINLEVEL_OUTOF10: 3

## 2024-09-05 ASSESSMENT — PAIN - FUNCTIONAL ASSESSMENT: PAIN_FUNCTIONAL_ASSESSMENT: 0-10

## 2024-09-05 NOTE — ED PROVIDER NOTES
HPI   Chief Complaint   Patient presents with    hematoma       HPI  See my MDM      Patient History   Past Medical History:   Diagnosis Date    Anxiety     Depression     GERD (gastroesophageal reflux disease)     Lupus (Multi)     Personal history of other specified conditions 2020    History of seizure    Pulmonary hypertension (Multi)      Past Surgical History:   Procedure Laterality Date    CARDIAC CATHETERIZATION N/A 2024    Procedure: Left Heart Cath, With LV, Angriography And Grafts;  Surgeon: Christiano Calvillo DO;  Location: [facility];  Service: Cardiovascular;  Laterality: N/A;    CHOLECYSTECTOMY      HYSTERECTOMY      OTHER SURGICAL HISTORY  2020    Cholecystectomy    OTHER SURGICAL HISTORY  2020    Esophagogastroduodenoscopy    OTHER SURGICAL HISTORY  2020    Colonoscopy    OTHER SURGICAL HISTORY  2020     section    OTHER SURGICAL HISTORY  2020    Hysterectomy     Family History   Problem Relation Name Age of Onset    No Known Problems Mother      No Known Problems Father       Social History     Tobacco Use    Smoking status: Former     Types: Cigarettes    Smokeless tobacco: Former   Vaping Use    Vaping status: Never Used   Substance Use Topics    Alcohol use: Not Currently    Drug use: Not Currently     Types: Cocaine, Other     Comment: Meth       Physical Exam   ED Triage Vitals [24 1613]   Temperature Heart Rate Respirations BP   36.8 °C (98.2 °F) (!) 101 (!) 22 98/69      Pulse Ox Temp Source Heart Rate Source Patient Position   94 % Temporal Monitor --      BP Location FiO2 (%)     -- --       Physical Exam    CONSTITUTIONAL: Vital signs reviewed as charted, well-developed and in no distress  LUNGS: Breath sounds equal and clear to auscultation. Good air exchange, no wheezes rales or retractions, pulse oximetry is charted.  HEART: Regular rate and rhythm without murmur thrill or rub, strong tones, auscultation is normal.  ABDOMEN:  Soft and nontender without guarding rebound rigidity or mass. Bowel sounds are present and normal in all quadrants. There is no palpable masses or aneurysms identified. No hepatosplenomegaly, normal abdominal exam.  Neuro: The patient is awake, alert and oriented ×3. Moving all 4 extremities and answering questions appropriately.   MUSCULOSKELETAL: The calves are nontender to palpation. Full gross active range of motion.   PSYCH: Awake alert oriented, normal mood and affect.  Skin:  Dry, normal color, warm to the touch, no rash present.  Exam of the left forearm shows an area following her pain of surrounding hematoma.  It is small in nature proximately 2 cm long by half centimeter wide    ED Course & MDM   Diagnoses as of 09/05/24 1633   Hematoma                 No data recorded     [place] Coma Scale Score: 15 (09/05/24 1617 : Mari Nix RN)                           Medical Decision Making  History obtained from: patient    Vital signs, nursing notes, current medications, past medical history, Surgical history, allergies, social history, family History were reviewed.         HPI:  Patient is a 63-year-old female presenting emergency room today for evaluation of her left arm.  States she was walking up the stairs and noticed an area around her vein got bigger.  States she had blood work drawn yesterday.  Does appear that she has got small hematoma surrounding it.  Denies dizziness, chest pain, shortness of breath, abdominal pain extremity edema.  Nontoxic well-appearing      10 point ROS was reviewed and negative except Noted above in HPI.  DDX: as listed above          MDM Summary/considerations:  Examination consistent with small hematoma surrounding the area of venipuncture.  Discussed using warm compresses.  She will follow with her PCP as needed.  Was discharged home stable condition.    All of the patient's questions were answered to the best of my ability.  Patient states understanding that they have  been screened for an emergency today and we have not found any etiology of symptoms that requires emergent treatment or admission to the hospital at this point. They understand that they have not had definitive care day and require follow-up for treatment of their condition. They also state understanding that they may have an emergent condition that may potentially have not of detected at this visit and they must return to the emergency department if they develop any worsening of symptoms or new complaints.      I have evaluated this patient, my supervising physician was available for consultation.                Critical Care: Not warranted at this time        This chart was completed using voice recognition transcription software. Please excuse any errors of transcription including grammatical, punctuation, syntax and spelling errors.  Please contact me with any questions regarding this chart.    Procedure  Procedures     MICHEL Busby-OSBALDO  09/05/24 5343

## 2024-09-05 NOTE — ED TRIAGE NOTES
Pt brought in by ems for a hematoma/bruise to the right arm. Pt unsure of origin of bruise. Pt has no other complains and is on O2 at baseline

## 2024-09-06 ENCOUNTER — CLINICAL SUPPORT (OUTPATIENT)
Dept: EMERGENCY MEDICINE | Facility: HOSPITAL | Age: 64
End: 2024-09-06
Payer: COMMERCIAL

## 2024-09-06 ENCOUNTER — HOSPITAL ENCOUNTER (INPATIENT)
Facility: HOSPITAL | Age: 64
End: 2024-09-06
Attending: EMERGENCY MEDICINE | Admitting: INTERNAL MEDICINE
Payer: COMMERCIAL

## 2024-09-06 ENCOUNTER — APPOINTMENT (OUTPATIENT)
Dept: RADIOLOGY | Facility: HOSPITAL | Age: 64
End: 2024-09-06
Payer: COMMERCIAL

## 2024-09-06 ENCOUNTER — DOCUMENTATION (OUTPATIENT)
Dept: PULMONOLOGY | Facility: HOSPITAL | Age: 64
End: 2024-09-06
Payer: COMMERCIAL

## 2024-09-06 DIAGNOSIS — I27.20 PULMONARY HYPERTENSION (MULTI): Primary | ICD-10-CM

## 2024-09-06 DIAGNOSIS — R06.02 SOB (SHORTNESS OF BREATH): ICD-10-CM

## 2024-09-06 DIAGNOSIS — I10 PRIMARY HYPERTENSION: ICD-10-CM

## 2024-09-06 DIAGNOSIS — I47.10 SVT (SUPRAVENTRICULAR TACHYCARDIA) (CMS-HCC): ICD-10-CM

## 2024-09-06 DIAGNOSIS — I27.20 PULMONARY HTN (MULTI): ICD-10-CM

## 2024-09-06 DIAGNOSIS — R60.0 LOWER LEG EDEMA: ICD-10-CM

## 2024-09-06 LAB
ALBUMIN SERPL BCP-MCNC: 3.5 G/DL (ref 3.4–5)
ALP SERPL-CCNC: 68 U/L (ref 33–136)
ALT SERPL W P-5'-P-CCNC: 6 U/L (ref 7–45)
ANION GAP BLDV CALCULATED.4IONS-SCNC: 14 MMOL/L (ref 10–25)
ANION GAP SERPL CALC-SCNC: 15 MMOL/L (ref 10–20)
AST SERPL W P-5'-P-CCNC: 9 U/L (ref 9–39)
ATRIAL RATE: 96 BPM
BASE EXCESS BLDV CALC-SCNC: -4.2 MMOL/L (ref -2–3)
BASOPHILS # BLD AUTO: 0 X10*3/UL (ref 0–0.1)
BASOPHILS NFR BLD AUTO: 0 %
BILIRUB SERPL-MCNC: 0.8 MG/DL (ref 0–1.2)
BNP SERPL-MCNC: 452 PG/ML (ref 0–99)
BODY TEMPERATURE: 37 DEGREES CELSIUS
BUN SERPL-MCNC: 18 MG/DL (ref 6–23)
CA-I BLDV-SCNC: 0.81 MMOL/L (ref 1.1–1.33)
CALCIUM SERPL-MCNC: 6.3 MG/DL (ref 8.6–10.6)
CARDIAC TROPONIN I PNL SERPL HS: 34 NG/L (ref 0–34)
CARDIAC TROPONIN I PNL SERPL HS: 36 NG/L (ref 0–34)
CHLORIDE BLDV-SCNC: 106 MMOL/L (ref 98–107)
CHLORIDE SERPL-SCNC: 106 MMOL/L (ref 98–107)
CO2 SERPL-SCNC: 20 MMOL/L (ref 21–32)
CREAT SERPL-MCNC: 0.83 MG/DL (ref 0.5–1.05)
EGFRCR SERPLBLD CKD-EPI 2021: 80 ML/MIN/1.73M*2
EOSINOPHIL # BLD AUTO: 0 X10*3/UL (ref 0–0.7)
EOSINOPHIL NFR BLD AUTO: 0 %
ERYTHROCYTE [DISTWIDTH] IN BLOOD BY AUTOMATED COUNT: 19.3 % (ref 11.5–14.5)
GLUCOSE BLDV-MCNC: 86 MG/DL (ref 74–99)
GLUCOSE SERPL-MCNC: 87 MG/DL (ref 74–99)
HCO3 BLDV-SCNC: 19.5 MMOL/L (ref 22–26)
HCT VFR BLD AUTO: 29.7 % (ref 36–46)
HCT VFR BLD EST: 29 % (ref 36–46)
HGB BLD-MCNC: 9.3 G/DL (ref 12–16)
HGB BLDV-MCNC: 9.7 G/DL (ref 12–16)
IMM GRANULOCYTES # BLD AUTO: 0.03 X10*3/UL (ref 0–0.7)
IMM GRANULOCYTES NFR BLD AUTO: 0.7 % (ref 0–0.9)
INHALED O2 CONCENTRATION: 40 %
INR PPP: 1.5 (ref 0.9–1.1)
LACTATE BLDV-SCNC: 0.7 MMOL/L (ref 0.4–2)
LIPASE SERPL-CCNC: 34 U/L (ref 9–82)
LYMPHOCYTES # BLD AUTO: 0.75 X10*3/UL (ref 1.2–4.8)
LYMPHOCYTES NFR BLD AUTO: 17.6 %
MAGNESIUM SERPL-MCNC: 0.61 MG/DL (ref 1.6–2.4)
MCH RBC QN AUTO: 23.9 PG (ref 26–34)
MCHC RBC AUTO-ENTMCNC: 31.3 G/DL (ref 32–36)
MCV RBC AUTO: 76 FL (ref 80–100)
MONOCYTES # BLD AUTO: 0.22 X10*3/UL (ref 0.1–1)
MONOCYTES NFR BLD AUTO: 5.2 %
NEUTROPHILS # BLD AUTO: 3.26 X10*3/UL (ref 1.2–7.7)
NEUTROPHILS NFR BLD AUTO: 76.5 %
NRBC BLD-RTO: 0 /100 WBCS (ref 0–0)
OXYHGB MFR BLDV: 87.3 % (ref 45–75)
P AXIS: 16 DEGREES
P OFFSET: 184 MS
P ONSET: 125 MS
PCO2 BLDV: 30 MM HG (ref 41–51)
PH BLDV: 7.42 PH (ref 7.33–7.43)
PLATELET # BLD AUTO: 102 X10*3/UL (ref 150–450)
PO2 BLDV: 59 MM HG (ref 35–45)
POTASSIUM BLDV-SCNC: 4.3 MMOL/L (ref 3.5–5.3)
POTASSIUM SERPL-SCNC: 4.2 MMOL/L (ref 3.5–5.3)
PR INTERVAL: 176 MS
PROT SERPL-MCNC: 6.4 G/DL (ref 6.4–8.2)
PROTHROMBIN TIME: 17 SECONDS (ref 9.8–12.8)
Q ONSET: 213 MS
QRS COUNT: 16 BEATS
QRS DURATION: 90 MS
QT INTERVAL: 432 MS
QTC CALCULATION(BAZETT): 545 MS
QTC FREDERICIA: 505 MS
R AXIS: 149 DEGREES
RBC # BLD AUTO: 3.89 X10*6/UL (ref 4–5.2)
SAO2 % BLDV: 90 % (ref 45–75)
SODIUM BLDV-SCNC: 135 MMOL/L (ref 136–145)
SODIUM SERPL-SCNC: 137 MMOL/L (ref 136–145)
T AXIS: -13 DEGREES
T OFFSET: 429 MS
VENTRICULAR RATE: 96 BPM
WBC # BLD AUTO: 4.3 X10*3/UL (ref 4.4–11.3)

## 2024-09-06 PROCEDURE — 71045 X-RAY EXAM CHEST 1 VIEW: CPT | Performed by: RADIOLOGY

## 2024-09-06 PROCEDURE — 84132 ASSAY OF SERUM POTASSIUM: CPT | Performed by: EMERGENCY MEDICINE

## 2024-09-06 PROCEDURE — 85025 COMPLETE CBC W/AUTO DIFF WBC: CPT | Performed by: EMERGENCY MEDICINE

## 2024-09-06 PROCEDURE — 74018 RADEX ABDOMEN 1 VIEW: CPT | Performed by: RADIOLOGY

## 2024-09-06 PROCEDURE — 2500000004 HC RX 250 GENERAL PHARMACY W/ HCPCS (ALT 636 FOR OP/ED): Mod: JZ,SE

## 2024-09-06 PROCEDURE — 83735 ASSAY OF MAGNESIUM: CPT

## 2024-09-06 PROCEDURE — 96365 THER/PROPH/DIAG IV INF INIT: CPT

## 2024-09-06 PROCEDURE — 82435 ASSAY OF BLOOD CHLORIDE: CPT | Performed by: EMERGENCY MEDICINE

## 2024-09-06 PROCEDURE — 93010 ELECTROCARDIOGRAM REPORT: CPT | Performed by: EMERGENCY MEDICINE

## 2024-09-06 PROCEDURE — 84484 ASSAY OF TROPONIN QUANT: CPT

## 2024-09-06 PROCEDURE — 1200000002 HC GENERAL ROOM WITH TELEMETRY DAILY

## 2024-09-06 PROCEDURE — 99285 EMERGENCY DEPT VISIT HI MDM: CPT | Performed by: EMERGENCY MEDICINE

## 2024-09-06 PROCEDURE — 99285 EMERGENCY DEPT VISIT HI MDM: CPT

## 2024-09-06 PROCEDURE — 93005 ELECTROCARDIOGRAM TRACING: CPT

## 2024-09-06 PROCEDURE — 83880 ASSAY OF NATRIURETIC PEPTIDE: CPT | Performed by: EMERGENCY MEDICINE

## 2024-09-06 PROCEDURE — 83690 ASSAY OF LIPASE: CPT

## 2024-09-06 PROCEDURE — 84484 ASSAY OF TROPONIN QUANT: CPT | Performed by: EMERGENCY MEDICINE

## 2024-09-06 PROCEDURE — 84132 ASSAY OF SERUM POTASSIUM: CPT

## 2024-09-06 PROCEDURE — 80053 COMPREHEN METABOLIC PANEL: CPT

## 2024-09-06 PROCEDURE — 96375 TX/PRO/DX INJ NEW DRUG ADDON: CPT

## 2024-09-06 PROCEDURE — 85610 PROTHROMBIN TIME: CPT

## 2024-09-06 PROCEDURE — 74018 RADEX ABDOMEN 1 VIEW: CPT

## 2024-09-06 PROCEDURE — 71045 X-RAY EXAM CHEST 1 VIEW: CPT

## 2024-09-06 PROCEDURE — 99223 1ST HOSP IP/OBS HIGH 75: CPT

## 2024-09-06 RX ORDER — MAGNESIUM SULFATE HEPTAHYDRATE 40 MG/ML
4 INJECTION, SOLUTION INTRAVENOUS ONCE
Status: COMPLETED | OUTPATIENT
Start: 2024-09-06 | End: 2024-09-07

## 2024-09-06 RX ORDER — CHOLESTYRAMINE 4 G/9G
1 POWDER, FOR SUSPENSION ORAL DAILY
Status: DISPENSED | OUTPATIENT
Start: 2024-09-07

## 2024-09-06 RX ORDER — EPOPROSTENOL 1.5 MG/10ML
1.5 INJECTION, POWDER, LYOPHILIZED, FOR SOLUTION INTRAVENOUS CONTINUOUS
Status: DISCONTINUED | OUTPATIENT
Start: 2024-09-06 | End: 2024-09-07

## 2024-09-06 RX ORDER — CALCIUM GLUCONATE 20 MG/ML
1 INJECTION, SOLUTION INTRAVENOUS EVERY 4 HOURS
Status: COMPLETED | OUTPATIENT
Start: 2024-09-06 | End: 2024-09-06

## 2024-09-06 RX ORDER — POLYETHYLENE GLYCOL 3350 17 G/17G
17 POWDER, FOR SOLUTION ORAL DAILY
Status: DISPENSED | OUTPATIENT
Start: 2024-09-07

## 2024-09-06 RX ORDER — TOPIRAMATE 25 MG/1
25 TABLET ORAL 2 TIMES DAILY
Status: DISPENSED | OUTPATIENT
Start: 2024-09-06

## 2024-09-06 RX ORDER — ENOXAPARIN SODIUM 100 MG/ML
40 INJECTION SUBCUTANEOUS EVERY 24 HOURS
Status: DISPENSED | OUTPATIENT
Start: 2024-09-06

## 2024-09-06 RX ORDER — HYDROXYCHLOROQUINE SULFATE 200 MG/1
200 TABLET, FILM COATED ORAL 2 TIMES DAILY
Status: DISPENSED | OUTPATIENT
Start: 2024-09-06

## 2024-09-06 RX ORDER — ASPIRIN 81 MG/1
81 TABLET ORAL DAILY
Status: DISPENSED | OUTPATIENT
Start: 2024-09-07

## 2024-09-06 RX ORDER — LOPERAMIDE HYDROCHLORIDE 2 MG/1
2 CAPSULE ORAL 4 TIMES DAILY PRN
Status: DISPENSED | OUTPATIENT
Start: 2024-09-06

## 2024-09-06 RX ORDER — PANTOPRAZOLE SODIUM 40 MG/1
40 TABLET, DELAYED RELEASE ORAL
Status: DISPENSED | OUTPATIENT
Start: 2024-09-07

## 2024-09-06 RX ORDER — ACETAMINOPHEN 500 MG
5 TABLET ORAL NIGHTLY
Status: DISPENSED | OUTPATIENT
Start: 2024-09-06

## 2024-09-06 RX ORDER — VENLAFAXINE HYDROCHLORIDE 150 MG/1
150 CAPSULE, EXTENDED RELEASE ORAL DAILY
Status: DISPENSED | OUTPATIENT
Start: 2024-09-07

## 2024-09-06 ASSESSMENT — PAIN DESCRIPTION - PAIN TYPE: TYPE: ACUTE PAIN

## 2024-09-06 ASSESSMENT — COLUMBIA-SUICIDE SEVERITY RATING SCALE - C-SSRS
2. HAVE YOU ACTUALLY HAD ANY THOUGHTS OF KILLING YOURSELF?: NO
1. IN THE PAST MONTH, HAVE YOU WISHED YOU WERE DEAD OR WISHED YOU COULD GO TO SLEEP AND NOT WAKE UP?: NO
6. HAVE YOU EVER DONE ANYTHING, STARTED TO DO ANYTHING, OR PREPARED TO DO ANYTHING TO END YOUR LIFE?: NO

## 2024-09-06 ASSESSMENT — LIFESTYLE VARIABLES
EVER HAD A DRINK FIRST THING IN THE MORNING TO STEADY YOUR NERVES TO GET RID OF A HANGOVER: NO
EVER FELT BAD OR GUILTY ABOUT YOUR DRINKING: NO
HAVE PEOPLE ANNOYED YOU BY CRITICIZING YOUR DRINKING: NO
TOTAL SCORE: 0
HAVE YOU EVER FELT YOU SHOULD CUT DOWN ON YOUR DRINKING: NO

## 2024-09-06 ASSESSMENT — PAIN DESCRIPTION - LOCATION: LOCATION: FOOT

## 2024-09-06 ASSESSMENT — PAIN SCALES - GENERAL: PAINLEVEL_OUTOF10: 10 - WORST POSSIBLE PAIN

## 2024-09-06 ASSESSMENT — PAIN - FUNCTIONAL ASSESSMENT: PAIN_FUNCTIONAL_ASSESSMENT: 0-10

## 2024-09-06 NOTE — PROGRESS NOTES
Pt called office with several complaints. Not urinating much since home from the hospital. Lasix is at 80 mg daily since discharge. Right ankle is swollen and left leg is swollen from knee to thigh with pitting edema present. Started wearing compression stockings without relief of edema. Took vitals while on the phone with me as follows: /74, , O2 Sat 87%/4 L NC. I advised pt to increase O2 to 5 liters and recheck Pox. Pox increased to 91% on 5 L NC. States she is not feeling well.  Unable to eat food. Only taking ice chips and having nausea. Has her grand daughter at home with her. Dr. Prescott notified and orders received to go to emergency [facility]. Pt agrees and charge nurse made aware of incoming pt.

## 2024-09-06 NOTE — ED TRIAGE NOTES
Hx of pulmonary arterial hypertension on veletri, CHF, home o2 of 4LNC though patient reports that she feels as if she is SOB. She has developed bilateral lower extremity, sent to ED by provider.

## 2024-09-07 ENCOUNTER — APPOINTMENT (OUTPATIENT)
Dept: RADIOLOGY | Facility: HOSPITAL | Age: 64
End: 2024-09-07
Payer: COMMERCIAL

## 2024-09-07 LAB
ALBUMIN SERPL BCP-MCNC: 3.4 G/DL (ref 3.4–5)
ALBUMIN SERPL BCP-MCNC: 3.6 G/DL (ref 3.4–5)
AMORPH CRY #/AREA UR COMP ASSIST: ABNORMAL /HPF
AMPHETAMINES UR QL SCN: ABNORMAL
ANION GAP SERPL CALC-SCNC: 15 MMOL/L (ref 10–20)
ANION GAP SERPL CALC-SCNC: 15 MMOL/L (ref 10–20)
APPEARANCE UR: ABNORMAL
BARBITURATES UR QL SCN: ABNORMAL
BASOPHILS # BLD AUTO: 0 X10*3/UL (ref 0–0.1)
BASOPHILS NFR BLD AUTO: 0 %
BENZODIAZ UR QL SCN: ABNORMAL
BILIRUB UR STRIP.AUTO-MCNC: NEGATIVE MG/DL
BUN SERPL-MCNC: 14 MG/DL (ref 6–23)
BUN SERPL-MCNC: 15 MG/DL (ref 6–23)
BZE UR QL SCN: ABNORMAL
CALCIUM SERPL-MCNC: 6.5 MG/DL (ref 8.6–10.6)
CALCIUM SERPL-MCNC: 6.7 MG/DL (ref 8.6–10.6)
CANNABINOIDS UR QL SCN: ABNORMAL
CHLORIDE SERPL-SCNC: 104 MMOL/L (ref 98–107)
CHLORIDE SERPL-SCNC: 106 MMOL/L (ref 98–107)
CO2 SERPL-SCNC: 21 MMOL/L (ref 21–32)
CO2 SERPL-SCNC: 22 MMOL/L (ref 21–32)
COLOR UR: ABNORMAL
CREAT SERPL-MCNC: 0.74 MG/DL (ref 0.5–1.05)
CREAT SERPL-MCNC: 0.76 MG/DL (ref 0.5–1.05)
EGFRCR SERPLBLD CKD-EPI 2021: 89 ML/MIN/1.73M*2
EGFRCR SERPLBLD CKD-EPI 2021: >90 ML/MIN/1.73M*2
EOSINOPHIL # BLD AUTO: 0 X10*3/UL (ref 0–0.7)
EOSINOPHIL NFR BLD AUTO: 0 %
ERYTHROCYTE [DISTWIDTH] IN BLOOD BY AUTOMATED COUNT: 19.3 % (ref 11.5–14.5)
FENTANYL+NORFENTANYL UR QL SCN: ABNORMAL
GLUCOSE SERPL-MCNC: 103 MG/DL (ref 74–99)
GLUCOSE SERPL-MCNC: 124 MG/DL (ref 74–99)
GLUCOSE UR STRIP.AUTO-MCNC: NORMAL MG/DL
HCT VFR BLD AUTO: 29.1 % (ref 36–46)
HGB BLD-MCNC: 8.6 G/DL (ref 12–16)
HOLD SPECIMEN: NORMAL
HOLD SPECIMEN: NORMAL
IMM GRANULOCYTES # BLD AUTO: 0.02 X10*3/UL (ref 0–0.7)
IMM GRANULOCYTES NFR BLD AUTO: 0.4 % (ref 0–0.9)
KETONES UR STRIP.AUTO-MCNC: NEGATIVE MG/DL
LEUKOCYTE ESTERASE UR QL STRIP.AUTO: NEGATIVE
LYMPHOCYTES # BLD AUTO: 0.55 X10*3/UL (ref 1.2–4.8)
LYMPHOCYTES NFR BLD AUTO: 11.7 %
MAGNESIUM SERPL-MCNC: 1.58 MG/DL (ref 1.6–2.4)
MAGNESIUM SERPL-MCNC: 1.62 MG/DL (ref 1.6–2.4)
MAGNESIUM SERPL-MCNC: 1.85 MG/DL (ref 1.6–2.4)
MCH RBC QN AUTO: 23.6 PG (ref 26–34)
MCHC RBC AUTO-ENTMCNC: 29.6 G/DL (ref 32–36)
MCV RBC AUTO: 80 FL (ref 80–100)
METHADONE UR QL SCN: ABNORMAL
MONOCYTES # BLD AUTO: 0.25 X10*3/UL (ref 0.1–1)
MONOCYTES NFR BLD AUTO: 5.3 %
MUCOUS THREADS #/AREA URNS AUTO: ABNORMAL /LPF
NEUTROPHILS # BLD AUTO: 3.9 X10*3/UL (ref 1.2–7.7)
NEUTROPHILS NFR BLD AUTO: 82.6 %
NITRITE UR QL STRIP.AUTO: NEGATIVE
NRBC BLD-RTO: 0 /100 WBCS (ref 0–0)
OPIATES UR QL SCN: ABNORMAL
OXYCODONE+OXYMORPHONE UR QL SCN: ABNORMAL
PCP UR QL SCN: ABNORMAL
PH UR STRIP.AUTO: 5.5 [PH]
PHOSPHATE SERPL-MCNC: 2.9 MG/DL (ref 2.5–4.9)
PHOSPHATE SERPL-MCNC: 3.5 MG/DL (ref 2.5–4.9)
PLATELET # BLD AUTO: 109 X10*3/UL (ref 150–450)
POTASSIUM SERPL-SCNC: 3.5 MMOL/L (ref 3.5–5.3)
POTASSIUM SERPL-SCNC: 3.7 MMOL/L (ref 3.5–5.3)
PROT UR STRIP.AUTO-MCNC: ABNORMAL MG/DL
RBC # BLD AUTO: 3.64 X10*6/UL (ref 4–5.2)
RBC # UR STRIP.AUTO: NEGATIVE /UL
RBC #/AREA URNS AUTO: ABNORMAL /HPF
SODIUM SERPL-SCNC: 137 MMOL/L (ref 136–145)
SODIUM SERPL-SCNC: 138 MMOL/L (ref 136–145)
SP GR UR STRIP.AUTO: 1.03
UROBILINOGEN UR STRIP.AUTO-MCNC: NORMAL MG/DL
WBC # BLD AUTO: 4.7 X10*3/UL (ref 4.4–11.3)
WBC #/AREA URNS AUTO: ABNORMAL /HPF

## 2024-09-07 PROCEDURE — 82435 ASSAY OF BLOOD CHLORIDE: CPT

## 2024-09-07 PROCEDURE — 83735 ASSAY OF MAGNESIUM: CPT

## 2024-09-07 PROCEDURE — 2500000004 HC RX 250 GENERAL PHARMACY W/ HCPCS (ALT 636 FOR OP/ED)

## 2024-09-07 PROCEDURE — 85025 COMPLETE CBC W/AUTO DIFF WBC: CPT

## 2024-09-07 PROCEDURE — 2500000001 HC RX 250 WO HCPCS SELF ADMINISTERED DRUGS (ALT 637 FOR MEDICARE OP)

## 2024-09-07 PROCEDURE — 80307 DRUG TEST PRSMV CHEM ANLYZR: CPT

## 2024-09-07 PROCEDURE — 81001 URINALYSIS AUTO W/SCOPE: CPT

## 2024-09-07 PROCEDURE — 2500000002 HC RX 250 W HCPCS SELF ADMINISTERED DRUGS (ALT 637 FOR MEDICARE OP, ALT 636 FOR OP/ED)

## 2024-09-07 PROCEDURE — 80069 RENAL FUNCTION PANEL: CPT

## 2024-09-07 PROCEDURE — 2500000004 HC RX 250 GENERAL PHARMACY W/ HCPCS (ALT 636 FOR OP/ED): Performed by: STUDENT IN AN ORGANIZED HEALTH CARE EDUCATION/TRAINING PROGRAM

## 2024-09-07 PROCEDURE — 2500000001 HC RX 250 WO HCPCS SELF ADMINISTERED DRUGS (ALT 637 FOR MEDICARE OP): Performed by: STUDENT IN AN ORGANIZED HEALTH CARE EDUCATION/TRAINING PROGRAM

## 2024-09-07 PROCEDURE — 1100000001 HC PRIVATE ROOM DAILY

## 2024-09-07 PROCEDURE — 93971 EXTREMITY STUDY: CPT | Performed by: RADIOLOGY

## 2024-09-07 PROCEDURE — 93970 EXTREMITY STUDY: CPT

## 2024-09-07 RX ORDER — ACETAMINOPHEN 325 MG/1
975 TABLET ORAL EVERY 6 HOURS PRN
Status: DISPENSED | OUTPATIENT
Start: 2024-09-07

## 2024-09-07 RX ORDER — FUROSEMIDE 10 MG/ML
80 INJECTION INTRAMUSCULAR; INTRAVENOUS ONCE
Status: COMPLETED | OUTPATIENT
Start: 2024-09-07 | End: 2024-09-07

## 2024-09-07 RX ORDER — POTASSIUM CHLORIDE 20 MEQ/1
40 TABLET, EXTENDED RELEASE ORAL ONCE
Status: COMPLETED | OUTPATIENT
Start: 2024-09-07 | End: 2024-09-07

## 2024-09-07 RX ORDER — MAGNESIUM SULFATE HEPTAHYDRATE 40 MG/ML
2 INJECTION, SOLUTION INTRAVENOUS ONCE
Status: COMPLETED | OUTPATIENT
Start: 2024-09-07 | End: 2024-09-07

## 2024-09-07 SDOH — SOCIAL STABILITY: SOCIAL INSECURITY: DO YOU FEEL ANYONE HAS EXPLOITED OR TAKEN ADVANTAGE OF YOU FINANCIALLY OR OF YOUR PERSONAL PROPERTY?: NO

## 2024-09-07 SDOH — SOCIAL STABILITY: SOCIAL INSECURITY: ARE THERE ANY APPARENT SIGNS OF INJURIES/BEHAVIORS THAT COULD BE RELATED TO ABUSE/NEGLECT?: NO

## 2024-09-07 SDOH — SOCIAL STABILITY: SOCIAL INSECURITY: HAS ANYONE EVER THREATENED TO HURT YOUR FAMILY OR YOUR PETS?: NO

## 2024-09-07 SDOH — SOCIAL STABILITY: SOCIAL INSECURITY: DO YOU FEEL UNSAFE GOING BACK TO THE PLACE WHERE YOU ARE LIVING?: NO

## 2024-09-07 SDOH — SOCIAL STABILITY: SOCIAL INSECURITY: ARE YOU OR HAVE YOU BEEN THREATENED OR ABUSED PHYSICALLY, EMOTIONALLY, OR SEXUALLY BY ANYONE?: NO

## 2024-09-07 SDOH — SOCIAL STABILITY: SOCIAL INSECURITY: HAVE YOU HAD THOUGHTS OF HARMING ANYONE ELSE?: NO

## 2024-09-07 SDOH — SOCIAL STABILITY: SOCIAL INSECURITY: DOES ANYONE TRY TO KEEP YOU FROM HAVING/CONTACTING OTHER FRIENDS OR DOING THINGS OUTSIDE YOUR HOME?: NO

## 2024-09-07 SDOH — SOCIAL STABILITY: SOCIAL INSECURITY: ABUSE: ADULT

## 2024-09-07 SDOH — SOCIAL STABILITY: SOCIAL INSECURITY: HAVE YOU HAD ANY THOUGHTS OF HARMING ANYONE ELSE?: NO

## 2024-09-07 SDOH — SOCIAL STABILITY: SOCIAL INSECURITY: WERE YOU ABLE TO COMPLETE ALL THE BEHAVIORAL HEALTH SCREENINGS?: YES

## 2024-09-07 ASSESSMENT — ACTIVITIES OF DAILY LIVING (ADL)
BATHING: INDEPENDENT
PATIENT'S MEMORY ADEQUATE TO SAFELY COMPLETE DAILY ACTIVITIES?: YES
LACK_OF_TRANSPORTATION: NO
FEEDING YOURSELF: INDEPENDENT
GROOMING: INDEPENDENT
HEARING - RIGHT EAR: FUNCTIONAL
GROOMING: INDEPENDENT
DRESSING YOURSELF: INDEPENDENT
JUDGMENT_ADEQUATE_SAFELY_COMPLETE_DAILY_ACTIVITIES: YES
TOILETING: INDEPENDENT
WALKS IN HOME: INDEPENDENT
FEEDING YOURSELF: INDEPENDENT
TOILETING: INDEPENDENT
JUDGMENT_ADEQUATE_SAFELY_COMPLETE_DAILY_ACTIVITIES: YES
WALKS IN HOME: INDEPENDENT
HEARING - LEFT EAR: FUNCTIONAL
HEARING - LEFT EAR: FUNCTIONAL
ADEQUATE_TO_COMPLETE_ADL: YES
PATIENT'S MEMORY ADEQUATE TO SAFELY COMPLETE DAILY ACTIVITIES?: YES
BATHING: INDEPENDENT
HEARING - RIGHT EAR: FUNCTIONAL
LACK_OF_TRANSPORTATION: NO
ADEQUATE_TO_COMPLETE_ADL: YES

## 2024-09-07 ASSESSMENT — PAIN SCALES - GENERAL
PAINLEVEL_OUTOF10: 5 - MODERATE PAIN
PAINLEVEL_OUTOF10: 0 - NO PAIN
PAINLEVEL_OUTOF10: 0 - NO PAIN
PAINLEVEL_OUTOF10: 3
PAINLEVEL_OUTOF10: 1

## 2024-09-07 ASSESSMENT — COGNITIVE AND FUNCTIONAL STATUS - GENERAL
MOBILITY SCORE: 22
DAILY ACTIVITIY SCORE: 24
MOBILITY SCORE: 24
PATIENT BASELINE BEDBOUND: NO
WALKING IN HOSPITAL ROOM: A LITTLE
CLIMB 3 TO 5 STEPS WITH RAILING: A LITTLE
DAILY ACTIVITIY SCORE: 24

## 2024-09-07 ASSESSMENT — LIFESTYLE VARIABLES
HOW MANY STANDARD DRINKS CONTAINING ALCOHOL DO YOU HAVE ON A TYPICAL DAY: PATIENT DOES NOT DRINK
HOW OFTEN DO YOU HAVE A DRINK CONTAINING ALCOHOL: NEVER
HOW OFTEN DO YOU HAVE 6 OR MORE DRINKS ON ONE OCCASION: NEVER
SKIP TO QUESTIONS 9-10: 1
AUDIT-C TOTAL SCORE: 0
AUDIT-C TOTAL SCORE: 0

## 2024-09-07 ASSESSMENT — PAIN - FUNCTIONAL ASSESSMENT
PAIN_FUNCTIONAL_ASSESSMENT: 0-10

## 2024-09-07 ASSESSMENT — PAIN SCALES - PAIN ASSESSMENT IN ADVANCED DEMENTIA (PAINAD): TOTALSCORE: MEDICATION (SEE MAR)

## 2024-09-07 NOTE — ED NOTES
Pharmacy Medication History Review    Ronna Beckham is a 63 y.o. female admitted for Pulmonary hypertension (Multi). Pharmacy reviewed the patient's [id number] medications and allergies for accuracy.    The list below reflects the updated PTA list.   Prior to Admission Medications   Prescriptions Last Dose Informant Patient Reported? Taking?   Opsumit 10 mg tablet tablet 2024 Self No Yes   Sig: TAKE 1 TABLET BY MOUTH DAILY. DO NOT HANDLE IF PREGNANT. DO NOT SPLIT, CRUSH, OR CHEW. REVIEW MEDICATION GUIDE.   aspirin 81 mg EC tablet 2024 Self Yes Yes   Sig: Take 1 tablet (81 mg) by mouth once daily.   cholestyramine (Questran) 4 gram packet   No No   Sig: Take 1 packet (4 g) by mouth once daily.   dilTIAZem CD (Cardizem CD) 180 mg 24 hr capsule  Self No No   Sig: Take 1 capsule (180 mg) by mouth once daily.   Patient not taking: Reported on 2024   epoprostenol (Veletri) 1.5 mg recon soln 2024 Self No Yes   Si.5 mg (1,500 mcg) by central venous line infusion route continuously. Reconstitute contents of vial with 5 ml diluent and mix cassette as directed. Infuse continuously per physician orders. Allow for priming volume. Dose range: 1-150 ng/kg/min.             furosemide (Lasix) 40 mg tablet 2024  No Yes   Sig: TAKE 1 TABLET BY MOUTH ONE TIME DAILY   gabapentin (Neurontin) 800 mg tablet 2024 Self Yes Yes   Sig: Take 1 tablet (800 mg) by mouth 3 times a day.   hydroxychloroquine (Plaquenil) 200 mg tablet 2024 Self Yes Yes   Sig: Take 1 tablet (200 mg) by mouth 2 times a day.   loperamide (Imodium A-D) 2 mg tablet Unknown  No No   Sig: Take 1 tablet (2 mg) by mouth 4 times a day as needed for diarrhea.   melatonin 5 mg tablet Unknown Self Yes No   Sig: Take 1 tablet (5 mg) by mouth once daily at bedtime.   omeprazole (PriLOSEC) 40 mg DR capsule Unknown Self Yes No   Sig: Take 1 capsule (40 mg) by mouth once daily in the morning. Take before meals. Do not crush or chew.  "  potassium chloride CR (Klor-Con M20) 20 mEq ER tablet  Self No No   Sig: TAKE 1 TABLET BY MOUTH TWO TIMES A DAY WITH MEALS   prochlorperazine (Compazine) 5 mg tablet Unknown Self No No   Sig: Take 1 tablet (5 mg) by mouth every 6 hours if needed for nausea or vomiting.   topiramate (Topamax) 25 mg tablet 9/7/2024  No Yes   Sig: Week 1 25mg at bedtime, week 2 25mg BID, week 3 25mg qAM, 50mg at bedtime, week 4 50 mg BID, week 5 50mg qAM 75mg at bedtime, week 6 75 mg BID, week 7 75mg qam 100mg at bedtime, week 8 100mg BID   venlafaxine XR (Effexor-XR) 150 mg 24 hr capsule Unknown Self Yes No   Sig: Take 1 capsule (150 mg) by mouth once daily. Do not crush or chew.      Facility-Administered Medications: None        The list below reflects the updated allergy list. Please review each documented allergy for additional clarification and justification.  Allergies  Reviewed by Vickie Gregorio RN on 9/7/2024        Severity Reactions Comments    Levetiracetam Medium Other Weakness, unable to walk            Patient declines M2B at discharge.       Sources used to complete the med rec include:   Patient interview, patient was a good historian of meds  Outpatient pharmacy dispense history  Discharge summary med list 8/29/24    AWILDA CARLTON  PGY-1 Pharmacy Resident  09/07/24     Secure Chat preferred   If no response call [phone] or [facility] \"Med Rec\"    "

## 2024-09-07 NOTE — CARE PLAN
Problem: Fall/Injury  Goal: Pace activities to prevent fatigue by end of the shift  Outcome: Progressing     Patient was safe and free of injury this shift. Patient switched to veletri on alaris pump running at 3.31 mL/hr, due to be changed 9/8/24 @11am.

## 2024-09-07 NOTE — CARE PLAN
The patient's goals for the shift include      The clinical goals for the shift include NO FALLS    Over the shift, the patient did  make progress toward the following goals.

## 2024-09-07 NOTE — PROGRESS NOTES
Internal Medicine Progress Note        Assessment and Plan    Ronna Beckham is a 63 y.o. female w/ a PMH of pHTN (on 4L NC at [facility]), GERD, SLE, HARRIS, MDD, and polysubstance use disorder (ethanol, cocaine and amphetamines) who presented to the ED with a chief complaint of lower extremity edema and is being admitted to the Pulmonology service for IV diuresis.     Updates 09/07/24  - s/p 80 IV lasix- will FU RFP and Mg  - continue veletri current dose     #Pulmonary HTN w/ home O2 on Veletri  #ADHF  :: presented with SOB, worsening edema, increasing O2 requirement  :: CXR with minor pulmonary vascular congestion. No obvious PNA  :: recently admitted 8/26-8/29 due to AOCHRF  - continue Opsumit, veletri  - continue aspirin  - FU LE DVT US  - strict I/O  - s/p 1x IV lasix 80 mg  - daily volume status assessment and diureses- spot dosing for now  - FU daily RFP    #GERD  - continue with home PPI      #SLE  - Continue with home plaquenil      #HARRIS  #MDD  #Chronic pain  - continue with home topamax, gabapentin, Effexor     #polysubtance use  :: prior admissions due to exacerbations of chronic illness after substance use  :: UDS + cannabis     Medical Check List   FEN  -Fluids: Will replete PRN   -Electrolytes: Will replete PRN, with goals of Mg >2, K>4  -Nutrition: regular   Prophylaxis:  -DVT ppx: lovenox  -GI ppx/Bowel care: PPI  -Abx: N/A  Hardware:  -Lines: PIV  Social:  -Code: Full Code  -NOK/Surrogate Decision Maker: Leticia Parson (daughter) [phone]      Patient assessment and plan staffed with the attending physician on service, Dr. Prescott.    Paradise Maynard MD  Internal Medicine PGY-1    Disclaimer: Documentation completed with the information available at the time of input. The times in the chart may not be reflective of actual patient care times, interventions, or procedures. Documentation occurs after the physical care of the patient.     Subjective       Overnight:  NAEO    Subjective   Pt was seen at the  bedside. Feeling okay, did not want to talk at the moment. Denied acute pain / discomfort.     Objective       Vitals: I/O:   Vitals:    09/07/24 1220   BP: 109/69   Pulse: 93   Resp: 18   Temp: 36.9 °C (98.4 °F)   SpO2: 97%      24hr Min/Max:  Temp  Min: 36 °C (96.8 °F)  Max: 36.9 °C (98.4 °F)  Pulse  Min: 89  Max: 102  BP  Min: 93/67  Max: 123/73  Resp  Min: 7  Max: 29  SpO2  Min: 89 %  Max: 97 %   Intake/Output Summary (Last 24 hours) at 9/7/2024 1350  Last data filed at 9/7/2024 1000  Gross per 24 hour   Intake 612.56 ml   Output 600 ml   Net 12.56 ml         Physical Exam:  General: Resting comfortably in bed. Well developed, well nourished. Appears stated age. In no acute distress   HEENT: Normocephalic and atraumatic. EOMI, sclera non-icteric, MMM, no JVD appreciated  Cardiovascular: RRR, no r/m/g. Noted to have port on left chest   Pulmonary: Lungs with faint rales in bilateral bases   GI: +BS, soft, diffusely tender, distended   : No suprapubic/ flank pain  Extremities: bilateral LE edema. RLE appears slightly > in daimeter then LLE  Skin: warm and dry. No rashes or lesions   Neurologic: CN II-XII grossly intact. No focal neurologic deficit   Psych: Pleasant.     General Chemistry Labs  Results from last 72 hours   Lab Units 09/07/24  0545 09/07/24  0212 09/06/24  1859   GLUCOSE mg/dL 103*  --  87   SODIUM mmol/L 137  --  137   POTASSIUM mmol/L 3.7  --  4.2   CHLORIDE mmol/L 104  --  106   CO2 mmol/L 22  --  20*   BUN mg/dL 15  --  18   CREATININE mg/dL 0.74  --  0.83   ANION GAP mmol/L 15  --  15   EGFR mL/min/1.73m*2 >90  --  80   CALCIUM mg/dL 6.7*  --  6.3*   PHOSPHORUS mg/dL 3.5  --   --    MAGNESIUM mg/dL 1.85 1.58* 0.61*   ALBUMIN g/dL 3.6  --  3.5   ALK PHOS U/L  --   --  68   ALT U/L  --   --  6*   AST U/L  --   --  9   BILIRUBIN TOTAL mg/dL  --   --  0.8   PROTEIN TOTAL g/dL  --   --  6.4   LIPASE U/L  --   --  34      CBC  Results from last 72 hours   Lab Units 09/07/24  0545 09/06/24  7560    WBC AUTO x10*3/uL 4.7 4.3*   HEMOGLOBIN g/dL 8.6* 9.3*   HEMATOCRIT % 29.1* 29.7*   MCV fL 80 76*   MCH pg 23.6* 23.9*   MCHC g/dL 29.6* 31.3*   RDW % 19.3* 19.3*   PLATELETS AUTO x10*3/uL 109* 102*   NEUTROS PCT AUTO % 82.6 76.5   LYMPHS PCT AUTO % 11.7 17.6   MONOS PCT AUTO % 5.3 5.2   EOS PCT AUTO % 0.0 0.0     Coagulation Labs  Results from last 72 hours   Lab Units 09/06/24  1936   INR  1.5*   PROTIME seconds 17.0*     Cardiac Labs  Results from last 72 hours   Lab Units 09/06/24  1859   TROPHSCMC ng/L 34  36*   BNP pg/mL 452*     Micro/ID:   Lab Results   Component Value Date    BLOODCULT No growth at 4 days -  FINAL REPORT 05/30/2024    BLOODCULT No growth at 4 days -  FINAL REPORT 05/30/2024     Results from last 72 hours   Lab Units 09/07/24  0221   COLOR U  Lavina*   APPEARANCE U  Ex.Turbid*   SPEC GRAV UR  1.028   PH U  5.5   PROTEIN U mg/dL 100 (2+)*   GLUCOSE U mg/dL Normal   BLOOD UR  NEGATIVE   KETONES UR mg/dL NEGATIVE   BILIRUBIN U  NEGATIVE   UROBILINOGEN UR mg/dL Normal   NITRITE U  NEGATIVE   LEUKOCYTES U  NEGATIVE   WBC UR /HPF NONE   RBC UR HPF /HPF NONE         Scheduled Medications:  PRN Medications:  Continuous Medications:   aspirin, 81 mg, oral, Daily  cholestyramine, 1 packet, oral, Daily  enoxaparin, 40 mg, subcutaneous, q24h  influenza, 0.5 mL, intramuscular, During hospitalization  gabapentin, 800 mg, oral, TID  hydroxychloroquine, 200 mg, oral, BID  macitentan, 10 mg, oral, Daily  melatonin, 5 mg, oral, Nightly  pantoprazole, 40 mg, oral, Daily before breakfast  polyethylene glycol, 17 g, oral, Daily  topiramate, 25 mg, oral, BID  venlafaxine XR, 150 mg, oral, Daily     PRN medications: loperamide epoprostenol, 68 ng/kg/min (Order-Specific), Last Rate: 68 ng/kg/min (09/07/24 1117)         Summary of key imaging results from the last 24 hours:  CXR 9/6  IMPRESSION:  Cardiomegaly with mild interstitial prominence suggestive of  developing interstitial edema. Correlate clinically.

## 2024-09-07 NOTE — ED PROVIDER NOTES
CC: Leg Swelling     HPI:   Patient is a 63-year-old female with past medical history of pulmonary hypertension on baseline 4 L with Veletri infusion, GERD, lupus, MDD, polysubstance use disorder presenting due to concerns of 4 days of bilateral lower extremity swelling as well as increased oxygen requirement.  Patient denies any further shortness of breath or difficulty with breathing and denies any wheezing at this time.  She is up to 5 L at the instruction of her pulmonary team as she was desatting into the high 80s on her 4 L.  Patient was off of her Lasix due to difficulty obtaining it over a week ago however for the past week she has been taking her 80 mg of Lasix as directed.  She has been wearing compression stockings however reports decreased oral intake, decreased urine output and worsening edema compared to baseline.  She has not had any significant emesis other than small dry heaving episodes.  Patient denies any recent fevers, chills, sore throat, palpitations.    Limitations to History: none  Additional History Obtained from: family    PMHx/PSHx:  Per HPI.   - has a past medical history of Anxiety, Depression, GERD (gastroesophageal reflux disease), Lupus (Multi), Personal history of other specified conditions (09/21/2020), and Pulmonary hypertension (Multi).  - has a past surgical history that includes Other surgical history (09/21/2020); Other surgical history (09/21/2020); Other surgical history (09/21/2020); Other surgical history (09/21/2020); Other surgical history (09/21/2020); Hysterectomy; Cholecystectomy; and Cardiac catheterization (N/A, 1/8/2024).    Social History:  - Tobacco:  reports that she has quit smoking. Her smoking use included cigarettes. She has quit using smokeless tobacco.   - Alcohol:  reports that she does not currently use alcohol.   - Drugs:  reports that she does not currently use drugs after having used the following drugs: Cocaine and Other.     Medications: Reviewed in  EMR.     Allergies:  Levetiracetam    ???????????????????????????????????????????????????????????????  Triage Vitals:  T 36 °C (96.8 °F)  HR 96  BP 93/67  RR 18  O2 (!) 91 % Supplemental oxygen    Physical Exam  Vitals and nursing note reviewed.   Constitutional:       General: She is not in acute distress.     Appearance: She is well-developed. She is obese.   HENT:      Head: Normocephalic and atraumatic.      Mouth/Throat:      Mouth: Mucous membranes are dry.      Pharynx: Oropharynx is clear.   Eyes:      General: No scleral icterus.     Extraocular Movements: Extraocular movements intact.      Conjunctiva/sclera: Conjunctivae normal.      Pupils: Pupils are equal, round, and reactive to light.   Cardiovascular:      Rate and Rhythm: Normal rate and regular rhythm.      Heart sounds: No murmur heard.  Pulmonary:      Effort: Pulmonary effort is normal. No respiratory distress.      Breath sounds: Normal breath sounds. No wheezing, rhonchi or rales.      Comments: Velitri catheter appears clean dry and intact and functioning appropriately  Abdominal:      General: There is distension (Reported distention without significant tenderness to gotten slightly worse without fluid wave).      Palpations: Abdomen is soft.      Tenderness: There is no abdominal tenderness. There is no guarding or rebound.   Musculoskeletal:         General: Swelling (Of bilateral lower extremities with pitting edema (3+) to the level of the knees) present. No tenderness.      Cervical back: Neck supple.   Skin:     General: Skin is warm and dry.      Capillary Refill: Capillary refill takes less than 2 seconds.      Findings: No rash (No overlying cellulitis of the lower extremities).   Neurological:      Mental Status: She is alert and oriented to person, place, and time.      Sensory: No sensory deficit.      Motor: No weakness.      Gait: Gait normal.   Psychiatric:         Mood and Affect: Mood normal.        ???????????????????????????????????????????????????????????????  EKG (per my interpretation):  Sinus rhythm with left atrial enlargement.  Right bundle branch block with noisy baseline.  Pending repeat with no acute ST elevations noted.  Appears largely similar to prior EKG in August 26, 2024.  QTc 545    ED Course  Diagnoses as of 09/06/24 2323   Pulmonary hypertension (Multi)   SOB (shortness of breath)       Medical Decision Making:  Patient is a 63-year-old female with past medical history of pulmonary hypertension on baseline 4 L with Veletri infusion, GERD, lupus, MDD, polysubstance use disorder presenting due to concerns of 4 days of bilateral lower extremity swelling as well as increased oxygen requirement.  Differentials considered but not limited to acute on chronic right heart failure, worsening pulmonary hypertension, medication noncompliance, new onset CHF, lupus flare, pericarditis,  ACS, pneumonia, pneumothorax.    Based on the patient's history and physical examination broad workup was initiated in addition to contacting pulmonology for any further recommendations that they had.  Patient's lab work was largely unremarkable except for severely low magnesium that was repleted along with the elevated BNP of 452 that is higher than her baseline in the 300s.  Patient's troponin is slightly elevated and is pending delta troponin.  CBC otherwise appears stable when compared to 8/29/2024 lab work.  She does not have a significant alkalosis or acidosis noted on her VBG and does not have a lactate bump.  Patient seems more subacutely ill at this point.  Belly tray settings have not changed and patient has until tomorrow at 11 AM to get her new Wise tray bag.  Pulmonology fellow was able to place new orders for Calitri and was excepted to medicine for further diuresis.  Will attempt to start diuresing well in the emergency department.  Patient care was overseen by attending physician who agrees with the  plan and disposition    External records reviewed: recent inpatient, clinic, and prior ED notes  Diagnostic imaging independently reviewed/interpreted by me (as reflected in MDM) includes: CXR  Social Determinants Affecting Care: Poor health literacy  Discussion of management with other providers: attending  Prescription Drug Consideration: per inpatient team  Escalation of Care: admission    Impression:   Pulm HTN  Hypoxia  Bilateral LE edema    Disposition: Admitted       Procedures ? SmartLinks last updated 9/6/2024 11:23 PM     Veronica Espinosa  PGY-2 Emergency Medicine  [facility]     Veronica Espinosa MD  Resident  09/08/24 2559

## 2024-09-07 NOTE — PROGRESS NOTES
09/07/24 1048   Discharge Planning   Living Arrangements Family members   Support Systems Family members   Assistance Needed Non e   Type of Residence Private residence   Number of Stairs to Enter Residence 0   Number of Stairs Within Residence 0   Do you have animals or pets at home? No   Who is requesting discharge planning? Provider   Home or Post Acute Services None   Expected Discharge Disposition Home   Does the patient need discharge transport arranged? No   Financial Resource Strain   How hard is it for you to pay for the very basics like food, housing, medical care, and heating? Not very   Housing Stability   In the last 12 months, was there a time when you were not able to pay the mortgage or rent on time? N   In the past 12 months, how many times have you moved where you were living? 1   At any time in the past 12 months, were you homeless or living in a shelter (including now)? N   Transportation Needs   In the past 12 months, has lack of transportation kept you from medical appointments or from getting medications? no   In the past 12 months, has lack of transportation kept you from meetings, work, or from getting things needed for daily living? No   Patient Choice   Provider Choice list and CMS website (https://medicare.gov/care-compare#search) for post-acute Quality and Resource Measure Data were provided and reviewed with: Patient   Patient / Family choosing to utilize agency / facility established prior to hospitalization No     Will continue to monitor for all discharging needs

## 2024-09-07 NOTE — HOSPITAL COURSE
63 y.o. female presenting with PMHx of pHTN (on 4L NC at [facility]), GERD, SLE, HARRIS, MDD, and polysubstance use disorder (ethanol, cocaine and amphetamines) who presented to the ED with a chief complaint of lower extremity edema and is being admitted to the Pulmonology service for IV diuresis.      On initial evaluation, patient is afebrile, normotensive, with O2 saturations appropriate on 5L NC (baseline 4L). ED workup reviewed and remarkable for profound hypomagnesemia, pancytopenia, normal renal function, and elevated BNP. CXR with pulm vascular congestion and cephalization. She was managed with electrolyte repletion in ED. On exam noted to have distended abdomen, Bilateral LE edema R slightly worse then L with mild tenderness to palpation, and faint crackles in bilateral lung bases. Will admit to telemetry and follow up electrolytes. Plan to double dose of outpatient diuretic and aim for minus 1 L today. Will order DVT US and anticoagulate as indicated.     TO DO:  - assess volume status daily and diurese as needed   - check electrolytes daily and replace

## 2024-09-07 NOTE — H&P
HPI  This is a 63 y.o. female presenting with PMHx of pHTN (on 4L NC at [facility]), GERD, SLE, HARRIS, MDD, and polysubstance use disorder (ethanol, cocaine and amphetamines) who presented to the ED with a chief complaint of lower extremity edema and is being admitted to the Pulmonology service for IV diuresis.     On interview, patient reports that she has been admitted several times over the last few months for AHRF and issues related to her pHTN. She reports today that she came into ED for the evaluation of bilateral LE edema. She states that her feet have been swollen for ~2 weeks, initially R>L, but now she feels they are equal. She reports that she has been taking her lasix as instructed (80mg PO every day) and using compression stocking, but despite this she continues with LE edema. She also reports that she has had ongoing abdominal distention for past several months. She endorses decreased PO intake. She denies nausea and emesis and reports she had a BM this AM. She denies fevers/chills, sore throat, rhinorrhea, and cough with sputum production. She denies chest pain, palpitations, increased ALAMO/ change in exercise tolerance, and orthopnea. She states that her O2 is slightly increased from baseline to 5 L NC (for desats to high 80s at home). She reports PND. She does not take her BP at home and is unsure of what her dry weight is. She reports excellent compliance with her home meds. She denies recent substance abuse (last >5 months ago). She reports frequent hospitalizations, but denies surgery or long travel. She is unsure of when her last age appropriate screening was completed by nell B sx, weight loss, and NS.     Per chart review, patient was recently admitted 08/26-08/29 acute on chronic hypoxic respiratory failure. Appears to have been having difficulty with outpatient Lasix refill, was admitted for IV diuresis and improved to baseline rapidly. Also treated empirically for PNA, not discharged with abx.  Called outpatient Pulm office  with complaint of decreased UOP despite increase in lasix dose to 80 mg PO every day on hospital discharge. Also with complaints on LE edema, without relief with compression stockings.     In the ED:  - Vitals:  T 96.8, HR 96, BP 93/67,  RR 18, SpO2 91% on 5L NC   - Labs:  CBC: WBC  4.3, Hgb 9.3, plt 102  BMP: Na 137, K 4.2, Cl 106, HCO3 20, BUN 18, Cr 0.83, glu 87  LFT: Ca 6.3, tprot 6.4, alb 3.5, alkphos 68, AST 9, ALT 6, tbili 0.8  Electrolytes: Mg 0.61  Heme: PT 17, INR 1.5  hs-TnI 36>34, , lipase wnl   VB.42/30/59/90/ L 0.7     - Imaging:  CXR:  IMPRESSION:  Cardiomegaly with mild interstitial prominence suggestive of  developing interstitial edema. Correlate clinically.  - no effusions or consolidations. Noted to have pulmonary vascular congestion and cephalization    - ECG:  Poor quality ECG with NSR, possible ST elevation in lead I, depression in III and TWI in V1-V3? (not new)     In the ED, given 4 g IV magnesium, and 1 gram calcium glucoante IV. Admitted to Pulmonology for IV diuresis.        Cardiac Hx:  TTE 2024:  CONCLUSIONS:   1. Left ventricular systolic function is normal with a 60-65% estimated ejection fraction.   2. Right ventricular volume and pressure overload.   3. Although the TAPSE and RVA' are normal, the fractional area change is markedly reduced consistent wtih severe RV systolic dysfunction. Still able to generate high RVSP.   4. Severely enlarged right ventricle.   5. There is severely reduced right ventricular systolic function.   6. Agitated saline contrast study was positive for small intracardiac shunt.   7. The right atrium is severely dilated.   8. There is a small to moderate pericardial effusion.   9. Small circumferential pericardial effusin though is small to moderate adjacent to the RA. No RV or RA collapse. The IVC is dilated, though likely due to the presence of severe pulmonary hypertension. No significant respiratory  variation of the MV or TV inflows. Not diagnostic for tamponade though difficult to dx tamponade in setting of severe pulmonary hypertension.  10. Severe tricuspid regurgitation visualized.  11. Severely elevated right ventricular systolic pressure.  12. Compared with the prior exam from 3/12/2022 the RV had a similar appearance with severe dilatation and reduced function. Note the TR was not asessed on that exam to allow estimation of the RVSP at that time. The pericardial effusion appears similar in size.  13. A bubble study using agitated saline was performed. Bubble study is positive. A small PFO (= 10 bubbles) was demonstrated.    [facility] 2024:  CONCLUSIONS:   1. Mild non-obstructive coronary artery disease.    PMH:   As above      SurgHx:   Past Surgical History:   Procedure Laterality Date    CARDIAC CATHETERIZATION N/A 2024    Procedure: Left Heart Cath, With LV, Angriography And Grafts;  Surgeon: Christiano Calvillo DO;  Location: [facility] Cardiac Cath Lab;  Service: Cardiovascular;  Laterality: N/A;    CHOLECYSTECTOMY      HYSTERECTOMY      OTHER SURGICAL HISTORY  2020    Cholecystectomy    OTHER SURGICAL HISTORY  2020    Esophagogastroduodenoscopy    OTHER SURGICAL HISTORY  2020    Colonoscopy    OTHER SURGICAL HISTORY  2020     section    OTHER SURGICAL HISTORY  2020    Hysterectomy       Allergies:   Allergies   Allergen Reactions    Levetiracetam Other     Weakness, unable to walk       SocHx:   Tobacco- 30 pack year hx, quit 15 years ago/ Alcohol- last use 5 months ago/ Drugs- previous cocaine and meth use, last 5 months ago      Home Medications:  Cholestyramine 4 g every day   Folic acid 1 mg every day   Gabapentin 800 mg TID   Plaquenil 200 mg BID   Loperamide 2 mg 4 times daily as needed   Melatonin 5 mg at bedtime   Prilosec 40 mg qam   Prochlorperazine 5 mg q6H   Topamax 25 mg 100 mg BID --> patient is unsure if she is taking   Effexor  mg every day   Pulm  HTN meds:   Veletri 1500 mcg   Opsumit 10 mg every day   Cardiac Medications:  Aspirin 81 mg every day   Diliazem  mg every day   Lasix 40 mg every day   Potassium chloride 20 mEq BID with meals     Objective:    24 Hour Vitals  Temp:  [36 °C (96.8 °F)-36.5 °C (97.7 °F)] 36.4 °C (97.5 °F)  Heart Rate:  [] 90  Resp:  [7-29] 19  BP: ()/(63-99) 116/68    Temp (24hrs), Av.3 °C (97.3 °F), Min:36 °C (96.8 °F), Max:36.5 °C (97.7 °F)     24 hour Intake/Output    Intake/Output Summary (Last 24 hours) at 2024 0602  Last data filed at 2024 0358  Gross per 24 hour   Intake 132.56 ml   Output --   Net 132.56 ml        Exam:  General: Resting comfortably in bed. Well developed, well nourished. Appears stated age. In no acute distress   HEENT: Normocephalic and atraumatic. EOMI, sclera non-icteric, MMM, no JVD appreciated  Cardiovascular: RRR, no r/m/g. Noted to have port on left chest   Pulmonary: Lungs with faint rales in bilateral bases   GI: +BS, soft, diffusely tender, distended   : No suprapubic/ flank pain  Extremities: bilateral LE edema. RLE appears slightly > in daimeter then LLE  Skin: warm and dry. No rashes or lesions   Neurologic: CN II-XII grossly intact. No focal neurologic deficit   Psych: Pleasant.     Labs  CBC  Results from last 72 hours   Lab Units 24  1859   WBC AUTO x10*3/uL 4.3*   HEMOGLOBIN g/dL 9.3*   HEMATOCRIT % 29.7*   PLATELETS AUTO x10*3/uL 102*        BMP  Results from last 72 hours   Lab Units 24  0212 24  1859   SODIUM mmol/L  --  137   POTASSIUM mmol/L  --  4.2   CHLORIDE mmol/L  --  106   BUN mg/dL  --  18   CREATININE mg/dL  --  0.83   MAGNESIUM mg/dL 1.58* 0.61*       Medications   Scheduled Medications  aspirin, 81 mg, oral, Daily  cholestyramine, 1 packet, oral, Daily  enoxaparin, 40 mg, subcutaneous, q24h  influenza, 0.5 mL, intramuscular, During hospitalization  gabapentin, 800 mg, oral, TID  hydroxychloroquine, 200 mg, oral,  BID  macitentan, 10 mg, oral, Daily  magnesium sulfate, 2 g, intravenous, Once  melatonin, 5 mg, oral, Nightly  pantoprazole, 40 mg, oral, Daily before breakfast  polyethylene glycol, 17 g, oral, Daily  topiramate, 25 mg, oral, BID  venlafaxine XR, 150 mg, oral, Daily     Continuous Medications  epoprostenol, 68 ng/kg/min (Order-Specific)     PRN Medications  PRN medications: loperamide          Assessment  and Plan:  This is a 63 y.o. female presenting with PMHx of pHTN (on 4L NC at [facility]), GERD, SLE, HARRIS, MDD, and polysubstance use disorder (ethanol, cocaine and amphetamines) who presented to the ED with a chief complaint of lower extremity edema and is being admitted to the Pulmonology service for IV diuresis.     On initial evaluation, patient is afebrile, normotensive, with O2 saturations appropriate on 5L NC (baseline 4L). ED workup reviewed and remarkable for profound hypomagnesemia, pancytopenia, normal renal function, and elevated BNP. CXR with pulm vascular congestion and cephalization. She was managed with electrolyte repletion in ED. On exam noted to have distended abdomen, Bilateral LE edema R slightly worse then L with mild tenderness to palpation, and faint crackles in bilateral lung bases. Will admit to telemetry and follow up electrolytes. Plan to double dose of outpatient diuretic and aim for minus 1 L today. Will order DVT US and anticoagulate as indicated.  Management of chronic conditions as outlined below.     #pHTN on 4 L NC at BL  #ADHF  #LE edema   - continue with Opsumit, Veletri, aspirin   - IV lasix 80 mg (ordered at 0600 as hypomagnesemia precluded IV diuresis overnight)  - LE DVT US  - Strict I/O's, daily weight     #GERD  - continue with home PPI     #SLE  - Continue with home plaquenil     #HARRIS  #MDD  #Chronic pain  - continue with home topamax, gabapentin, Effexor     F: PRN for euvolemia   E: PRN K>4, Mg>2, P>3   N: Cardiac diet, 1800 ml restriction   A: PIV     Oxygen: 5L NC (baseline  4 L NC)   Abx: -  Gtts: -    DVT ppx: Enoxaparin   GI ppx: home ppi     Code Status: FULL CODE (confirmed on ADMISSION)  Surrogate Medical Decision-maker:  Leticia Parson (daughter) [phone]

## 2024-09-08 ENCOUNTER — APPOINTMENT (OUTPATIENT)
Dept: CARDIOLOGY | Facility: HOSPITAL | Age: 64
End: 2024-09-08
Payer: COMMERCIAL

## 2024-09-08 VITALS
HEART RATE: 91 BPM | HEIGHT: 65 IN | RESPIRATION RATE: 18 BRPM | BODY MASS INDEX: 28.36 KG/M2 | WEIGHT: 170.19 LBS | TEMPERATURE: 97.9 F | SYSTOLIC BLOOD PRESSURE: 97 MMHG | DIASTOLIC BLOOD PRESSURE: 68 MMHG | OXYGEN SATURATION: 93 %

## 2024-09-08 LAB
ALBUMIN SERPL BCP-MCNC: 3.4 G/DL (ref 3.4–5)
ALBUMIN SERPL BCP-MCNC: 3.8 G/DL (ref 3.4–5)
ANION GAP SERPL CALC-SCNC: 12 MMOL/L (ref 10–20)
ANION GAP SERPL CALC-SCNC: 14 MMOL/L (ref 10–20)
BUN SERPL-MCNC: 14 MG/DL (ref 6–23)
BUN SERPL-MCNC: 15 MG/DL (ref 6–23)
CALCIUM SERPL-MCNC: 7 MG/DL (ref 8.6–10.6)
CALCIUM SERPL-MCNC: 7 MG/DL (ref 8.6–10.6)
CHLORIDE SERPL-SCNC: 104 MMOL/L (ref 98–107)
CHLORIDE SERPL-SCNC: 106 MMOL/L (ref 98–107)
CO2 SERPL-SCNC: 23 MMOL/L (ref 21–32)
CO2 SERPL-SCNC: 24 MMOL/L (ref 21–32)
CREAT SERPL-MCNC: 0.8 MG/DL (ref 0.5–1.05)
CREAT SERPL-MCNC: 0.83 MG/DL (ref 0.5–1.05)
EGFRCR SERPLBLD CKD-EPI 2021: 80 ML/MIN/1.73M*2
EGFRCR SERPLBLD CKD-EPI 2021: 84 ML/MIN/1.73M*2
ERYTHROCYTE [DISTWIDTH] IN BLOOD BY AUTOMATED COUNT: 19.6 % (ref 11.5–14.5)
GLUCOSE SERPL-MCNC: 84 MG/DL (ref 74–99)
GLUCOSE SERPL-MCNC: 98 MG/DL (ref 74–99)
HCT VFR BLD AUTO: 33.7 % (ref 36–46)
HGB BLD-MCNC: 9.6 G/DL (ref 12–16)
MAGNESIUM SERPL-MCNC: 1.71 MG/DL (ref 1.6–2.4)
MAGNESIUM SERPL-MCNC: 2.04 MG/DL (ref 1.6–2.4)
MCH RBC QN AUTO: 23.9 PG (ref 26–34)
MCHC RBC AUTO-ENTMCNC: 28.5 G/DL (ref 32–36)
MCV RBC AUTO: 84 FL (ref 80–100)
NRBC BLD-RTO: 0 /100 WBCS (ref 0–0)
PHOSPHATE SERPL-MCNC: 2.7 MG/DL (ref 2.5–4.9)
PHOSPHATE SERPL-MCNC: 3.4 MG/DL (ref 2.5–4.9)
PLATELET # BLD AUTO: 105 X10*3/UL (ref 150–450)
POTASSIUM SERPL-SCNC: 4 MMOL/L (ref 3.5–5.3)
POTASSIUM SERPL-SCNC: 4.4 MMOL/L (ref 3.5–5.3)
RBC # BLD AUTO: 4.02 X10*6/UL (ref 4–5.2)
SODIUM SERPL-SCNC: 137 MMOL/L (ref 136–145)
SODIUM SERPL-SCNC: 138 MMOL/L (ref 136–145)
WBC # BLD AUTO: 3.9 X10*3/UL (ref 4.4–11.3)

## 2024-09-08 PROCEDURE — 93005 ELECTROCARDIOGRAM TRACING: CPT

## 2024-09-08 PROCEDURE — 2500000004 HC RX 250 GENERAL PHARMACY W/ HCPCS (ALT 636 FOR OP/ED): Performed by: STUDENT IN AN ORGANIZED HEALTH CARE EDUCATION/TRAINING PROGRAM

## 2024-09-08 PROCEDURE — 2500000001 HC RX 250 WO HCPCS SELF ADMINISTERED DRUGS (ALT 637 FOR MEDICARE OP)

## 2024-09-08 PROCEDURE — 83735 ASSAY OF MAGNESIUM: CPT

## 2024-09-08 PROCEDURE — 80069 RENAL FUNCTION PANEL: CPT

## 2024-09-08 PROCEDURE — 2500000004 HC RX 250 GENERAL PHARMACY W/ HCPCS (ALT 636 FOR OP/ED)

## 2024-09-08 PROCEDURE — 2500000001 HC RX 250 WO HCPCS SELF ADMINISTERED DRUGS (ALT 637 FOR MEDICARE OP): Performed by: STUDENT IN AN ORGANIZED HEALTH CARE EDUCATION/TRAINING PROGRAM

## 2024-09-08 PROCEDURE — 93010 ELECTROCARDIOGRAM REPORT: CPT | Performed by: INTERNAL MEDICINE

## 2024-09-08 PROCEDURE — 99232 SBSQ HOSP IP/OBS MODERATE 35: CPT

## 2024-09-08 PROCEDURE — 85027 COMPLETE CBC AUTOMATED: CPT

## 2024-09-08 PROCEDURE — 1100000001 HC PRIVATE ROOM DAILY

## 2024-09-08 RX ORDER — FUROSEMIDE 10 MG/ML
80 INJECTION INTRAMUSCULAR; INTRAVENOUS ONCE
Status: COMPLETED | OUTPATIENT
Start: 2024-09-08 | End: 2024-09-08

## 2024-09-08 RX ORDER — DILTIAZEM HYDROCHLORIDE 180 MG/1
180 CAPSULE, COATED, EXTENDED RELEASE ORAL DAILY
Status: DISPENSED | OUTPATIENT
Start: 2024-09-08

## 2024-09-08 ASSESSMENT — COGNITIVE AND FUNCTIONAL STATUS - GENERAL
CLIMB 3 TO 5 STEPS WITH RAILING: A LITTLE
DAILY ACTIVITIY SCORE: 24
MOBILITY SCORE: 24
MOBILITY SCORE: 21
WALKING IN HOSPITAL ROOM: A LITTLE
DAILY ACTIVITIY SCORE: 24
STANDING UP FROM CHAIR USING ARMS: A LITTLE

## 2024-09-08 ASSESSMENT — PAIN - FUNCTIONAL ASSESSMENT
PAIN_FUNCTIONAL_ASSESSMENT: 0-10

## 2024-09-08 ASSESSMENT — PAIN SCALES - GENERAL
PAINLEVEL_OUTOF10: 0 - NO PAIN
PAINLEVEL_OUTOF10: 3
PAINLEVEL_OUTOF10: 0 - NO PAIN

## 2024-09-08 NOTE — PROGRESS NOTES
1  Please see the post cardiac catheterization dishcarge instructions  No heavy lifting, greater than 10 lbs  or strenuous  activity for 48 hrs  2 Remove band aid tomorrow  Shower and wash area-  wrist gently with soap and water- beginning tomorrow  Rinse and pat dry  Apply new water seal band aid  Repeat this process for 5 days  No powders, creams lotions or antibiotic ointments  for 5 days  No tub baths, hot tubs or swimming for 5 days  3  Please call our office ([phone]) if you have any fever, redness, swelling, discharge from your wrist access site      4 No driving for 1 day    5  none Internal Medicine Progress Note        Assessment and Plan    Ronna Beckham is a 63 y.o. female w/ a PMH of pHTN (on 4L NC at [facility]), GERD, SLE, HARRIS, MDD, and polysubstance use disorder (ethanol, cocaine and amphetamines) who presented to the ED with a chief complaint of lower extremity edema and is being admitted to the Pulmonology service for IV diuresis.     Updates 09/08/24  - continue IV diuresis with additional 80 IV lasix today  - will FU RFP and Mg  - continue veletri current dose   - possible discharge tomorrow pending diuretic response  - restart home diltiazem, repeat EKG to monitor qtc    #Pulmonary HTN w/ home O2 on Veletri  #ADHF  :: presented with SOB, worsening edema, increasing O2 requirement  :: CXR with minor pulmonary vascular congestion. No obvious PNA  :: recently admitted 8/26-8/29 due to AOCHRF  - continue Opsumit, veletri  - continue aspirin  - FU LE DVT US  - strict I/O  - s/p 1x IV lasix 80 mg  - daily volume status assessment and diureses- spot dosing for now  - FU daily RFP    #GERD  - continue with home PPI      #SLE  - Continue with home plaquenil      #HARRIS  #MDD  #Chronic pain  - continue with home topamax, gabapentin, Effexor     #polysubtance use  :: prior admissions due to exacerbations of chronic illness after substance use  :: UDS + cannabis     Medical Check List   FEN  -Fluids: Will replete PRN   -Electrolytes: Will replete PRN, with goals of Mg >2, K>4  -Nutrition: regular   Prophylaxis:  -DVT ppx: lovenox  -GI ppx/Bowel care: PPI  -Abx: N/A  Hardware:  -Lines: PIV  Social:  -Code: Full Code  -NOK/Surrogate Decision Maker: Leticia Parson (daughter) [phone]      Patient assessment and plan staffed with the attending physician on service, Dr. Prescott.    Disclaimer: Documentation completed with the information available at the time of input. The times in the chart may not be reflective of actual patient care times, interventions, or procedures. Documentation occurs after  the physical care of the patient.     Subjective       Overnight:  INOCENTE    Subjective   Pt was seen at the bedside. Feeling improved, less edema compared to yesterday reportedly.    Objective       Vitals: I/O:   Vitals:    09/08/24 0813   BP: 103/66   Pulse: 97   Resp: 17   Temp: 36.3 °C (97.3 °F)   SpO2: 94%      24hr Min/Max:  Temp  Min: 36.1 °C (97 °F)  Max: 37.1 °C (98.8 °F)  Pulse  Min: 85  Max: 97  BP  Min: 101/75  Max: 109/69  Resp  Min: 17  Max: 18  SpO2  Min: 94 %  Max: 97 %   Intake/Output Summary (Last 24 hours) at 9/8/2024 1149  Last data filed at 9/8/2024 0700  Gross per 24 hour   Intake 600 ml   Output 1050 ml   Net -450 ml         Physical Exam:  General: Resting comfortably . Well developed, well nourished. Appears stated age. In no acute distress   HEENT: Normocephalic and atraumatic. EOMI, sclera non-icteric, MMM, no JVD appreciated  Cardiovascular: Noted to have port on left chest   GI: nondistended   Extremities: bilateral LE edema.   Skin: No rashes or lesions   Neurologic: CN II-XII grossly intact. No focal neurologic deficit   Psych: Pleasant.     General Chemistry Labs  Results from last 72 hours   Lab Units 09/08/24  0642 09/07/24  1715 09/07/24  0545 09/07/24  0212 09/06/24  1859   GLUCOSE mg/dL 84 124* 103*  --  87   SODIUM mmol/L 138 138 137  --  137   POTASSIUM mmol/L 4.4 3.5 3.7  --  4.2   CHLORIDE mmol/L 106 106 104  --  106   CO2 mmol/L 24 21 22  --  20*   BUN mg/dL 14 14 15  --  18   CREATININE mg/dL 0.83 0.76 0.74  --  0.83   ANION GAP mmol/L 12 15 15  --  15   EGFR mL/min/1.73m*2 80 89 >90  --  80   CALCIUM mg/dL 7.0* 6.5* 6.7*  --  6.3*   PHOSPHORUS mg/dL 3.4 2.9 3.5  --   --    MAGNESIUM mg/dL 2.04 1.62 1.85   < > 0.61*   ALBUMIN g/dL 3.8 3.4 3.6  --  3.5   ALK PHOS U/L  --   --   --   --  68   ALT U/L  --   --   --   --  6*   AST U/L  --   --   --   --  9   BILIRUBIN TOTAL mg/dL  --   --   --   --  0.8   PROTEIN TOTAL g/dL  --   --   --   --  6.4   LIPASE U/L  --   --   --   --   34    < > = values in this interval not displayed.      CBC  Results from last 72 hours   Lab Units 09/08/24  0642 09/07/24  0545 09/06/24  1859   WBC AUTO x10*3/uL 3.9* 4.7 4.3*   HEMOGLOBIN g/dL 9.6* 8.6* 9.3*   HEMATOCRIT % 33.7* 29.1* 29.7*   MCV fL 84 80 76*   MCH pg 23.9* 23.6* 23.9*   MCHC g/dL 28.5* 29.6* 31.3*   RDW % 19.6* 19.3* 19.3*   PLATELETS AUTO x10*3/uL 105* 109* 102*   NEUTROS PCT AUTO %  --  82.6 76.5   LYMPHS PCT AUTO %  --  11.7 17.6   MONOS PCT AUTO %  --  5.3 5.2   EOS PCT AUTO %  --  0.0 0.0     Coagulation Labs  Results from last 72 hours   Lab Units 09/06/24  1936   INR  1.5*   PROTIME seconds 17.0*     Cardiac Labs  Results from last 72 hours   Lab Units 09/06/24  1859   TROPHSCMC ng/L 34  36*   BNP pg/mL 452*     Micro/ID:   Lab Results   Component Value Date    BLOODCULT No growth at 4 days -  FINAL REPORT 05/30/2024    BLOODCULT No growth at 4 days -  FINAL REPORT 05/30/2024     Results from last 72 hours   Lab Units 09/07/24  0221   COLOR U  [place]*   APPEARANCE U  Ex.Turbid*   SPEC GRAV UR  1.028   PH U  5.5   PROTEIN U mg/dL 100 (2+)*   GLUCOSE U mg/dL Normal   BLOOD UR  NEGATIVE   KETONES UR mg/dL NEGATIVE   BILIRUBIN U  NEGATIVE   UROBILINOGEN UR mg/dL Normal   NITRITE U  NEGATIVE   LEUKOCYTES U  NEGATIVE   WBC UR /HPF NONE   RBC UR HPF /HPF NONE         Scheduled Medications:  PRN Medications:  Continuous Medications:   aspirin, 81 mg, oral, Daily  cholestyramine, 1 packet, oral, Daily  dilTIAZem CD, 180 mg, oral, Daily  enoxaparin, 40 mg, subcutaneous, q24h  influenza, 0.5 mL, intramuscular, During hospitalization  furosemide, 80 mg, intravenous, Once  gabapentin, 800 mg, oral, TID  hydroxychloroquine, 200 mg, oral, BID  macitentan, 10 mg, oral, Daily  melatonin, 5 mg, oral, Nightly  pantoprazole, 40 mg, oral, Daily before breakfast  polyethylene glycol, 17 g, oral, Daily  topiramate, 25 mg, oral, BID  venlafaxine XR, 150 mg, oral, Daily     PRN medications: acetaminophen,  loperamide epoprostenol, 68 ng/kg/min (Order-Specific), Last Rate: 68 ng/kg/min (09/08/24 1143)         Summary of key imaging results from the last 24 hours:  CXR 9/6  IMPRESSION:  Cardiomegaly with mild interstitial prominence suggestive of  developing interstitial edema. Correlate clinically.

## 2024-09-08 NOTE — CARE PLAN
Problem: Pain  Goal: Free from acute confusion related to pain meds throughout the shift  Outcome: Progressing     Patient was safe and stable today. 80 IVP Lasix given with over 2L output. Restarted home diltiazem.

## 2024-09-09 ENCOUNTER — APPOINTMENT (OUTPATIENT)
Dept: CARDIOLOGY | Facility: HOSPITAL | Age: 64
End: 2024-09-09
Payer: COMMERCIAL

## 2024-09-09 VITALS
BODY MASS INDEX: 27.11 KG/M2 | WEIGHT: 162.7 LBS | OXYGEN SATURATION: 96 % | HEIGHT: 65 IN | TEMPERATURE: 97.3 F | HEART RATE: 90 BPM | RESPIRATION RATE: 17 BRPM | DIASTOLIC BLOOD PRESSURE: 70 MMHG | SYSTOLIC BLOOD PRESSURE: 105 MMHG

## 2024-09-09 LAB
ALBUMIN SERPL BCP-MCNC: 3.5 G/DL (ref 3.4–5)
ANION GAP SERPL CALC-SCNC: 11 MMOL/L (ref 10–20)
BUN SERPL-MCNC: 16 MG/DL (ref 6–23)
CALCIUM SERPL-MCNC: 7.5 MG/DL (ref 8.6–10.6)
CHLORIDE SERPL-SCNC: 102 MMOL/L (ref 98–107)
CO2 SERPL-SCNC: 27 MMOL/L (ref 21–32)
CREAT SERPL-MCNC: 0.86 MG/DL (ref 0.5–1.05)
EGFRCR SERPLBLD CKD-EPI 2021: 77 ML/MIN/1.73M*2
ERYTHROCYTE [DISTWIDTH] IN BLOOD BY AUTOMATED COUNT: 19.4 % (ref 11.5–14.5)
GLUCOSE SERPL-MCNC: 77 MG/DL (ref 74–99)
HCT VFR BLD AUTO: 31.4 % (ref 36–46)
HGB BLD-MCNC: 9.1 G/DL (ref 12–16)
MAGNESIUM SERPL-MCNC: 2.17 MG/DL (ref 1.6–2.4)
MCH RBC QN AUTO: 23.7 PG (ref 26–34)
MCHC RBC AUTO-ENTMCNC: 29 G/DL (ref 32–36)
MCV RBC AUTO: 82 FL (ref 80–100)
NRBC BLD-RTO: 0 /100 WBCS (ref 0–0)
PHOSPHATE SERPL-MCNC: 3.4 MG/DL (ref 2.5–4.9)
PLATELET # BLD AUTO: 119 X10*3/UL (ref 150–450)
POTASSIUM SERPL-SCNC: 4.4 MMOL/L (ref 3.5–5.3)
RBC # BLD AUTO: 3.84 X10*6/UL (ref 4–5.2)
SODIUM SERPL-SCNC: 136 MMOL/L (ref 136–145)
WBC # BLD AUTO: 4.7 X10*3/UL (ref 4.4–11.3)

## 2024-09-09 PROCEDURE — 83735 ASSAY OF MAGNESIUM: CPT

## 2024-09-09 PROCEDURE — 93005 ELECTROCARDIOGRAM TRACING: CPT

## 2024-09-09 PROCEDURE — 2500000001 HC RX 250 WO HCPCS SELF ADMINISTERED DRUGS (ALT 637 FOR MEDICARE OP)

## 2024-09-09 PROCEDURE — 2500000004 HC RX 250 GENERAL PHARMACY W/ HCPCS (ALT 636 FOR OP/ED): Performed by: STUDENT IN AN ORGANIZED HEALTH CARE EDUCATION/TRAINING PROGRAM

## 2024-09-09 PROCEDURE — 85027 COMPLETE CBC AUTOMATED: CPT

## 2024-09-09 PROCEDURE — 2500000001 HC RX 250 WO HCPCS SELF ADMINISTERED DRUGS (ALT 637 FOR MEDICARE OP): Performed by: STUDENT IN AN ORGANIZED HEALTH CARE EDUCATION/TRAINING PROGRAM

## 2024-09-09 PROCEDURE — 99238 HOSP IP/OBS DSCHRG MGMT 30/<: CPT | Performed by: INTERNAL MEDICINE

## 2024-09-09 PROCEDURE — 2500000004 HC RX 250 GENERAL PHARMACY W/ HCPCS (ALT 636 FOR OP/ED)

## 2024-09-09 PROCEDURE — 80069 RENAL FUNCTION PANEL: CPT

## 2024-09-09 RX ORDER — MAGNESIUM SULFATE HEPTAHYDRATE 40 MG/ML
2 INJECTION, SOLUTION INTRAVENOUS ONCE
Status: COMPLETED | OUTPATIENT
Start: 2024-09-09 | End: 2024-09-09

## 2024-09-09 RX ORDER — FUROSEMIDE 40 MG/1
40 TABLET ORAL DAILY
Qty: 60 TABLET | Refills: 1 | Status: SHIPPED | OUTPATIENT
Start: 2024-09-09 | End: 2024-09-09

## 2024-09-09 RX ORDER — DILTIAZEM HYDROCHLORIDE 180 MG/1
180 CAPSULE, COATED, EXTENDED RELEASE ORAL DAILY
Qty: 60 CAPSULE | Refills: 0 | Status: SHIPPED | OUTPATIENT
Start: 2024-09-09 | End: 2024-11-08

## 2024-09-09 RX ORDER — DILTIAZEM HYDROCHLORIDE 180 MG/1
180 CAPSULE, COATED, EXTENDED RELEASE ORAL DAILY
Qty: 30 CAPSULE | Refills: 1 | Status: SHIPPED | OUTPATIENT
Start: 2024-09-09 | End: 2024-09-09

## 2024-09-09 RX ORDER — FUROSEMIDE 40 MG/1
40 TABLET ORAL DAILY
Qty: 60 TABLET | Refills: 0 | Status: SHIPPED | OUTPATIENT
Start: 2024-09-09 | End: 2024-11-08

## 2024-09-09 ASSESSMENT — COGNITIVE AND FUNCTIONAL STATUS - GENERAL
DAILY ACTIVITIY SCORE: 24
MOBILITY SCORE: 24

## 2024-09-09 ASSESSMENT — PAIN SCALES - GENERAL
PAINLEVEL_OUTOF10: 0 - NO PAIN

## 2024-09-09 ASSESSMENT — PAIN - FUNCTIONAL ASSESSMENT
PAIN_FUNCTIONAL_ASSESSMENT: 0-10
PAIN_FUNCTIONAL_ASSESSMENT: 0-10

## 2024-09-09 NOTE — CARE PLAN
Pt discharging home.  Upon discharge pt has [place] central line to L chest with IV medication infusing.  Pt has home oxygen tank and has 4L O2 via NC per home oxygen tank.  Pt transported via w/c accompanied by transporter.  Pt verbalizes understanding of discharge instructions and follow up appointments.      The patient's goals for the shift include      The clinical goals for the shift include pt will endorse less c/o shortness of breath with activity      Problem: Fall/Injury  Goal: Not fall by end of shift  Outcome: Met  Goal: Be free from injury by end of the shift  Outcome: Met  Goal: Verbalize understanding of personal risk factors for fall in the hospital  Outcome: Met  Goal: Verbalize understanding of risk factor reduction measures to prevent injury from fall in the home  Outcome: Met  Goal: Use assistive devices by end of the shift  Outcome: Met  Goal: Pace activities to prevent fatigue by end of the shift  Outcome: Met     Problem: Pain  Goal: Takes deep breaths with improved pain control throughout the shift  Outcome: Met  Goal: Turns in bed with improved pain control throughout the shift  Outcome: Met  Goal: Walks with improved pain control throughout the shift  Outcome: Met  Goal: Performs ADL's with improved pain control throughout shift  Outcome: Met  Goal: Participates in PT with improved pain control throughout the shift  Outcome: Met  Goal: Free from opioid side effects throughout the shift  Outcome: Met  Goal: Free from acute confusion related to pain meds throughout the shift  Outcome: Met

## 2024-09-09 NOTE — CARE PLAN
The patient's goals for the shift include      The clinical goals for the shift include Patient will continue to remain safe and stable.    Over the shift, the patient did make progress toward the following goals.

## 2024-09-09 NOTE — DISCHARGE SUMMARY
Discharge Diagnosis  Principal Problem:    Pulmonary hypertension (Multi)  Active Problems:    Does not refill medications appropriately    Prolonged Q-T interval on ECG    Chronic respiratory failure with hypoxia, on home O2 therapy (Multi)    Volume overload    Issues Requiring Follow-Up  Pulmonary hypertension - lasix dosing  Hx of SVT - diltiazem  Recent hospitalization fu with PCP    Discharge Meds     Medication List      CONTINUE taking these medications     aspirin 81 mg EC tablet   cholestyramine 4 gram packet; Commonly known as: Questran; Take 1 packet   (4 g) by mouth once daily.   dilTIAZem  mg 24 hr capsule; Commonly known as: Cardizem CD; Take   1 capsule (180 mg) by mouth once daily.   epoprostenol 1.5 mg recon soln; Commonly known as: Veletri; 1.5 mg   (1,500 mcg) by central venous line infusion route continuously.   Reconstitute contents of vial with 5 ml diluent and mix cassette as   directed. Infuse continuously per physician orders. Allow for priming   volume. Dose range: 1-150 ng/kg/min.   folic acid 1 mg tablet; Commonly known as: Folvite   furosemide 40 mg tablet; Commonly known as: Lasix; TAKE 1 TABLET BY   MOUTH ONE TIME DAILY   gabapentin 800 mg tablet; Commonly known as: Neurontin   hydroxychloroquine 200 mg tablet; Commonly known as: Plaquenil   loperamide 2 mg tablet; Commonly known as: Imodium A-D; Take 1 tablet (2   mg) by mouth 4 times a day as needed for diarrhea.   melatonin 5 mg tablet   omeprazole 40 mg DR capsule; Commonly known as: PriLOSEC   Opsumit 10 mg tablet tablet; Generic drug: macitentan; TAKE 1 TABLET BY   MOUTH DAILY. DO NOT HANDLE IF PREGNANT. DO NOT SPLIT, CRUSH, OR CHEW.   REVIEW MEDICATION GUIDE.   topiramate 25 mg tablet; Commonly known as: Topamax; Week 1 25mg at   bedtime, week 2 25mg BID, week 3 25mg qAM, 50mg at bedtime, week 4 50 mg   BID, week 5 50mg qAM 75mg at bedtime, week 6 75 mg BID, week 7 75mg qam   100mg at bedtime, week 8 100mg BID; Notes to  patient: Take as directed   venlafaxine  mg 24 hr capsule; Commonly known as: Effexor-XR     STOP taking these medications     Klor-Con M20 20 mEq ER tablet; Generic drug: potassium chloride CR   prochlorperazine 5 mg tablet; Commonly known as: Compazine       Test Results Pending At Discharge  Pending Labs       No current pending labs.            Hospital Course  63 y.o. female presenting with PMHx of pHTN (on 4L NC at [facility]), GERD, SLE, HARRIS, MDD, and polysubstance use disorder (ethanol, cocaine and amphetamines) who presented to the ED with a chief complaint of lower extremity edema and is being admitted to the Pulmonology service for IV diuresis.      On initial evaluation, patient was afebrile, normotensive, with O2 saturations appropriate on 5L NC (baseline 4L). ED workup reviewed and remarkable for profound hypomagnesemia, pancytopenia, normal renal function, and elevated BNP. CXR with pulm vascular congestion and cephalization. She was managed with electrolyte repletion in ED. On exam noted to have distended abdomen, Bilateral LE edema R slightly worse then L with mild tenderness to palpation, and faint crackles in bilateral lung bases. Treated with increased IV lasix dosing of 80 mg. She responded well. Patient states she has not been taking medications. Unclear why as it was filled August 12 and she stated she was having issues with filling the prescription. DVT US was negative for any acute DVT. Once hypervolemia improved, she was discharged home.    Medications changed on discharge:  - lasix 40 mg (was taking 80 mg PO daily even thought it was prescribed as 40 mg PO daily)    Follow up Appointments on discharge:  - PCP for recent hospitalization  - Dr. Prescott for PH  - Cardiology for diltiazem - takes for SVT prophylaxis however has qtc of 513    Pertinent Physical Exam At Time of Discharge  Physical Exam    Physical Exam:  General: Resting comfortably . Well developed, well nourished. Appears stated  age. In no acute distress   HEENT: Normocephalic and atraumatic. EOMI, sclera non-icteric, MMM, no JVD appreciated  Cardiovascular: Noted to have L subclavian port   GI: non distended but firm to palpation (states this is baseline), nontender  Extremities: no edema of lower extremities   Skin: No rashes or lesions on visible skin   Neurologic: CN II-XII grossly intact. No focal neurologic deficit   Psych: Pleasant.     Outpatient Follow-Up  Future Appointments   Date Time Provider Department Center   9/19/2024  2:00 PM Chicho Prescott DO [id number] [facility]   9/24/2024 11:30 AM Mahsa Lincoln PT [id number] [facility]   9/30/2024 10:30 AM Chelsea Wood MD [id number] [facility]   9/30/2024 10:30 AM [facility] [id number] NEURODG EMG EQUIP 1 [id number] [facility]   10/22/2024  1:00 PM TRI RESPTHER1 PFT ROOM TRI[address]   10/30/2024  4:00 PM Bin Wade MD [id number] [facility]         Bam Vaca MD  [id number]

## 2024-09-09 NOTE — DISCHARGE INSTRUCTIONS
Dear Ms. Beckham    You came to the hospital because you noticed you had increased swelling of your legs. This can happen with pulmonary hypertension because high pressures in the lungs can cause fluid back up into the veins and lead to swelling in the legs. You were treated with intravenous lasix at a higher dose than your home medications. You responded well, and the swelling of your legs improved.     Medications changed on discharge:  - take 40 mg lasix once a day  Continue all other medications as  previously prescribed     Appointments to follow up with on discharge:   - please follow up with your primary care doctor for this hospitalization and chronic conditions  - please follow up with your pulmonary hypertension doctor, Dr. Prescott, for pulmonary hypertension  - please follow up with cardiology regarding your medication, diltiazem.    If you develop any new shortness of breath, leg swelling, or any other concerning symptoms, please feel free to come back to the emergency department, and we would be happy to treat you.    Take care and be well.

## 2024-09-09 NOTE — SIGNIFICANT EVENT
Rapid Response RN Note    Rapid response RN for RADAR score 7 due to the recent VS listed below:     Vitals:    09/08/24 0813 09/08/24 1153 09/08/24 1517 09/08/24 2020   BP: 103/66 104/71 106/71 97/68   BP Location: Right arm Right arm  Right arm   Patient Position: Sitting Lying  Lying   Pulse: 97 92 96 91   Resp: 17 18 17 18   Temp: 36.3 °C (97.3 °F) 36.3 °C (97.3 °F) 36.3 °C (97.3 °F) 36.6 °C (97.9 °F)   TempSrc: Temporal Temporal Temporal Temporal   SpO2: 94% 94% 96% 93%   Weight:       Height:            Reviewed above VS with bedside RN via phone.  RN stated that she was in room with pt.  No concerns voiced.  Pt asymptomatic.  No interventions by rapid response team indicated at this time.  Staff to page rapid response for any concerns or acute change in condition/VS.

## 2024-09-10 PROBLEM — Z91.148: Status: ACTIVE | Noted: 2024-09-10

## 2024-09-10 PROBLEM — J96.11 CHRONIC RESPIRATORY FAILURE WITH HYPOXIA, ON HOME O2 THERAPY (MULTI): Status: ACTIVE | Noted: 2022-11-11

## 2024-09-10 PROBLEM — E87.70 VOLUME OVERLOAD: Status: ACTIVE | Noted: 2024-09-10

## 2024-09-10 PROBLEM — Z99.81 CHRONIC RESPIRATORY FAILURE WITH HYPOXIA, ON HOME O2 THERAPY (MULTI): Status: ACTIVE | Noted: 2022-11-11

## 2024-09-10 NOTE — PROGRESS NOTES
"History Of Present Illness  Ronna Beckham is a 63 y.o. female presenting with pulmonary hypertension virtual follow up. Patient is NYHA Functional Class 3 and WHO Group 1. Last office visit was on 7/22/2024. She was originally referred by Dr. Calvillo on 9/11/2020.     Recent hospital admission for volume overload and electrolyte abnormalities from 9/7/2024-9/9/2024.     Additional medical history includes concern for line infection on 5/3/2024. Called with infection concern again 5/29/2024, reported had not completed antibiotics. Another prescription was ordered. On 7/3/2024, Pt reported improvement at site . Recent hospitalization 7/5 - 7/7 for \"racing heart.\"    PAH Treatment:   Opsumit (4/12/2021)    Veletri 72 ng/kg/min   80 ml/24hr (9/23/2020)   4L O2  Infusion site: [facility] changed dressing today. Denies redness, endorses slight red drainage.   Treatment history: Tadalafil (5/4/2021-5/6/2021) headaches      09/10/24     Interval History     Past Medical History  Patient Active Problem List   Diagnosis    Shortness of breath    NSTEMI (non-ST elevated myocardial infarction) (Multi)    Toothache    Systemic lupus erythematosus (Multi)    Syncope and collapse    Sore throat    Seizure disorder (Multi)    Right ventricular systolic dysfunction    Right ventricular failure (Multi)    Pulmonary hypertension (Multi)    Paresthesia    Hand pain    PAH (pulmonary artery hypertension) (Multi)    Olecranon bursitis, right elbow    Obesity    Neuropathy    Nasal congestion    Methamphetamine abuse (Multi)    Major depressive disorder, single episode, moderate (Multi)    Liver disease    Insomnia    Increased oxygen demand    Hypomagnesemia    Hypocalcemia    Hyperlipidemia    History of non-ST elevation myocardial infarction (NSTEMI)    History of alcohol abuse    Fatigue    Electrolyte and fluid disorder    Prolonged Q-T interval on ECG    Lower leg edema    Edema of eyelid    Ear pain, bilateral    Drug abuse (Multi) "    Dizziness    Diverticulosis    Depressive disorder    Dental infection    Decreased GFR    Cough    Chronic obstructive pulmonary disease (Multi)    Central line complication    Cellulitis    Blepharitis of both eyes    Blepharitis    Primary hypertension    Anxiety    Alcohol dependence, uncomplicated (Multi)    Acute otitis externa of left ear    Acute on chronic cholecystitis    Acute cor pulmonale (Multi)    Gastroesophageal reflux disease    Chronic respiratory failure with hypoxia, on home O2 therapy (Multi)    Acquired thrombocytopenia (CMS-[facility])    Acquired absence of cervix    Acidosis, unspecified    Abnormal findings on diagnostic imaging of breast    Pulmonary HTN (Multi)    Long-term use of high-risk medication    SVT (supraventricular tachycardia) (CMS-HCC)    Acute on chronic hypoxic respiratory failure (Multi)    Pneumonia of left lower lobe due to infectious organism    Volume overload    Does not refill medications appropriately        Surgical History  She has a past surgical history that includes Other surgical history (09/21/2020); Other surgical history (09/21/2020); Other surgical history (09/21/2020); Other surgical history (09/21/2020); Other surgical history (09/21/2020); Hysterectomy; Cholecystectomy; and Cardiac catheterization (N/A, 1/8/2024).     Social History  She reports that she has quit smoking. Her smoking use included cigarettes. She has quit using smokeless tobacco. She reports that she does not currently use alcohol. She reports that she does not currently use drugs after having used the following drugs: Cocaine and Other.    Family History  Family History   Problem Relation Name Age of Onset    No Known Problems Mother      No Known Problems Father          Medications      Current Outpatient Medications:     aspirin 81 mg EC tablet, Take 1 tablet (81 mg) by mouth once daily., Disp: , Rfl:     cholestyramine (Questran) 4 gram packet, Take 1 packet (4 g) by mouth once daily.,  Disp: 30 packet, Rfl: 0    dilTIAZem CD (Cardizem CD) 180 mg 24 hr capsule, Take 1 capsule (180 mg) by mouth once daily., Disp: 60 capsule, Rfl: 0    epoprostenol (Veletri) 1.5 mg recon soln, 1.5 mg (1,500 mcg) by central venous line infusion route continuously. Reconstitute contents of vial with 5 ml diluent and mix cassette as directed. Infuse continuously per physician orders. Allow for priming volume. Dose range: 1-150 ng/kg/min., Disp: 180 each, Rfl: 11    folic acid (Folvite) 1 mg tablet, Take 1 tablet (1 mg) by mouth once daily. Needs to resume, Disp: , Rfl:     furosemide (Lasix) 40 mg tablet, TAKE 1 TABLET BY MOUTH ONE TIME DAILY, Disp: 60 tablet, Rfl: 0    gabapentin (Neurontin) 800 mg tablet, Take 1 tablet (800 mg) by mouth 3 times a day., Disp: , Rfl:     hydroxychloroquine (Plaquenil) 200 mg tablet, Take 1 tablet (200 mg) by mouth 2 times a day., Disp: , Rfl:     loperamide (Imodium A-D) 2 mg tablet, Take 1 tablet (2 mg) by mouth 4 times a day as needed for diarrhea., Disp: 30 tablet, Rfl: 11    melatonin 5 mg tablet, Take 1 tablet (5 mg) by mouth once daily at bedtime., Disp: , Rfl:     omeprazole (PriLOSEC) 40 mg DR capsule, Take 1 capsule (40 mg) by mouth once daily in the morning. Take before meals. Do not crush or chew., Disp: , Rfl:     Opsumit 10 mg tablet tablet, TAKE 1 TABLET BY MOUTH DAILY. DO NOT HANDLE IF PREGNANT. DO NOT SPLIT, CRUSH, OR CHEW. REVIEW MEDICATION GUIDE., Disp: 30 tablet, Rfl: 11    topiramate (Topamax) 25 mg tablet, Week 1 25mg at bedtime, week 2 25mg BID, week 3 25mg qAM, 50mg at bedtime, week 4 50 mg BID, week 5 50mg qAM 75mg at bedtime, week 6 75 mg BID, week 7 75mg qam 100mg at bedtime, week 8 100mg BID, Disp: 256 tablet, Rfl: 0    venlafaxine XR (Effexor-XR) 150 mg 24 hr capsule, Take 1 capsule (150 mg) by mouth once daily. Do not crush or chew., Disp: , Rfl:   No current facility-administered medications for this visit.     Allergies  Levetiracetam    Review of Systems    Constitutional:  Positive for appetite change. Negative for activity change, chills, fatigue, fever and unexpected weight change.   Respiratory:  Positive for shortness of breath. Negative for cough, chest tightness and wheezing.    Cardiovascular:  Positive for leg swelling. Negative for chest pain and palpitations.   Gastrointestinal:  Positive for diarrhea and nausea. Negative for abdominal distention, abdominal pain, constipation and vomiting.   Neurological:  Negative for dizziness, syncope, light-headedness and headaches.       Last Recorded Vitals  There were no vitals taken for this visit. Telephone visit     Physical Exam   Telephone visit.     Relevant Results    6MWT (7/22/2024)  SP02-98%/96% on 4L  HR-106/114  CHEIKH-2/2  Actual Meters- 183 meters     6MWT (5/13/24)  SP02- 96-96% /4 L NC  HR-   CHEIKH-0-4  Actual Meters 216 meters     CT angio chest for PE (5/30/2024)  1. No findings of acute pulmonary embolism.  2. Persistent marked cardiac enlargement. Hepatic heterogeneity and splenomegaly suggesting right heart dysfunction with associated not make liver and portal hypertension.  3. Bronchial thickening. There are couple of other stable pulmonary nodules when compared to most recent examination, however there are areas of ground-glass opacity seen in the lungs possibly reflecting inflammatory etiologies. Findings of mild pulmonary edema and volume  Overload     Echo (4/24/24) [facility]  Left Ventricle: The left ventricular systolic function is normal, with an estimated ejection fraction of 60-65%. There are no regional wall motion abnormalities. The left ventricular cavity size is normal. The interventricular septum is flattened in systole and diastole, consistent with right ventricular pressure and volume overload. Left ventricular diastolic filling was not assessed.  Left Atrium: The left atrium is normal in size. A bubble study using agitated saline was performed. Bubble study is positive. A small  PFO (= 10 bubbles) was demonstrated. Agitated saline contrast study was positive for small intracardiac shunt.  Right Ventricle: The right ventricle is severely enlarged. There is severely reduced right ventricular systolic function. Although the TAPSE and RVA' are normal, the fractional area change is markedly reduced consistent wtih severe RV systolic dysfunction. Still able to generate high RVSP.  Right Atrium: The right atrium is severely dilated.  Aortic Valve: The aortic valve is trileaflet. There is minimal aortic valve cusp calcification. Aortic valve regurgitation was not assessed.  Mitral Valve: The mitral valve is normal in structure. Mitral valve regurgitation was not assessed.  Tricuspid Valve: The tricuspid valve is structurally normal. There is severe tricuspid regurgitation. The Doppler estimated RVSP is severely elevated at 88.6 mmHg.  Pulmonic Valve: The pulmonic valve is not well visualized. Pulmonic valve regurgitation was not assessed.  Pericardium: There is a small to moderate pericardial effusion. Small circumferential pericardial effusin though is small to moderate adjacent to the RA. No RV or RA collapse. The IVC is dilated, though likely due to the presence of severe pulmonary hypertension. No significant respiratory variation of the MV or TV inflows. Not diagnostic for tamponade though difficult to dx tamponade in setting of severe pulmonary hypertension.  Aorta: The aortic root is normal.  Systemic Veins: The inferior vena cava appears dilated. There is IVC inspiratory collapse greater than 50%.  In comparison to the previous echocardiogram(s): Compared with study from 3/12/2022,. Compared with the prior exam from 3/12/2022 the RV had a similar appearance with severe dilatation and reduced function. Note the TR was not asessed on that exam to allow estimation of the RVSP at that time. The pericardial effusion appears similar in size.  CONCLUSIONS:   1. Left ventricular systolic function  is normal with a 60-65% estimated ejection fraction.   2. Right ventricular volume and pressure overload.   3. Although the TAPSE and RVA' are normal, the fractional area change is markedly reduced consistent wtih severe RV systolic dysfunction. Still able to generate high RVSP.   4. Severely enlarged right ventricle.   5. There is severely reduced right ventricular systolic function.   6. Agitated saline contrast study was positive for small intracardiac shunt.   7. The right atrium is severely dilated.   8. There is a small to moderate pericardial effusion.   9. Small circumferential pericardial effusin though is small to moderate adjacent to the RA. No RV or RA collapse. The IVC is dilated, though likely due to the presence of severe pulmonary hypertension. No significant respiratory variation of the MV or TV inflows. Not diagnostic for tamponade though difficult to dx tamponade in setting of severe pulmonary hypertension.  10. Severe tricuspid regurgitation visualized.  11. Severely elevated right ventricular systolic pressure.  12. Compared with the prior exam from 3/12/2022 the RV had a similar appearance with severe dilatation and reduced function. Note the TR was not asessed on that exam to allow estimation of the RVSP at that time. The pericardial effusion appears similar in size.  13. A bubble study using agitated saline was performed. Bubble study is positive. A small PFO (= 10 bubbles) was demonstrated.     6MWT (23)  SpO2: 98% - 95% on 4L  HR: 97 - 116  Valentin: 1 - 7  Actual 314m     6MWT (2022)   Hr 78 - 111  Spo2 94 - 90 on RA  Valentin 0 - 1  Meters 366      6MWT (21) on RA  SpO2: 92% - 87%  HR: 81 - 94  Valentin: 3 - 4  Actual 311m     6MWT (2021)  HR 91 -102  Spo2 97 - 95 on RA  Valentin 1- 4  Meters 287/predicted 477     RHC (2020)   PAP- 73/31 (47)  PCWP- 8  CO/CI- 3.8 - 2.0   RHC (20): PAp 83/26 (54), PW 8, C.O. 3.8 / C.I. 2.0     Echo from OSH (20)  moderately to severely  dilated RV with moderately reduced function and a moderately dilated RA.            Assessment/Plan  1) Pulmonary  a. PAH associated with methamphetamine use, presented FCIV, V/Q (-) , depressed CI, now on infusion therapy with dramatic subjective improvement, still high risk. Still with hectic follow up at best, (+) cocaine during hospitalization 4/24/2024. (+) Cannabinoid and amphetamine 7/5/2024.  In past,  adjust dosing for weight loss and follow resolution of epo rash and significant diarrhea. Patient went up without authorization. Seems stable from PAH standpoint,      Did not finish Keflex ordered for possible site infection. Better today.      Intolerant to PDE5     On macitentan     2) Neuro - pre-existing neuropathy, alcohol vs ? Today complaining of classic sciatic pain onset after riding horse.      3) Cardiac - hypertension     4) Interval diagnosis of lupus on basis of ?     Plan     1) Continue current meds, start sotatercept.     2) Monitor site,      3) Follow up in clinic. 6 weeks after first dose sotatercept with 6 minute walk and routine labs.    Discussed R/B/A sotatercept. Including hematologic abnormalities and the need to obtain CBC before dosing for  AT LEAST the first 5 doses and perhaps longer if abnormalities exits.     Discussed side effects in particular rash, epistaxis, GI bleeding and intracranial hematoma. Patient aware, understands risks and agrees to proceed.     35 minute phone call.

## 2024-09-12 NOTE — DOCUMENTATION CLARIFICATION NOTE
"    PATIENT:               CARMEN MADRIGAL  ACCT #:                  [id number]  MRN:                       [id number]  :                       1960  ADMIT DATE:       2024 6:13 PM  DISCH DATE:        2024 4:42 PM  RESPONDING PROVIDER #:        [id number]          PROVIDER RESPONSE TEXT:    Acute Decompensated Right Heart Failure    CDI QUERY TEXT:    Clarification        Instruction:    Based on your assessment of the patient and the clinical information, please provide the requested documentation by clicking on the appropriate radio button and enter any additional information if prompted.    Question: Please further clarify the type of heart failure    When answering this query, please exercise your independent professional judgment. The fact that a question is being asked, does not imply that any particular answer is desired or expected.    The patient's clinical indicators include:  Clinical Information: Per ED Note, pt is a \"63-year-old female with past medical history of pulmonary hypertension on baseline 4 L with Veletri infusion, GERD, lupus, MDD, polysubstance use disorder presenting due to concerns of 4 days of bilateral lower extremity swelling as well as increased oxygen requirement\"    Clinical Indicators:  ED Provider Note, : \"Differentials considered but not limited to acute on chronic right heart failure, worsening pulmonary hypertension, medication noncompliance, new onset CHF\"    \"ADHF\" is documented in History and Physical and Progress Notes, -    BNP, : 452  CXR, : \"Cardiomegaly with mild interstitial prominence suggestive of developing interstitial edema.\"    TTE, 2024: EF 60-65 pct    Treatment: Lasix 80mg IV on  and , Monitor I/Os, Daily weight, Telemetry, Supplemental oxygen    Risk Factors: Pulmonary HTN  Options provided:  -- Acute Decompensated Diastolic Heart Failure  -- Acute Decompensated Right Heart Failure  -- Other - I will add my own diagnosis  -- " Refer to Clinical Documentation Reviewer    Query created by: Rhina Do on 9/9/2024 11:55 AM      Electronically signed by:  JASON JAMA MD 9/12/2024 12:18 PM

## 2024-09-13 LAB
ATRIAL RATE: 92 BPM
P AXIS: 71 DEGREES
P OFFSET: 185 MS
P ONSET: 124 MS
PR INTERVAL: 178 MS
Q ONSET: 213 MS
QRS COUNT: 15 BEATS
QRS DURATION: 114 MS
QT INTERVAL: 418 MS
QTC CALCULATION(BAZETT): 516 MS
QTC FREDERICIA: 482 MS
R AXIS: 149 DEGREES
T AXIS: 8 DEGREES
T OFFSET: 422 MS
VENTRICULAR RATE: 92 BPM

## 2024-09-19 ENCOUNTER — TELEMEDICINE (OUTPATIENT)
Dept: PULMONOLOGY | Facility: HOSPITAL | Age: 64
End: 2024-09-19
Payer: COMMERCIAL

## 2024-09-19 DIAGNOSIS — Z79.899 LONG-TERM USE OF HIGH-RISK MEDICATION: ICD-10-CM

## 2024-09-19 DIAGNOSIS — I27.20 PULMONARY HYPERTENSION (MULTI): Primary | ICD-10-CM

## 2024-09-19 DIAGNOSIS — R06.02 SOB (SHORTNESS OF BREATH): ICD-10-CM

## 2024-09-19 DIAGNOSIS — I27.20 PULMONARY HYPERTENSION (MULTI): ICD-10-CM

## 2024-09-19 DIAGNOSIS — Z09 HOSPITAL DISCHARGE FOLLOW-UP: ICD-10-CM

## 2024-09-19 PROCEDURE — 99214 OFFICE O/P EST MOD 30 MIN: CPT | Mod: GT,U1 | Performed by: INTERNAL MEDICINE

## 2024-09-19 PROCEDURE — 99214 OFFICE O/P EST MOD 30 MIN: CPT | Performed by: INTERNAL MEDICINE

## 2024-09-19 ASSESSMENT — ENCOUNTER SYMPTOMS
APPETITE CHANGE: 1
PALPITATIONS: 0
FEVER: 0
UNEXPECTED WEIGHT CHANGE: 0
ABDOMINAL DISTENTION: 0
NAUSEA: 1
DIZZINESS: 0
LIGHT-HEADEDNESS: 0
DIARRHEA: 1
VOMITING: 0
ACTIVITY CHANGE: 0
CONSTIPATION: 0
ABDOMINAL PAIN: 0
WHEEZING: 0
SHORTNESS OF BREATH: 1
FATIGUE: 0
CHEST TIGHTNESS: 0
HEADACHES: 0
COUGH: 0
CHILLS: 0

## 2024-09-24 ENCOUNTER — CLINICAL SUPPORT (OUTPATIENT)
Dept: PHYSICAL THERAPY | Facility: CLINIC | Age: 64
End: 2024-09-24
Payer: COMMERCIAL

## 2024-09-24 DIAGNOSIS — R26.2 DIFFICULTY WALKING: ICD-10-CM

## 2024-09-24 DIAGNOSIS — R29.6 REPEATED FALLS: Primary | ICD-10-CM

## 2024-09-24 PROCEDURE — 97110 THERAPEUTIC EXERCISES: CPT | Mod: GP

## 2024-09-24 PROCEDURE — 97163 PT EVAL HIGH COMPLEX 45 MIN: CPT | Mod: GP

## 2024-09-24 ASSESSMENT — PAIN - FUNCTIONAL ASSESSMENT: PAIN_FUNCTIONAL_ASSESSMENT: 0-10

## 2024-09-24 ASSESSMENT — PAIN SCALES - GENERAL: PAINLEVEL_OUTOF10: 0 - NO PAIN

## 2024-09-24 NOTE — PROGRESS NOTES
Physical Therapy Evaluation and Treatment     Patient Name: Ronna Beckham  MRN: [id number]  Encounter date: 9/24/2024  Time Calculation  Start Time: 1215  Stop Time: 1305  Time Calculation (min): 50 min  PT Evaluation Time Entry  PT Evaluation (Complex) Time Entry: 35  PT Therapeutic Procedures Time Entry  Therapeutic Exercise Time Entry: 15    Visit # 1 of 10  Visits/Dates Authorized: JR BECK - 100% COVERAGE / AUTH REQ AFTER 30 VISITS /    Current Problem:   Problem List Items Addressed This Visit             ICD-10-CM    Repeated falls - Primary R29.6    Relevant Orders    Follow Up In Physical Therapy    Difficulty walking R26.2     Precautions:   H/o anxiety, depression, pulmonary hypertension, lupus, GERD       Subjective Evaluation    History of Present Illness  Date of onset: 1/1/2020  Mechanism of injury: 63 year old female presenting with primary complaint of generalized weakness, deconditioning, and difficulty walking/negotiating stairs, secondary to pulmonary hypertension. Patient is at 4L O2 via NC at baseline. She reports having decreased motivation to perform exercise regularly and is unsure what she could do at home to improve her strength and endurance with daily activities. She reports bilateral LE neuropathy and edema, R>L, which limits her tolerance for weight bearing activity due to increased paresthesias and pain in lower legs and feet. She does not use an AD for ambulation in house, but presents in wheelchair due to being quick to fatigue walking into the clinic from her car. She denies having any recent falls but has had falls in the past, sometimes due to passing out with over-exertion.     Per hospitalization 9/2024:  63 y.o. female presenting with PMHx of pHTN (on 4L NC at [facility]), GERD, SLE, HARRIS, MDD, and polysubstance use disorder (ethanol, cocaine and amphetamines) who presented to the ED with a chief complaint of lower extremity edema and is being admitted to the Pulmonology service  for IV diuresis.      On initial evaluation, patient was afebrile, normotensive, with O2 saturations appropriate on 5L NC (baseline 4L). ED workup reviewed and remarkable for profound hypomagnesemia, pancytopenia, normal renal function, and elevated BNP. CXR with pulm vascular congestion and cephalization. She was managed with electrolyte repletion in ED. On exam noted to have distended abdomen, Bilateral LE edema R slightly worse then L with mild tenderness to palpation, and faint crackles in bilateral lung bases. Treated with increased IV lasix dosing of 80 mg. She responded well. Patient states she has not been taking medications. Unclear why as it was filled  and she stated she was having issues with filling the prescription. DVT US was negative for any acute DVT. Once hypervolemia improved, she was discharged home.      Pain  Current pain ratin  At worst pain ratin  Location: R>L LE seconary to neuropathy  Relieving factors: medications and rest  Aggravating factors: movement (weight bearing activity)  Progression: worsening    Social Support  Lives in: condominium (full staircase to bedroom on second floor)    Patient Goals  Patient goals for therapy: decreased edema, decreased pain, improved balance, increased strength and independence with ADLs/IADLs        Objective     Observations   Left Ankle/Foot   Positive for edema.     Right Ankle/Foot   Positive for edema.     Neurological Testing     Sensation     Ankle/Foot   Left Ankle/Foot   Diminished: light touch and proprioception  Paresthesia: light touch    Right Ankle/Foot   Diminished: light touch and proprioception  Paresthesia: light touch    Active Range of Motion   Left Hip   Normal active range of motion    Right Hip   Normal active range of motion  Left Knee   Normal active range of motion    Right Knee   Normal active range of motion  Left Ankle/Foot   Normal active range of motion    Right Ankle/Foot   Normal active range of  motion    Strength/Myotome Testing     Left Hip   Planes of Motion   Flexion: 4-  Extension: 4-  Abduction: 4-  Adduction: 4+    Right Hip   Planes of Motion   Flexion: 4-  Extension: 4-  Abduction: 4-  Adduction: 4+    Left Knee   Flexion: 5  Extension: 5    Right Knee   Flexion: 5  Extension: 5    Left Ankle/Foot   Dorsiflexion: 5    Right Ankle/Foot   Dorsiflexion: 5    Ambulation   Weight-Bearing Status   Assistive device used: none    Ambulation: Level Surfaces   Ambulation without assistive device: independent    Ambulation: Stairs   Ascend stairs: independent  Pattern: reciprocal  Railings: two rails  Descend stairs: independent  Pattern: reciprocal  Railings: two rails    Additional Stairs Ambulation Details  Fatigues quickly    Observational Gait   Decreased walking speed.   Left arm swing: decreased  Right arm swing: decreased    Comments   Quick to fatigue in general with walking ambulation. Patient has to carry heavy portable O2 tank which limits walking capacity.      Vitals:  Vital Signs  Vital Signs Comment: SpO2 remained 90-95% throughout session, pre- and post- ther ex, on 4L O2 via NC    Romberg: Firm Eyes Open 30 sec    Other Measures  30 Second Sit to Stand: 7    Treatments:     Therapeutic Exercise 01805:   Completed and reviewed HEP (below)    Neuromuscular Re-Ed 83002:     Therapeutic Activity 01835:    Gait Training 10048:     Manual Therapy 67092:     Modalities:     HEP / Access Codes:   Access Code: [email or web address]  Date: 09/25/2024  - Seated March  - 2 x daily - 2 sets - 10 reps  - Seated pillow squeezes  - 2 x daily - 2 sets - 10 reps  - Seated Long Arc Quad  - 2 x daily - 2 sets - 10 reps  - Sit to Stand with Armchair  - 2 x daily - 2 sets - 5 reps  - Heel Raises with Counter Support  - 2 x daily - 2 sets - 10 reps    Assessment & Plan     Assessment  Impairments: activity intolerance, impaired balance, impaired physical strength, lacks appropriate home exercise program and pain with  function  Assessment details: Patient presents with general deconditioning and poor functional endurance secondary to pulmonary hypertension, on 4L O2 via NC at baseline.  Prognosis: fair    Plan  Therapy options: will be seen for skilled physical therapy services  Planned modality interventions: electrical stimulation/Russian stimulation, cryotherapy and TENS  Other planned modality interventions: Dry needling, Red light therapy for LE neuropathy  Planned therapy interventions: therapeutic activities, stretching, strengthening, postural training, neuromuscular re-education, abdominal trunk stabilization, body mechanics training, flexibility, home exercise program, balance/weight-bearing training, gait training and manual therapy  Frequency: 2x week  Duration in visits: 10  Treatment plan discussed with: patient       High complexity due to patient's clinical presentation being unstable, with evolving and unpredictable characteristics, with comorbidities/complexities to include anxiety, depression, pulmonary hypertension, lupus, GERD, h/o frequent ED visits and hospitalizations, all of which may negatively impact rehab tolerance and progression.     Post-Treatment Symptoms:  Response to Interventions: Fatigued    Goals:   Active       PT Problem       STG       Start:  09/24/24    Expected End:  10/08/24       Patient will be independent with HEP.          LTG       Start:  09/24/24    Expected End:  12/31/24       1) Pt will improve 30 second sit to stand score to 11 or greater reps to demonstrate improved functional strength.  2) Pt will be able to safely walk outdoors in community with least restrictive AD and distant supervision.   3) Pt will report having had no falls since beginning therapy.  4) Pt will be able to complete 2MWT while maintaining safe SpO2 vitals (>90%) with 4L O2 via NC.

## 2024-09-24 NOTE — PROGRESS NOTES
Physical Therapy Evaluation and Treatment     Patient Name: Ronna Beckham  MRN: [id number]  Encounter date: 9/24/2024       Visit # 1 of ***  Visits/Dates Authorized: JR BECK - 100% COVERAGE / AUTH REQ AFTER 30 VISITS /    Current Problem:   Problem List Items Addressed This Visit    None    Precautions:   Anxiety, depression, pulmonary hypertension, lupus, GERD       Subjective Evaluation    History of Present Illness  Mechanism of injury:     Per hospitalization 9/2024:  63 y.o. female presenting with PMHx of pHTN (on 4L NC at BL), GERD, SLE, HARRIS, MDD, and polysubstance use disorder (ethanol, cocaine and amphetamines) who presented to the ED with a chief complaint of lower extremity edema and is being admitted to the Pulmonology service for IV diuresis.      On initial evaluation, patient was afebrile, normotensive, with O2 saturations appropriate on 5L NC (baseline 4L). ED workup reviewed and remarkable for profound hypomagnesemia, pancytopenia, normal renal function, and elevated BNP. CXR with pulm vascular congestion and cephalization. She was managed with electrolyte repletion in ED. On exam noted to have distended abdomen, Bilateral LE edema R slightly worse then L with mild tenderness to palpation, and faint crackles in bilateral lung bases. Treated with increased IV lasix dosing of 80 mg. She responded well. Patient states she has not been taking medications. Unclear why as it was filled August 12 and she stated she was having issues with filling the prescription. DVT US was negative for any acute DVT. Once hypervolemia improved, she was discharged home.            Objective    Vitals:       Cervical/Thoracic Spine    Lumbar Spine         {Balance Assessments:39570}       Treatments:     Therapeutic Exercise 06177:     Neuromuscular Re-Ed 63927:     Therapeutic Activity 98147:    Gait Training 45321:     Manual Therapy 36024:     Modalities:   {Modalities Used:68248}      HEP / Access Codes:        Assessment/Plan   {Complexities:68983}     Post-Treatment Symptoms:       Goals:

## 2024-09-25 PROBLEM — R26.2 DIFFICULTY WALKING: Status: ACTIVE | Noted: 2024-09-25

## 2024-09-25 ASSESSMENT — ACTIVITIES OF DAILY LIVING (ADL)
POOR_BALANCE: 1
AMBULATION_WITHOUT_ASSISTIVE_DEVICE: INDEPENDENT

## 2024-09-25 ASSESSMENT — ENCOUNTER SYMPTOMS
PAIN SCALE AT HIGHEST: 7
ALLEVIATING FACTORS: MEDICATIONS
EXACERBATED BY: MOVEMENT
PAIN SCALE: 0
ALLEVIATING FACTORS: REST

## 2024-09-27 ENCOUNTER — APPOINTMENT (OUTPATIENT)
Dept: PHYSICAL THERAPY | Facility: CLINIC | Age: 64
End: 2024-09-27
Payer: COMMERCIAL

## 2024-09-30 ENCOUNTER — APPOINTMENT (OUTPATIENT)
Dept: NEUROLOGY | Facility: CLINIC | Age: 64
End: 2024-09-30
Payer: COMMERCIAL

## 2024-10-01 ENCOUNTER — DOCUMENTATION (OUTPATIENT)
Dept: PULMONOLOGY | Facility: HOSPITAL | Age: 64
End: 2024-10-01
Payer: COMMERCIAL

## 2024-10-02 ENCOUNTER — DOCUMENTATION (OUTPATIENT)
Dept: PULMONOLOGY | Facility: HOSPITAL | Age: 64
End: 2024-10-02
Payer: COMMERCIAL

## 2024-10-03 ENCOUNTER — HOSPITAL ENCOUNTER (EMERGENCY)
Facility: HOSPITAL | Age: 64
Discharge: OTHER INSTITUTION | End: 2024-10-03
Payer: COMMERCIAL

## 2024-10-03 ENCOUNTER — APPOINTMENT (OUTPATIENT)
Dept: RADIOLOGY | Facility: HOSPITAL | Age: 64
End: 2024-10-03
Payer: COMMERCIAL

## 2024-10-03 ENCOUNTER — APPOINTMENT (OUTPATIENT)
Dept: CARDIOLOGY | Facility: HOSPITAL | Age: 64
End: 2024-10-03
Payer: COMMERCIAL

## 2024-10-03 ENCOUNTER — HOSPITAL ENCOUNTER (INPATIENT)
Facility: HOSPITAL | Age: 64
LOS: 3 days | Discharge: HOME | End: 2024-10-06
Attending: STUDENT IN AN ORGANIZED HEALTH CARE EDUCATION/TRAINING PROGRAM | Admitting: STUDENT IN AN ORGANIZED HEALTH CARE EDUCATION/TRAINING PROGRAM
Payer: COMMERCIAL

## 2024-10-03 VITALS
RESPIRATION RATE: 20 BRPM | TEMPERATURE: 97.9 F | HEART RATE: 102 BPM | SYSTOLIC BLOOD PRESSURE: 110 MMHG | WEIGHT: 172.84 LBS | BODY MASS INDEX: 28.8 KG/M2 | OXYGEN SATURATION: 92 % | DIASTOLIC BLOOD PRESSURE: 91 MMHG | HEIGHT: 65 IN

## 2024-10-03 DIAGNOSIS — E83.42 HYPOMAGNESEMIA: ICD-10-CM

## 2024-10-03 DIAGNOSIS — I47.10 SVT (SUPRAVENTRICULAR TACHYCARDIA) (CMS-HCC): Primary | ICD-10-CM

## 2024-10-03 DIAGNOSIS — R68.81 EARLY SATIETY: ICD-10-CM

## 2024-10-03 DIAGNOSIS — I27.20 PULMONARY HYPERTENSION (MULTI): ICD-10-CM

## 2024-10-03 DIAGNOSIS — R06.02 SOB (SHORTNESS OF BREATH): ICD-10-CM

## 2024-10-03 DIAGNOSIS — R68.89 INCREASED OXYGEN DEMAND: ICD-10-CM

## 2024-10-03 LAB
ALBUMIN SERPL BCP-MCNC: 3.9 G/DL (ref 3.4–5)
ALP SERPL-CCNC: 84 U/L (ref 33–136)
ALT SERPL W P-5'-P-CCNC: 7 U/L (ref 7–45)
ANION GAP BLDA CALCULATED.4IONS-SCNC: 13 MMO/L (ref 10–25)
ANION GAP SERPL CALCULATED.3IONS-SCNC: 16 MMOL/L (ref 10–20)
APPARATUS: ABNORMAL
APPEARANCE UR: CLEAR
ARTERIAL PATENCY WRIST A: POSITIVE
AST SERPL W P-5'-P-CCNC: 10 U/L (ref 9–39)
BASE EXCESS BLDA CALC-SCNC: -1.9 MMOL/L (ref -2–3)
BASOPHILS # BLD AUTO: 0 X10*3/UL (ref 0–0.1)
BASOPHILS NFR BLD AUTO: 0 %
BILIRUB SERPL-MCNC: 0.9 MG/DL (ref 0–1.2)
BILIRUB UR STRIP.AUTO-MCNC: NEGATIVE MG/DL
BNP SERPL-MCNC: 1207 PG/ML (ref 0–99)
BODY TEMPERATURE: 37 DEGREES CELSIUS
BUN SERPL-MCNC: 18 MG/DL (ref 6–23)
CA-I BLD-SCNC: 0.79 MMOL/L (ref 1.1–1.33)
CA-I BLDA-SCNC: 0.86 MMOL/L (ref 1.1–1.33)
CALCIUM SERPL-MCNC: 6.6 MG/DL (ref 8.6–10.3)
CARDIAC TROPONIN I PNL SERPL HS: 7 NG/L (ref 0–13)
CARDIAC TROPONIN I PNL SERPL HS: 7 NG/L (ref 0–13)
CHLORIDE BLDA-SCNC: 107 MMOL/L (ref 98–107)
CHLORIDE SERPL-SCNC: 106 MMOL/L (ref 98–107)
CO2 SERPL-SCNC: 21 MMOL/L (ref 21–32)
COLOR UR: YELLOW
CREAT SERPL-MCNC: 1.18 MG/DL (ref 0.5–1.05)
EGFRCR SERPLBLD CKD-EPI 2021: 53 ML/MIN/1.73M*2
EOSINOPHIL # BLD AUTO: 0 X10*3/UL (ref 0–0.7)
EOSINOPHIL NFR BLD AUTO: 0 %
ERYTHROCYTE [DISTWIDTH] IN BLOOD BY AUTOMATED COUNT: 18.1 % (ref 11.5–14.5)
FLUAV RNA RESP QL NAA+PROBE: NOT DETECTED
FLUBV RNA RESP QL NAA+PROBE: NOT DETECTED
GLUCOSE BLDA-MCNC: 141 MG/DL (ref 74–99)
GLUCOSE SERPL-MCNC: 137 MG/DL (ref 74–99)
GLUCOSE UR STRIP.AUTO-MCNC: NORMAL MG/DL
HCO3 BLDA-SCNC: 21.9 MMOL/L (ref 22–26)
HCT VFR BLD AUTO: 35 % (ref 36–46)
HCT VFR BLD EST: 29 % (ref 36–46)
HGB BLD-MCNC: 10.2 G/DL (ref 12–16)
HGB BLDA-MCNC: 9.8 G/DL (ref 12–16)
HYALINE CASTS #/AREA URNS AUTO: ABNORMAL /LPF
IMM GRANULOCYTES # BLD AUTO: 0.02 X10*3/UL (ref 0–0.7)
IMM GRANULOCYTES NFR BLD AUTO: 0.4 % (ref 0–0.9)
INHALED O2 CONCENTRATION: 44 %
KETONES UR STRIP.AUTO-MCNC: NEGATIVE MG/DL
LACTATE BLDA-SCNC: 0.9 MMOL/L (ref 0.4–2)
LACTATE SERPL-SCNC: 1.6 MMOL/L (ref 0.4–2)
LEUKOCYTE ESTERASE UR QL STRIP.AUTO: NEGATIVE
LYMPHOCYTES # BLD AUTO: 1.18 X10*3/UL (ref 1.2–4.8)
LYMPHOCYTES NFR BLD AUTO: 26.2 %
MAGNESIUM SERPL-MCNC: 0.7 MG/DL (ref 1.6–2.4)
MCH RBC QN AUTO: 23.7 PG (ref 26–34)
MCHC RBC AUTO-ENTMCNC: 29.1 G/DL (ref 32–36)
MCV RBC AUTO: 81 FL (ref 80–100)
MONOCYTES # BLD AUTO: 0.32 X10*3/UL (ref 0.1–1)
MONOCYTES NFR BLD AUTO: 7.1 %
MUCOUS THREADS #/AREA URNS AUTO: ABNORMAL /LPF
NEUTROPHILS # BLD AUTO: 2.98 X10*3/UL (ref 1.2–7.7)
NEUTROPHILS NFR BLD AUTO: 66.3 %
NITRITE UR QL STRIP.AUTO: NEGATIVE
NRBC BLD-RTO: 0 /100 WBCS (ref 0–0)
OXYHGB MFR BLDA: 90.9 % (ref 94–98)
PCO2 BLDA: 33 MM HG (ref 38–42)
PH BLDA: 7.43 PH (ref 7.38–7.42)
PH UR STRIP.AUTO: 5.5 [PH]
PLATELET # BLD AUTO: 107 X10*3/UL (ref 150–450)
PO2 BLDA: 68 MM HG (ref 85–95)
POTASSIUM BLDA-SCNC: 3.9 MMOL/L (ref 3.5–5.3)
POTASSIUM SERPL-SCNC: 4 MMOL/L (ref 3.5–5.3)
PROT SERPL-MCNC: 6.9 G/DL (ref 6.4–8.2)
PROT UR STRIP.AUTO-MCNC: ABNORMAL MG/DL
RBC # BLD AUTO: 4.31 X10*6/UL (ref 4–5.2)
RBC # UR STRIP.AUTO: NEGATIVE /UL
RBC #/AREA URNS AUTO: ABNORMAL /HPF
SAO2 % BLDA: 92 % (ref 94–100)
SARS-COV-2 RNA RESP QL NAA+PROBE: NOT DETECTED
SODIUM BLDA-SCNC: 138 MMOL/L (ref 136–145)
SODIUM SERPL-SCNC: 139 MMOL/L (ref 136–145)
SP GR UR STRIP.AUTO: 1.01
SPECIMEN DRAWN FROM PATIENT: ABNORMAL
SQUAMOUS #/AREA URNS AUTO: ABNORMAL /HPF
UROBILINOGEN UR STRIP.AUTO-MCNC: NORMAL MG/DL
WBC # BLD AUTO: 4.5 X10*3/UL (ref 4.4–11.3)
WBC #/AREA URNS AUTO: ABNORMAL /HPF

## 2024-10-03 PROCEDURE — 93005 ELECTROCARDIOGRAM TRACING: CPT

## 2024-10-03 PROCEDURE — 84540 ASSAY OF URINE/UREA-N: CPT

## 2024-10-03 PROCEDURE — 2500000004 HC RX 250 GENERAL PHARMACY W/ HCPCS (ALT 636 FOR OP/ED)

## 2024-10-03 PROCEDURE — 85025 COMPLETE CBC W/AUTO DIFF WBC: CPT

## 2024-10-03 PROCEDURE — 83605 ASSAY OF LACTIC ACID: CPT

## 2024-10-03 PROCEDURE — 87636 SARSCOV2 & INF A&B AMP PRB: CPT

## 2024-10-03 PROCEDURE — 84443 ASSAY THYROID STIM HORMONE: CPT

## 2024-10-03 PROCEDURE — 83036 HEMOGLOBIN GLYCOSYLATED A1C: CPT

## 2024-10-03 PROCEDURE — 85610 PROTHROMBIN TIME: CPT

## 2024-10-03 PROCEDURE — 80053 COMPREHEN METABOLIC PANEL: CPT

## 2024-10-03 PROCEDURE — 84132 ASSAY OF SERUM POTASSIUM: CPT

## 2024-10-03 PROCEDURE — 83735 ASSAY OF MAGNESIUM: CPT

## 2024-10-03 PROCEDURE — 83880 ASSAY OF NATRIURETIC PEPTIDE: CPT

## 2024-10-03 PROCEDURE — 87040 BLOOD CULTURE FOR BACTERIA: CPT | Mod: TRILAB

## 2024-10-03 PROCEDURE — 84484 ASSAY OF TROPONIN QUANT: CPT

## 2024-10-03 PROCEDURE — 71045 X-RAY EXAM CHEST 1 VIEW: CPT | Performed by: RADIOLOGY

## 2024-10-03 PROCEDURE — 82436 ASSAY OF URINE CHLORIDE: CPT

## 2024-10-03 PROCEDURE — 80307 DRUG TEST PRSMV CHEM ANLYZR: CPT

## 2024-10-03 PROCEDURE — 37799 UNLISTED PX VASCULAR SURGERY: CPT

## 2024-10-03 PROCEDURE — 87635 SARS-COV-2 COVID-19 AMP PRB: CPT

## 2024-10-03 PROCEDURE — 1100000001 HC PRIVATE ROOM DAILY

## 2024-10-03 PROCEDURE — 84100 ASSAY OF PHOSPHORUS: CPT

## 2024-10-03 PROCEDURE — 87075 CULTR BACTERIA EXCEPT BLOOD: CPT | Mod: TRILAB,59

## 2024-10-03 PROCEDURE — 82330 ASSAY OF CALCIUM: CPT

## 2024-10-03 PROCEDURE — 85730 THROMBOPLASTIN TIME PARTIAL: CPT

## 2024-10-03 PROCEDURE — 81001 URINALYSIS AUTO W/SCOPE: CPT

## 2024-10-03 PROCEDURE — 96374 THER/PROPH/DIAG INJ IV PUSH: CPT

## 2024-10-03 PROCEDURE — 85018 HEMOGLOBIN: CPT | Mod: 59

## 2024-10-03 PROCEDURE — 80061 LIPID PANEL: CPT

## 2024-10-03 PROCEDURE — 99284 EMERGENCY DEPT VISIT MOD MDM: CPT | Mod: 25

## 2024-10-03 PROCEDURE — 86901 BLOOD TYPING SEROLOGIC RH(D): CPT

## 2024-10-03 PROCEDURE — 71045 X-RAY EXAM CHEST 1 VIEW: CPT

## 2024-10-03 PROCEDURE — 2020000001 HC ICU ROOM DAILY

## 2024-10-03 PROCEDURE — 36415 COLL VENOUS BLD VENIPUNCTURE: CPT

## 2024-10-03 RX ORDER — ADENOSINE 3 MG/ML
12 INJECTION, SOLUTION INTRAVENOUS ONCE
Status: COMPLETED | OUTPATIENT
Start: 2024-10-03 | End: 2024-10-03

## 2024-10-03 RX ORDER — ETOMIDATE 2 MG/ML
10 INJECTION INTRAVENOUS ONCE
Status: DISCONTINUED | OUTPATIENT
Start: 2024-10-03 | End: 2024-10-03 | Stop reason: HOSPADM

## 2024-10-03 RX ORDER — ENOXAPARIN SODIUM 100 MG/ML
40 INJECTION SUBCUTANEOUS EVERY 24 HOURS
Status: DISCONTINUED | OUTPATIENT
Start: 2024-10-04 | End: 2024-10-06 | Stop reason: HOSPADM

## 2024-10-03 RX ORDER — ADENOSINE 3 MG/ML
6 INJECTION, SOLUTION INTRAVENOUS ONCE
Status: COMPLETED | OUTPATIENT
Start: 2024-10-03 | End: 2024-10-03

## 2024-10-03 RX ORDER — ONDANSETRON HYDROCHLORIDE 2 MG/ML
INJECTION, SOLUTION INTRAVENOUS
Status: COMPLETED
Start: 2024-10-03 | End: 2024-10-03

## 2024-10-03 SDOH — SOCIAL STABILITY: SOCIAL INSECURITY: DO YOU FEEL ANYONE HAS EXPLOITED OR TAKEN ADVANTAGE OF YOU FINANCIALLY OR OF YOUR PERSONAL PROPERTY?: NO

## 2024-10-03 SDOH — SOCIAL STABILITY: SOCIAL INSECURITY: HAVE YOU HAD ANY THOUGHTS OF HARMING ANYONE ELSE?: NO

## 2024-10-03 SDOH — SOCIAL STABILITY: SOCIAL INSECURITY: ARE THERE ANY APPARENT SIGNS OF INJURIES/BEHAVIORS THAT COULD BE RELATED TO ABUSE/NEGLECT?: NO

## 2024-10-03 SDOH — SOCIAL STABILITY: SOCIAL INSECURITY: ABUSE: ADULT

## 2024-10-03 SDOH — SOCIAL STABILITY: SOCIAL INSECURITY: ARE YOU OR HAVE YOU BEEN THREATENED OR ABUSED PHYSICALLY, EMOTIONALLY, OR SEXUALLY BY ANYONE?: NO

## 2024-10-03 SDOH — SOCIAL STABILITY: SOCIAL INSECURITY: WERE YOU ABLE TO COMPLETE ALL THE BEHAVIORAL HEALTH SCREENINGS?: YES

## 2024-10-03 SDOH — SOCIAL STABILITY: SOCIAL INSECURITY: HAS ANYONE EVER THREATENED TO HURT YOUR FAMILY OR YOUR PETS?: NO

## 2024-10-03 SDOH — SOCIAL STABILITY: SOCIAL INSECURITY: DO YOU FEEL UNSAFE GOING BACK TO THE PLACE WHERE YOU ARE LIVING?: NO

## 2024-10-03 SDOH — SOCIAL STABILITY: SOCIAL INSECURITY: HAVE YOU HAD THOUGHTS OF HARMING ANYONE ELSE?: NO

## 2024-10-03 ASSESSMENT — ACTIVITIES OF DAILY LIVING (ADL)
ADEQUATE_TO_COMPLETE_ADL: YES
TOILETING: INDEPENDENT
ASSISTIVE_DEVICE: WHEELCHAIR;OTHER (COMMENT)
HEARING - RIGHT EAR: FUNCTIONAL
GROOMING: INDEPENDENT
HEARING - LEFT EAR: FUNCTIONAL
BATHING: INDEPENDENT
JUDGMENT_ADEQUATE_SAFELY_COMPLETE_DAILY_ACTIVITIES: YES
PATIENT'S MEMORY ADEQUATE TO SAFELY COMPLETE DAILY ACTIVITIES?: YES
WALKS IN HOME: INDEPENDENT
FEEDING YOURSELF: INDEPENDENT
DRESSING YOURSELF: INDEPENDENT

## 2024-10-03 ASSESSMENT — LIFESTYLE VARIABLES
SUBSTANCE_ABUSE_PAST_12_MONTHS: NO
AUDIT-C TOTAL SCORE: 0
HOW OFTEN DO YOU HAVE 6 OR MORE DRINKS ON ONE OCCASION: NEVER
SKIP TO QUESTIONS 9-10: 1
HOW OFTEN DO YOU HAVE A DRINK CONTAINING ALCOHOL: NEVER
HOW MANY STANDARD DRINKS CONTAINING ALCOHOL DO YOU HAVE ON A TYPICAL DAY: PATIENT DOES NOT DRINK
PRESCIPTION_ABUSE_PAST_12_MONTHS: NO
AUDIT-C TOTAL SCORE: 0

## 2024-10-03 ASSESSMENT — PATIENT HEALTH QUESTIONNAIRE - PHQ9
2. FEELING DOWN, DEPRESSED OR HOPELESS: NOT AT ALL
1. LITTLE INTEREST OR PLEASURE IN DOING THINGS: SEVERAL DAYS
SUM OF ALL RESPONSES TO PHQ9 QUESTIONS 1 & 2: 1

## 2024-10-04 ENCOUNTER — APPOINTMENT (OUTPATIENT)
Dept: RADIOLOGY | Facility: HOSPITAL | Age: 64
End: 2024-10-04
Payer: COMMERCIAL

## 2024-10-04 LAB
ABO GROUP (TYPE) IN BLOOD: NORMAL
ALBUMIN SERPL BCP-MCNC: 3.4 G/DL (ref 3.4–5)
ALBUMIN SERPL BCP-MCNC: 3.7 G/DL (ref 3.4–5)
ALP SERPL-CCNC: 86 U/L (ref 33–136)
ALT SERPL W P-5'-P-CCNC: 7 U/L (ref 7–45)
AMPHETAMINES UR QL SCN: NORMAL
ANION GAP BLDA CALCULATED.4IONS-SCNC: 16 MMO/L (ref 10–25)
ANION GAP BLDV CALCULATED.4IONS-SCNC: 16 MMOL/L (ref 10–25)
ANION GAP SERPL CALC-SCNC: 14 MMOL/L (ref 10–20)
ANION GAP SERPL CALC-SCNC: 15 MMOL/L (ref 10–20)
ANTIBODY SCREEN: NORMAL
APTT PPP: 34 SECONDS (ref 27–38)
AST SERPL W P-5'-P-CCNC: 11 U/L (ref 9–39)
BARBITURATES UR QL SCN: NORMAL
BASE EXCESS BLDA CALC-SCNC: -5.3 MMOL/L (ref -2–3)
BASE EXCESS BLDV CALC-SCNC: -3.7 MMOL/L (ref -2–3)
BASOPHILS # BLD AUTO: 0 X10*3/UL (ref 0–0.1)
BASOPHILS # BLD AUTO: 0 X10*3/UL (ref 0–0.1)
BASOPHILS NFR BLD AUTO: 0 %
BASOPHILS NFR BLD AUTO: 0 %
BENZODIAZ UR QL SCN: NORMAL
BILIRUB SERPL-MCNC: 0.9 MG/DL (ref 0–1.2)
BNP SERPL-MCNC: 603 PG/ML (ref 0–99)
BODY TEMPERATURE: 37 DEGREES CELSIUS
BODY TEMPERATURE: 37 DEGREES CELSIUS
BUN SERPL-MCNC: 17 MG/DL (ref 6–23)
BUN SERPL-MCNC: 18 MG/DL (ref 6–23)
BZE UR QL SCN: NORMAL
CA-I BLDA-SCNC: 0.88 MMOL/L (ref 1.1–1.33)
CA-I BLDV-SCNC: 0.87 MMOL/L (ref 1.1–1.33)
CALCIUM SERPL-MCNC: 6.6 MG/DL (ref 8.6–10.6)
CALCIUM SERPL-MCNC: 6.8 MG/DL (ref 8.6–10.6)
CANNABINOIDS UR QL SCN: NORMAL
CHLORIDE BLDA-SCNC: 104 MMOL/L (ref 98–107)
CHLORIDE BLDV-SCNC: 104 MMOL/L (ref 98–107)
CHLORIDE SERPL-SCNC: 106 MMOL/L (ref 98–107)
CHLORIDE SERPL-SCNC: 108 MMOL/L (ref 98–107)
CHLORIDE UR-SCNC: 85 MMOL/L
CHLORIDE/CREATININE (MMOL/G) IN URINE: 91 MMOL/G CREAT (ref 38–318)
CHOLEST SERPL-MCNC: 125 MG/DL (ref 0–199)
CHOLESTEROL/HDL RATIO: 3.2
CO2 SERPL-SCNC: 22 MMOL/L (ref 21–32)
CO2 SERPL-SCNC: 23 MMOL/L (ref 21–32)
CREAT SERPL-MCNC: 1.13 MG/DL (ref 0.5–1.05)
CREAT SERPL-MCNC: 1.23 MG/DL (ref 0.5–1.05)
CREAT UR-MCNC: 92.9 MG/DL (ref 20–320)
CREAT UR-MCNC: 92.9 MG/DL (ref 20–320)
EGFRCR SERPLBLD CKD-EPI 2021: 50 ML/MIN/1.73M*2
EGFRCR SERPLBLD CKD-EPI 2021: 55 ML/MIN/1.73M*2
EOSINOPHIL # BLD AUTO: 0 X10*3/UL (ref 0–0.7)
EOSINOPHIL # BLD AUTO: 0 X10*3/UL (ref 0–0.7)
EOSINOPHIL NFR BLD AUTO: 0 %
EOSINOPHIL NFR BLD AUTO: 0 %
ERYTHROCYTE [DISTWIDTH] IN BLOOD BY AUTOMATED COUNT: 17.9 % (ref 11.5–14.5)
ERYTHROCYTE [DISTWIDTH] IN BLOOD BY AUTOMATED COUNT: 18.2 % (ref 11.5–14.5)
EST. AVERAGE GLUCOSE BLD GHB EST-MCNC: 117 MG/DL
FENTANYL+NORFENTANYL UR QL SCN: NORMAL
GLUCOSE BLDA-MCNC: 114 MG/DL (ref 74–99)
GLUCOSE BLDV-MCNC: 106 MG/DL (ref 74–99)
GLUCOSE SERPL-MCNC: 109 MG/DL (ref 74–99)
GLUCOSE SERPL-MCNC: 114 MG/DL (ref 74–99)
HBA1C MFR BLD: 5.7 %
HCO3 BLDA-SCNC: 20 MMOL/L (ref 22–26)
HCO3 BLDV-SCNC: 22.1 MMOL/L (ref 22–26)
HCT VFR BLD AUTO: 30.6 % (ref 36–46)
HCT VFR BLD AUTO: 32.3 % (ref 36–46)
HCT VFR BLD EST: 30 % (ref 36–46)
HCT VFR BLD EST: 31 % (ref 36–46)
HDLC SERPL-MCNC: 39.3 MG/DL
HGB BLD-MCNC: 10 G/DL (ref 12–16)
HGB BLD-MCNC: 8.9 G/DL (ref 12–16)
HGB BLDA-MCNC: 10 G/DL (ref 12–16)
HGB BLDV-MCNC: 10.2 G/DL (ref 12–16)
IMM GRANULOCYTES # BLD AUTO: 0.02 X10*3/UL (ref 0–0.7)
IMM GRANULOCYTES # BLD AUTO: 0.03 X10*3/UL (ref 0–0.7)
IMM GRANULOCYTES NFR BLD AUTO: 0.4 % (ref 0–0.9)
IMM GRANULOCYTES NFR BLD AUTO: 0.5 % (ref 0–0.9)
INHALED O2 CONCENTRATION: 32 %
INHALED O2 CONCENTRATION: 44 %
INR PPP: 1.6 (ref 0.9–1.1)
LACTATE BLDA-SCNC: 0.4 MMOL/L (ref 0.4–2)
LACTATE BLDV-SCNC: 0.6 MMOL/L (ref 0.4–2)
LACTATE SERPL-SCNC: 0.9 MMOL/L (ref 0.4–2)
LDLC SERPL CALC-MCNC: 62 MG/DL
LYMPHOCYTES # BLD AUTO: 0.59 X10*3/UL (ref 1.2–4.8)
LYMPHOCYTES # BLD AUTO: 0.77 X10*3/UL (ref 1.2–4.8)
LYMPHOCYTES NFR BLD AUTO: 10.4 %
LYMPHOCYTES NFR BLD AUTO: 16.6 %
MAGNESIUM SERPL-MCNC: 0.74 MG/DL (ref 1.6–2.4)
MAGNESIUM SERPL-MCNC: 2.05 MG/DL (ref 1.6–2.4)
MCH RBC QN AUTO: 24.3 PG (ref 26–34)
MCH RBC QN AUTO: 24.3 PG (ref 26–34)
MCHC RBC AUTO-ENTMCNC: 29.1 G/DL (ref 32–36)
MCHC RBC AUTO-ENTMCNC: 31 G/DL (ref 32–36)
MCV RBC AUTO: 78 FL (ref 80–100)
MCV RBC AUTO: 84 FL (ref 80–100)
METHADONE UR QL SCN: NORMAL
MONOCYTES # BLD AUTO: 0.3 X10*3/UL (ref 0.1–1)
MONOCYTES # BLD AUTO: 0.37 X10*3/UL (ref 0.1–1)
MONOCYTES NFR BLD AUTO: 6.5 %
MONOCYTES NFR BLD AUTO: 6.5 %
NEUTROPHILS # BLD AUTO: 3.55 X10*3/UL (ref 1.2–7.7)
NEUTROPHILS # BLD AUTO: 4.66 X10*3/UL (ref 1.2–7.7)
NEUTROPHILS NFR BLD AUTO: 76.5 %
NEUTROPHILS NFR BLD AUTO: 82.6 %
NON HDL CHOLESTEROL: 86 MG/DL (ref 0–149)
NRBC BLD-RTO: 0 /100 WBCS (ref 0–0)
NRBC BLD-RTO: 0 /100 WBCS (ref 0–0)
OPIATES UR QL SCN: NORMAL
OXYCODONE+OXYMORPHONE UR QL SCN: NORMAL
OXYHGB MFR BLDA: 88.2 % (ref 94–98)
OXYHGB MFR BLDV: 31.1 % (ref 45–75)
PCO2 BLDA: 37 MM HG (ref 38–42)
PCO2 BLDV: 42 MM HG (ref 41–51)
PCP UR QL SCN: NORMAL
PH BLDA: 7.34 PH (ref 7.38–7.42)
PH BLDV: 7.33 PH (ref 7.33–7.43)
PHOSPHATE SERPL-MCNC: 5 MG/DL (ref 2.5–4.9)
PHOSPHATE SERPL-MCNC: 5.3 MG/DL (ref 2.5–4.9)
PLATELET # BLD AUTO: 105 X10*3/UL (ref 150–450)
PLATELET # BLD AUTO: 98 X10*3/UL (ref 150–450)
PO2 BLDA: 66 MM HG (ref 85–95)
PO2 BLDV: 28 MM HG (ref 35–45)
POTASSIUM BLDA-SCNC: 4.2 MMOL/L (ref 3.5–5.3)
POTASSIUM BLDV-SCNC: 4 MMOL/L (ref 3.5–5.3)
POTASSIUM SERPL-SCNC: 4.1 MMOL/L (ref 3.5–5.3)
POTASSIUM SERPL-SCNC: 4.5 MMOL/L (ref 3.5–5.3)
POTASSIUM UR-SCNC: 68 MMOL/L
POTASSIUM/CREAT UR-RTO: 73 MMOL/G CREAT
PROT SERPL-MCNC: 6.4 G/DL (ref 6.4–8.2)
PROTHROMBIN TIME: 18 SECONDS (ref 9.8–12.8)
RBC # BLD AUTO: 3.66 X10*6/UL (ref 4–5.2)
RBC # BLD AUTO: 4.12 X10*6/UL (ref 4–5.2)
RH FACTOR (ANTIGEN D): NORMAL
SAO2 % BLDA: 91 % (ref 94–100)
SAO2 % BLDV: 31 % (ref 45–75)
SODIUM BLDA-SCNC: 136 MMOL/L (ref 136–145)
SODIUM BLDV-SCNC: 138 MMOL/L (ref 136–145)
SODIUM SERPL-SCNC: 139 MMOL/L (ref 136–145)
SODIUM SERPL-SCNC: 140 MMOL/L (ref 136–145)
SODIUM UR-SCNC: 48 MMOL/L
SODIUM/CREAT UR-RTO: 52 MMOL/G CREAT
TRIGL SERPL-MCNC: 117 MG/DL (ref 0–149)
TSH SERPL-ACNC: 2 MIU/L (ref 0.44–3.98)
UREA/CREAT UR-SRTO: 3.7 G/G CREAT
UUN UR-MCNC: 344 MG/DL
VLDL: 23 MG/DL (ref 0–40)
WBC # BLD AUTO: 4.6 X10*3/UL (ref 4.4–11.3)
WBC # BLD AUTO: 5.7 X10*3/UL (ref 4.4–11.3)

## 2024-10-04 PROCEDURE — 84132 ASSAY OF SERUM POTASSIUM: CPT

## 2024-10-04 PROCEDURE — 2500000005 HC RX 250 GENERAL PHARMACY W/O HCPCS

## 2024-10-04 PROCEDURE — 37799 UNLISTED PX VASCULAR SURGERY: CPT

## 2024-10-04 PROCEDURE — 2500000001 HC RX 250 WO HCPCS SELF ADMINISTERED DRUGS (ALT 637 FOR MEDICARE OP)

## 2024-10-04 PROCEDURE — 97530 THERAPEUTIC ACTIVITIES: CPT | Mod: GP

## 2024-10-04 PROCEDURE — 2500000004 HC RX 250 GENERAL PHARMACY W/ HCPCS (ALT 636 FOR OP/ED): Mod: JZ

## 2024-10-04 PROCEDURE — 97161 PT EVAL LOW COMPLEX 20 MIN: CPT | Mod: GP

## 2024-10-04 PROCEDURE — 99291 CRITICAL CARE FIRST HOUR: CPT

## 2024-10-04 PROCEDURE — 71045 X-RAY EXAM CHEST 1 VIEW: CPT | Performed by: RADIOLOGY

## 2024-10-04 PROCEDURE — 36600 WITHDRAWAL OF ARTERIAL BLOOD: CPT

## 2024-10-04 PROCEDURE — 2500000004 HC RX 250 GENERAL PHARMACY W/ HCPCS (ALT 636 FOR OP/ED)

## 2024-10-04 PROCEDURE — 83735 ASSAY OF MAGNESIUM: CPT

## 2024-10-04 PROCEDURE — 1100000001 HC PRIVATE ROOM DAILY

## 2024-10-04 PROCEDURE — 71045 X-RAY EXAM CHEST 1 VIEW: CPT

## 2024-10-04 PROCEDURE — 85025 COMPLETE CBC W/AUTO DIFF WBC: CPT

## 2024-10-04 RX ORDER — ACETAMINOPHEN 500 MG
5 TABLET ORAL NIGHTLY
Status: DISCONTINUED | OUTPATIENT
Start: 2024-10-04 | End: 2024-10-06 | Stop reason: HOSPADM

## 2024-10-04 RX ORDER — VENLAFAXINE HYDROCHLORIDE 150 MG/1
150 CAPSULE, EXTENDED RELEASE ORAL DAILY
Status: DISCONTINUED | OUTPATIENT
Start: 2024-10-04 | End: 2024-10-06 | Stop reason: HOSPADM

## 2024-10-04 RX ORDER — CHOLESTYRAMINE 4 G/9G
1 POWDER, FOR SUSPENSION ORAL DAILY
Status: DISCONTINUED | OUTPATIENT
Start: 2024-10-04 | End: 2024-10-06 | Stop reason: HOSPADM

## 2024-10-04 RX ORDER — FERROUS SULFATE, DRIED 160(50) MG
2 TABLET, EXTENDED RELEASE ORAL 2 TIMES DAILY
COMMUNITY
End: 2024-10-06 | Stop reason: HOSPADM

## 2024-10-04 RX ORDER — ASPIRIN 81 MG/1
81 TABLET ORAL DAILY
Status: DISCONTINUED | OUTPATIENT
Start: 2024-10-04 | End: 2024-10-06 | Stop reason: HOSPADM

## 2024-10-04 RX ORDER — METOPROLOL TARTRATE 25 MG/1
25 TABLET, FILM COATED ORAL 2 TIMES DAILY
Status: DISCONTINUED | OUTPATIENT
Start: 2024-10-04 | End: 2024-10-04

## 2024-10-04 RX ORDER — PANTOPRAZOLE SODIUM 40 MG/1
40 TABLET, DELAYED RELEASE ORAL
Status: DISCONTINUED | OUTPATIENT
Start: 2024-10-04 | End: 2024-10-06 | Stop reason: HOSPADM

## 2024-10-04 RX ORDER — MAGNESIUM SULFATE HEPTAHYDRATE 40 MG/ML
4 INJECTION, SOLUTION INTRAVENOUS ONCE
Status: COMPLETED | OUTPATIENT
Start: 2024-10-04 | End: 2024-10-04

## 2024-10-04 RX ORDER — DILTIAZEM HYDROCHLORIDE 180 MG/1
180 CAPSULE, COATED, EXTENDED RELEASE ORAL DAILY
Status: DISCONTINUED | OUTPATIENT
Start: 2024-10-04 | End: 2024-10-06 | Stop reason: HOSPADM

## 2024-10-04 RX ORDER — HYDROXYCHLOROQUINE SULFATE 200 MG/1
200 TABLET, FILM COATED ORAL 2 TIMES DAILY
Status: DISCONTINUED | OUTPATIENT
Start: 2024-10-04 | End: 2024-10-06 | Stop reason: HOSPADM

## 2024-10-04 RX ORDER — ACETAMINOPHEN 325 MG/1
975 TABLET ORAL EVERY 8 HOURS PRN
Status: DISCONTINUED | OUTPATIENT
Start: 2024-10-04 | End: 2024-10-06 | Stop reason: HOSPADM

## 2024-10-04 SDOH — ECONOMIC STABILITY: INCOME INSECURITY: HOW HARD IS IT FOR YOU TO PAY FOR THE VERY BASICS LIKE FOOD, HOUSING, MEDICAL CARE, AND HEATING?: NOT HARD AT ALL

## 2024-10-04 SDOH — HEALTH STABILITY: MENTAL HEALTH: HOW OFTEN DO YOU HAVE 6 OR MORE DRINKS ON ONE OCCASION?: NEVER

## 2024-10-04 SDOH — SOCIAL STABILITY: SOCIAL INSECURITY: DOES ANYONE TRY TO KEEP YOU FROM HAVING/CONTACTING OTHER FRIENDS OR DOING THINGS OUTSIDE YOUR HOME?: NO

## 2024-10-04 SDOH — SOCIAL STABILITY: SOCIAL INSECURITY: ABUSE: ADULT

## 2024-10-04 SDOH — ECONOMIC STABILITY: HOUSING INSECURITY: AT ANY TIME IN THE PAST 12 MONTHS, WERE YOU HOMELESS OR LIVING IN A SHELTER (INCLUDING NOW)?: NO

## 2024-10-04 SDOH — HEALTH STABILITY: MENTAL HEALTH: HOW MANY STANDARD DRINKS CONTAINING ALCOHOL DO YOU HAVE ON A TYPICAL DAY?: PATIENT DOES NOT DRINK

## 2024-10-04 SDOH — SOCIAL STABILITY: SOCIAL INSECURITY: WITHIN THE LAST YEAR, HAVE YOU BEEN HUMILIATED OR EMOTIONALLY ABUSED IN OTHER WAYS BY YOUR PARTNER OR EX-PARTNER?: NO

## 2024-10-04 SDOH — ECONOMIC STABILITY: FOOD INSECURITY: WITHIN THE PAST 12 MONTHS, THE FOOD YOU BOUGHT JUST DIDN'T LAST AND YOU DIDN'T HAVE MONEY TO GET MORE.: NEVER TRUE

## 2024-10-04 SDOH — SOCIAL STABILITY: SOCIAL INSECURITY: WITHIN THE LAST YEAR, HAVE YOU BEEN AFRAID OF YOUR PARTNER OR EX-PARTNER?: NO

## 2024-10-04 SDOH — SOCIAL STABILITY: SOCIAL INSECURITY: DO YOU FEEL ANYONE HAS EXPLOITED OR TAKEN ADVANTAGE OF YOU FINANCIALLY OR OF YOUR PERSONAL PROPERTY?: NO

## 2024-10-04 SDOH — ECONOMIC STABILITY: INCOME INSECURITY: IN THE PAST 12 MONTHS, HAS THE ELECTRIC, GAS, OIL, OR WATER COMPANY THREATENED TO SHUT OFF SERVICE IN YOUR HOME?: NO

## 2024-10-04 SDOH — SOCIAL STABILITY: SOCIAL INSECURITY: HAVE YOU HAD ANY THOUGHTS OF HARMING ANYONE ELSE?: NO

## 2024-10-04 SDOH — HEALTH STABILITY: MENTAL HEALTH: HOW OFTEN DO YOU HAVE A DRINK CONTAINING ALCOHOL?: NEVER

## 2024-10-04 SDOH — ECONOMIC STABILITY: FOOD INSECURITY: WITHIN THE PAST 12 MONTHS, YOU WORRIED THAT YOUR FOOD WOULD RUN OUT BEFORE YOU GOT MONEY TO BUY MORE.: NEVER TRUE

## 2024-10-04 SDOH — ECONOMIC STABILITY: INCOME INSECURITY: IN THE LAST 12 MONTHS, WAS THERE A TIME WHEN YOU WERE NOT ABLE TO PAY THE MORTGAGE OR RENT ON TIME?: NO

## 2024-10-04 SDOH — SOCIAL STABILITY: SOCIAL INSECURITY: ARE YOU OR HAVE YOU BEEN THREATENED OR ABUSED PHYSICALLY, EMOTIONALLY, OR SEXUALLY BY ANYONE?: NO

## 2024-10-04 SDOH — SOCIAL STABILITY: SOCIAL INSECURITY: WERE YOU ABLE TO COMPLETE ALL THE BEHAVIORAL HEALTH SCREENINGS?: YES

## 2024-10-04 SDOH — HEALTH STABILITY: MENTAL HEALTH
HOW OFTEN DO YOU NEED TO HAVE SOMEONE HELP YOU WHEN YOU READ INSTRUCTIONS, PAMPHLETS, OR OTHER WRITTEN MATERIAL FROM YOUR DOCTOR OR PHARMACY?: SOMETIMES

## 2024-10-04 SDOH — ECONOMIC STABILITY: HOUSING INSECURITY: IN THE PAST 12 MONTHS, HOW MANY TIMES HAVE YOU MOVED WHERE YOU WERE LIVING?: 0

## 2024-10-04 SDOH — ECONOMIC STABILITY: INCOME INSECURITY: HOW HARD IS IT FOR YOU TO PAY FOR THE VERY BASICS LIKE FOOD, HOUSING, MEDICAL CARE, AND HEATING?: NOT VERY HARD

## 2024-10-04 SDOH — SOCIAL STABILITY: SOCIAL INSECURITY: HAS ANYONE EVER THREATENED TO HURT YOUR FAMILY OR YOUR PETS?: NO

## 2024-10-04 SDOH — SOCIAL STABILITY: SOCIAL INSECURITY: DO YOU FEEL UNSAFE GOING BACK TO THE PLACE WHERE YOU ARE LIVING?: NO

## 2024-10-04 SDOH — SOCIAL STABILITY: SOCIAL INSECURITY: ARE THERE ANY APPARENT SIGNS OF INJURIES/BEHAVIORS THAT COULD BE RELATED TO ABUSE/NEGLECT?: NO

## 2024-10-04 SDOH — SOCIAL STABILITY: SOCIAL INSECURITY: HAVE YOU HAD THOUGHTS OF HARMING ANYONE ELSE?: NO

## 2024-10-04 ASSESSMENT — COGNITIVE AND FUNCTIONAL STATUS - GENERAL
WALKING IN HOSPITAL ROOM: A LITTLE
DAILY ACTIVITIY SCORE: 24
STANDING UP FROM CHAIR USING ARMS: A LITTLE
PATIENT BASELINE BEDBOUND: NO
MOBILITY SCORE: 21
CLIMB 3 TO 5 STEPS WITH RAILING: A LOT
MOVING TO AND FROM BED TO CHAIR: A LITTLE
TURNING FROM BACK TO SIDE WHILE IN FLAT BAD: A LITTLE
MOBILITY SCORE: 21
MOVING TO AND FROM BED TO CHAIR: A LITTLE
CLIMB 3 TO 5 STEPS WITH RAILING: A LOT
MOVING TO AND FROM BED TO CHAIR: A LITTLE
CLIMB 3 TO 5 STEPS WITH RAILING: A LOT
DAILY ACTIVITIY SCORE: 24
MOBILITY SCORE: 18

## 2024-10-04 ASSESSMENT — PATIENT HEALTH QUESTIONNAIRE - PHQ9
2. FEELING DOWN, DEPRESSED OR HOPELESS: NOT AT ALL
SUM OF ALL RESPONSES TO PHQ9 QUESTIONS 1 & 2: 1
1. LITTLE INTEREST OR PLEASURE IN DOING THINGS: SEVERAL DAYS

## 2024-10-04 ASSESSMENT — PAIN SCALES - GENERAL
PAINLEVEL_OUTOF10: 0 - NO PAIN
PAINLEVEL_OUTOF10: 0 - NO PAIN
PAINLEVEL_OUTOF10: 4
PAINLEVEL_OUTOF10: 0 - NO PAIN
PAINLEVEL_OUTOF10: 6
PAINLEVEL_OUTOF10: 0 - NO PAIN

## 2024-10-04 ASSESSMENT — LIFESTYLE VARIABLES
HOW OFTEN DO YOU HAVE A DRINK CONTAINING ALCOHOL: NEVER
AUDIT-C TOTAL SCORE: 0
SKIP TO QUESTIONS 9-10: 1
HOW MANY STANDARD DRINKS CONTAINING ALCOHOL DO YOU HAVE ON A TYPICAL DAY: PATIENT DOES NOT DRINK
AUDIT-C TOTAL SCORE: 0
SKIP TO QUESTIONS 9-10: 1
AUDIT-C TOTAL SCORE: 0
AUDIT-C TOTAL SCORE: 0
SKIP TO QUESTIONS 9-10: 1
AUDIT-C TOTAL SCORE: 0
HOW OFTEN DO YOU HAVE 6 OR MORE DRINKS ON ONE OCCASION: NEVER
SKIP TO QUESTIONS 9-10: 1

## 2024-10-04 ASSESSMENT — PAIN - FUNCTIONAL ASSESSMENT
PAIN_FUNCTIONAL_ASSESSMENT: 0-10

## 2024-10-04 ASSESSMENT — PAIN DESCRIPTION - DESCRIPTORS: DESCRIPTORS: ACHING

## 2024-10-04 ASSESSMENT — ACTIVITIES OF DAILY LIVING (ADL)
LACK_OF_TRANSPORTATION: NO
ADL_ASSISTANCE: INDEPENDENT

## 2024-10-04 ASSESSMENT — PAIN SCALES - PAIN ASSESSMENT IN ADVANCED DEMENTIA (PAINAD): TOTALSCORE: MEDICATION (SEE MAR)

## 2024-10-04 NOTE — CARE PLAN
The clinical goals for the shift include Patient will remain HDS throughout shift.    Problem: Pain - Adult  Goal: Verbalizes/displays adequate comfort level or baseline comfort level  Outcome: Progressing     Problem: Safety - Adult  Goal: Free from fall injury  Outcome: Progressing     Problem: Discharge Planning  Goal: Discharge to home or other facility with appropriate resources  Outcome: Progressing     Problem: Chronic Conditions and Co-morbidities  Goal: Patient's chronic conditions and co-morbidity symptoms are monitored and maintained or improved  Outcome: Progressing     Problem: Fall/Injury  Goal: Not fall by end of shift  Outcome: Progressing  Goal: Be free from injury by end of the shift  Outcome: Progressing  Goal: Verbalize understanding of personal risk factors for fall in the hospital  Outcome: Progressing  Goal: Verbalize understanding of risk factor reduction measures to prevent injury from fall in the home  Outcome: Progressing  Goal: Use assistive devices by end of the shift  Outcome: Progressing  Goal: Pace activities to prevent fatigue by end of the shift  Outcome: Progressing

## 2024-10-04 NOTE — PROGRESS NOTES
"Ronna Beckham is a 63 y.o. female on day 1 of admission presenting with SVT (supraventricular tachycardia) (CMS-HCC).      Subjective     Ronna Beckham/[id number]  On evaluation, patient reports she is doing better than yesterday. She denies any chest pain or abnormal sensation in her chest. She states she presented to ED 10/3 because her heart was racing. This has happened a few times before and she is on diltiazem to suppress her SVT though patient was not taking it at home because she thought she was only supposed to take it when needed and wasn't sure when that was. She reports some SOB especially with activity. She is on 4L NC continuously at home. She reports she doesn't have much of an appetite and doesn't eat much. She reports some abdominal pain and distension, which she has had for a long time and seen her PCP for but states \"no one knows what it's from\". She states she has not had any diarrhea since 10/3 and had a normal BM today. She denies any blood or mucous in the diarrhea she was having on initial presentation.     Hospital Course:  Pt. presented to the ED with vision changes and dizziness after 4 days of increased diarrhea.  Had not been taking her home diltiazem.  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  Restored to sinus tachycardia with adenosine x2 and was transferred to CICU. 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  Restarted on home diltiazem. Has been in NSR overnight with no events on telemetry.  Currently on 6L.    To follow up:  [x]  Restarted Home diltiazem 180 Q24H  [ ]  Monitor on telemetry, aggressive electrolyte repletion with K>4, Mg>2  [ ] Consider further workup if continues or outpatient for her chronic diarrhea   [ ] Consider magnesium supplementation outpatient given she has had multiple admissions for similar presentation   [ ] Follow up stool pathogen PCR            Objective  " "  VITALS:   Visit Vitals  BP 94/65   Pulse 83   Temp 36.3 °C (97.3 °F)   Resp 25   Ht 1.651 m (5' 5\")   Wt 73 kg (160 lb 15 oz)   SpO2 90%   BMI 26.78 kg/m²   OB Status Hysterectomy   Smoking Status Former   BSA 1.83 m²     Infusions:  epoprostenol, Last Rate: 72 ng/kg/min (10/04/24 1129)        INTAKE/OUTPUT:  I/O last 3 completed shifts:  In: 120.8 (1.6 mL/kg) [I.V.:120.8 (1.6 mL/kg)]  Out: 150 (2 mL/kg) [Urine:150 (0.1 mL/kg/hr)]  Weight: 74 kg        LABS:  CBC:  Recent Labs     10/04/24  0602 10/03/24  2357 10/03/24  2123 09/09/24  0822 09/08/24  0642 09/07/24  0545 09/06/24 1859 08/29/24  0439   WBC 4.6 5.7 4.5 4.7 3.9* 4.7 4.3* 4.3*   HGB 10.0* 8.9* 10.2* 9.1* 9.6* 8.6* 9.3* 8.8*   HCT 32.3* 30.6* 35.0* 31.4* 33.7* 29.1* 29.7* 30.4*   * 98* 107* 119* 105* 109* 102* 101*   MCV 78* 84 81 82 84 80 76* 80     CMP:  Recent Labs     10/04/24  0602 10/03/24  2357 10/03/24  2123 09/09/24  0822 09/08/24  1929 09/08/24  0642 09/07/24  1715 09/07/24  0545 09/07/24  0212 09/06/24  1859 08/29/24  0439    140 139 136 137 138 138 137  --  137 139   K 4.5 4.1 4.0 4.4 4.0 4.4 3.5 3.7  --  4.2 3.4    108* 106 102 104 106 106 104  --  106 103   CO2 23 22 21 27 23 24 21 22  --  20* 23*   ANIONGAP 15 14 16 11 14 12 15 15  --  15 13   BUN 18 17 18 16 15 14 14 15  --  18 12   CREATININE 1.23* 1.13* 1.18* 0.86 0.80 0.83 0.76 0.74  --  0.83 0.80   EGFR 50* 55* 53* 77 84 80 89 >90  --  80 84   MG 2.05 0.74* 0.70* 2.17 1.71 2.04 1.62 1.85 1.58* 0.61*  --      Recent Labs     10/04/24  0602 10/03/24  2357 10/03/24  2123 09/09/24  0822 09/08/24  1929 09/08/24  0642 09/07/24  1715 09/07/24  0545 09/06/24  1859 08/27/24  0500 08/26/24  1647 08/03/24  1034 07/08/24  1139 07/06/24  0546 03/10/22  0854 03/09/22  1646 08/14/20  1758 04/01/20  2252   ALBUMIN 3.4 3.7 3.9 3.5 3.4 3.8 3.4 3.6 3.5 3.5 3.5 3.6 3.7 3.4*   < > 4.0   < > 3.9   ALT  --  7 7  --   --   --   --   --  6* 6 8 5 10 7   < > 9   < > 46*   AST  --  11 10  -- " "  --   --   --   --  9 11 9 7 21 10   < > 11   < > 25   BILITOT  --  0.9 0.9  --   --   --   --   --  0.8 0.8 0.8 0.7 1.1 0.8   < > 1.7*   < > 1.1   LIPASE  --   --   --   --   --   --   --   --  34  --   --   --   --   --   --  20  --  41    < > = values in this interval not displayed.     COAG:   Recent Labs     10/03/24  2357 09/06/24  1936 09/07/23  1728 07/13/23  1431 06/02/23  0945 04/13/22  1623 03/10/22  1323 09/23/21  2347   INR 1.6* 1.5* 1.3* 1.3* 1.1 1.2* 1.2* 1.1     ABO:   Recent Labs     10/03/24  2357   ABO A     HEME/ENDO:  Recent Labs     10/03/24  2357 08/27/24  0951 08/26/24  0934 07/06/24  0546 07/05/24  1730   FERRITIN  --  19  --  15  --    IRONSAT  --  8*  --   --   --    TSH 2.00  --   --   --  2.62   HGBA1C 5.7*  --  5.4  --   --       CARDIAC:   Recent Labs     10/03/24  2357 10/03/24  2245 10/03/24  2123 09/06/24  1859 05/31/24  0430 04/23/24  1227 04/23/24  1044 06/02/23  0945 04/13/22  1623 03/11/22  0404 03/10/22  2139 03/10/22  0854 03/09/22  1646 11/23/20 2006   LDH  --   --   --   --   --   --   --  378*  --  172 198 471*  --   --    TROPHS  --  7 7 34  36*  --  26 31  --   --   --   --   --   --   --    *  --  1,207* 452* 430*  --  384*  --  275*  --   --   --  301* 273*     Recent Labs     10/04/24  0332 10/03/24  2157 08/26/24  1659 09/28/20  0840   LACTATEART 0.4 0.9 0.7  --    SO2MV  --   --   --  67     No results for input(s): \"TACROLIMUS\", \"SIROLIMUS\", \"CYCLOSPORINE\" in the last 38320 hours.    Results from last 7 days   Lab Units 10/04/24  0602 10/03/24  2357 10/03/24  2123   WBC AUTO x10*3/uL 4.6 5.7 4.5   HEMOGLOBIN g/dL 10.0* 8.9* 10.2*   HEMATOCRIT % 32.3* 30.6* 35.0*   PLATELETS AUTO x10*3/uL 105* 98* 107*     Results from last 7 days   Lab Units 10/04/24  0602 10/03/24  2357 10/03/24  2123   SODIUM mmol/L 139 140 139   POTASSIUM mmol/L 4.5 4.1 4.0   CO2 mmol/L 23 22 21   ANION GAP mmol/L 15 14 16   BUN mg/dL 18 17 18   CREATININE mg/dL 1.23* 1.13* 1.18* "   GLUCOSE mg/dL 114* 109* 137*   EGFR mL/min/1.73m*2 50* 55* 53*   MAGNESIUM mg/dL 2.05 0.74* 0.70*   PHOSPHORUS mg/dL 5.3* 5.0*  --       Results from last 7 days   Lab Units 10/03/24  2357 10/03/24  2123   ALT U/L 7 7   AST U/L 11 10   ALK PHOS U/L 86 84      Results from last 7 days   Lab Units 10/03/24  2357   INR  1.6*     Results from last 7 days   Lab Units 10/04/24  0332 10/03/24  2357 10/03/24  2157   POCT PH, ARTERIAL pH 7.34*  --  7.43*   POCT PO2, ARTERIAL mm Hg 66*  --  68*   POCT PCO2, ARTERIAL mm Hg 37*  --  33*   FIO2 % 32 44 44     Results from last 7 days   Lab Units 10/03/24  2357   POCT PH, VENOUS pH 7.33   POCT PCO2, VENOUS mm Hg 42     Results from last 7 days   Lab Units 10/03/24  2357 10/03/24  2145   LACTATE mmol/L 0.9 1.6         PHYSICAL EXAM:  Constitutional: NAD, lying in bed   HEENT: No scleral icterus, EOMI grossly intact, elevated JVD to base of jaw  Cardiovascular: RRR, normal S1/S2, no murmurs noted  Pulmonary: Clear to auscultation b/l, no wheezes/rhonchi, no increased work of breathing  GI: Soft, moderately distended, normoactive bowel sounds, mildly tender to palpation in all quadrants  Lower extremities: Warm and well perfused, no lower extremity edema  Skin: chronic erythematous rash around neck and clavicle  Neuro: A&O x3, able to move all 4 extremities spontaneously  Psych: Appropriate mood and affect     MEDICATIONS:   Scheduled medications  aspirin, 81 mg, oral, Daily  cholestyramine, 1 packet, oral, Daily  dilTIAZem CD, 180 mg, oral, Daily  enoxaparin, 40 mg, subcutaneous, q24h  gabapentin, 800 mg, oral, TID  hydroxychloroquine, 200 mg, oral, BID  macitentan, 10 mg, oral, Daily  melatonin, 5 mg, oral, Nightly  oxygen, , inhalation, Continuous - Inhalation  pantoprazole, 40 mg, oral, Daily before breakfast  venlafaxine XR, 150 mg, oral, Daily      Continuous medications  epoprostenol, 72 ng/kg/min (Order-Specific), Last Rate: 72 ng/kg/min (10/04/24 1129)      PRN  medications            Assessment/Plan   Ronna Beckham a 63 y.o. female with PMHx of pHTN (on 4L NC at [facility]), GERD, SLE, HARRIS, MDD, and polysubstance use disorder (ethanol, cocaine and amphetamines) who presented to the ED with dizziness, found to be in SVT (rate 200s) now s/p adenosine and in NSR. 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 electrolyte abnormalities in setting of ongoing GI losses and medication nonadherence.     #HARRIS / #MDD  #Chronic pain  # GABY  - continue with home  gabapentin, Effexor   - pending pETH  - No signs of withdrawal    #AVNRT - resolved  :: UDS negative on presentation, TSH wnl, NSR restored with adenosine x2  :: home meds: diltiazem  mg every day; however, was not taking  :: likely triggered by hypovolemia vs hypomagnesemia   Plan:  - Restarted Home diltiazem [id number]  - Monitor on telemetry   - Aggressive electrolyte repletion with K>4, Mg>2       #CAD  - nonobstructive CAD on cath 01/2024  - continue with aspirin      #pHTN   - continue with Opsumit, Veletri   - hold on diuresis for now given hypomagnesemia  - Suspect acute increase in O2 secondary to SVT, wean O2 as able, currently on 6L NC     #ALONDRA  :: Baseline Cr 0.8-0.9; Likely 2/2 hypovolemia in setting of diarrhea   :: UA unremarkable  ::FeNa 0.4% - likely prerenal  Plan:  - Holding IVF for now given HF, encourage PO intake   - Daily RFP      #Hypomagnesemia  - given frequent admissions with hypomagnesemia would likely benefit from outpatient supplementation      #GERD  - continue with home PPI      #Diarrhea, resolved  - Appears to be chronic in nature on discussion with pt, possibly related to her veletri but she has been on veletri for years   - PRN loperamide   - Pending stool pathogen panel      #SLE  - Continue with home plaquenil      F: PRN for euvolemia   E: PRN K>4, Mg>2, P>3   N: Cardiac  A: PIV   Oxygen: 6L NC      DVT ppx: Enoxaparin   GI ppx: home PPI    Code Status: FULL CODE (confirmed on ADMISSION)  Surrogate  Medical Decision-maker:  Leticia Parson (daughter) [phone]          Linda Prasad MD

## 2024-10-04 NOTE — PROGRESS NOTES
Pharmacy Medication History Review    Ronna Beckham is a 63 y.o. female admitted for SVT (supraventricular tachycardia) (CMS-HCC). Pharmacy reviewed the patient's [id number]-[id number] medications and allergies for accuracy.    The list below reflects the updated PTA list.   Prior to Admission Medications   Prescriptions Last Dose Informant   Opsumit 10 mg tablet tablet  Self   Sig: TAKE 1 TABLET BY MOUTH DAILY. DO NOT HANDLE IF PREGNANT. DO NOT SPLIT, CRUSH, OR CHEW. REVIEW MEDICATION GUIDE.   aspirin 81 mg EC tablet  Self   Sig: Take 1 tablet (81 mg) by mouth once daily.   calcium carbonate-vitamin D3 500 mg-5 mcg (200 unit) tablet     Sig: Take 2 tablets by mouth 2 times a day.   cholestyramine (Questran) 4 gram packet     Sig: Take 1 packet (4 g) by mouth once daily.   dilTIAZem CD (Cardizem CD) 180 mg 24 hr capsule  Self   Sig: Take 1 capsule (180 mg) by mouth once daily.   epoprostenol (Veletri) 1.5 mg recon soln  Self   Si.5 mg (1,500 mcg) by central venous line infusion route continuously. Reconstitute contents of vial with 5 ml diluent and mix cassette as directed. Infuse continuously per physician orders. Allow for priming volume. Dose range: 1-150 ng/kg/min.   folic acid (Folvite) 1 mg tablet  Self   Sig: Take 1 tablet (1 mg) by mouth once daily. Needs to resume   furosemide (Lasix) 40 mg tablet  Self   Sig: TAKE 1 TABLET BY MOUTH ONE TIME DAILY   gabapentin (Neurontin) 800 mg tablet  Self   Sig: Take 1 tablet (800 mg) by mouth 3 times a day.   hydroxychloroquine (Plaquenil) 200 mg tablet  Self   Sig: Take 1 tablet (200 mg) by mouth 2 times a day.   loperamide (Imodium A-D) 2 mg tablet  Self   Sig: Take 1 tablet (2 mg) by mouth 4 times a day as needed for diarrhea.   melatonin 5 mg tablet  Self   Sig: Take 1 tablet (5 mg) by mouth once daily at bedtime.   omeprazole (PriLOSEC) 40 mg DR capsule  Self   Sig: Take 1 capsule (40 mg) by mouth once daily in the morning. Take before meals. Do not crush or  "chew.   topiramate (Topamax) 25 mg tablet  Self   Sig: Week 1 25mg at bedtime, week 2 25mg BID, week 3 25mg qAM, 50mg at bedtime, week 4 50 mg BID, week 5 50mg qAM 75mg at bedtime, week 6 75 mg BID, week 7 75mg qam 100mg at bedtime, week 8 100mg BID   venlafaxine XR (Effexor-XR) 150 mg 24 hr capsule  Self   Sig: Take 1 capsule (150 mg) by mouth once daily. Do not crush or chew.      Facility-Administered Medications: None        The list below reflects the updated allergy list. Please review each documented allergy for additional clarification and justification.  Allergies  Reviewed by Michael Manning DO on 10/3/2024        Severity Reactions Comments    Levetiracetam Medium Other Weakness, unable to walk            Patient was unable to be assessed for M2B at discharge.     Sources:   Outpatient medication list  Recent MAR  Patient interview; patient was a poor historian     Additional Comments:  Patient was a poor historian, requested that I check the past meds given during hospital visit and said those are the current medications she's on      AWILDA CARLTON  PGY-1 Pharmacy Resident  10/04/24     Secure Chat preferred   If no response call [phone] or Naveraera \"Med Rec\"    "

## 2024-10-04 NOTE — PROGRESS NOTES
Physical Therapy    Physical Therapy Evaluation & Treatment    Patient Name: Ronna Beckham  MRN: [id number]  Department: [facility]  Room: 03/03-A  Today's Date: 10/4/2024   Time Calculation  Start Time: 0942  Stop Time: 1013  Time Calculation (min): 31 min    Assessment/Plan   PT Assessment  PT Assessment Results: Decreased strength, Decreased endurance, Decreased mobility  Rehab Prognosis: Good  Barriers to Discharge: Medical acuity  Evaluation/Treatment Tolerance: Patient tolerated treatment well  Medical Staff Made Aware: Yes  End of Session Communication: Bedside nurse  Assessment Comment: Pt performed bed mobility, functional transfers, and ambulated total of 16' with SBA-CGA; pt limited this date due to hypotension with upright activity although pt asymptomatic. Pt will continue to benefit from skilled PT to improve functional mobility.  End of Session Patient Position: Up in chair, Alarm off, not on at start of session   IP OR SWING BED PT PLAN  Inpatient or Swing Bed: Inpatient  PT Plan  Treatment/Interventions: Bed mobility, Transfer training, Gait training, Stair training, Balance training, Strengthening, Endurance training, Therapeutic exercise, Therapeutic activity  PT Plan: Ongoing PT  PT Frequency: 3 times per week  PT Discharge Recommendations: Low intensity level of continued care  PT Recommended Transfer Status: Assist x1  PT - OK to Discharge: Yes      Subjective     General Visit Information:  General  Reason for Referral: PMHx of pHTN (on 4L NC at [facility]), GERD, SLE, HARRIS, MDD, and polysubstance use disorder (ethanol, cocaine and amphetamines)  Past Medical History Relevant to Rehab: presented to the ED with a chief complain of lightheadedness/dizziness and SOB, found to be in SVT with rates in the 200s, now s/p adenosine x2 with restoration of sinus tachycardia, transferred to [facility] for further management.  Prior to Session Communication: Bedside nurse  Patient Position Received: Bed, 3 rail up,  Alarm off, not on at start of session  Preferred Learning Style: auditory, verbal, visual  General Comment: Pt awake, alert, and willing to participate in PT evaluation. (10L O2 NC, BP cuff, tele, portable Veletri pack worn around waist, purewick (removed prior to having BM))  Home Living:  Home Living  Type of Home: [place]  Lives With:  (16 y.o. granddaughter)  Home Adaptive Equipment: None  Home Layout: Two level  Home Access: Stairs to enter with rails  Entrance Stairs-Number of Steps: 2  Bathroom Shower/Tub: Tub/shower unit  Bathroom Equipment: None  Home Living Comments: 14 steps to access bed/bath on second floor  Prior Level of Function:  Prior Function Per Pt/Caregiver Report  Level of [place]: Independent with ADLs and functional transfers  Receives Help From: Family (granddaughter)  ADL Assistance: Independent  Homemaking Assistance: Needs assistance (Granddaughter)  Ambulatory Assistance: Independent (Pt reported that she is mostly homebound and has difficulty managing stairs; reported that she often stays on the second level due to difficulty with stairs to first floor)  Stairs:  (Although not reported this date; per past EMR pt had reported that she performed stairs via climbing on hands and knees)  Vocational: Unemployed  Leisure: TV  Prior Function Comments: Granddaughter drives, reports a few falls within the past year due to fainting and associated hospitalization  Precautions:  Precautions  Hearing/Visual Limitations: WFL  Medical Precautions: Cardiac precautions, Fall precautions  Precautions Comment: Per cardio note- avoid hypotension    Vital Signs (Past 2hrs)        Date/Time Vitals Session Patient Position Pulse Resp SpO2 BP MAP (mmHg)    10/04/24 0900 --  --  77  17  93 %  132/74  89     10/04/24 0942 Pre PT  Lying  79  --  95 %  98/75  79     10/04/24 1000 --  --  81  24  90 %  --  --     10/04/24 1013 Post PT  Sitting  77  --  100 %  86/63  68                   Vital Signs Comment: Pt  asymptomatic with BP decrease    Objective   Pain:  Pain Assessment  Pain Assessment: 0-10  0-10 (Numeric) Pain Score: 0 - No pain  Cognition:  Cognition  Overall Cognitive Status: Within Functional Limits  Orientation Level: Oriented X4  Cognition Comments: Pt endorsed that she manages everything at home independently but stated that she shouldn't due to forgetting about appointments and having difficulty with memory/cognition; otherwise A&Ox4 during session    General Assessments:    Activity Tolerance  Endurance: Tolerates 10 - 20 min exercise with multiple rests  Early Mobility/Exercise Safety Screen: Proceed with mobilization - No exclusion criteria met  Activity Tolerance Comments: Pt asymtompatic throughout session however with decreased BP upon standing and ambulating to/from toilet in room; further ambulation aborted due to hypotension    Sensation  Light Touch: No apparent deficits    Strength  Strength Comments: Grossly at least 3/5 based on functional mobility  Strength  Strength Comments: Grossly at least 3/5 based on functional mobility    Perception  Inattention/Neglect: Appears intact  Initiation: Appears intact  Motor Planning: Appears intact  Perseveration: Not present      Coordination  Movements are Fluid and Coordinated: Yes    Postural Control  Postural Control: Within Functional Limits    Static Sitting Balance  Static Sitting-Balance Support: Feet supported  Static Sitting-Level of Assistance: Distant supervision    Static Standing Balance  Static Standing-Balance Support: No upper extremity supported  Static Standing-Level of Assistance: Close supervision  Functional Assessments:  Bed Mobility  Bed Mobility: Yes  Bed Mobility 1  Bed Mobility 1: Supine to sitting  Level of Assistance 1: Contact guard  Bed Mobility Comments 1: CGA for safety and line management; pt reached out for PT's hand for support during trunk elevation    Transfers  Transfer: Yes  Transfer 1  Transfer From 1: Sit to,  Stand to  Transfer to 1: Stand, Sit  Technique 1: Sit to stand, Stand to sit  Transfer Level of Assistance 1: Close supervision  Trials/Comments 1: SBA for safety and line management  Transfers 2  Transfer From 2: Stand to, Toilet to  Transfer to 2: Toilet, Stand  Technique 2: Sit to stand, Stand to sit  Transfer Level of Assistance 2: Close supervision  Trials/Comments 2: SBA for safety and line management    Ambulation/Gait Training  Ambulation/Gait Training Performed: Yes  Ambulation/Gait Training 1  Surface 1: Level tile  Device 1: No device  Assistance 1: Close supervision  Quality of Gait 1: Decreased step length  Comments/Distance (ft) 1: 2x8' with SBA for safety and line management; BP decreased upon seated rest in chair after walking from toilet; further ambulation aborted due to hypotension  Extremity/Trunk Assessments:  Cervical Spine   Cervical Spine: Within Functional Limits  Lumbar Spine   Lumbar Spine : Within Functional Limits    RLE   RLE : Within Functional Limits  LLE   LLE : Within Functional Limits  Treatments:  Therapeutic Activity  Therapeutic Activity Performed: Yes  Therapeutic Activity 1: Increased time for advanced ICU line management required for functional mobility  Therapeutic Activity 2: Pt sat on toilet with distant supervision for ~5 minutes; pt independent with hygiene/wiping  Therapeutic Activity 3: Pt stood at sink for ~2 minutes while washing hands with SBA for safety and line management  Outcome Measures:  [facility] Basic Mobility  Turning from your back to your side while in a flat bed without using bedrails: None  Moving from lying on your back to sitting on the side of a flat bed without using bedrails: A little  Moving to and from bed to chair (including a wheelchair): A little  Standing up from a chair using your arms (e.g. wheelchair or bedside chair): A little  To walk in hospital room: A little  Climbing 3-5 steps with railing: A lot  Basic Mobility - Total Score:  18    FSS-ICU  Ambulation: Walks <50 feet with any assistance x1 or walks any distance with assistance x2 people  Rolling: Complete independence  Sitting: Supervision or set-up only  Transfer Sit-to-Stand: Supervision or set-up only  Transfer Supine-to-Sit: Supervision or set-up only  Total Score: 23      Early Mobility/Exercise Safety Screen: Proceed with mobilization - No exclusion criteria met  ICU Mobility Scale: Walking with assistance of 1 person [8]    Encounter Problems       Encounter Problems (Active)       Balance       Pt will demonstrate improved sitting/standing static/dynamic balance activities without LOB via score of 24/28 on the [place] for increase in safety prior to DC.  (Progressing)       Start:  10/04/24    Expected End:  10/18/24               Mobility       Pt will ambulate >50ft with appropriate form, SBA or less, LRAD, and no LOB for safe DC.  (Progressing)       Start:  10/04/24    Expected End:  10/18/24            Pt will tolerate >15 minutes of upright standing activity without seated rest breaks with no changes in vital signs for improved functional mobility.  (Progressing)       Start:  10/04/24    Expected End:  10/18/24            Pt will ambulate up/down >2 stairs with hand rail with CGA or less to safely access their home upon DC, and 14 stairs with hand rail with CGA or less to access their bedroom/bathroom.   (Progressing)       Start:  10/04/24    Expected End:  10/18/24               PT Transfers       Pt will perform bed mobility and sit<>stand transfers independently with use of LRAD to safely DC.  (Progressing)       Start:  10/04/24    Expected End:  10/18/24                   Education Documentation  Body Mechanics, taught by Najma Medel PT at 10/4/2024 10:44 AM.  Learner: Patient  Readiness: Acceptance  Method: Explanation  Response: Verbalizes Understanding    Mobility Training, taught by Najma Medel PT at 10/4/2024 10:44 AM.  Learner: Patient  Readiness:  Acceptance  Method: Explanation  Response: Verbalizes Understanding    Education Comments  No comments found.    PETEY BURCIAGA, PT

## 2024-10-04 NOTE — PROGRESS NOTES
"Transfer CARDIAC ICU to Floor NOTE    Ronna Beckham/[id number]    Admit Date: 10/3/2024  Hospital Length of Stay: 1   ICU Length of Stay: 8h     Hospital Course:.  Pt. presented to the ED with vision changes and dizziness after 4 days of increased diarrhea.  Had not been taking her home diltiazem.  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  Restored to sinus tachycardia with that and was asked to and transferred to CICU. 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  Restarted on home diltiazem. This AM, not volume overloaded on exam, encouraging po intake.  Has been in NSR overnight with no events on telemetry.  Currently on 6L but has been weaning down. Dr. Prescott is aware, requires tower 5.    To follow up:  [ ]  Restarted Home diltiazem 180 Q24H  [ ]  Monitor on telemetry, aggressive electrolyte repletion with K>4, Mg>2  [ ] Consider further diarrhea workup if continues  [ ] Consider magnesium supplementation outpatient      Subjective   Upon evaluation this AM, patient said she is feeling well and better than when she came in.  Denied any N/V, SOB, CP.  Currently on 6L NC.  Has been in NSR throughout the night since transfer. No diarrhea but past 4days of diarrhea have been much worse than usual.         Objective    VITALS:   Visit Vitals  /72   Pulse 76   Temp 36.1 °C (97 °F)   Resp 18   Ht 1.651 m (5' 5\")   Wt 74 kg (163 lb 1.6 oz)   SpO2 93%   BMI 27.14 kg/m²   OB Status Hysterectomy   Smoking Status Former   BSA 1.84 m²     Infusions:  epoprostenol, Last Rate: 72 ng/kg/min (10/04/24 0721)        INTAKE/OUTPUT:  I/O last 3 completed shifts:  In: 120.8 (1.6 mL/kg) [I.V.:120.8 (1.6 mL/kg)]  Out: 150 (2 mL/kg) [Urine:150 (0.1 mL/kg/hr)]  Weight: 74 kg        LABS:  CBC:  Recent Labs     10/04/24  0602 10/03/24  2357 10/03/24  2123 09/09/24  0822 09/08/24  0642 09/07/24  0545 09/06/24  1859 08/29/24  0439   WBC 4.6 " 5.7 4.5 4.7 3.9* 4.7 4.3* 4.3*   HGB 10.0* 8.9* 10.2* 9.1* 9.6* 8.6* 9.3* 8.8*   HCT 32.3* 30.6* 35.0* 31.4* 33.7* 29.1* 29.7* 30.4*   * 98* 107* 119* 105* 109* 102* 101*   MCV 78* 84 81 82 84 80 76* 80     CMP:  Recent Labs     10/04/24  0602 10/03/24  2357 10/03/24  2123 09/09/24  0822 09/08/24  1929 09/08/24  0642 09/07/24  1715 09/07/24  0545 09/07/24  0212 09/06/24  1859 08/29/24  0439    140 139 136 137 138 138 137  --  137 139   K 4.5 4.1 4.0 4.4 4.0 4.4 3.5 3.7  --  4.2 3.4    108* 106 102 104 106 106 104  --  106 103   CO2 23 22 21 27 23 24 21 22  --  20* 23*   ANIONGAP 15 14 16 11 14 12 15 15  --  15 13   BUN 18 17 18 16 15 14 14 15  --  18 12   CREATININE 1.23* 1.13* 1.18* 0.86 0.80 0.83 0.76 0.74  --  0.83 0.80   EGFR 50* 55* 53* 77 84 80 89 >90  --  80 84   MG 2.05 0.74* 0.70* 2.17 1.71 2.04 1.62 1.85 1.58* 0.61*  --      Recent Labs     10/04/24  0602 10/03/24  2357 10/03/24  2123 09/09/24  0822 09/08/24  1929 09/08/24  0642 09/07/24  1715 09/07/24  0545 09/06/24  1859 08/27/24  0500 08/26/24  1647 08/03/24  1034 07/08/24  1139 07/06/24  0546 03/10/22  0854 03/09/22  1646 08/14/20  1758 04/01/20  2252   ALBUMIN 3.4 3.7 3.9 3.5 3.4 3.8 3.4 3.6 3.5 3.5 3.5 3.6 3.7 3.4*   < > 4.0   < > 3.9   ALT  --  7 7  --   --   --   --   --  6* 6 8 5 10 7   < > 9   < > 46*   AST  --  11 10  --   --   --   --   --  9 11 9 7 21 10   < > 11   < > 25   BILITOT  --  0.9 0.9  --   --   --   --   --  0.8 0.8 0.8 0.7 1.1 0.8   < > 1.7*   < > 1.1   LIPASE  --   --   --   --   --   --   --   --  34  --   --   --   --   --   --  20  --  41    < > = values in this interval not displayed.     COAG:   Recent Labs     10/03/24  2357 09/06/24  1936 09/07/23  1728 07/13/23  1431 06/02/23  0945 04/13/22  1623 03/10/22  1323 09/23/21  2347   INR 1.6* 1.5* 1.3* 1.3* 1.1 1.2* 1.2* 1.1     ABO:   Recent Labs     10/03/24  2357   ABO A     HEME/ENDO:  Recent Labs     10/03/24  2357 08/27/24  0951 08/26/24  0934  "07/06/24  0546 07/05/24  1730   FERRITIN  --  19  --  15  --    IRONSAT  --  8*  --   --   --    TSH 2.00  --   --   --  2.62   HGBA1C 5.7*  --  5.4  --   --       CARDIAC:   Recent Labs     10/03/24  2357 10/03/24  2245 10/03/24  2123 09/06/24  1859 05/31/24  0430 04/23/24  1227 04/23/24  1044 06/02/23  0945 04/13/22  1623 03/11/22  0404 03/10/22  2139 03/10/22  0854 03/09/22  1646 11/23/20 2006   LDH  --   --   --   --   --   --   --  378*  --  172 198 471*  --   --    TROPHS  --  7 7 34  36*  --  26 31  --   --   --   --   --   --   --    *  --  1,207* 452* 430*  --  384*  --  275*  --   --   --  301* 273*     Recent Labs     10/04/24  0332 10/03/24  2157 08/26/24  1659 09/28/20  0840   LACTATEART 0.4 0.9 0.7  --    SO2MV  --   --   --  67     No results for input(s): \"TACROLIMUS\", \"SIROLIMUS\", \"CYCLOSPORINE\" in the last 02932 hours.    Results from last 7 days   Lab Units 10/04/24  0602 10/03/24  2357 10/03/24  2123   WBC AUTO x10*3/uL 4.6 5.7 4.5   HEMOGLOBIN g/dL 10.0* 8.9* 10.2*   HEMATOCRIT % 32.3* 30.6* 35.0*   PLATELETS AUTO x10*3/uL 105* 98* 107*     Results from last 7 days   Lab Units 10/04/24  0602 10/03/24  2357 10/03/24  2123   SODIUM mmol/L 139 140 139   POTASSIUM mmol/L 4.5 4.1 4.0   CO2 mmol/L 23 22 21   ANION GAP mmol/L 15 14 16   BUN mg/dL 18 17 18   CREATININE mg/dL 1.23* 1.13* 1.18*   GLUCOSE mg/dL 114* 109* 137*   EGFR mL/min/1.73m*2 50* 55* 53*   MAGNESIUM mg/dL 2.05 0.74* 0.70*   PHOSPHORUS mg/dL 5.3* 5.0*  --       Results from last 7 days   Lab Units 10/03/24  2357 10/03/24  2123   ALT U/L 7 7   AST U/L 11 10   ALK PHOS U/L 86 84      Results from last 7 days   Lab Units 10/03/24  2357   INR  1.6*     Results from last 7 days   Lab Units 10/04/24  0332 10/03/24  2357 10/03/24  2157   POCT PH, ARTERIAL pH 7.34*  --  7.43*   POCT PO2, ARTERIAL mm Hg 66*  --  68*   POCT PCO2, ARTERIAL mm Hg 37*  --  33*   FIO2 % 32 44 44     Results from last 7 days   Lab Units 10/03/24  2357   POCT " PH, VENOUS pH 7.33   POCT PCO2, VENOUS mm Hg 42     Results from last 7 days   Lab Units 10/03/24  2357 10/03/24  2145   LACTATE mmol/L 0.9 1.6         PHYSICAL EXAM:  Constitutional: No acute distress, cooperative   HEENT: No conjunctival icterus, EOMI grossly intact, mild JVD  Cardiovascular: RRR, normal S1/S2, no murmurs noted  Pulmonary: Clear to auscultation b/l, no wheezes/rhonchi, mild fine crackles bilat, no increased work of breathing  GI: Soft, non-distended, normoactive bowel sounds, mildly tender to palpation in all quadrants  Lower extremities: Warm and well perfused, no lower extremity edema  Skin: chronic erythematous rash around neck and clavicle  Neuro: A&O x3, able to move all 4 extremities spontaneously  Psych: Appropriate mood and affect     MEDICATIONS:   Scheduled medications  aspirin, 81 mg, oral, Daily  calcium chloride, 1 g, intravenous, Once  cholestyramine, 1 packet, oral, Daily  enoxaparin, 40 mg, subcutaneous, q24h  gabapentin, 800 mg, oral, TID  hydroxychloroquine, 200 mg, oral, BID  macitentan, 10 mg, oral, Daily  melatonin, 5 mg, oral, Nightly  metoprolol tartrate, 25 mg, oral, BID  oxygen, , inhalation, Continuous - Inhalation  pantoprazole, 40 mg, oral, Daily before breakfast  venlafaxine XR, 150 mg, oral, Daily      Continuous medications  epoprostenol, 72 ng/kg/min (Order-Specific), Last Rate: 72 ng/kg/min (10/04/24 0721)      PRN medications            Assessment/Plan   Ronna Beckham a 63 y.o. female with PMHx of pHTN (on 4L NC at [facility]), GERD, SLE, HARRIS, MDD, and polysubstance use disorder (ethanol, cocaine and amphetamines) who presented to the ED with dizziness, found to be in SVT (rate 200s) now s/p adenosine and in NSR, transferred to [facility] for further management.  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 electrolyte abnormalities in setting of ongoing GI losses and medication noncompliance.     NEUROLOGIC  #HARRIS / #MDD  #Chronic pain  # GABY  - continue with home  gabapentin, Effexor    - Sending pETH  - No signs of withdrawal     CARDIOVASCULAR  #AVNRT - resolved  :: UDS negative on presentation, TSH wnl, NSR restored with adenosine x2  :: home meds: diltiazem  mg every day; however, was not taking  :: likely triggered by hypovolemia vs hypomagnesemia   Plan:  - Restart Home diltiazem 180 Q24H  - Monitor on telemetry   - Aggressive electrolyte repletion with K>4, Mg>2       #CAD  - nonobstructive CAD on cath 01/2024  - continue with aspirin      PULMONARY  #pHTN on 4 L NC at [facility]   - continue with Opsumit, Veletri   - hold on diuresis for now given hypomagnesemia  - Suspect acute increase in O2 secondary to SVT, wean O2 as able      RENAL/  #ALONDRA  :: Baseline Cr 0.8-0.9; Likely 2/2 hypovolemia in setting of diarrhea   :: UA unremarkable  ::FeNa 0.4% - likely prerenal  Plan:  - Holding IVF for now given HF, encourage PO intake   - Daily RFP      #Hypomagnesemia  - given frequent admissions with hypomagnesemia would likely benefit from outpatient supplementation   - Consider nephrology consult     GASTROINTESTINAL  #GERD  - continue with home PPI      #Diarrhea   - hold home loperamide   - Stool pathogen panel ordered, if negative plan to resume anti-diarrheal therapy      ENDOCRINE  No active issues      HEME/ONC  No active issues     INFECTIOUS DISEASE  No active issues      MSK/DERM  #SLE  - Continue with home plaquenil      F: PRN for euvolemia   E: PRN K>4, Mg>2, P>3   N: Cardiac  A: PIV   Oxygen: 6L NC      DVT ppx: Enoxaparin   GI ppx: home ppi     Code Status: FULL CODE (confirmed on ADMISSION)  Surrogate Medical Decision-maker:  Leticia Parson (daughter) [phone]         (Pily Ruiz  Internal Medicine, PGY-1

## 2024-10-04 NOTE — H&P
HPI  This is a 63 y.o. female presenting with PMHx of pHTN (on 4L NC at [facility]), GERD, SLE, HARRIS, MDD, and polysubstance use disorder (ethanol, cocaine and amphetamines) who presented to the ED with a chief complain of lightheadedness/dizziness and SOB, found to be in SVT with rates in the 200s, now s/p adenosine x2 with restoration of sinus tachycardia, transferred to [facility] for further management.     On interview, patient reports that this PM she developed vision changes (color changes and tunnel vision) that was associated with an increase in her SOB and lightheadedness/dizziness. She states that this is not how she had felt when she had SVT in the past and she did not consider taking her home diltiazem. She called EMS who brought her into the ED for further evaluation. She denies preceding chest pain, palpitations, and syncope or LOC. She reports that she has had diarrhea (3-4 bouts/day) for the last 4 days. She denies preceding abdominal pain, nausea/emesis, fevers/chills, sore throat/rhinorrhea, and cough  with sputum production. She denies urinary sx and decreased PO intake. She reports her granddaughter is currently sick, but she is unsure of her symptoms. She is currently asx. She reports poor medication compliance with her diltiazem (started medication in 07/2024 for suppression of SVT). She states that she has only taken this medication one time since her initial episode of SVT. She denies any recent drug or alcohol use. She follows with Dr. Prescott with plans to start sotatercept, but she has yet to start this medication.     Per chart review, admitted 07/06-08/2024 at [facility] for management of SVT which converted t NSR with adenosine. She was evaluated by Cardiology and started on Cardizem  every 24 hours. Of note, also hypomagnesemic  during this admission. She again sought medical attention for SVT 07/08 (post discharge), again with restoration of NSR with adenosine. Most recently admitted at   [facility] -2024 for for volume overload and electrolyte abnormalities in setting of diuretic noncompliance.       In the ED:  - Labs:  CBC: WBC  4.5, Hgb 10.2, plt 107  BMP: Na 139, K 4.0, Cl 106, HCO3 21, BUN 18, Cr 1.18, glu 137  LFT: Ca 6.6, tprot 6.9, alb 3.9, alkphos 84, AST 10, ALT 7, tbili 0.69  Electrolytes: Mg 0.7, Ionized Ca 0.79  hs-TnI 7, BNP 1207, lactate 1.6  FLU/COVID negative   AB.43/33/68/92% L-0.9  UDS negative   UA bland   TSH 2.0  BCx x2 drawn    - Imaging:  CXR:  IMPRESSION:  1. Stable marked cardiomegaly and/or pericardial effusion   - ECG:  SVT with rate of 190s    In the ED, given IV zofran and cardioverted with adenosine (6 mg and 12 mg). Transferred to [facility] for further evaluation.    Cardiac Hx:  TTE 2024:  CONCLUSIONS:   1. Left ventricular systolic function is normal with a 60-65% estimated ejection fraction.   2. Right ventricular volume and pressure overload.   3. Although the TAPSE and RVA' are normal, the fractional area change is markedly reduced consistent wtih severe RV systolic dysfunction. Still able to generate high RVSP.   4. Severely enlarged right ventricle.   5. There is severely reduced right ventricular systolic function.   6. Agitated saline contrast study was positive for small intracardiac shunt.   7. The right atrium is severely dilated.   8. There is a small to moderate pericardial effusion.   9. Small circumferential pericardial effusin though is small to moderate adjacent to the RA. No RV or RA collapse. The IVC is dilated, though likely due to the presence of severe pulmonary hypertension. No significant respiratory variation of the MV or TV inflows. Not diagnostic for tamponade though difficult to dx tamponade in setting of severe pulmonary hypertension.  10. Severe tricuspid regurgitation visualized.  11. Severely elevated right ventricular systolic pressure.  12. Compared with the prior exam from 3/12/2022 the RV had a similar appearance with severe  dilatation and reduced function. Note the TR was not asessed on that exam to allow estimation of the RVSP at that time. The pericardial effusion appears similar in size.  13. A bubble study using agitated saline was performed. Bubble study is positive. A small PFO (= 10 bubbles) was demonstrated.     [facility] 2024:  CONCLUSIONS:   1. Mild non-obstructive coronary artery disease.    PMH:   As above     SurgHx:   Past Surgical History:   Procedure Laterality Date    CARDIAC CATHETERIZATION N/A 2024    Procedure: Left Heart Cath, With LV, Angriography And Grafts;  Surgeon: Christiano Calvillo DO;  Location: [facility] Cardiac Cath Lab;  Service: Cardiovascular;  Laterality: N/A;    CHOLECYSTECTOMY      HYSTERECTOMY      OTHER SURGICAL HISTORY  2020    Cholecystectomy    OTHER SURGICAL HISTORY  2020    Esophagogastroduodenoscopy    OTHER SURGICAL HISTORY  2020    Colonoscopy    OTHER SURGICAL HISTORY  2020     section    OTHER SURGICAL HISTORY  2020    Hysterectomy       Allergies:   Allergies   Allergen Reactions    Levetiracetam Other     Weakness, unable to walk       SocHx:   Tobacco- 30 pack year hx, quit 15 years ago/ Alcohol- last use 5 months ago/ Drugs- previous cocaine and meth use, last 5 months ago       Home Medications:  Cholestyramine 4 g every day   Folic acid 1 mg every day --> does not take   Gabapentin 800 mg TID   Plaquenil 200 mg BID   Loperamide 2 mg 4 times daily as needed   Melatonin 5 mg at bedtime   Prilosec 40 mg qam   Prochlorperazine 5 mg q6H --> not taking  Topamax 25 mg 100 mg BID --> not taking  Effexor  mg every day   Pulm HTN meds:   Veletri 1500 mcg   Opsumit 10 mg every day   Cardiac Medications:  Aspirin 81 mg every day   Diliazem  mg every day --> not taking  Lasix 40 mg every day   Potassium chloride 20 mEq BID with meals     Objective:    24 Hour Vitals  Temp:  [36.6 °C (97.9 °F)] 36.6 °C (97.9 °F)  Heart Rate:  [102-200] 102  Resp:   [17-28] 20  BP: ()/(66-91) 110/91    Temp (24hrs), Av.6 °C (97.9 °F), Min:36.6 °C (97.9 °F), Max:36.6 °C (97.9 °F)     24 hour Intake/Output  No intake or output data in the 24 hours ending 10/04/24 0010     Exam:  General: Resting comfortably in bed. Well developed, well nourished. Appears stated age. In no acute distress   HEENT: Normocephalic and atraumatic. EOMI, sclera non-icteric, MMM, + neck vein distention  Cardiovascular: tachycardic, regular rhythm, no r/m/g  Pulmonary: Lungs clear to auscultation bilaterally. No wheezes/ rhonchi/ rales   GI: +BS, soft, non-tender, non-distended  : No suprapubic/ flank pain  Extremities: No LE edema   Skin: warm and dry. No rashes or lesions   Neurologic: CN II-XII grossly intact. No focal neurologic deficit   Psych: Pleasant. Appropriate mood and affect     Labs:  CBC  Results from last 72 hours   Lab Units 10/03/24  2123   WBC AUTO x10*3/uL 4.5   HEMOGLOBIN g/dL 10.2*   HEMATOCRIT % 35.0*   PLATELETS AUTO x10*3/uL 107*        BMP  Results from last 72 hours   Lab Units 10/03/24  2123   SODIUM mmol/L 139   POTASSIUM mmol/L 4.0   CHLORIDE mmol/L 106   BUN mg/dL 18   CREATININE mg/dL 1.18*   MAGNESIUM mg/dL 0.70*     Medications   Scheduled Medications  enoxaparin, 40 mg, subcutaneous, q24h     Continuous Medications    PRN Medications         Assessment/Plan:  This is a 63 y.o. female presenting with PMHx of pHTN (on 4L NC at [facility]), GERD, SLE, HARRIS, MDD, and polysubstance use disorder (ethanol, cocaine and amphetamines) who presented to the ED with a chief complain of lightheadedness/dizziness and SOB, found to be in SVT with rates in the 200s, now s/p adenosine x2 with restoration of sinus tachycardia, transferred to [facility] for further management.  Overall, suspect SVT triggered by hypovolemia or electrolyte abnormalities in setting of ongoing GI losses and medication compliance. Given low Bps on arrival will plan to transition from home dilt to  metoprolol  tartrate for now.  Will monitor for recurrence on telemetry overnight in CICU, anticipate transfer to [facility] in AM.     NEUROLOGIC  #HARRIS  #MDD  #Chronic pain  - continue with home  gabapentin, Effexor     CARDIOVASCULAR  #SVT, now resolved  :: UDS negative on presentation   :: TSH wnl   :: NSR restored with adenosine x2, unfortunately unable to review continuous ECG from administration   :: home meds: diltiazem  mg every day; however, not complaint   :: Impression- likely triggered by hypovolemia vs hypomagnesemia   Plan:  - given history of recurrent SVT and borderline BP on presentation, will start metoprolol tartrate 25 mg BID with plans to transition to succinate prior to discharge  - Monitor on telemetry   - Aggressive electrolyte repletion with K>4, Mg>2      #CAD  - nonobstructive CAD on cath 01/2024  - continue with aspirin     PULMONARY  #pHTN on 4 L NC at [facility]   - continue with Opsumit, Veletri   - hold on diuresis for now given hypomagnesemia  - Suspect acute increase in O2 secondary to SVT, wean O2 as able     RENAL/  #ALONDRA  :: Baseline Cr 0.8-0.9  :: Likely 2/2 hypovolemia in setting of diarrhea   :: UA bland   Plan:  - Add on urine electrolytes + urea   - Avoid hypotension, nephrotoxins   - Hold on IVF for now, encourage PO intake   - Daily RFP     #Hypomagnesemia  - 4 g IV Mg now  - given frequent admissions with hypomagnesemia would likely benefit from outpatient supplementation     GASTROINTESTINAL  #GERD  - continue with home PPI     #Diarrhea   - hold home loperamide   - Stool pathogen panel ordered, if negative plan to resume anti-diarrheal therapy     ENDOCRINE  No active issues     HEME/ONC  No active issues     INFECTIOUS DISEASE  No active issues     MSK/DERM  #SLE  - Continue with home plaquenil       F: PRN for euvolemia   E: PRN K>4, Mg>2, P>3   N: Cardiac  A: PIV     Oxygen: 6L NC   Abx: -  Gtts: -    DVT ppx: Enoxaparin   GI ppx: home ppi     Code Status: FULL CODE (confirmed on  ADMISSION)  Surrogate Medical Decision-maker:  Leticia Blank (daughter) [phone]

## 2024-10-04 NOTE — PROGRESS NOTES
Pharmacy Admission Order Reconciliation Review    Ronna Beckham is a 63 y.o. female admitted for SVT (supraventricular tachycardia) (CMS-[facility]). Pharmacy reviewed the patient's unreconciled admission medications.    Prior to admission medications that were reviewed and acted on by the pharmacist include:  Calcium + vitamin D3  Veletri  These medications have been reconciled.     Any other unreconcilied medications have been addressed and will be ordered or held by the patient's medical team. Medications addressed by the pharmacist may be added or changed by the patient's medical team at any time.    Ankit Diaz, PharmD  Transitions of Care Pharmacist  [facility]  Please reach out via Secure Chat for questions

## 2024-10-04 NOTE — PROGRESS NOTES
10/04/24 1312   Discharge Planning   Living Arrangements Children   Support Systems Children;Family members   Assistance Needed n/a   Type of Residence Private residence   Do you have animals or pets at home? No   Who is requesting discharge planning? Provider   Home or Post Acute Services   (TBD)   Does the patient need discharge transport arranged? No     - ICU TREATMENT PLAN: Patient [place] [facility] [place]ricular tachy[place].   - Payer: [employer].  -Support System: daughter, sister.   - Planned Disposition: Pending medical outcome and rehab recommendations.  - Additional Information:   Patient reports that she lives with her 16 year old granddaughter Beau. Per H&P, patient has a history of polysubstance use disorder. Labs on this admission are negative for substances. Patient says that her DOC was Cocaine and last time she used was  4 months ago. Patient is not in treatment. She says that she stopped using on her own. She declined substance abuse treatment resources. Patient reports that she has a custody of the child, but refuses to provide other information about the child, like how long the child has been staying with her. The child is currently with her mother (patient's daughter). Patient's daughter Leticia with whom MAURICIO spoke on the phone earlier, said that she was with the child. MAURICIO contacted [facility] [employer], CPS ([phone]) and made a report. The report was taken by Eduarda.   Update 04:25 pm: MAURICIO was informed by Dr. Thomas that patient's labs from 3 months ago were positive for PCP and labs from 3 weeks ago were positive for Fentanyl, Oxy and Cannabis. MAURICIO contacted [facility] [employer] , CPS and added this information to the earlier report.

## 2024-10-04 NOTE — CONSULTS
"Nutrition Initial Assessment:   Nutrition Assessment    Reason for Assessment: Dietitian discretion (MST)    Patient is a 63 y.o. female admission day 2 transferred to [facility] for further management of SVT.     PMHx: pHTN (on 4L NC at [facility]), GERD, SLE, HARRIS, MDD, and polysubstance use disorder (ethanol, cocaine and amphetamines), liver disease, HLD, lupus, electrolyte/fluid disorder, COPD, diverticulitis     Nutrition History:  Energy Intake: Poor < 50 %  Food and Nutrient History: Intern met with pt at bedside for MST 2 for 2-13# wt loss and poor appetite. Pt reports poor appetite for \"a long time.\" Pt reports \"barely\" eating 1 meal a day. Pt states that food is not appetizing to her and that meat makes her want to vomit. Pt seen by RD in 8/2024, reported poor intake as well. Pt's breakfast tray at bedside- 25% of yogurt + cereal consumed. Pt states hard to eat during admission d/t low sodium diet. Pt endorses nausea and vomiting as well as \"always\" experiencing constipation/diarrhea. Pt does not endorse doing supplements in the past but is agreeable to recieving during admission. Pt understands PO intake needs to improve to heal properly but finds it hard since food is not appetizing.  Vitamin/Herbal Supplement Use: None reported  Food Allergies/Intolerances:  None  GI Symptoms: Constipation, Diarrhea, Nausea, and Vomiting  Oral Problems: None       Anthropometrics:  Height: 165.1 cm (5' 5\")   Weight: 73 kg (160 lb 15 oz)   BMI (Calculated): 26.78  IBW/kg (Dietitian Calculated): 56.8 kg  Percent of IBW: 129 %       Weight History:   Wt Readings from Last 60 Encounters:   10/04/24 73 kg (160 lb 15 oz)   10/03/24 78.4 kg (172 lb 13.5 oz)   09/09/24 73.8 kg (162 lb 11.2 oz)   09/05/24 68 kg (150 lb)   08/26/24 73.5 kg (162 lb)   08/26/24 73.5 kg (162 lb)   08/22/24 73.5 kg (162 lb)   08/14/24 73.5 kg (162 lb)   08/03/24 73.9 kg (163 lb)   07/22/24 73.5 kg (162 lb)   07/10/24 77.7 kg (171 lb 3.2 oz)   07/08/24 78.2 kg " (172 lb 6.4 oz)   07/07/24 78.2 kg (172 lb 6.4 oz) 7% wt loss in 3 mo, not significant    06/02/24 77.9 kg (171 lb 11.8 oz)   05/30/24 74.8 kg (165 lb)   05/30/24 74.8 kg (165 lb)   05/13/24 72.6 kg (160 lb)   04/23/24 74.8 kg (165 lb)   03/26/24 80.3 kg (177 lb)   01/07/24 81.9 kg (180 lb 8.9 oz)      Weight Change %:  Weight History / % Weight Change: Pt reports UBW of 200# with significant wt loss over 1 mo- now weighing 160#. When doing physical exam, pt reports significant loss of muscle. EMR wt history shows no significant wt loss. Wt appears to fluctuate between 180# and 160# from 1/2024. Wt fluctuations likely due to fluid shifts.    Nutrition Focused Physical Exam Findings:  Subcutaneous Fat Loss:   Orbital Fat Pads: Mild-Moderate (slight dark circles and slight hollowing)  Buccal Fat Pads: Mild-Moderate (flat cheeks, minimal bounce)  Triceps: Mild-Moderate (less than ample fat tissue)  Muscle Wasting:  Temporalis: Mild-Moderate (slight depression)  Pectoralis (Clavicular Region): Mild-Moderate (some protrusion of clavicle)  Deltoid/Trapezius: Mild-Moderate (slight protrusion of acromion process)  Interosseous: Well nourished (muscle bulges)  Quadriceps: Defer (Swelling)  Gastrocnemius: Defer (Swelling)  Edema:  Edema Location: Generalized edema      Nutrition Significant Labs:  CBC Trend:   Results from last 7 days   Lab Units 10/04/24  0602 10/03/24  2357 10/03/24  2123   WBC AUTO x10*3/uL 4.6 5.7 4.5   RBC AUTO x10*6/uL 4.12 3.66* 4.31   HEMOGLOBIN g/dL 10.0* 8.9* 10.2*   HEMATOCRIT % 32.3* 30.6* 35.0*   MCV fL 78* 84 81   PLATELETS AUTO x10*3/uL 105* 98* 107*    , BMP Trend:   Results from last 7 days   Lab Units 10/04/24  0602 10/03/24  2357 10/03/24  2123   GLUCOSE mg/dL 114* 109* 137*   CALCIUM mg/dL 6.8* 6.6* 6.6*   SODIUM mmol/L 139 140 139   POTASSIUM mmol/L 4.5 4.1 4.0   CO2 mmol/L 23 22 21   CHLORIDE mmol/L 106 108* 106   BUN mg/dL 18 17 18   CREATININE mg/dL 1.23* 1.13* 1.18*    , A1C:  Lab  Results   Component Value Date    HGBA1C 5.7 (H) 10/03/2024   , Liver Function Trend:   Results from last 7 days   Lab Units 10/03/24  2357 10/03/24  2123   ALK PHOS U/L 86 84   AST U/L 11 10   ALT U/L 7 7   BILIRUBIN TOTAL mg/dL 0.9 0.9    , Renal Lab Trend:   Results from last 7 days   Lab Units 10/04/24  0602 10/03/24  2357 10/03/24  2123 10/03/24  2123   POTASSIUM mmol/L 4.5 4.1  --  4.0   PHOSPHORUS mg/dL 5.3* 5.0*   < >  --    SODIUM mmol/L 139 140  --  139   MAGNESIUM mg/dL 2.05 0.74*  --  0.70*   EGFR mL/min/1.73m*2 50* 55*  --  53*   BUN mg/dL 18 17  --  18   CREATININE mg/dL 1.23* 1.13*  --  1.18*    < > = values in this interval not displayed.    , Vit D:   Lab Results   Component Value Date    VITD25 22 (L) 09/09/2023    , Vit B12:   Lab Results   Component Value Date    PEGOPUHJ60 304 08/26/2024    , Iron Panel:   Lab Results   Component Value Date    IRON 26 (L) 08/27/2024    TIBC 323 08/27/2024    FERRITIN 19 08/27/2024       Nutrition Specific Medications:  calcium gluconate, 3 g, intravenous, Once  cholestyramine, 1 packet, oral, Daily  dilTIAZem CD, 180 mg, oral, Daily  enoxaparin, 40 mg, subcutaneous, q24h  gabapentin, 800 mg, oral, TID  hydroxychloroquine, 200 mg, oral, BID  macitentan, 10 mg, oral, Daily  pantoprazole, 40 mg, oral, Daily before breakfast  venlafaxine XR, 150 mg, oral, Daily        I/O:   Last BM Date: 10/04/24; Stool Appearance: Loose, Formed (10/04/24 1000)    Dietary Orders (From admission, onward)       Start     Ordered    10/03/24 2352  Adult diet Cardiac; 70 gm fat; 2 - 3 grams Sodium  Diet effective now        Question Answer Comment   Diet type Cardiac    Fat restriction: 70 gm fat    Sodium restriction: 2 - 3 grams Sodium        10/03/24 2352                     Estimated Needs:   Total Energy Estimated Needs (kCal):  (6194-2687)  Method for Estimating Needs: 56.8 kg (IBW) * 28-30 kcals/kg  Total Protein Estimated Needs (g):  (74+. Needs subject to change based on renal  function)  Method for Estimating Needs: 56.8 kg (IBW) * 1.3 g/kg  Total Fluid Estimated Needs (mL):  (Per team)  Method for Estimating Needs: Per team        Nutrition Diagnosis   Malnutrition Diagnosis  Patient has Malnutrition Diagnosis: Yes  Diagnosis Status: New  Malnutrition Diagnosis: Moderate malnutrition related to starvation  As Evidenced by: Suspected EEN <75% for >= 3 mo, mild fat loss and muscle wasting            Nutrition Interventions/Recommendations         Nutrition Prescription:  Individualized Nutrition Prescription Provided for : ONS  Ensure Plus (350 kcals, 13 g pro) TID   If BS trend high, re-consult for ONS changes   Consider liberalizing diet to improve PO intake as medically appropriate per team discretion   Check Vitamin D and supplement as needed  Recommend probiotic d/t pt reports of chronic diarrhea   Consider soluble fiber or anti-diarrheal per team discretion as medically appropriate.   Re-check MCV and supplement as needed  Monitor withdrawal symptoms: Consider thiamin + folic acid supplementation         Nutrition Interventions:   Interventions: Meals and snacks, Medical food supplement  Meals and Snacks: General healthful diet  Medical Food Supplement: Commercial beverage  Goal: Ensure Plus (350 kcals, 13 g pro) TID      Nutrition Education: Discussed purpose of ONS to give pt needed kcals and pro to heal while PO intake is poor.          Nutrition Monitoring and Evaluation   Food/Nutrient Related History Monitoring  Monitoring and Evaluation Plan: Energy intake  Energy Intake: Estimated energy intake  Criteria: > 50% EEN    Body Composition/Growth/Weight History  Monitoring and Evaluation Plan: Weight  Weight: Weight change    Biochemical Data, Medical Tests and Procedures  Monitoring and Evaluation Plan: Electrolyte/renal panel, Glucose/endocrine profile  Criteria: WNL  Criteria: WNL              Time Spent (min): 90 minutes

## 2024-10-04 NOTE — ED PROVIDER NOTES
HPI   No chief complaint on file.      Patient is a 63-year-old female who presents with a chief plaint shortness of breath, patient has a history of pulmonary hypertension, is on a chronic drip for a medication for this.  Also feels like she is dizzy, feel like she may pass out, that her heart is racing.  Patient states she has had 1 prior episode similar to this, she required diltiazem to convert out, patient does not take this regularly, patient Nuys any recent fevers or chills, patient was noted to have a heart rate in 200s upon arrival, patient was moved to resuscitation room.      History provided by:  Patient          Patient History   Past Medical History:   Diagnosis Date    Anxiety     Depression     Does not refill medications appropriately 09/10/2024    GERD (gastroesophageal reflux disease)     Lupus (Multi)     Personal history of other specified conditions 2020    History of seizure    Pulmonary hypertension (Multi)     Volume overload 09/10/2024     Past Surgical History:   Procedure Laterality Date    CARDIAC CATHETERIZATION N/A 2024    Procedure: Left Heart Cath, With LV, Angriography And Grafts;  Surgeon: Christiano Calvillo DO;  Location: [facility];  Service: Cardiovascular;  Laterality: N/A;    CHOLECYSTECTOMY      HYSTERECTOMY      OTHER SURGICAL HISTORY  2020    Cholecystectomy    OTHER SURGICAL HISTORY  2020    Esophagogastroduodenoscopy    OTHER SURGICAL HISTORY  2020    Colonoscopy    OTHER SURGICAL HISTORY  2020     section    OTHER SURGICAL HISTORY  2020    Hysterectomy     Family History   Problem Relation Name Age of Onset    No Known Problems Mother      No Known Problems Father       Social History     Tobacco Use    Smoking status: Former     Types: Cigarettes    Smokeless tobacco: Former   Vaping Use    Vaping status: Never Used   Substance Use Topics    Alcohol use: Not Currently    Drug use: Not Currently     Types: Cocaine,  Other     Comment: Meth       Physical Exam   ED Triage Vitals   Temp Pulse Resp BP   -- -- -- --      SpO2 Temp src Heart Rate Source Patient Position   -- -- -- --      BP Location FiO2 (%)     -- --       Physical Exam  Vitals and nursing note reviewed. Exam conducted with a chaperone present.   Constitutional:       General: She is in acute distress.      Appearance: Normal appearance. She is normal weight. She is ill-appearing and diaphoretic. She is not toxic-appearing.   HENT:      Head: Normocephalic and atraumatic.      Nose: Nose normal.      Mouth/Throat:      Mouth: Mucous membranes are dry.      Pharynx: Oropharynx is clear.   Eyes:      Extraocular Movements: Extraocular movements intact.      Conjunctiva/sclera: Conjunctivae normal.      Pupils: Pupils are equal, round, and reactive to light.   Cardiovascular:      Rate and Rhythm: Tachycardia present.      Pulses: Normal pulses.      Heart sounds: Normal heart sounds.   Pulmonary:      Effort: Pulmonary effort is normal.      Breath sounds: Normal breath sounds.   Abdominal:      General: Abdomen is flat. Bowel sounds are normal.      Palpations: Abdomen is soft.   Musculoskeletal:         General: Normal range of motion.      Cervical back: Normal range of motion and neck supple.   Skin:     General: Skin is warm.      Capillary Refill: Capillary refill takes less than 2 seconds.   Neurological:      General: No focal deficit present.      Mental Status: She is alert and oriented to person, place, and time. Mental status is at baseline.   Psychiatric:         Mood and Affect: Mood normal.         Behavior: Behavior normal.         Thought Content: Thought content normal.         Judgment: Judgment normal.           ED Course & [facility]   ED Course as of 10/04/24 0131   Thu Oct 03, 2024   2120 EKG interpreted by myself independently, EKG shows SVT with rate of 196 bpm, , QTc 451, no discernible ST elevations or depressions, negative for acute MI  [YOUSIF]   2238 EKG interpreted myself independently, EKG shows sinus tachycardia, rate of 108 bpm, patient with PVCs, patient incomplete right bundle branch block, patient has no significant ST elevations or depressions, negative for acute MI. [YOUSIF]      ED Course User Index  [YOUSIF] Michael Manning, DO         Diagnoses as of 10/04/24 0131   SVT (supraventricular tachycardia) (CMS-[facility])                 No data recorded                                 Medical Decision Making  Patient seen and evaluated at bedside, patient is in no acute distress.  I will order a Portable chest x-ray, adenosine,CBC, CMP, magnesium, phosphorus, EKG, . Differential diagnosis includes but is not limited to SVT, electrolyte abnormality, pneumonia, .  Portable chest x-ray, adenosine,CBC, CMP, magnesium, phosphorus, EKG,  due to patient being borderline unstable or stable with SVT, decision was made to attempt adenosine.  After second attempt adenosine, patient did convert back to a sinus tachycardia patient will be given adenosine for cardioversion, electric.  This is after discussion with on-call pharmacy, states that defibrillation with the patient's continuous IV fusion could be troublesome.  Patient was given a new room, patient did convert to sinus tachycardia after second dose of adenosine.  I spoke with on-call cardiac ICU physician at [facility], they believe patient be best suited to be at their facility, patient will be transferred there, via helicopter,    Diagnosis: SVT    XR chest 1 view   Final Result    1. Stable marked cardiomegaly and/or pericardial effusion          MACRO:    None.          Signed by: Maggy Sam 10/3/2024 10:21 PM    Dictation workstation:   [id number]    XR chest 1 view   Final Result    1. Stable marked cardiomegaly and/or pericardial effusion          MACRO:    None.          Signed by: Maggy Sam 10/3/2024 10:21 PM    Dictation workstation:   [id number]              Procedure  Procedures    Sections of this report were created using voice-to-text technology and may contain errors in translation    Michael Manning DO  Emergency Medicine         Michael Manning DO  10/04/24 0131

## 2024-10-04 NOTE — SIGNIFICANT EVENT
.Rapid Response Nurse Note: [] Rapid Response [x] RADAR alert: Score 9    Pager time:   Arrival time:   Event end time:   Location: T5  [x] Phone triage     Rapid response initiated by:  [] Rapid Response RN [] Family [] Nursing Supervisor [] Physician   [x] RADAR auto-page [] Sepsis auto-page [] RN [] RT   [] NP/PA [] Other:     Primary reason for call:   [] BAT [] New CPAP/BiPAP [] Bleeding [] Change in mental status   [] Chest pain [] Code blue [] FiO2 >/= 50% [] HR </= 40 bpm   [] HR >/= 130 bpm [] Hyperglycemia [] Hypoglycemia [x] RADAR    [] RR </= 8 bpm [] RR >/= 30 bpm [] SBP </= 90 mmHg [] SpO2 < 90%   [] Seizure [] Sepsis [] Staff concern:     Interventions:  [x] None [] ABG [] Assist w/ICU transfer [] BAT paged    [] Bag mask [] Blood [] Cardioversion [] Code Blue   [] Code blue for intubation [] Code status changed [] Chest x-ray [] EKG   [] IV fluid/bolus [] KUB x-ray [] Labs/cultures [] Medication   [] Nebulizer treatment [] NIPPV (CPAP/BiPAP) [] Oxygen [] Oral airway   [] Peripheral IV [] Palliative care consult [] CT/MRI [] Sepsis protocol    [] Suctioned [] Other:     Outcome:  [] Coded and  [] Code blue for intubation [] Coded and transferred to ICU []  on division   [x] Remained on division (no change) [] Remained on division + additional monitoring [] Remained in ED [] Transferred to ED   [] Transferred to ICU [] Transferred to inpatient status [] Transferred for interventions (procedure) [] Transferred to ICU stepdown    [] Transferred to surgery [] Transferred to telemetry [] Sepsis protocol [] STEMI protocol   [] Stroke protocol       Additional Comments: Notified nurse of RADAR page: 36.2 87 22 93/69 90%.  No concerns at this time; encouraged to call with changes.

## 2024-10-05 LAB
ALBUMIN SERPL BCP-MCNC: 3.6 G/DL (ref 3.4–5)
ANION GAP SERPL CALC-SCNC: 15 MMOL/L (ref 10–20)
ATRIAL RATE: 107 BPM
BASOPHILS # BLD AUTO: 0 X10*3/UL (ref 0–0.1)
BASOPHILS NFR BLD AUTO: 0 %
BUN SERPL-MCNC: 22 MG/DL (ref 6–23)
CALCIUM SERPL-MCNC: 7.4 MG/DL (ref 8.6–10.6)
CHLORIDE SERPL-SCNC: 102 MMOL/L (ref 98–107)
CO2 SERPL-SCNC: 22 MMOL/L (ref 21–32)
CREAT SERPL-MCNC: 1.45 MG/DL (ref 0.5–1.05)
EGFRCR SERPLBLD CKD-EPI 2021: 41 ML/MIN/1.73M*2
EOSINOPHIL # BLD AUTO: 0 X10*3/UL (ref 0–0.7)
EOSINOPHIL NFR BLD AUTO: 0 %
ERYTHROCYTE [DISTWIDTH] IN BLOOD BY AUTOMATED COUNT: 17.9 % (ref 11.5–14.5)
GLUCOSE SERPL-MCNC: 105 MG/DL (ref 74–99)
HCT VFR BLD AUTO: 30.7 % (ref 36–46)
HGB BLD-MCNC: 9.1 G/DL (ref 12–16)
IMM GRANULOCYTES # BLD AUTO: 0.02 X10*3/UL (ref 0–0.7)
IMM GRANULOCYTES NFR BLD AUTO: 0.4 % (ref 0–0.9)
LYMPHOCYTES # BLD AUTO: 0.58 X10*3/UL (ref 1.2–4.8)
LYMPHOCYTES NFR BLD AUTO: 10.4 %
MAGNESIUM SERPL-MCNC: 1.56 MG/DL (ref 1.6–2.4)
MCH RBC QN AUTO: 23.9 PG (ref 26–34)
MCHC RBC AUTO-ENTMCNC: 29.6 G/DL (ref 32–36)
MCV RBC AUTO: 81 FL (ref 80–100)
MONOCYTES # BLD AUTO: 0.51 X10*3/UL (ref 0.1–1)
MONOCYTES NFR BLD AUTO: 9.1 %
NEUTROPHILS # BLD AUTO: 4.49 X10*3/UL (ref 1.2–7.7)
NEUTROPHILS NFR BLD AUTO: 80.1 %
NRBC BLD-RTO: 0 /100 WBCS (ref 0–0)
P AXIS: 74 DEGREES
P OFFSET: 178 MS
P ONSET: 132 MS
PHOSPHATE SERPL-MCNC: 4.2 MG/DL (ref 2.5–4.9)
PLATELET # BLD AUTO: 118 X10*3/UL (ref 150–450)
POTASSIUM SERPL-SCNC: 3.9 MMOL/L (ref 3.5–5.3)
PR INTERVAL: 162 MS
Q ONSET: 213 MS
QRS COUNT: 18 BEATS
QRS DURATION: 112 MS
QT INTERVAL: 376 MS
QTC CALCULATION(BAZETT): 501 MS
QTC FREDERICIA: 456 MS
R AXIS: 145 DEGREES
RBC # BLD AUTO: 3.81 X10*6/UL (ref 4–5.2)
SODIUM SERPL-SCNC: 135 MMOL/L (ref 136–145)
T AXIS: -2 DEGREES
T OFFSET: 401 MS
VENTRICULAR RATE: 107 BPM
WBC # BLD AUTO: 5.6 X10*3/UL (ref 4.4–11.3)

## 2024-10-05 PROCEDURE — 80069 RENAL FUNCTION PANEL: CPT

## 2024-10-05 PROCEDURE — 2500000001 HC RX 250 WO HCPCS SELF ADMINISTERED DRUGS (ALT 637 FOR MEDICARE OP)

## 2024-10-05 PROCEDURE — 1100000001 HC PRIVATE ROOM DAILY

## 2024-10-05 PROCEDURE — 85025 COMPLETE CBC W/AUTO DIFF WBC: CPT

## 2024-10-05 PROCEDURE — 99233 SBSQ HOSP IP/OBS HIGH 50: CPT

## 2024-10-05 PROCEDURE — 2500000004 HC RX 250 GENERAL PHARMACY W/ HCPCS (ALT 636 FOR OP/ED)

## 2024-10-05 PROCEDURE — 2500000005 HC RX 250 GENERAL PHARMACY W/O HCPCS

## 2024-10-05 PROCEDURE — 80321 ALCOHOLS BIOMARKERS 1OR 2: CPT

## 2024-10-05 PROCEDURE — 87506 IADNA-DNA/RNA PROBE TQ 6-11: CPT

## 2024-10-05 PROCEDURE — 83735 ASSAY OF MAGNESIUM: CPT

## 2024-10-05 RX ORDER — MAGNESIUM CHLORIDE 64 MG
64 TABLET, DELAYED RELEASE (ENTERIC COATED) ORAL 2 TIMES DAILY
Status: DISCONTINUED | OUTPATIENT
Start: 2024-10-05 | End: 2024-10-06 | Stop reason: HOSPADM

## 2024-10-05 RX ORDER — MAGNESIUM SULFATE HEPTAHYDRATE 40 MG/ML
2 INJECTION, SOLUTION INTRAVENOUS ONCE
Status: COMPLETED | OUTPATIENT
Start: 2024-10-05 | End: 2024-10-05

## 2024-10-05 ASSESSMENT — COGNITIVE AND FUNCTIONAL STATUS - GENERAL
MOBILITY SCORE: 23
MOBILITY SCORE: 23
DAILY ACTIVITIY SCORE: 24
DAILY ACTIVITIY SCORE: 24
CLIMB 3 TO 5 STEPS WITH RAILING: A LITTLE
CLIMB 3 TO 5 STEPS WITH RAILING: A LITTLE

## 2024-10-05 ASSESSMENT — PAIN - FUNCTIONAL ASSESSMENT
PAIN_FUNCTIONAL_ASSESSMENT: 0-10

## 2024-10-05 ASSESSMENT — PAIN SCALES - GENERAL
PAINLEVEL_OUTOF10: 3
PAINLEVEL_OUTOF10: 7
PAINLEVEL_OUTOF10: 0 - NO PAIN
PAINLEVEL_OUTOF10: 2
PAINLEVEL_OUTOF10: 5 - MODERATE PAIN

## 2024-10-05 NOTE — PROGRESS NOTES
"Ronna Beckham is a 63 y.o. female on day 2 of admission presenting with SVT (supraventricular tachycardia) (CMS-HCC).    Subjective   Patient initially admitted to the CICU for SVT s/p adenosine. Currently in NSR. States diarrhea has improved and denies any palpitations, chest pain, SOB, or any abdominal symptoms.  sCr this AM is 1.41 from yesterday which 1.21. Plan to give her 500 ml fluid and monitor kidney function and could ne possibly discharge home tomorrow if sCr and diarrhea improves. Also will dc home with Slow mag 64g 1 tab BID.     Review of Systems   Review of Systems   10/10 reviewed negative except stated above.     Objective     Last Recorded Vitals  Blood pressure 106/67, pulse 92, temperature 36.6 °C (97.9 °F), temperature source Temporal, resp. rate 20, height 1.651 m (5' 5\"), weight 72.9 kg (160 lb 11.5 oz), SpO2 93%.  Intake/Output last 3 Shifts:  I/O last 3 completed shifts:  In: 925.3 (12.7 mL/kg) [P.O.:760; I.V.:165.3 (2.3 mL/kg)]  Out: 350 (4.8 mL/kg) [Urine:350 (0.1 mL/kg/hr)]  Weight: 72.9 kg     Physical Exam  Constitutional:       Appearance: Normal appearance.   HENT:      Head: Normocephalic and atraumatic.      Nose: Nose normal.   Eyes:      Extraocular Movements: Extraocular movements intact.      Pupils: Pupils are equal, round, and reactive to light.   Cardiovascular:      Rate and Rhythm: Normal rate and regular rhythm.      Pulses: Normal pulses.      Heart sounds: Murmur heard.   Pulmonary:      Effort: Pulmonary effort is normal.      Breath sounds: Normal breath sounds.   Abdominal:      General: Abdomen is flat.      Palpations: Abdomen is soft.   Musculoskeletal:         General: Normal range of motion.   Skin:     General: Skin is warm.      Capillary Refill: Capillary refill takes less than 2 seconds.   Neurological:      General: No focal deficit present.      Mental Status: She is alert and oriented to person, place, and time. Mental status is at baseline. "   Psychiatric:         Mood and Affect: Mood normal.         Behavior: Behavior normal.         Relevant Results    Labs    Results from last 7 days   Lab Units 10/05/24  0656 10/04/24  0602 10/03/24  2357   WBC AUTO x10*3/uL 5.6 4.6 5.7   HEMOGLOBIN g/dL 9.1* 10.0* 8.9*   HEMATOCRIT % 30.7* 32.3* 30.6*   PLATELETS AUTO x10*3/uL 118* 105* 98*            Results from last 7 days   Lab Units 10/05/24  0656 10/04/24  0602 10/03/24  2357   SODIUM mmol/L 135* 139 140   POTASSIUM mmol/L 3.9 4.5 4.1   CHLORIDE mmol/L 102 106 108*   CO2 mmol/L 22 23 22   BUN mg/dL 22 18 17   CREATININE mg/dL 1.45* 1.23* 1.13*   CALCIUM mg/dL 7.4* 6.8* 6.6*     Results from last 7 days   Lab Units 10/03/24  2357 10/03/24  2123   ALK PHOS U/L 86 84   BILIRUBIN TOTAL mg/dL 0.9 0.9   PROTEIN TOTAL g/dL 6.4 6.9   ALT U/L 7 7   AST U/L 11 10      Results from last 7 days   Lab Units 10/03/24  2357   APTT seconds 34   INR  1.6*        Medications  Scheduled medications  aspirin, 81 mg, oral, Daily  cholestyramine, 1 packet, oral, Daily  dilTIAZem CD, 180 mg, oral, Daily  enoxaparin, 40 mg, subcutaneous, q24h  gabapentin, 800 mg, oral, TID  hydroxychloroquine, 200 mg, oral, BID  macitentan, 10 mg, oral, Daily  melatonin, 5 mg, oral, Nightly  oxygen, , inhalation, Continuous - Inhalation  pantoprazole, 40 mg, oral, Daily before breakfast  venlafaxine XR, 150 mg, oral, Daily      Continuous medications  epoprostenol, 72 ng/kg/min (Order-Specific), Last Rate: 72 ng/kg/min (10/04/24 1802)      PRN medications  PRN medications: acetaminophen     Imaging  XR chest 1 view   Final Result   1.  Enlarged cardiomediastinal silhouette again noted. The   possibility of associated pericardial effusion could also be   considered. Increased prominence of perihilar and interstitial lung   markings raise suspicion for mild pulmonary edema.        I personally reviewed the images/study and I agree with the findings   as stated by Omar Lester MD. This study was  interpreted at   [facility], [place], [place].        MACRO:   None.        Signed by: Brett Cox 10/4/2024 12:59 PM   Dictation workstation:   [id number]               Assessment/Plan   Principal Problem:    SVT (supraventricular tachycardia) (CMS-HCC)    Ronna Beckham a 63 y.o. female with PMHx of pHTN (on 4L NC at BL), GERD, SLE, HARRIS, MDD, and polysubstance use disorder (ethanol, cocaine and amphetamines) who presented to the ED with dizziness, found to be in SVT (rate 200s) now s/p adenosine and in NSR. 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 electrolyte abnormalities in setting of ongoing GI losses and medication nonadherence. Diarrhea is improving upon eval this AM. Mag 1.5 wa replete.       Update 10/05:   ADOD 10/06 if sCr and diarrhea improves   DC with [id number] 1 tab BID   Give 500 ml LR       #HARRIS / #MDD  #Chronic pain  # GABY  - continue with home  gabapentin, Effexor   - pending pETH  - No signs of withdrawal     #AVNRT - resolved  :: UDS negative on presentation, TSH wnl, NSR restored with adenosine x2  :: home meds: diltiazem  mg every day; however, was not taking  :: likely triggered by hypovolemia vs hypomagnesemia   Plan:  Continue with Home diltiazem [id number]  - Monitor on telemetry   - Aggressive electrolyte repletion with K>4, Mg>2       #CAD  - nonobstructive CAD on cath 01/2024  - continue with aspirin      #pHTN   - continue with Opsumit, Veletri   - continue to hold on diuresis for now given hypomagnesemia  - Suspect acute increase in O2 secondary to SVT, wean O2 as able, currently on 4.5L NC. Wean to home baseline 4L      #ALONDRA  :: Baseline Cr 0.8-0.9; Likely 2/2 hypovolemia in setting of diarrhea   :: UA unremarkable  ::FeNa 0.4% - likely prerenal  Plan:  - Give 500cc LR and encourage PO intake   - Daily RFP      #Hypomagnesemia  - given frequent admissions with hypomagnesemia would likely benefit from outpatient supplementation      #GERD  -  continue with home PPI      #Diarrhea, resolved  - Appears to be chronic in nature on discussion with pt, possibly related to her veletri but she has been on veletri for years   - PRN loperamide   - Pending stool pathogen panel      #SLE  - Continue with home plaquenil      F: PRN for euvolemia   E: PRN K>4, Mg>2, P>3   N: Cardiac  A: PIV   Oxygen: 4.5 NC      DVT ppx: Enoxaparin   GI ppx: home PPI     Code Status: FULL CODE (confirmed on ADMISSION)  Surrogate Medical Decision-maker:  Leticia Parson (daughter) [phone]         Case seen and discussed with attending Dr. Thomson, to addend as necessary.    Chang Cardenas MD PGY-1

## 2024-10-05 NOTE — CARE PLAN
Problem: Safety - Adult  Goal: Free from fall injury  Outcome: Progressing     Problem: Chronic Conditions and Co-morbidities  Goal: Patient's chronic conditions and co-morbidity symptoms are monitored and maintained or improved  Outcome: Progressing     Problem: Fall/Injury  Goal: Not fall by end of shift  Outcome: Progressing  Goal: Be free from injury by end of the shift  Outcome: Progressing  Goal: Verbalize understanding of personal risk factors for fall in the hospital  Outcome: Progressing  Goal: Verbalize understanding of risk factor reduction measures to prevent injury from fall in the home  Outcome: Progressing  Goal: Use assistive devices by end of the shift  Outcome: Progressing  Goal: Pace activities to prevent fatigue by end of the shift  Outcome: Progressing    Pt HDS on veletri infusion. Denies chest pain, given 500 LR for creatinine.

## 2024-10-05 NOTE — SIGNIFICANT EVENT
Rapid Response Nurse Note: [] Rapid Response [x] RADAR alert: Score 6     Pager time: 27  Arrival time: 29  Event end time: 30  Location: [id number]  [x] Phone triage     Rapid response initiated by:  [] Rapid Response RN [] Family [] Nursing Supervisor [] Physician   [x] RADAR auto-page [] Sepsis auto-page [] RN [] RT   [] NP/PA [] Other:     Primary reason for call:   [] BAT [] New CPAP/BiPAP [] Bleeding [] Change in mental status   [] Chest pain [] Code blue [] FiO2 >/= 50% [] HR </= 40 bpm   [] HR >/= 130 bpm [] Hyperglycemia [] Hypoglycemia [x] RADAR    [] RR </= 8 bpm [] RR >/= 30 bpm [] SBP </= 90 mmHg [] SpO2 < 90%   [] Seizure [] Sepsis [] Staff concern:     Initial VS and/or RADAR VS: T 36.2 °C; HR 88; RR 20; BP 98/69; SPO2 92%.  Providers present at bedside (if applicable):   Objective/Subjective data relevant to event (if applicable): Reviewed radar VS with bedside nurse Rich who confirmed that there are no acute complaints currently and that these VS are within pt's current trends. RN will reach out with any new concerns.   Interventions:  [x] None [] ABG [] Assist w/ICU transfer [] BAT paged    [] Bag mask [] Blood [] Cardioversion [] Code Blue   [] Code blue for intubation [] Code status changed [] Chest x-ray [] EKG   [] IV fluid/bolus [] KUB x-ray [] Labs/cultures [] Medication   [] Nebulizer treatment [] NIPPV (CPAP/BiPAP) [] Oxygen [] Oral airway   [] Peripheral IV [] Palliative care consult [] CT/MRI [] Sepsis protocol    [] Suctioned [] Other:     Name of ICU Provider contacted (if applicable):   Outcome:  [] Coded and  [] Code blue for intubation [] Coded and transferred to ICU []  on division   [x] Remained on division (no change) [] Remained on division + additional monitoring [] Remained in ED [] Transferred to ED   [] Transferred to ICU [] Transferred to inpatient status [] Transferred for interventions (procedure) [] Transferred to ICU stepdown    [] Transferred to surgery  [] Transferred to telemetry [] Sepsis protocol [] STEMI protocol   [] Stroke protocol [x] Bedside nurse instructed to page rapid response for any concerns or acute change in condition/VS     Additional Comments:

## 2024-10-05 NOTE — SIGNIFICANT EVENT
Rapid Response Nurse Note: [] Rapid Response [x] RADAR alert: Score 6    Pager time: 1155  Arrival time: 1200  Event end time: 1205  Location: LT 5058  [x] Phone triage     Rapid response initiated by:  [] Rapid Response RN [] Family [] Nursing Supervisor [] Physician   [x] RADAR auto-page [] Sepsis auto-page [] RN [] RT   [] NP/PA [] Other:     Primary reason for call:   [] BAT [] New CPAP/BiPAP [] Bleeding [] Change in mental status   [] Chest pain [] Code blue [] FiO2 >/= 50% [] HR </= 40 bpm   [] HR >/= 130 bpm [] Hyperglycemia [] Hypoglycemia [x] RADAR    [] RR </= 8 bpm [] RR >/= 30 bpm [] SBP </= 90 mmHg [] SpO2 < 90%   [] Seizure [] Sepsis [] Staff concern:     Initial VS and/or RADAR VS: T 36.6 °C; HR 92; RR 20; /67; SPO2 93%.  Providers present at bedside (if applicable):   Objective/Subjective data relevant to event (if applicable):   Interventions:  [x] None [] ABG [] Assist w/ICU transfer [] BAT paged    [] Bag mask [] Blood [] Cardioversion [] Code Blue   [] Code blue for intubation [] Code status changed [] Chest x-ray [] EKG   [] IV fluid/bolus [] KUB x-ray [] Labs/cultures [] Medication   [] Nebulizer treatment [] NIPPV (CPAP/BiPAP) [] Oxygen [] Oral airway   [] Peripheral IV [] Palliative care consult [] CT/MRI [] Sepsis protocol    [] Suctioned [] Other:     Name of ICU Provider contacted (if applicable):   Outcome:  [] Coded and  [] Code blue for intubation [] Coded and transferred to ICU []  on division   [x] Remained on division (no change) [] Remained on division + additional monitoring [] Remained in ED [] Transferred to ED   [] Transferred to ICU [] Transferred to inpatient status [] Transferred for interventions (procedure) [] Transferred to ICU stepdown    [] Transferred to surgery [] Transferred to telemetry [] Sepsis protocol [] STEMI protocol   [] Stroke protocol [x] Bedside nurse instructed to page rapid response for any concerns or acute change in condition/VS      Additional Comments:     Radar auto-page received for a radar score of 6 with the above listed vital signs.  Vital signs were confirmed and reviewed with primary RN.  She is at her current baseline and RN has no concerns.  There are no indications for interventions by Rapid Response at this time.  RN to contact Rapid Response with any future concerns or signs of clinical decompensation.

## 2024-10-05 NOTE — HOSPITAL COURSE
Ronna Beckham a 63 y.o. female with PMHx of pHTN (on 4L NC at [facility]), GERD, SLE, HARRIS, MDD, and polysubstance use disorder (ethanol, cocaine and amphetamines) who presented to the ED with dizziness, found to be in SVT (rate 200s) now s/p adenosine and in NSR. Pt. presented to the ED with vision changes and dizziness after 4 days of increased diarrhea.  Had not been taking her home diltiazem.  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  Restored to sinus tachycardia with that and was asked to and transferred to CICU. 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  Restarted on home diltiazem. On exam, pt not volume overloaded, and encouraging po intake.  Has been in NSR overnight with no events on telemetry.  Currently on 4.5L but has been weaning down to her home baseline of 4.0L. Dr. Prescott is aware. Patient is doing overall and can be discharged home if sCr  (now 1.41) and diarrhea is improved.      To follow up:  [ ]  Monitor on telemetry, aggressive electrolyte repletion with K>4, Mg>2  [ ] Consider magnesium supplementation outpatient (DC with [id number] 1 tab BID)

## 2024-10-05 NOTE — CARE PLAN
The patient's goals for the shift include      The clinical goals for the shift include Patient will remain HDS this shift.    Over the shift, the patient did not make progress toward the following goals. Barriers to progression include patient admitted with arrhythmia. Recommendations to address these barriers include monitor heart rhythm and vital signs.

## 2024-10-05 NOTE — DOCUMENTATION CLARIFICATION NOTE
PATIENT:               CARMEN MADRIGAL  ACCT #:                  [id number]  MRN:                       [id number]  :                       1960  ADMIT DATE:       10/3/2024 11:37 PM  DISCH DATE:  RESPONDING PROVIDER #:        [id number]          PROVIDER RESPONSE TEXT:    I agree with dietician diagnosis of moderate malnutrition    CDI QUERY TEXT:    Clarification        Instruction:    Based on your assessment of the patient and the clinical information, please provide the requested documentation by clicking on the appropriate radio button and enter any additional information if prompted.    Question: Please further clarify this patient nutritional status as    When answering this query, please exercise your independent professional judgment. The fact that a question is being asked, does not imply that any particular answer is desired or expected.    The patient's clinical indicators include:  Clinical Information:  63 y.o. female presenting with PMHx of pHTN (on 4L NC at [facility]), GERD, SLE, HARRIS, MDD, and polysubstance use disorder (ethanol, cocaine and amphetamines) who presented lightheadedness/dizziness and SOB, found to be in SVT with rates in the 200s, now s/p adenosine x2 with restoration of sinus tachycardia.  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 electrolyte abnormalities in setting of ongoing GI losses and medication nonadherence.    Clinical Indicators:  10/4 Nutrition Consult:  BMI 26.78  Malnutrition Diagnosis: Moderate malnutrition related to starvation  As Evidenced by: Suspected EEN <75% for > or equal to 3 mo, mild fat loss and muscle wasting    Treatment:  Ensure Plus (350 kcals, 13 g pro) TID  Risk Factors:  polysubstance use disorder, recurrent SVT, diarrhea  Options provided:  -- I agree with dietician diagnosis of moderate malnutrition  -- Other - I will add my own diagnosis  -- Refer to Clinical Documentation Reviewer    Query created by: Brandy Regan on 10/5/2024 11:25  AM      Electronically signed by:  ALESSIA LEIGH MD 10/5/2024 12:29 PM

## 2024-10-06 VITALS
WEIGHT: 164.02 LBS | DIASTOLIC BLOOD PRESSURE: 76 MMHG | TEMPERATURE: 97.9 F | HEART RATE: 100 BPM | OXYGEN SATURATION: 92 % | HEIGHT: 65 IN | BODY MASS INDEX: 27.33 KG/M2 | RESPIRATION RATE: 17 BRPM | SYSTOLIC BLOOD PRESSURE: 109 MMHG

## 2024-10-06 LAB
ALBUMIN SERPL BCP-MCNC: 3.6 G/DL (ref 3.4–5)
ANION GAP SERPL CALC-SCNC: 13 MMOL/L (ref 10–20)
BACTERIA BLD CULT: NORMAL
BACTERIA BLD CULT: NORMAL
BASOPHILS # BLD AUTO: 0 X10*3/UL (ref 0–0.1)
BASOPHILS NFR BLD AUTO: 0 %
BUN SERPL-MCNC: 20 MG/DL (ref 6–23)
CALCIUM SERPL-MCNC: 7.8 MG/DL (ref 8.6–10.6)
CHLORIDE SERPL-SCNC: 103 MMOL/L (ref 98–107)
CHLORIDE UR-SCNC: <15 MMOL/L
CHLORIDE/CREATININE (MMOL/G) IN URINE: NORMAL
CO2 SERPL-SCNC: 22 MMOL/L (ref 21–32)
CREAT SERPL-MCNC: 1.07 MG/DL (ref 0.5–1.05)
CREAT UR-MCNC: 98.2 MG/DL (ref 20–320)
EGFRCR SERPLBLD CKD-EPI 2021: 59 ML/MIN/1.73M*2
EOSINOPHIL # BLD AUTO: 0 X10*3/UL (ref 0–0.7)
EOSINOPHIL NFR BLD AUTO: 0 %
ERYTHROCYTE [DISTWIDTH] IN BLOOD BY AUTOMATED COUNT: 17.5 % (ref 11.5–14.5)
ERYTHROCYTE [DISTWIDTH] IN BLOOD BY AUTOMATED COUNT: 17.7 % (ref 11.5–14.5)
GLUCOSE SERPL-MCNC: 89 MG/DL (ref 74–99)
HCT VFR BLD AUTO: 29.6 % (ref 36–46)
HCT VFR BLD AUTO: 31.6 % (ref 36–46)
HGB BLD-MCNC: 8.8 G/DL (ref 12–16)
HGB BLD-MCNC: 9.3 G/DL (ref 12–16)
IMM GRANULOCYTES # BLD AUTO: 0.01 X10*3/UL (ref 0–0.7)
IMM GRANULOCYTES NFR BLD AUTO: 0.2 % (ref 0–0.9)
LYMPHOCYTES # BLD AUTO: 0.5 X10*3/UL (ref 1.2–4.8)
LYMPHOCYTES NFR BLD AUTO: 10.4 %
MAGNESIUM SERPL-MCNC: 1.81 MG/DL (ref 1.6–2.4)
MCH RBC QN AUTO: 23.8 PG (ref 26–34)
MCH RBC QN AUTO: 24 PG (ref 26–34)
MCHC RBC AUTO-ENTMCNC: 29.4 G/DL (ref 32–36)
MCHC RBC AUTO-ENTMCNC: 29.7 G/DL (ref 32–36)
MCV RBC AUTO: 81 FL (ref 80–100)
MCV RBC AUTO: 81 FL (ref 80–100)
MONOCYTES # BLD AUTO: 0.31 X10*3/UL (ref 0.1–1)
MONOCYTES NFR BLD AUTO: 6.4 %
NEUTROPHILS # BLD AUTO: 3.99 X10*3/UL (ref 1.2–7.7)
NEUTROPHILS NFR BLD AUTO: 83 %
NRBC BLD-RTO: 0 /100 WBCS (ref 0–0)
NRBC BLD-RTO: 0 /100 WBCS (ref 0–0)
PHOSPHATE SERPL-MCNC: 2.8 MG/DL (ref 2.5–4.9)
PLATELET # BLD AUTO: 130 X10*3/UL (ref 150–450)
PLATELET # BLD AUTO: 138 X10*3/UL (ref 150–450)
POTASSIUM SERPL-SCNC: 3.7 MMOL/L (ref 3.5–5.3)
POTASSIUM UR-SCNC: 51 MMOL/L
POTASSIUM/CREAT UR-RTO: 52 MMOL/G CREAT
RBC # BLD AUTO: 3.66 X10*6/UL (ref 4–5.2)
RBC # BLD AUTO: 3.91 X10*6/UL (ref 4–5.2)
SODIUM SERPL-SCNC: 134 MMOL/L (ref 136–145)
SODIUM UR-SCNC: 14 MMOL/L
SODIUM/CREAT UR-RTO: 14 MMOL/G CREAT
WBC # BLD AUTO: 4.8 X10*3/UL (ref 4.4–11.3)
WBC # BLD AUTO: 5.6 X10*3/UL (ref 4.4–11.3)

## 2024-10-06 PROCEDURE — 99239 HOSP IP/OBS DSCHRG MGMT >30: CPT

## 2024-10-06 PROCEDURE — 82436 ASSAY OF URINE CHLORIDE: CPT

## 2024-10-06 PROCEDURE — 80069 RENAL FUNCTION PANEL: CPT

## 2024-10-06 PROCEDURE — 2500000001 HC RX 250 WO HCPCS SELF ADMINISTERED DRUGS (ALT 637 FOR MEDICARE OP)

## 2024-10-06 PROCEDURE — 83735 ASSAY OF MAGNESIUM: CPT

## 2024-10-06 PROCEDURE — 85025 COMPLETE CBC W/AUTO DIFF WBC: CPT

## 2024-10-06 PROCEDURE — 85027 COMPLETE CBC AUTOMATED: CPT

## 2024-10-06 PROCEDURE — 2500000004 HC RX 250 GENERAL PHARMACY W/ HCPCS (ALT 636 FOR OP/ED)

## 2024-10-06 RX ORDER — MAGNESIUM CHLORIDE 64 MG
64 TABLET, DELAYED RELEASE (ENTERIC COATED) ORAL 2 TIMES DAILY
Qty: 30 TABLET | Refills: 1 | Status: SHIPPED | OUTPATIENT
Start: 2024-10-06 | End: 2024-11-05

## 2024-10-06 RX ORDER — MAGNESIUM SULFATE 1 G/100ML
1 INJECTION INTRAVENOUS ONCE
Status: COMPLETED | OUTPATIENT
Start: 2024-10-06 | End: 2024-10-06

## 2024-10-06 RX ORDER — POTASSIUM CHLORIDE 1.5 G/1.58G
40 POWDER, FOR SOLUTION ORAL ONCE
Status: COMPLETED | OUTPATIENT
Start: 2024-10-06 | End: 2024-10-06

## 2024-10-06 ASSESSMENT — PAIN - FUNCTIONAL ASSESSMENT: PAIN_FUNCTIONAL_ASSESSMENT: 0-10

## 2024-10-06 ASSESSMENT — PAIN SCALES - GENERAL: PAINLEVEL_OUTOF10: 2

## 2024-10-06 NOTE — PROGRESS NOTES
Discharge Planning Note:      Ronna Beckham is a 63 y.o. female on day 3 of admission presenting with SVT (supraventricular tachycardia) (CMS-HCC).      Patient to be discharged to home daughter to  for home. Therapy recommends PT MD made aware for referral for homecare  . Homecare notified SOC Wednesday or Thursday, MD  aware and asked for F2F for homecare referral.      Mere Manley RN

## 2024-10-06 NOTE — CARE PLAN
The patient's goals for the shift include      The clinical goals for the shift include Patient will deny dizziness.    Over the shift, the patient did not make progress toward the following goals. Barriers to progression include patient with elevated creatinine on last lab draw. Recommendations to address these barriers include encourage fluids and monitor labs.

## 2024-10-06 NOTE — DISCHARGE INSTRUCTIONS
Theresa Ms. 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 You were given some medication to slow your heart rate down.  You had not been taking your home home diltiazem.  You initially in the CICU and transferred to the floor for continued management.  We suspected abnormal rhythm (AVNRT) was as a result of electrolyte abnormalities, dehydration in the setting of diarrhea and medication noncompliance. You have had no other episode of diarrhea and you have been in normal rhythm after you were given the medications.  We restarted you on your home diltiazem and corrected all electrolytes imbalances and gave you some fluids which helped correct some derangements in your kidney function. Your magnesium levels however have been very low so we will be discharging you home on oral magnesium and would want you to continue all other medications.  You have remained stable and back on your home oxygen 4 L.  Please follow-up with Dr. Prescott , continue taking all your pulmonary hypertension medications and follow-up with the GI team due to concerns of your tummy being big and unable to eat.  We encourage you to try and eat and drink as much as possible.    It was a pleasure taking care of you    Your [facility] team

## 2024-10-06 NOTE — DISCHARGE SUMMARY
Discharge Diagnosis  SVT (supraventricular tachycardia) (CMS-[facility])    Issues Requiring Follow-Up  [ ]GI for concerns of early sateity and poor feeding  [ ]Follow up with Dr beasley  [ ]continue taking Magnesium tablet for hypomagnesimia  [ ]Hx of supraventricular tachycardia    Discharge Meds     Medication List      CONTINUE taking these medications     aspirin 81 mg EC tablet   dilTIAZem  mg 24 hr capsule; Commonly known as: Cardizem CD; Take   1 capsule (180 mg) by mouth once daily.   epoprostenol 1.5 mg recon soln; Commonly known as: Veletri; 1.5 mg   (1,500 mcg) by central venous line infusion route continuously.   Reconstitute contents of vial with 5 ml diluent and mix cassette as   directed. Infuse continuously per physician orders. Allow for priming   volume. Dose range: 1-150 ng/kg/min.   folic acid 1 mg tablet; Commonly known as: Folvite   gabapentin 800 mg tablet; Commonly known as: Neurontin   hydroxychloroquine 200 mg tablet; Commonly known as: Plaquenil   loperamide 2 mg tablet; Commonly known as: Imodium A-D; Take 1 tablet (2   mg) by mouth 4 times a day as needed for diarrhea.   melatonin 5 mg tablet   omeprazole 40 mg DR capsule; Commonly known as: PriLOSEC   Opsumit 10 mg tablet tablet; Generic drug: macitentan; TAKE 1 TABLET BY   MOUTH DAILY. DO NOT HANDLE IF PREGNANT. DO NOT SPLIT, CRUSH, OR CHEW.   REVIEW MEDICATION GUIDE.   topiramate 25 mg tablet; Commonly known as: Topamax; Week 1 25mg at   bedtime, week 2 25mg BID, week 3 25mg qAM, 50mg at bedtime, week 4 50 mg   BID, week 5 50mg qAM 75mg at bedtime, week 6 75 mg BID, week 7 75mg qam   100mg at bedtime, week 8 100mg BID   venlafaxine  mg 24 hr capsule; Commonly known as: Effexor-XR     STOP taking these medications     calcium carbonate-vitamin D3 500 mg-5 mcg (200 unit) tablet   cholestyramine 4 gram packet; Commonly known as: Questran     ASK your doctor about these medications     furosemide 40 mg tablet; Commonly known as:  Lasix; TAKE 1 TABLET BY   MOUTH ONE TIME DAILY       Test Results Pending At Discharge  Pending Labs       Order Current Status    Phosphatidylethanol (PEth), Whole Blood, Quantitative In process    Stool Pathogen Panel, PCR In process            Hospital Course  Ronna Beckham a 63 y.o. female with PMHx of pHTN (on 4L NC at BL), GERD, SLE, HARRIS, MDD, and polysubstance use disorder (ethanol, cocaine and amphetamines) who presented to the ED with dizziness, found to be in SVT (rate 200s) now s/p adenosine and in NSR. Pt. presented to the ED with vision changes and dizziness after 4 days of increased diarrhea.  Had not been taking her home diltiazem.  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  Restored to sinus tachycardia with that and was asked to and transferred to CICU. 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  Transferred to medicine team (Pulm team).Pt restarted on home diltiazem, denies any episodes of diarrhoea, electrolytes repleted. HDS  Has been in NSR with no events on telemetry.  Currently weaned down to her home baseline of 4.0L, Creatinine downtrended 1.07. Dr. Prescott is aware. Patient encouraged to feed and drink      To follow up:  [ ]GI for concerns of early sateity and poor feeding  [ ]Follow up with Dr prescott  [ ]continue taking Magnesium tablet for hypomagnesimia    Pertinent Physical Exam At Time of Discharge    Visit Vitals  /76 (BP Location: Right arm, Patient Position: Lying)   Pulse 100   Temp 36.6 °C (97.9 °F) (Temporal)   Resp 17        Physical Exam  Constitutional:       Appearance: Normal appearance.   HENT:      Head: Normocephalic and atraumatic.      Nose: Nose normal.   Eyes:      Extraocular Movements: Extraocular movements intact.      Pupils: Pupils are equal, round, and reactive to light.   Cardiovascular:      Rate and Rhythm: Normal rate and regular rhythm.      Pulses: Normal  pulses.      Heart sounds: Murmur heard.   Pulmonary:      Effort: Pulmonary effort is normal.      Breath sounds: Normal breath sounds.   Abdominal:      General: Abdomen is flat.      Palpations: Abdomen is soft.   Musculoskeletal:         General: Normal range of motion.   Skin:     General: Skin is warm.      Capillary Refill: Capillary refill takes less than 2 seconds.   Neurological:      General: No focal deficit present.      Mental Status: She is alert and oriented to person, place, and time. Mental status is at baseline.   Psychiatric:         Mood and Affect: Mood normal.         Behavior: Behavior normal.           Outpatient Follow-Up  Future Appointments   Date Time Provider Department Center   10/10/2024 11:15 AM Zack Hughes, PT [id number] [facility]   10/14/2024 11:45 AM Zack Hughes, PT [id number] [facility]   10/16/2024 11:45 AM Zack Hughes, PT [id number] [facility]   10/21/2024 11:45 AM Zack Hughes, PT [id number] [facility]   10/22/2024  1:00 PM TRI RESPTHER1 PFT ROOM [address]   10/23/2024 10:45 AM Zack Hughes, PT [id number] [facility]   10/28/2024 11:45 AM Zack Hughes, PT [id number] [facility]   10/30/2024 11:45 AM Zack Hughes, PT [id number] [facility]   10/30/2024  4:00 PM Bin Wade MD [id number] [facility]   11/4/2024 10:15 AM Zack Hughes, PT [id number] [facility]   11/25/2024  3:30 PM [facility] BOL6F PFT WALKWAY  [id number] [facility]   11/25/2024  4:00 PM Chicho Prescott DO [id number] [facility]   12/23/2024  9:00 AM Chelsea Wood MD [id number] [facility]         Shelbie Kessler MD

## 2024-10-06 NOTE — NURSING NOTE
Pt discharged home.  Instructions reviewed with pt and all questions answered.  Telemetry discontinued and heplocks out x2.  Pt mixed own cassette of Veletri and hooked up to Montez catheter.  Central line dressing changed.  Pt transported to main entrance where her daughter is taking her home.

## 2024-10-07 LAB
ATRIAL RATE: 107 BPM
ATRIAL RATE: 86 BPM
P AXIS: 74 DEGREES
P OFFSET: 172 MS
P OFFSET: 178 MS
P ONSET: 106 MS
P ONSET: 132 MS
PR INTERVAL: 162 MS
PR INTERVAL: 218 MS
Q ONSET: 213 MS
Q ONSET: 215 MS
QRS COUNT: 14 BEATS
QRS COUNT: 18 BEATS
QRS DURATION: 102 MS
QRS DURATION: 112 MS
QT INTERVAL: 376 MS
QT INTERVAL: 424 MS
QTC CALCULATION(BAZETT): 501 MS
QTC CALCULATION(BAZETT): 507 MS
QTC FREDERICIA: 456 MS
QTC FREDERICIA: 478 MS
R AXIS: 145 DEGREES
R AXIS: 148 DEGREES
T AXIS: -2 DEGREES
T AXIS: -54 DEGREES
T OFFSET: 401 MS
T OFFSET: 427 MS
VENTRICULAR RATE: 107 BPM
VENTRICULAR RATE: 86 BPM

## 2024-10-08 ENCOUNTER — APPOINTMENT (OUTPATIENT)
Dept: PHYSICAL THERAPY | Facility: CLINIC | Age: 64
End: 2024-10-08
Payer: COMMERCIAL

## 2024-10-08 LAB
BACTERIA BLD CULT: NORMAL
BACTERIA BLD CULT: NORMAL
C COLI+JEJ+UPSA DNA STL QL NAA+PROBE: NOT DETECTED
EC STX1 GENE STL QL NAA+PROBE: NOT DETECTED
EC STX2 GENE STL QL NAA+PROBE: NOT DETECTED
LABORATORY REPORT: NORMAL
NOROVIRUS GI + GII RNA STL NAA+PROBE: NOT DETECTED
PETH INTERPRETATION: NORMAL
PLPETH BLD-MCNC: <10 NG/ML
POPETH BLD-MCNC: <10 NG/ML
RV RNA STL NAA+PROBE: NOT DETECTED
SALMONELLA DNA STL QL NAA+PROBE: NOT DETECTED
SHIGELLA DNA SPEC QL NAA+PROBE: NOT DETECTED
V CHOLERAE DNA STL QL NAA+PROBE: NOT DETECTED
Y ENTEROCOL DNA STL QL NAA+PROBE: NOT DETECTED

## 2024-10-09 ENCOUNTER — DOCUMENTATION (OUTPATIENT)
Dept: PULMONOLOGY | Facility: HOSPITAL | Age: 64
End: 2024-10-09
Payer: COMMERCIAL

## 2024-10-10 ENCOUNTER — APPOINTMENT (OUTPATIENT)
Dept: PHYSICAL THERAPY | Facility: CLINIC | Age: 64
End: 2024-10-10
Payer: COMMERCIAL

## 2024-10-11 ENCOUNTER — DOCUMENTATION (OUTPATIENT)
Dept: PULMONOLOGY | Facility: HOSPITAL | Age: 64
End: 2024-10-11
Payer: COMMERCIAL

## 2024-10-11 NOTE — PROGRESS NOTES
Confirmation of Verbal Order for Oxygen and updated clinicals faxed to [facility] at [phone]

## 2024-10-12 LAB
ATRIAL RATE: 202 BPM
Q ONSET: 216 MS
QRS COUNT: 32 BEATS
QRS DURATION: 92 MS
QT INTERVAL: 250 MS
QTC CALCULATION(BAZETT): 451 MS
QTC FREDERICIA: 370 MS
R AXIS: 137 DEGREES
T AXIS: 48 DEGREES
T OFFSET: 341 MS
VENTRICULAR RATE: 196 BPM

## 2024-10-14 ENCOUNTER — APPOINTMENT (OUTPATIENT)
Dept: PHYSICAL THERAPY | Facility: CLINIC | Age: 64
End: 2024-10-14
Payer: COMMERCIAL

## 2024-10-15 LAB
ATRIAL RATE: 86 BPM
P OFFSET: 172 MS
P ONSET: 106 MS
PR INTERVAL: 218 MS
Q ONSET: 215 MS
QRS COUNT: 14 BEATS
QRS DURATION: 102 MS
QT INTERVAL: 424 MS
QTC CALCULATION(BAZETT): 507 MS
QTC FREDERICIA: 478 MS
R AXIS: 148 DEGREES
T AXIS: -54 DEGREES
T OFFSET: 427 MS
VENTRICULAR RATE: 86 BPM

## 2024-10-18 ENCOUNTER — TELEPHONE (OUTPATIENT)
Dept: PULMONOLOGY | Facility: HOSPITAL | Age: 64
End: 2024-10-18
Payer: COMMERCIAL

## 2024-10-21 ENCOUNTER — APPOINTMENT (OUTPATIENT)
Dept: PHYSICAL THERAPY | Facility: CLINIC | Age: 64
End: 2024-10-21
Payer: COMMERCIAL

## 2024-10-22 ENCOUNTER — APPOINTMENT (OUTPATIENT)
Dept: RESPIRATORY THERAPY | Facility: HOSPITAL | Age: 64
End: 2024-10-22
Payer: COMMERCIAL

## 2024-10-22 ENCOUNTER — TELEPHONE (OUTPATIENT)
Dept: NEPHROLOGY | Facility: CLINIC | Age: 64
End: 2024-10-22

## 2024-10-22 ENCOUNTER — HOSPITAL ENCOUNTER (INPATIENT)
Facility: HOSPITAL | Age: 64
LOS: 1 days | Discharge: ANOTHER HOSPITAL | End: 2024-10-23
Attending: STUDENT IN AN ORGANIZED HEALTH CARE EDUCATION/TRAINING PROGRAM | Admitting: INTERNAL MEDICINE
Payer: COMMERCIAL

## 2024-10-22 ENCOUNTER — APPOINTMENT (OUTPATIENT)
Dept: RADIOLOGY | Facility: HOSPITAL | Age: 64
End: 2024-10-22
Payer: COMMERCIAL

## 2024-10-22 ENCOUNTER — APPOINTMENT (OUTPATIENT)
Dept: CARDIOLOGY | Facility: HOSPITAL | Age: 64
End: 2024-10-22
Payer: COMMERCIAL

## 2024-10-22 ENCOUNTER — LAB (OUTPATIENT)
Dept: LAB | Facility: LAB | Age: 64
End: 2024-10-22
Payer: COMMERCIAL

## 2024-10-22 DIAGNOSIS — E83.51 HYPOCALCEMIA: ICD-10-CM

## 2024-10-22 DIAGNOSIS — E83.42 HYPOMAGNESEMIA: ICD-10-CM

## 2024-10-22 DIAGNOSIS — R06.02 SOB (SHORTNESS OF BREATH): ICD-10-CM

## 2024-10-22 DIAGNOSIS — I27.20 PULMONARY HYPERTENSION (MULTI): ICD-10-CM

## 2024-10-22 DIAGNOSIS — J96.21 ACUTE ON CHRONIC RESPIRATORY FAILURE WITH HYPOXIA (MULTI): Primary | ICD-10-CM

## 2024-10-22 LAB
25(OH)D3 SERPL-MCNC: 41 NG/ML (ref 30–100)
ALBUMIN SERPL BCP-MCNC: 3.4 G/DL (ref 3.4–5)
ALBUMIN SERPL BCP-MCNC: 3.5 G/DL (ref 3.4–5)
ALP SERPL-CCNC: 68 U/L (ref 33–136)
ALP SERPL-CCNC: 70 U/L (ref 33–136)
ALT SERPL W P-5'-P-CCNC: 7 U/L (ref 7–45)
ALT SERPL W P-5'-P-CCNC: 7 U/L (ref 7–45)
ANION GAP SERPL CALC-SCNC: 15 MMOL/L (ref 10–20)
ANION GAP SERPL CALCULATED.3IONS-SCNC: 14 MMOL/L (ref 10–20)
AST SERPL W P-5'-P-CCNC: 10 U/L (ref 9–39)
AST SERPL W P-5'-P-CCNC: 9 U/L (ref 9–39)
BASOPHILS # BLD AUTO: 0 X10*3/UL (ref 0–0.1)
BASOPHILS # BLD AUTO: 0.01 X10*3/UL (ref 0–0.1)
BASOPHILS NFR BLD AUTO: 0 %
BASOPHILS NFR BLD AUTO: 0.2 %
BILIRUB SERPL-MCNC: 0.9 MG/DL (ref 0–1.2)
BILIRUB SERPL-MCNC: 1 MG/DL (ref 0–1.2)
BNP SERPL-MCNC: 700 PG/ML (ref 0–99)
BNP SERPL-MCNC: 707 PG/ML (ref 0–99)
BUN SERPL-MCNC: 15 MG/DL (ref 6–23)
BUN SERPL-MCNC: 15 MG/DL (ref 6–23)
CALCIUM SERPL-MCNC: 6.4 MG/DL (ref 8.6–10.3)
CALCIUM SERPL-MCNC: 6.8 MG/DL (ref 8.6–10.3)
CARDIAC TROPONIN I PNL SERPL HS: 7 NG/L (ref 0–13)
CARDIAC TROPONIN I PNL SERPL HS: 7 NG/L (ref 0–13)
CHLORIDE SERPL-SCNC: 104 MMOL/L (ref 98–107)
CHLORIDE SERPL-SCNC: 105 MMOL/L (ref 98–107)
CO2 SERPL-SCNC: 23 MMOL/L (ref 21–32)
CO2 SERPL-SCNC: 23 MMOL/L (ref 21–32)
CREAT SERPL-MCNC: 0.82 MG/DL (ref 0.5–1.05)
CREAT SERPL-MCNC: 0.88 MG/DL (ref 0.5–1.05)
EGFRCR SERPLBLD CKD-EPI 2021: 75 ML/MIN/1.73M*2
EGFRCR SERPLBLD CKD-EPI 2021: 81 ML/MIN/1.73M*2
EOSINOPHIL # BLD AUTO: 0 X10*3/UL (ref 0–0.7)
EOSINOPHIL # BLD AUTO: 0 X10*3/UL (ref 0–0.7)
EOSINOPHIL NFR BLD AUTO: 0 %
EOSINOPHIL NFR BLD AUTO: 0 %
ERYTHROCYTE [DISTWIDTH] IN BLOOD BY AUTOMATED COUNT: 17 % (ref 11.5–14.5)
ERYTHROCYTE [DISTWIDTH] IN BLOOD BY AUTOMATED COUNT: 17.3 % (ref 11.5–14.5)
GLUCOSE SERPL-MCNC: 93 MG/DL (ref 74–99)
GLUCOSE SERPL-MCNC: 94 MG/DL (ref 74–99)
HCT VFR BLD AUTO: 32.2 % (ref 36–46)
HCT VFR BLD AUTO: 33.4 % (ref 36–46)
HGB BLD-MCNC: 9.7 G/DL (ref 12–16)
HGB BLD-MCNC: 9.8 G/DL (ref 12–16)
IMM GRANULOCYTES # BLD AUTO: 0.02 X10*3/UL (ref 0–0.7)
IMM GRANULOCYTES # BLD AUTO: 0.07 X10*3/UL (ref 0–0.7)
IMM GRANULOCYTES NFR BLD AUTO: 0.5 % (ref 0–0.9)
IMM GRANULOCYTES NFR BLD AUTO: 1.8 % (ref 0–0.9)
LYMPHOCYTES # BLD AUTO: 0.52 X10*3/UL (ref 1.2–4.8)
LYMPHOCYTES # BLD AUTO: 0.67 X10*3/UL (ref 1.2–4.8)
LYMPHOCYTES NFR BLD AUTO: 13 %
LYMPHOCYTES NFR BLD AUTO: 16.3 %
MAGNESIUM SERPL-MCNC: 0.53 MG/DL (ref 1.6–2.4)
MAGNESIUM SERPL-MCNC: 0.58 MG/DL (ref 1.6–2.4)
MAGNESIUM SERPL-MCNC: 1.48 MG/DL (ref 1.6–2.4)
MCH RBC QN AUTO: 23.4 PG (ref 26–34)
MCH RBC QN AUTO: 24.1 PG (ref 26–34)
MCHC RBC AUTO-ENTMCNC: 29 G/DL (ref 32–36)
MCHC RBC AUTO-ENTMCNC: 30.4 G/DL (ref 32–36)
MCV RBC AUTO: 79 FL (ref 80–100)
MCV RBC AUTO: 81 FL (ref 80–100)
MONOCYTES # BLD AUTO: 0.24 X10*3/UL (ref 0.1–1)
MONOCYTES # BLD AUTO: 0.3 X10*3/UL (ref 0.1–1)
MONOCYTES NFR BLD AUTO: 5.9 %
MONOCYTES NFR BLD AUTO: 7.5 %
NEUTROPHILS # BLD AUTO: 3.1 X10*3/UL (ref 1.2–7.7)
NEUTROPHILS # BLD AUTO: 3.16 X10*3/UL (ref 1.2–7.7)
NEUTROPHILS NFR BLD AUTO: 77.1 %
NEUTROPHILS NFR BLD AUTO: 77.7 %
NRBC BLD-RTO: 0 /100 WBCS (ref 0–0)
NRBC BLD-RTO: ABNORMAL /100{WBCS}
PLATELET # BLD AUTO: 119 X10*3/UL (ref 150–450)
PLATELET # BLD AUTO: 132 X10*3/UL (ref 150–450)
POTASSIUM SERPL-SCNC: 3.6 MMOL/L (ref 3.5–5.3)
POTASSIUM SERPL-SCNC: 3.6 MMOL/L (ref 3.5–5.3)
PREALB SERPL-MCNC: 14.8 MG/DL (ref 18–40)
PROT SERPL-MCNC: 6 G/DL (ref 6.4–8.2)
PROT SERPL-MCNC: 6.3 G/DL (ref 6.4–8.2)
PTH-INTACT SERPL-MCNC: 62 PG/ML (ref 18.5–88)
RBC # BLD AUTO: 4.07 X10*6/UL (ref 4–5.2)
RBC # BLD AUTO: 4.15 X10*6/UL (ref 4–5.2)
SODIUM SERPL-SCNC: 137 MMOL/L (ref 136–145)
SODIUM SERPL-SCNC: 139 MMOL/L (ref 136–145)
WBC # BLD AUTO: 4 X10*3/UL (ref 4.4–11.3)
WBC # BLD AUTO: 4.1 X10*3/UL (ref 4.4–11.3)

## 2024-10-22 PROCEDURE — 85025 COMPLETE CBC W/AUTO DIFF WBC: CPT | Performed by: PHYSICIAN ASSISTANT

## 2024-10-22 PROCEDURE — 2500000002 HC RX 250 W HCPCS SELF ADMINISTERED DRUGS (ALT 637 FOR MEDICARE OP, ALT 636 FOR OP/ED): Performed by: PHYSICIAN ASSISTANT

## 2024-10-22 PROCEDURE — 80053 COMPREHEN METABOLIC PANEL: CPT | Performed by: PHYSICIAN ASSISTANT

## 2024-10-22 PROCEDURE — 83880 ASSAY OF NATRIURETIC PEPTIDE: CPT | Performed by: PHYSICIAN ASSISTANT

## 2024-10-22 PROCEDURE — 83735 ASSAY OF MAGNESIUM: CPT | Performed by: STUDENT IN AN ORGANIZED HEALTH CARE EDUCATION/TRAINING PROGRAM

## 2024-10-22 PROCEDURE — 71275 CT ANGIOGRAPHY CHEST: CPT

## 2024-10-22 PROCEDURE — 84134 ASSAY OF PREALBUMIN: CPT

## 2024-10-22 PROCEDURE — 71275 CT ANGIOGRAPHY CHEST: CPT | Performed by: RADIOLOGY

## 2024-10-22 PROCEDURE — 80053 COMPREHEN METABOLIC PANEL: CPT

## 2024-10-22 PROCEDURE — 2500000004 HC RX 250 GENERAL PHARMACY W/ HCPCS (ALT 636 FOR OP/ED): Performed by: PHYSICIAN ASSISTANT

## 2024-10-22 PROCEDURE — 2550000001 HC RX 255 CONTRASTS: Performed by: STUDENT IN AN ORGANIZED HEALTH CARE EDUCATION/TRAINING PROGRAM

## 2024-10-22 PROCEDURE — 85025 COMPLETE CBC W/AUTO DIFF WBC: CPT

## 2024-10-22 PROCEDURE — 93005 ELECTROCARDIOGRAM TRACING: CPT

## 2024-10-22 PROCEDURE — 84484 ASSAY OF TROPONIN QUANT: CPT | Performed by: PHYSICIAN ASSISTANT

## 2024-10-22 PROCEDURE — 83735 ASSAY OF MAGNESIUM: CPT

## 2024-10-22 PROCEDURE — 83880 ASSAY OF NATRIURETIC PEPTIDE: CPT

## 2024-10-22 PROCEDURE — 96365 THER/PROPH/DIAG IV INF INIT: CPT

## 2024-10-22 PROCEDURE — 82306 VITAMIN D 25 HYDROXY: CPT

## 2024-10-22 PROCEDURE — 93010 ELECTROCARDIOGRAM REPORT: CPT | Performed by: INTERNAL MEDICINE

## 2024-10-22 PROCEDURE — 99285 EMERGENCY DEPT VISIT HI MDM: CPT | Mod: 25

## 2024-10-22 PROCEDURE — 71045 X-RAY EXAM CHEST 1 VIEW: CPT | Performed by: STUDENT IN AN ORGANIZED HEALTH CARE EDUCATION/TRAINING PROGRAM

## 2024-10-22 PROCEDURE — 96366 THER/PROPH/DIAG IV INF ADDON: CPT

## 2024-10-22 PROCEDURE — 71045 X-RAY EXAM CHEST 1 VIEW: CPT

## 2024-10-22 PROCEDURE — 83735 ASSAY OF MAGNESIUM: CPT | Performed by: PHYSICIAN ASSISTANT

## 2024-10-22 PROCEDURE — 83970 ASSAY OF PARATHORMONE: CPT

## 2024-10-22 RX ORDER — MAGNESIUM SULFATE HEPTAHYDRATE 40 MG/ML
4 INJECTION, SOLUTION INTRAVENOUS ONCE
Status: COMPLETED | OUTPATIENT
Start: 2024-10-22 | End: 2024-10-22

## 2024-10-22 RX ORDER — FLUTICASONE PROPIONATE 50 MCG
2 SPRAY, SUSPENSION (ML) NASAL ONCE
Status: COMPLETED | OUTPATIENT
Start: 2024-10-22 | End: 2024-10-22

## 2024-10-22 ASSESSMENT — PAIN SCALES - GENERAL: PAINLEVEL_OUTOF10: 5 - MODERATE PAIN

## 2024-10-22 ASSESSMENT — PAIN - FUNCTIONAL ASSESSMENT: PAIN_FUNCTIONAL_ASSESSMENT: 0-10

## 2024-10-22 NOTE — PROGRESS NOTES
Lab called to report critical Mg of 0.58. Sent info to Dr. Wade, she recommends ED for IV Mg.   Called Pt. To report. She was frustrated as she has been taking oral Mg. She agrees to go to ED.  -MO

## 2024-10-22 NOTE — ED PROVIDER NOTES
HPI   Chief Complaint   Patient presents with    Weakness, Gen     Pt states she had blood work checked today.  Was told her magnesium was critically low.  States she feels weak and has general pain.       HPI    Patient is a 63-year-old female with a history of pulmonary hypertension, SVT, chronic hypoxic respiratory failure and polysubstance abuse presenting for evaluation of low magnesium.  Patient states that she takes 64 mg of magnesium orally twice per day but states that she had labs done today and was told that her magnesium was low and to come to the emergency department for an infusion.  Patient denies associated chest pain or shortness of breath, over she states she is dyspneic on exertion and this is an everyday thing for her.  She chronically wears 4 L nasal cannula oxygen.  She denies abdominal pain.  She states that she always feels nauseous and dry heaves but does not vomit.  Patient states that she has diarrhea multiple times per day but this is common for her and she takes loperamide which stops the diarrhea.  She denies muscle weakness, ataxia, confusion, or seizures.  He denies numbness or tingling.    Patient History   Past Medical History:   Diagnosis Date    Anxiety     Depression     Does not refill medications appropriately 09/10/2024    GERD (gastroesophageal reflux disease)     Lupus (Multi)     Personal history of other specified conditions 09/21/2020    History of seizure    Pulmonary hypertension (Multi)     Volume overload 09/10/2024     Past Surgical History:   Procedure Laterality Date    CARDIAC CATHETERIZATION N/A 1/8/2024    Procedure: Left Heart Cath, With LV, Angriography And Grafts;  Surgeon: Christiano Calvillo DO;  Location: [facility] Cardiac [facility] Lab;  Service: Cardiovascular;  Laterality: N/A;    CHOLECYSTECTOMY      HYSTERECTOMY      OTHER SURGICAL HISTORY  09/21/2020    Cholecystectomy    OTHER SURGICAL HISTORY  09/21/2020    Esophagogastroduodenoscopy    OTHER SURGICAL HISTORY   2020    Colonoscopy    OTHER SURGICAL HISTORY  2020     section    OTHER SURGICAL HISTORY  2020    Hysterectomy     Family History   Problem Relation Name Age of Onset    No Known Problems Mother      No Known Problems Father       Social History     Tobacco Use    Smoking status: Former     Types: Cigarettes    Smokeless tobacco: Former   Vaping Use    Vaping status: Never Used   Substance Use Topics    Alcohol use: Not Currently    Drug use: Not Currently     Types: Cocaine, Other     Comment: Meth       Physical Exam   ED Triage Vitals [10/22/24 1516]   Temperature Heart Rate Respirations BP   36.7 °C (98.1 °F) 100 20 102/69      Pulse Ox Temp src Heart Rate Source Patient Position   (!) 86 % -- -- --      BP Location FiO2 (%)     -- --       Physical Exam  Vitals and nursing note reviewed.   Constitutional:       Appearance: Normal appearance.   HENT:      Head: Normocephalic and atraumatic.      Mouth/Throat:      Mouth: Mucous membranes are moist.      Pharynx: Oropharynx is clear.   Eyes:      Extraocular Movements: Extraocular movements intact.      Conjunctiva/sclera: Conjunctivae normal.      Pupils: Pupils are equal, round, and reactive to light.   Cardiovascular:      Rate and Rhythm: Normal rate and regular rhythm.   Pulmonary:      Effort: Pulmonary effort is normal.      Breath sounds: Normal breath sounds.      Comments: Mildly dyspneic with conversation.  Abdominal:      General: Abdomen is flat. Bowel sounds are normal.      Palpations: Abdomen is soft.   Skin:     General: Skin is warm and dry.   Neurological:      Mental Status: She is alert.           ED Course & MDM   ED Course as of 10/22/24 2305   Tue Oct 22, 2024   1703 EKG Time:1533  EKG Interpretation time:  EKG Interpretation: Sinus tachycardia with a rate of 101 bpm, incomplete right bundle branch block, QTc 614, no evidence of STEMI.    EKG was interpreted by myself independently [JL]      ED Course User  Index  [JL] Clint JUDD PeteyDO         Diagnoses as of 10/22/24 3311   Acute on chronic respiratory failure with hypoxia (Multi)   Pulmonary hypertension (Multi)   Hypomagnesemia   Hypocalcemia                 No data recorded     [place] Coma Scale Score: 15 (10/22/24 1517 : Nahun Edwards, EMT)                           Medical Decision Making    Parts of this chart have been completed using voice recognition software. Please excuse any errors of transcription. Despite the medical decision making time stamp above-my medical decision making has taken place during the patient's entire visit. My thought process and reason for plan has been formulated from the time that I saw the patient until the time of disposition and is not specific to one specific moment during their visit and furthermore my MDM encompasses this entire chart and not only this text box.      HPI: Detailed above.    Exam: A medically appropriate exam performed, outlined above, given the known history and presentation.    History obtained from: Patient    Social Determinants of Health considered during this visit: Coming from home    EKG interpreted by my attending physician, reviewed by myself.    Labs Reviewed   CBC WITH AUTO DIFFERENTIAL - Abnormal       Result Value    WBC 4.1 (*)     nRBC        RBC 4.07      Hemoglobin 9.8 (*)     Hematocrit 32.2 (*)     MCV 79 (*)     MCH 24.1 (*)     MCHC 30.4 (*)     RDW 17.0 (*)     Platelets 119 (*)     Neutrophils % 77.1      Immature Granulocytes %, Automated 0.5      Lymphocytes % 16.3      Monocytes % 5.9      Eosinophils % 0.0      Basophils % 0.2      Neutrophils Absolute 3.16      Immature Granulocytes Absolute, Automated 0.02      Lymphocytes Absolute 0.67 (*)     Monocytes Absolute 0.24      Eosinophils Absolute 0.00      Basophils Absolute 0.01     COMPREHENSIVE METABOLIC PANEL - Abnormal    Glucose 93      Sodium 137      Potassium 3.6      Chloride 104      Bicarbonate 23      Anion Gap 14       Urea Nitrogen 15      Creatinine 0.88      eGFR 75      Calcium 6.8 (*)     Albumin 3.5      Alkaline Phosphatase 68      Total Protein 6.3 (*)     AST 10      Bilirubin, Total 1.0      ALT 7     MAGNESIUM - Abnormal    Magnesium 0.53 (*)    B-TYPE NATRIURETIC PEPTIDE - Abnormal     (*)     Narrative:        <100 pg/mL - Heart failure unlikely  100-299 pg/mL - Intermediate probability of acute heart                  failure exacerbation. Correlate with clinical                  context and patient history.    >=300 pg/mL - Heart Failure likely. Correlate with clinical                  context and patient history.    BNP testing is performed using different testing methodology at [facility] than at other [facility]. Direct result comparisons should only be made within the same method.      SERIAL TROPONIN-INITIAL - Normal    Troponin I, High Sensitivity 7      Narrative:     Less than 99th percentile of normal range cutoff-  Female and children under 18 years old <14 ng/L; Male <21 ng/L: Negative  Repeat testing should be performed if clinically indicated.     Female and children under 18 years old 14-50 ng/L; Male 21-50 ng/L:  Consistent with possible cardiac damage and possible increased clinical   risk. Serial measurements may help to assess extent of myocardial damage.     >50 ng/L: Consistent with cardiac damage, increased clinical risk and  myocardial infarction. Serial measurements may help assess extent of   myocardial damage.      NOTE: Children less than 1 year old may have higher baseline troponin   levels and results should be interpreted in conjunction with the overall   clinical context.     NOTE: Troponin I testing is performed using a different   testing methodology at [facility] than at other   [facility]. Direct result comparisons should only   be made within the same method.   TROPONIN SERIES- (INITIAL, 1 HR)    Narrative:     The following orders  were created for panel order Troponin Series, (0, 1 HR).  Procedure                               Abnormality         Status                     ---------                               -----------         ------                     Troponin I, High Sensiti...[[id number]]  Normal              Final result               Troponin, High Sensitivi...[[id number]]                      In process                   Please view results for these tests on the individual orders.   SERIAL TROPONIN, 1 HOUR     Medications   magnesium sulfate 4 g in sterile water for injection 100 mL (has no administration in time range)   iohexol (OMNIPaque) 350 mg iodine/mL solution 75 mL (75 mL intravenous Given 10/22/24 1466)     XR chest 1 view   Final Result   1. Left basilar consolidation may represent atelectasis, aspiration,   or pneumonia. Overall, this area is not well evaluated and obscured   by cardiomegaly.   2. Marked cardiomegaly with background of mild pulmonary edema.        MACRO:   None        Signed by: Raza Thurman 10/22/2024 4:15 PM   Dictation workstation:   [id number]      CT angio chest for pulmonary embolism    (Results Pending)     Differential diagnoses considered for this visit include: Electrolyte imbalance versus metabolic disturbance    Considerations/further MDM:    Patient is a 63-year-old female presenting for evaluation of low magnesium.  Patient has a history of pulmonary hypertension and is currently on a Veletri drip and also taking Opsumit.  Patient chronically wears 4 L the new cannula oxygen but was saturating at 86%.  She was bumped up to 6 L and was now saturating at 92%.  She is mildly dyspneic with conversation.  CBC was trending along patient's baseline.  CMP demonstrated hypocalcemia with a calcium of 6.8.  Magnesium was significantly low at 0.53.  4 g of magnesium sulfate were ordered for infusion. BNP was 700.  Initial troponin of 7. Chest x-ray shows left basilar consolidation that may represent  atelectasis, aspiration or pneumonia.  Cardiomegaly with background of pulmonary edema.  Given patient's tachycardia and hypoxia, CT angio of the chest was ordered for rule out pulmonary embolism.  CT angio was negative for pulmonary embolism, identifying a moderately sized pericardial effusion.  I spoke to the on-call hospitalist here at [facility], who recommended transfer to [facility] as the patient does have a moderate-sized pericardial effusion that will need further evaluation and possible drainage.  Spoke to the on-call hospitalist at [facility], who recommended reaching out to cardiology to see if the patient needed to be transferred as the patient had this pericardial effusion as of 6 months ago.  It was also a concern the patient may be hypoxic as she is having worsening pulmonary hypertension and it could be [facility] for [facility]  I spoke to Dr. Hadley in pulmonology [facility], who accepted the patient for transfer to [facility].  After being accepted for 4 hours, a bed was still not available.  I reached out to the on-call hospitalist Dr. Ryan Tao, who wanted to consult with the transfer center prior to admitting for a bed update.    Patient was handed off to my oncoming attending physician pending admission.  See supervisory note for further detail disposition.    Patient was seen in conjunction with attending physician Dr. Clint Angelo.   Patient's history, physical exam, diagnostic studies, and treatment plan were discussed thoroughly.  Procedure  Procedures     Marjorie Baumann PA-C  10/23/24 0023

## 2024-10-23 ENCOUNTER — APPOINTMENT (OUTPATIENT)
Dept: PHYSICAL THERAPY | Facility: CLINIC | Age: 64
End: 2024-10-23
Payer: COMMERCIAL

## 2024-10-23 ENCOUNTER — HOSPITAL ENCOUNTER (INPATIENT)
Facility: HOSPITAL | Age: 64
LOS: 3 days | Discharge: HOME | End: 2024-10-26
Attending: INTERNAL MEDICINE | Admitting: INTERNAL MEDICINE
Payer: COMMERCIAL

## 2024-10-23 ENCOUNTER — APPOINTMENT (OUTPATIENT)
Dept: CARDIOLOGY | Facility: HOSPITAL | Age: 64
End: 2024-10-23
Payer: COMMERCIAL

## 2024-10-23 ENCOUNTER — APPOINTMENT (OUTPATIENT)
Dept: RADIOLOGY | Facility: HOSPITAL | Age: 64
End: 2024-10-23
Payer: COMMERCIAL

## 2024-10-23 VITALS
RESPIRATION RATE: 21 BRPM | OXYGEN SATURATION: 91 % | DIASTOLIC BLOOD PRESSURE: 74 MMHG | TEMPERATURE: 97.7 F | WEIGHT: 166.01 LBS | BODY MASS INDEX: 27.66 KG/M2 | HEIGHT: 65 IN | HEART RATE: 100 BPM | SYSTOLIC BLOOD PRESSURE: 113 MMHG

## 2024-10-23 DIAGNOSIS — R92.8 ABNORMAL FINDINGS ON DIAGNOSTIC IMAGING OF BREAST: ICD-10-CM

## 2024-10-23 DIAGNOSIS — I31.39 PERICARDIAL EFFUSION (HHS-HCC): ICD-10-CM

## 2024-10-23 DIAGNOSIS — R29.6 REPEATED FALLS: ICD-10-CM

## 2024-10-23 DIAGNOSIS — F32.A DEPRESSIVE DISORDER: ICD-10-CM

## 2024-10-23 DIAGNOSIS — F41.9 ANXIETY: ICD-10-CM

## 2024-10-23 DIAGNOSIS — E83.42 HYPOMAGNESEMIA: ICD-10-CM

## 2024-10-23 DIAGNOSIS — Z91.148: ICD-10-CM

## 2024-10-23 DIAGNOSIS — I27.20 PULMONARY HTN (MULTI): ICD-10-CM

## 2024-10-23 DIAGNOSIS — I47.10 SVT (SUPRAVENTRICULAR TACHYCARDIA) (CMS-HCC): ICD-10-CM

## 2024-10-23 DIAGNOSIS — J96.21 ACUTE ON CHRONIC RESPIRATORY FAILURE WITH HYPOXIA (MULTI): ICD-10-CM

## 2024-10-23 DIAGNOSIS — G62.9 NEUROPATHY: ICD-10-CM

## 2024-10-23 DIAGNOSIS — Z79.899 LONG-TERM USE OF HIGH-RISK MEDICATION: ICD-10-CM

## 2024-10-23 DIAGNOSIS — J96.20 ACUTE ON CHRONIC RESPIRATORY FAILURE (MULTI): Primary | ICD-10-CM

## 2024-10-23 DIAGNOSIS — K21.9 GASTROESOPHAGEAL REFLUX DISEASE, UNSPECIFIED WHETHER ESOPHAGITIS PRESENT: ICD-10-CM

## 2024-10-23 DIAGNOSIS — I10 PRIMARY HYPERTENSION: ICD-10-CM

## 2024-10-23 DIAGNOSIS — M32.19 OTHER SYSTEMIC LUPUS ERYTHEMATOSUS WITH OTHER ORGAN INVOLVEMENT: ICD-10-CM

## 2024-10-23 DIAGNOSIS — R26.2 DIFFICULTY WALKING: ICD-10-CM

## 2024-10-23 LAB
ALBUMIN SERPL BCP-MCNC: 3.6 G/DL (ref 3.4–5)
ALBUMIN SERPL BCP-MCNC: 3.6 G/DL (ref 3.4–5)
ALP SERPL-CCNC: 66 U/L (ref 33–136)
ALT SERPL W P-5'-P-CCNC: 7 U/L (ref 7–45)
AMPHETAMINES UR QL SCN: NORMAL
ANION GAP BLDV CALCULATED.4IONS-SCNC: 11 MMOL/L (ref 10–25)
ANION GAP SERPL CALC-SCNC: 15 MMOL/L (ref 10–20)
ANION GAP SERPL CALCULATED.3IONS-SCNC: 13 MMOL/L (ref 10–20)
APTT PPP: 32 SECONDS (ref 27–38)
AST SERPL W P-5'-P-CCNC: 9 U/L (ref 9–39)
ATRIAL RATE: 101 BPM
BARBITURATES UR QL SCN: NORMAL
BASE EXCESS BLDV CALC-SCNC: -3 MMOL/L (ref -2–3)
BASOPHILS # BLD AUTO: 0 X10*3/UL (ref 0–0.1)
BASOPHILS # BLD AUTO: 0 X10*3/UL (ref 0–0.1)
BASOPHILS NFR BLD AUTO: 0 %
BASOPHILS NFR BLD AUTO: 0 %
BENZODIAZ UR QL SCN: NORMAL
BILIRUB SERPL-MCNC: 0.9 MG/DL (ref 0–1.2)
BODY TEMPERATURE: 37 DEGREES CELSIUS
BUN SERPL-MCNC: 14 MG/DL (ref 6–23)
BUN SERPL-MCNC: 14 MG/DL (ref 6–23)
BZE UR QL SCN: NORMAL
CA-I BLD-SCNC: 0.99 MMOL/L (ref 1.1–1.33)
CA-I BLDV-SCNC: 1.01 MMOL/L (ref 1.1–1.33)
CALCIUM SERPL-MCNC: 7.2 MG/DL (ref 8.6–10.3)
CALCIUM SERPL-MCNC: 7.4 MG/DL (ref 8.6–10.6)
CANNABINOIDS UR QL SCN: NORMAL
CHLORIDE BLDV-SCNC: 107 MMOL/L (ref 98–107)
CHLORIDE SERPL-SCNC: 107 MMOL/L (ref 98–107)
CHLORIDE SERPL-SCNC: 107 MMOL/L (ref 98–107)
CO2 SERPL-SCNC: 20 MMOL/L (ref 21–32)
CO2 SERPL-SCNC: 23 MMOL/L (ref 21–32)
CREAT SERPL-MCNC: 0.72 MG/DL (ref 0.5–1.05)
CREAT SERPL-MCNC: 0.77 MG/DL (ref 0.5–1.05)
EGFRCR SERPLBLD CKD-EPI 2021: 88 ML/MIN/1.73M*2
EGFRCR SERPLBLD CKD-EPI 2021: >90 ML/MIN/1.73M*2
EOSINOPHIL # BLD AUTO: 0 X10*3/UL (ref 0–0.7)
EOSINOPHIL # BLD AUTO: 0 X10*3/UL (ref 0–0.7)
EOSINOPHIL NFR BLD AUTO: 0 %
EOSINOPHIL NFR BLD AUTO: 0 %
ERYTHROCYTE [DISTWIDTH] IN BLOOD BY AUTOMATED COUNT: 17.2 % (ref 11.5–14.5)
ERYTHROCYTE [DISTWIDTH] IN BLOOD BY AUTOMATED COUNT: 17.3 % (ref 11.5–14.5)
FENTANYL+NORFENTANYL UR QL SCN: NORMAL
GLUCOSE BLDV-MCNC: 129 MG/DL (ref 74–99)
GLUCOSE SERPL-MCNC: 117 MG/DL (ref 74–99)
GLUCOSE SERPL-MCNC: 126 MG/DL (ref 74–99)
HCO3 BLDV-SCNC: 20.6 MMOL/L (ref 22–26)
HCT VFR BLD AUTO: 31.9 % (ref 36–46)
HCT VFR BLD AUTO: 32.6 % (ref 36–46)
HCT VFR BLD EST: 31 % (ref 36–46)
HGB BLD-MCNC: 10 G/DL (ref 12–16)
HGB BLD-MCNC: 9.4 G/DL (ref 12–16)
HGB BLDV-MCNC: 10.2 G/DL (ref 12–16)
IMM GRANULOCYTES # BLD AUTO: 0.02 X10*3/UL (ref 0–0.7)
IMM GRANULOCYTES # BLD AUTO: 0.03 X10*3/UL (ref 0–0.7)
IMM GRANULOCYTES NFR BLD AUTO: 0.5 % (ref 0–0.9)
IMM GRANULOCYTES NFR BLD AUTO: 0.6 % (ref 0–0.9)
INHALED O2 CONCENTRATION: 39 %
INR PPP: 1.3 (ref 0.9–1.1)
LACTATE BLDV-SCNC: 1 MMOL/L (ref 0.4–2)
LYMPHOCYTES # BLD AUTO: 0.32 X10*3/UL (ref 1.2–4.8)
LYMPHOCYTES # BLD AUTO: 0.46 X10*3/UL (ref 1.2–4.8)
LYMPHOCYTES NFR BLD AUTO: 7.9 %
LYMPHOCYTES NFR BLD AUTO: 8.6 %
MAGNESIUM SERPL-MCNC: 1.33 MG/DL (ref 1.6–2.4)
MAGNESIUM SERPL-MCNC: 2.02 MG/DL (ref 1.6–2.4)
MCH RBC QN AUTO: 23.4 PG (ref 26–34)
MCH RBC QN AUTO: 24 PG (ref 26–34)
MCHC RBC AUTO-ENTMCNC: 29.5 G/DL (ref 32–36)
MCHC RBC AUTO-ENTMCNC: 30.7 G/DL (ref 32–36)
MCV RBC AUTO: 78 FL (ref 80–100)
MCV RBC AUTO: 80 FL (ref 80–100)
METHADONE UR QL SCN: NORMAL
MONOCYTES # BLD AUTO: 0.1 X10*3/UL (ref 0.1–1)
MONOCYTES # BLD AUTO: 0.22 X10*3/UL (ref 0.1–1)
MONOCYTES NFR BLD AUTO: 2.5 %
MONOCYTES NFR BLD AUTO: 4.1 %
NEUTROPHILS # BLD AUTO: 3.62 X10*3/UL (ref 1.2–7.7)
NEUTROPHILS # BLD AUTO: 4.61 X10*3/UL (ref 1.2–7.7)
NEUTROPHILS NFR BLD AUTO: 86.7 %
NEUTROPHILS NFR BLD AUTO: 89.1 %
NRBC BLD-RTO: 0 /100 WBCS (ref 0–0)
NRBC BLD-RTO: 0 /100 WBCS (ref 0–0)
OPIATES UR QL SCN: NORMAL
OXYCODONE+OXYMORPHONE UR QL SCN: NORMAL
OXYHGB MFR BLDV: 88.6 % (ref 45–75)
P AXIS: -14 DEGREES
P OFFSET: 172 MS
P ONSET: 116 MS
PCO2 BLDV: 31 MM HG (ref 41–51)
PCP UR QL SCN: NORMAL
PH BLDV: 7.43 PH (ref 7.33–7.43)
PHOSPHATE SERPL-MCNC: 3.6 MG/DL (ref 2.5–4.9)
PLATELET # BLD AUTO: 127 X10*3/UL (ref 150–450)
PLATELET # BLD AUTO: 150 X10*3/UL (ref 150–450)
PO2 BLDV: 60 MM HG (ref 35–45)
POTASSIUM BLDV-SCNC: 4.7 MMOL/L (ref 3.5–5.3)
POTASSIUM SERPL-SCNC: 3.9 MMOL/L (ref 3.5–5.3)
POTASSIUM SERPL-SCNC: 4.6 MMOL/L (ref 3.5–5.3)
PR INTERVAL: 194 MS
PROT SERPL-MCNC: 6.3 G/DL (ref 6.4–8.2)
PROTHROMBIN TIME: 14.4 SECONDS (ref 9.8–12.8)
Q ONSET: 213 MS
QRS COUNT: 16 BEATS
QRS DURATION: 98 MS
QT INTERVAL: 474 MS
QTC CALCULATION(BAZETT): 614 MS
QTC FREDERICIA: 564 MS
R AXIS: 134 DEGREES
RBC # BLD AUTO: 4.01 X10*6/UL (ref 4–5.2)
RBC # BLD AUTO: 4.16 X10*6/UL (ref 4–5.2)
SAO2 % BLDV: 90 % (ref 45–75)
SODIUM BLDV-SCNC: 134 MMOL/L (ref 136–145)
SODIUM SERPL-SCNC: 137 MMOL/L (ref 136–145)
SODIUM SERPL-SCNC: 139 MMOL/L (ref 136–145)
T AXIS: -18 DEGREES
T OFFSET: 450 MS
VENTRICULAR RATE: 101 BPM
WBC # BLD AUTO: 4.1 X10*3/UL (ref 4.4–11.3)
WBC # BLD AUTO: 5.3 X10*3/UL (ref 4.4–11.3)

## 2024-10-23 PROCEDURE — 2500000004 HC RX 250 GENERAL PHARMACY W/ HCPCS (ALT 636 FOR OP/ED)

## 2024-10-23 PROCEDURE — 71045 X-RAY EXAM CHEST 1 VIEW: CPT

## 2024-10-23 PROCEDURE — 83735 ASSAY OF MAGNESIUM: CPT

## 2024-10-23 PROCEDURE — 2500000004 HC RX 250 GENERAL PHARMACY W/ HCPCS (ALT 636 FOR OP/ED): Performed by: INTERNAL MEDICINE

## 2024-10-23 PROCEDURE — 82947 ASSAY GLUCOSE BLOOD QUANT: CPT

## 2024-10-23 PROCEDURE — 83735 ASSAY OF MAGNESIUM: CPT | Performed by: INTERNAL MEDICINE

## 2024-10-23 PROCEDURE — 99254 IP/OBS CNSLTJ NEW/EST MOD 60: CPT | Performed by: NURSE PRACTITIONER

## 2024-10-23 PROCEDURE — 85025 COMPLETE CBC W/AUTO DIFF WBC: CPT

## 2024-10-23 PROCEDURE — 85025 COMPLETE CBC W/AUTO DIFF WBC: CPT | Performed by: INTERNAL MEDICINE

## 2024-10-23 PROCEDURE — 2500000001 HC RX 250 WO HCPCS SELF ADMINISTERED DRUGS (ALT 637 FOR MEDICARE OP): Performed by: INTERNAL MEDICINE

## 2024-10-23 PROCEDURE — 87081 CULTURE SCREEN ONLY: CPT | Mod: TRILAB,WESLAB | Performed by: INTERNAL MEDICINE

## 2024-10-23 PROCEDURE — 99223 1ST HOSP IP/OBS HIGH 75: CPT | Performed by: INTERNAL MEDICINE

## 2024-10-23 PROCEDURE — 82330 ASSAY OF CALCIUM: CPT

## 2024-10-23 PROCEDURE — 2060000001 HC INTERMEDIATE ICU ROOM DAILY

## 2024-10-23 PROCEDURE — 80307 DRUG TEST PRSMV CHEM ANLYZR: CPT | Performed by: INTERNAL MEDICINE

## 2024-10-23 PROCEDURE — 97165 OT EVAL LOW COMPLEX 30 MIN: CPT | Mod: GO

## 2024-10-23 PROCEDURE — 82435 ASSAY OF BLOOD CHLORIDE: CPT

## 2024-10-23 PROCEDURE — 93005 ELECTROCARDIOGRAM TRACING: CPT

## 2024-10-23 PROCEDURE — 2500000004 HC RX 250 GENERAL PHARMACY W/ HCPCS (ALT 636 FOR OP/ED): Performed by: STUDENT IN AN ORGANIZED HEALTH CARE EDUCATION/TRAINING PROGRAM

## 2024-10-23 PROCEDURE — 2500000002 HC RX 250 W HCPCS SELF ADMINISTERED DRUGS (ALT 637 FOR MEDICARE OP, ALT 636 FOR OP/ED): Performed by: INTERNAL MEDICINE

## 2024-10-23 PROCEDURE — 85610 PROTHROMBIN TIME: CPT

## 2024-10-23 PROCEDURE — 93010 ELECTROCARDIOGRAM REPORT: CPT | Performed by: INTERNAL MEDICINE

## 2024-10-23 PROCEDURE — 2500000004 HC RX 250 GENERAL PHARMACY W/ HCPCS (ALT 636 FOR OP/ED): Mod: JZ

## 2024-10-23 PROCEDURE — 99222 1ST HOSP IP/OBS MODERATE 55: CPT | Performed by: INTERNAL MEDICINE

## 2024-10-23 PROCEDURE — 80053 COMPREHEN METABOLIC PANEL: CPT | Performed by: INTERNAL MEDICINE

## 2024-10-23 PROCEDURE — 1200000002 HC GENERAL ROOM WITH TELEMETRY DAILY

## 2024-10-23 PROCEDURE — 2500000001 HC RX 250 WO HCPCS SELF ADMINISTERED DRUGS (ALT 637 FOR MEDICARE OP)

## 2024-10-23 PROCEDURE — 97162 PT EVAL MOD COMPLEX 30 MIN: CPT | Mod: GP

## 2024-10-23 RX ORDER — FUROSEMIDE 10 MG/ML
40 INJECTION INTRAMUSCULAR; INTRAVENOUS ONCE
Status: COMPLETED | OUTPATIENT
Start: 2024-10-23 | End: 2024-10-23

## 2024-10-23 RX ORDER — LOPERAMIDE HYDROCHLORIDE 2 MG/1
2 CAPSULE ORAL 4 TIMES DAILY PRN
Status: DISCONTINUED | OUTPATIENT
Start: 2024-10-23 | End: 2024-10-23 | Stop reason: HOSPADM

## 2024-10-23 RX ORDER — LANOLIN ALCOHOL/MO/W.PET/CERES
400 CREAM (GRAM) TOPICAL 2 TIMES DAILY
Status: DISCONTINUED | OUTPATIENT
Start: 2024-10-23 | End: 2024-10-23 | Stop reason: HOSPADM

## 2024-10-23 RX ORDER — CALCIUM GLUCONATE 20 MG/ML
2 INJECTION, SOLUTION INTRAVENOUS ONCE
Status: COMPLETED | OUTPATIENT
Start: 2024-10-23 | End: 2024-10-23

## 2024-10-23 RX ORDER — CALCIUM GLUCONATE 98 MG/ML
1 INJECTION, SOLUTION INTRAVENOUS ONCE
Status: COMPLETED | OUTPATIENT
Start: 2024-10-23 | End: 2024-10-23

## 2024-10-23 RX ORDER — FUROSEMIDE 40 MG/1
40 TABLET ORAL DAILY
Status: DISCONTINUED | OUTPATIENT
Start: 2024-10-23 | End: 2024-10-23 | Stop reason: HOSPADM

## 2024-10-23 RX ORDER — VENLAFAXINE HYDROCHLORIDE 150 MG/1
150 CAPSULE, EXTENDED RELEASE ORAL DAILY
Status: DISCONTINUED | OUTPATIENT
Start: 2024-10-24 | End: 2024-10-26 | Stop reason: HOSPADM

## 2024-10-23 RX ORDER — FOLIC ACID 1 MG/1
1 TABLET ORAL DAILY
Status: DISCONTINUED | OUTPATIENT
Start: 2024-10-23 | End: 2024-10-23 | Stop reason: HOSPADM

## 2024-10-23 RX ORDER — MAGNESIUM CHLORIDE 64 MG
64 TABLET, DELAYED RELEASE (ENTERIC COATED) ORAL 2 TIMES DAILY
Status: DISCONTINUED | OUTPATIENT
Start: 2024-10-23 | End: 2024-10-23

## 2024-10-23 RX ORDER — MAGNESIUM CHLORIDE 64 MG
64 TABLET, DELAYED RELEASE (ENTERIC COATED) ORAL 2 TIMES DAILY
Status: DISCONTINUED | OUTPATIENT
Start: 2024-10-23 | End: 2024-10-26 | Stop reason: HOSPADM

## 2024-10-23 RX ORDER — EPOPROSTENOL 1.5 MG/10ML
1.5 INJECTION, POWDER, LYOPHILIZED, FOR SOLUTION INTRAVENOUS CONTINUOUS
Status: DISCONTINUED | OUTPATIENT
Start: 2024-10-23 | End: 2024-10-23 | Stop reason: HOSPADM

## 2024-10-23 RX ORDER — POLYETHYLENE GLYCOL 3350 17 G/17G
17 POWDER, FOR SOLUTION ORAL DAILY PRN
Status: DISCONTINUED | OUTPATIENT
Start: 2024-10-23 | End: 2024-10-23 | Stop reason: HOSPADM

## 2024-10-23 RX ORDER — VENLAFAXINE HYDROCHLORIDE 150 MG/1
150 CAPSULE, EXTENDED RELEASE ORAL DAILY
Status: DISCONTINUED | OUTPATIENT
Start: 2024-10-23 | End: 2024-10-23 | Stop reason: HOSPADM

## 2024-10-23 RX ORDER — FOLIC ACID 1 MG/1
1 TABLET ORAL DAILY
Status: DISCONTINUED | OUTPATIENT
Start: 2024-10-24 | End: 2024-10-26 | Stop reason: HOSPADM

## 2024-10-23 RX ORDER — GUAIFENESIN 600 MG/1
600 TABLET, EXTENDED RELEASE ORAL EVERY 12 HOURS PRN
Status: DISCONTINUED | OUTPATIENT
Start: 2024-10-23 | End: 2024-10-23 | Stop reason: HOSPADM

## 2024-10-23 RX ORDER — HEPARIN SODIUM 5000 [USP'U]/ML
5000 INJECTION, SOLUTION INTRAVENOUS; SUBCUTANEOUS EVERY 8 HOURS SCHEDULED
Status: DISCONTINUED | OUTPATIENT
Start: 2024-10-23 | End: 2024-10-23

## 2024-10-23 RX ORDER — ACETAMINOPHEN 500 MG
5 TABLET ORAL NIGHTLY
Status: DISCONTINUED | OUTPATIENT
Start: 2024-10-23 | End: 2024-10-26 | Stop reason: HOSPADM

## 2024-10-23 RX ORDER — DILTIAZEM HYDROCHLORIDE 180 MG/1
180 CAPSULE, COATED, EXTENDED RELEASE ORAL DAILY
Status: DISCONTINUED | OUTPATIENT
Start: 2024-10-23 | End: 2024-10-23

## 2024-10-23 RX ORDER — MAGNESIUM SULFATE HEPTAHYDRATE 40 MG/ML
4 INJECTION, SOLUTION INTRAVENOUS ONCE
Status: COMPLETED | OUTPATIENT
Start: 2024-10-23 | End: 2024-10-23

## 2024-10-23 RX ORDER — ASPIRIN 81 MG/1
81 TABLET ORAL DAILY
Status: DISCONTINUED | OUTPATIENT
Start: 2024-10-23 | End: 2024-10-23 | Stop reason: HOSPADM

## 2024-10-23 RX ORDER — PANTOPRAZOLE SODIUM 40 MG/1
40 TABLET, DELAYED RELEASE ORAL
Status: DISCONTINUED | OUTPATIENT
Start: 2024-10-23 | End: 2024-10-23 | Stop reason: HOSPADM

## 2024-10-23 RX ORDER — HYDROXYCHLOROQUINE SULFATE 200 MG/1
200 TABLET, FILM COATED ORAL 2 TIMES DAILY
Status: DISCONTINUED | OUTPATIENT
Start: 2024-10-23 | End: 2024-10-23 | Stop reason: HOSPADM

## 2024-10-23 RX ORDER — ASPIRIN 81 MG/1
81 TABLET ORAL DAILY
Status: DISCONTINUED | OUTPATIENT
Start: 2024-10-24 | End: 2024-10-26 | Stop reason: HOSPADM

## 2024-10-23 RX ORDER — ACETAMINOPHEN 325 MG/1
650 TABLET ORAL EVERY 4 HOURS PRN
Status: DISCONTINUED | OUTPATIENT
Start: 2024-10-23 | End: 2024-10-23 | Stop reason: HOSPADM

## 2024-10-23 RX ORDER — POTASSIUM CHLORIDE 20 MEQ/1
20 TABLET, EXTENDED RELEASE ORAL DAILY
Status: DISCONTINUED | OUTPATIENT
Start: 2024-10-23 | End: 2024-10-23 | Stop reason: HOSPADM

## 2024-10-23 RX ORDER — ACETAMINOPHEN 650 MG/1
650 SUPPOSITORY RECTAL EVERY 4 HOURS PRN
Status: DISCONTINUED | OUTPATIENT
Start: 2024-10-23 | End: 2024-10-23 | Stop reason: HOSPADM

## 2024-10-23 RX ORDER — HYDROXYCHLOROQUINE SULFATE 200 MG/1
200 TABLET, FILM COATED ORAL 2 TIMES DAILY
Status: DISCONTINUED | OUTPATIENT
Start: 2024-10-23 | End: 2024-10-26 | Stop reason: HOSPADM

## 2024-10-23 RX ORDER — CHOLESTYRAMINE 4 G/9G
1 POWDER, FOR SUSPENSION ORAL DAILY
Status: DISCONTINUED | OUTPATIENT
Start: 2024-10-23 | End: 2024-10-26 | Stop reason: HOSPADM

## 2024-10-23 RX ORDER — DILTIAZEM HYDROCHLORIDE 240 MG/1
240 CAPSULE, COATED, EXTENDED RELEASE ORAL DAILY
Status: DISCONTINUED | OUTPATIENT
Start: 2024-10-24 | End: 2024-10-23 | Stop reason: HOSPADM

## 2024-10-23 RX ORDER — FUROSEMIDE 40 MG/1
40 TABLET ORAL DAILY
Status: DISCONTINUED | OUTPATIENT
Start: 2024-10-24 | End: 2024-10-24

## 2024-10-23 RX ORDER — PANTOPRAZOLE SODIUM 40 MG/1
40 TABLET, DELAYED RELEASE ORAL
Status: DISCONTINUED | OUTPATIENT
Start: 2024-10-24 | End: 2024-10-26 | Stop reason: HOSPADM

## 2024-10-23 RX ORDER — IPRATROPIUM BROMIDE AND ALBUTEROL SULFATE 2.5; .5 MG/3ML; MG/3ML
3 SOLUTION RESPIRATORY (INHALATION) EVERY 4 HOURS PRN
Status: DISCONTINUED | OUTPATIENT
Start: 2024-10-23 | End: 2024-10-23 | Stop reason: HOSPADM

## 2024-10-23 RX ORDER — DILTIAZEM HYDROCHLORIDE 180 MG/1
180 CAPSULE, COATED, EXTENDED RELEASE ORAL DAILY
Status: DISCONTINUED | OUTPATIENT
Start: 2024-10-24 | End: 2024-10-24

## 2024-10-23 RX ORDER — ENOXAPARIN SODIUM 100 MG/ML
40 INJECTION SUBCUTANEOUS DAILY
Status: DISCONTINUED | OUTPATIENT
Start: 2024-10-23 | End: 2024-10-26 | Stop reason: HOSPADM

## 2024-10-23 RX ORDER — ACETAMINOPHEN 160 MG/5ML
650 SOLUTION ORAL EVERY 4 HOURS PRN
Status: DISCONTINUED | OUTPATIENT
Start: 2024-10-23 | End: 2024-10-23 | Stop reason: HOSPADM

## 2024-10-23 RX ORDER — GUAIFENESIN/DEXTROMETHORPHAN 100-10MG/5
5 SYRUP ORAL EVERY 4 HOURS PRN
Status: DISCONTINUED | OUTPATIENT
Start: 2024-10-23 | End: 2024-10-23 | Stop reason: HOSPADM

## 2024-10-23 SDOH — SOCIAL STABILITY: SOCIAL INSECURITY: HAVE YOU HAD ANY THOUGHTS OF HARMING ANYONE ELSE?: NO

## 2024-10-23 SDOH — ECONOMIC STABILITY: FOOD INSECURITY: WITHIN THE PAST 12 MONTHS, THE FOOD YOU BOUGHT JUST DIDN'T LAST AND YOU DIDN'T HAVE MONEY TO GET MORE.: NEVER TRUE

## 2024-10-23 SDOH — SOCIAL STABILITY: SOCIAL INSECURITY: DO YOU FEEL UNSAFE GOING BACK TO THE PLACE WHERE YOU ARE LIVING?: NO

## 2024-10-23 SDOH — SOCIAL STABILITY: SOCIAL INSECURITY: HAVE YOU HAD THOUGHTS OF HARMING ANYONE ELSE?: NO

## 2024-10-23 SDOH — ECONOMIC STABILITY: INCOME INSECURITY: IN THE PAST 12 MONTHS HAS THE ELECTRIC, GAS, OIL, OR WATER COMPANY THREATENED TO SHUT OFF SERVICES IN YOUR HOME?: NO

## 2024-10-23 SDOH — SOCIAL STABILITY: SOCIAL INSECURITY: WITHIN THE LAST YEAR, HAVE YOU BEEN AFRAID OF YOUR PARTNER OR EX-PARTNER?: NO

## 2024-10-23 SDOH — HEALTH STABILITY: MENTAL HEALTH: HOW MANY DRINKS CONTAINING ALCOHOL DO YOU HAVE ON A TYPICAL DAY WHEN YOU ARE DRINKING?: PATIENT DOES NOT DRINK

## 2024-10-23 SDOH — ECONOMIC STABILITY: TRANSPORTATION INSECURITY: IN THE PAST 12 MONTHS, HAS LACK OF TRANSPORTATION KEPT YOU FROM MEDICAL APPOINTMENTS OR FROM GETTING MEDICATIONS?: NO

## 2024-10-23 SDOH — SOCIAL STABILITY: SOCIAL INSECURITY: ARE YOU OR HAVE YOU BEEN THREATENED OR ABUSED PHYSICALLY, EMOTIONALLY, OR SEXUALLY BY ANYONE?: NO

## 2024-10-23 SDOH — HEALTH STABILITY: MENTAL HEALTH
DO YOU FEEL STRESS - TENSE, RESTLESS, NERVOUS, OR ANXIOUS, OR UNABLE TO SLEEP AT NIGHT BECAUSE YOUR MIND IS TROUBLED ALL THE TIME - THESE DAYS?: NOT AT ALL

## 2024-10-23 SDOH — ECONOMIC STABILITY: HOUSING INSECURITY: AT ANY TIME IN THE PAST 12 MONTHS, WERE YOU HOMELESS OR LIVING IN A SHELTER (INCLUDING NOW)?: NO

## 2024-10-23 SDOH — SOCIAL STABILITY: SOCIAL INSECURITY: DO YOU FEEL ANYONE HAS EXPLOITED OR TAKEN ADVANTAGE OF YOU FINANCIALLY OR OF YOUR PERSONAL PROPERTY?: NO

## 2024-10-23 SDOH — ECONOMIC STABILITY: HOUSING INSECURITY: IN THE LAST 12 MONTHS, WAS THERE A TIME WHEN YOU WERE NOT ABLE TO PAY THE MORTGAGE OR RENT ON TIME?: NO

## 2024-10-23 SDOH — SOCIAL STABILITY: SOCIAL INSECURITY: WITHIN THE LAST YEAR, HAVE YOU BEEN HUMILIATED OR EMOTIONALLY ABUSED IN OTHER WAYS BY YOUR PARTNER OR EX-PARTNER?: NO

## 2024-10-23 SDOH — SOCIAL STABILITY: SOCIAL INSECURITY
WITHIN THE LAST YEAR, HAVE YOU BEEN RAPED OR FORCED TO HAVE ANY KIND OF SEXUAL ACTIVITY BY YOUR PARTNER OR EX-PARTNER?: NO

## 2024-10-23 SDOH — SOCIAL STABILITY: SOCIAL INSECURITY: ABUSE: ADULT

## 2024-10-23 SDOH — SOCIAL STABILITY: SOCIAL INSECURITY: HAS ANYONE EVER THREATENED TO HURT YOUR FAMILY OR YOUR PETS?: NO

## 2024-10-23 SDOH — ECONOMIC STABILITY: HOUSING INSECURITY: IN THE PAST 12 MONTHS, HOW MANY TIMES HAVE YOU MOVED WHERE YOU WERE LIVING?: 0

## 2024-10-23 SDOH — SOCIAL STABILITY: SOCIAL INSECURITY: WERE YOU ABLE TO COMPLETE ALL THE BEHAVIORAL HEALTH SCREENINGS?: YES

## 2024-10-23 SDOH — ECONOMIC STABILITY: FOOD INSECURITY: WITHIN THE PAST 12 MONTHS, YOU WORRIED THAT YOUR FOOD WOULD RUN OUT BEFORE YOU GOT THE MONEY TO BUY MORE.: NEVER TRUE

## 2024-10-23 SDOH — HEALTH STABILITY: MENTAL HEALTH: HOW OFTEN DO YOU HAVE A DRINK CONTAINING ALCOHOL?: NEVER

## 2024-10-23 SDOH — SOCIAL STABILITY: SOCIAL INSECURITY: DOES ANYONE TRY TO KEEP YOU FROM HAVING/CONTACTING OTHER FRIENDS OR DOING THINGS OUTSIDE YOUR HOME?: NO

## 2024-10-23 SDOH — SOCIAL STABILITY: SOCIAL INSECURITY: ARE THERE ANY APPARENT SIGNS OF INJURIES/BEHAVIORS THAT COULD BE RELATED TO ABUSE/NEGLECT?: NO

## 2024-10-23 SDOH — ECONOMIC STABILITY: FOOD INSECURITY: HOW HARD IS IT FOR YOU TO PAY FOR THE VERY BASICS LIKE FOOD, HOUSING, MEDICAL CARE, AND HEATING?: NOT HARD AT ALL

## 2024-10-23 ASSESSMENT — ACTIVITIES OF DAILY LIVING (ADL)
PATIENT'S MEMORY ADEQUATE TO SAFELY COMPLETE DAILY ACTIVITIES?: YES
LACK_OF_TRANSPORTATION: NO
DRESSING YOURSELF: INDEPENDENT
JUDGMENT_ADEQUATE_SAFELY_COMPLETE_DAILY_ACTIVITIES: YES
HEARING - RIGHT EAR: FUNCTIONAL
ADEQUATE_TO_COMPLETE_ADL: YES
LACK_OF_TRANSPORTATION: NO
TOILETING: INDEPENDENT
PATIENT'S MEMORY ADEQUATE TO SAFELY COMPLETE DAILY ACTIVITIES?: YES
HEARING - LEFT EAR: FUNCTIONAL
TOILETING: INDEPENDENT
ADEQUATE_TO_COMPLETE_ADL: YES
BATHING: INDEPENDENT
HEARING - RIGHT EAR: FUNCTIONAL
DRESSING YOURSELF: INDEPENDENT
ADL_ASSISTANCE: INDEPENDENT
BATHING: INDEPENDENT
GROOMING: INDEPENDENT
ASSISTIVE_DEVICE: OXYGEN;OTHER (COMMENT)
FEEDING YOURSELF: INDEPENDENT
JUDGMENT_ADEQUATE_SAFELY_COMPLETE_DAILY_ACTIVITIES: YES
LACK_OF_TRANSPORTATION: NO
ADL_ASSISTANCE: INDEPENDENT
WALKS IN HOME: INDEPENDENT
BATHING_ASSISTANCE: STAND BY
WALKS IN HOME: INDEPENDENT
HEARING - LEFT EAR: FUNCTIONAL
GROOMING: INDEPENDENT
FEEDING YOURSELF: INDEPENDENT

## 2024-10-23 ASSESSMENT — COGNITIVE AND FUNCTIONAL STATUS - GENERAL
CLIMB 3 TO 5 STEPS WITH RAILING: A LITTLE
WALKING IN HOSPITAL ROOM: A LITTLE
MOBILITY SCORE: 20
MOVING FROM LYING ON BACK TO SITTING ON SIDE OF FLAT BED WITH BEDRAILS: A LITTLE
MOBILITY SCORE: 20
MOVING FROM LYING ON BACK TO SITTING ON SIDE OF FLAT BED WITH BEDRAILS: A LITTLE
PATIENT BASELINE BEDBOUND: NO
MOVING TO AND FROM BED TO CHAIR: A LITTLE
MOVING TO AND FROM BED TO CHAIR: A LITTLE
MOBILITY SCORE: 20
TURNING FROM BACK TO SIDE WHILE IN FLAT BAD: A LITTLE
EATING MEALS: A LITTLE
PERSONAL GROOMING: A LITTLE
STANDING UP FROM CHAIR USING ARMS: A LITTLE
PATIENT BASELINE BEDBOUND: NO
CLIMB 3 TO 5 STEPS WITH RAILING: A LITTLE
MOBILITY SCORE: 24
DAILY ACTIVITIY SCORE: 18
DRESSING REGULAR UPPER BODY CLOTHING: A LITTLE
PATIENT BASELINE BEDBOUND: NO
DRESSING REGULAR LOWER BODY CLOTHING: A LITTLE
MOVING TO AND FROM BED TO CHAIR: A LITTLE
DAILY ACTIVITIY SCORE: 24
TURNING FROM BACK TO SIDE WHILE IN FLAT BAD: A LITTLE
CLIMB 3 TO 5 STEPS WITH RAILING: A LITTLE
TOILETING: A LITTLE
DAILY ACTIVITIY SCORE: 24
HELP NEEDED FOR BATHING: A LITTLE
DAILY ACTIVITIY SCORE: 24

## 2024-10-23 ASSESSMENT — LIFESTYLE VARIABLES
AUDIT-C TOTAL SCORE: 0
PRESCIPTION_ABUSE_PAST_12_MONTHS: NO
SKIP TO QUESTIONS 9-10: 1
SUBSTANCE_ABUSE_PAST_12_MONTHS: NO
AUDIT-C TOTAL SCORE: 0
HOW OFTEN DO YOU HAVE A DRINK CONTAINING ALCOHOL: NEVER
HOW OFTEN DO YOU HAVE A DRINK CONTAINING ALCOHOL: NEVER
HOW MANY STANDARD DRINKS CONTAINING ALCOHOL DO YOU HAVE ON A TYPICAL DAY: PATIENT DOES NOT DRINK
AUDIT-C TOTAL SCORE: 0
SKIP TO QUESTIONS 9-10: 1
AUDIT-C TOTAL SCORE: 0
HOW MANY STANDARD DRINKS CONTAINING ALCOHOL DO YOU HAVE ON A TYPICAL DAY: PATIENT DOES NOT DRINK
HOW OFTEN DO YOU HAVE 6 OR MORE DRINKS ON ONE OCCASION: NEVER
HOW OFTEN DO YOU HAVE 6 OR MORE DRINKS ON ONE OCCASION: NEVER

## 2024-10-23 ASSESSMENT — PAIN SCALES - GENERAL
PAINLEVEL_OUTOF10: 0 - NO PAIN
PAINLEVEL_OUTOF10: 0 - NO PAIN
PAINLEVEL_OUTOF10: 5 - MODERATE PAIN
PAINLEVEL_OUTOF10: 4
PAINLEVEL_OUTOF10: 0 - NO PAIN
PAINLEVEL_OUTOF10: 0 - NO PAIN
PAINLEVEL_OUTOF10: 1
PAINLEVEL_OUTOF10: 0 - NO PAIN

## 2024-10-23 ASSESSMENT — PAIN - FUNCTIONAL ASSESSMENT
PAIN_FUNCTIONAL_ASSESSMENT: 0-10

## 2024-10-23 ASSESSMENT — PAIN DESCRIPTION - LOCATION: LOCATION: NECK

## 2024-10-23 ASSESSMENT — ENCOUNTER SYMPTOMS
EYES NEGATIVE: 1
MUSCULOSKELETAL NEGATIVE: 1
SHORTNESS OF BREATH: 1
ALLERGIC/IMMUNOLOGIC NEGATIVE: 1
NEUROLOGICAL NEGATIVE: 1
CONSTITUTIONAL NEGATIVE: 1
GASTROINTESTINAL NEGATIVE: 1
ENDOCRINE NEGATIVE: 1
HEMATOLOGIC/LYMPHATIC NEGATIVE: 1
CARDIOVASCULAR NEGATIVE: 1
PSYCHIATRIC NEGATIVE: 1

## 2024-10-23 ASSESSMENT — PATIENT HEALTH QUESTIONNAIRE - PHQ9
SUM OF ALL RESPONSES TO PHQ9 QUESTIONS 1 & 2: 0
1. LITTLE INTEREST OR PLEASURE IN DOING THINGS: NOT AT ALL
SUM OF ALL RESPONSES TO PHQ9 QUESTIONS 1 & 2: 0
2. FEELING DOWN, DEPRESSED OR HOPELESS: NOT AT ALL
2. FEELING DOWN, DEPRESSED OR HOPELESS: NOT AT ALL
1. LITTLE INTEREST OR PLEASURE IN DOING THINGS: NOT AT ALL

## 2024-10-23 ASSESSMENT — PAIN DESCRIPTION - ORIENTATION: ORIENTATION: MID

## 2024-10-23 NOTE — CONSULTS
Inpatient consult to Pulmonology  Consult performed by: MICHEL Miller-CNP  Consult ordered by: Ryan Tao MD  Reason for consult: Pulmonary artery hypertension          Reason For Consult  Pulmonary artery hypertension    History Of Present Illness  Ronna Beckham is a 63 y.o. female with a past medical history of pulmonary artery hypertension, systemic lupus erythematosus, GERD, anxiety and hypomagnesemia who presented to [facility] Emergency Department yesterday with instruction of his Nephrologist for evaluation of hypomagnesemia.  She also endorses chronic dyspnea on exertion secondary to pulmonary artery hypertension and is on 4 L nasal cannula oxygen at baseline.  She was hypoxic upon arrival on her baseline oxygen and is now on 6 L mini high flow nasal cannula oxygen to maintain O2 sats greater than 92%.  She denies fevers, chills, nausea, vomiting, diarrhea, chest or abdominal pain.     Past Medical History  Past Medical History:   Diagnosis Date    Anxiety     Depression     Does not refill medications appropriately 09/10/2024    GERD (gastroesophageal reflux disease)     Lupus (Multi)     Personal history of other specified conditions 2020    History of seizure    Pulmonary hypertension (Multi)     Volume overload 09/10/2024       Surgical History  Past Surgical History:   Procedure Laterality Date    CARDIAC CATHETERIZATION N/A 2024    Procedure: Left Heart Cath, With LV, Angriography And Grafts;  Surgeon: Christiano Calvillo DO;  Location: [facility] Cardiac Cath Lab;  Service: Cardiovascular;  Laterality: N/A;    CHOLECYSTECTOMY      HYSTERECTOMY      OTHER SURGICAL HISTORY  2020    Cholecystectomy    OTHER SURGICAL HISTORY  2020    Esophagogastroduodenoscopy    OTHER SURGICAL HISTORY  2020    Colonoscopy    OTHER SURGICAL HISTORY  2020     section    OTHER SURGICAL HISTORY  2020    Hysterectomy        Social History  Social History      Tobacco Use    Smoking status: Former     Types: Cigarettes    Smokeless tobacco: Former   Vaping Use    Vaping status: Never Used   Substance Use Topics    Alcohol use: Not Currently    Drug use: Not Currently     Types: Cocaine, Other     Comment: Meth       Family History  Family History   Problem Relation Name Age of Onset    No Known Problems Mother      No Known Problems Father       Allergies  Levetiracetam    Review of Systems   Constitutional: Negative.    HENT: Negative.     Eyes: Negative.    Respiratory:  Positive for shortness of breath.    Cardiovascular: Negative.    Gastrointestinal: Negative.    Endocrine: Negative.    Genitourinary: Negative.    Musculoskeletal: Negative.    Allergic/Immunologic: Negative.    Neurological: Negative.    Hematological: Negative.    Psychiatric/Behavioral: Negative.          Physical Exam  Vitals and nursing note reviewed.   Constitutional:       Appearance: Normal appearance.   HENT:      Head: Normocephalic and atraumatic.      Nose: Nose normal.      Mouth/Throat:      Mouth: Mucous membranes are moist.   Eyes:      Extraocular Movements: Extraocular movements intact.      Conjunctiva/sclera: Conjunctivae normal.      Pupils: Pupils are equal, round, and reactive to light.   Cardiovascular:      Rate and Rhythm: Regular rhythm. Tachycardia present.      Pulses: Normal pulses.      Heart sounds: Normal heart sounds.   Pulmonary:      Effort: Pulmonary effort is normal.      Comments: Lungs diminished but clear. On 6 L mini high flow nasal cannula oxygen. Tachypneic.  Endorses improved breathing.   Abdominal:      General: Bowel sounds are normal.      Palpations: Abdomen is soft.   Musculoskeletal:         General: Normal range of motion.   Skin:     General: Skin is warm and dry.      Capillary Refill: Capillary refill takes less than 2 seconds.   Neurological:      General: No focal deficit present.      Mental Status: She is alert and oriented to person,  "place, and time.   Psychiatric:         Mood and Affect: Mood normal.         Behavior: Behavior normal.          Vital Signs  Blood pressure 116/85, pulse 97, temperature 36.7 °C (98.1 °F), temperature source Temporal, resp. rate 19, height 1.651 m (5' 5\"), weight 75.3 kg (166 lb 0.1 oz), SpO2 93%.  Oxygen Therapy  SpO2: 93 %  Medical Gas Therapy: Supplemental oxygen  Medical Gas Delivery Method: Nasal cannula       Intake/Output Summary (Last 24 hours) at 10/23/2024 0997  Last data filed at 10/23/2024 0845  Gross per 24 hour   Intake 200 ml   Output 350 ml   Net -150 ml      Scheduled medications:  aspirin, 81 mg, oral, Daily  dilTIAZem CD, 180 mg, oral, Daily  folic acid, 1 mg, oral, Daily  furosemide, 40 mg, oral, Daily  hydroxychloroquine, 200 mg, oral, BID  macitentan, 10 mg, oral, Daily  magnesium chloride, 64 mg, oral, BID  magnesium sulfate, 4 g, intravenous, Once  methylPREDNISolone sodium succinate (PF), 40 mg, intravenous, q12h  pantoprazole, 40 mg, oral, Daily before breakfast  venlafaxine XR, 150 mg, oral, Daily       PRN medications: acetaminophen **OR** acetaminophen **OR** acetaminophen, benzocaine-menthol, dextromethorphan-guaifenesin, guaiFENesin, ipratropium-albuteroL, loperamide, polyethylene glycol     Continuous infusion:  epoprostenol, 1.5 mg       Relevant Results  Results for orders placed or performed during the hospital encounter of 10/22/24 (from the past 24 hours)   CBC and Auto Differential   Result Value Ref Range    WBC 4.1 (L) 4.4 - 11.3 x10*3/uL    nRBC      RBC 4.07 4.00 - 5.20 x10*6/uL    Hemoglobin 9.8 (L) 12.0 - 16.0 g/dL    Hematocrit 32.2 (L) 36.0 - 46.0 %    MCV 79 (L) 80 - 100 fL    MCH 24.1 (L) 26.0 - 34.0 pg    MCHC 30.4 (L) 32.0 - 36.0 g/dL    RDW 17.0 (H) 11.5 - 14.5 %    Platelets 119 (L) 150 - 450 x10*3/uL    Neutrophils % 77.1 40.0 - 80.0 %    Immature Granulocytes %, Automated 0.5 0.0 - 0.9 %    Lymphocytes % 16.3 13.0 - 44.0 %    Monocytes % 5.9 2.0 - 10.0 %    " Eosinophils % 0.0 0.0 - 6.0 %    Basophils % 0.2 0.0 - 2.0 %    Neutrophils Absolute 3.16 1.20 - 7.70 x10*3/uL    Immature Granulocytes Absolute, Automated 0.02 0.00 - 0.70 x10*3/uL    Lymphocytes Absolute 0.67 (L) 1.20 - 4.80 x10*3/uL    Monocytes Absolute 0.24 0.10 - 1.00 x10*3/uL    Eosinophils Absolute 0.00 0.00 - 0.70 x10*3/uL    Basophils Absolute 0.01 0.00 - 0.10 x10*3/uL   Comprehensive metabolic panel   Result Value Ref Range    Glucose 93 74 - 99 mg/dL    Sodium 137 136 - 145 mmol/L    Potassium 3.6 3.5 - 5.3 mmol/L    Chloride 104 98 - 107 mmol/L    Bicarbonate 23 21 - 32 mmol/L    Anion Gap 14 10 - 20 mmol/L    Urea Nitrogen 15 6 - 23 mg/dL    Creatinine 0.88 0.50 - 1.05 mg/dL    eGFR 75 >60 mL/min/1.73m*2    Calcium 6.8 (L) 8.6 - 10.3 mg/dL    Albumin 3.5 3.4 - 5.0 g/dL    Alkaline Phosphatase 68 33 - 136 U/L    Total Protein 6.3 (L) 6.4 - 8.2 g/dL    AST 10 9 - 39 U/L    Bilirubin, Total 1.0 0.0 - 1.2 mg/dL    ALT 7 7 - 45 U/L   Magnesium   Result Value Ref Range    Magnesium 0.53 (LL) 1.60 - 2.40 mg/dL   B-type natriuretic peptide   Result Value Ref Range     (H) 0 - 99 pg/mL   Troponin I, High Sensitivity, Initial   Result Value Ref Range    Troponin I, High Sensitivity 7 0 - 13 ng/L   ECG 12 lead   Result Value Ref Range    Ventricular Rate 101 BPM    Atrial Rate 101 BPM    KY Interval 194 ms    QRS Duration 98 ms    QT Interval 474 ms    QTC Calculation(Bazett) 614 ms    P Axis -14 degrees    R Axis 134 degrees    T Axis -18 degrees    QRS Count 16 beats    Q Onset 213 ms    P Onset 116 ms    P Offset 172 ms    T Offset 450 ms    QTC Fredericia 564 ms   Troponin, High Sensitivity, 1 Hour   Result Value Ref Range    Troponin I, High Sensitivity 7 0 - 13 ng/L   Magnesium   Result Value Ref Range    Magnesium 1.48 (L) 1.60 - 2.40 mg/dL   Drug Screen, Urine   Result Value Ref Range    Amphetamine Screen, Urine Presumptive Negative Presumptive Negative    Barbiturate Screen, Urine Presumptive  Negative Presumptive Negative    Benzodiazepines Screen, Urine Presumptive Negative Presumptive Negative    Cannabinoid Screen, Urine Presumptive Negative Presumptive Negative    Cocaine Metabolite Screen, Urine Presumptive Negative Presumptive Negative    Fentanyl Screen, Urine Presumptive Negative Presumptive Negative    Opiate Screen, Urine Presumptive Negative Presumptive Negative    Oxycodone Screen, Urine Presumptive Negative Presumptive Negative    PCP Screen, Urine Presumptive Negative Presumptive Negative    Methadone Screen, Urine Presumptive Negative Presumptive Negative   Magnesium   Result Value Ref Range    Magnesium 1.33 (L) 1.60 - 2.40 mg/dL   CBC and Auto Differential   Result Value Ref Range    WBC 5.3 4.4 - 11.3 x10*3/uL    nRBC 0.0 0.0 - 0.0 /100 WBCs    RBC 4.01 4.00 - 5.20 x10*6/uL    Hemoglobin 9.4 (L) 12.0 - 16.0 g/dL    Hematocrit 31.9 (L) 36.0 - 46.0 %    MCV 80 80 - 100 fL    MCH 23.4 (L) 26.0 - 34.0 pg    MCHC 29.5 (L) 32.0 - 36.0 g/dL    RDW 17.3 (H) 11.5 - 14.5 %    Platelets 127 (L) 150 - 450 x10*3/uL    Neutrophils % 86.7 40.0 - 80.0 %    Immature Granulocytes %, Automated 0.6 0.0 - 0.9 %    Lymphocytes % 8.6 13.0 - 44.0 %    Monocytes % 4.1 2.0 - 10.0 %    Eosinophils % 0.0 0.0 - 6.0 %    Basophils % 0.0 0.0 - 2.0 %    Neutrophils Absolute 4.61 1.20 - 7.70 x10*3/uL    Immature Granulocytes Absolute, Automated 0.03 0.00 - 0.70 x10*3/uL    Lymphocytes Absolute 0.46 (L) 1.20 - 4.80 x10*3/uL    Monocytes Absolute 0.22 0.10 - 1.00 x10*3/uL    Eosinophils Absolute 0.00 0.00 - 0.70 x10*3/uL    Basophils Absolute 0.00 0.00 - 0.10 x10*3/uL   Comprehensive metabolic panel   Result Value Ref Range    Glucose 117 (H) 74 - 99 mg/dL    Sodium 139 136 - 145 mmol/L    Potassium 3.9 3.5 - 5.3 mmol/L    Chloride 107 98 - 107 mmol/L    Bicarbonate 23 21 - 32 mmol/L    Anion Gap 13 10 - 20 mmol/L    Urea Nitrogen 14 6 - 23 mg/dL    Creatinine 0.77 0.50 - 1.05 mg/dL    eGFR 88 >60 mL/min/1.73m*2     Calcium 7.2 (L) 8.6 - 10.3 mg/dL    Albumin 3.6 3.4 - 5.0 g/dL    Alkaline Phosphatase 66 33 - 136 U/L    Total Protein 6.3 (L) 6.4 - 8.2 g/dL    AST 9 9 - 39 U/L    Bilirubin, Total 0.9 0.0 - 1.2 mg/dL    ALT 7 7 - 45 U/L      CT angio chest for pulmonary embolism  Result Date: 10/22/2024  FINDINGS: POTENTIAL LIMITATIONS OF THE STUDY: None   HEART AND VESSELS: There are no discrete filling defects within main pulmonary artery and its branches to suggest acute pulmonary embolism. Main pulmonary artery and its branches are normal in caliber.   The thoracic aorta normal in course and caliber. No coronary artery calcifications are seen. Please note, the study is not optimized for evaluation of coronary arteries.   There is cardiomegaly noted. There is a moderate size pericardial effusion noted.   There is no pericardial effusion seen.   MEDIASTINUM AND JOANA, LOWER NECK AND AXILLA: The visualized thyroid gland is within normal limits. No evidence of thoracic lymphadenopathy by CT criteria. Esophagus appears within normal limits as seen.   LUNGS AND AIRWAYS: The trachea and central airways are patent. No endobronchial lesion is seen.   There is scattered linear atelectasis versus scarring noted.     UPPER ABDOMEN: The visualized subdiaphragmatic structures demonstrate no remarkable findings.       CHEST WALL AND OSSEOUS STRUCTURES: Chest wall is within normal limits. No acute osseous pathology.There are no suspicious osseous lesions.  1. No evidence of acute pulmonary embolism. 2. There is a moderate size pericardial effusion noted.      XR chest 1 view  Result Date: 10/22/2024  FINDINGS: Central venous catheter tip is in the lower SVC.   Left basilar consolidation may represent atelectasis, aspiration, or pneumonia. Background of mild pulmonary edema.   No pleural effusion or pneumothorax.   Cardiomediastinal silhouette is stable in size and configuration.   No remarkable upper abdominal findings.   No acute osseous  abnormality.  1. Left basilar consolidation may represent atelectasis, aspiration, or pneumonia. Overall, this area is not well evaluated and obscured by cardiomegaly. 2. Marked cardiomegaly with background of mild pulmonary edema.       Assessment/Plan   Acute on chronic hypoxic respiratory failure  Continue as needed aerosolized bronchodilator  IV Solu-Medrol-will maintain current dose  Wean oxygen as sats allow  Continuous pulse oximetry  Incentive spirometry/pulmonary hygiene    Hypomagnesemia/Hypocalcemia  Replenish and monitor levels    Moderate-sized pericardial effusion  Cardiology consultation    Pulmonary artery hypertension  Patient follows with Pulmonologist Dr. Chicho Prescott  Main  Contiue Veletri continuous IV infusion via left subclavian central line  Continue home Opsumit    Systemic lupus erythematosus   Continue hydroxychloroquine    Prophylaxis  SCDs  Protonix    Lindsey Angela, APRN-CNP

## 2024-10-23 NOTE — PROGRESS NOTES
Spiritual Care Visit    Clinical Encounter Type  Visited With: Patient  Routine Visit: Introduction    [facility] Encounters  [facility] Needs: Prayer     Annotation:  provided patient support while rounding the Unit.  introduced  services of    explained the role of the  in providing emotional and spiritual support for patient's and family while in admitted to the hospital. Patient recalled previous  visit. Patient states that she is discouraged to be hospitalized again. Patient welcomed the  support today.  offered supportive dialog and encouragement to be diligent in her self care.   offered prayer. No other spiritual or Rastafarian needs were expressed. Spiritual care will remain available for support as requested.                                    Taxonomy  Intended Effects: Establish rapport and connectedness, Build relationship of care and support, Demonstrate caring and concern  Methods: Assist with finding purpose, Encourage sharing of feelings, Offer spiritual/Rastafarian support  Interventions: Ask guided questions, Facilitate life review, [place]

## 2024-10-23 NOTE — SIGNIFICANT EVENT
HISTORY OF PRESENT ILLNESS: 63 y.o. F w/PMHx pulm HTN (on macitentan and epoprostenol via [place], NYHA Functional Class 3 and WHO Group 1), chronic hypoxic respiratory failure (baseline 4L NC), polysubstance use (ethanol, cocaine, amphetamines), SVT, SLE (on hydroxychloroquine), GERD, HARRIS, MDD transferred from [facility] for management of AHRF, hypoMg, hypoCa, and moderate pericardial effusion.     She was initially recommended for admission on 10/22 by her outpatient nephrologist Dr. Wade for hypoMg (0.58). Her Mg was low despite taking 64 mg PO Mg BID. In ED, she also endorsed weakness and generalized pain. Given 4 gm IV Mg. She was found to be saturating 86% on her home 4L, requiring an increase in her O2 to between 6-7L. Noted to have BNP in 700s. CPTE negative but noted moderate size pericardial effusion (noted small-moderate on TTE 4/2024). While at OSH, cards and pulm were consulted. Cards recommended increasing Mg Ox supplementation to 400 BID and continuing on home diltiazem, but did not comment on the pericardial effusion. Pulm recommended continuing home PAH meds, and IV solumedrol for presumed ?PAH exacerbation.     Of note, appears to have had recent admissions for AHRF and issues related to her pulm HTN, at least 3 since August. She was admitted between 9/6-9/24 for AHRF (requiring 5L) iso volume overload due to medication non-compliance. She was found to be hypomagnesemic at this time. Was given IV lasix 80 and responded well to diuresis. In the interim, she saw her OP pulm HTN physician (Dr. Prescott) on 9/19/24. Plan was to start sotatercept, but patient has not started this. She was recently admitted at  between 10/4-6 for SVT that resolved with adenosine, likely iso diarrhea and home diltiazem non-compliance. She was restarted on her home meds and discharged home.    At bedside, endorses above history. States that she has been compliant with all of her recent medications including diltiazem,  lasix, and Mg supplementation. Reports a history of diarrhea, had 2-4 watery bowel movements yesterday, but sometimes has solid stools as well. Diarrhea hasn't significantly worsened recently. Takes Imodium prn for this. Denies chest pain or worsening SOB from baseline. No abdominal pain. Has some nausea but this is normal. Otherwise denies HA, vision changes, dysuria, fever, cough.    - Vital Signs on arrival to : T 36.1C, HR 80, RR 21, /81, O2 89 4L -> bumped to 6L  - Labs:  CBC: WBC 4.1 (lymphocytes 0.32, L), Hb 10 (recent baseline 9-10), MCV 78, Plt 150 (recent baseline 120s-130s)  CHEM: Na 137, K 4.6, Cl 107, Bicarb 20 (L), BUN 14, Cr 0.72  M.02  Calcium: 7.4 (L)  [id number]  COAGS: INR 1.3 (H), PT 14.4 (H)  TROP: 7->7  BNP: 700 (H, peak BNP 1207 10/3 but previously was in 400s)  UDS: negative for all drugs  - EKG: sinus tach, RBBB, QTc 500 ms,     - Imaging:    CXR 10/23/2024  IMPRESSION:  1.  Cardiomegaly with mild degree of central pulmonary vascular  congestion. There is a new small/trace left-sided pleural effusion.  Recommend correlation with patient's volume status.  2. Similar left basilar consolidation, favored to represent  atelectasis however underlying infectious process is not definitively  excluded.    CXR 10/22  IMPRESSION:  1. Left basilar consolidation may represent atelectasis, aspiration,  or pneumonia. Overall, this area is not well evaluated and obscured  by cardiomegaly.  2. Marked cardiomegaly with background of mild pulmonary edema    CTPE 10/2022  IMPRESSION:  1. No evidence of acute pulmonary embolism.  2. There is a moderate size pericardial effusion noted.    TTE 2024  CONCLUSIONS:   1. Left ventricular systolic function is normal with a 60-65% estimated ejection fraction.   2. Right ventricular volume and pressure overload.   3. Although the TAPSE and RVA' are normal, the fractional area change is markedly reduced consistent wtih severe RV systolic  dysfunction. Still able to generate high RVSP.   4. Severely enlarged right ventricle.   5. There is severely reduced right ventricular systolic function.   6. Agitated saline contrast study was positive for small intracardiac shunt.   7. The right atrium is severely dilated.   8. There is a small to moderate pericardial effusion.   9. Small circumferential pericardial effusion though is small to moderate adjacent to the RA. No RV or RA collapse. The IVC is dilated, though likely due to the presence of severe pulmonary hypertension. No significant respiratory variation of the MV or TV inflows. Not diagnostic for tamponade though difficult to dx tamponade in setting of severe pulmonary hypertension.  10. Severe tricuspid regurgitation visualized.  11. Severely elevated right ventricular systolic pressure.  12. Compared with the prior exam from 3/12/2022 the RV had a similar appearance with severe dilatation and reduced function. Note the TR was not asessed on that exam to allow estimation of the RVSP at that time. The pericardial effusion appears similar in size.  13. A bubble study using agitated saline was performed. Bubble study is positive. A small PFO (= 10 bubbles) was demonstrated.    HOME MEDS:     Prior to Admission Medications   Prescriptions Last Dose Informant   Opsumit 10 mg tablet tablet   Self   Sig: TAKE 1 TABLET BY MOUTH DAILY. DO NOT HANDLE IF PREGNANT. DO NOT SPLIT, CRUSH, OR CHEW. REVIEW MEDICATION GUIDE.   aspirin 81 mg EC tablet   Self   Sig: Take 1 tablet (81 mg) by mouth once daily.   calcium carbonate-vitamin D3 500 mg-5 mcg (200 unit) tablet       Sig: Take 2 tablets by mouth 2 times a day.   cholestyramine (Questran) 4 gram packet       Sig: Take 1 packet (4 g) by mouth once daily.   dilTIAZem CD (Cardizem CD) 180 mg 24 hr capsule   Self   Sig: Take 1 capsule (180 mg) by mouth once daily.   epoprostenol (Veletri) 1.5 mg recon soln   Self   Si.5 mg (1,500 mcg) by central venous line  infusion route continuously. Reconstitute contents of vial with 5 ml diluent and mix cassette as directed. Infuse continuously per physician orders. Allow for priming volume. Dose range: 1-150 ng/kg/min.   folic acid (Folvite) 1 mg tablet   Self   Sig: Take 1 tablet (1 mg) by mouth once daily. Needs to resume   furosemide (Lasix) 40 mg tablet   Self   Sig: TAKE 1 TABLET BY MOUTH ONE TIME DAILY   gabapentin (Neurontin) 800 mg tablet   Self   Sig: Take 1 tablet (800 mg) by mouth 3 times a day.   hydroxychloroquine (Plaquenil) 200 mg tablet   Self   Sig: Take 1 tablet (200 mg) by mouth 2 times a day.   loperamide (Imodium A-D) 2 mg tablet   Self   Sig: Take 1 tablet (2 mg) by mouth 4 times a day as needed for diarrhea.   melatonin 5 mg tablet   Self   Sig: Take 1 tablet (5 mg) by mouth once daily at bedtime.   omeprazole (PriLOSEC) 40 mg DR capsule   Self   Sig: Take 1 capsule (40 mg) by mouth once daily in the morning. Take before meals. Do not crush or chew.   topiramate (Topamax) 25 mg tablet   Self   Sig: Week 1 25mg at bedtime, week 2 25mg BID, week 3 25mg qAM, 50mg at bedtime, week 4 50 mg BID, week 5 50mg qAM 75mg at bedtime, week 6 75 mg BID, week 7 75mg qam 100mg at bedtime, week 8 100mg BID   venlafaxine XR (Effexor-XR) 150 mg 24 hr capsule   Self   Sig: Take 1 capsule (150 mg) by mouth once daily. Do not crush or chew.      Facility-Administered Medications: None     PHYSICAL EXAM:  General:  female resting in bed in NAD on 6L NC  HEENT: PERRL  Skin: erythema on the neck, warm to touch  Chest: +L sided subclavian catheter with surrounding erythema, no drainage  Cardiac: tachycardic, regular rhythm, normal s1, s2, holosystolic murmur lower sternal border, no JVD  Abdomen: soft, ND, NT, no involuntary guarding  EXT: 1-2+ pitting edema extending up to R knee, 1+ pitting edema extending up to L knee  Neuro: AOx4, moving all limbs spontaneously, follows commands    ASSESSMENT: 63 y.o. F w/PMHx pulm HTN  (on macitentan and epoprostenol via [place], NYHA Functional Class 3 and WHO Group 1), chronic hypoxic respiratory failure (baseline 4L NC), polysubstance use (ethanol, cocaine, amphetamines), SVT, SLE (on hydroxychloroquine), GERD, HARRIS, MDD transferred from [facility] [place] for management of AHRF, hypoMg, hypoCa, and moderate pericardial effusion. HDS, remains on 6L NC saturating 92%. Attempted to wean to 4L however patient dropped to 89%. PE ruled out at OSH, clinical picture not c/w PNA (no fevers, leukocytosis). Hypoxia may be 2/2 fluid overload () vs. worsening pericardial effusion vs. progression of PAH. Will diurese with IV lasix for tonight, suspect she needs higher outpatient diuretic dosing. Repeat TTE for effusion monitoring. Regarding hypoMg, per chart review, has low levels dating back to 11/2020. Has been taking PO Mg supplementation as outpatient but suspect this is contributing to diarrhea. Has history of alcohol use that may be contributing, as well as recent diuretic use. Given that Mg has improved with repletion, do not suspect renal Mg losses.     #Acute on chronic hypoxic respiratory failure  #PAH  ::follows with Dr. Prescott  ::baseline 4L NC, at OSH ED was requiring between 6-7L NC  ::was given 2 doses of IV solumedrol 40 mg for presumed ?PAH exacerbation  ::CTPE without PE, no concern for PNA  ::CXR on arrival with cardiomegaly, pulm vascular congestion, L sided pleural effusion,  (1000s in early October and then 400s previously)  -currently on 6L NC  -IV lasix 40 once (double home dose), will continue PO 40 lasix daily but suspect she needs higher outpatient dose  -strict I&O  -c/w home macitentan and epoprostenol  -will defer further steroids as patient is not wheezing and without clear COPD history   -goal O2 sat > 90%    #Sinus tach  #Recurrent SVT  ::history of recurrent SVT, usually in the setting of dilt non-compliance  -EKG on arrival with   -suspect sinus tach iso hypoxia  vs pericardial effusion, recent imaging for PE negative  -tele    #Hypomagnesesmia, resolved  #Secondary hypocalcemia  ::initial Mg level 0.58 at OSH ED, repleted with IV Mg and level on arrival to  2.02  ::ddx for hypoMg: diarrhea iso Mg supplementation vs diuretic use vs alcohol use (quit recently)  -c/w Mg 64 mg BID, will not increase to 400 mg BID as rec'd by OSH cardiology due to diarrhea  -iCal level and replete   -needs GI FU/workup for diarrhea as outpt, will hold imodium given borderline prolonged QTc but will restart home cholestyramine iso known cholecystectomy    #Moderate pericardial effusion  ::noted on CTA, small to moderate effusion noted on TTE 4/2024  -no hypotension, JVD muffled heart sounds c/f tamponade  -iso worsening tachycardia during admissions since 4/2024, will obtain repeat TTE (heart rates now in 100s, previously in 80s)  -if hypoxia does not improve should consider drainage/cards consult    #Prolonged QTc  -EKG with borderline prolonged QTc at 500 ms  -avoid QT prolonging agents, will hold Imodium    #Anemia  #Thrombocytopenia  -c/w folate    #Polysusbtance Use  ::UDS 10/23 negative, UDS 2 weeks prior also negative for any substances    #SLE  -c/w home hydroxychloroquine    #HARRIS  #MDD  -c/w home gabapentin, venlafaxine  -clarify if patient is still taking Topamax in AM    F: diuresis  E: K>4, Mg>2  N: 2L fluid restriction  A: pIV  DVT PPx: lovenox 40  GI PPx: home PPI     CODE STATUS: FULL (confirmed on admission)   SURROGATE DECISION MAKER: Leticia Parson (daughter) [phone]

## 2024-10-23 NOTE — H&P
History Of Present Illness      Ronna Beckham is a 63 y.o. female presenting with Abnormal Blood Work.    Patient was instructed by Dr. Wade that she has a critically low magnesium level and she should go to the emergency room for IV replacement.  In addition she had some generalized associated weakness and pain.     Patient is a 63-year-old female with a history of pulmonary hypertension, SVT, chronic hypoxic respiratory failure and polysubstance abuse presenting for evaluation of low magnesium.  Patient states that she takes 64 mg of magnesium orally twice per day but states that she had labs done today and was told that her magnesium was low and to come to the emergency department for an infusion.  Patient denies associated chest pain or shortness of breath, over she states she is dyspneic on exertion and this is an everyday thing for her.  She chronically wears 4 L nasal cannula oxygen.  She denies abdominal pain.  She states that she always feels nauseous and dry heaves but does not vomit.  Patient states that she has diarrhea multiple times per day but this is common for her and she takes loperamide which stops the diarrhea.  She denies muscle weakness, ataxia, confusion, or seizures.  He denies numbness or tingling.     The patient's ED diagnostic workup was noted for a Tmax of 36.7, pulse rate 100, respiratory rate 20, /69.  She was saturating 86% on supplemental oxygen by nasal cannula upon arrival to the ED.  She apparently normally wears 4 L of supplemental oxygen at baseline.  She was requiring 7 L supplemental oxygen via nasal cannula in the ED.    In the ED she was given Flonase spray, magnesium sulfate 4 g IV piggyback x 1.    Her ED diagnostic workup was noted for a CBC with differential that suggested a leukopenia of 4.1.  Her H&H was low normal at 9.8/32.2.  Her platelet count was low at 119.  She has chronic thrombocytopenia.  Her blood chemistry was noted for a very low magnesium of  0.53.  Her calcium level was low at 6.8 with an albumin of 3.5.  I will also replace her calcium levels.  Her prealbumin was low at 14.8.  Her first 2 sets of cardiac enzymes were within normal limits.  Her repeat magnesium level came up to 1.48.    Initial chest x-ray was noted for the following:    IMPRESSION:  1. Left basilar consolidation may represent atelectasis, aspiration,  or pneumonia. Overall, this area is not well evaluated and obscured  by cardiomegaly.  2. Marked cardiomegaly with background of mild pulmonary edema.    A CT angiogram of the chest was performed.  This was noted for the following:    IMPRESSION:  1. No evidence of acute pulmonary embolism.  2. There is a moderate size pericardial effusion noted.    Patient's most recent 2D echocardiogram from April 24, 2024 noted for the following:    Suspected by Dr. Maricarmen Mead.       CONCLUSIONS:   1. Left ventricular systolic function is normal with a 60-65% estimated ejection fraction.   2. Right ventricular volume and pressure overload.   3. Although the TAPSE and RVA' are normal, the fractional area change is markedly reduced consistent wtih severe RV systolic dysfunction. Still able to generate high RVSP.   4. Severely enlarged right ventricle.   5. There is severely reduced right ventricular systolic function.   6. Agitated saline contrast study was positive for small intracardiac shunt.   7. The right atrium is severely dilated.   8. There is a small to moderate pericardial effusion.   9. Small circumferential pericardial effusin though is small to moderate adjacent to the RA. No RV or RA collapse. The IVC is dilated, though likely due to the presence of severe pulmonary hypertension. No significant respiratory variation of the MV or TV inflows. Not diagnostic for tamponade though difficult to dx tamponade in setting of severe pulmonary hypertension.  10. Severe tricuspid regurgitation visualized.  11. Severely elevated right ventricular  systolic pressure.  12. Compared with the prior exam from 3/12/2022 the RV had a similar appearance with severe dilatation and reduced function. Note the TR was not asessed on that exam to allow estimation of the RVSP at that time. The pericardial effusion appears similar in size.  13. A bubble study using agitated saline was performed. Bubble study is positive. A small PFO (= 10 bubbles) was demonstrated.        Patient was accepted to [place] [address] at [facility].  She was accepted by her pulmonologist.  Due to bed availability the ED has requested admission to [facility] until her bed becomes available at [place] [address].        Past Medical History      Past Medical History:   Diagnosis Date    Anxiety     Depression     Does not refill medications appropriately 09/10/2024    GERD (gastroesophageal reflux disease)     Lupus (Multi)     Personal history of other specified conditions 2020    History of seizure    Pulmonary hypertension (Multi)     Volume overload 09/10/2024         Surgical History        Past Surgical History:   Procedure Laterality Date    CARDIAC CATHETERIZATION N/A 2024    Procedure: Left Heart Cath, With LV, Angriography And Grafts;  Surgeon: Christiano Calvillo DO;  Location: [facility] Cardiac Cath Lab;  Service: Cardiovascular;  Laterality: N/A;    CHOLECYSTECTOMY      HYSTERECTOMY      OTHER SURGICAL HISTORY  2020    Cholecystectomy    OTHER SURGICAL HISTORY  2020    Esophagogastroduodenoscopy    OTHER SURGICAL HISTORY  2020    Colonoscopy    OTHER SURGICAL HISTORY  2020     section    OTHER SURGICAL HISTORY  2020    Hysterectomy          Social History    She reports that she has quit smoking. Her smoking use included cigarettes. She has quit using smokeless tobacco. She reports that she does not currently use alcohol. She reports that she does not currently use drugs after having used the following drugs: Cocaine and Other.      Family  "History      Family History   Problem Relation Name Age of Onset    No Known Problems Mother      No Known Problems Father            Allergies        Levetiracetam        Review of Systems    14-point ROS otherwise negative, as per HPI/Interval History.    General: No change in weight. Yes weakenss. No Fevers/Chills/Night Sweats   Skin: No skin/hair/nail changes. No rashes or sores.  Head:  No trauma. No Headache/nasuea/vomitting.   Eyes: No visual changes. No tearing. No itching.   Ears: No hearing loss. No tinnitus. No vertigo. No discharge.  Nose, Sinuses: No rhinorrhea, No nasal congestion. No epistaxis.  Mouth, Throat, Neck: No bleeding gums, hoarseness, sore throat or swollen neck  Cardiac: No palpitations. No ALAMO. No PND. No Orthopnea.   Respiratory: YEs Shortness of Breath. No wheezing. No cough. No hemoptysis.   GI: No nausea/vomiting. No indigestion. No diarrhea. No constipation.   Extremities: No numbness or tingling. No paresthesias.   Urinary: No change in urinary frequency. No change in hesitancy. No hematuria. No incontinence.         Physical Exam        Constitutional:  Pleasant.   Eyes: PERRL, EOMI,   ENMT: mucous membranes moist  Head/Neck: Neck supple, No JVD,   Respiratory/Thorax: Diminished breath sounds B/L  Cardiovascular: Regular, rate and rhythm, no murmurs  Gastrointestinal: Soft, non-distended, +BS.  Musculoskeletal: ROM intact, no joint swelling, normal strength  Extremities: peripheral pulses intact; no edema  Neurological: Alert and Oriented x 3; no focal deficits; gross motor and sensation intact; CN II-XII intact. No asterixis.  Psychological: Appropriate mood and behavior  Skin: No lesions, No rashes. Flushed  - baseline for her   Left anterior chest wall cuffed catheter          Last Recorded Vitals  Blood pressure 123/82, pulse 95, temperature 36.7 °C (98.1 °F), resp. rate 11, height 1.651 m (5' 5\"), weight 74.8 kg (165 lb), SpO2 (!) 93%.    Relevant Results    Lab Results "   Component Value Date    WBC 4.1 (L) 10/22/2024    HGB 9.8 (L) 10/22/2024    HCT 32.2 (L) 10/22/2024    MCV 79 (L) 10/22/2024     (L) 10/22/2024       Lab Results   Component Value Date    GLUCOSE 93 10/22/2024    CALCIUM 6.8 (L) 10/22/2024     10/22/2024    K 3.6 10/22/2024    CO2 23 10/22/2024     10/22/2024    BUN 15 10/22/2024    CREATININE 0.88 10/22/2024       Lab Results   Component Value Date    HGBA1C 5.7 (H) 10/03/2024         No CT head results found for the past 12 months      Scheduled medications  aspirin, 81 mg, oral, Daily  dilTIAZem CD, 180 mg, oral, Daily  folic acid, 1 mg, oral, Daily  furosemide, 40 mg, oral, Daily  hydroxychloroquine, 200 mg, oral, BID  macitentan, 10 mg, oral, Daily  magnesium chloride, 64 mg, oral, BID  pantoprazole, 40 mg, oral, Daily before breakfast  venlafaxine XR, 150 mg, oral, Daily      Continuous medications  epoprostenol, 1.5 mg      PRN medications  PRN medications: acetaminophen **OR** acetaminophen **OR** acetaminophen, benzocaine-menthol, dextromethorphan-guaifenesin, guaiFENesin, ipratropium-albuteroL, polyethylene glycol        Assessment/Plan   Principal Problem:    Acute on chronic respiratory failure with hypoxia (Multi)  Active Problems:    Hypomagnesemia    Hypocalcemia    Pulmonary hypertension (Multi)    PAH (pulmonary artery hypertension) (Multi)    Chronic obstructive pulmonary disease (Multi)    Pericardial effusion ([facility])    Systemic lupus erythematosus (Multi)    Seizure disorder (Multi)    Methamphetamine abuse (Multi)    Major depressive disorder, single episode, moderate (Multi)    Hyperlipidemia    Anxiety    Acute cor pulmonale (Multi)    Gastroesophageal reflux disease    Acquired thrombocytopenia (CMS-HCC)    SVT (supraventricular tachycardia) (CMS-[facility])      Ronna Beckham is a 63 y.o. female presenting with Abnormal Blood Work.  Patient admitted for further evaluation and management.      Acute on Chronic Respiratory  Failure 2/2 Presumed PAH Exacerbation         Admit patient to Telemetry service   Continuous Cardiac Monitor and BP Monitor Placement   CT angiogram of the chest appreciated  No signs of overt infection  We will continue the Lasix therapy  Continue with Fluid Restriction   Patient wears 4 L supplemental oxygen by nasal cannula at baseline  Currently requiring 7 L nasal cannula.  Will titrate down as tolerated  Will continue IV corticosteroids  Pulmonary consultation placed  Continue Opsumit        Moderate Sized Pericardial Effusion      This appears to have been present on her 2D echocardiogram from April 24, 2024  Unclear if the dimensions have increased  I have consulted cardiology to consider repeating TTE or consider LUIS        Hypocalcemia     Replace and recheck levels  Will order repeat levels in a.m.  Will add magnesium level      Hypomagnesemia      Aggressive replacement and recheck levels in a.m.  Continue oral maintenance dose      Prolonged Qtc    EKG is noted for sinus tachycardia 101 bpm  Incomplete right bundle branch block  Right ventricular hypertrophic with repolarization abnormality  QTc elevated  Cardiology consulted  Avoid QT prolonging agents  Will focus on electrolyte replacement      Anemia     Continue folic acid therapy        Thrombocytopenia     Appears to be chronic in nature  Monitor platelet count closely        H/o Epilepsy      Seizure precautions  She is currently not taking any AED medication        Chronic respiratory failure     Patient wears baseline supplemental oxygen level at 4 L      History of Recurrent SVT    He has frequent episodes of SVT and hospitalizations  She was recently hospitalized at [facility] for an SVT in the 200s  She was given adenosine at that time with a good result  She was not compliant with her home diltiazem at that time  Will continue home diltiazem  Last hospitalization 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 Chicho Prescott  She is taking advanced therapy for PAH (Veletri) Via cuffed catheter left subclavian w/ continuous Home pump.  She has all of her supplies with her.   Will allow her to self administer her medication as she did last hospitalization. Case discussed with pharmacy doctor.   She is also taking Opsumit 10 mg p.o. daily  Will consult pulmonary service to evaluate the patient  Intolerant to PDE5         Systemic lupus erythematosus     Continue home hydroxychloroquine dose      Polysubstance use disorder    (ethanol, cocaine and amphetamines)   Will order urine toxicology screen        GI + DVT Prophylaxis     Home oral PPI  Bilateral Sequential Compression Devices          Disposition:      Patient was accepted to [place] [address] at [facility].  She was accepted by her pulmonologist.  Due to bed availability the ED has requested admission to [facility] until her bed becomes available at [place] [address].          The patient has been Instructed by me upon admission to follow-up with their PCP upon discharge to obtain repeat blood work/CXR and to maintain close medical follow-up for their current disease process.        This Dictation was Transcribed using a Nuance Dragon Voice Recognition System Device (with Compatible Computer + Software) and as such may contain Grammatical Errors and Unintentional Typing Misprints.      I spent 42 minutes in the professional and overall care of this patient.      Ryan Tao MD

## 2024-10-23 NOTE — CARE PLAN
The patient's goals for the shift include      The clinical goals for the shift include remain HDS    Problem: Pain - Adult  Goal: Verbalizes/displays adequate comfort level or baseline comfort level  Outcome: Progressing  Flowsheets (Taken 10/23/2024 0310)  Verbalizes/displays adequate comfort level or baseline comfort level:   Encourage patient to monitor pain and request assistance   Assess pain using appropriate pain scale   Administer analgesics based on type and severity of pain and evaluate response   Implement non-pharmacological measures as appropriate and evaluate response   Consider cultural and social influences on pain and pain management     Problem: Safety - Adult  Goal: Free from fall injury  Outcome: Progressing  Flowsheets (Taken 10/23/2024 0310)  Free from fall injury:   Instruct family/caregiver on patient safety   Based on caregiver fall risk screen, instruct family/caregiver to ask for assistance with transferring infant if caregiver noted to have fall risk factors     Problem: Discharge Planning  Goal: Discharge to home or other facility with appropriate resources  Outcome: Progressing  Flowsheets (Taken 10/23/2024 0310)  Discharge to home or other facility with appropriate resources:   Identify barriers to discharge with patient and caregiver   Identify discharge learning needs (meds, wound care, etc)   Arrange for needed discharge resources and transportation as appropriate   Refer to discharge planning if patient needs post-hospital services based on physician order or complex needs related to functional status, cognitive ability or social support system   Arrange for interpreters to assist at discharge as needed     Problem: Chronic Conditions and Co-morbidities  Goal: Patient's chronic conditions and co-morbidity symptoms are monitored and maintained or improved  Outcome: Progressing  Flowsheets (Taken 10/23/2024 0310)  Care Plan - Patient's Chronic Conditions and Co-Morbidity Symptoms  are Monitored and Maintained or Improved:   Monitor and assess patient's chronic conditions and comorbid symptoms for stability, deterioration, or improvement   Collaborate with multidisciplinary team to address chronic and comorbid conditions and prevent exacerbation or deterioration   Update acute care plan with appropriate goals if chronic or comorbid symptoms are exacerbated and prevent overall improvement and discharge

## 2024-10-23 NOTE — PROGRESS NOTES
Physical Therapy Evaluation    Patient Name: Ronna Beckham  MRN: [id number]  Department: [facility] 4 ICU  Room: [address] [place], [place] [address]  Today's Date: 10/23/2024   Time Calculation  Start Time: 0952  Stop Time: 1011  Time Calculation (min): 19 min    Assessment/Plan   PT Assessment  PT Assessment Results: Decreased strength, Decreased endurance, Impaired balance, Decreased mobility, Impaired judgement, Decreased safety awareness, Impaired sensation  Rehab Prognosis: Fair  Evaluation/Treatment Tolerance: Patient tolerated treatment well  Medical Staff Made Aware: Yes  Strengths: Premorbid level of function  Barriers to Participation: Housing layout, Attitude of self  End of Session Communication: Bedside nurse  Assessment Comment: 63 year old female admits with acute on chronic respiratory failure, moderate pericardial effusion, hypocalcemia, low Mg, prolonged QTC, Anemia, Thrombocytopenia, Pulmonary Arterial HTN, Systemic Lupus Erythematosus, and polysubstance use disorder. Was active with OP PT prior to admit for weakness and falls.On eval, she demonstrates weakness and impaired safe mobility warranting skilled PT care. At DC, low intensity rehab is recommended.  End of Session Patient Position: Bed, 3 rail up, Alarm on  IP OR SWING BED PT PLAN  Inpatient or Swing Bed: Inpatient  PT Plan  Treatment/Interventions: Transfer training, Gait training, Stair training, Balance training, Strengthening, Endurance training, Therapeutic exercise, Therapeutic activity, Home exercise program  PT Plan: Ongoing PT  PT Frequency: 4 times per week  PT Discharge Recommendations: Low intensity level of continued care  PT Recommended Transfer Status: Contact guard  PT - OK to Discharge: Yes    Subjective   General Visit Information:  General  Reason for Referral: Impaired Mobility  Referred By: Dr. Fried  Past Medical History Relevant to Rehab: Chronic Respiratory Failure, Polysubstance Abuse  Family/Caregiver Present: No  Prior to Session  Communication: Bedside nurse  Patient Position Received: Bed, 3 rail up, Alarm off, not on at start of session  Preferred Learning Style: verbal, visual  General Comment: 63 year old female admits with acute on chronic respiratory failure, moderate pericardial effusion, hypocalcemia, low Mg, prolonged QTC, Anemia, Thrombocytopenia, Pulmonary Arterial HTN, Systemic Lupus Erythematosus, and polysubstance use disorder. Was active with OP PT prior to admit for weakness and falls.  Home Living:  Home Living  Type of Home: [place]  Lives With:  (grand dtr)  Home Adaptive Equipment: Walker rolling or standard  Home Layout: One level  Home Access: Stairs to enter without rails  Entrance Stairs-Number of Steps: 2  Bathroom Shower/Tub: Tub/shower unit  Prior Level of Function:  Prior Function Per Pt/Caregiver Report  Level of [place]: Independent with ADLs and functional transfers, Independent with homemaking with ambulation  Receives Help From: Family  ADL Assistance: Independent  Homemaking Assistance: Independent  Ambulatory Assistance: Independent  Prior Function Comments: Reports mechanical fall ~ 2 weeks ago. Was working with OP PT for fall risk  Precautions:  Precautions  Medical Precautions: Fall precautions    Vital Signs Comment: 6L NC throughout session: SPO2 WNLs at all times     Objective   Pain:  Pain Assessment  Pain Assessment: 0-10  0-10 (Numeric) Pain Score: 0 - No pain  Cognition:  Cognition  Overall Cognitive Status: Within Functional Limits  Orientation Level: Oriented X4  Cognition Comments: Very frustrated towards medical situation  Insight: Mild  Impulsive: Mildly    General Assessments:  Activity Tolerance  Endurance: Decreased tolerance for upright activites    Sensation  Light Touch:  (chronic B hand and B foot to mid shin numbness)    Strength  Strength Comments: Grossly 4/5 BLEs on MMT. Pt reports feeling weak  Functional Assessments:  Bed Mobility  Bed Mobility: Yes  Bed Mobility 1  Bed  Mobility 1: Supine to sitting, Sitting to supine  Level of Assistance 1: Independent    Transfers  Transfer: Yes  Transfer 1  Transfer From 1: Bed to  Transfer to 1: Stand  Technique 1: Sit to stand, Stand to sit  Transfer Level of Assistance 1: Close supervision  Trials/Comments 1: CS for safety due to mild unsteadiness    Ambulation/Gait Training  Ambulation/Gait Training Performed: Yes  Ambulation/Gait Training 1  Surface 1: Level tile  Device 1: No device  Assistance 1: Close supervision  Quality of Gait 1:  (mild shuffled pattern, mild increased in lateral sway increasing unsteadiness)  Comments/Distance (ft) 1: 30- reports 6/10 RPE at end    Outcome Measures:  [facility] Basic Mobility  Turning from your back to your side while in a flat bed without using bedrails: None  Moving from lying on your back to sitting on the side of a flat bed without using bedrails: None  Moving to and from bed to chair (including a wheelchair): A little  Standing up from a chair using your arms (e.g. wheelchair or bedside chair): A little  To walk in hospital room: A little  Climbing 3-5 steps with railing: A little  Basic Mobility - Total Score: 20    Encounter Problems       Encounter Problems (Active)       Balance       Standing Balance (Progressing)       Start:  10/23/24    Expected End:  11/06/24       Pt will demonstrate good static standing balance to promote safe participation with out of bed activity, transfers, and mobility              Mobility       Ambulation (Progressing)       Start:  10/23/24    Expected End:  11/06/24       Pt will ambulate 150' independent assist with no device to promote safe home mobility           Entry Stair Negotiation (Progressing)       Start:  10/23/24    Expected End:  11/06/24       Pt will ascend/descend 2 stairs, no rails, and no device with independent assist to safely enter/exit the home environment              PT Transfers       Sit to stand (Progressing)       Start:  10/23/24     Expected End:  11/06/24       Pt will transfer sit to standing position with modified independent assist and no device to promote safe out of bed activity                Pain - Adult          Safety       Safe Mobility Techniques (Progressing)       Start:  10/23/24    Expected End:  11/06/24       Pt will correctly identify and demonstrate safe mobility techniques to reduce their risks for falls during their acute care stay                     Education Documentation  Mobility Training, taught by Thuan Victor, PT at 10/23/2024 10:25 AM.  Learner: Patient  Readiness: Acceptance  Method: Explanation, Demonstration  Response: Needs Reinforcement  Comment: safe mobility techniques, importance of OOB activity, risks of prolonged bedrest, importance of early fall risk intervention, fall risk management    Education Comments  No comments found.

## 2024-10-23 NOTE — ED PROVIDER NOTES
Patient was seen by both myself and advanced practitioner.  I personally saw the patient and made/approved the management plan and take responsibility for the patient management     Patient is a 63-year-old female that presents emergency department for evaluation of low magnesium.  She reports that she had outpatient lab work performed earlier today by her nephrologist and was called noting that she had very low magnesium of 0.58.  He states that he has had low magnesium in the past and typically takes 64 mg of magnesium daily and notes he has not missed any doses.  She does admit to feeling shortness of breath on exertion however states this is chronic due to her history of pulmonary artery hypertension.  She typically wears 4 L of cannula at baseline.  She denies chest pain, abdominal pain, nausea or vomiting.    On exam patient very uncomfortable appearing but in no obvious distress.  She is awake and alert and oriented.  Patient was found to be hypoxic on arrival including cannula with an oxygen saturation of 86%.  Lungs are clear to auscultation bilaterally with no wheezing, rhonchi or rails.  Abdomen soft, nontender, nondistended.  She is awake and oriented to self, present.  Regular rate and rhythm on auscultation of the heart.  Blood work is ordered including CBC, CMP, magnesium, BNP, troponin along with a chest x-ray.  EKG did show sinus tachycardia with incomplete right bundle branch block but no evidence of STEMI or dysrhythmia.  Blood work was remarkable for mildly elevated BNP of 700 unknown of 7 which remained flat on repeat testing at 7.  She was found to have significantly low magnesium consistent with her outpatient lab work with a magnesium of 0.53 and was given 4 g of magnesium for repletion through her IV.  X-ray showed no obvious evidence of pneumonia, pneumothorax and wide mediastinum but did show some findings consistent with edema and cardiomegaly.  CT angio of the chest shows no evidence of  pneumonia, pulmonary embolism however does show cardiomegaly again with a moderate pericardial effusion however this was present on prior imaging.  Patient's hypoxia is likely combination of her low magnesium level as well as pulmonary artery hypertension.  Initially case was discussed with hospitalist at [facility] however given her complex medical history with pulmonary artery hypertension as well as the moderate pericardial effusion patient was requested to be transferred downtown.  Advance practitioner did discuss case with the pulmonologist at [facility] and they excepted patient for transfer.  After being in the emergency department for 4 hours with no bed available patient will be admitted here at [facility] for initiation of care until bed is available at [facility].    I independently interpreted the following study(s) EKG, chest x-ray, CT angio of the chest which show EKG shows sinus tachycardia with incomplete right bundle branch block but no evidence of STEMI.  Chest x-ray shows evidence of cardiomegaly with mild pulmonary edema but no evidence of pneumothorax rib fractures.  CT angio of the chest shows no evidence of pulmonary embolism but does show a moderate size pericardial effusion.       Clint Angelo, DO  10/22/24 4098

## 2024-10-23 NOTE — CONSULTS
Consults  History Of Present Illness:    Ronna Beckham is a 63 y.o. female presenting with .    The patient is a unfortunate upper middle-aged white female who does have severe pulmonary hypertension with COPD requiring continuous nasal oxygen.  The patient is being followed by the pulmonary service at [facility].  The patient has been on chronic Veletri infusions for the pulmonary hypertension.  The patient was admitted to [facility] on 1/8/2024 with shortness of breath slight troponin elevation.  At that time the patient had evidently been utilizing cocaine.  Her EKG showed T wave inversions in the anterior precordial leads possibly related to her pulmonary hypertension and right ventricular hypertrophy with strain versus demand ischemia.  The patient underwent a cardiac catheterization on 1/8/2024 which demonstrated the following results consistent with nonobstructive coronary artery disease:      LMCA-large-caliber vessel that bifurcates in the LAD and left circumflex arteries.  It contains luminal irregularities.     LAD-also large-caliber vessel gives rise to a moderate-sized first diagonal and subsequent small diagonal branches as well as a recurrent apical branch.  There is also an extensive septal network.  The first diagonal has a 40% stenosis in the proximal portion.  The remainder of the LAD system has mild luminal irregularities.     Circumflex-nondominant vessel giving rise to 2 obtuse marginal branches and a posterolateral branch.  There are mild luminal irregularities throughout the system.     RCA-dominant vessel giving rise to the right posterior descending artery and an extensive posterolateral network.  There are mild to moderate luminal irregularities throughout the system.     LVEDP 8 mmHg     The patient's last echocardiogram performed at [facility] 4/24/2024 showed a LV ejection fraction of 60-65% but severe right ventricular chamber enlargement and a severely  reduced right ventricular systolic function.  The agitated saline study was positive for a small intracardiac shunt.  The right atrium is also severely enlarged and there was a small to moderate pericardial effusion.  There was severe tricuspid valve regurgitation and severe pulmonary hypertension with an estimated PA systolic pressure 88 mmHg.     The patient has been seeking some assistance with pain management has been taking Gummies with THC for pain relief.  Yesterday the patient developed sudden increase in her shortness of breath that did not improve EMS was called.  Patient was identified to have an SVT of greater than 200/min and she was given 6 mg of adenosine with no response and then 12 mg of adenosine with conversion to sinus tachycardia at 100/min with resolution of her symptoms.  Her evaluation in the emergency room included a CBC with hematocrit of 28.5 platelet count 103,000.  Electrolyte panel was notable for magnesium level of only 0.4.  TSH was normal.  The initial high-sensitivity troponin was 19.  Chest x-ray showed pulmonary vascular congestion interstitial edema no pleural effusion or consolidation.  Patient's EKG currently shows sinus rhythm bordering on sinus tachycardia with increased anterior forces and anterior ST-T abnormality consistent with right ventricular hypertrophy with a strain pattern.  Repeat lab work from this morning includes a CBC hemoglobin 8.1 hematocrit 27.5.  Comprehensive metabolic panel unremarkable creatinine 0.9 potassium of 4.0 calcium is low at 6.6 and was given a supplement for lower value 5.7 earlier.  Ionized calcium is low at 0.83.  Magnesium values increased from 0.4-1.4.  Patient's serum iron is less than 20. The patient was started on low-dose Cardizem CD1 180 mg daily for prophylaxis of SVT and both the hypokalemia and hypomagnesemia were corrected by time of discharge.     Patient is presently readmitted through the [facility] emergency room 8/26/2024 with  increasing shortness of breath and worsening lower extremity edema.  The patient had been wearing continuous nasal oxygen at 4 L/min due to her severe pulmonary hypertension.  She may have missed 1 week of Lasix therapy prior to admission.  Evaluation in the emergency room included a chest x-ray demonstrating a left retrocardiac density possibly related to overlapping normal structures although a left basilar pneumonia could not be excluded.  Arterial blood gases included a pH of 7.47 pCO2 29 pO2 of 68 O2 saturation 94%.  CBC was notable for hematocrit 30.9 platelet count 102,000.  Electrolyte panel relatively unremarkable with a serum sodium of 132.  Glycohemoglobin was 5.4%.  Patient's EKG showed sinus tachycardia at 102/min with right bundle branch block left anterior hemiblock.  The patient's proBNP was elevated at 6865.  The patient has been seen by neurology due to complaints of numbness and burning in the hands and feet and is being assessed for various causes of polyneuropathy with an EMG various labs and has been started on gabapentin and topiramate.  The patient has been given 1 dose of IV Lasix yesterday and again 1 additional time. Initial chest x-ray on admission did not show any evidence of CHF although the patient does have right ventricular dysfunction by echo with severe pulmonary hypertension and severe tricuspid valve regurgitation. The patient complains of some firmness and some vague discomfort of the abdomen. The patient did have an abdominal ultrasound in 7/2023 negative for ascites. Her CT angiogram of the chest 5/30/2024 was negative for pulmonary embolism but showed persistent marked cardiomegaly hepatic heterogeneity and splenomegaly consistent with right heart dysfunction. The patient had a abdominal and pelvic CT scan performed yesterday rather than an ultrasound. It does demonstrate a dramatic increase in cardiac size from a study of 6/1/2023 with marked right atrial dilatation as  expected from the patient's known severe pulmonary hypertension. She also has a small pericardial effusion. There is heterogeneous perfusion throughout the liver due to backflow pressure from right heart impairment and there is mild hepatomegaly. There is also mild splenomegaly. There is only a trace amount of ascites with subcutaneous edema. Patient's abdominal distention thought to be related to the hepatomegaly. Patient remains in sinus rhythm sinus tachycardia no atrial tachyarrhythmias on diltiazem CD1 100 mg daily. She has been restarted on her usual Lasix 40 mg daily and is being prepared for discharge     The patient was again admitted to [facility] from 9/7/2024 to 9/9/2024 due to increased lower extremity edema for which she was treated with IV Lasix.  At that time her O2 saturations were appropriate on a 5 L nasal cannula.  Chest x-ray showed some pulmonary vascular congestion and cephalization.  According to hospital records the patient may not been taking her oral Lasix prescribed following her last admission to [facility].  Ultrasound of the lower extremities was negative for DVT.  Patient was continued on Cardizem for SVT.    The patient was admitted again to [facility] 10/3/2024 - 10/6/2024 with an episode of dizziness with SVT at greater than 200/min for which she received IV adenosine.  Patient had evidently had not been taking her home diltiazem.  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  She was restarted as noted to sinus tachycardia with the IV adenosine.  It was thought that the AVNRT may have been precipitated by hypovolemia and/or electrolyte abnormalities.  She was restarted on her home diltiazem.  And she was weaned to 4 L O2 nasal cannula.  Her creatinine was 1.0 at time of discharge.    The patient states that she has been taking her Lasix 40 mg daily and that it has been adequate in preventing significant lower extremity edema or abdominal swelling.  The patient  evidently was contacted at home by her nephrologist due to abnormal electrolytes values on routine lab work more specifically a significant hypomagnesemia.  Patient states that she had been taking 64 mg of magnesium orally twice a day.  Her evaluation in the emergency room was notable for stable vital signs with a heart rate of 100/min respiratory rate of 20/min her initial O2 saturation was 86% on 4 L O2 nasal cannula which was increased to 7 L/min.  Initial laboratory studies included a CBC with a WBC of 4100 hematocrit 32.2 platelet count 119,000.  The patient does have a chronic thrombocytopenia.  Electrolyte panel was notable for a magnesium of 0.53 and calcium of 6.8 albumin of 3.5.  The patient was given 4 g of magnesium intravenously in the emergency room and a repeat value was 1.48.  Chest x-ray showed left basilar consolidation consistent with atelectasis or possible pneumonia.  There was marked cardiomegaly.  The patient had a CT angiogram of the chest performed negative for acute pulmonary embolism but evidently showing a moderate-sized pericardial effusion.The patient's last echocardiogram performed at [facility] 4/24/2024 showed a LV ejection fraction of 60-65% but severe right ventricular chamber enlargement and a severely reduced right ventricular systolic function.  The agitated saline study was positive for a small intracardiac shunt.  The right atrium is also severely enlarged and there was a small to moderate pericardial effusion.  There was severe tricuspid valve regurgitation and severe pulmonary hypertension with an estimated PA systolic pressure 88 mmHg.     Last Recorded Vitals:  Vitals:    10/23/24 0145 10/23/24 0224 10/23/24 0500 10/23/24 0735   BP:  106/70 129/78 116/85   BP Location:  Left arm Left arm Left arm   Patient Position:  Lying Lying Lying   Pulse: 94 92 96 97   Resp: 19 23 20 19   Temp:  36.3 °C (97.3 °F) 36.2 °C (97.2 °F) 36.7 °C (98.1 °F)   TempSrc:  Temporal  "Temporal Temporal   SpO2: (!) 93% 93% 91% 93%   Weight:  75.3 kg (166 lb 0.1 oz)     Height:  1.651 m (5' 5\")         Last Labs:  CBC - 10/23/2024:  4:51 AM  5.3 9.4 127    31.9      CMP - 10/23/2024:  4:51 AM  7.2 6.3 9 --- 0.9   2.8 3.6 7 66      PTT - 10/3/2024: 11:57 PM  1.6   18.0 34     Troponin I, High Sensitivity   Date/Time Value Ref Range Status   10/22/2024 05:00 PM 7 0 - 13 ng/L Final   10/22/2024 03:27 PM 7 0 - 13 ng/L Final   10/03/2024 10:45 PM 7 0 - 13 ng/L Final     BNP   Date/Time Value Ref Range Status   10/22/2024 03:27  (H) 0 - 99 pg/mL Final   10/22/2024 09:46  (H) 0 - 99 pg/mL Final     Hemoglobin A1C   Date/Time Value Ref Range Status   10/03/2024 11:57 PM 5.7 (H) See comment % Final   08/26/2024 09:34 AM 5.4 see below % Final     LDL Calculated   Date/Time Value Ref Range Status   10/03/2024 11:57 PM 62 <=99 mg/dL Final     Comment:                                 Near   Borderline      AGE      Desirable  Optimal    High     High     Very High     0-19 Y     0 - 109     ---    110-129   >/= 130     ----    20-24 Y     0 - 119     ---    120-159   >/= 160     ----      >24 Y     0 -  99   100-129  130-159   160-189     >/=190     07/23/2019 10:40 AM  65 - 130 MG/DL Final    Unable to report calculated LDL due to increased triglycerides. Direct     Comment:      LDL to be run.     VLDL   Date/Time Value Ref Range Status   10/03/2024 11:57 PM 23 0 - 40 mg/dL Final   09/21/2020 09:58 PM 52 (H) 0 - 40 mg/dL Final      Last I/O:  I/O last 3 completed shifts:  In: 200 (2.7 mL/kg) [I.V.:100 (1.3 mL/kg); IV Piggyback:100]  Out: 100 (1.3 mL/kg) [Urine:100 (0 mL/kg/hr)]  Weight: 75.3 kg     Past Cardiology Tests (Last 3 Years):  EKG:  ECG 12 lead 10/22/2024 (Preliminary)      ECG 12 lead       ECG 12 lead 10/03/2024      ECG 12 lead 10/03/2024      ECG 12 Lead 09/08/2024      ECG 12 lead 09/06/2024      ECG 12 lead 08/26/2024      ECG 12 lead 07/08/2024      ECG 12 lead " "07/05/2024      Electrocardiogram, 12-lead PRN ACS symptoms 05/31/2024      ECG 12 lead 05/30/2024      ECG 12 lead 05/30/2024      ECG 12 Lead 01/07/2024    Echo:  Transthoracic Echo (TTE) Limited 04/24/2024    Ejection Fractions:  No results found for: \"EF\"  Cath:  Cardiac catheterization - coronary 01/08/2024    Stress Test:  No results found for this or any previous visit from the past 1095 days.    Cardiac Imaging:  No results found for this or any previous visit from the past 1095 days.      Past Medical History:  She has a past medical history of Anxiety, Depression, Does not refill medications appropriately (09/10/2024), GERD (gastroesophageal reflux disease), Lupus (Multi), Personal history of other specified conditions (09/21/2020), Pulmonary hypertension (Multi), and Volume overload (09/10/2024).    Past Surgical History:  She has a past surgical history that includes Other surgical history (09/21/2020); Other surgical history (09/21/2020); Other surgical history (09/21/2020); Other surgical history (09/21/2020); Other surgical history (09/21/2020); Hysterectomy; Cholecystectomy; and Cardiac catheterization (N/A, 1/8/2024).      Social History:  She reports that she has quit smoking. Her smoking use included cigarettes. She has quit using smokeless tobacco. She reports that she does not currently use alcohol. She reports that she does not currently use drugs after having used the following drugs: Cocaine and Other.    Family History:  Family History   Problem Relation Name Age of Onset    No Known Problems Mother      No Known Problems Father          Allergies:  Levetiracetam    Inpatient Medications:  Scheduled medications   Medication Dose Route Frequency    aspirin  81 mg oral Daily    dilTIAZem CD  180 mg oral Daily    folic acid  1 mg oral Daily    furosemide  40 mg oral Daily    hydroxychloroquine  200 mg oral BID    macitentan  10 mg oral Daily    magnesium chloride  64 mg oral BID    magnesium " sulfate  4 g intravenous Once    methylPREDNISolone sodium succinate (PF)  40 mg intravenous q12h    pantoprazole  40 mg oral Daily before breakfast    venlafaxine XR  150 mg oral Daily     PRN medications   Medication    acetaminophen    Or    acetaminophen    Or    acetaminophen    benzocaine-menthol    dextromethorphan-guaifenesin    guaiFENesin    ipratropium-albuteroL    polyethylene glycol     Continuous Medications   Medication Dose Last Rate    epoprostenol  1.5 mg       Outpatient Medications:  Current Outpatient Medications   Medication Instructions    aspirin 81 mg, Daily    cholestyramine (Questran) 4 gram packet 4 g, oral, Daily    dilTIAZem CD (CARDIZEM CD) 180 mg, oral, Daily    epoprostenol (VELETRI) 1,500 mcg, central venous line infusion, Continuous, Reconstitute contents of vial with 5 ml diluent and mix cassette as directed. Infuse continuously per physician orders. Allow for priming volume. Dose range: 1-150 ng/kg/min.    folic acid (FOLVITE) 1 mg, Daily    furosemide (Lasix) 40 mg tablet TAKE 1 TABLET BY MOUTH ONE TIME DAILY    gabapentin (NEURONTIN) 800 mg, 3 times daily    hydroxychloroquine (PLAQUENIL) 200 mg, 2 times daily    loperamide (IMODIUM A-D) 2 mg, oral, 4 times daily PRN    magnesium chloride (MAGDELAY) 64 mg, oral, 2 times daily    melatonin 5 mg, Nightly    omeprazole (PRILOSEC) 40 mg, Daily before breakfast    Opsumit 10 mg tablet tablet TAKE 1 TABLET BY MOUTH DAILY. DO NOT HANDLE IF PREGNANT. DO NOT SPLIT, CRUSH, OR CHEW. REVIEW MEDICATION GUIDE.    topiramate (Topamax) 25 mg tablet Week 1 25mg at bedtime, week 2 25mg BID, week 3 25mg qAM, 50mg at bedtime, week 4 50 mg BID, week 5 50mg qAM 75mg at bedtime, week 6 75 mg BID, week 7 75mg qam 100mg at bedtime, week 8 100mg BID    venlafaxine XR (EFFEXOR-XR) 150 mg, Daily       Physical Exam:  The patient is a slightly overweight upper middle-aged white female lying in bed not short of breath a low-flow O2 nasal cannula  JVP not  elevated.  Carotid and pulses 2+  Chest diminished air movement breath sounds are decreased but clear no wheezes rales  Cardiac rhythm is regular slightly tachycardic normal S1-S2 no gallop murmur rub  Abdomen soft and benign no focal tenderness  There is mild bilateral left greater than right peripheral edema.     Assessment/Plan   10/23: This patient is a 63-year-old white female with a history of smoking, substance abuse including cocaine methamphetamines and alcohol, severe pulmonary hypertension with severe right-sided chamber dilatation and severe tricuspid valve regurgitation, low-grade coronary artery disease by cardiac cath in 1/2024. The patient's last echocardiogram performed at [facility] 4/24/2024 showed a LV ejection fraction of 60-65% but severe right ventricular chamber enlargement and a severely reduced right ventricular systolic function. The agitated saline study was positive for a small intracardiac shunt. The right atrium is also severely enlarged and there was a small to moderate pericardial effusion. There was severe tricuspid valve regurgitation and severe pulmonary hypertension with an estimated PA systolic pressure 88 mmHg.  She was admitted to [facility] 7/5/2024 with an episode of SVT that converted utilizing IV adenosine and she subsequently was started on Cardizem  mg daily.  At that time the patient had significant electrolyte abnormalities including hypomagnesemia and hypokalemia.  The patient had been on Lasix 40 mg daily for peripheral edema.  Patient was admitted to [facility] 9/6/2024 - 9/9/2024 with increasing lower extremity edema possibly related to noncompliance with diuretic therapy.  She was admitted again to [facility] 10/3/2024 - 10/6/2024 with an episode of SVT requiring IV adenosine again possibly related to her diltiazem noncompliance.  She is currently admitted with hypomagnesemia in part related to diuretic therapy despite  taking supplementation 64 mg twice daily.  Will increase the patient's maintenance magnesium supplementation to magnesium oxide 400 mg twice daily watch her values have been restored to baseline by intravenous replacement.  Most recent magnesium level is still low at 1.33.  Renal parameters are actually at normal baseline with a creatinine of 0.77 and potassium of 3.9 but potassium supplementation will also be provided.  Patient be continued on her usual diltiazem.  The pericardial effusion seen on the CT angiogram is previously known by echo in 4/2024.    CVC 04/23/24 Single lumen Tunneled Left Subclavian (Active)   Site Assessment Clean;Dry;Intact 10/23/24 0811   Dressing Status Clean;Dry;Occlusive 10/23/24 0811   Number of days: 183       Peripheral IV 10/22/24 20 G Proximal;Right;Anterior Forearm (Active)   Site Assessment Clean;Dry;Intact 10/23/24 0812   Dressing Status Clean;Dry;Occlusive 10/23/24 0812   Number of days: 1       Code Status:  Full Code    I spent 30 minutes in the professional and overall care of this patient.        Anderson Niño MD

## 2024-10-23 NOTE — PROGRESS NOTES
10/23/24 1520   Discharge Planning   Living Arrangements   (Granddaughter)   Support Systems Family members   Type of Residence Private residence   Number of Stairs to Enter Residence  --    Number of Stairs Within Residence  --    Do you have animals or pets at home? No   Home or Post Acute Services   (Transfer to [facility] - Transport arranged for 1815 per nurse.)   Expected Discharge Disposition Short Term A  (Transfer to [facility]  - Transport arranged for 1815 per bedside nurse.)

## 2024-10-23 NOTE — PROGRESS NOTES
Occupational Therapy    Evaluation    Patient Name: Ronna Beckham  MRN: [id number]  Department: [facility] 4 ICU  Room: [address] [place], [place] [address]  Today's Date: 10/23/2024  Time Calculation  Start Time: 0912  Stop Time: 0932  Time Calculation (min): 20 min    Assessment  IP OT Assessment  OT Assessment: Pt is 62 y/o female admitted for ac. on chr. resp. failure. Pt presents w/ decreased ADL skills/ functional mobility, decreased activity tolerance,decreased safety awareness/judgement, impaired sensation. REcommend OT services to address above deficits.  Prognosis: Good  Evaluation/Treatment Tolerance: Patient tolerated treatment well  Medical Staff Made Aware: Yes  End of Session Communication: Bedside nurse  End of Session Patient Position: Bed, 3 rail up, Alarm off, not on at start of session  Plan:  Treatment Interventions: ADL retraining, Functional transfer training, Endurance training, Patient/family training, Equipment evaluation/education, Compensatory technique education  OT Frequency: 3 times per week  OT Discharge Recommendations: Low intensity level of continued care  OT - OK to Discharge: Yes    Subjective   Current Problem:  1. Acute on chronic respiratory failure with hypoxia (Multi)        2. Pulmonary hypertension (Multi)        3. Hypomagnesemia        4. Hypocalcemia          General:  General  Reason for Referral: decreased ADL's  Referred By: Dr. Fried  Past Medical History Relevant to Rehab: Chr. respiratoryfailure, poly substance abuse  Family/Caregiver Present: No  Prior to Session Communication: Bedside nurse  Patient Position Received: Bed, 3 rail up, Alarm off, not on at start of session  Preferred Learning Style: verbal, visual  General Comment: Cleared to see for eval, pt agreeable to therapy  Precautions:  Medical Precautions: Fall precautions, Oxygen therapy device and L/min (6L NC)    Vital Sign (Past 2hrs)        Date/Time Vitals Session Patient Position Pulse Resp SpO2 BP MAP (mmHg)    10/23/24 0912  --  --  99  --  92 %  --  --     10/23/24 1056 --  --  103  20  93 %  109/80  90                   Vital Signs Comment: 6L NC    Pain:  Pain Assessment  Pain Assessment: 0-10  0-10 (Numeric) Pain Score: 5 - Moderate pain  Pain Type: Acute pain  Pain Location: Neck    Objective   Cognition:  Overall Cognitive Status: Within Functional Limits  Orientation Level: Oriented X4  Cognition Comments: Pt reports depressed about current medical situation  Insight: Mild  Impulsive: Mildly           Home Living:  Type of Home: Condo  Lives With:  (granddtr)  Home Adaptive Equipment: Walker rolling or standard  Home Layout: One level, Stairs to alternate level with rails  Alternate Level Stairs-Rails: Left  Alternate Level Stairs-Number of Steps: 12-13  Home Access: Stairs to enter without rails  Entrance Stairs-Number of Steps: 2  Bathroom Shower/Tub: Tub/shower unit  Bathroom Toilet: Standard   Prior Function:  Level of [place]: Independent with ADLs and functional transfers, Needs assistance with homemaking  Receives Help From: Family (madison)  ADL Assistance: Independent  Homemaking Assistance:  (frankyr cooks, cleans, drives)  Ambulatory Assistance: Independent  IADL History:     ADL:  Eating Assistance: Stand by  Eating Deficit: Setup (per clinical judgement)  Grooming Assistance: Stand by  Grooming Deficit: Setup (per clinical judgement)  Bathing Assistance: Stand by  Bathing Deficit: Setup (per clinical judgement)  UE Dressing Assistance: Stand by  UE Dressing Deficit: Setup (per clinical judgement)  LE Dressing Assistance: Stand by  LE Dressing Deficit: Setup (to adjust sock on feet, using figure 4 tech)  Toileting Assistance with Device: Not performed  Functional Assistance: Not performed  ADL Comments: Pt educated in benefit of AE use for ease in LB dressing/ EC  Activity Tolerance:  Endurance: Decreased tolerance for upright activites  Activity Tolerance Comments: increased SOB w/ activity  Bed  Mobility/Transfers: Bed Mobility  Bed Mobility: Yes  Bed Mobility 1  Bed Mobility 1: Supine to sitting  Level of Assistance 1: Independent    Transfers  Transfer: Yes  Transfer 1  Transfer From 1: Bed to  Transfer to 1: Stand  Technique 1: Sit to stand, Stand to sit  Transfer Level of Assistance 1: Close supervision  Trials/Comments 1: Close supervision for safety, pt w/ mild unsteadiness      Functional Mobility:  Functional Mobility  Functional Mobility Performed: Yes  Functional Mobility 1  Surface 1: Level tile  Device 1: No device  Assistance 1: Close supervision  Comments 1: Pt ambulated from bed to bathroom doorway and back to bed w/ close supervision and no AD.  Modalities:     IADL's:      Vision: Vision - Basic Assessment  Current Vision: Wears glasses all the time  Sensation:  Sensation Comment: Pt reports numbness/tingling in both hands  Strength:  Strength Comments: 4/5 BUE;s  Perception:     Coordination:  Movements are Fluid and Coordinated: Yes   Hand Function:  Hand Function  Gross Grasp: Functional  Coordination: Functional  Extremities: RUE   RUE : Within Functional Limits and LUE   LUE: Within Functional Limits      Outcome Measures: [facility] Daily Activity  Putting on and taking off regular lower body clothing: A little  Bathing (including washing, rinsing, drying): A little  Putting on and taking off regular upper body clothing: A little  Toileting, which includes using toilet, bedpan or urinal: A little  Taking care of personal grooming such as brushing teeth: A little  Eating Meals: A little  Daily Activity - Total Score: 18      Education Documentation  ADL Training, taught by Stacy Sigala OT at 10/23/2024 10:57 AM.  Learner: Patient  Readiness: Acceptance  Method: Explanation  Response: Needs Reinforcement    Education Comments  No comments found.      Goals:   Encounter Problems       Encounter Problems (Active)       OT Goals       ADL's (Progressing)       Start:  10/23/24    Expected  End:  11/06/24       Pt will complete ADL tasks w/ Mod I w/ AE/ compensatory tech for safety and increased independence in self care tasKs         Functional transfers (Progressing)       Start:  10/23/24    Expected End:  11/06/24       Pt will demon functional transfers w/ Mod I using LRD as needed for safety and increased independence in daily tasks.         Functional endurance (Progressing)       Start:  10/23/24    Expected End:  11/06/24       Pt will tolerate =/> 25 minutes of functional activity to improve functional endurance for daily tasks and functional mobility.         Energy Conservation (Progressing)       Start:  10/23/24    Expected End:  11/06/24       Pt will verbalize/ demon energy conservation tech consistently 100% of the time for ease and safety in daily tasks.

## 2024-10-23 NOTE — H&P
HISTORY & PHYSICAL      Ronna Beckham  :  1960(63 y.o.)  MRN:  [id number]    Date: 10/23/24     Subjective:      Chief Complaint: hypomagnesemia & SOB  PCP: NANCY Liz     HPI:  63 y.o. female with a history of pulmonary HTN (on macitentan and epoprostenol via [place], NYHA Class 3, WHO Group 1), chronic hypoxic respiratory failure (baseline 4L NC), polysubstance use (ethanol, cocaine, amphetamines), SVT, SLE (on hydroxychloroquine), GERD, HARRIS, and MDD, transferred from [facility] for management of acute hypoxic respiratory failure (AHRF), hypomagnesemia, hypocalcemia, and a moderate pericardial effusion.    She was initially recommended for admission on 10/22 by her nephrologist due to low Mg (0.58), despite taking 64 mg PO Mg BID. In the ED, she reported weakness and generalized pain and was given 4 gm IV Mg. Her O2 sats were 86% on her baseline 4L, requiring an increase to 6-7L. BNP was noted to be in the 700s. Cardiac workup was negative for pulmonary embolism, but imaging revealed a moderate pericardial effusion (previously noted as small-moderate on TTE in 2024). Cardiology and pulmonary were consulted; Cardiology recommended increasing Mg Ox to 400 mg BID and continuing diltiazem, but made no specific comments on the pericardial effusion. Pulmonology advised continuing her PAH medications and giving IV solumedrol for a possible PAH exacerbation.    The patient has had multiple recent admissions for AHRF related to her pulmonary HTN, including 3 since August. Most recently, she was admitted between - for AHRF (requiring 5L) due to volume overload from medication non-compliance. She was hypomagnesemic at that time and responded well to diuresis after receiving IV Lasix. She saw her pulmonologist on 24, and the plan was to start sotatercept, though she has not yet started this. Additionally, she was admitted to  from 10/4-10/6 for SVT, which resolved with adenosine, likely  related to diarrhea and diltiazem non-compliance. She was restarted on her home meds and discharged.    At bedside today, the patient confirms this history. She states she has been compliant with her medications, including diltiazem, Lasix, and Mg supplementation. She reports a history of diarrhea, with 2-4 watery bowel movements yesterday but sometimes has formed stools. The diarrhea hasn't worsened recently, and she takes Imodium as needed. She denies chest pain, worsening SOB from baseline, abdominal pain, and other significant symptoms, though she notes some nausea, which is typical for her. She denies headache, vision changes, dysuria, fever, or cough.    Review of systems negative unless mentioned above.      In the ED, the patient's vital signs   Temp:  [36.2 °C (97.2 °F)-36.7 °C (98.1 °F)] 36.5 °C (97.7 °F)  Heart Rate:  [] 100  Resp:  [11-23] 21  BP: (106-144)/() 113/74     - Vital Signs on arrival to : T 36.1C, HR 80, RR 21, /81, O2 89 4L -> bumped to 6L  - Labs:  CBC: WBC 4.1 (lymphocytes 0.32, L), Hb 10 (recent baseline 9-10), MCV 78, Plt 150 (recent baseline 120s-130s)  CHEM: Na 137, K 4.6, Cl 107, Bicarb 20 (L), BUN 14, Cr 0.72  M.02  Calcium: 7.4 (L)  [id number]  COAGS: INR 1.3 (H), PT 14.4 (H)  TROP: 7->7  BNP: 700 (H, peak BNP 1207 10/3 but previously was in 400s)  UDS: negative for all drugs  - EKG: sinus tach, RBBB, QTc 500 ms,       - imaging:   CXR 10/23/2024  IMPRESSION:  1.  Cardiomegaly with mild degree of central pulmonary vascular  congestion. There is a new small/trace left-sided pleural effusion.  Recommend correlation with patient's volume status.  2. Similar left basilar consolidation, favored to represent  atelectasis however underlying infectious process is not definitively  excluded.     CXR 10/22  IMPRESSION:  1. Left basilar consolidation may represent atelectasis, aspiration,  or pneumonia. Overall, this area is not well evaluated and  obscured  by cardiomegaly.  2. Marked cardiomegaly with background of mild pulmonary edema     CTPE 10/2022  IMPRESSION:  1. No evidence of acute pulmonary embolism.  2. There is a moderate size pericardial effusion noted.     TTE 4/2024  CONCLUSIONS:   1. Left ventricular systolic function is normal with a 60-65% estimated ejection fraction.   2. Right ventricular volume and pressure overload.   3. Although the TAPSE and RVA' are normal, the fractional area change is markedly reduced consistent wtih severe RV systolic dysfunction. Still able to generate high RVSP.   4. Severely enlarged right ventricle.   5. There is severely reduced right ventricular systolic function.   6. Agitated saline contrast study was positive for small intracardiac shunt.   7. The right atrium is severely dilated.   8. There is a small to moderate pericardial effusion.   9. Small circumferential pericardial effusion though is small to moderate adjacent to the RA. No RV or RA collapse. The IVC is dilated, though likely due to the presence of severe pulmonary hypertension. No significant respiratory variation of the MV or TV inflows. Not diagnostic for tamponade though difficult to dx tamponade in setting of severe pulmonary hypertension.  10. Severe tricuspid regurgitation visualized.  11. Severely elevated right ventricular systolic pressure.  12. Compared with the prior exam from 3/12/2022 the RV had a similar appearance with severe dilatation and reduced function. Note the TR was not asessed on that exam to allow estimation of the RVSP at that time. The pericardial effusion appears similar in size.  13. A bubble study using agitated saline was performed. Bubble study is positive. A small PFO (= 10 bubbles) was demonstrated.    Past Medical History:   Diagnosis Date    Anxiety     Depression     Does not refill medications appropriately 09/10/2024    GERD (gastroesophageal reflux disease)     Lupus (Multi)     Personal history of other  specified conditions 2020    History of seizure    Pulmonary hypertension (Multi)     Volume overload 09/10/2024       Past Surgical History:   Procedure Laterality Date    CARDIAC CATHETERIZATION N/A 2024    Procedure: Left Heart Cath, With LV, Angriography And Grafts;  Surgeon: Christiano Calvillo DO;  Location: [facility];  Service: Cardiovascular;  Laterality: N/A;    CHOLECYSTECTOMY      HYSTERECTOMY      OTHER SURGICAL HISTORY  2020    Cholecystectomy    OTHER SURGICAL HISTORY  2020    Esophagogastroduodenoscopy    OTHER SURGICAL HISTORY  2020    Colonoscopy    OTHER SURGICAL HISTORY  2020     section    OTHER SURGICAL HISTORY  2020    Hysterectomy       Family History   Problem Relation Name Age of Onset    No Known Problems Mother      No Known Problems Father         Social History     Socioeconomic History    Marital status: Legally      Spouse name: Not on file    Number of children: Not on file    Years of education: Not on file    Highest education level: Not on file   Occupational History    Not on file   Tobacco Use    Smoking status: Former     Types: Cigarettes    Smokeless tobacco: Former   Vaping Use    Vaping status: Never Used   Substance and Sexual Activity    Alcohol use: Not Currently    Drug use: Not Currently     Types: Cocaine, Other     Comment: Meth    Sexual activity: Defer   Other Topics Concern    Not on file   Social History Narrative    Not on file     Social Drivers of Health     Financial Resource Strain: Low Risk  (10/23/2024)    Overall Financial Resource Strain (CARDIA)     Difficulty of Paying Living Expenses: Not hard at all   Recent Concern: Financial Resource Strain - Medium Risk (2024)    Overall Financial Resource Strain (CARDIA)     Difficulty of Paying Living Expenses: Somewhat hard   Food Insecurity: No Food Insecurity (10/23/2024)    Hunger Vital Sign     Worried About Running Out of Food in the Last  Year: Never true     Ran Out of Food in the Last Year: Never true   Transportation Needs: No Transportation Needs (10/23/2024)    PRAPARE - Transportation     Lack of Transportation (Medical): No     Lack of Transportation (Non-Medical): No   Physical Activity: Inactive (8/27/2024)    Exercise Vital Sign     Days of Exercise per Week: 0 days     Minutes of Exercise per Session: 0 min   Stress: No Stress Concern Present (8/27/2024)    [employer] [place] [employer] - Occupational Stress Questionnaire     Feeling of Stress : Not at all   Social Connections: Not on File (9/13/2024)    Received from [employer]    Social Connections     Connectedness: 0   Intimate Partner Violence: Not At Risk (10/23/2024)    Humiliation, Afraid, Rape, and Kick questionnaire     Fear of Current or Ex-Partner: No     Emotionally Abused: No     Physically Abused: No     Sexually Abused: No   Housing Stability: Low Risk  (10/23/2024)    Housing Stability Vital Sign     Unable to Pay for Housing in the Last Year: No     Number of Times Moved in the Last Year: 0     Homeless in the Last Year: No       Allergies   Allergen Reactions    Levetiracetam Other     Weakness, unable to walk       Prior to Admission medications    Medication Sig Start Date End Date Taking? Authorizing Provider   aspirin 81 mg EC tablet Take 1 tablet (81 mg) by mouth once daily.  Patient not taking: Reported on 10/23/2024    Historical Provider, MD   cholestyramine (Questran) 4 gram packet Take 1 packet (4 g) by mouth once daily. 6/3/24 9/7/24  Marty Buck MD   dilTIAZem CD (Cardizem CD) 180 mg 24 hr capsule Take 1 capsule (180 mg) by mouth once daily. 9/9/24 11/8/24  Bam Vaca MD   epoprostenol (Veletri) 1.5 mg recon soln 1.5 mg (1,500 mcg) by central venous line infusion route continuously. Reconstitute contents of vial with 5 ml diluent and mix cassette as directed. Infuse continuously per physician orders. Allow for priming volume. Dose range: 1-150  ng/kg/min. 3/26/24 3/26/25  Chicho Prescott DO   folic acid (Folvite) 1 mg tablet Take 1 tablet (1 mg) by mouth once daily. Needs to resume  Patient not taking: Reported on 10/23/2024    Historical Provider, MD   furosemide (Lasix) 40 mg tablet TAKE 1 TABLET BY MOUTH ONE TIME DAILY 9/9/24 11/8/24  Bam Vaca MD   gabapentin (Neurontin) 800 mg tablet Take 1 tablet (800 mg) by mouth 3 times a day.    Historical Provider, MD   hydroxychloroquine (Plaquenil) 200 mg tablet Take 1 tablet (200 mg) by mouth 2 times a day.    Historical Provider, MD   loperamide (Imodium A-D) 2 mg tablet Take 1 tablet (2 mg) by mouth 4 times a day as needed for diarrhea. 7/23/24   Chicho Prescott DO   magnesium chloride (MagDelay) 64 mg EC tablet Take 1 tablet (64 mg) by mouth 2 times a day. 10/6/24 11/5/24  Shelbie Kessler MD   melatonin 5 mg tablet Take 1 tablet (5 mg) by mouth once daily at bedtime.    Historical Provider, MD   omeprazole (PriLOSEC) 40 mg DR capsule Take 1 capsule (40 mg) by mouth once daily in the morning. Take before meals. Do not crush or chew.    Historical Provider, MD   Opsumit 10 mg tablet tablet TAKE 1 TABLET BY MOUTH DAILY. DO NOT HANDLE IF PREGNANT. DO NOT SPLIT, CRUSH, OR CHEW. REVIEW MEDICATION GUIDE. 3/11/24   Chicho Prescott DO   topiramate (Topamax) 25 mg tablet Week 1 25mg at bedtime, week 2 25mg BID, week 3 25mg qAM, 50mg at bedtime, week 4 50 mg BID, week 5 50mg qAM 75mg at bedtime, week 6 75 mg BID, week 7 75mg qam 100mg at bedtime, week 8 100mg BID  Patient not taking: Reported on 10/23/2024 8/22/24   Brett Abdul MD   venlafaxine XR (Effexor-XR) 150 mg 24 hr capsule Take 1 capsule (150 mg) by mouth once daily. Do not crush or chew.    Historical Provider, MD             Objective:      Temp:  [36.2 °C (97.2 °F)-36.7 °C (98.1 °F)] 36.5 °C (97.7 °F)  Heart Rate:  [] 100  Resp:  [11-23] 21  BP: (106-144)/() 113/74       PHYSICAL EXAM  GEN: NAD. On 6L.  Eyes: PERRL.   Mouth:  MMM.  Neck: Erythema around neck, warm to touch  CVS: sinus tachycardia, murmur, no rubs, or gallops. No JVD. Negative HJR.  Resp: CTA b/l.  Abd: Flat. ND. NT. NL BS. Soft.  MSK: 2+ pitting edema b/l  Skin: WWP. NL capillary refill.  Neuro: NFD. A&Ox4. 5/5 Power t/o. CN intact.     Labs:   Lab Results   Component Value Date     10/23/2024    K 3.9 10/23/2024     10/23/2024    CO2 23 10/23/2024    BUN 14 10/23/2024    CREATININE 0.77 10/23/2024    GLUCOSE 117 (H) 10/23/2024    CALCIUM 7.2 (L) 10/23/2024    PROT 6.3 (L) 10/23/2024    BILITOT 0.9 10/23/2024    ALKPHOS 66 10/23/2024    AST 9 10/23/2024    ALT 7 10/23/2024    GLOB 2.8 09/08/2023       Lab Results   Component Value Date    WBC 5.3 10/23/2024    HGB 9.4 (L) 10/23/2024    HCT 31.9 (L) 10/23/2024    MCV 80 10/23/2024     (L) 10/23/2024       Imaging:   ECG 12 lead    Result Date: 10/23/2024  Sinus tachycardia Incomplete right bundle branch block Right ventricular hypertrophy with repolarization abnormality Nonspecific T wave abnormality Abnormal ECG When compared with ECG of 04-OCT-2024 17:47, no acute changes QT has lengthened Confirmed by Bradley Hooker ([id number]) on 10/23/2024 12:04:56 PM    CT angio chest for pulmonary embolism    Result Date: 10/22/2024  Interpreted By:  Shan De La Garza, STUDY: CT ANGIO CHEST FOR PULMONARY EMBOLISM;  10/22/2024 4:58 pm   INDICATION: Signs/Symptoms:r/o PE.     COMPARISON: None   ACCESSION NUMBER(S): [id number]   ORDERING CLINICIAN: BURKE KEENE   TECHNIQUE: Helical data acquisition of the chest was obtained after intravenous administration of 75 ML Omnipaque 350, as per PE protocol. Images were reformatted in coronal and sagittal planes. Axial and coronal maximum intensity projection (MIP) images were created and reviewed.   FINDINGS: POTENTIAL LIMITATIONS OF THE STUDY: None   HEART AND VESSELS: There are no discrete filling defects within main pulmonary artery and its branches to suggest acute  pulmonary embolism. Main pulmonary artery and its branches are normal in caliber.   The thoracic aorta normal in course and caliber. No coronary artery calcifications are seen. Please note, the study is not optimized for evaluation of coronary arteries.   There is cardiomegaly noted. There is a moderate size pericardial effusion noted.   There is no pericardial effusion seen.   MEDIASTINUM AND JOANA, LOWER NECK AND AXILLA: The visualized thyroid gland is within normal limits. No evidence of thoracic lymphadenopathy by CT criteria. Esophagus appears within normal limits as seen.   LUNGS AND AIRWAYS: The trachea and central airways are patent. No endobronchial lesion is seen.   There is scattered linear atelectasis versus scarring noted.     UPPER ABDOMEN: The visualized subdiaphragmatic structures demonstrate no remarkable findings.       CHEST WALL AND OSSEOUS STRUCTURES: Chest wall is within normal limits. No acute osseous pathology.There are no suspicious osseous lesions.       1. No evidence of acute pulmonary embolism. 2. There is a moderate size pericardial effusion noted.   MACRO: None   Signed by: Shan De La Garza 10/22/2024 5:31 PM Dictation workstation:   [id number]    XR chest 1 view    Result Date: 10/22/2024  Interpreted By:  Raza Thurman, STUDY: XR CHEST 1 VIEW;  10/22/2024 4:08 pm   INDICATION: Signs/Symptoms:dyspnea on exertion.   COMPARISON: CXR 10/04/2024   ACCESSION NUMBER(S): [id number]   ORDERING CLINICIAN: BURKE KEENE   FINDINGS: Central venous catheter tip is in the lower SVC.   Left basilar consolidation may represent atelectasis, aspiration, or pneumonia. Background of mild pulmonary edema.   No pleural effusion or pneumothorax.   Cardiomediastinal silhouette is stable in size and configuration.   No remarkable upper abdominal findings.   No acute osseous abnormality.       1. Left basilar consolidation may represent atelectasis, aspiration, or pneumonia. Overall, this area is not well  evaluated and obscured by cardiomegaly. 2. Marked cardiomegaly with background of mild pulmonary edema.   MACRO: None   Signed by: Raza Thurman 10/22/2024 4:15 PM Dictation workstation:   [id number]      Assessment and Plan:      63 y.o. F w/PMHx pulm HTN (on macitentan and epoprostenol via [place], NYHA Functional Class 3 and WHO Group 1), chronic hypoxic respiratory failure (baseline 4L NC), polysubstance use (ethanol, cocaine, amphetamines), SVT, SLE (on hydroxychloroquine), GERD, HARRIS, MDD transferred from [facility] for management of AHRF, hypoMg, hypoCa, and moderate pericardial effusion. HDS, remains on 6L NC saturating 92%. Attempted to wean to 4L however patient dropped to 89%. PE ruled out at OSH, clinical picture not c/w PNA (no fevers, leukocytosis). Hypoxia may be 2/2 fluid overload () vs. worsening pericardial effusion vs. progression of PAH. Will diurese with IV lasix for tonight, suspect she needs higher outpatient diuretic dosing. Repeat TTE for effusion monitoring. Regarding hypoMg, per chart review, has low levels dating back to 11/2020. Has been taking PO Mg supplementation as outpatient but suspect this is contributing to diarrhea. Has history of alcohol use that may be contributing, as well as recent diuretic use. Given that Mg has improved with repletion, do not suspect renal Mg losses.      #Acute on chronic hypoxic respiratory failure  #PAH  ::follows with Dr. Prescott  ::baseline 4L NC, at OSH ED was requiring between 6-7L NC  ::was given 2 doses of IV solumedrol 40 mg for presumed ?PAH exacerbation  ::CTPE without PE, no concern for PNA  ::CXR on arrival with cardiomegaly, pulm vascular congestion, L sided pleural effusion,  (1000s in early October and then 400s previously)  -currently on 6L NC  -IV lasix 40 once (double home dose), will continue PO 40 lasix daily but suspect she needs higher outpatient dose  -strict I&O  -c/w home macitentan and epoprostenol  -will defer  further steroids as patient is not wheezing and without clear COPD history   -goal O2 sat > 90%     #Sinus tach  #Recurrent SVT  ::history of recurrent SVT, usually in the setting of dilt non-compliance  -EKG on arrival with   -suspect sinus tach iso hypoxia vs pericardial effusion, recent imaging for PE negative  -tele     #Hypomagnesesmia, resolved  #Secondary hypocalcemia  ::initial Mg level 0.58 at OSH ED, repleted with IV Mg and level on arrival to  2.02  ::ddx for hypoMg: diarrhea iso Mg supplementation vs diuretic use vs alcohol use (quit recently)  - administered 1g IV Calcium gluconate s/p ionized calcium of 0.99  -c/w Mg 64 mg BID, will not increase to 400 mg BID as rec'd by OSH cardiology due to diarrhea  -needs GI FU/workup for diarrhea as outpt, will hold imodium given borderline prolonged QTc but will restart home cholestyramine iso known cholecystectomy       #Moderate pericardial effusion  ::noted on CTA, small to moderate effusion noted on TTE 4/2024  -no hypotension, JVD muffled heart sounds c/f tamponade  -iso worsening tachycardia during admissions since 4/2024, will obtain repeat TTE (heart rates now in 100s, previously in 80s)  -if hypoxia does not improve should consider drainage/cards consult     #Prolonged QTc  -EKG with borderline prolonged QTc at 500 ms  -avoid QT prolonging agents, will hold Imodium     #Anemia  #Thrombocytopenia  -c/w folate     #Polysusbtance Use  ::UDS 10/23 negative, UDS 2 weeks prior also negative for any substances     #SLE  -c/w home hydroxychloroquine     #HARRIS  #MDD  -c/w home gabapentin, venlafaxine  -clarify if patient is still taking Topamax in AM     F: diuresis  E: K>4, Mg>2  N: 2L fluid restriction  A: pIV  DVT PPx: lovenox 40  GI PPx: home PPI      CODE STATUS: FULL (confirmed on admission)   SURROGATE DECISION MAKER: Leticia Parson (daughter) [phone]       Electronically signed by Matilde Sinclair MD on 10/23/24 at 4:46 PM

## 2024-10-24 ENCOUNTER — APPOINTMENT (OUTPATIENT)
Dept: CARDIOLOGY | Facility: HOSPITAL | Age: 64
End: 2024-10-24
Payer: COMMERCIAL

## 2024-10-24 LAB
ALBUMIN SERPL BCP-MCNC: 3.6 G/DL (ref 3.4–5)
ALBUMIN SERPL BCP-MCNC: 3.6 G/DL (ref 3.4–5)
ANION GAP SERPL CALC-SCNC: 14 MMOL/L (ref 10–20)
ANION GAP SERPL CALC-SCNC: 15 MMOL/L (ref 10–20)
ATRIAL RATE: 100 BPM
BASOPHILS # BLD AUTO: 0 X10*3/UL (ref 0–0.1)
BASOPHILS NFR BLD AUTO: 0 %
BUN SERPL-MCNC: 15 MG/DL (ref 6–23)
BUN SERPL-MCNC: 17 MG/DL (ref 6–23)
CALCIUM SERPL-MCNC: 7.9 MG/DL (ref 8.6–10.6)
CALCIUM SERPL-MCNC: 7.9 MG/DL (ref 8.6–10.6)
CHLORIDE SERPL-SCNC: 104 MMOL/L (ref 98–107)
CHLORIDE SERPL-SCNC: 105 MMOL/L (ref 98–107)
CO2 SERPL-SCNC: 22 MMOL/L (ref 21–32)
CO2 SERPL-SCNC: 23 MMOL/L (ref 21–32)
CREAT SERPL-MCNC: 0.87 MG/DL (ref 0.5–1.05)
CREAT SERPL-MCNC: 0.97 MG/DL (ref 0.5–1.05)
EGFRCR SERPLBLD CKD-EPI 2021: 67 ML/MIN/1.73M*2
EGFRCR SERPLBLD CKD-EPI 2021: 76 ML/MIN/1.73M*2
EJECTION FRACTION APICAL 4 CHAMBER: 61.7
EJECTION FRACTION: 75 %
EOSINOPHIL # BLD AUTO: 0 X10*3/UL (ref 0–0.7)
EOSINOPHIL NFR BLD AUTO: 0 %
ERYTHROCYTE [DISTWIDTH] IN BLOOD BY AUTOMATED COUNT: 17.4 % (ref 11.5–14.5)
GLUCOSE SERPL-MCNC: 110 MG/DL (ref 74–99)
GLUCOSE SERPL-MCNC: 97 MG/DL (ref 74–99)
HCT VFR BLD AUTO: 32 % (ref 36–46)
HGB BLD-MCNC: 9.3 G/DL (ref 12–16)
IMM GRANULOCYTES # BLD AUTO: 0.02 X10*3/UL (ref 0–0.7)
IMM GRANULOCYTES NFR BLD AUTO: 0.4 % (ref 0–0.9)
LEFT VENTRICLE INTERNAL DIMENSION DIASTOLE: 5.23 CM (ref 3.5–6)
LYMPHOCYTES # BLD AUTO: 0.58 X10*3/UL (ref 1.2–4.8)
LYMPHOCYTES NFR BLD AUTO: 11.6 %
MAGNESIUM SERPL-MCNC: 1.72 MG/DL (ref 1.6–2.4)
MAGNESIUM SERPL-MCNC: 2 MG/DL (ref 1.6–2.4)
MCH RBC QN AUTO: 23.4 PG (ref 26–34)
MCHC RBC AUTO-ENTMCNC: 29.1 G/DL (ref 32–36)
MCV RBC AUTO: 81 FL (ref 80–100)
MONOCYTES # BLD AUTO: 0.41 X10*3/UL (ref 0.1–1)
MONOCYTES NFR BLD AUTO: 8.2 %
NEUTROPHILS # BLD AUTO: 4 X10*3/UL (ref 1.2–7.7)
NEUTROPHILS NFR BLD AUTO: 79.8 %
NRBC BLD-RTO: 0 /100 WBCS (ref 0–0)
P AXIS: 37 DEGREES
P OFFSET: 172 MS
P ONSET: 115 MS
PHOSPHATE SERPL-MCNC: 3.3 MG/DL (ref 2.5–4.9)
PHOSPHATE SERPL-MCNC: 4.2 MG/DL (ref 2.5–4.9)
PLATELET # BLD AUTO: 171 X10*3/UL (ref 150–450)
POTASSIUM SERPL-SCNC: 4.2 MMOL/L (ref 3.5–5.3)
POTASSIUM SERPL-SCNC: 4.3 MMOL/L (ref 3.5–5.3)
PR INTERVAL: 196 MS
Q ONSET: 213 MS
QRS COUNT: 16 BEATS
QRS DURATION: 110 MS
QT INTERVAL: 388 MS
QTC CALCULATION(BAZETT): 500 MS
QTC FREDERICIA: 460 MS
R AXIS: 147 DEGREES
RBC # BLD AUTO: 3.97 X10*6/UL (ref 4–5.2)
RIGHT VENTRICLE FREE WALL PEAK S': 13 CM/S
RIGHT VENTRICLE PEAK SYSTOLIC PRESSURE: 105.1 MMHG
SODIUM SERPL-SCNC: 137 MMOL/L (ref 136–145)
SODIUM SERPL-SCNC: 138 MMOL/L (ref 136–145)
T AXIS: 1 DEGREES
T OFFSET: 407 MS
TRICUSPID ANNULAR PLANE SYSTOLIC EXCURSION: 1.9 CM
VENTRICULAR RATE: 100 BPM
WBC # BLD AUTO: 5 X10*3/UL (ref 4.4–11.3)

## 2024-10-24 PROCEDURE — 2500000001 HC RX 250 WO HCPCS SELF ADMINISTERED DRUGS (ALT 637 FOR MEDICARE OP)

## 2024-10-24 PROCEDURE — 83735 ASSAY OF MAGNESIUM: CPT

## 2024-10-24 PROCEDURE — 93308 TTE F-UP OR LMTD: CPT | Performed by: STUDENT IN AN ORGANIZED HEALTH CARE EDUCATION/TRAINING PROGRAM

## 2024-10-24 PROCEDURE — 80069 RENAL FUNCTION PANEL: CPT

## 2024-10-24 PROCEDURE — 93325 DOPPLER ECHO COLOR FLOW MAPG: CPT | Performed by: STUDENT IN AN ORGANIZED HEALTH CARE EDUCATION/TRAINING PROGRAM

## 2024-10-24 PROCEDURE — 93321 DOPPLER ECHO F-UP/LMTD STD: CPT | Performed by: STUDENT IN AN ORGANIZED HEALTH CARE EDUCATION/TRAINING PROGRAM

## 2024-10-24 PROCEDURE — 2500000004 HC RX 250 GENERAL PHARMACY W/ HCPCS (ALT 636 FOR OP/ED)

## 2024-10-24 PROCEDURE — 93325 DOPPLER ECHO COLOR FLOW MAPG: CPT

## 2024-10-24 PROCEDURE — 85025 COMPLETE CBC W/AUTO DIFF WBC: CPT

## 2024-10-24 PROCEDURE — 1200000002 HC GENERAL ROOM WITH TELEMETRY DAILY

## 2024-10-24 PROCEDURE — 99223 1ST HOSP IP/OBS HIGH 75: CPT

## 2024-10-24 PROCEDURE — 2500000004 HC RX 250 GENERAL PHARMACY W/ HCPCS (ALT 636 FOR OP/ED): Performed by: STUDENT IN AN ORGANIZED HEALTH CARE EDUCATION/TRAINING PROGRAM

## 2024-10-24 RX ORDER — MAGNESIUM SULFATE HEPTAHYDRATE 40 MG/ML
2 INJECTION, SOLUTION INTRAVENOUS ONCE
Status: COMPLETED | OUTPATIENT
Start: 2024-10-24 | End: 2024-10-25

## 2024-10-24 RX ORDER — DILTIAZEM HYDROCHLORIDE 120 MG/1
120 CAPSULE, COATED, EXTENDED RELEASE ORAL DAILY
Status: DISCONTINUED | OUTPATIENT
Start: 2024-10-25 | End: 2024-10-26 | Stop reason: HOSPADM

## 2024-10-24 RX ORDER — FUROSEMIDE 10 MG/ML
40 INJECTION INTRAMUSCULAR; INTRAVENOUS ONCE
Status: COMPLETED | OUTPATIENT
Start: 2024-10-24 | End: 2024-10-24

## 2024-10-24 RX ORDER — POTASSIUM CHLORIDE 20 MEQ/1
20 TABLET, EXTENDED RELEASE ORAL 2 TIMES DAILY
Status: ON HOLD | COMMUNITY
End: 2024-10-26

## 2024-10-24 RX ORDER — FERROUS SULFATE, DRIED 160(50) MG
2 TABLET, EXTENDED RELEASE ORAL 2 TIMES DAILY
Status: ON HOLD | COMMUNITY
Start: 2024-06-27 | End: 2024-10-26

## 2024-10-24 ASSESSMENT — COGNITIVE AND FUNCTIONAL STATUS - GENERAL
TURNING FROM BACK TO SIDE WHILE IN FLAT BAD: A LITTLE
MOBILITY SCORE: 20
CLIMB 3 TO 5 STEPS WITH RAILING: A LITTLE
DAILY ACTIVITIY SCORE: 24
MOVING TO AND FROM BED TO CHAIR: A LITTLE
MOBILITY SCORE: 23
CLIMB 3 TO 5 STEPS WITH RAILING: A LITTLE
DAILY ACTIVITIY SCORE: 24
MOVING FROM LYING ON BACK TO SITTING ON SIDE OF FLAT BED WITH BEDRAILS: A LITTLE

## 2024-10-24 ASSESSMENT — PAIN SCALES - GENERAL
PAINLEVEL_OUTOF10: 0 - NO PAIN
PAINLEVEL_OUTOF10: 0 - NO PAIN

## 2024-10-24 ASSESSMENT — PAIN - FUNCTIONAL ASSESSMENT
PAIN_FUNCTIONAL_ASSESSMENT: 0-10
PAIN_FUNCTIONAL_ASSESSMENT: 0-10

## 2024-10-24 NOTE — CARE PLAN
Problem: Fall/Injury  Goal: Not fall by end of shift  Outcome: Progressing  Goal: Be free from injury by end of the shift  Outcome: Progressing  Goal: Verbalize understanding of personal risk factors for fall in the hospital  Outcome: Progressing  Goal: Verbalize understanding of risk factor reduction measures to prevent injury from fall in the home  Outcome: Progressing  Goal: Use assistive devices by end of the shift  Outcome: Progressing  Goal: Pace activities to prevent fatigue by end of the shift  Outcome: Progressing    Pt HDS, plan to diurese with IVP lasix.

## 2024-10-24 NOTE — PROGRESS NOTES
Pharmacy Admission Order Reconciliation Review    Ronna Beckham is a 63 y.o. female admitted for Acute on chronic respiratory failure (Multi). Pharmacy reviewed the patient's unreconciled admission medications.    Prior to admission medications that were reviewed and acted on by the pharmacist include:  Calcium carbonate- vitamin D3  These medications have been reconciled.     Any other unreconcilied medications have been addressed and will be ordered or held by the patient's medical team. Medications addressed by the pharmacist may be added or changed by the patient's medical team at any time.    Ankit Diaz, PharmD  Transitions of Care Pharmacist  [facility]  Please reach out via Secure Chat for questions

## 2024-10-24 NOTE — SIGNIFICANT EVENT
Rapid Response Nurse Note: RADAR alert: 6    Pager time: [phone]  Arrival time: 816  Event end time: 820  Location:  [id number]  [x] Triage by phone or secure messaging    Rapid response initiated by:  [] Rapid response RN [] Family [] Nursing Supervisor [] Physician   [x] RADAR auto page [] Sepsis auto-page [] RN [] RT   [] NP/PA [] Other:     Primary reason for call:   [] BAT [] New CPAP/BiPAP [] Bleeding [] Change in mental status   [] Chest pain [] Code blue [] FiO2 >/= 50% [] HR </= 40 bpm   [] HR >/= 130 bpm [] Hyperglycemia [] Hypoglycemia [x] RADAR    [] RR </= 8 bpm [] RR >/= 30 bpm [] SBP </= 90 mmHg [] SpO2 < 90%   [] Seizure [] Sepsis [] Shorness of breath  [] Staff concern: see comments     Initial VS and/or RADAR VS: T 36.6 °C; HR 92; RR 18; /67; SPO2 93%.  Providers present at bedside (if applicable):     Interventions:  [x] None [] ABG/VBG [] Assist w/ICU transfer [] BAT paged    [] Bag mask [] Blood [] Cardioversion [] Code Blue   [] Code blue for intubation [] Code status changed [] Chest x-ray [] EKG   [] IV fluid/bolus [] KUB x-ray [] Labs/cultures [] Medication   [] Nebulizer treatment [] NIPPV (CPAP/BiPAP) [] Oxygen [] Oral airway   [] Peripheral IV [] Palliative care consult [] CT/MRI [] Sepsis protocol    [] Suctioned [] Other:     Name of ICU Provider contacted (if applicable):   Outcome:  [] Coded and  [] Code blue for intubation [] Coded and transferred to ICU []  on division   [x] Remained on division (no change) [] Remained on division + additional monitoring [] Remained in ED [] Transferred to ED   [] Transferred to ICU [] Transferred to inpatient status [] Transferred for interventions (procedure) [] Transferred to ICU stepdown    [] Transferred to surgery [] Transferred to telemetry [] Sepsis protocol [] STEMI protocol   [] Stroke protocol [x] Bedside nurse instructed to page rapid response for any concerns or acute change in condition/VS     Additional Comments:      Radar auto-page received for a radar score of 6 with the above listed vital signs.  Vital signs were confirmed and reviewed with primary RN.  She is at her current baseline and RN has no concerns at this time.  There are no indications for interventions by Rapid Response at this time.  RN to contact Rapid Response with any future concerns or signs of clinical decompensation.

## 2024-10-24 NOTE — PROGRESS NOTES
Ronna Beckham is a 63 y.o. female on day 1 of admission presenting with Acute on chronic respiratory failure (Multi).      Subjective     Patient had no acute events overnight. She feels well this morning, and does not feel SOB on 6L NC. She does endorse having right lower extremity swelling that is stable. She says that she normally does have swelling just in her right leg. She also has nausea this morning, but states that she always has nausea, and does now want anything to help it.    On exam, patient has a red rash on her skin that has been there for many months, that has not gotten any worse recently. She notes that it is from her lupus, and she is aware that she needs to follow up with a rheumatologist as an outpatient.    She denies any chest pain, lightheadedness, dizziness, cough, or headache.         Objective     Last Recorded Vitals  /67 (BP Location: Left arm, Patient Position: Lying)   Pulse 92   Temp 36.6 °C (97.9 °F) (Temporal)   Resp 18   Wt 74.9 kg (165 lb 2 oz)   SpO2 93%   Intake/Output last 3 Shifts:    Intake/Output Summary (Last 24 hours) at 10/24/2024 1144  Last data filed at 10/24/2024 0603  Gross per 24 hour   Intake 29.46 ml   Output 1100 ml   Net -1070.54 ml       Admission Weight  Weight: 75.6 kg (166 lb 10.7 oz) (10/23/24 1822)    Daily Weight  10/24/24 : 74.9 kg (165 lb 2 oz)    Image Results  ECG 12 Lead  Normal sinus rhythm  Right bundle branch block  Possible Lateral infarct , age undetermined  Cannot rule out Inferior infarct , age undetermined  Abnormal ECG  When compared with ECG of 22-OCT-2024 15:33,  QT has shortened  Confirmed by Yehuda Hicks ([id number]) on 10/24/2024 10:30:22 AM      Physical Exam  Constitutional:       General: She is not in acute distress.     Appearance: Normal appearance. She is not ill-appearing or toxic-appearing.      Comments: On 6L NC   HENT:      Head: Normocephalic.   Eyes:      Pupils: Pupils are equal, round, and reactive to light.    Cardiovascular:      Rate and Rhythm: Normal rate and regular rhythm.      Pulses: Normal pulses.      Heart sounds:      No friction rub.   Pulmonary:      Effort: Pulmonary effort is normal. No respiratory distress.      Breath sounds: Normal breath sounds. No wheezing or rales.   Abdominal:      General: Abdomen is flat. There is no distension.      Palpations: Abdomen is soft.      Tenderness: There is no abdominal tenderness. There is no guarding.   Musculoskeletal:      Right lower leg: Edema present.      Left lower leg: Edema present.      Comments: 1-2+ RLE pitting edema. 1+ LLE pitting edema   Skin:     Findings: Rash present.      Comments: Red rash on chest, blanchable. Per patient is stable. Red rash on face   Neurological:      General: No focal deficit present.      Mental Status: She is alert and oriented to person, place, and time.   Psychiatric:         Mood and Affect: Mood normal.         Behavior: Behavior normal.         Relevant Results  Scheduled medications  aspirin, 81 mg, oral, Daily  cholestyramine, 1 packet, oral, Daily  [START ON 10/25/2024] dilTIAZem CD, 120 mg, oral, Daily  enoxaparin, 40 mg, subcutaneous, Daily  folic acid, 1 mg, oral, Daily  gabapentin, 800 mg, oral, TID  hydroxychloroquine, 200 mg, oral, BID  macitentan, 10 mg, oral, Daily  magnesium chloride, 64 mg, oral, BID  melatonin, 5 mg, oral, Nightly  pantoprazole, 40 mg, oral, Daily before breakfast  venlafaxine XR, 150 mg, oral, Daily      Continuous medications  epoprostenol, 72 ng/kg/min (Order-Specific), Last Rate: 72 ng/kg/min (10/24/24 0509)      PRN medications    Results for orders placed or performed during the hospital encounter of 10/23/24 (from the past 24 hours)   Renal function panel   Result Value Ref Range    Glucose 126 (H) 74 - 99 mg/dL    Sodium 137 136 - 145 mmol/L    Potassium 4.6 3.5 - 5.3 mmol/L    Chloride 107 98 - 107 mmol/L    Bicarbonate 20 (L) 21 - 32 mmol/L    Anion Gap 15 10 - 20 mmol/L     Urea Nitrogen 14 6 - 23 mg/dL    Creatinine 0.72 0.50 - 1.05 mg/dL    eGFR >90 >60 mL/min/1.73m*2    Calcium 7.4 (L) 8.6 - 10.6 mg/dL    Phosphorus 3.6 2.5 - 4.9 mg/dL    Albumin 3.6 3.4 - 5.0 g/dL   CBC and Auto Differential   Result Value Ref Range    WBC 4.1 (L) 4.4 - 11.3 x10*3/uL    nRBC 0.0 0.0 - 0.0 /100 WBCs    RBC 4.16 4.00 - 5.20 x10*6/uL    Hemoglobin 10.0 (L) 12.0 - 16.0 g/dL    Hematocrit 32.6 (L) 36.0 - 46.0 %    MCV 78 (L) 80 - 100 fL    MCH 24.0 (L) 26.0 - 34.0 pg    MCHC 30.7 (L) 32.0 - 36.0 g/dL    RDW 17.2 (H) 11.5 - 14.5 %    Platelets 150 150 - 450 x10*3/uL    Neutrophils % 89.1 40.0 - 80.0 %    Immature Granulocytes %, Automated 0.5 0.0 - 0.9 %    Lymphocytes % 7.9 13.0 - 44.0 %    Monocytes % 2.5 2.0 - 10.0 %    Eosinophils % 0.0 0.0 - 6.0 %    Basophils % 0.0 0.0 - 2.0 %    Neutrophils Absolute 3.62 1.20 - 7.70 x10*3/uL    Immature Granulocytes Absolute, Automated 0.02 0.00 - 0.70 x10*3/uL    Lymphocytes Absolute 0.32 (L) 1.20 - 4.80 x10*3/uL    Monocytes Absolute 0.10 0.10 - 1.00 x10*3/uL    Eosinophils Absolute 0.00 0.00 - 0.70 x10*3/uL    Basophils Absolute 0.00 0.00 - 0.10 x10*3/uL   Magnesium   Result Value Ref Range    Magnesium 2.02 1.60 - 2.40 mg/dL   CALCIUM, IONIZED   Result Value Ref Range    POCT Calcium, Ionized 0.99 (L) 1.1 - 1.33 mmol/L   Blood Gas Venous Full Panel   Result Value Ref Range    POCT pH, Venous 7.43 7.33 - 7.43 pH    POCT pCO2, Venous 31 (L) 41 - 51 mm Hg    POCT pO2, Venous 60 (H) 35 - 45 mm Hg    POCT SO2, Venous 90 (H) 45 - 75 %    POCT Oxy Hemoglobin, Venous 88.6 (H) 45.0 - 75.0 %    POCT Hematocrit Calculated, Venous 31.0 (L) 36.0 - 46.0 %    POCT Sodium, Venous 134 (L) 136 - 145 mmol/L    POCT Potassium, Venous 4.7 3.5 - 5.3 mmol/L    POCT Chloride, Venous 107 98 - 107 mmol/L    POCT Ionized Calicum, Venous 1.01 (L) 1.10 - 1.33 mmol/L    POCT Glucose, Venous 129 (H) 74 - 99 mg/dL    POCT Lactate, Venous 1.0 0.4 - 2.0 mmol/L    POCT Base Excess, Venous -3.0  (L) -2.0 - 3.0 mmol/L    POCT HCO3 Calculated, Venous 20.6 (L) 22.0 - 26.0 mmol/L    POCT Hemoglobin, Venous 10.2 (L) 12.0 - 16.0 g/dL    POCT Anion Gap, Venous 11.0 10.0 - 25.0 mmol/L    Patient Temperature 37.0 degrees Celsius    FiO2 39 %   Coagulation Screen   Result Value Ref Range    Protime 14.4 (H) 9.8 - 12.8 seconds    INR 1.3 (H) 0.9 - 1.1    aPTT 32 27 - 38 seconds   ECG 12 Lead   Result Value Ref Range    Ventricular Rate 100 BPM    Atrial Rate 100 BPM    GA Interval 196 ms    QRS Duration 110 ms    QT Interval 388 ms    QTC Calculation(Bazett) 500 ms    P Axis 37 degrees    R Axis 147 degrees    T Axis 1 degrees    QRS Count 16 beats    Q Onset 213 ms    P Onset 115 ms    P Offset 172 ms    T Offset 407 ms    QTC Fredericia 460 ms   Renal function panel   Result Value Ref Range    Glucose 110 (H) 74 - 99 mg/dL    Sodium 137 136 - 145 mmol/L    Potassium 4.3 3.5 - 5.3 mmol/L    Chloride 105 98 - 107 mmol/L    Bicarbonate 22 21 - 32 mmol/L    Anion Gap 14 10 - 20 mmol/L    Urea Nitrogen 15 6 - 23 mg/dL    Creatinine 0.87 0.50 - 1.05 mg/dL    eGFR 76 >60 mL/min/1.73m*2    Calcium 7.9 (L) 8.6 - 10.6 mg/dL    Phosphorus 4.2 2.5 - 4.9 mg/dL    Albumin 3.6 3.4 - 5.0 g/dL   Magnesium   Result Value Ref Range    Magnesium 2.00 1.60 - 2.40 mg/dL   CBC and Auto Differential   Result Value Ref Range    WBC 5.0 4.4 - 11.3 x10*3/uL    nRBC 0.0 0.0 - 0.0 /100 WBCs    RBC 3.97 (L) 4.00 - 5.20 x10*6/uL    Hemoglobin 9.3 (L) 12.0 - 16.0 g/dL    Hematocrit 32.0 (L) 36.0 - 46.0 %    MCV 81 80 - 100 fL    MCH 23.4 (L) 26.0 - 34.0 pg    MCHC 29.1 (L) 32.0 - 36.0 g/dL    RDW 17.4 (H) 11.5 - 14.5 %    Platelets 171 150 - 450 x10*3/uL    Neutrophils % 79.8 40.0 - 80.0 %    Immature Granulocytes %, Automated 0.4 0.0 - 0.9 %    Lymphocytes % 11.6 13.0 - 44.0 %    Monocytes % 8.2 2.0 - 10.0 %    Eosinophils % 0.0 0.0 - 6.0 %    Basophils % 0.0 0.0 - 2.0 %    Neutrophils Absolute 4.00 1.20 - 7.70 x10*3/uL    Immature Granulocytes  Absolute, Automated 0.02 0.00 - 0.70 x10*3/uL    Lymphocytes Absolute 0.58 (L) 1.20 - 4.80 x10*3/uL    Monocytes Absolute 0.41 0.10 - 1.00 x10*3/uL    Eosinophils Absolute 0.00 0.00 - 0.70 x10*3/uL    Basophils Absolute 0.00 0.00 - 0.10 x10*3/uL   Transthoracic Echo (TTE) Limited   Result Value Ref Range    BSA 1.83 m2     *Note: Due to a large number of results and/or encounters for the requested time period, some results have not been displayed. A complete set of results can be found in Results Review.         Assessment/Plan      Assessment & Plan  Acute on chronic respiratory failure (Multi)      63 y.o. F w/PMHx pulm HTN (on macitentan and epoprostenol via [place], NYHA Functional Class 3 and WHO Group 1), chronic hypoxic respiratory failure (baseline 4L NC), polysubstance use (ethanol, cocaine, amphetamines), SVT, SLE (on hydroxychloroquine), GERD, HARRIS, MDD transferred from [facility] for management of AHRF, hypoMg, hypoCa, and moderate pericardial effusion. HDS, remains on 6L NC saturating 92%. Attempted to wean to 4L however patient dropped to 89%. PE ruled out at OSH, clinical picture not c/w PNA (no fevers, leukocytosis). Hypoxia may be 2/2 fluid overload () vs. worsening pericardial effusion vs. progression of PAH. Diuresed with IV lasix overnight 10/23. Repeat TTE 10/24 for effusion monitoring and monitoring of right heart function. Regarding hypoMg, per chart review, has low levels dating back to 11/2020. Has been taking PO Mg supplementation as outpatient but suspect this is contributing to diarrhea. Has history of alcohol use that may be contributing, as well as recent diuretic use. Patients Mg has improved with supplementation.    Update 10/24:  - Will obtain echo to characterize pericardial effusion  - s/p IV lasix overnight, tolerated well hemodynamically. Will continue with careful diuresis today, but with PO home dose. Will consider redosing lasix after TTE  - BID RFP  - decreased diltiazem  dose for 10/25 AM     #Acute on chronic hypoxic respiratory failure  #PAH  ::follows with Dr. Prescott  ::baseline 4L NC, at OSH ED was requiring between 6-7L NC  ::was given 2 doses of IV solumedrol 40 mg for presumed ?PAH exacerbation  ::CTPE without PE, no concern for PNA  ::CXR on arrival with cardiomegaly, pulm vascular congestion, L sided pleural effusion,  (1000s in early October and then 400s previously)  ::currently on 6L NC  :: s/p IV lasix 40 once (double home dose), will continue PO 40 lasix daily but may need higher outpatient dose vs compliance education  :: Suspect acute on chronic hypoxic respiratory failure   Plan:  -strict I&O  -c/w home macitentan and epoprostenol  -will defer further steroids as patient is not wheezing and without clear COPD history   -goal O2 sat > 90%  - Careful diuresis with PO home dose today  -TTE    #Moderate pericardial effusion  ::noted on CTA, small to moderate effusion noted on TTE 4/2024  -no hypotension, JVD muffled heart sounds c/f tamponade  -iso worsening tachycardia during admissions since 4/2024, will obtain repeat TTE (heart rates now in 100s, previously in 80s)  -if hypoxia does not improve should consider drainage/cards consult     #Sinus tach  #Recurrent SVT  ::history of recurrent SVT, usually in the setting of dilt non-compliance  -EKG on arrival with   -suspect sinus tach iso hypoxia vs pericardial effusion, recent imaging for PE negative  -tele  -decreased diltiazem dose iso diuresis      #Hypomagnesesmia, resolved  #Secondary hypocalcemia  ::initial Mg level 0.58 at OSH ED, repleted with IV Mg and level on arrival to  2.02  ::ddx for hypoMg: diarrhea iso Mg supplementation vs diuretic use vs alcohol use (quit recently)  - administered 1g IV Calcium gluconate s/p ionized calcium of 0.99  -c/w Mg 64 mg BID, will not increase to 400 mg BID as rec'd by OSH cardiology due to diarrhea  -needs GI FU/workup for diarrhea as outpt, will hold imodium  given borderline prolonged QTc but will restart home cholestyramine iso known cholecystectomy     #Prolonged QTc  -EKG with borderline prolonged QTc at 500 ms  -avoid QT prolonging agents, will hold Imodium     #Anemia  #Thrombocytopenia  -c/w folate     #Polysusbtance Use  ::UDS 10/23 negative, UDS 2 weeks prior also negative for any substances     #SLE  -c/w home hydroxychloroquine  -will need outpatient rheum f/u for worsening symptoms     #HARRIS  #MDD  -c/w home gabapentin, venlafaxine  -clarify if patient is still taking Topamax in AM       F: diuresis  E: K>4, Mg>2  N: 2L fluid restriction  A: PIV    DVT PPx: lovenox 40  GI PPx: home PPI      CODE STATUS: FULL (confirmed on admission)   SURROGATE DECISION MAKER: Leticia Parson (daughter) [phone]             Joanne Olsen MD

## 2024-10-24 NOTE — CARE PLAN
Problem: Fall/Injury  Goal: Not fall by end of shift  Outcome: Progressing  Goal: Be free from injury by end of the shift  Outcome: Progressing  Goal: Verbalize understanding of personal risk factors for fall in the hospital  Outcome: Progressing  Goal: Verbalize understanding of risk factor reduction measures to prevent injury from fall in the home  Outcome: Progressing  Goal: Use assistive devices by end of the shift  Outcome: Progressing  Goal: Pace activities to prevent fatigue by end of the shift  Outcome: Progressing     Problem: Pain - Adult  Goal: Verbalizes/displays adequate comfort level or baseline comfort level  Outcome: Progressing     Problem: Safety - Adult  Goal: Free from fall injury  Outcome: Progressing     Problem: Discharge Planning  Goal: Discharge to home or other facility with appropriate resources  Outcome: Progressing     The patient's goals for the shift include Have no c/o SOB    The clinical goals for the shift include Patients O2 remains stable throughout shift    Patient had no complains of SOB and O2 remained stable throughout shift

## 2024-10-24 NOTE — PROGRESS NOTES
Pharmacy Medication History Review    Ronna Beckham is a 63 y.o. female admitted for Acute on chronic respiratory failure (Multi). Pharmacy reviewed the patient's [id number] medications and allergies for accuracy.    Medications ADDED:  Calcium carbonate-vitamin D3 500mg- 5mcg: take 2 tablets twice a day  Potassium chloride 20mEq: take 1 tablet twice a day  Medications CHANGED:  Folic acid- not taking outpatient, plans to resume   Gabapentin 800mg: take 1 tablet twice a day (may take up to three times a day)  Medications REMOVED:   topiramate     The list below reflects the updated PTA list.   Prior to Admission Medications   Prescriptions Last Dose Informant   Opsumit 10 mg tablet tablet 10/22/2024 Self   Sig: TAKE 1 TABLET BY MOUTH DAILY. DO NOT HANDLE IF PREGNANT. DO NOT SPLIT, CRUSH, OR CHEW. REVIEW MEDICATION GUIDE.   aspirin 81 mg EC tablet 10/22/2024 Self   Sig: Take 1 tablet (81 mg) by mouth once daily.   calcium carbonate-vitamin D3 500 mg-5 mcg (200 unit) tablet  Self   Sig: Take 2 tablets by mouth 2 times a day.   cholestyramine (Questran) 4 gram packet  Self   Sig: Take 1 packet (4 g) by mouth once daily.   dilTIAZem CD (Cardizem CD) 180 mg 24 hr capsule 10/22/2024 Self   Sig: Take 1 capsule (180 mg) by mouth once daily.   epoprostenol (Veletri) 1.5 mg recon soln 10/22/2024 Self   Si.5 mg (1,500 mcg) by central venous line infusion route continuously. Reconstitute contents of vial with 5 ml diluent and mix cassette as directed. Infuse continuously per physician orders. Allow for priming volume. Dose range: 1-150 ng/kg/min.   folic acid (Folvite) 1 mg tablet Not Taking Self   Sig: Take 1 tablet (1 mg) by mouth once daily. Needs to resume  Patient reported not taking outpatient. Has supply of medication and plans to restart    furosemide (Lasix) 40 mg tablet 10/22/2024 Self   Sig: TAKE 1 TABLET BY MOUTH ONE TIME DAILY   gabapentin (Neurontin) 800 mg tablet 10/22/2024 Self   Sig: Take 1  tablet (800 mg) by mouth 3 times a day.   Patient taking differently: Take 1 tablet (800 mg) by mouth 2 times a day.  Can take up to three times a day, but typically only takes twice a day    hydroxychloroquine (Plaquenil) 200 mg tablet 10/22/2024 Self   Sig: Take 1 tablet (200 mg) by mouth 2 times a day.   loperamide (Imodium A-D) 2 mg tablet 10/22/2024 Self   Sig: Take 1 tablet (2 mg) by mouth 4 times a day as needed for diarrhea.   magnesium chloride (MagDelay) 64 mg EC tablet 10/22/2024 Self   Sig: Take 1 tablet (64 mg) by mouth 2 times a day.   melatonin 5 mg tablet 10/22/2024 Self   Sig: Take 1 tablet (5 mg) by mouth once daily at bedtime.   omeprazole (PriLOSEC) 40 mg DR capsule 10/22/2024 Self   Sig: Take 1 capsule (40 mg) by mouth once daily in the morning. Take before meals. Do not crush or chew.   potassium chloride CR 20 mEq ER tablet  Self   Sig: Take 1 tablet (20 mEq) by mouth 2 times a day. Do not crush or chew.   topiramate (Topamax) 25 mg tablet Not Taking Self   Sig: Week 1 25mg at bedtime, week 2 25mg BID, week 3 25mg qAM, 50mg at bedtime, week 4 50 mg BID, week 5 50mg qAM 75mg at bedtime, week 6 75 mg BID, week 7 75mg qam 100mg at bedtime, week 8 100mg BID   venlafaxine XR (Effexor-XR) 150 mg 24 hr capsule 10/22/2024 Self   Sig: Take 1 capsule (150 mg) by mouth once daily. Do not crush or chew.      Facility-Administered Medications: None       The list below reflects the updated allergy list. Please review each documented allergy for additional clarification and justification.  Allergies  Reviewed by Vianney Campbell RN on 10/23/2024        Severity Reactions Comments    Levetiracetam Medium Other Weakness, unable to walk            Patient accepts M2B at discharge. Pharmacy has been updated to [facility].    Sources  Review of out patient fill history, OARRS, and patient interview   Reliable historian     Additional Comments  N/A    Ankit Diaz, PharmD  Transitions of Care Pharmacist  [facility]  Ambulatory and Retail Services  Please reach out via Secure Chat for questions, or if no response call [employer] or [facility]

## 2024-10-25 ENCOUNTER — APPOINTMENT (OUTPATIENT)
Dept: RADIOLOGY | Facility: HOSPITAL | Age: 64
End: 2024-10-25
Payer: COMMERCIAL

## 2024-10-25 LAB
ALBUMIN SERPL BCP-MCNC: 3.5 G/DL (ref 3.4–5)
ALBUMIN SERPL BCP-MCNC: 3.6 G/DL (ref 3.4–5)
ANION GAP SERPL CALC-SCNC: 12 MMOL/L (ref 10–20)
ANION GAP SERPL CALC-SCNC: 13 MMOL/L (ref 10–20)
BASOPHILS # BLD AUTO: 0 X10*3/UL (ref 0–0.1)
BASOPHILS NFR BLD AUTO: 0 %
BNP SERPL-MCNC: 737 PG/ML (ref 0–99)
BUN SERPL-MCNC: 18 MG/DL (ref 6–23)
BUN SERPL-MCNC: 18 MG/DL (ref 6–23)
CALCIUM SERPL-MCNC: 7.6 MG/DL (ref 8.6–10.6)
CALCIUM SERPL-MCNC: 7.9 MG/DL (ref 8.6–10.6)
CHLORIDE SERPL-SCNC: 102 MMOL/L (ref 98–107)
CHLORIDE SERPL-SCNC: 104 MMOL/L (ref 98–107)
CO2 SERPL-SCNC: 22 MMOL/L (ref 21–32)
CO2 SERPL-SCNC: 25 MMOL/L (ref 21–32)
CREAT SERPL-MCNC: 0.83 MG/DL (ref 0.5–1.05)
CREAT SERPL-MCNC: 0.98 MG/DL (ref 0.5–1.05)
EGFRCR SERPLBLD CKD-EPI 2021: 66 ML/MIN/1.73M*2
EGFRCR SERPLBLD CKD-EPI 2021: 80 ML/MIN/1.73M*2
EOSINOPHIL # BLD AUTO: 0 X10*3/UL (ref 0–0.7)
EOSINOPHIL NFR BLD AUTO: 0 %
ERYTHROCYTE [DISTWIDTH] IN BLOOD BY AUTOMATED COUNT: 17.3 % (ref 11.5–14.5)
GLUCOSE SERPL-MCNC: 106 MG/DL (ref 74–99)
GLUCOSE SERPL-MCNC: 82 MG/DL (ref 74–99)
HCT VFR BLD AUTO: 32.2 % (ref 36–46)
HGB BLD-MCNC: 9.2 G/DL (ref 12–16)
IMM GRANULOCYTES # BLD AUTO: 0.01 X10*3/UL (ref 0–0.7)
IMM GRANULOCYTES NFR BLD AUTO: 0.2 % (ref 0–0.9)
LYMPHOCYTES # BLD AUTO: 0.5 X10*3/UL (ref 1.2–4.8)
LYMPHOCYTES NFR BLD AUTO: 10.8 %
MAGNESIUM SERPL-MCNC: 1.69 MG/DL (ref 1.6–2.4)
MAGNESIUM SERPL-MCNC: 1.98 MG/DL (ref 1.6–2.4)
MCH RBC QN AUTO: 23.2 PG (ref 26–34)
MCHC RBC AUTO-ENTMCNC: 28.6 G/DL (ref 32–36)
MCV RBC AUTO: 81 FL (ref 80–100)
MONOCYTES # BLD AUTO: 0.3 X10*3/UL (ref 0.1–1)
MONOCYTES NFR BLD AUTO: 6.5 %
NEUTROPHILS # BLD AUTO: 3.83 X10*3/UL (ref 1.2–7.7)
NEUTROPHILS NFR BLD AUTO: 82.5 %
NRBC BLD-RTO: 0 /100 WBCS (ref 0–0)
PHOSPHATE SERPL-MCNC: 2.4 MG/DL (ref 2.5–4.9)
PHOSPHATE SERPL-MCNC: 2.9 MG/DL (ref 2.5–4.9)
PLATELET # BLD AUTO: 140 X10*3/UL (ref 150–450)
POTASSIUM SERPL-SCNC: 3.8 MMOL/L (ref 3.5–5.3)
POTASSIUM SERPL-SCNC: 4.1 MMOL/L (ref 3.5–5.3)
RBC # BLD AUTO: 3.96 X10*6/UL (ref 4–5.2)
SODIUM SERPL-SCNC: 135 MMOL/L (ref 136–145)
SODIUM SERPL-SCNC: 135 MMOL/L (ref 136–145)
STAPHYLOCOCCUS SPEC CULT: ABNORMAL
WBC # BLD AUTO: 4.6 X10*3/UL (ref 4.4–11.3)

## 2024-10-25 PROCEDURE — 99233 SBSQ HOSP IP/OBS HIGH 50: CPT

## 2024-10-25 PROCEDURE — 84520 ASSAY OF UREA NITROGEN: CPT

## 2024-10-25 PROCEDURE — 2500000001 HC RX 250 WO HCPCS SELF ADMINISTERED DRUGS (ALT 637 FOR MEDICARE OP)

## 2024-10-25 PROCEDURE — 2500000004 HC RX 250 GENERAL PHARMACY W/ HCPCS (ALT 636 FOR OP/ED)

## 2024-10-25 PROCEDURE — 83880 ASSAY OF NATRIURETIC PEPTIDE: CPT

## 2024-10-25 PROCEDURE — 2500000004 HC RX 250 GENERAL PHARMACY W/ HCPCS (ALT 636 FOR OP/ED): Performed by: STUDENT IN AN ORGANIZED HEALTH CARE EDUCATION/TRAINING PROGRAM

## 2024-10-25 PROCEDURE — 85025 COMPLETE CBC W/AUTO DIFF WBC: CPT

## 2024-10-25 PROCEDURE — 70450 CT HEAD/BRAIN W/O DYE: CPT

## 2024-10-25 PROCEDURE — 83735 ASSAY OF MAGNESIUM: CPT

## 2024-10-25 PROCEDURE — 70450 CT HEAD/BRAIN W/O DYE: CPT | Performed by: RADIOLOGY

## 2024-10-25 PROCEDURE — 80069 RENAL FUNCTION PANEL: CPT

## 2024-10-25 PROCEDURE — 1200000002 HC GENERAL ROOM WITH TELEMETRY DAILY

## 2024-10-25 RX ORDER — POTASSIUM CHLORIDE 14.9 MG/ML
20 INJECTION INTRAVENOUS ONCE
Status: COMPLETED | OUTPATIENT
Start: 2024-10-25 | End: 2024-10-26

## 2024-10-25 RX ORDER — POTASSIUM CHLORIDE 14.9 MG/ML
20 INJECTION INTRAVENOUS
Status: DISCONTINUED | OUTPATIENT
Start: 2024-10-25 | End: 2024-10-25

## 2024-10-25 RX ORDER — MAGNESIUM SULFATE HEPTAHYDRATE 40 MG/ML
2 INJECTION, SOLUTION INTRAVENOUS ONCE
Status: COMPLETED | OUTPATIENT
Start: 2024-10-25 | End: 2024-10-26

## 2024-10-25 RX ORDER — FUROSEMIDE 10 MG/ML
40 INJECTION INTRAMUSCULAR; INTRAVENOUS ONCE
Status: COMPLETED | OUTPATIENT
Start: 2024-10-25 | End: 2024-10-25

## 2024-10-25 ASSESSMENT — COGNITIVE AND FUNCTIONAL STATUS - GENERAL
MOBILITY SCORE: 23
CLIMB 3 TO 5 STEPS WITH RAILING: A LITTLE
DAILY ACTIVITIY SCORE: 24

## 2024-10-25 ASSESSMENT — PAIN SCALES - GENERAL
PAINLEVEL_OUTOF10: 0 - NO PAIN

## 2024-10-25 ASSESSMENT — PAIN - FUNCTIONAL ASSESSMENT
PAIN_FUNCTIONAL_ASSESSMENT: 0-10

## 2024-10-25 ASSESSMENT — ACTIVITIES OF DAILY LIVING (ADL): LACK_OF_TRANSPORTATION: NO

## 2024-10-25 NOTE — PROGRESS NOTES
10/25/24 0950   Discharge Planning   Living Arrangements Children   Support Systems Children   Assistance Needed needs assist   Type of Residence Private residence   Number of Stairs to Enter Residence 0   Number of Stairs Within Residence 13   Do you have animals or pets at home? No   Home or Post Acute Services In home services   Type of Home Care Services Home health aide   Expected Discharge Disposition Home   Does the patient need discharge transport arranged? No   Financial Resource Strain   How hard is it for you to pay for the very basics like food, housing, medical care, and heating? Not very   Housing Stability   In the last 12 months, was there a time when you were not able to pay the mortgage or rent on time? N   In the past 12 months, how many times have you moved where you were living? 1   At any time in the past 12 months, were you homeless or living in a shelter (including now)? N   Transportation Needs   In the past 12 months, has lack of transportation kept you from medical appointments or from getting medications? no   In the past 12 months, has lack of transportation kept you from meetings, work, or from getting things needed for daily living? No   Patient Choice   Provider Choice list and CMS website (https://medicare.gov/care-compare#search) for post-acute Quality and Resource Measure Data were provided and reviewed with: Patient   Patient / Family choosing to utilize agency / facility established prior to hospitalization No     Transitional Care Coordination Progress Note:  Patient discussed during interdisciplinary rounds.   Team members present: CHARITY MARTÍNEZ MD   Plan per Medical/Surgical team: Echo TTE redosing lasix decreased diltiazem   Payor: [facility]   Discharge disposition: Home   Potential Barriers: None   ADOD: 10-      Previous Home Care: None   DME: None   Pharmacy: [facility]   Falls: None   PCP:  NORBERT CRAWFORD   Dialysis: None

## 2024-10-25 NOTE — PROGRESS NOTES
Patients Lace Score greater than 78.  This TCC scheduled follow up PCP appointment,  with [facility] next thursday 10- at 120 pm  patient made aware.

## 2024-10-25 NOTE — PROGRESS NOTES
10/25/24 0950   Discharge Planning   Living Arrangements Children   Support Systems Children   Assistance Needed needs assist   Type of Residence Private residence   Number of Stairs to Enter Residence 0   Number of Stairs Within Residence 13   Do you have animals or pets at home? No   Home or Post Acute Services In home services   Type of Home Care Services Home health aide   Expected Discharge Disposition Home   Does the patient need discharge transport arranged? No   Financial Resource Strain   How hard is it for you to pay for the very basics like food, housing, medical care, and heating? Not very   Housing Stability   In the last 12 months, was there a time when you were not able to pay the mortgage or rent on time? N   In the past 12 months, how many times have you moved where you were living? 1   At any time in the past 12 months, were you homeless or living in a shelter (including now)? N   Transportation Needs   In the past 12 months, has lack of transportation kept you from medical appointments or from getting medications? no   Patient Choice   Provider Choice list and CMS website (https://medicare.gov/care-compare#search) for post-acute Quality and Resource Measure Data were provided and reviewed with: Patient   Patient / Family choosing to utilize agency / facility established prior to hospitalization No

## 2024-10-25 NOTE — SIGNIFICANT EVENT
Rapid Response Nurse Note: RADAR alert: 7    Pager time:   Arrival time:   Event end time:   Location: T5-60  [x] Triage by phone or secure messaging    Rapid response initiated by:  [] Rapid response RN [] Family [] Nursing Supervisor [] Physician   [] RADAR auto page [] Sepsis auto-page [] RN [] RT   [] NP/PA [] Other:     Primary reason for call:   [] BAT [] New CPAP/BiPAP [] Bleeding [] Change in mental status   [] Chest pain [] Code blue [] FiO2 >/= 50% [] HR </= 40 bpm   [] HR >/= 130 bpm [] Hyperglycemia [] Hypoglycemia [x] RADAR    [] RR </= 8 bpm [] RR >/= 30 bpm [] SBP </= 90 mmHg [] SpO2 < 90%   [] Seizure [] Sepsis [] Shorness of breath  [] Staff concern: see comments     Initial VS and/or RADAR VS: T 36.2 °C; ; RR 20; /80; SPO2 89% but corrected to 92%  Providers present at bedside (if applicable):     Interventions:  [x] None [] ABG/VBG [] Assist w/ICU transfer [] BAT paged    [] Bag mask [] Blood [] Cardioversion [] Code Blue   [] Code blue for intubation [] Code status changed [] Chest x-ray [] EKG   [] IV fluid/bolus [] KUB x-ray [] Labs/cultures [] Medication   [] Nebulizer treatment [] NIPPV (CPAP/BiPAP) [] Oxygen [] Oral airway   [] Peripheral IV [] Palliative care consult [] CT/MRI [] Sepsis protocol    [] Suctioned [] Other:     Name of ICU Provider contacted (if applicable):   Outcome:  [] Coded and  [] Code blue for intubation [] Coded and transferred to ICU []  on division   [x] Remained on division (no change) [] Remained on division + additional monitoring [] Remained in ED [] Transferred to ED   [] Transferred to ICU [] Transferred to inpatient status [] Transferred for interventions (procedure) [] Transferred to ICU stepdown    [] Transferred to surgery [] Transferred to telemetry [] Sepsis protocol [] STEMI protocol   [] Stroke protocol [x] Bedside nurse instructed to page rapid response for any concerns or acute change in condition/VS      Additional Comments:  Bedside Rn has no concerns at this time.

## 2024-10-25 NOTE — SIGNIFICANT EVENT
The nurse massaged me that the patient fell and hit her head. I promptly assessed the patient, who reported that she was in the bathroom when she tripped over her IV line, causing her to fall and strike her head and back against the plastic part of the linen cart.    The patient reported feeling well with no pain, headache, weakness, numbness, slurred speech,  dizziness, loss of consciousness, bleeding, vision impairment, nausea or vomiting    On examination, the patient A&O x4. Minimal bruise on right upper side of the back noted     Vitals:    10/25/24 0159   BP: 105/76   Pulse: 98   Resp: 22   Temp: 36.5 °C (97.7 °F)   SpO2: 93%      Neurological Exam:  Cranial nerves II-XII: intact  Motor strength: 5/5 bilaterally in upper and lower extremities  Sensation: intact to light touch throughout  Reflexes: normal  Gait: steady  No signs of focal neurological deficits    A head CT scan has been ordered, and the patient will be closely monitored for any changes.

## 2024-10-25 NOTE — PROGRESS NOTES
Ronna Beckham is a 63 y.o. female on day 2 of admission presenting with Acute on chronic respiratory failure (Multi).      Subjective     Patient had no acute events overnight. She feels well this morning, and does not feel SOB on 6L NC, even when walking around. She does endorse having right lower extremity swelling that is stable.     Overnight, patient fell in the bathroom after she tripped over her oxygen tubing. She hit her head on the linen cart. She did not lose consciousness. She denied any changes in vision, syncope, weakness, numbness, tingling, headache, chest pain, dizziness, or lightheadedness. Patient continues to deny any of these symptoms.     Patient states that she was taking her medications as prescribed. When she was not taking her medications (a few weeks prior to hospitalization), she ran out and could not get contact with her physician to get refills.    She denies any chest pain, lightheadedness, dizziness, cough, or headache.         Objective     Last Recorded Vitals  /69 (BP Location: Left arm, Patient Position: Sitting)   Pulse 103   Temp 36.2 °C (97.2 °F) (Temporal)   Resp 20   Wt 75.4 kg (166 lb 3.6 oz)   SpO2 93%   Intake/Output last 3 Shifts:    Intake/Output Summary (Last 24 hours) at 10/25/2024 0700  Last data filed at 10/25/2024 0456  Gross per 24 hour   Intake 665.1 ml   Output 2300 ml   Net -1634.9 ml       Admission Weight  Weight: 75.6 kg (166 lb 10.7 oz) (10/23/24 1822)    Daily Weight  10/25/24 : 75.4 kg (166 lb 3.6 oz)    Image Results  CT head wo IV contrast  Narrative: STUDY:  CT HEAD WO IV CONTRAST;  10/25/2024 5:40 am      INDICATION:  Signs/Symptoms:Hx of fall on head.      COMPARISON:  CT head 05/31/2023      ACCESSION NUMBER(S):  [id number]      ORDERING CLINICIAN:  GRZEGORZ LEIVA      TECHNIQUE:  Axial CT images of the head were obtained with sagittal and coronal  reconstructions. Angled reformats in brain and bone windows were  generated.       Intravenous contrast was not administered for this exam.      FINDINGS:  CSF SPACES: The ventricles, sulci and basal cisterns are mildly  diffusely prominent proportional to volume loss. There is no  extraaxial fluid collection.      PARENCHYMA: Mild periventricular white matter hypodensity. The  grey-white differentiation is intact. There is no mass effect or  midline shift. There is no intra-axial hemorrhage.      CALVARIUM AND EXTRACRANIAL SOFT TISSUES: The calvarium is intact.  Prominent subcutaneous vessels overlying the bilateral temporal  regions. Otherwise, the visualized soft tissues of the head and neck  are unremarkable.      PARANASAL SINUSES AND MASTOIDS: Visualized paranasal sinuses and  mastoids are clear.      Impression: 1. No acute intracranial abnormality or calvarial fracture.  2. Mild diffuse parenchymal volume loss and periventricular white  matter changes of chronic microvascular ischemic disease.      I personally reviewed the image(s)/study and resident interpretation  as stated by Dr. Shalonda Molina MD. I agree with the findings as  stated. This study was interpreted at [facility], [place], [place].      MACRO:  None          Dictation workstation:   [id number]      Physical Exam  Constitutional:       General: She is not in acute distress.     Appearance: Normal appearance. She is not ill-appearing or toxic-appearing.      Comments: On 6L NC   HENT:      Head: Normocephalic.   Eyes:      Pupils: Pupils are equal, round, and reactive to light.   Cardiovascular:      Rate and Rhythm: Normal rate and regular rhythm.      Pulses: Normal pulses.      Heart sounds:      No friction rub.   Pulmonary:      Effort: Pulmonary effort is normal. No respiratory distress.      Breath sounds: Normal breath sounds. No wheezing or rales.   Abdominal:      General: Abdomen is flat. There is no distension.      Palpations: Abdomen is soft.      Tenderness: There is  no abdominal tenderness. There is no guarding.   Musculoskeletal:      Right lower leg: Edema present.      Left lower leg: Edema present.      Comments: 1-2+ RLE pitting edema. 1+ LLE pitting edema. Right back bruise   Skin:     Findings: Rash present.      Comments: Red rash on chest, blanchable. Per patient is stable. Red rash on face   Neurological:      General: No focal deficit present.      Mental Status: She is alert and oriented to person, place, and time.   Psychiatric:         Mood and Affect: Mood normal.         Behavior: Behavior normal.         Relevant Results  Scheduled medications  aspirin, 81 mg, oral, Daily  cholestyramine, 1 packet, oral, Daily  dilTIAZem CD, 120 mg, oral, Daily  enoxaparin, 40 mg, subcutaneous, Daily  folic acid, 1 mg, oral, Daily  gabapentin, 800 mg, oral, TID  hydroxychloroquine, 200 mg, oral, BID  macitentan, 10 mg, oral, Daily  magnesium chloride, 64 mg, oral, BID  melatonin, 5 mg, oral, Nightly  pantoprazole, 40 mg, oral, Daily before breakfast  venlafaxine XR, 150 mg, oral, Daily      Continuous medications  epoprostenol, 72 ng/kg/min (Order-Specific), Last Rate: 72 ng/kg/min (10/24/24 3175)      PRN medications    Results for orders placed or performed during the hospital encounter of 10/23/24 (from the past 24 hours)   Transthoracic Echo (TTE) Limited   Result Value Ref Range    Tricuspid annular plane systolic excursion 1.9 cm    LV EF 75 %    RV free wall pk S' 13.00 cm/s    LVIDd 5.23 cm    RVSP 105.1 mmHg    LV A4C EF 61.7    Magnesium   Result Value Ref Range    Magnesium 1.72 1.60 - 2.40 mg/dL   Renal Function Panel   Result Value Ref Range    Glucose 97 74 - 99 mg/dL    Sodium 138 136 - 145 mmol/L    Potassium 4.2 3.5 - 5.3 mmol/L    Chloride 104 98 - 107 mmol/L    Bicarbonate 23 21 - 32 mmol/L    Anion Gap 15 10 - 20 mmol/L    Urea Nitrogen 17 6 - 23 mg/dL    Creatinine 0.97 0.50 - 1.05 mg/dL    eGFR 67 >60 mL/min/1.73m*2    Calcium 7.9 (L) 8.6 - 10.6 mg/dL     Phosphorus 3.3 2.5 - 4.9 mg/dL    Albumin 3.6 3.4 - 5.0 g/dL         Assessment/Plan      Assessment & Plan  Acute on chronic respiratory failure (Multi)      63 y.o. F w/PMHx pulm HTN (on macitentan and epoprostenol via [place], NYHA Functional Class 3 and WHO Group 1), chronic hypoxic respiratory failure (baseline 4L NC), polysubstance use (ethanol, cocaine, amphetamines), SVT, SLE (on hydroxychloroquine), GERD, HARRIS, MDD transferred from [facility] for management of AHRF, hypoMg, hypoCa, and moderate pericardial effusion. HDS, remains on 6L NC saturating 92%. Attempted to wean to 4L however patient dropped to 89%. PE ruled out at OSH, clinical picture not c/w PNA (no fevers, leukocytosis). Hypoxia may be 2/2 fluid overload () vs. worsening pericardial effusion vs. progression of PAH. Diuresed with IV lasix overnight 10/23. Repeat TTE 10/24 for effusion monitoring and monitoring of right heart function. Regarding hypoMg, per chart review, has low levels dating back to 11/2020. Has been taking PO Mg supplementation as outpatient but suspect this is contributing to diarrhea. Has history of alcohol use that may be contributing, as well as recent diuretic use. Patients Mg has improved with supplementation.    Update 10/25:  - Echo 10/24 unchanged pericardial effusion from prior, will hold off on intervention presently  - Still on 6L NC (baseline requirement 4L)  - IV lasix to remove volume, will keep close watch on HD stability  - BID RFP  - CT head negative for acute intracranial abnormality after mechanical fall. Patient symptomatic and doing well after.      #Acute on chronic hypoxic respiratory failure  #PAH  ::follows with Dr. Prescott  ::baseline 4L NC, at OSH ED was requiring between 6-7L NC  ::was given 2 doses of IV solumedrol 40 mg for presumed ?PAH exacerbation  ::CTPE without PE, no concern for PNA  ::CXR on arrival with cardiomegaly, pulm vascular congestion, L sided pleural effusion,  (1000s in  early October and then 400s previously)  ::currently on 6L NC  :: s/p IV lasix 40 once (double home dose), will continue PO 40 lasix daily but may need higher outpatient dose vs compliance education  :: Suspect acute on chronic hypoxic respiratory failure   Plan:  -strict I&O  -c/w home macitentan and epoprostenol  -will defer further steroids as patient is not wheezing and without clear COPD history   -goal O2 sat > 90%  - Careful diuresis with IV lasix 40mg today  -TTE with increased RAP  -Endpoint for diuresis will be O2 requirements at home 4L    #Moderate pericardial effusion  ::noted on CTA, small to moderate effusion noted on TTE 4/2024  -no hypotension, JVD muffled heart sounds c/f tamponade  -iso worsening tachycardia during admissions since 4/2024, will obtain repeat TTE (heart rates now in 100s, previously in 80s)  -if hypoxia does not improve should consider drainage/cards consult  -TTE with unchanged pericardial effusion from 04/2024     #Sinus tach  #Recurrent SVT  ::history of recurrent SVT, usually in the setting of dilt non-compliance  -EKG on arrival with   -suspect sinus tach iso hypoxia vs pericardial effusion, recent imaging for PE negative  -tele  -decreased diltiazem dose iso diuresis      #Hypomagnesesmia, resolved  #Secondary hypocalcemia  ::initial Mg level 0.58 at OSH ED, repleted with IV Mg and level on arrival to  2.02  ::ddx for hypoMg: diarrhea iso Mg supplementation vs diuretic use vs alcohol use (quit recently)  - administered 1g IV Calcium gluconate s/p ionized calcium of 0.99  -c/w Mg 64 mg BID, will not increase to 400 mg BID as rec'd by OSH cardiology due to diarrhea  -needs GI FU/workup for diarrhea as outpt, will hold imodium given borderline prolonged QTc but will restart home cholestyramine iso known cholecystectomy     #Prolonged QTc  -EKG with borderline prolonged QTc at 500 ms  -avoid QT prolonging agents, will hold Imodium     #Anemia  #Thrombocytopenia  -c/w  folate     #Polysusbtance Use  ::UDS 10/23 negative, UDS 2 weeks prior also negative for any substances     #SLE  -c/w home hydroxychloroquine  -will need outpatient rheum f/u for worsening symptoms     #HARRIS  #MDD  -c/w home gabapentin, venlafaxine  -clarify if patient is still taking Topamax in AM    F: diuresis  E: K>4, Mg>2  N: 2L fluid restriction  A: PIV    DVT PPx: lovenox 40  GI PPx: home PPI      CODE STATUS: FULL (confirmed on admission)   SURROGATE DECISION MAKER: Leticia Parson (daughter) [phone]           Joanne Olsen MD

## 2024-10-25 NOTE — CARE PLAN
The patient's goals for the shift include She would like to have no SOB    The clinical goals for the shift include Maintain pulse ox of greater than 92%    Over the shift, the patient reports 0/10 pain and no SOB or ALAMO.  Although patient had a fall during the night, patient states she does not need assistance going to the bathroom.  En-enforced to patient falls safety precautions and staff would be available if needed just use call light or pull cord in bathroom.  Patient remained safe free from falls and injury this shift.    Note:  Veletri changed at 14:00      Problem: Fall/Injury  Goal: Not fall by end of shift  Outcome: Progressing  Goal: Be free from injury by end of the shift  Outcome: Progressing  Goal: Verbalize understanding of personal risk factors for fall in the hospital  Outcome: Progressing  Goal: Verbalize understanding of risk factor reduction measures to prevent injury from fall in the home  Outcome: Progressing  Goal: Use assistive devices by end of the shift  Outcome: Progressing  Goal: Pace activities to prevent fatigue by end of the shift  Outcome: Progressing     Problem: Pain - Adult  Goal: Verbalizes/displays adequate comfort level or baseline comfort level  Outcome: Progressing     Problem: Safety - Adult  Goal: Free from fall injury  Outcome: Progressing     Problem: Discharge Planning  Goal: Discharge to home or other facility with appropriate resources  Outcome: Progressing

## 2024-10-26 ENCOUNTER — PHARMACY VISIT (OUTPATIENT)
Dept: PHARMACY | Facility: CLINIC | Age: 64
End: 2024-10-26
Payer: MEDICAID

## 2024-10-26 VITALS
OXYGEN SATURATION: 91 % | TEMPERATURE: 97.7 F | WEIGHT: 166.23 LBS | DIASTOLIC BLOOD PRESSURE: 77 MMHG | HEIGHT: 64 IN | HEART RATE: 100 BPM | BODY MASS INDEX: 28.38 KG/M2 | RESPIRATION RATE: 19 BRPM | SYSTOLIC BLOOD PRESSURE: 113 MMHG

## 2024-10-26 LAB
ALBUMIN SERPL BCP-MCNC: 3.4 G/DL (ref 3.4–5)
ANION GAP SERPL CALC-SCNC: 14 MMOL/L (ref 10–20)
BASOPHILS # BLD AUTO: 0 X10*3/UL (ref 0–0.1)
BASOPHILS NFR BLD AUTO: 0 %
BUN SERPL-MCNC: 17 MG/DL (ref 6–23)
CALCIUM SERPL-MCNC: 7.9 MG/DL (ref 8.6–10.6)
CHLORIDE SERPL-SCNC: 104 MMOL/L (ref 98–107)
CO2 SERPL-SCNC: 22 MMOL/L (ref 21–32)
CREAT SERPL-MCNC: 0.69 MG/DL (ref 0.5–1.05)
EGFRCR SERPLBLD CKD-EPI 2021: >90 ML/MIN/1.73M*2
EOSINOPHIL # BLD AUTO: 0 X10*3/UL (ref 0–0.7)
EOSINOPHIL NFR BLD AUTO: 0 %
ERYTHROCYTE [DISTWIDTH] IN BLOOD BY AUTOMATED COUNT: 16.9 % (ref 11.5–14.5)
GLUCOSE SERPL-MCNC: 112 MG/DL (ref 74–99)
HCT VFR BLD AUTO: 29.3 % (ref 36–46)
HGB BLD-MCNC: 8.3 G/DL (ref 12–16)
IMM GRANULOCYTES # BLD AUTO: 0.02 X10*3/UL (ref 0–0.7)
IMM GRANULOCYTES NFR BLD AUTO: 0.4 % (ref 0–0.9)
LYMPHOCYTES # BLD AUTO: 0.48 X10*3/UL (ref 1.2–4.8)
LYMPHOCYTES NFR BLD AUTO: 10.7 %
MAGNESIUM SERPL-MCNC: 1.87 MG/DL (ref 1.6–2.4)
MCH RBC QN AUTO: 23.4 PG (ref 26–34)
MCHC RBC AUTO-ENTMCNC: 28.3 G/DL (ref 32–36)
MCV RBC AUTO: 83 FL (ref 80–100)
MONOCYTES # BLD AUTO: 0.31 X10*3/UL (ref 0.1–1)
MONOCYTES NFR BLD AUTO: 6.9 %
NEUTROPHILS # BLD AUTO: 3.66 X10*3/UL (ref 1.2–7.7)
NEUTROPHILS NFR BLD AUTO: 82 %
NRBC BLD-RTO: 0 /100 WBCS (ref 0–0)
PHOSPHATE SERPL-MCNC: 3 MG/DL (ref 2.5–4.9)
PLATELET # BLD AUTO: 115 X10*3/UL (ref 150–450)
POTASSIUM SERPL-SCNC: 4.3 MMOL/L (ref 3.5–5.3)
RBC # BLD AUTO: 3.55 X10*6/UL (ref 4–5.2)
SODIUM SERPL-SCNC: 136 MMOL/L (ref 136–145)
WBC # BLD AUTO: 4.5 X10*3/UL (ref 4.4–11.3)

## 2024-10-26 PROCEDURE — 2500000004 HC RX 250 GENERAL PHARMACY W/ HCPCS (ALT 636 FOR OP/ED)

## 2024-10-26 PROCEDURE — RXMED WILLOW AMBULATORY MEDICATION CHARGE

## 2024-10-26 PROCEDURE — 2500000004 HC RX 250 GENERAL PHARMACY W/ HCPCS (ALT 636 FOR OP/ED): Performed by: STUDENT IN AN ORGANIZED HEALTH CARE EDUCATION/TRAINING PROGRAM

## 2024-10-26 PROCEDURE — 2500000001 HC RX 250 WO HCPCS SELF ADMINISTERED DRUGS (ALT 637 FOR MEDICARE OP)

## 2024-10-26 PROCEDURE — 85025 COMPLETE CBC W/AUTO DIFF WBC: CPT

## 2024-10-26 PROCEDURE — 80069 RENAL FUNCTION PANEL: CPT

## 2024-10-26 PROCEDURE — 2500000005 HC RX 250 GENERAL PHARMACY W/O HCPCS

## 2024-10-26 PROCEDURE — 83735 ASSAY OF MAGNESIUM: CPT

## 2024-10-26 PROCEDURE — 99239 HOSP IP/OBS DSCHRG MGMT >30: CPT

## 2024-10-26 RX ORDER — DILTIAZEM HYDROCHLORIDE 180 MG/1
180 CAPSULE, COATED, EXTENDED RELEASE ORAL DAILY
Qty: 30 CAPSULE | Refills: 0 | Status: SHIPPED | OUTPATIENT
Start: 2024-10-26

## 2024-10-26 RX ORDER — GABAPENTIN 800 MG/1
800 TABLET ORAL 3 TIMES DAILY
Qty: 90 TABLET | Refills: 0 | Status: SHIPPED | OUTPATIENT
Start: 2024-10-26

## 2024-10-26 RX ORDER — ACETAMINOPHEN 500 MG
5 TABLET ORAL NIGHTLY
Qty: 30 TABLET | Refills: 0 | Status: SHIPPED | OUTPATIENT
Start: 2024-10-26

## 2024-10-26 RX ORDER — ASPIRIN 81 MG/1
81 TABLET ORAL DAILY
Qty: 30 TABLET | Refills: 0 | Status: SHIPPED | OUTPATIENT
Start: 2024-10-26

## 2024-10-26 RX ORDER — HYDROXYCHLOROQUINE SULFATE 200 MG/1
200 TABLET, FILM COATED ORAL 2 TIMES DAILY
Qty: 60 TABLET | Refills: 0 | Status: SHIPPED | OUTPATIENT
Start: 2024-10-26

## 2024-10-26 RX ORDER — FUROSEMIDE 40 MG/1
60 TABLET ORAL DAILY
Qty: 30 TABLET | Refills: 0 | Status: SHIPPED | OUTPATIENT
Start: 2024-10-26

## 2024-10-26 RX ORDER — MAGNESIUM CHLORIDE 64 MG
64 TABLET, DELAYED RELEASE (ENTERIC COATED) ORAL 2 TIMES DAILY
Qty: 60 TABLET | Refills: 0 | Status: SHIPPED | OUTPATIENT
Start: 2024-10-26

## 2024-10-26 RX ORDER — VENLAFAXINE HYDROCHLORIDE 150 MG/1
150 CAPSULE, EXTENDED RELEASE ORAL DAILY
Qty: 30 CAPSULE | Refills: 0 | Status: SHIPPED | OUTPATIENT
Start: 2024-10-26 | End: 2024-11-25

## 2024-10-26 RX ORDER — FERROUS SULFATE, DRIED 160(50) MG
2 TABLET, EXTENDED RELEASE ORAL 2 TIMES DAILY
Qty: 120 TABLET | Refills: 0 | Status: SHIPPED | OUTPATIENT
Start: 2024-10-26

## 2024-10-26 RX ORDER — OMEPRAZOLE 40 MG/1
40 CAPSULE, DELAYED RELEASE ORAL
Qty: 30 CAPSULE | Refills: 0 | Status: SHIPPED | OUTPATIENT
Start: 2024-10-26

## 2024-10-26 RX ORDER — POTASSIUM CHLORIDE 20 MEQ/1
20 TABLET, EXTENDED RELEASE ORAL 2 TIMES DAILY
Qty: 60 TABLET | Refills: 0 | Status: SHIPPED | OUTPATIENT
Start: 2024-10-26

## 2024-10-26 ASSESSMENT — COGNITIVE AND FUNCTIONAL STATUS - GENERAL
DAILY ACTIVITIY SCORE: 24
MOBILITY SCORE: 23
DAILY ACTIVITIY SCORE: 24
MOBILITY SCORE: 23
CLIMB 3 TO 5 STEPS WITH RAILING: A LITTLE
CLIMB 3 TO 5 STEPS WITH RAILING: A LITTLE

## 2024-10-26 ASSESSMENT — PAIN - FUNCTIONAL ASSESSMENT: PAIN_FUNCTIONAL_ASSESSMENT: 0-10

## 2024-10-26 ASSESSMENT — PAIN SCALES - GENERAL: PAINLEVEL_OUTOF10: 0 - NO PAIN

## 2024-10-26 NOTE — CARE PLAN
The patient's goals for the shift include She would like to have no SOB    The clinical goals for the shift include Maintain pulse ox of greater than 92%    Over the shift, the patient did walking pulse oximetry and it is documented in note;  She maintained 90% on 4L/NC with ALAMO at end of walk and at rest SPO2 93%.  Patient reports 0/10 pain will make up her own cassette for discharge.  Patient remained safe free from falls and injury this shift.      Note:  Patient to be discharged as soon as meds to beds are delivered.      Problem: Fall/Injury  Goal: Not fall by end of shift  Outcome: Progressing  Goal: Be free from injury by end of the shift  Outcome: Progressing  Goal: Verbalize understanding of personal risk factors for fall in the hospital  Outcome: Progressing  Goal: Verbalize understanding of risk factor reduction measures to prevent injury from fall in the home  Outcome: Progressing  Goal: Use assistive devices by end of the shift  Outcome: Progressing  Goal: Pace activities to prevent fatigue by end of the shift  Outcome: Progressing     Problem: Pain - Adult  Goal: Verbalizes/displays adequate comfort level or baseline comfort level  Outcome: Progressing     Problem: Safety - Adult  Goal: Free from fall injury  Outcome: Progressing     Problem: Discharge Planning  Goal: Discharge to home or other facility with appropriate resources  Outcome: Progressing

## 2024-10-26 NOTE — HOSPITAL COURSE
63 y.o. F w/PMHx pulm HTN (on macitentan and epoprostenol via [place], NYHA Functional Class 3 and WHO Group 1), chronic hypoxic respiratory failure (baseline 4L NC), polysubstance use (ethanol, cocaine, amphetamines), SVT, SLE (on hydroxychloroquine), GERD, HARRIS, MDD transferred from [facility] for management of AHRF, hypoMg, hypoCa, and moderate pericardial effusion. HDS, remains on 6L NC saturating 92%. Attempted to wean to 4L however patient dropped to 89%. PE ruled out at OSH, clinical picture not c/w PNA (no fevers, leukocytosis). Hypoxia initially thought may be 2/2 fluid overload () vs. worsening pericardial effusion vs. progression of PAH.     Diuresed with IV lasix overnight 10/23. Repeat TTE 10/24 for effusion monitoring and monitoring of right heart function, stable from previous. Pericardial effusion stable from 04/24, and right heart function also appears stable. No acute intervention required for effusion. Overall presentation of AHRF 2/2 volume overload related to right heart issues and pulm HTN. Patient improved with daily diuresis back to home O2 requirement of 4L NC. Patient felt well all throughout admission and felt ready to go home on 10/26.    Regarding hypoMg, per chart review, has low levels dating back to 11/2020. Has been taking PO Mg supplementation as outpatient but suspect this is contributing to diarrhea. Has history of alcohol use that may be contributing, as well as recent diuretic use. Patients Mg has improved with supplementation.    Patient had a mechanical fall during admission on 10/25 where she tripped on O2 tubing while going to the bathroom. She hit her head on the linen cart. CT head obtained and negative for acute intracranial abnormality. Patient was asymptomatic after fall and feeling well the AM after.    Patient remained overall HD stable during admission. She tolerated diuresis well hemodynamically, and her renal function was stable throughout admission. Her heart  rate was 's during admission, though diltiazem dose was decreased to ensure patient would tolerate diuresis.     She was discharged in stable condition on home O2 requirement of 4L NC. She will go home with diuretic regimen of 60mg lasix daily, and will follow up with PCP within 2 weeks of discharge, and Dr. Prescott at her appointment on 11/25.

## 2024-10-26 NOTE — NURSING NOTE
Discharge Nursing Note:  Discharge instructions including medications, appointments, [place] Dressing Change on the 28th of October and other instructions.  Patient on 4L/NC, walking pulse oximetry completed and documented in notes.  Patient made up own Remodulin Cassette for Discharge.  Patient taken off of telemetry and IV removed with catheter tip intact.  [place] Single Lumen will remain intact for Remodulin Infusion.  Patient discharged to home and transportation will be provided by [facility] once meds to beds delivered.

## 2024-10-26 NOTE — DISCHARGE INSTRUCTIONS
Dear Ms. Beckham,    You were hospitalized at [facility] from 10/23-10/26 for low oxygen saturations and low magnesium in your blood. During your stay, we noticed that you were needed more oxygen than your normal 4 liters at home in order to maintain your oxygen at a safe level. We suspect that this was related to having too much fluid buildup in your body. For this, we gave you a more powerful water pill while you were in the hospital to remove some of the fluid off of your body.    While you were hospitalized, we got a picture of your heart, called an echocardiogram, because we were concerned about a fluid collection around your heart, and we wanted to assess the function of your heart. This test found that both your heart function and the fluid collection surrounding your heart were stable.     On your day of discharge, your oxygen requirement was back at your home oxygen requirement at 4 L. You were not complaining of shortness of breath, and felt well enough to go home.     Should you feel that you have worsening shortness of breath and requiring additional oxygen to do your daily activities, chest pain, fatigue, lightheadedness, dizziness, or worsening swelling in your legs, please report to the emergency department immediately for an evaluation.    You will need to follow up with the following providers as an outpatient when you leave the hospital:  Dr. Prescott at your regularly scheduled appointment on 11/25. Here, he will go over your hospital stay and your symptoms since the hospital stay.  Your Primary Care Provider within 2 weeks of leaving the hospital as a general hospital follow up  Rheumatology for your lupus symptoms that have become worse recently  Ophthalmology because you are on Plaquenil for your lupus. People who take Plaquenil need eye exams intermittently    It was a pleasure taking care of you during your stay.     - Your  Care Team

## 2024-10-26 NOTE — NURSING NOTE
Walking Pulse Oximetry:    At rest on 4L/NC:    SPO2 93%  Ambulating on 4L/NC   SPO2 91% Patient ALAMO  End of walk ambulating on 4L/NC:   SPO2 90%  Patient ALAMO  At rest on 4L/NC post ambulating:   SPO2 92%

## 2024-10-26 NOTE — DISCHARGE SUMMARY
Discharge Diagnosis  Acute on chronic respiratory failure (Multi)    Issues Requiring Follow-Up  Dr. Prescott at scheduled appointment on 11/25. He will review hospital stay and symptoms since the hospitalization.  PCP within 2 weeks of leaving the hospital as a general hospital follow up  Rheumatology for lupus symptoms that have become worse recently  Ophthalmology for prolonged use Plaquenil for lupus    Discharge Meds     Medication List      CHANGE how you take these medications     furosemide 40 mg tablet; Commonly known as: Lasix; Take 1.5 tablets (60   mg) by mouth once daily.; What changed: how much to take   gabapentin 800 mg tablet; Commonly known as: Neurontin; Take 1 tablet   (800 mg) by mouth 3 times a day.; What changed: when to take this     CONTINUE taking these medications     aspirin 81 mg EC tablet; Take 1 tablet (81 mg) by mouth once daily.   calcium carbonate-vitamin D3 500 mg-5 mcg (200 unit) tablet; Take 2   tablets by mouth 2 times a day.   cholestyramine 4 gram packet; Commonly known as: Questran; Take 1 packet   (4 g) by mouth once daily.   dilTIAZem  mg 24 hr capsule; Commonly known as: Cardizem CD; Take   1 capsule (180 mg) by mouth once daily.   epoprostenol 1.5 mg recon soln; Commonly known as: Veletri; 1.5 mg   (1,500 mcg) by central venous line infusion route continuously.   Reconstitute contents of vial with 5 ml diluent and mix cassette as   directed. Infuse continuously per physician orders. Allow for priming   volume. Dose range: 1-150 ng/kg/min.   folic acid 1 mg tablet; Commonly known as: Folvite   hydroxychloroquine 200 mg tablet; Commonly known as: Plaquenil; Take 1   tablet (200 mg) by mouth 2 times a day.   Mag 64 64 mg EC tablet; Generic drug: magnesium chloride; Take 1 tablet   (64 mg) by mouth 2 times a day.   melatonin 5 mg tablet; Take 1 tablet (5 mg) by mouth once daily at   bedtime.   omeprazole 40 mg DR capsule; Commonly known as: PriLOSEC; Take 1 capsule   (40  mg) by mouth once daily in the morning. Take before meals. Do not   crush or chew.   Opsumit 10 mg tablet tablet; Generic drug: macitentan; TAKE 1 TABLET BY   MOUTH DAILY. DO NOT HANDLE IF PREGNANT. DO NOT SPLIT, CRUSH, OR CHEW.   REVIEW MEDICATION GUIDE.   potassium chloride CR 20 mEq ER tablet; Commonly known as: Klor-Con M20;   Take 1 tablet (20 mEq) by mouth 2 times a day. Do not crush or chew.   topiramate 25 mg tablet; Commonly known as: Topamax; Week 1 25mg at   bedtime, week 2 25mg BID, week 3 25mg qAM, 50mg at bedtime, week 4 50 mg   BID, week 5 50mg qAM 75mg at bedtime, week 6 75 mg BID, week 7 75mg qam   100mg at bedtime, week 8 100mg BID   venlafaxine  mg 24 hr capsule; Commonly known as: Effexor-XR; Take   1 capsule (150 mg) by mouth once daily. Do not crush or chew.     STOP taking these medications     loperamide 2 mg tablet; Commonly known as: Imodium A-D       Test Results Pending At Discharge  Pending Labs       No current pending labs.            Hospital Course  63 y.o. F w/PMHx pulm HTN (on macitentan and epoprostenol via [place], NYHA Functional Class 3 and WHO Group 1), chronic hypoxic respiratory failure (baseline 4L NC), polysubstance use (ethanol, cocaine, amphetamines), SVT, SLE (on hydroxychloroquine), GERD, HARRIS, MDD transferred from [facility] for management of AHRF, hypoMg, hypoCa, and moderate pericardial effusion. HDS, remains on 6L NC saturating 92%. Attempted to wean to 4L however patient dropped to 89%. PE ruled out at OSH, clinical picture not c/w PNA (no fevers, leukocytosis). Hypoxia initially thought may be 2/2 fluid overload () vs. worsening pericardial effusion vs. progression of PAH.     Diuresed with IV lasix overnight 10/23. Repeat TTE 10/24 for effusion monitoring and monitoring of right heart function, stable from previous. Pericardial effusion stable from 04/24, and right heart function also appears stable. No acute intervention required for effusion. Overall  presentation of AHRF 2/2 volume overload related to right heart issues and pulm HTN. Patient improved with daily diuresis back to home O2 requirement of 4L NC. Patient felt well all throughout admission and felt ready to go home on 10/26.    Regarding hypoMg, per chart review, has low levels dating back to 11/2020. Has been taking PO Mg supplementation as outpatient but suspect this is contributing to diarrhea. Has history of alcohol use that may be contributing, as well as recent diuretic use. Patients Mg has improved with supplementation.    Patient had a mechanical fall during admission on 10/25 where she tripped on O2 tubing while going to the bathroom. She hit her head on the linen cart. CT head obtained and negative for acute intracranial abnormality. Patient was asymptomatic after fall and feeling well the AM after.    Patient remained overall HD stable during admission. She tolerated diuresis well hemodynamically, and her renal function was stable throughout admission. Her heart rate was 's during admission, though diltiazem dose was decreased to ensure patient would tolerate diuresis.     She was discharged in stable condition on home O2 requirement of 4L NC. She will go home with diuretic regimen of 60mg lasix daily, and will follow up with PCP within 2 weeks of discharge, and Dr. Prescott at her appointment on 11/25.       ASSESSMENT AND PLAN 10/26:    63 y.o. F w/PMHx pulm HTN (on macitentan and epoprostenol via [place], NYHA Functional Class 3 and WHO Group 1), chronic hypoxic respiratory failure (baseline 4L NC), polysubstance use (ethanol, cocaine, amphetamines), SVT, SLE (on hydroxychloroquine), GERD, HARRIS, MDD transferred from [facility] for management of AHRF, hypoMg, hypoCa, and moderate pericardial effusion. HDS, remains on 6L NC saturating 92%. Attempted to wean to 4L however patient dropped to 89%. PE ruled out at OSH, clinical picture not c/w PNA (no fevers, leukocytosis). Hypoxia may be 2/2  fluid overload () vs. worsening pericardial effusion vs. progression of PAH. Diuresed with IV lasix overnight 10/23. Repeat TTE 10/24 for effusion monitoring and monitoring of right heart function. Regarding hypoMg, per chart review, has low levels dating back to 11/2020. Has been taking PO Mg supplementation as outpatient but suspect this is contributing to diarrhea. Has history of alcohol use that may be contributing, as well as recent diuretic use. Patients Mg has improved with supplementation.     Update 10/26:  - Patient stable, improved O2 requirement to home 4L NC.  - Walking pulse ox acceptable for home going  - Patient comfortable with increased diuretic regimen to 60mg PO lasix daily with a script of potassium and magnesium daily to ensure electrolyte repletion  - She will go home on regular diltiazem dose     #Acute on chronic hypoxic respiratory failure  #PAH  ::follows with Dr. Prescott  ::baseline 4L NC, at OSH ED was requiring between 6-7L NC  ::was given 2 doses of IV solumedrol 40 mg for presumed ?PAH exacerbation  ::CTPE without PE, no concern for PNA  ::CXR on arrival with cardiomegaly, pulm vascular congestion, L sided pleural effusion,  (1000s in early October and then 400s previously)  ::currently on 6L NC  :: s/p IV lasix 40 once (double home dose), will continue PO 40 lasix daily but may need higher outpatient dose vs compliance education  :: Suspect acute on chronic hypoxic respiratory failure   Plan:  -strict I&O  -c/w home macitentan and epoprostenol  -will defer further steroids as patient is not wheezing and without clear COPD history   -goal O2 sat > 90%  - Careful diuresis with IV lasix 40mg today  -TTE with increased RAP  -Endpoint for diuresis was O2 requirements at home 4L     #Moderate pericardial effusion  ::noted on CTA, small to moderate effusion noted on TTE 4/2024  -no hypotension, JVD muffled heart sounds c/f tamponade  -iso worsening tachycardia during admissions  since 4/2024, will obtain repeat TTE (heart rates now in 100s, previously in 80s)  -if hypoxia does not improve should consider drainage/cards consult  -TTE with unchanged pericardial effusion from 04/2024     #Sinus tach  #Recurrent SVT  ::history of recurrent SVT, usually in the setting of dilt non-compliance  -EKG on arrival with   -suspect sinus tach iso hypoxia vs pericardial effusion, recent imaging for PE negative  -tele  -decreased diltiazem dose iso diuresis, changed to normal home dosing on d/c     #Hypomagnesesmia, resolved  #Secondary hypocalcemia  ::initial Mg level 0.58 at OSH ED, repleted with IV Mg and level on arrival to  2.02  ::ddx for hypoMg: diarrhea iso Mg supplementation vs diuretic use vs alcohol use (quit recently)  - administered 1g IV Calcium gluconate s/p ionized calcium of 0.99  -c/w Mg 64 mg BID, will not increase to 400 mg BID as rec'd by OSH cardiology due to diarrhea  -needs GI FU/workup for diarrhea as outpt, will hold imodium given borderline prolonged QTc but will restart home cholestyramine iso known cholecystectomy     #Prolonged QTc  -EKG with borderline prolonged QTc at 500 ms  -avoid QT prolonging agents, will hold Imodium     #Anemia  #Thrombocytopenia  -c/w folate     #Polysusbtance Use  ::UDS 10/23 negative, UDS 2 weeks prior also negative for any substances     #SLE  -c/w home hydroxychloroquine  -will need outpatient rheum f/u for worsening symptoms     #HARRIS  #MDD  -c/w home gabapentin, venlafaxine  -clarify if patient is still taking Topamax in AM     F: diuresis  E: K>4, Mg>2  N: 2L fluid restriction  A: PIV     DVT PPx: lovenox 40  GI PPx: home PPI     Pertinent Physical Exam At Time of Discharge  Physical Exam  Constitutional:       General: She is not in acute distress.     Appearance: Normal appearance. She is not ill-appearing or toxic-appearing.   HENT:      Mouth/Throat:      Mouth: Mucous membranes are moist.   Eyes:      Pupils: Pupils are equal,  round, and reactive to light.   Cardiovascular:      Rate and Rhythm: Normal rate.      Heart sounds: No murmur heard.     No friction rub. No gallop.   Pulmonary:      Effort: Pulmonary effort is normal. No respiratory distress.      Breath sounds: Normal breath sounds. No wheezing.      Comments: On 4L NC  Abdominal:      General: Abdomen is flat. There is no distension.      Palpations: Abdomen is soft.      Tenderness: There is no abdominal tenderness.   Musculoskeletal:         General: Normal range of motion.      Right lower leg: Edema present.      Left lower leg: Edema present.      Comments: 1+ b/l pitting edema   Skin:     General: Skin is warm.      Findings: Rash present. No lesion.      Comments: Red rash on chest, blanchable. Per patient is stable. Red rash on face      Neurological:      General: No focal deficit present.      Mental Status: She is alert and oriented to person, place, and time.      Cranial Nerves: No cranial nerve deficit.      Motor: No weakness.   Psychiatric:         Mood and Affect: Mood normal.         Behavior: Behavior normal.       Outpatient Follow-Up  Future Appointments   Date Time Provider Department [place]   10/28/2024 11:45 AM Zack Hughes PT [id number] [facility]   10/30/2024 11:45 AM Zack Hughes PT [id number] [facility]   10/30/2024  4:00 PM Bin Wade MD [id number] [facility]   11/4/2024 10:15 AM Zack Hughes PT [id number] [facility]   11/25/2024  3:30 PM [facility] [id number]  [id number] [facility]   11/25/2024  4:00 PM Chicho Prescott DO [id number] [facility]   12/23/2024  9:00 AM Chelsea Wood MD [id number] [facility]         Joanne Olsen MD

## 2024-10-29 ENCOUNTER — APPOINTMENT (OUTPATIENT)
Dept: RESPIRATORY THERAPY | Facility: HOSPITAL | Age: 64
End: 2024-10-29
Payer: COMMERCIAL

## 2024-10-30 ENCOUNTER — APPOINTMENT (OUTPATIENT)
Dept: NEPHROLOGY | Facility: CLINIC | Age: 64
End: 2024-10-30
Payer: COMMERCIAL

## 2024-11-01 ENCOUNTER — DOCUMENTATION (OUTPATIENT)
Dept: PULMONOLOGY | Facility: HOSPITAL | Age: 64
End: 2024-11-01
Payer: COMMERCIAL

## 2024-11-04 ENCOUNTER — APPOINTMENT (OUTPATIENT)
Dept: NEPHROLOGY | Facility: CLINIC | Age: 64
End: 2024-11-04
Payer: COMMERCIAL

## 2024-11-04 ENCOUNTER — APPOINTMENT (OUTPATIENT)
Dept: PHYSICAL THERAPY | Facility: CLINIC | Age: 64
End: 2024-11-04
Payer: COMMERCIAL

## 2024-11-12 ENCOUNTER — DOCUMENTATION (OUTPATIENT)
Dept: PULMONOLOGY | Facility: HOSPITAL | Age: 64
End: 2024-11-12
Payer: COMMERCIAL

## 2024-11-13 DIAGNOSIS — R06.02 SOB (SHORTNESS OF BREATH): ICD-10-CM

## 2024-11-13 DIAGNOSIS — I27.20 PULMONARY HYPERTENSION (MULTI): ICD-10-CM

## 2024-11-13 DIAGNOSIS — Z79.899 LONG-TERM USE OF HIGH-RISK MEDICATION: ICD-10-CM

## 2024-11-13 NOTE — PROGRESS NOTES
History Of Present Illness  Ronna Beckham is a 64 y.o. female presenting with pulmonary arterial hypertension follow up. Patient is NYHA Functional Class 3 and WHO Group 1. Last office visit was on 7/22/2024. She is referred by Dr. Calvillo on 9/11/2020.     Recent hospital admissions include 8/26-8/29, CHF exacerbation, problems refilling lasix. 9/7-9/9 for  hypervolemia, not taking medications per patient. 10/3-10/6 with vision changes and dizziness after 4 days of increased diarrhea. Had not been taking her home diltiazem. Found to be in SVT now s/p adenosine and in NSR. 10/23-10/26 for management of acute on chronic respiratory failure, hypomagnesia, hypocalcemia and moderate pericardial effusion. Patient had a mechanical fall on 10/25 where she tripped on O2 tubing while going to the bathroom, hitting her head on the linen cart.      PAH Treatment:  Veletri (9/23/2020)  Opsumit (4/12/2021)  Winrevair  (11/1/2024)  Oxygen 4 LPM continuously  Infusion site: [facility]  Treatment history:   Tadalafil (5/4/2021-5/6/2021) headache    Today's testing includes 6 MWT and Labs    Interval History       Past Medical History  Patient Active Problem List   Diagnosis    Shortness of breath    NSTEMI (non-ST elevated myocardial infarction) (Multi)    Toothache    Systemic lupus erythematosus (Multi)    Syncope and collapse    Sore throat    Seizure disorder (Multi)    Right ventricular systolic dysfunction    Right ventricular failure (Multi)    Pulmonary hypertension (Multi)    Paresthesia    Hand pain    PAH (pulmonary artery hypertension) (Multi)    Olecranon bursitis, right elbow    Obesity    Neuropathy    Nasal congestion    Methamphetamine abuse (Multi)    Major depressive disorder, single episode, moderate (Multi)    Liver disease    Insomnia    Increased oxygen demand    Hypomagnesemia    Hypocalcemia    Hyperlipidemia    History of non-ST elevation myocardial infarction (NSTEMI)    History of alcohol abuse     Fatigue    Electrolyte and fluid disorder    Prolonged Q-T interval on ECG    Lower leg edema    Edema of eyelid    Ear pain, bilateral    Drug abuse (Multi)    Dizziness    Diverticulosis    Depressive disorder    Dental infection    Decreased GFR    Cough    Chronic obstructive pulmonary disease (Multi)    Central line complication    Cellulitis    Blepharitis of both eyes    Blepharitis    Primary hypertension    Anxiety    Alcohol dependence, uncomplicated (Multi)    Acute otitis externa of left ear    Acute on chronic cholecystitis    Acute cor pulmonale (Multi)    Gastroesophageal reflux disease    Chronic respiratory failure with hypoxia, on home O2 therapy (Multi)    Acquired thrombocytopenia (CMS-HCC)    Acquired absence of cervix    Acidosis, unspecified    Abnormal findings on diagnostic imaging of breast    Pulmonary HTN (Multi)    Long-term use of high-risk medication    SVT (supraventricular tachycardia) (CMS-HCC)    Acute on chronic hypoxic respiratory failure (Multi)    Pneumonia of left lower lobe due to infectious organism    Volume overload    Does not refill medications appropriately    Repeated falls    Difficulty walking    Acute on chronic respiratory failure with hypoxia (Multi)    Pericardial effusion ([facility])    Acute on chronic respiratory failure (Multi)        Surgical History  She has a past surgical history that includes Other surgical history (09/21/2020); Other surgical history (09/21/2020); Other surgical history (09/21/2020); Other surgical history (09/21/2020); Other surgical history (09/21/2020); Hysterectomy; Cholecystectomy; and Cardiac catheterization (N/A, 1/8/2024).     Social History  She reports that she has quit smoking. Her smoking use included cigarettes. She has quit using smokeless tobacco. She reports that she does not currently use alcohol. She reports that she does not currently use drugs after having used the following drugs: Cocaine and Other.    Family  History  Family History   Problem Relation Name Age of Onset    No Known Problems Mother      No Known Problems Father          Medications  Current Outpatient Medications:     aspirin 81 mg EC tablet, Take 1 tablet (81 mg) by mouth once daily., Disp: 30 tablet, Rfl: 0    calcium carbonate-vitamin D3 500 mg-5 mcg (200 unit) tablet, Take 2 tablets by mouth 2 times a day., Disp: 120 tablet, Rfl: 0    cholestyramine (Questran) 4 gram packet, Take 1 packet (4 g) by mouth once daily., Disp: 30 packet, Rfl: 0    dilTIAZem CD (Cardizem CD) 180 mg 24 hr capsule, Take 1 capsule (180 mg) by mouth once daily., Disp: 30 capsule, Rfl: 0    epoprostenol (Veletri) 1.5 mg recon soln, 1.5 mg (1,500 mcg) by central venous line infusion route continuously. Reconstitute contents of vial with 5 ml diluent and mix cassette as directed. Infuse continuously per physician orders. Allow for priming volume. Dose range: 1-150 ng/kg/min., Disp: 180 each, Rfl: 11    folic acid (Folvite) 1 mg tablet, Take 1 tablet (1 mg) by mouth once daily. Needs to resume, Disp: , Rfl:     furosemide (Lasix) 40 mg tablet, Take 1.5 tablets (60 mg) by mouth once daily., Disp: 30 tablet, Rfl: 0    gabapentin (Neurontin) 800 mg tablet, Take 1 tablet (800 mg) by mouth 3 times a day., Disp: 90 tablet, Rfl: 0    hydroxychloroquine (Plaquenil) 200 mg tablet, Take 1 tablet (200 mg) by mouth 2 times a day., Disp: 60 tablet, Rfl: 0    magnesium chloride (MagDelay) 64 mg EC tablet, Take 1 tablet (64 mg) by mouth 2 times a day., Disp: 60 tablet, Rfl: 0    melatonin 5 mg tablet, Take 1 tablet (5 mg) by mouth once daily at bedtime., Disp: 30 tablet, Rfl: 0    omeprazole (PriLOSEC) 40 mg DR capsule, Take 1 capsule (40 mg) by mouth once daily in the morning. Take before meals. Do not crush or chew., Disp: 30 capsule, Rfl: 0    Opsumit 10 mg tablet tablet, TAKE 1 TABLET BY MOUTH DAILY. DO NOT HANDLE IF PREGNANT. DO NOT SPLIT, CRUSH, OR CHEW. REVIEW MEDICATION GUIDE., Disp: 30  tablet, Rfl: 11    potassium chloride CR 20 mEq ER tablet, Take 1 tablet (20 mEq) by mouth 2 times a day. Do not crush or chew., Disp: 60 tablet, Rfl: 0    topiramate (Topamax) 25 mg tablet, Week 1 25mg at bedtime, week 2 25mg BID, week 3 25mg qAM, 50mg at bedtime, week 4 50 mg BID, week 5 50mg qAM 75mg at bedtime, week 6 75 mg BID, week 7 75mg qam 100mg at bedtime, week 8 100mg BID, Disp: 256 tablet, Rfl: 0    venlafaxine XR (Effexor-XR) 150 mg 24 hr capsule, Take 1 capsule (150 mg) by mouth once daily. Do not crush or chew., Disp: 30 capsule, Rfl: 0     Allergies  Levetiracetam    Review of Systems    Last Recorded Vitals  There were no vitals taken for this visit.     Physical Exam       Relevant Results    Echo (10/24/2024)  Left Ventricle: Left ventricular ejection fraction is hyperdynamic, by visual estimate at 75%. The left ventricular cavity size is decreased. The interventricular septum is flattened in systole and diastole, consistent with right ventricular pressure and volume overload. Left ventricular diastolic filling was not assessed. The left ventricle appears underfilled.  Left Atrium: The left atrium was not assessed.  Right Ventricle: The right ventricle is severely enlarged. There is moderately reduced right ventricular systolic function.  Right Atrium: The right atrium is severely dilated. A dilated inferior vena cava demonstrates poor inspiratory collapse, consistent with elevated right atrial pressures.  Aortic Valve: The aortic valve is trileaflet. There is no evidence of aortic valve regurgitation.  Mitral Valve: The mitral valve is normal in structure. There is trace mitral valve regurgitation.  Tricuspid Valve: The tricuspid valve is abnormal. There is severe tricuspid regurgitation. The Doppler estimated RVSP is severely elevated at 105.1 mmHg.  Pulmonic Valve: The pulmonic valve is structurally normal. There is trace pulmonic valve regurgitation.  Pericardium: Small to moderate pericardial  effusion. There is no evidence of cardiac tamponade.  Aorta: The aortic root was not assessed.  In comparison to the previous echocardiogram(s): Compared with study dated 4/24/2024, there is no significant difference in RV size and function. There is again severe TR. The RVSP has increased from 89 to 105 mmHg. The pericardial effusion appears unchanged.     CONCLUSIONS:   1. Left ventricular ejection fraction is hyperdynamic, by visual estimate at 75%.   2. Left ventricular cavity size is decreased.   3. Right ventricular volume and pressure overload.   4. There is moderately reduced right ventricular systolic function.   5. Severely enlarged right ventricle.   6. Severely elevated right ventricular systolic pressure.   7. The right atrium is severely dilated.   8. Severe tricuspid regurgitation visualized.   9. Small to moderate pericardial effusion.  10. There is no evidence of cardiac tamponade.  11. A dilated inferior vena cava demonstrates poor inspiratory collapse, consistent with elevated right atrial pressures.  12. Compared with study dated 4/24/2024, there is no significant difference in RV size and function. There is again severe TR. The RVSP has increased from 89 to 105 mmHg. The pericardial effusion appears unchanged.     CT angio chest for pulmonary embolism (10/22/2024)  1. No evidence of acute pulmonary embolism.  2. There is a moderate size pericardial effusion noted.    6MWT (7/22/2024)  SP02-98%/96% on 4L  HR-106/114  CHEIKH-2/2  Actual Meters- 183 meters     6MWT (5/13/2024)  SP02- 96-96% /4 L NC  HR-   CHEIKH-0-4  Actual Meters 216 meters     CT angio chest for PE (5/30/2024)  1. No findings of acute pulmonary embolism.  2. Persistent marked cardiac enlargement. Hepatic heterogeneity and splenomegaly suggesting right heart dysfunction with associated not make liver and portal hypertension.  3. Bronchial thickening. There are couple of other stable pulmonary nodules when compared to most recent  examination, however there are areas of ground-glass opacity seen in the lungs possibly reflecting inflammatory etiologies. Findings of mild pulmonary edema and volume  Overload     Echo (4/24/2024) [facility]  Left Ventricle: The left ventricular systolic function is normal, with an estimated ejection fraction of 60-65%. There are no regional wall motion abnormalities. The left ventricular cavity size is normal. The interventricular septum is flattened in systole and diastole, consistent with right ventricular pressure and volume overload. Left ventricular diastolic filling was not assessed.  Left Atrium: The left atrium is normal in size. A bubble study using agitated saline was performed. Bubble study is positive. A small PFO (= 10 bubbles) was demonstrated. Agitated saline contrast study was positive for small intracardiac shunt.  Right Ventricle: The right ventricle is severely enlarged. There is severely reduced right ventricular systolic function. Although the TAPSE and RVA' are normal, the fractional area change is markedly reduced consistent wtih severe RV systolic dysfunction. Still able to generate high RVSP.  Right Atrium: The right atrium is severely dilated.  Aortic Valve: The aortic valve is trileaflet. There is minimal aortic valve cusp calcification. Aortic valve regurgitation was not assessed.  Mitral Valve: The mitral valve is normal in structure. Mitral valve regurgitation was not assessed.  Tricuspid Valve: The tricuspid valve is structurally normal. There is severe tricuspid regurgitation. The Doppler estimated RVSP is severely elevated at 88.6 mmHg.  Pulmonic Valve: The pulmonic valve is not well visualized. Pulmonic valve regurgitation was not assessed.  Pericardium: There is a small to moderate pericardial effusion. Small circumferential pericardial effusin though is small to moderate adjacent to the RA. No RV or RA collapse. The IVC is dilated, though likely due to the presence of severe  pulmonary hypertension. No significant respiratory variation of the MV or TV inflows. Not diagnostic for tamponade though difficult to dx tamponade in setting of severe pulmonary hypertension.  Aorta: The aortic root is normal.  Systemic Veins: The inferior vena cava appears dilated. There is IVC inspiratory collapse greater than 50%.  In comparison to the previous echocardiogram(s): Compared with study from 3/12/2022,. Compared with the prior exam from 3/12/2022 the RV had a similar appearance with severe dilatation and reduced function. Note the TR was not asessed on that exam to allow estimation of the RVSP at that time. The pericardial effusion appears similar in size.  CONCLUSIONS:   1. Left ventricular systolic function is normal with a 60-65% estimated ejection fraction.   2. Right ventricular volume and pressure overload.   3. Although the TAPSE and RVA' are normal, the fractional area change is markedly reduced consistent wtih severe RV systolic dysfunction. Still able to generate high RVSP.   4. Severely enlarged right ventricle.   5. There is severely reduced right ventricular systolic function.   6. Agitated saline contrast study was positive for small intracardiac shunt.   7. The right atrium is severely dilated.   8. There is a small to moderate pericardial effusion.   9. Small circumferential pericardial effusin though is small to moderate adjacent to the RA. No RV or RA collapse. The IVC is dilated, though likely due to the presence of severe pulmonary hypertension. No significant respiratory variation of the MV or TV inflows. Not diagnostic for tamponade though difficult to dx tamponade in setting of severe pulmonary hypertension.  10. Severe tricuspid regurgitation visualized.  11. Severely elevated right ventricular systolic pressure.  12. Compared with the prior exam from 3/12/2022 the RV had a similar appearance with severe dilatation and reduced function. Note the TR was not asessed on that  exam to allow estimation of the RVSP at that time. The pericardial effusion appears similar in size.  13. A bubble study using agitated saline was performed. Bubble study is positive. A small PFO (= 10 bubbles) was demonstrated.     6MWT (2023)  SpO2: 98% - 95% on 4L  HR: 97 - 116  Valentin: 1 - 7  Actual 314m     6MWT (2022)  SpO2: 94 - 90 on RA   HR: 78 - 111  Valentin: 0 - 1  Meters: 366      6MWT (2021)   SpO2: 92% - 87% on RA  HR: 81 - 94  Valentin: 3 - 4  Actual 311m     6MWT (2021)  HR 91 -102  Spo2 97 - 95 on RA  Valentin 1- 4  Meters 287/predicted 477     RHC (2020)   PAP- 73/31 (47), 83/26 (54)  PCWP- 8  CO/CI- 3.8/2.0      Echo (2020) from OSH   moderately to severely dilated RV with moderately reduced function and a moderately dilated RA.      Assessment/Plan     1) Pulmonary  a. PAH associated with methamphetamine use, presented FCIV, V/Q (-) , depressed CI, now on infusion therapy with dramatic subjective improvement, still high risk. Still with hectic follow up at best, (+) cocaine during hospitalization 2024. (+) Cannabinoid and amphetamine 2024.  In past,  adjust dosing for weight loss and follow resolution of epo rash and significant diarrhea. Patient went up without authorization. Seems stable from PAH standpoint,      Did not finish Keflex ordered for possible site infection. Better today. W     Intolerant to PDE5     On macitentan     2) Neuro - pre-existing neuropathy, alcohol vs ? Today complaining of classic sciatic pain onset after riding horse.      3) Cardiac - hypertension     4) Interval diagnosis of lupus on basis of ?     Plan    1) Continue current meds     2) Monitor site, change dressing twice as often , finish Keflex and report. If F/C/S or line tenderness, to ED     3) Follow up in clinic. 8-12 weeks with 6 minute walk and routine labs.     4) Today complaining of classic sciatic pain onset after riding horse. Suggested PCP follow up.

## 2024-11-17 ENCOUNTER — APPOINTMENT (OUTPATIENT)
Dept: CARDIOLOGY | Facility: HOSPITAL | Age: 64
End: 2024-11-17
Payer: COMMERCIAL

## 2024-11-17 ENCOUNTER — HOSPITAL ENCOUNTER (EMERGENCY)
Facility: HOSPITAL | Age: 64
Discharge: HOME | End: 2024-11-17
Attending: EMERGENCY MEDICINE
Payer: COMMERCIAL

## 2024-11-17 VITALS
DIASTOLIC BLOOD PRESSURE: 67 MMHG | TEMPERATURE: 99.1 F | RESPIRATION RATE: 19 BRPM | HEART RATE: 101 BPM | BODY MASS INDEX: 26.66 KG/M2 | SYSTOLIC BLOOD PRESSURE: 100 MMHG | HEIGHT: 65 IN | OXYGEN SATURATION: 94 % | WEIGHT: 160 LBS

## 2024-11-17 DIAGNOSIS — E83.51 HYPOCALCEMIA: ICD-10-CM

## 2024-11-17 DIAGNOSIS — E83.42 HYPOMAGNESEMIA: ICD-10-CM

## 2024-11-17 DIAGNOSIS — E87.8 ELECTROLYTE ABNORMALITY: Primary | ICD-10-CM

## 2024-11-17 LAB
ALBUMIN SERPL BCP-MCNC: 3.4 G/DL (ref 3.4–5)
ALP SERPL-CCNC: 67 U/L (ref 33–136)
ALT SERPL W P-5'-P-CCNC: 11 U/L (ref 7–45)
ANION GAP SERPL CALCULATED.3IONS-SCNC: 14 MMOL/L (ref 10–20)
AST SERPL W P-5'-P-CCNC: 6 U/L (ref 9–39)
BASOPHILS # BLD AUTO: 0 X10*3/UL (ref 0–0.1)
BASOPHILS NFR BLD AUTO: 0 %
BILIRUB SERPL-MCNC: 0.7 MG/DL (ref 0–1.2)
BUN SERPL-MCNC: 17 MG/DL (ref 6–23)
CA-I BLD-SCNC: 0.73 MMOL/L (ref 1.1–1.33)
CALCIUM SERPL-MCNC: 5.4 MG/DL (ref 8.6–10.3)
CHLORIDE SERPL-SCNC: 109 MMOL/L (ref 98–107)
CO2 SERPL-SCNC: 19 MMOL/L (ref 21–32)
CREAT SERPL-MCNC: 0.98 MG/DL (ref 0.5–1.05)
EGFRCR SERPLBLD CKD-EPI 2021: 65 ML/MIN/1.73M*2
EOSINOPHIL # BLD AUTO: 0 X10*3/UL (ref 0–0.7)
EOSINOPHIL NFR BLD AUTO: 0 %
ERYTHROCYTE [DISTWIDTH] IN BLOOD BY AUTOMATED COUNT: 17.2 % (ref 11.5–14.5)
GLUCOSE SERPL-MCNC: 148 MG/DL (ref 74–99)
HCT VFR BLD AUTO: 31.4 % (ref 36–46)
HGB BLD-MCNC: 9.4 G/DL (ref 12–16)
IMM GRANULOCYTES # BLD AUTO: 0.05 X10*3/UL (ref 0–0.7)
IMM GRANULOCYTES NFR BLD AUTO: 1 % (ref 0–0.9)
LYMPHOCYTES # BLD AUTO: 0.81 X10*3/UL (ref 1.2–4.8)
LYMPHOCYTES NFR BLD AUTO: 15.9 %
MAGNESIUM SERPL-MCNC: 0.51 MG/DL (ref 1.6–2.4)
MCH RBC QN AUTO: 23.4 PG (ref 26–34)
MCHC RBC AUTO-ENTMCNC: 29.9 G/DL (ref 32–36)
MCV RBC AUTO: 78 FL (ref 80–100)
MONOCYTES # BLD AUTO: 0.27 X10*3/UL (ref 0.1–1)
MONOCYTES NFR BLD AUTO: 5.3 %
NEUTROPHILS # BLD AUTO: 3.95 X10*3/UL (ref 1.2–7.7)
NEUTROPHILS NFR BLD AUTO: 77.8 %
NRBC BLD-RTO: 0 /100 WBCS (ref 0–0)
PLATELET # BLD AUTO: 116 X10*3/UL (ref 150–450)
POTASSIUM SERPL-SCNC: 4 MMOL/L (ref 3.5–5.3)
PROT SERPL-MCNC: 6.1 G/DL (ref 6.4–8.2)
RBC # BLD AUTO: 4.01 X10*6/UL (ref 4–5.2)
SODIUM SERPL-SCNC: 138 MMOL/L (ref 136–145)
TSH SERPL-ACNC: 1.91 MIU/L (ref 0.44–3.98)
WBC # BLD AUTO: 5.1 X10*3/UL (ref 4.4–11.3)

## 2024-11-17 PROCEDURE — 96367 TX/PROPH/DG ADDL SEQ IV INF: CPT

## 2024-11-17 PROCEDURE — 85025 COMPLETE CBC W/AUTO DIFF WBC: CPT | Performed by: EMERGENCY MEDICINE

## 2024-11-17 PROCEDURE — 83735 ASSAY OF MAGNESIUM: CPT | Performed by: EMERGENCY MEDICINE

## 2024-11-17 PROCEDURE — 93005 ELECTROCARDIOGRAM TRACING: CPT

## 2024-11-17 PROCEDURE — 80053 COMPREHEN METABOLIC PANEL: CPT | Performed by: EMERGENCY MEDICINE

## 2024-11-17 PROCEDURE — 82330 ASSAY OF CALCIUM: CPT | Performed by: EMERGENCY MEDICINE

## 2024-11-17 PROCEDURE — 99284 EMERGENCY DEPT VISIT MOD MDM: CPT | Mod: 25

## 2024-11-17 PROCEDURE — 84443 ASSAY THYROID STIM HORMONE: CPT | Performed by: EMERGENCY MEDICINE

## 2024-11-17 PROCEDURE — 2500000004 HC RX 250 GENERAL PHARMACY W/ HCPCS (ALT 636 FOR OP/ED): Performed by: EMERGENCY MEDICINE

## 2024-11-17 PROCEDURE — 96365 THER/PROPH/DIAG IV INF INIT: CPT

## 2024-11-17 PROCEDURE — 2500000004 HC RX 250 GENERAL PHARMACY W/ HCPCS (ALT 636 FOR OP/ED): Mod: JZ

## 2024-11-17 RX ORDER — MAGNESIUM SULFATE HEPTAHYDRATE 40 MG/ML
2 INJECTION, SOLUTION INTRAVENOUS ONCE
Status: COMPLETED | OUTPATIENT
Start: 2024-11-17 | End: 2024-11-17

## 2024-11-17 RX ORDER — CALCIUM GLUCONATE 20 MG/ML
2 INJECTION, SOLUTION INTRAVENOUS ONCE
Status: COMPLETED | OUTPATIENT
Start: 2024-11-17 | End: 2024-11-17

## 2024-11-17 ASSESSMENT — PAIN SCALES - GENERAL: PAINLEVEL_OUTOF10: 0 - NO PAIN

## 2024-11-17 ASSESSMENT — PAIN - FUNCTIONAL ASSESSMENT: PAIN_FUNCTIONAL_ASSESSMENT: 0-10

## 2024-11-17 NOTE — ED PROVIDER NOTES
HPI   Chief Complaint   Patient presents with    Tremors     Started yesterday, lasted all day. Today 1 prior to arrival pt started to have spasms. Hx arterial pulmonary hypertension. Denies cp, dizziness, weakness.        Patient is a 64-year-old female presenting to the emergency department for evaluation of tremors.  Patient states she has a history of pulmonary hypertension and electrolyte abnormalities.  She states she is on oxygen chronically and also takes Lasix daily.  She states she has had to have her electrolytes replaced multiple times and does take daily supplements for her electrolyte abnormalities.  She states today she was feeling off and thought it could be due to an electrolyte abnormality therefore prompting her visit.  She states she feels like she just needs some blood work to see what her electrolytes are and if she needs any replenished.  She denies any chest pain, shortness of breath, fevers, chills, nausea, vomiting, cough, congestion, headaches, numbness, tingling.               Patient History   Past Medical History:   Diagnosis Date    Anxiety     Depression     Does not refill medications appropriately 09/10/2024    GERD (gastroesophageal reflux disease)     Lupus (Multi)     Personal history of other specified conditions 2020    History of seizure    Pulmonary hypertension (Multi)     Volume overload 09/10/2024     Past Surgical History:   Procedure Laterality Date    CARDIAC CATHETERIZATION N/A 2024    Procedure: Left Heart Cath, With LV, Angriography And Grafts;  Surgeon: Christiano Calvillo DO;  Location: [facility] Cardiac [facility] Lab;  Service: Cardiovascular;  Laterality: N/A;    CHOLECYSTECTOMY      HYSTERECTOMY      OTHER SURGICAL HISTORY  2020    Cholecystectomy    OTHER SURGICAL HISTORY  2020    Esophagogastroduodenoscopy    OTHER SURGICAL HISTORY  2020    Colonoscopy    OTHER SURGICAL HISTORY  2020     section    OTHER SURGICAL HISTORY  2020     Hysterectomy     Family History   Problem Relation Name Age of Onset    No Known Problems Mother      No Known Problems Father       Social History     Tobacco Use    Smoking status: Former     Types: Cigarettes    Smokeless tobacco: Former   Vaping Use    Vaping status: Never Used   Substance Use Topics    Alcohol use: Not Currently    Drug use: Not Currently     Types: Cocaine, Other     Comment: Meth       Physical Exam   ED Triage Vitals   Temperature Heart Rate Respirations BP   11/17/24 1629 11/17/24 1632 11/17/24 1632 11/17/24 1632   37.3 °C (99.1 °F) (!) 103 16 112/72      Pulse Ox Temp src Heart Rate Source Patient Position   11/17/24 1632 -- 11/17/24 1716 11/17/24 1632   (!) 93 %  Monitor Sitting      BP Location FiO2 (%)     11/17/24 1632 --     Right arm        Physical Exam  Vitals and nursing note reviewed.   Constitutional:       General: She is not in acute distress.     Appearance: Normal appearance. She is not ill-appearing or toxic-appearing.   HENT:      Head: Normocephalic and atraumatic.      Nose: Nose normal.      Mouth/Throat:      Mouth: Mucous membranes are moist.   Eyes:      Extraocular Movements: Extraocular movements intact.      Pupils: Pupils are equal, round, and reactive to light.   Cardiovascular:      Rate and Rhythm: Normal rate and regular rhythm.      Pulses: Normal pulses.      Heart sounds: No murmur heard.     No friction rub. No gallop.   Pulmonary:      Effort: Pulmonary effort is normal.      Comments: On 4 L nasal cannula chronically and satting at 94%   Musculoskeletal:         General: Normal range of motion.   Skin:     General: Skin is warm and dry.   Neurological:      General: No focal deficit present.      Mental Status: She is alert and oriented to person, place, and time.   Psychiatric:         Mood and Affect: Mood normal.         Behavior: Behavior normal.           ED Course & MDM   ED Course as of 11/17/24 1952   Sun Nov 17, 2024   1653 EKG on my  independent interpretation: Sinus tachycardia 102 bpm, normal axis, normal intervals, incomplete right bundle branch block morphology, nonspecific T wave inversion V2-V3 change compared to prior EKG 10/23/2024 [ADRIAN]      ED Course User Index  [ADRIAN] Isela Lyn MD         Diagnoses as of 11/17/24 1952   Electrolyte abnormality   Hypocalcemia   Hypomagnesemia                 No data recorded     Feliciano Coma Scale Score: 15 (11/17/24 1628 : Emeka Huerta, EMT)                           Medical Decision Making  **Disclaimer parts of this chart have been completed using voice recognition software. Please excuse any errors of transcription.     Patient seen in conjunction with attending physician Dr. Lyn.     HPI: Detailed above.    Exam: A medically appropriate exam performed, outlined above, given the known history and presentation.    History obtained from: Patient    EKG: Reviewed and interpreted by my attending physician    Labs/Diagnostics:  Labs Reviewed   CBC WITH AUTO DIFFERENTIAL - Abnormal       Result Value    WBC 5.1      nRBC 0.0      RBC 4.01      Hemoglobin 9.4 (*)     Hematocrit 31.4 (*)     MCV 78 (*)     MCH 23.4 (*)     MCHC 29.9 (*)     RDW 17.2 (*)     Platelets 116 (*)     Neutrophils % 77.8      Immature Granulocytes %, Automated 1.0 (*)     Lymphocytes % 15.9      Monocytes % 5.3      Eosinophils % 0.0      Basophils % 0.0      Neutrophils Absolute 3.95      Immature Granulocytes Absolute, Automated 0.05      Lymphocytes Absolute 0.81 (*)     Monocytes Absolute 0.27      Eosinophils Absolute 0.00      Basophils Absolute 0.00     COMPREHENSIVE METABOLIC PANEL - Abnormal    Glucose 148 (*)     Sodium 138      Potassium 4.0      Chloride 109 (*)     Bicarbonate 19 (*)     Anion Gap 14      Urea Nitrogen 17      Creatinine 0.98      eGFR 65      Calcium 5.4 (*)     Albumin 3.4      Alkaline Phosphatase 67      Total Protein 6.1 (*)     AST 6 (*)     Bilirubin, Total 0.7      ALT 11      MAGNESIUM - Abnormal    Magnesium 0.51 (*)    CALCIUM, IONIZED - Abnormal    POCT Calcium, Ionized 0.73 (*)    TSH WITH REFLEX TO FREE T4 IF ABNORMAL - Normal    Thyroid Stimulating Hormone 1.91      Narrative:     TSH testing is performed using different testing methodology at [facility] than at other [facility]. Direct result comparisons should only be made within the same method.     URINALYSIS WITH REFLEX CULTURE AND MICROSCOPIC    Narrative:     The following orders were created for panel order Urinalysis with Reflex Culture and Microscopic.  Procedure                               Abnormality         Status                     ---------                               -----------         ------                     Urinalysis with Reflex C...[[id number]]                                                 Extra Urine Gray Tube[[id number]]                                                         Please view results for these tests on the individual orders.   URINALYSIS WITH REFLEX CULTURE AND MICROSCOPIC   EXTRA URINE GRAY TUBE     EMERGENCY DEPARTMENT COURSE and DIFFERENTIAL DIAGNOSIS/MDM:  Patient is a 64-year-old female presenting to the emergency department for evaluation tremors, feeling off.  On physical exam vital signs remarkable for borderline tachycardia but otherwise stable patient is in no acute distress.  Patient's physical exam benign and patient is satting at 95% on her normal baseline oxygen.  She has no increased work of breathing.  No obvious tremors on exam.  Diagnostic labs ordered to check for any electrolyte abnormalities.  TSH normal.  CMP remarkable for a chloride of 109, calcium of 5.4.  Magnesium 0.51.  Ionized calcium 0.73.  CBC remarkable for an anemia however consistent with previous labs.  Patient given IV calcium and IV magnesium replacement.  Patient comfortable being discharged home.  She has no further complaints and was therefore discharged in stable condition.  I  "do not feel that patient needs to stay in the hospital for these electrolyte abnormalities as this is a chronic issue for her and they have been replaced.  She was advised to continue her outpatient electrolyte replacement medications.  She will return to the emergency department with any new or worsening symptoms.  She will follow-up with primary care physician outpatient.      Vitals:    Vitals:    11/17/24 1629 11/17/24 1632 11/17/24 1716 11/17/24 1820   BP:  112/72 117/81 100/67   BP Location:  Right arm     Patient Position:  Sitting     Pulse:  (!) 103 (!) 103 (!) 101   Resp:  16 19 (!) 22   Temp: 37.3 °C (99.1 °F)      SpO2:  (!) 93% 95% 94%   Weight:  72.6 kg (160 lb)     Height:  1.651 m (5' 5\")       History Limited by:    None    Independent history obtained from:    None    External records reviewed:    None    Diagnostics interpreted by me:    None    Discussions with other clinicians:    None    Chronic conditions impacting care:    None    Social determinants of health affecting care:    None    Diagnostic tests considered but not performed: None    ED Medications managed:    Medications   magnesium sulfate 2 g in sterile water for injection 50 mL (0 g intravenous Stopped 11/17/24 1834)   calcium gluconate 2 g in sodium chloride (iso)  mL (0 g intravenous Stopped 11/17/24 1940)       Prescription drugs considered:    None    Screenings:              Procedure  Procedures     Meagan Rodriguez PA-C  11/17/24 1952    "

## 2024-11-18 ENCOUNTER — DOCUMENTATION (OUTPATIENT)
Dept: PULMONOLOGY | Facility: HOSPITAL | Age: 64
End: 2024-11-18
Payer: COMMERCIAL

## 2024-11-18 NOTE — DISCHARGE INSTRUCTIONS
Follow-up with primary care physician outpatient regarding your electrolyte abnormalities.  Continue to take your electrolyte supplements including magnesium and calcium.  Return to the emergency department at anytime with any new or worsening symptoms.

## 2024-11-18 NOTE — PROGRESS NOTES
Sotatercept Monitoring    2nd Sotatercept dose to be given on 11/20/2024.   11/20/2024-Dose held. Specialty pharmacy nursing arrived for additional injection teaching and patient was being [facility] to [facility] by EMS for SOB.   Starting dose: 45 mg kit, 0.3 mg/kg. Inject 0.4 ml.   Target dose: 60 mg kit, 0.7 mg/kg. Inject 1.0 ml.     Baseline HGB 8.3    Lab Results   Component Value Date    WBC 5.1 11/17/2024    HGB 9.4 (L) 11/17/2024    HCT 31.4 (L) 11/17/2024    MCV 78 (L) 11/17/2024     (L) 11/17/2024     Per Dr. Prescott, ok for next dose yes    Symptoms: black tarry stools/nosebleeds/heavy menstrual bleeding/petechia:    Next dose due 12/13/2024.  Next labs due week of 12/9/2024.  Patient verbalized understanding of plan.     Note addended to reflect early injection on 11/20/24 per George RN from [facility].    11/20/2024- Per Specialty nursing, patient will need to be rescheduled for next week for Winrevair administration if discharged from hospital.

## 2024-11-19 LAB
ATRIAL RATE: 102 BPM
P OFFSET: 175 MS
P ONSET: 119 MS
PR INTERVAL: 188 MS
Q ONSET: 213 MS
QRS COUNT: 17 BEATS
QRS DURATION: 102 MS
QT INTERVAL: 382 MS
QTC CALCULATION(BAZETT): 497 MS
QTC FREDERICIA: 455 MS
R AXIS: 142 DEGREES
T AXIS: -16 DEGREES
T OFFSET: 404 MS
VENTRICULAR RATE: 102 BPM

## 2024-11-20 ENCOUNTER — APPOINTMENT (OUTPATIENT)
Dept: RADIOLOGY | Facility: HOSPITAL | Age: 64
End: 2024-11-20
Payer: COMMERCIAL

## 2024-11-20 ENCOUNTER — HOSPITAL ENCOUNTER (INPATIENT)
Facility: HOSPITAL | Age: 64
LOS: 1 days | Discharge: ANOTHER HOSPITAL | End: 2024-11-21
Attending: EMERGENCY MEDICINE | Admitting: STUDENT IN AN ORGANIZED HEALTH CARE EDUCATION/TRAINING PROGRAM
Payer: COMMERCIAL

## 2024-11-20 ENCOUNTER — APPOINTMENT (OUTPATIENT)
Dept: CARDIOLOGY | Facility: HOSPITAL | Age: 64
End: 2024-11-20
Payer: COMMERCIAL

## 2024-11-20 VITALS
OXYGEN SATURATION: 94 % | TEMPERATURE: 98.4 F | RESPIRATION RATE: 23 BRPM | BODY MASS INDEX: 27.01 KG/M2 | WEIGHT: 162.1 LBS | DIASTOLIC BLOOD PRESSURE: 78 MMHG | SYSTOLIC BLOOD PRESSURE: 135 MMHG | HEIGHT: 65 IN | HEART RATE: 97 BPM

## 2024-11-20 DIAGNOSIS — E83.42 HYPOMAGNESEMIA: ICD-10-CM

## 2024-11-20 DIAGNOSIS — I50.813 ACUTE ON CHRONIC RIGHT-SIDED HEART FAILURE: ICD-10-CM

## 2024-11-20 DIAGNOSIS — R60.1 ANASARCA: ICD-10-CM

## 2024-11-20 DIAGNOSIS — I31.39 PERICARDIAL EFFUSION (HHS-HCC): ICD-10-CM

## 2024-11-20 DIAGNOSIS — E83.51 HYPOCALCEMIA: ICD-10-CM

## 2024-11-20 DIAGNOSIS — I47.10 SVT (SUPRAVENTRICULAR TACHYCARDIA) (CMS-HCC): Primary | ICD-10-CM

## 2024-11-20 LAB
ALBUMIN SERPL BCP-MCNC: 3.4 G/DL (ref 3.4–5)
ALBUMIN SERPL BCP-MCNC: 3.7 G/DL (ref 3.4–5)
ALP SERPL-CCNC: 82 U/L (ref 33–136)
ALT SERPL W P-5'-P-CCNC: 11 U/L (ref 7–45)
ANION GAP SERPL CALCULATED.3IONS-SCNC: 12 MMOL/L (ref 10–20)
ANION GAP SERPL CALCULATED.3IONS-SCNC: 16 MMOL/L (ref 10–20)
APPEARANCE UR: CLEAR
AST SERPL W P-5'-P-CCNC: 13 U/L (ref 9–39)
ATRIAL RATE: 111 BPM
BASOPHILS # BLD AUTO: 0.01 X10*3/UL (ref 0–0.1)
BASOPHILS NFR BLD AUTO: 0.2 %
BILIRUB SERPL-MCNC: 0.9 MG/DL (ref 0–1.2)
BILIRUB UR STRIP.AUTO-MCNC: NEGATIVE MG/DL
BNP SERPL-MCNC: 1482 PG/ML (ref 0–99)
BUN SERPL-MCNC: 15 MG/DL (ref 6–23)
BUN SERPL-MCNC: 16 MG/DL (ref 6–23)
CA-I BLD-SCNC: 0.8 MMOL/L (ref 1.1–1.33)
CALCIUM SERPL-MCNC: 6.4 MG/DL (ref 8.6–10.3)
CALCIUM SERPL-MCNC: 7.1 MG/DL (ref 8.6–10.3)
CARDIAC TROPONIN I PNL SERPL HS: 6 NG/L (ref 0–13)
CARDIAC TROPONIN I PNL SERPL HS: 6 NG/L (ref 0–13)
CHLORIDE SERPL-SCNC: 109 MMOL/L (ref 98–107)
CHLORIDE SERPL-SCNC: 110 MMOL/L (ref 98–107)
CO2 SERPL-SCNC: 19 MMOL/L (ref 21–32)
CO2 SERPL-SCNC: 20 MMOL/L (ref 21–32)
COLOR UR: ABNORMAL
CREAT SERPL-MCNC: 0.9 MG/DL (ref 0.5–1.05)
CREAT SERPL-MCNC: 1.01 MG/DL (ref 0.5–1.05)
EGFRCR SERPLBLD CKD-EPI 2021: 63 ML/MIN/1.73M*2
EGFRCR SERPLBLD CKD-EPI 2021: 72 ML/MIN/1.73M*2
EOSINOPHIL # BLD AUTO: 0 X10*3/UL (ref 0–0.7)
EOSINOPHIL NFR BLD AUTO: 0 %
ERYTHROCYTE [DISTWIDTH] IN BLOOD BY AUTOMATED COUNT: 17.5 % (ref 11.5–14.5)
GLUCOSE SERPL-MCNC: 132 MG/DL (ref 74–99)
GLUCOSE SERPL-MCNC: 137 MG/DL (ref 74–99)
GLUCOSE UR STRIP.AUTO-MCNC: NORMAL MG/DL
HCT VFR BLD AUTO: 36 % (ref 36–46)
HGB BLD-MCNC: 10.3 G/DL (ref 12–16)
HYALINE CASTS #/AREA URNS AUTO: ABNORMAL /LPF
IMM GRANULOCYTES # BLD AUTO: 0.07 X10*3/UL (ref 0–0.7)
IMM GRANULOCYTES NFR BLD AUTO: 1.2 % (ref 0–0.9)
INR PPP: 1.3 (ref 0.9–1.2)
KETONES UR STRIP.AUTO-MCNC: NEGATIVE MG/DL
LACTATE SERPL-SCNC: 1.7 MMOL/L (ref 0.4–2)
LEUKOCYTE ESTERASE UR QL STRIP.AUTO: NEGATIVE
LIPASE SERPL-CCNC: 86 U/L (ref 9–82)
LYMPHOCYTES # BLD AUTO: 1.5 X10*3/UL (ref 1.2–4.8)
LYMPHOCYTES NFR BLD AUTO: 26.4 %
MAGNESIUM SERPL-MCNC: 0.89 MG/DL (ref 1.6–2.4)
MAGNESIUM SERPL-MCNC: 1.42 MG/DL (ref 1.6–2.4)
MCH RBC QN AUTO: 23 PG (ref 26–34)
MCHC RBC AUTO-ENTMCNC: 28.6 G/DL (ref 32–36)
MCV RBC AUTO: 80 FL (ref 80–100)
MONOCYTES # BLD AUTO: 0.26 X10*3/UL (ref 0.1–1)
MONOCYTES NFR BLD AUTO: 4.6 %
MUCOUS THREADS #/AREA URNS AUTO: ABNORMAL /LPF
NEUTROPHILS # BLD AUTO: 3.85 X10*3/UL (ref 1.2–7.7)
NEUTROPHILS NFR BLD AUTO: 67.6 %
NITRITE UR QL STRIP.AUTO: NEGATIVE
NRBC BLD-RTO: 0 /100 WBCS (ref 0–0)
P AXIS: 70 DEGREES
P OFFSET: 176 MS
P ONSET: 125 MS
PH UR STRIP.AUTO: 6.5 [PH]
PHOSPHATE SERPL-MCNC: 4 MG/DL (ref 2.5–4.9)
PHOSPHATE SERPL-MCNC: 4.4 MG/DL (ref 2.5–4.9)
PLATELET # BLD AUTO: 147 X10*3/UL (ref 150–450)
POTASSIUM SERPL-SCNC: 4.1 MMOL/L (ref 3.5–5.3)
POTASSIUM SERPL-SCNC: 4.4 MMOL/L (ref 3.5–5.3)
PR INTERVAL: 170 MS
PROT SERPL-MCNC: 6.7 G/DL (ref 6.4–8.2)
PROT UR STRIP.AUTO-MCNC: ABNORMAL MG/DL
PROTHROMBIN TIME: 13.5 SECONDS (ref 9.3–12.7)
Q ONSET: 210 MS
QRS COUNT: 18 BEATS
QRS DURATION: 100 MS
QT INTERVAL: 364 MS
QTC CALCULATION(BAZETT): 495 MS
QTC FREDERICIA: 447 MS
R AXIS: 139 DEGREES
RBC # BLD AUTO: 4.48 X10*6/UL (ref 4–5.2)
RBC # UR STRIP.AUTO: NEGATIVE /UL
RBC #/AREA URNS AUTO: ABNORMAL /HPF
SARS-COV-2 RNA RESP QL NAA+PROBE: NOT DETECTED
SODIUM SERPL-SCNC: 138 MMOL/L (ref 136–145)
SODIUM SERPL-SCNC: 140 MMOL/L (ref 136–145)
SP GR UR STRIP.AUTO: 1.01
T AXIS: 52 DEGREES
T OFFSET: 392 MS
UROBILINOGEN UR STRIP.AUTO-MCNC: NORMAL MG/DL
VENTRICULAR RATE: 111 BPM
WBC # BLD AUTO: 5.7 X10*3/UL (ref 4.4–11.3)
WBC #/AREA URNS AUTO: ABNORMAL /HPF

## 2024-11-20 PROCEDURE — 81001 URINALYSIS AUTO W/SCOPE: CPT | Performed by: CLINICAL NURSE SPECIALIST

## 2024-11-20 PROCEDURE — 71275 CT ANGIOGRAPHY CHEST: CPT

## 2024-11-20 PROCEDURE — 2550000001 HC RX 255 CONTRASTS: Performed by: EMERGENCY MEDICINE

## 2024-11-20 PROCEDURE — 36415 COLL VENOUS BLD VENIPUNCTURE: CPT | Performed by: CLINICAL NURSE SPECIALIST

## 2024-11-20 PROCEDURE — 80053 COMPREHEN METABOLIC PANEL: CPT | Performed by: CLINICAL NURSE SPECIALIST

## 2024-11-20 PROCEDURE — 93005 ELECTROCARDIOGRAM TRACING: CPT

## 2024-11-20 PROCEDURE — 2500000002 HC RX 250 W HCPCS SELF ADMINISTERED DRUGS (ALT 637 FOR MEDICARE OP, ALT 636 FOR OP/ED): Performed by: STUDENT IN AN ORGANIZED HEALTH CARE EDUCATION/TRAINING PROGRAM

## 2024-11-20 PROCEDURE — 2500000004 HC RX 250 GENERAL PHARMACY W/ HCPCS (ALT 636 FOR OP/ED): Performed by: EMERGENCY MEDICINE

## 2024-11-20 PROCEDURE — 93970 EXTREMITY STUDY: CPT

## 2024-11-20 PROCEDURE — 99291 CRITICAL CARE FIRST HOUR: CPT | Mod: 25 | Performed by: CLINICAL NURSE SPECIALIST

## 2024-11-20 PROCEDURE — 83880 ASSAY OF NATRIURETIC PEPTIDE: CPT | Performed by: CLINICAL NURSE SPECIALIST

## 2024-11-20 PROCEDURE — 96366 THER/PROPH/DIAG IV INF ADDON: CPT

## 2024-11-20 PROCEDURE — 84484 ASSAY OF TROPONIN QUANT: CPT | Performed by: CLINICAL NURSE SPECIALIST

## 2024-11-20 PROCEDURE — 74177 CT ABD & PELVIS W/CONTRAST: CPT | Performed by: RADIOLOGY

## 2024-11-20 PROCEDURE — 2020000001 HC ICU ROOM DAILY

## 2024-11-20 PROCEDURE — 87040 BLOOD CULTURE FOR BACTERIA: CPT | Mod: TRILAB | Performed by: CLINICAL NURSE SPECIALIST

## 2024-11-20 PROCEDURE — 5A2204Z RESTORATION OF CARDIAC RHYTHM, SINGLE: ICD-10-PCS | Performed by: EMERGENCY MEDICINE

## 2024-11-20 PROCEDURE — 85610 PROTHROMBIN TIME: CPT | Performed by: CLINICAL NURSE SPECIALIST

## 2024-11-20 PROCEDURE — 96365 THER/PROPH/DIAG IV INF INIT: CPT

## 2024-11-20 PROCEDURE — 87635 SARS-COV-2 COVID-19 AMP PRB: CPT | Performed by: CLINICAL NURSE SPECIALIST

## 2024-11-20 PROCEDURE — 2500000004 HC RX 250 GENERAL PHARMACY W/ HCPCS (ALT 636 FOR OP/ED): Performed by: CLINICAL NURSE SPECIALIST

## 2024-11-20 PROCEDURE — 85025 COMPLETE CBC W/AUTO DIFF WBC: CPT | Performed by: CLINICAL NURSE SPECIALIST

## 2024-11-20 PROCEDURE — 2500000004 HC RX 250 GENERAL PHARMACY W/ HCPCS (ALT 636 FOR OP/ED): Performed by: STUDENT IN AN ORGANIZED HEALTH CARE EDUCATION/TRAINING PROGRAM

## 2024-11-20 PROCEDURE — 71045 X-RAY EXAM CHEST 1 VIEW: CPT | Performed by: RADIOLOGY

## 2024-11-20 PROCEDURE — 84100 ASSAY OF PHOSPHORUS: CPT | Performed by: STUDENT IN AN ORGANIZED HEALTH CARE EDUCATION/TRAINING PROGRAM

## 2024-11-20 PROCEDURE — 83735 ASSAY OF MAGNESIUM: CPT | Performed by: CLINICAL NURSE SPECIALIST

## 2024-11-20 PROCEDURE — 83690 ASSAY OF LIPASE: CPT | Performed by: CLINICAL NURSE SPECIALIST

## 2024-11-20 PROCEDURE — 96375 TX/PRO/DX INJ NEW DRUG ADDON: CPT

## 2024-11-20 PROCEDURE — 71275 CT ANGIOGRAPHY CHEST: CPT | Performed by: RADIOLOGY

## 2024-11-20 PROCEDURE — 2500000001 HC RX 250 WO HCPCS SELF ADMINISTERED DRUGS (ALT 637 FOR MEDICARE OP): Performed by: STUDENT IN AN ORGANIZED HEALTH CARE EDUCATION/TRAINING PROGRAM

## 2024-11-20 PROCEDURE — 74177 CT ABD & PELVIS W/CONTRAST: CPT

## 2024-11-20 PROCEDURE — 83735 ASSAY OF MAGNESIUM: CPT | Performed by: STUDENT IN AN ORGANIZED HEALTH CARE EDUCATION/TRAINING PROGRAM

## 2024-11-20 PROCEDURE — 71045 X-RAY EXAM CHEST 1 VIEW: CPT

## 2024-11-20 PROCEDURE — 82330 ASSAY OF CALCIUM: CPT | Performed by: CLINICAL NURSE SPECIALIST

## 2024-11-20 PROCEDURE — 93971 EXTREMITY STUDY: CPT | Performed by: RADIOLOGY

## 2024-11-20 PROCEDURE — 93010 ELECTROCARDIOGRAM REPORT: CPT | Performed by: INTERNAL MEDICINE

## 2024-11-20 PROCEDURE — 83605 ASSAY OF LACTIC ACID: CPT | Performed by: CLINICAL NURSE SPECIALIST

## 2024-11-20 RX ORDER — DILTIAZEM HYDROCHLORIDE 180 MG/1
180 CAPSULE, COATED, EXTENDED RELEASE ORAL DAILY
Status: DISCONTINUED | OUTPATIENT
Start: 2024-11-21 | End: 2024-11-21 | Stop reason: HOSPADM

## 2024-11-20 RX ORDER — FENTANYL CITRATE 50 UG/ML
50 INJECTION, SOLUTION INTRAMUSCULAR; INTRAVENOUS ONCE
Status: COMPLETED | OUTPATIENT
Start: 2024-11-20 | End: 2024-11-20

## 2024-11-20 RX ORDER — MAGNESIUM SULFATE HEPTAHYDRATE 40 MG/ML
2 INJECTION, SOLUTION INTRAVENOUS ONCE
Status: COMPLETED | OUTPATIENT
Start: 2024-11-20 | End: 2024-11-20

## 2024-11-20 RX ORDER — ACETAMINOPHEN 500 MG
5 TABLET ORAL NIGHTLY
Status: DISCONTINUED | OUTPATIENT
Start: 2024-11-20 | End: 2024-11-21 | Stop reason: HOSPADM

## 2024-11-20 RX ORDER — ASPIRIN 81 MG/1
81 TABLET ORAL DAILY
Status: DISCONTINUED | OUTPATIENT
Start: 2024-11-21 | End: 2024-11-21 | Stop reason: HOSPADM

## 2024-11-20 RX ORDER — GABAPENTIN 800 MG/1
800 TABLET ORAL 3 TIMES DAILY
Status: DISCONTINUED | OUTPATIENT
Start: 2024-11-20 | End: 2024-11-21 | Stop reason: HOSPADM

## 2024-11-20 RX ORDER — ADENOSINE 3 MG/ML
12 INJECTION, SOLUTION INTRAVENOUS ONCE
Status: COMPLETED | OUTPATIENT
Start: 2024-11-20 | End: 2024-11-20

## 2024-11-20 RX ORDER — HYDROXYCHLOROQUINE SULFATE 200 MG/1
200 TABLET, FILM COATED ORAL 2 TIMES DAILY
Status: DISCONTINUED | OUTPATIENT
Start: 2024-11-20 | End: 2024-11-21 | Stop reason: HOSPADM

## 2024-11-20 RX ORDER — CALCIUM GLUCONATE 20 MG/ML
2 INJECTION, SOLUTION INTRAVENOUS ONCE
Status: COMPLETED | OUTPATIENT
Start: 2024-11-20 | End: 2024-11-20

## 2024-11-20 RX ORDER — DILTIAZEM HCL/D5W 125 MG/125
5-15 PLASTIC BAG, INJECTION (ML) INTRAVENOUS CONTINUOUS
Status: DISCONTINUED | OUTPATIENT
Start: 2024-11-20 | End: 2024-11-21 | Stop reason: HOSPADM

## 2024-11-20 RX ORDER — TOPIRAMATE 100 MG/1
100 TABLET, FILM COATED ORAL 2 TIMES DAILY
Status: DISCONTINUED | OUTPATIENT
Start: 2024-11-20 | End: 2024-11-21 | Stop reason: HOSPADM

## 2024-11-20 RX ORDER — MAGNESIUM SULFATE HEPTAHYDRATE 40 MG/ML
4 INJECTION, SOLUTION INTRAVENOUS ONCE
Status: DISCONTINUED | OUTPATIENT
Start: 2024-11-20 | End: 2024-11-21 | Stop reason: HOSPADM

## 2024-11-20 RX ORDER — EPOPROSTENOL 1.5 MG/10ML
1.5 INJECTION, POWDER, LYOPHILIZED, FOR SOLUTION INTRAVENOUS CONTINUOUS
Status: DISCONTINUED | OUTPATIENT
Start: 2024-11-20 | End: 2024-11-21 | Stop reason: HOSPADM

## 2024-11-20 RX ORDER — VENLAFAXINE HYDROCHLORIDE 150 MG/1
150 CAPSULE, EXTENDED RELEASE ORAL DAILY
Status: DISCONTINUED | OUTPATIENT
Start: 2024-11-21 | End: 2024-11-21 | Stop reason: HOSPADM

## 2024-11-20 SDOH — SOCIAL STABILITY: SOCIAL INSECURITY: ABUSE: ADULT

## 2024-11-20 SDOH — SOCIAL STABILITY: SOCIAL INSECURITY: DO YOU FEEL UNSAFE GOING BACK TO THE PLACE WHERE YOU ARE LIVING?: NO

## 2024-11-20 SDOH — SOCIAL STABILITY: SOCIAL INSECURITY: HAVE YOU HAD THOUGHTS OF HARMING ANYONE ELSE?: NO

## 2024-11-20 SDOH — SOCIAL STABILITY: SOCIAL INSECURITY: DO YOU FEEL ANYONE HAS EXPLOITED OR TAKEN ADVANTAGE OF YOU FINANCIALLY OR OF YOUR PERSONAL PROPERTY?: NO

## 2024-11-20 SDOH — SOCIAL STABILITY: SOCIAL INSECURITY: HAS ANYONE EVER THREATENED TO HURT YOUR FAMILY OR YOUR PETS?: NO

## 2024-11-20 SDOH — SOCIAL STABILITY: SOCIAL INSECURITY: HAVE YOU HAD ANY THOUGHTS OF HARMING ANYONE ELSE?: NO

## 2024-11-20 SDOH — SOCIAL STABILITY: SOCIAL INSECURITY: WERE YOU ABLE TO COMPLETE ALL THE BEHAVIORAL HEALTH SCREENINGS?: YES

## 2024-11-20 SDOH — SOCIAL STABILITY: SOCIAL INSECURITY: DOES ANYONE TRY TO KEEP YOU FROM HAVING/CONTACTING OTHER FRIENDS OR DOING THINGS OUTSIDE YOUR HOME?: NO

## 2024-11-20 SDOH — SOCIAL STABILITY: SOCIAL INSECURITY: ARE THERE ANY APPARENT SIGNS OF INJURIES/BEHAVIORS THAT COULD BE RELATED TO ABUSE/NEGLECT?: NO

## 2024-11-20 SDOH — SOCIAL STABILITY: SOCIAL INSECURITY: ARE YOU OR HAVE YOU BEEN THREATENED OR ABUSED PHYSICALLY, EMOTIONALLY, OR SEXUALLY BY ANYONE?: NO

## 2024-11-20 ASSESSMENT — COGNITIVE AND FUNCTIONAL STATUS - GENERAL
PATIENT BASELINE BEDBOUND: NO
MOBILITY SCORE: 24
DAILY ACTIVITIY SCORE: 24

## 2024-11-20 ASSESSMENT — LIFESTYLE VARIABLES
HOW MANY STANDARD DRINKS CONTAINING ALCOHOL DO YOU HAVE ON A TYPICAL DAY: PATIENT DOES NOT DRINK
HOW OFTEN DO YOU HAVE A DRINK CONTAINING ALCOHOL: NEVER
HOW OFTEN DO YOU HAVE 6 OR MORE DRINKS ON ONE OCCASION: NEVER
SKIP TO QUESTIONS 9-10: 1
PRESCIPTION_ABUSE_PAST_12_MONTHS: NO
AUDIT-C TOTAL SCORE: 0
AUDIT-C TOTAL SCORE: 0
SUBSTANCE_ABUSE_PAST_12_MONTHS: NO

## 2024-11-20 ASSESSMENT — PAIN SCALES - GENERAL: PAINLEVEL_OUTOF10: 0 - NO PAIN

## 2024-11-20 ASSESSMENT — ACTIVITIES OF DAILY LIVING (ADL)
DRESSING YOURSELF: INDEPENDENT
PATIENT'S MEMORY ADEQUATE TO SAFELY COMPLETE DAILY ACTIVITIES?: YES
HEARING - LEFT EAR: FUNCTIONAL
GROOMING: INDEPENDENT
JUDGMENT_ADEQUATE_SAFELY_COMPLETE_DAILY_ACTIVITIES: YES
ADEQUATE_TO_COMPLETE_ADL: YES
FEEDING YOURSELF: INDEPENDENT
BATHING: INDEPENDENT
TOILETING: INDEPENDENT
HEARING - RIGHT EAR: FUNCTIONAL
WALKS IN HOME: INDEPENDENT

## 2024-11-20 ASSESSMENT — PATIENT HEALTH QUESTIONNAIRE - PHQ9
2. FEELING DOWN, DEPRESSED OR HOPELESS: NOT AT ALL
SUM OF ALL RESPONSES TO PHQ9 QUESTIONS 1 & 2: 0
1. LITTLE INTEREST OR PLEASURE IN DOING THINGS: NOT AT ALL

## 2024-11-20 ASSESSMENT — PAIN - FUNCTIONAL ASSESSMENT: PAIN_FUNCTIONAL_ASSESSMENT: 0-10

## 2024-11-20 NOTE — ED PROVIDER NOTES
Department of Emergency Medicine   ED  Provider Note  Admit Date/RoomTime: 11/20/2024 10:34 AM  ED Room: 406/406-A        History of Present Illness:  Chief Complaint   Patient presents with   • Irregular Heart Beat     Patient bib Ems from home, states she felt her heart fluttering. Found in SVT in the 200s, EMS gave 2 doses of adenosine with no relief. Hx of pulmonary HTN         Ronna Beckham is a 64 y.o. female history of pulmonary hypertension with a Veletri pump,  hypercholesterolemia, cardiomegaly, sent to the emergency department sudden onset of rapid heart rate.  Patient has chronic shortness of breath with no change denies any dizziness or lightheadedness.  No pain.  Reports generalized swelling to her abdomen and legs which is normal for her.  She denies cough congestion runny nose sore throat.  No pressure burning frequency with urination feels nauseous but no vomiting.  No diarrhea .     Patient arrived via EMS en route received 6 mg of adenosine 12 mg of adenosine fluids with no improvement of heart rate  Review of Systems:   Pertinent positives and negatives are stated within HPI, all other systems reviewed and are negative.        --------------------------------------------- PAST HISTORY ---------------------------------------------  Past Medical History:  has a past medical history of Anxiety, Depression, Does not refill medications appropriately (09/10/2024), GERD (gastroesophageal reflux disease), Lupus, Personal history of other specified conditions (09/21/2020), Pulmonary hypertension (Multi), and Volume overload (09/10/2024).  Past Surgical History:  has a past surgical history that includes Other surgical history (09/21/2020); Other surgical history (09/21/2020); Other surgical history (09/21/2020); Other surgical history (09/21/2020); Other surgical history (09/21/2020); Hysterectomy; Cholecystectomy; and Cardiac catheterization (N/A, 1/8/2024).  Social History:  reports that she has quit  smoking. Her smoking use included cigarettes. She has quit using smokeless tobacco. She reports that she does not currently use alcohol. She reports that she does not currently use drugs after having used the following drugs: Cocaine and Other.  Family History: family history includes No Known Problems in her father and mother.. Unless otherwise noted, family history is non contributory  The patient’s home medications have been reviewed.  Allergies: Levetiracetam        ---------------------------------------------------PHYSICAL EXAM--------------------------------------    GENERAL APPEARANCE: Awake and alert.  Appears to be uncomfortable  VITAL SIGNS: SVT on the monitor at a rate of 200 oxygen saturation 96% on 2 L respiratory rate 29 hypotensive at 83/68  HEENT: Normocephalic, atraumatic. Extraocular muscles are intact. Pupils equal round and reactive to light. Conjunctiva are pink. Negative scleral icterus. Mucous membranes are moist. Tongue in the midline. Pharynx was without erythema or exudates, uvula midline  NECK: Soft Nontender and supple, full gross ROM, no meningeal signs.  CHEST: Nontender to palpation.  Scattered wheezing  HEART: SVT at a rate of 200 no murmurs or rubs noted.  Peripheral pulses are intact.    MUSCULCSKELETAL: +2 pitting edema right lower extremity +1 left lower extremity peripheral pulses intact no bruising no obvious bony abnormality sensation intact  NEUROLOGICAL: Awake, alert and oriented x 3. Power intact in the upper and lower extremities. Sensation is intact to light touch in the upper and lower extremities.   IMMUNOLOGICAL: No lymphatic streaking noted   DERM: Pale no petechiae, rashes, or ecchymoses.          ------------------------- NURSING NOTES AND VITALS REVIEWED ---------------------------  The nursing notes within the ED encounter and vital signs as below have been reviewed by myself  /89 (BP Location: Right arm, Patient Position: Sitting)   Pulse 104   Temp 36.5  "°C (97.7 °F) (Temporal)   Resp 16   Ht 1.651 m (5' 5\")   Wt 73.5 kg (162 lb 1.6 oz)   SpO2 97%   BMI 26.97 kg/m²     Oxygen Saturation Interpretation: 96% 2 L    The cardiac monitor revealed SVT 200s with a heart rate in the 200s as interpreted by me. The cardiac monitor was ordered secondary to the patient's heart rate and to monitor the patient for dysrhythmia.       The patient’s available past medical records and past encounters were reviewed.          -----------------------DIAGNOSTIC RESULTS------------------------  LABS:    Labs Reviewed   B-TYPE NATRIURETIC PEPTIDE - Abnormal       Result Value    BNP 1,482 (*)     Narrative:        <100 pg/mL - Heart failure unlikely  100-299 pg/mL - Intermediate probability of acute heart                  failure exacerbation. Correlate with clinical                  context and patient history.    >=300 pg/mL - Heart Failure likely. Correlate with clinical                  context and patient history.    BNP testing is performed using different testing methodology at [facility] than at other [facility]. Direct result comparisons should only be made within the same method.      CBC WITH AUTO DIFFERENTIAL - Abnormal    WBC 5.7      nRBC 0.0      RBC 4.48      Hemoglobin 10.3 (*)     Hematocrit 36.0      MCV 80      MCH 23.0 (*)     MCHC 28.6 (*)     RDW 17.5 (*)     Platelets 147 (*)     Neutrophils % 67.6      Immature Granulocytes %, Automated 1.2 (*)     Lymphocytes % 26.4      Monocytes % 4.6      Eosinophils % 0.0      Basophils % 0.2      Neutrophils Absolute 3.85      Immature Granulocytes Absolute, Automated 0.07      Lymphocytes Absolute 1.50      Monocytes Absolute 0.26      Eosinophils Absolute 0.00      Basophils Absolute 0.01     COMPREHENSIVE METABOLIC PANEL - Abnormal    Glucose 132 (*)     Sodium 140      Potassium 4.4      Chloride 109 (*)     Bicarbonate 19 (*)     Anion Gap 16      Urea Nitrogen 16      Creatinine 1.01      eGFR " 63      Calcium 6.4 (*)     Albumin 3.7      Alkaline Phosphatase 82      Total Protein 6.7      AST 13      Bilirubin, Total 0.9      ALT 11     MAGNESIUM - Abnormal    Magnesium 0.89 (*)    PROTIME-INR - Abnormal    Protime 13.5 (*)     INR 1.3 (*)     Narrative:     INR Therapeutic Range: 2.0-3.5   URINALYSIS WITH REFLEX CULTURE AND MICROSCOPIC - Abnormal    Color, Urine Light-Yellow      Appearance, Urine Clear      Specific Gravity, Urine 1.006      pH, Urine 6.5      Protein, Urine 100 (2+) (*)     Glucose, Urine Normal      Blood, Urine NEGATIVE      Ketones, Urine NEGATIVE      Bilirubin, Urine NEGATIVE      Urobilinogen, Urine Normal      Nitrite, Urine NEGATIVE      Leukocyte Esterase, Urine NEGATIVE     LIPASE - Abnormal    Lipase 86 (*)     Narrative:     Venipuncture immediately after or during the administration of Metamizole may lead to falsely low results. Testing should be performed immediately prior to Metamizole dosing.   CALCIUM, IONIZED - Abnormal    POCT Calcium, Ionized 0.80 (*)    MAGNESIUM - Abnormal    Magnesium 1.42 (*)    RENAL FUNCTION PANEL - Abnormal    Glucose 137 (*)     Sodium 138      Potassium 4.1      Chloride 110 (*)     Bicarbonate 20 (*)     Anion Gap 12      Urea Nitrogen 15      Creatinine 0.90      eGFR 72      Calcium 7.1 (*)     Phosphorus 4.0      Albumin 3.4     URINALYSIS MICROSCOPIC WITH REFLEX CULTURE - Abnormal    WBC, Urine 1-5      RBC, Urine 1-2      Mucus, Urine FEW      Hyaline Casts, Urine 1+ (*)    BLOOD CULTURE - Normal    Blood Culture Loaded on Instrument - Culture in progress     BLOOD CULTURE - Normal    Blood Culture Loaded on Instrument - Culture in progress     SERIAL TROPONIN-INITIAL - Normal    Troponin I, High Sensitivity 6      Narrative:     Less than 99th percentile of normal range cutoff-  Female and children under 18 years old <14 ng/L; Male <21 ng/L: Negative  Repeat testing should be performed if clinically indicated.     Female and children  under 18 years old 14-50 ng/L; Male 21-50 ng/L:  Consistent with possible cardiac damage and possible increased clinical   risk. Serial measurements may help to assess extent of myocardial damage.     >50 ng/L: Consistent with cardiac damage, increased clinical risk and  myocardial infarction. Serial measurements may help assess extent of   myocardial damage.      NOTE: Children less than 1 year old may have higher baseline troponin   levels and results should be interpreted in conjunction with the overall   clinical context.     NOTE: Troponin I testing is performed using a different   testing methodology at [facility] than at [facility]. Direct result comparisons should only   be made within the same method.   LACTATE - Normal    Lactate 1.7      Narrative:     Venipuncture immediately after or during the administration of Metamizole may lead to falsely low results. Testing should be performed immediately prior to Metamizole dosing.   SARS-COV-2 PCR - Normal    Coronavirus 2019, PCR Not Detected      Narrative:     This assay has received FDA Emergency Use Authorization (EUA) and is only authorized for the duration of time that circumstances exist to justify the authorization of the emergency use of in vitro diagnostic tests for the detection of SARS-CoV-2 virus and/or diagnosis of COVID-19 infection under section 564(b)(1) of the Act, 21 U.S.C. 360bbb-3(b)(1). This assay is an in vitro diagnostic nucleic acid amplification test for the qualitative detection of SARS-CoV-2 from nasopharyngeal specimens and has been validated for use at [facility]. Negative results do not preclude COVID-19 infections and should not be used as the sole basis for diagnosis, treatment, or other management decisions.     SERIAL TROPONIN, 1 HOUR - Normal    Troponin I, High Sensitivity 6      Narrative:     Less than 99th percentile of normal range cutoff-  Female and children under 18  years old <14 ng/L; Male <21 ng/L: Negative  Repeat testing should be performed if clinically indicated.     Female and children under 18 years old 14-50 ng/L; Male 21-50 ng/L:  Consistent with possible cardiac damage and possible increased clinical   risk. Serial measurements may help to assess extent of myocardial damage.     >50 ng/L: Consistent with cardiac damage, increased clinical risk and  myocardial infarction. Serial measurements may help assess extent of   myocardial damage.      NOTE: Children less than 1 year old may have higher baseline troponin   levels and results should be interpreted in conjunction with the overall   clinical context.     NOTE: Troponin I testing is performed using a different   testing methodology at [facility] than at other   [facility]. Direct result comparisons should only   be made within the same method.   PHOSPHORUS - Normal    Phosphorus 4.4     TROPONIN SERIES- (INITIAL, 1 HR)    Narrative:     The following orders were created for panel order Troponin I Series, High Sensitivity (0, 1 HR).  Procedure                               Abnormality         Status                     ---------                               -----------         ------                     Troponin I, High Sensiti...[[id number]]  Normal              Final result               Troponin, High Sensitivi...[[id number]]  Normal              Final result                 Please view results for these tests on the individual orders.   URINALYSIS WITH REFLEX CULTURE AND MICROSCOPIC    Narrative:     The following orders were created for panel order Urinalysis with Reflex Culture and Microscopic.  Procedure                               Abnormality         Status                     ---------                               -----------         ------                     Urinalysis with Reflex C...[[id number]]  Abnormal            Final result               Extra Urine Gray Tube[[id number]]                                                          Please view results for these tests on the individual orders.   EXTRA URINE GRAY TUBE       As interpreted by me, the above displayed labs are abnormal. All other labs obtained during this visit were within normal range or not returned as of this dictation.      EKG Interpretation    ) EKG which show supraventricular tachycardia heart rate of 200 right axis deviation normal QRS, no STEMI criteria.  After synchronized cardioversion second EKG interpreted by me: Sinus tachycardia heart rate of 111 right axis deviation prolonged QT interval no hypertrophy incomplete right bundle branch block no STEMI        CT angio chest for pulmonary embolism   Final Result   NO NEW ACUTE PROCESS IN THE CHEST, ABDOMEN OR PELVIS        MILD INTERSTITIAL AND ALVEOLAR EDEMA        NO ACUTE PULMONARY EMBOLISM THROUGH THE SEGMENTAL BRANCH LEVEL        NO AORTIC DISSECTION OR OTHER ACUTE THORACIC AORTIC FINDINGS        NO AIRSPACE CONSOLIDATION, GROUND-GLASS AIRSPACE DISEASE OR ANY OTHER   SIGN OF ACTIVE INFECTION        NO PLEURAL EFFUSION        NO PNEUMOTHORAX        DIFFUSE IMPRESSIVE SUBCUTANEOUS EDEMA OVER THE TRUNK HAS, IF   ANYTHING, SLIGHTLY WORSENED IN THE CHEST WHEN COMPARED TO 22 OCTOBER 2024. THE SUBCUTANEOUS EDEMA OVER THE ABDOMEN AND PELVIS HAS   DEFINITELY WORSENED FROM ITS MOST RECENT COMPARISON EXAM, 28 AUGUST 2024. STILL ONLY A VERY SMALL QUANTITY OF ASCITES, FAR TOO SMALL FOR   PARACENTESIS        MODERATE SIZE PERICARDIAL EFFUSION UNCHANGED        IMPRESSIVE CARDIOMEGALY WITH DILATED RIGHT-SIDED CHAMBERS AND LIVER   FINDINGS OF PASSIVE CONGESTION        NO DEFINITE ACUTE THROMBOSIS OF SVC OR IVC OR GREAT PELVIC VEINS   ALTHOUGH SENSITIVITY AND SPECIFICITY IS LIMITED DUE TO CONTRAST BOLUS   TIMING        THE ENTIRE PORTAL VENOUS SYSTEM INCLUDING THE SPLENIC VEIN, SMV AND   ITS MAJOR TRIBUTARIES, MAIN AND INTRAHEPATIC PORTAL VEINS ARE ALL   PATENT; NO ACUTE PORTAL  VENOUS THROMBOSIS        ALL THREE HEPATIC VEINS ARE PATENT; NO ACUTE HEPATIC VENOUS THROMBOSIS        THE LAST CT OF THE ABDOMEN AND PELVIS 28 AUGUST 2024 SHOWED SIGMOID   DIVERTICULITIS WHICH HAS SINCE RESOLVED. PRESENTLY NO ACUTE   DIVERTICULITIS OR OTHER COLITIS, BOWEL OBSTRUCTION, PERFORATION OR   ABSCESS        THIS REPORT SERVES AS THE DIAGNOSTIC INTERPRETATION FOR TWO EXAMS   PERFORMED CONCURRENTLY: CT CHEST WITH IV CONTRAST FOR PULMONARY   EMBOLISM EVALUATION; CT ABDOMEN AND PELVIS WITH IV CONTRAST        MACRO:   None        Signed by: Juan Diego Hernández 11/20/2024 1:47 PM   Dictation workstation:   [id number]      CT abdomen pelvis w IV contrast   Final Result   NO NEW ACUTE PROCESS IN THE CHEST, ABDOMEN OR PELVIS        MILD INTERSTITIAL AND ALVEOLAR EDEMA        NO ACUTE PULMONARY EMBOLISM THROUGH THE SEGMENTAL BRANCH LEVEL        NO AORTIC DISSECTION OR OTHER ACUTE THORACIC AORTIC FINDINGS        NO AIRSPACE CONSOLIDATION, GROUND-GLASS AIRSPACE DISEASE OR ANY OTHER   SIGN OF ACTIVE INFECTION        NO PLEURAL EFFUSION        NO PNEUMOTHORAX        DIFFUSE IMPRESSIVE SUBCUTANEOUS EDEMA OVER THE TRUNK HAS, IF   ANYTHING, SLIGHTLY WORSENED IN THE CHEST WHEN COMPARED TO 22 OCTOBER 2024. THE SUBCUTANEOUS EDEMA OVER THE ABDOMEN AND PELVIS HAS   DEFINITELY WORSENED FROM ITS MOST RECENT COMPARISON EXAM, 28 AUGUST 2024. STILL ONLY A VERY SMALL QUANTITY OF ASCITES, FAR TOO SMALL FOR   PARACENTESIS        MODERATE SIZE PERICARDIAL EFFUSION UNCHANGED        IMPRESSIVE CARDIOMEGALY WITH DILATED RIGHT-SIDED CHAMBERS AND LIVER   FINDINGS OF PASSIVE CONGESTION        NO DEFINITE ACUTE THROMBOSIS OF SVC OR IVC OR GREAT PELVIC VEINS   ALTHOUGH SENSITIVITY AND SPECIFICITY IS LIMITED DUE TO CONTRAST BOLUS   TIMING        THE ENTIRE PORTAL VENOUS SYSTEM INCLUDING THE SPLENIC VEIN, SMV AND   ITS MAJOR TRIBUTARIES, MAIN AND INTRAHEPATIC PORTAL VEINS ARE ALL   PATENT; NO ACUTE PORTAL VENOUS THROMBOSIS        ALL THREE HEPATIC  VEINS ARE PATENT; NO ACUTE HEPATIC VENOUS THROMBOSIS        THE LAST CT OF THE ABDOMEN AND PELVIS 28 AUGUST 2024 SHOWED SIGMOID   DIVERTICULITIS WHICH HAS SINCE RESOLVED. PRESENTLY NO ACUTE   DIVERTICULITIS OR OTHER COLITIS, BOWEL OBSTRUCTION, PERFORATION OR   ABSCESS        THIS REPORT SERVES AS THE DIAGNOSTIC INTERPRETATION FOR TWO EXAMS   PERFORMED CONCURRENTLY: CT CHEST WITH IV CONTRAST FOR PULMONARY   EMBOLISM EVALUATION; CT ABDOMEN AND PELVIS WITH IV CONTRAST        MACRO:   None        Signed by: Juan Diego Hernández 11/20/2024 1:47 PM   Dictation workstation:   [id number]      Vascular US lower extremity venous duplex bilateral   Final Result   1. No evidence of deep venous thrombus in the evaluated veins of the   bilateral lower extremities from the inguinal ligament to the   popliteal fossa.        Signed by: Valdo Fox 11/20/2024 12:02 PM   Dictation workstation:   [id number]      XR chest 1 view   Final Result   Cardiomegaly. No evidence of acute intrathoracic abnormality        Signed by: Sea Hadley 11/20/2024 11:48 AM   Dictation workstation:   [id number]              CT angio chest for pulmonary embolism   Final Result   NO NEW ACUTE PROCESS IN THE CHEST, ABDOMEN OR PELVIS        MILD INTERSTITIAL AND ALVEOLAR EDEMA        NO ACUTE PULMONARY EMBOLISM THROUGH THE SEGMENTAL BRANCH LEVEL        NO AORTIC DISSECTION OR OTHER ACUTE THORACIC AORTIC FINDINGS        NO AIRSPACE CONSOLIDATION, GROUND-GLASS AIRSPACE DISEASE OR ANY OTHER   SIGN OF ACTIVE INFECTION        NO PLEURAL EFFUSION        NO PNEUMOTHORAX        DIFFUSE IMPRESSIVE SUBCUTANEOUS EDEMA OVER THE TRUNK HAS, IF   ANYTHING, SLIGHTLY WORSENED IN THE CHEST WHEN COMPARED TO 22 OCTOBER 2024. THE SUBCUTANEOUS EDEMA OVER THE ABDOMEN AND PELVIS HAS   DEFINITELY WORSENED FROM ITS MOST RECENT COMPARISON EXAM, 28 AUGUST 2024. STILL ONLY A VERY SMALL QUANTITY OF ASCITES, FAR TOO SMALL FOR   PARACENTESIS        MODERATE SIZE PERICARDIAL EFFUSION  UNCHANGED        IMPRESSIVE CARDIOMEGALY WITH DILATED RIGHT-SIDED CHAMBERS AND LIVER   FINDINGS OF PASSIVE CONGESTION        NO DEFINITE ACUTE THROMBOSIS OF SVC OR IVC OR GREAT PELVIC VEINS   ALTHOUGH SENSITIVITY AND SPECIFICITY IS LIMITED DUE TO CONTRAST BOLUS   TIMING        THE ENTIRE PORTAL VENOUS SYSTEM INCLUDING THE SPLENIC VEIN, SMV AND   ITS MAJOR TRIBUTARIES, MAIN AND INTRAHEPATIC PORTAL VEINS ARE ALL   PATENT; NO ACUTE PORTAL VENOUS THROMBOSIS        ALL THREE HEPATIC VEINS ARE PATENT; NO ACUTE HEPATIC VENOUS THROMBOSIS        THE LAST CT OF THE ABDOMEN AND PELVIS 28 AUGUST 2024 SHOWED SIGMOID   DIVERTICULITIS WHICH HAS SINCE RESOLVED. PRESENTLY NO ACUTE   DIVERTICULITIS OR OTHER COLITIS, BOWEL OBSTRUCTION, PERFORATION OR   ABSCESS        THIS REPORT SERVES AS THE DIAGNOSTIC INTERPRETATION FOR TWO EXAMS   PERFORMED CONCURRENTLY: CT CHEST WITH IV CONTRAST FOR PULMONARY   EMBOLISM EVALUATION; CT ABDOMEN AND PELVIS WITH IV CONTRAST        MACRO:   None        Signed by: Juan Diego Hernández 11/20/2024 1:47 PM   Dictation workstation:   [id number]      CT abdomen pelvis w IV contrast   Final Result   NO NEW ACUTE PROCESS IN THE CHEST, ABDOMEN OR PELVIS        MILD INTERSTITIAL AND ALVEOLAR EDEMA        NO ACUTE PULMONARY EMBOLISM THROUGH THE SEGMENTAL BRANCH LEVEL        NO AORTIC DISSECTION OR OTHER ACUTE THORACIC AORTIC FINDINGS        NO AIRSPACE CONSOLIDATION, GROUND-GLASS AIRSPACE DISEASE OR ANY OTHER   SIGN OF ACTIVE INFECTION        NO PLEURAL EFFUSION        NO PNEUMOTHORAX        DIFFUSE IMPRESSIVE SUBCUTANEOUS EDEMA OVER THE TRUNK HAS, IF   ANYTHING, SLIGHTLY WORSENED IN THE CHEST WHEN COMPARED TO 22 OCTOBER 2024. THE SUBCUTANEOUS EDEMA OVER THE ABDOMEN AND PELVIS HAS   DEFINITELY WORSENED FROM ITS MOST RECENT COMPARISON EXAM, 28 AUGUST 2024. STILL ONLY A VERY SMALL QUANTITY OF ASCITES, FAR TOO SMALL FOR   PARACENTESIS        MODERATE SIZE PERICARDIAL EFFUSION UNCHANGED        IMPRESSIVE CARDIOMEGALY  WITH DILATED RIGHT-SIDED CHAMBERS AND LIVER   FINDINGS OF PASSIVE CONGESTION        NO DEFINITE ACUTE THROMBOSIS OF SVC OR IVC OR GREAT PELVIC VEINS   ALTHOUGH SENSITIVITY AND SPECIFICITY IS LIMITED DUE TO CONTRAST BOLUS   TIMING        THE ENTIRE PORTAL VENOUS SYSTEM INCLUDING THE SPLENIC VEIN, SMV AND   ITS MAJOR TRIBUTARIES, MAIN AND INTRAHEPATIC PORTAL VEINS ARE ALL   PATENT; NO ACUTE PORTAL VENOUS THROMBOSIS        ALL THREE HEPATIC VEINS ARE PATENT; NO ACUTE HEPATIC VENOUS THROMBOSIS        THE LAST CT OF THE ABDOMEN AND PELVIS 28 AUGUST 2024 SHOWED SIGMOID   DIVERTICULITIS WHICH HAS SINCE RESOLVED. PRESENTLY NO ACUTE   DIVERTICULITIS OR OTHER COLITIS, BOWEL OBSTRUCTION, PERFORATION OR   ABSCESS        THIS REPORT SERVES AS THE DIAGNOSTIC INTERPRETATION FOR TWO EXAMS   PERFORMED CONCURRENTLY: CT CHEST WITH IV CONTRAST FOR PULMONARY   EMBOLISM EVALUATION; CT ABDOMEN AND PELVIS WITH IV CONTRAST        MACRO:   None        Signed by: Juan Diego Hernández 11/20/2024 1:47 PM   Dictation workstation:   [id number]      Vascular US lower extremity venous duplex bilateral   Final Result   1. No evidence of deep venous thrombus in the evaluated veins of the   bilateral lower extremities from the inguinal ligament to the   popliteal fossa.        Signed by: Valdo Fox 11/20/2024 12:02 PM   Dictation workstation:   [id number]      XR chest 1 view   Final Result   Cardiomegaly. No evidence of acute intrathoracic abnormality        Signed by: Sea Hadley 11/20/2024 11:48 AM   Dictation workstation:   [id number]              ------------------------------ ED COURSE/MEDICAL DECISION MAKING----------------------  Medical Decision Making:   Exam: A medically appropriate exam performed, outlined above, given the known history and presentation.    History obtained from: Review of medical record nursing notes patient EMS      Social Determinants of Health considered during this visit: Takes care of herself at home      PAST MEDICAL  HISTORY/Chronic Conditions Affecting Care     has a past medical history of Anxiety, Depression, Does not refill medications appropriately (09/10/2024), GERD (gastroesophageal reflux disease), Lupus, Personal history of other specified conditions (09/21/2020), Pulmonary hypertension (Multi), and Volume overload (09/10/2024).       CC/HPI Summary, Social Determinants of health, Records Reviewed, DDx, testing done/not done, ED Course, Reassessment, disposition considerations/shared decision making with patient, consults, disposition:   Presents with SVT seen immediately with attending physician  Adenosine 12 mg   cardioversion consent signed  Fentanyl  EKG  CBC  CMP  Magnesium  PT/INR  Troponin  Blood cultures  Urine  Sepsis trigger  Normal saline 300 mL due to concern of CHF  Magnesium sulfate history of hypomagnesia and arrhythmia  CT abdomen pelvis DIFFUSE IMPRESSIVE SUBCUTANEOUS EDEMA OVER THE TRUNK HAS, IF  ANYTHING, SLIGHTLY WORSENED IN THE CHEST WHEN COMPARED TO 22 OCTOBER 2024. THE SUBCUTANEOUS EDEMA OVER THE ABDOMEN AND PELVIS HAS  DEFINITELY WORSENED FROM ITS MOST RECENT COMPARISON EXAM, 28 AUGUST 2024. STILL ONLY A VERY SMALL QUANTITY OF ASCITES, FAR TOO SMALL FOR  PARACENTESIS      MODERATE SIZE PERICARDIAL EFFUSION UNCHANGED      IMPRESSIVE CARDIOMEGALY WITH DILATED RIGHT-SIDED CHAMBERS AND LIVER  FINDINGS OF PASSIVE CONGESTION      NO DEFINITE ACUTE THROMBOSIS OF SVC OR IVC OR GREAT PELVIC VEINS  ALTHOUGH SENSITIVITY AND SPECIFICITY IS LIMITED DUE TO CONTRAST BOLUS  TIMING      THE ENTIRE PORTAL VENOUS SYSTEM INCLUDING THE SPLENIC VEIN, SMV AND  ITS MAJOR TRIBUTARIES, MAIN AND INTRAHEPATIC PORTAL VEINS ARE ALL  PATENT; NO ACUTE PORTAL VENOUS THROMBOSIS      ALL THREE HEPATIC VEINS ARE PATENT; NO ACUTE HEPATIC VENOUS THROMBOSIS      THE LAST CT OF THE ABDOMEN AND PELVIS 28 AUGUST 2024 SHOWED SIGMOID  DIVERTICULITIS WHICH HAS SINCE RESOLVED. PRESENTLY NO ACUTE  DIVERTICULITIS OR OTHER COLITIS, BOWEL  OBSTRUCTION, PERFORATION OR  ABSCESS  CT chest angio concern for PE PERC positive NO NEW ACUTE PROCESS IN THE CHEST, ABDOMEN OR PELVIS      MILD INTERSTITIAL AND ALVEOLAR EDEMA      NO ACUTE PULMONARY EMBOLISM THROUGH THE SEGMENTAL BRANCH LEVEL      NO AORTIC DISSECTION OR OTHER ACUTE THORACIC AORTIC FINDINGS      NO AIRSPACE CONSOLIDATION, GROUND-GLASS AIRSPACE DISEASE OR ANY OTHER  SIGN OF ACTIVE INFECTION      NO PLEURAL EFFUSION      NO PNEUMOTHORAX  Doppler bilateral lower extremity edema  BNP  Ionized calcium  Lactate  Lipase  COVID  Medical Decision Making/Differential Diagnosis:  Differentials include but not limited to arrhythmia versus electrolyte abnormality versus PE versus infectious process versus acute coronary syndrome versus dehydration versus anemia  Review  COVID-negative  Troponin 6 repeat troponin 6  Lipase 86  Coag panel reviewed pro time 13.5  INR 1.3  Lactate 1.7  Ionized calcium 0.8  White blood cell count 5.7  Hemoglobin 10.3  proBNP 1482  Glucose 132  Chloride 109  Bicarb 19  Calcium 6.4  Magnesium 0.89  Patient presented to the emergency department tachycardia.  Received adenosine 6 mg and 12 g in the EMS transfer with no improvement of her heart rate.  Received additional dose of adenosine 12 mg with no improvement patient was found to be tachycardic at a rate of 200 hypotension poor perfusion decision was made for emergent cardioversion consent was signed.  Synchronized cardioversion was performed with attending physician at 50 J with improvement of heart rate to sinus tachycardia.  Troponin 6 repeat troponin 6 No complaints of chest pain  coag panel reviewed.  Urine is not consistent with UTI magnesium was noted to be low replaced in the emergency department calcium low replaced in the emergency department with electrolyte imbalance normal renal function LFTs unremarkable white blood cell count is not elevated hemoglobin 10.3 no signs of anemia patient triggered sepsis secondary to  hypotension tachycardia lactate is normal.  Urine is not consistent with UTI. Chest x-ray showed cardiomegaly.  CT angio of the chest showed no PE no new acute process of the chest abdomen or pelvis mild interstitial and alveolar edema.  No aortic dissection or acute thoracic aortic findings.  No airspace consolidation groundglass opaque disease noted no pleural effusion no pneumothorax.  Patient was found to have diffuse impressive subcutaneous edema over the trunk subcutaneous edema over the abdomen and pelvis worsened from previous testing small out of ascites.  Moderate size pericardial effusion unchanged from previous impressive cardiomegaly with dilated right sided chamber and liver findings findings passive congestion after evaluation with the cardiologist and hospitalist felt patient needed more resources that can be provided at [facility] patient was accepted at [facility] for further evaluation and treatment of her anasarca, acute on chronic right-sided heart failure pericardial effusion hypocalcemia hypomagnesia.  SVT was resolved with synchronized cardioversion.  Patient is amenable to transfer EMTALA form completed.  No beds available at this time at [facility] level of care discussed case with hospitalist agreed to admit the patient under her service for further patient treatment until bed available patient seen evaluated with attending physician    PROCEDURES  Unless otherwise noted below, none      CONSULTS:   None      ED Course as of 11/21/24 0040   Wed Nov 20, 2024   1045 Patient was given Identicard with no improvement of the heart rate due to hypotension and SVT required emergent cardioversion.  Consent was signed.  Attending physician at bedside patient received fentanyl for pain cardioverted at 50 J return to sinus tachycardia patient reports improvement of symptoms blood pressure improved 125/90 heart rate 110 respiratory rate 20 [TB]   1309 Discussed admission with patient.  She is amenable  to admission amenable to transfer.  Has seen Dr. Calvillo in the past. [TB]   1442 Spoke with Dr. Niño discussed case with him.  [place] patient may be stable to stay at [facility] but discussed with the hospitalist service due to her extreme right heart failure and arrhythmia will need pulmonology which is not available at [facility] recommend transfer downtown [TB]   1619 This case with hospitalist who felt patient required more resources that can be provided at [facility] recommend transfer to [facility] for further evaluation and treatment patient has been there in the past for similar issues. [TB]   1620 Case discussed with ICU team at [facility] by attending physician patient was accepted by Dr. Sarmiento  [TB]   2000 Patient resting position of comfort.  No acute distress.  Reports she feels better.  Discussed plan of care with her amenable to plan [TB]   2000 I performed a sepsis reperfusion exam on Ronna Beckham on 11/22/2024 2000pm  Patient did not receive the 30 mg kilogram IV fluid secondary to concern of congestive heart failure with obvious fluid overload on exam      NANCY Chopra  [TB]   2029 Patient has been in the emergency department greater than 4 hours after the admission acceptance at [facility].  No bed available at this time.  Discussed case with hospitalist is agreed to admit the patient under their service until bed available [TB]      ED Course User Index  [TB] NANCY Chopra         Diagnoses as of 11/21/24 0040   SVT (supraventricular tachycardia) (CMS-HCC)   Hypocalcemia   Hypomagnesemia   Anasarca   Acute on chronic right-sided heart failure   Pericardial effusion (HHS-HCC)         This patient has remained hemodynamically stable during their ED course.      Critical Care: 31  Critical Care    Performed by: NANCY Chopra  Authorized by: Bhargav Stallings MD    Critical care provider statement:     Critical care time (minutes):  31    Critical care time was exclusive of:   Separately billable procedures and treating other patients and teaching time    Critical care was necessary to treat or prevent imminent or life-threatening deterioration of the following conditions:  Sepsis, shock, cardiac failure and circulatory failure    Critical care was time spent personally by me on the following activities:  Blood draw for specimens, development of treatment plan with patient or surrogate, discussions with consultants, evaluation of patient's response to treatment, examination of patient, interpretation of cardiac output measurements, obtaining history from patient or surrogate, ordering and review of laboratory studies, ordering and review of radiographic studies, ordering and performing treatments and interventions, pulse oximetry, re-evaluation of patient's condition and review of old charts          Counseling:  The emergency provider has spoken with the patient and discussed today’s results, in addition to providing specific details for the plan of care and counseling regarding the diagnosis and prognosis.  Questions are answered at this time and they are agreeable with the plan.         --------------------------------- IMPRESSION AND DISPOSITION ---------------------------------    IMPRESSION  1. SVT (supraventricular tachycardia) (CMS-HCC)    2. Hypocalcemia    3. Hypomagnesemia    4. Anasarca    5. Acute on chronic right-sided heart failure    6. Pericardial effusion ([facility])        DISPOSITION  Disposition: Transfer to [facility] admit to the hospital service until bed available  Patient condition is stable improved        NOTE: This report was transcribed using voice recognition software. Every effort was made to ensure accuracy; however, inadvertent computerized transcription errors may be present      NANCY Chopra  11/20/24 2238       NANCY Chopra  11/21/24 0040

## 2024-11-20 NOTE — ED PROVIDER NOTES
THIS IS MY MICHAEL/resident SUPERVISORY AND SHARED VISIT NOTE:    I personally saw the patient and made/approved the management plan and take responsibility for the patient management.    History: 64 y.o. female presents with racing heart rate.  She reports that started this morning.  She has chronic shortness of breath from pulmonary hypertension chronic swelling in her leg and abdomen.  But no abdominal pain.  Denies any pain anywhere.  No fever.  No cough.  Past medical history: She is on epoprostenol continuously.  Exam: Neurologic exam is: Lethargic  Cardiovascular examination: Irregular irregular rhythm tachycardic.  Patient hypotensive systolic blood pressure 80s millimeters of mercury.  Has JVD.  Hepatojugular reflux.  Abdominal examination: Distended no guarding no tenderness musculoskeletal examination lower extremity edema.  MDM: 12 mg adenosine IV was tried for SVT.  It was unsuccessful patient was still hypotensive.  Synchronized cardioversion was performed I was present for the entire procedure.    First EKG I independently interpreted the following study(s) EKG which show supraventricular tachycardia heart rate of 200 right axis deviation normal QRS, no STEMI criteria.  After synchronized cardioversion second EKG interpreted by me: Sinus tachycardia heart rate of 111 right axis deviation prolonged QT interval no hypertrophy incomplete right bundle branch block no STEMI  Patient converted to sinus rhythm with synchronized cardioversion.  Patient was given intravenous magnesium for severe hypomagnesemia.  Intravenous calcium for hypocalcemia.  Did not start diuresis yet given she had low blood pressure initially will replete electrolytes first and then reassess.    Critical care time was provided for 33 minutes by the attending physician, exclusive of separately billable procedures, teaching of residents and treating other patients.  This was necessary to treat or prevent further deterioration of the  following condition(s) circulatory system failure, severe electrolyte derangement, severe metabolic abnormality, renal system failure which the patient had and/or had a high probability of suddenly developing.  Critical care time provided to assess, support and manipulate the patient and to prevent further lifethreatening deterioration in patient's condition. Critical care time provided for high level MDM.   Clinical impression:  1. SVT (supraventricular tachycardia) (CMS-[facility])        2. Hypocalcemia        3. Hypomagnesemia        4. Anasarca        5. Acute on chronic right-sided heart failure        6. Pericardial effusion ([facility])               Bhargav Stallings MD  11/20/24 1408      Case dw dr. Yuliana Sarmiento MD at medical intensive care unit at [facility] patient was accepted for hospitalization.     Bhargav Stallings MD  11/20/24 6976

## 2024-11-21 ENCOUNTER — HOSPITAL ENCOUNTER (INPATIENT)
Facility: HOSPITAL | Age: 64
LOS: 1 days | Discharge: HOME | End: 2024-11-22
Attending: STUDENT IN AN ORGANIZED HEALTH CARE EDUCATION/TRAINING PROGRAM | Admitting: INTERNAL MEDICINE
Payer: COMMERCIAL

## 2024-11-21 DIAGNOSIS — I27.20 PULMONARY HTN (MULTI): ICD-10-CM

## 2024-11-21 DIAGNOSIS — I27.20 PULMONARY HYPERTENSION (MULTI): ICD-10-CM

## 2024-11-21 DIAGNOSIS — I47.10 SUPRAVENTRICULAR TACHYCARDIA (CMS-HCC): Primary | ICD-10-CM

## 2024-11-21 LAB
ALBUMIN SERPL BCP-MCNC: 3.4 G/DL (ref 3.4–5)
AMPHETAMINES UR QL SCN: ABNORMAL
ANION GAP SERPL CALC-SCNC: 15 MMOL/L (ref 10–20)
ATRIAL RATE: 202 BPM
BARBITURATES UR QL SCN: ABNORMAL
BASOPHILS # BLD AUTO: 0 X10*3/UL (ref 0–0.1)
BASOPHILS NFR BLD AUTO: 0 %
BENZODIAZ UR QL SCN: ABNORMAL
BUN SERPL-MCNC: 15 MG/DL (ref 6–23)
BZE UR QL SCN: ABNORMAL
CA-I BLD-SCNC: 1.06 MMOL/L (ref 1.1–1.33)
CA-I BLD-SCNC: 1.07 MMOL/L (ref 1.1–1.33)
CALCIUM SERPL-MCNC: 7.4 MG/DL (ref 8.6–10.6)
CANNABINOIDS UR QL SCN: ABNORMAL
CHLORIDE SERPL-SCNC: 106 MMOL/L (ref 98–107)
CO2 SERPL-SCNC: 21 MMOL/L (ref 21–32)
CREAT SERPL-MCNC: 0.93 MG/DL (ref 0.5–1.05)
EGFRCR SERPLBLD CKD-EPI 2021: 70 ML/MIN/1.73M*2
EOSINOPHIL # BLD AUTO: 0 X10*3/UL (ref 0–0.7)
EOSINOPHIL NFR BLD AUTO: 0 %
ERYTHROCYTE [DISTWIDTH] IN BLOOD BY AUTOMATED COUNT: 17.2 % (ref 11.5–14.5)
ETHANOL SERPL-MCNC: <10 MG/DL
FENTANYL+NORFENTANYL UR QL SCN: ABNORMAL
GLUCOSE BLD MANUAL STRIP-MCNC: 94 MG/DL (ref 74–99)
GLUCOSE SERPL-MCNC: 99 MG/DL (ref 74–99)
HCT VFR BLD AUTO: 32.6 % (ref 36–46)
HGB BLD-MCNC: 9.8 G/DL (ref 12–16)
IMM GRANULOCYTES # BLD AUTO: 0.06 X10*3/UL (ref 0–0.7)
IMM GRANULOCYTES NFR BLD AUTO: 0.9 % (ref 0–0.9)
LYMPHOCYTES # BLD AUTO: 0.6 X10*3/UL (ref 1.2–4.8)
LYMPHOCYTES NFR BLD AUTO: 9.4 %
MAGNESIUM SERPL-MCNC: 2.07 MG/DL (ref 1.6–2.4)
MCH RBC QN AUTO: 23.6 PG (ref 26–34)
MCHC RBC AUTO-ENTMCNC: 30.1 G/DL (ref 32–36)
MCV RBC AUTO: 79 FL (ref 80–100)
METHADONE UR QL SCN: ABNORMAL
MONOCYTES # BLD AUTO: 0.37 X10*3/UL (ref 0.1–1)
MONOCYTES NFR BLD AUTO: 5.8 %
NEUTROPHILS # BLD AUTO: 5.36 X10*3/UL (ref 1.2–7.7)
NEUTROPHILS NFR BLD AUTO: 83.9 %
NRBC BLD-RTO: 0 /100 WBCS (ref 0–0)
OPIATES UR QL SCN: ABNORMAL
OXYCODONE+OXYMORPHONE UR QL SCN: ABNORMAL
PCP UR QL SCN: ABNORMAL
PHOSPHATE SERPL-MCNC: 4.1 MG/DL (ref 2.5–4.9)
PLATELET # BLD AUTO: 156 X10*3/UL (ref 150–450)
POTASSIUM SERPL-SCNC: 4 MMOL/L (ref 3.5–5.3)
Q ONSET: 211 MS
QRS COUNT: 33 BEATS
QRS DURATION: 108 MS
QT INTERVAL: 234 MS
QTC CALCULATION(BAZETT): 427 MS
QTC FREDERICIA: 349 MS
R AXIS: 149 DEGREES
RBC # BLD AUTO: 4.15 X10*6/UL (ref 4–5.2)
SODIUM SERPL-SCNC: 138 MMOL/L (ref 136–145)
T AXIS: -50 DEGREES
T OFFSET: 328 MS
TSH SERPL-ACNC: 1.2 MIU/L (ref 0.44–3.98)
VENTRICULAR RATE: 200 BPM
WBC # BLD AUTO: 6.4 X10*3/UL (ref 4.4–11.3)

## 2024-11-21 PROCEDURE — 80354 DRUG SCREENING FENTANYL: CPT

## 2024-11-21 PROCEDURE — 97162 PT EVAL MOD COMPLEX 30 MIN: CPT | Mod: GP

## 2024-11-21 PROCEDURE — 2500000004 HC RX 250 GENERAL PHARMACY W/ HCPCS (ALT 636 FOR OP/ED): Mod: JZ

## 2024-11-21 PROCEDURE — 37799 UNLISTED PX VASCULAR SURGERY: CPT

## 2024-11-21 PROCEDURE — 1100000001 HC PRIVATE ROOM DAILY

## 2024-11-21 PROCEDURE — 2500000001 HC RX 250 WO HCPCS SELF ADMINISTERED DRUGS (ALT 637 FOR MEDICARE OP)

## 2024-11-21 PROCEDURE — 83735 ASSAY OF MAGNESIUM: CPT

## 2024-11-21 PROCEDURE — 82947 ASSAY GLUCOSE BLOOD QUANT: CPT

## 2024-11-21 PROCEDURE — 96375 TX/PRO/DX INJ NEW DRUG ADDON: CPT

## 2024-11-21 PROCEDURE — 96365 THER/PROPH/DIAG IV INF INIT: CPT

## 2024-11-21 PROCEDURE — 2500000001 HC RX 250 WO HCPCS SELF ADMINISTERED DRUGS (ALT 637 FOR MEDICARE OP): Performed by: STUDENT IN AN ORGANIZED HEALTH CARE EDUCATION/TRAINING PROGRAM

## 2024-11-21 PROCEDURE — 80307 DRUG TEST PRSMV CHEM ANLYZR: CPT

## 2024-11-21 PROCEDURE — 84443 ASSAY THYROID STIM HORMONE: CPT

## 2024-11-21 PROCEDURE — G0378 HOSPITAL OBSERVATION PER HR: HCPCS

## 2024-11-21 PROCEDURE — 2500000002 HC RX 250 W HCPCS SELF ADMINISTERED DRUGS (ALT 637 FOR MEDICARE OP, ALT 636 FOR OP/ED)

## 2024-11-21 PROCEDURE — 80069 RENAL FUNCTION PANEL: CPT

## 2024-11-21 PROCEDURE — 82077 ASSAY SPEC XCP UR&BREATH IA: CPT

## 2024-11-21 PROCEDURE — 99291 CRITICAL CARE FIRST HOUR: CPT

## 2024-11-21 PROCEDURE — 99291 CRITICAL CARE FIRST HOUR: CPT | Performed by: INTERNAL MEDICINE

## 2024-11-21 PROCEDURE — 82330 ASSAY OF CALCIUM: CPT

## 2024-11-21 PROCEDURE — 85025 COMPLETE CBC W/AUTO DIFF WBC: CPT

## 2024-11-21 PROCEDURE — 2500000004 HC RX 250 GENERAL PHARMACY W/ HCPCS (ALT 636 FOR OP/ED)

## 2024-11-21 PROCEDURE — 2500000004 HC RX 250 GENERAL PHARMACY W/ HCPCS (ALT 636 FOR OP/ED): Performed by: STUDENT IN AN ORGANIZED HEALTH CARE EDUCATION/TRAINING PROGRAM

## 2024-11-21 RX ORDER — CALCIUM GLUCONATE 20 MG/ML
2 INJECTION, SOLUTION INTRAVENOUS ONCE
Status: COMPLETED | OUTPATIENT
Start: 2024-11-21 | End: 2024-11-21

## 2024-11-21 RX ORDER — ERGOCALCIFEROL 1.25 MG/1
1 CAPSULE ORAL
COMMUNITY
Start: 2024-11-01

## 2024-11-21 RX ORDER — PANTOPRAZOLE SODIUM 40 MG/1
40 TABLET, DELAYED RELEASE ORAL
Status: DISCONTINUED | OUTPATIENT
Start: 2024-11-21 | End: 2024-11-22 | Stop reason: HOSPADM

## 2024-11-21 RX ORDER — ENOXAPARIN SODIUM 100 MG/ML
40 INJECTION SUBCUTANEOUS EVERY 24 HOURS
Status: DISCONTINUED | OUTPATIENT
Start: 2024-11-21 | End: 2024-11-22 | Stop reason: HOSPADM

## 2024-11-21 RX ORDER — MAGNESIUM CHLORIDE 64 MG
64 TABLET, DELAYED RELEASE (ENTERIC COATED) ORAL 2 TIMES DAILY
Status: DISCONTINUED | OUTPATIENT
Start: 2024-11-21 | End: 2024-11-22 | Stop reason: HOSPADM

## 2024-11-21 RX ORDER — EPOPROSTENOL 1.5 MG/10ML
1.5 INJECTION, POWDER, LYOPHILIZED, FOR SOLUTION INTRAVENOUS CONTINUOUS
Status: DISCONTINUED | OUTPATIENT
Start: 2024-11-21 | End: 2024-11-21

## 2024-11-21 RX ORDER — VENLAFAXINE HYDROCHLORIDE 150 MG/1
150 CAPSULE, EXTENDED RELEASE ORAL DAILY
Status: DISCONTINUED | OUTPATIENT
Start: 2024-11-21 | End: 2024-11-22 | Stop reason: HOSPADM

## 2024-11-21 RX ORDER — HYDROXYCHLOROQUINE SULFATE 200 MG/1
200 TABLET, FILM COATED ORAL 2 TIMES DAILY
Status: DISCONTINUED | OUTPATIENT
Start: 2024-11-21 | End: 2024-11-22 | Stop reason: HOSPADM

## 2024-11-21 RX ORDER — ASPIRIN 81 MG/1
81 TABLET ORAL DAILY
Status: DISCONTINUED | OUTPATIENT
Start: 2024-11-21 | End: 2024-11-22 | Stop reason: HOSPADM

## 2024-11-21 RX ORDER — LOPERAMIDE HYDROCHLORIDE 2 MG/1
2 CAPSULE ORAL DAILY PRN
COMMUNITY
Start: 2024-10-03

## 2024-11-21 RX ORDER — CHOLESTYRAMINE 4 G/9G
1 POWDER, FOR SUSPENSION ORAL DAILY
Status: DISCONTINUED | OUTPATIENT
Start: 2024-11-21 | End: 2024-11-22 | Stop reason: HOSPADM

## 2024-11-21 RX ORDER — METOPROLOL TARTRATE 1 MG/ML
5 INJECTION, SOLUTION INTRAVENOUS ONCE
Status: COMPLETED | OUTPATIENT
Start: 2024-11-21 | End: 2024-11-21

## 2024-11-21 RX ORDER — POTASSIUM CHLORIDE 20 MEQ/1
20 TABLET, EXTENDED RELEASE ORAL 2 TIMES DAILY
Status: DISCONTINUED | OUTPATIENT
Start: 2024-11-21 | End: 2024-11-22 | Stop reason: HOSPADM

## 2024-11-21 RX ORDER — DILTIAZEM HYDROCHLORIDE 180 MG/1
180 CAPSULE, COATED, EXTENDED RELEASE ORAL DAILY
Status: DISCONTINUED | OUTPATIENT
Start: 2024-11-21 | End: 2024-11-22 | Stop reason: HOSPADM

## 2024-11-21 RX ORDER — FOLIC ACID 1 MG/1
1 TABLET ORAL DAILY
Status: DISCONTINUED | OUTPATIENT
Start: 2024-11-21 | End: 2024-11-22 | Stop reason: HOSPADM

## 2024-11-21 RX ORDER — FERROUS SULFATE, DRIED 160(50) MG
2 TABLET, EXTENDED RELEASE ORAL 2 TIMES DAILY
Status: DISCONTINUED | OUTPATIENT
Start: 2024-11-21 | End: 2024-11-22 | Stop reason: HOSPADM

## 2024-11-21 RX ORDER — ACETAMINOPHEN 500 MG
5 TABLET ORAL NIGHTLY
Status: DISCONTINUED | OUTPATIENT
Start: 2024-11-21 | End: 2024-11-22 | Stop reason: HOSPADM

## 2024-11-21 SDOH — SOCIAL STABILITY: SOCIAL INSECURITY: WERE YOU ABLE TO COMPLETE ALL THE BEHAVIORAL HEALTH SCREENINGS?: YES

## 2024-11-21 SDOH — SOCIAL STABILITY: SOCIAL INSECURITY: WITHIN THE LAST YEAR, HAVE YOU BEEN HUMILIATED OR EMOTIONALLY ABUSED IN OTHER WAYS BY YOUR PARTNER OR EX-PARTNER?: NO

## 2024-11-21 SDOH — SOCIAL STABILITY: SOCIAL INSECURITY: WITHIN THE LAST YEAR, HAVE YOU BEEN AFRAID OF YOUR PARTNER OR EX-PARTNER?: NO

## 2024-11-21 SDOH — SOCIAL STABILITY: SOCIAL INSECURITY: HAVE YOU HAD ANY THOUGHTS OF HARMING ANYONE ELSE?: NO

## 2024-11-21 SDOH — ECONOMIC STABILITY: FOOD INSECURITY: WITHIN THE PAST 12 MONTHS, THE FOOD YOU BOUGHT JUST DIDN'T LAST AND YOU DIDN'T HAVE MONEY TO GET MORE.: PATIENT DECLINED

## 2024-11-21 SDOH — SOCIAL STABILITY: SOCIAL INSECURITY: HAVE YOU HAD THOUGHTS OF HARMING ANYONE ELSE?: NO

## 2024-11-21 SDOH — SOCIAL STABILITY: SOCIAL INSECURITY: DO YOU FEEL UNSAFE GOING BACK TO THE PLACE WHERE YOU ARE LIVING?: NO

## 2024-11-21 SDOH — SOCIAL STABILITY: SOCIAL INSECURITY: DO YOU FEEL ANYONE HAS EXPLOITED OR TAKEN ADVANTAGE OF YOU FINANCIALLY OR OF YOUR PERSONAL PROPERTY?: NO

## 2024-11-21 SDOH — SOCIAL STABILITY: SOCIAL INSECURITY: HAS ANYONE EVER THREATENED TO HURT YOUR FAMILY OR YOUR PETS?: NO

## 2024-11-21 SDOH — SOCIAL STABILITY: SOCIAL INSECURITY: ABUSE: ADULT

## 2024-11-21 SDOH — SOCIAL STABILITY: SOCIAL INSECURITY: ARE YOU OR HAVE YOU BEEN THREATENED OR ABUSED PHYSICALLY, EMOTIONALLY, OR SEXUALLY BY ANYONE?: NO

## 2024-11-21 SDOH — ECONOMIC STABILITY: FOOD INSECURITY
WITHIN THE PAST 12 MONTHS, YOU WORRIED THAT YOUR FOOD WOULD RUN OUT BEFORE YOU GOT THE MONEY TO BUY MORE.: PATIENT DECLINED

## 2024-11-21 SDOH — SOCIAL STABILITY: SOCIAL INSECURITY: DOES ANYONE TRY TO KEEP YOU FROM HAVING/CONTACTING OTHER FRIENDS OR DOING THINGS OUTSIDE YOUR HOME?: NO

## 2024-11-21 SDOH — ECONOMIC STABILITY: INCOME INSECURITY
IN THE PAST 12 MONTHS HAS THE ELECTRIC, GAS, OIL, OR WATER COMPANY THREATENED TO SHUT OFF SERVICES IN YOUR HOME?: PATIENT DECLINED

## 2024-11-21 SDOH — SOCIAL STABILITY: SOCIAL INSECURITY: ARE THERE ANY APPARENT SIGNS OF INJURIES/BEHAVIORS THAT COULD BE RELATED TO ABUSE/NEGLECT?: NO

## 2024-11-21 ASSESSMENT — COGNITIVE AND FUNCTIONAL STATUS - GENERAL
MOBILITY SCORE: 24
MOBILITY SCORE: 23
DAILY ACTIVITIY SCORE: 24
MOVING FROM LYING ON BACK TO SITTING ON SIDE OF FLAT BED WITH BEDRAILS: A LITTLE
CLIMB 3 TO 5 STEPS WITH RAILING: A LITTLE
TURNING FROM BACK TO SIDE WHILE IN FLAT BAD: A LITTLE
CLIMB 3 TO 5 STEPS WITH RAILING: A LITTLE
DAILY ACTIVITIY SCORE: 24
CLIMB 3 TO 5 STEPS WITH RAILING: A LITTLE
MOBILITY SCORE: 18
WALKING IN HOSPITAL ROOM: A LITTLE
MOBILITY SCORE: 23
DAILY ACTIVITIY SCORE: 24
PATIENT BASELINE BEDBOUND: NO
MOVING TO AND FROM BED TO CHAIR: A LITTLE
STANDING UP FROM CHAIR USING ARMS: A LITTLE

## 2024-11-21 ASSESSMENT — PAIN SCALES - GENERAL
PAINLEVEL_OUTOF10: 0 - NO PAIN

## 2024-11-21 ASSESSMENT — ACTIVITIES OF DAILY LIVING (ADL)
DRESSING YOURSELF: INDEPENDENT
LACK_OF_TRANSPORTATION: PATIENT DECLINED
HEARING - RIGHT EAR: FUNCTIONAL
GROOMING: INDEPENDENT
JUDGMENT_ADEQUATE_SAFELY_COMPLETE_DAILY_ACTIVITIES: YES
WALKS IN HOME: INDEPENDENT
BATHING: INDEPENDENT
TOILETING: INDEPENDENT
PATIENT'S MEMORY ADEQUATE TO SAFELY COMPLETE DAILY ACTIVITIES?: YES
HEARING - LEFT EAR: FUNCTIONAL
ADEQUATE_TO_COMPLETE_ADL: YES
FEEDING YOURSELF: INDEPENDENT

## 2024-11-21 ASSESSMENT — PAIN - FUNCTIONAL ASSESSMENT
PAIN_FUNCTIONAL_ASSESSMENT: 0-10

## 2024-11-21 ASSESSMENT — COLUMBIA-SUICIDE SEVERITY RATING SCALE - C-SSRS
2. HAVE YOU ACTUALLY HAD ANY THOUGHTS OF KILLING YOURSELF?: NO
6. HAVE YOU EVER DONE ANYTHING, STARTED TO DO ANYTHING, OR PREPARED TO DO ANYTHING TO END YOUR LIFE?: NO
1. IN THE PAST MONTH, HAVE YOU WISHED YOU WERE DEAD OR WISHED YOU COULD GO TO SLEEP AND NOT WAKE UP?: NO

## 2024-11-21 ASSESSMENT — LIFESTYLE VARIABLES
HOW MANY STANDARD DRINKS CONTAINING ALCOHOL DO YOU HAVE ON A TYPICAL DAY: PATIENT DOES NOT DRINK
HOW OFTEN DO YOU HAVE 6 OR MORE DRINKS ON ONE OCCASION: NEVER
AUDIT-C TOTAL SCORE: 0
SKIP TO QUESTIONS 9-10: 1
AUDIT-C TOTAL SCORE: 0
HOW OFTEN DO YOU HAVE A DRINK CONTAINING ALCOHOL: NEVER

## 2024-11-21 ASSESSMENT — ENCOUNTER SYMPTOMS
SHORTNESS OF BREATH: 1
ABDOMINAL DISTENTION: 1

## 2024-11-21 NOTE — H&P
History Of Present Illness  Ronna Beckham is a 64 y.o. female w/PMHx pulm HTN (on macitentan and epoprostenol via Montez, NYHA Functional Class 3 and WHO Group 1), chronic hypoxic respiratory failure (baseline 4L NC), polysubstance use (ethanol, cocaine, amphetamines), SVT, SLE (on hydroxychloroquine), GERD, HARRIS, MDD presenting with shortness of breath and feelings of doom.    This afternoon, patient started to have increased dyspnea, feelings of doom, and dizziness. This felt similar to when she had SVT once in the past (July 2024). Denied chest pain, palpitations, blurry vision. She is on baseline 4L O2 at home and unable to ambulate very far so uses wheelchair. She recently changed her veletri disc. Denies increased leg swelling or abdominal bloating beyond her baseline, she chronically has increased right > left leg swelling. She has eaten a sandwich today. She has not drank anything. Did take her medications today and states she has been compliant with diltiazem.    Endorses chronic diarrhea for which she takes loperamide up to twice a day (but not more than that due to constipation and resulting abdominal pain), she just ran out of loperamide (appears that this was discontinued on recent admission due to prolonged QTC at 500ms). Denies fevers, chills, headache, cough, abdominal pain, nausea/vomiting, new numbness/tingling/weakness in extremities.       Regarding hypoMg, per chart review, has low levels dating back to 11/2020. Has been taking PO Mg supplementation as outpatient but suspect this is contributing to diarrhea. Has history of alcohol use though patient denies using alcohol to me today.      Social history:  Former smoker, quit 12 years ago, smoked 1ppd x20 years  Denies alcohol and illicit drug use  Lives with daughter and granddaughter who just moved in as patient is trying to move currently to a 1 floor home since she can't get around anymore  Uses wheelchair    In the OSH ED:  - Vitals:   T  36.5, , /89, SpO2 97% on 2L  - Labs:   CBC: WBC 5.7, Hgb 10.3, plt 147L   BMP: Na 140, K 4.4, Cl 109, HCO3 19L, BUN 16, Cr 1.01, glu 132   LFT: Ca 6.4L, tprot 6.7, alb 3.7, alkphos 82, AST 13, ALT 11, tbili 0.9###   Electrolytes: PO4 4.0, Mg 0.89 -> 1.42   Heme: PT 13.5, INR 1.3   Ionized Ca 0.80L   hs-TnI 6->6, BNP 1,482, lactate 1.7   Lipase 86H   COVID ND   UA: 2+ protein, negative LE/nitrite   Blood culturesx2 pending     - Imaging:  CXR: Cardiomegaly. No evidence of acute intrathoracic abnormality   DVT US:  No evidence of deep venous thrombus in the evaluated veins of the bilateral lower extremities from the inguinal ligament to the popliteal fossa.  CT chest w/IV contrast, CT A/P with IV contrast:  IMPRESSION:  NO NEW ACUTE PROCESS IN THE CHEST, ABDOMEN OR PELVIS      MILD INTERSTITIAL AND ALVEOLAR EDEMA      NO ACUTE PULMONARY EMBOLISM THROUGH THE SEGMENTAL BRANCH LEVEL      NO AORTIC DISSECTION OR OTHER ACUTE THORACIC AORTIC FINDINGS      NO AIRSPACE CONSOLIDATION, GROUND-GLASS AIRSPACE DISEASE OR ANY OTHER  SIGN OF ACTIVE INFECTION      NO PLEURAL EFFUSION      NO PNEUMOTHORAX      DIFFUSE IMPRESSIVE SUBCUTANEOUS EDEMA OVER THE TRUNK HAS, IF  ANYTHING, SLIGHTLY WORSENED IN THE CHEST WHEN COMPARED TO 22 OCTOBER 2024. THE SUBCUTANEOUS EDEMA OVER THE ABDOMEN AND PELVIS HAS  DEFINITELY WORSENED FROM ITS MOST RECENT COMPARISON EXAM, 28 AUGUST 2024. STILL ONLY A VERY SMALL QUANTITY OF ASCITES, FAR TOO SMALL FOR  PARACENTESIS      MODERATE SIZE PERICARDIAL EFFUSION UNCHANGED      IMPRESSIVE CARDIOMEGALY WITH DILATED RIGHT-SIDED CHAMBERS AND LIVER  FINDINGS OF PASSIVE CONGESTION      NO DEFINITE ACUTE THROMBOSIS OF SVC OR IVC OR GREAT PELVIC VEINS  ALTHOUGH SENSITIVITY AND SPECIFICITY IS LIMITED DUE TO CONTRAST BOLUS  TIMING      THE ENTIRE PORTAL VENOUS SYSTEM INCLUDING THE SPLENIC VEIN, SMV AND  ITS MAJOR TRIBUTARIES, MAIN AND INTRAHEPATIC PORTAL VEINS ARE ALL  PATENT; NO ACUTE PORTAL VENOUS  THROMBOSIS      ALL THREE HEPATIC VEINS ARE PATENT; NO ACUTE HEPATIC VENOUS THROMBOSIS      THE LAST CT OF THE ABDOMEN AND PELVIS 28 AUGUST 2024 SHOWED SIGMOID  DIVERTICULITIS WHICH HAS SINCE RESOLVED. PRESENTLY NO ACUTE  DIVERTICULITIS OR OTHER COLITIS, BOWEL OBSTRUCTION, PERFORATION OR  ABSCESS      THIS REPORT SERVES AS THE DIAGNOSTIC INTERPRETATION FOR TWO EXAMS  PERFORMED CONCURRENTLY: CT CHEST WITH IV CONTRAST FOR PULMONARY  EMBOLISM EVALUATION; CT ABDOMEN AND PELVIS WITH IV CONTRAST    Interventions:  Received adenosine 6 mg and 12 g in the EMS transfer with no improvement of her heart rate.  Received additional dose of adenosine 12 mg with no improvement patient was found to be tachycardic at a rate of 200 hypotension poor perfusion decision was made for emergent cardioversion consent was signed.  Synchronized cardioversion was performed with attending physician at 50 J with improvement of heart rate to sinus tachycardia  Started on dilt drip  Electrolytes repleted  Transferred to [facility] ICU    Cardiac Hx:  Echo 10/24/2024  1. Left ventricular ejection fraction is hyperdynamic, by visual estimate at 75%.   2. Left ventricular cavity size is decreased.   3. Right ventricular volume and pressure overload.   4. There is moderately reduced right ventricular systolic function.   5. Severely enlarged right ventricle.   6. Severely elevated right ventricular systolic pressure.   7. The right atrium is severely dilated.   8. Severe tricuspid regurgitation visualized.   9. Small to moderate pericardial effusion.  10. There is no evidence of cardiac tamponade.  11. A dilated inferior vena cava demonstrates poor inspiratory collapse, consistent with elevated right atrial pressures.  12. Compared with study dated 4/24/2024, there is no significant difference in RV size and function. There is again severe TR. The RVSP has increased from 89 to 105 mmHg. The pericardial effusion appears unchanged.    Past Medical  History  She has a past medical history of Anxiety, Depression, Does not refill medications appropriately (09/10/2024), GERD (gastroesophageal reflux disease), Lupus, Personal history of other specified conditions (09/21/2020), Pulmonary hypertension (Multi), and Volume overload (09/10/2024).    Surgical History  She has a past surgical history that includes Other surgical history (09/21/2020); Other surgical history (09/21/2020); Other surgical history (09/21/2020); Other surgical history (09/21/2020); Other surgical history (09/21/2020); Hysterectomy; Cholecystectomy; and Cardiac catheterization (N/A, 1/8/2024).     Social History  She reports that she has quit smoking. Her smoking use included cigarettes. She has quit using smokeless tobacco. She reports that she does not currently use alcohol. She reports that she does not currently use drugs after having used the following drugs: Cocaine and Other.    Family History  Family History   Problem Relation Name Age of Onset    No Known Problems Mother      No Known Problems Father          Allergies  Levetiracetam       Physical Exam  General: no acute distress, alert, conversant  Eyes: no scleral icterus, EOMI  Cardiovascular: +JVD, tachycardia, regular rate, murmur present  Respiratory: inspiratory wheezing, Montez catheter present over left side, no coughing/rhonchi/wheezing  GI: bowel sounds present, soft, nontender, distended  Extremities: RLE edema>LLE edema  Neuro: Aox3 (thought it was February), 4+/5 symmetric  strength, moving all extremities spontaneously  Skin: brusing on arm      Last Recorded Vitals  /75   Pulse 103   Temp 36.9 °C (98.4 °F) (Temporal)   Resp (!) 27   Wt 73.4 kg (161 lb 13.1 oz)   SpO2 95%     Relevant Results  Scheduled medications  aspirin, 81 mg, oral, Daily  calcium carbonate-vitamin D3, 2 tablet, oral, BID  calcium gluconate, 2 g, intravenous, Once  cholestyramine, 1 packet, oral, Daily  dilTIAZem CD, 180 mg, oral,  Daily  enoxaparin, 40 mg, subcutaneous, q24h  folic acid, 1 mg, oral, Daily  gabapentin, 800 mg, oral, TID  hydroxychloroquine, 200 mg, oral, BID  macitentan, 10 mg, oral, Daily  magnesium chloride, 64 mg, oral, BID  melatonin, 5 mg, oral, Nightly  pantoprazole, 40 mg, oral, Daily before breakfast  potassium chloride CR, 20 mEq, oral, BID  venlafaxine XR, 150 mg, oral, Daily      Continuous medications  epoprostenol, 1.5 mg      PRN medications        Assessment/Plan   Assessment & Plan  Supraventricular tachycardia (CMS-HCC)      Ronna Beckham is a 64 y.o. female w/PMHx pulm HTN (on macitentan and epoprostenol via Montez, NYHA Functional Class 3 and WHO Group 1), chronic hypoxic respiratory failure (baseline 4L NC), polysubstance use (ethanol, cocaine, amphetamines), SVT, SLE (on hydroxychloroquine), GERD, HARRIS, MDD presenting with shortness of breath and feelings of doom found to be in SVT with hypotension and rates to 200s now s/p adenosine x2 without conversion then cardioverted to sinus tachycardia and started on a diltiazem drip. Found to have significant electrolyte abnormalities (Mg 0.89, ionized Ca 0.80), repleted.     #Recurrent SVT  #Sinus tach  ::history of recurrent SVT, usually in the setting of dilt non-compliance  ::Imaging with PE negative  -stop dilt drip (likely can transfer to floor)  -resume home diltiazem 180mg daily  -tele  -aggressive repletion of electrolytes, K>4, Mg>2  -pending TSH, Utox    #Acute on chronic hypoxic respiratory failure  #PAH  ::follows with Dr. Prescott  ::baseline 4L NC, on presentation to MICU on 5L  ::CTPE with mild interstitial edema, without PE, no concern for PNA  ::currently on 5L NC  ::home lasix 60mg daily   Plan:  -strict I&O  -c/w home macitentan 10mg daily and epoprostenol  -goal O2 sat > 90%  -hold on diuresis until electrolytes repleted     #Moderate pericardial effusion  ::noted on CTA, small to moderate effusion noted on TTE 4/2024  ::10/2024 TTE with  unchanged pericardial effusion from 04/2024  -unchanged on CT 11/20/2024     #Hypomagnesesmia  #Secondary hypocalcemia  ::initial Mg level 0.51 at OSH ED, repleted with IV Mg and level on arrival to  2.07  ::ddx for hypoMg: diarrhea iso Mg supplementation vs diuretic use  -replete magnesium and calcium  -c/w Mg 64 mg BID, will not increase to 400 mg BID as rec'd by OSH cardiology due to diarrhea  -c/w home calcium-D3 500-5mcg  -needs GI FU/workup for diarrhea as outpt, will hold imodium given borderline prolonged QTc but will restart home cholestyramine 4g packet iso known cholecystectomy  -pending ethanol    #NSTEMI  -c/w home ASA 81mg   -consider starting statin     #Prolonged QTc  -EKG with borderline prolonged QTc at 495 ms (though with RBBB)  -avoid QT prolonging agents, will hold Imodium     #Anemia  #Thrombocytopenia  -c/w folate     #Polysusbtance Use  ::UDS 10/23 negative  -pending repeat UDS     #SLE  -c/w home hydroxychloroquine 200mg BID     #HARRIS  #MDD  -c/w home gabapentin 800 TID, venlafaxine 150mg daily  -patient denies taking topamax    F: diuresis  E: K>4, Mg>2  N: cardiac diet, 2L fluid restriction  A: PIV     DVT PPx: lovenox 40  GI PPx: home PPI     Code status: FULL CODE (confirmed on admission)  NOK:  Daughter Leticia Parson [phone]  Sister Diana Vera [phone]    Parul Avendaño MD

## 2024-11-21 NOTE — PROGRESS NOTES
Ronna Beckham is a 64 y.o. female on day 0 of admission presenting with Supraventricular tachycardia (CMS-HCC).      Subjective   Patient was seen. She was transferred to floor from MICU. She is feeling much better,  she has on and off cough, she denies any hx SOB, chest pain or palpitation  No other concerns          Objective     Last Recorded Vitals  /73 (BP Location: Left arm, Patient Position: Lying)   Pulse 97   Temp 36.8 °C (98.2 °F) (Temporal)   Resp 22   Wt 73.4 kg (161 lb 13.1 oz)   SpO2 92%   Intake/Output last 3 Shifts:    Intake/Output Summary (Last 24 hours) at 11/21/2024 1346  Last data filed at 11/21/2024 1000  Gross per 24 hour   Intake 594 ml   Output 1200 ml   Net -606 ml       Admission Weight  Weight: 73.4 kg (161 lb 13.1 oz) (11/21/24 0114)    Daily Weight  11/21/24 : 73.4 kg (161 lb 13.1 oz)    Image Results      Physical Exam  General: no acute distress, alert, conversant  Eyes: no scleral icterus, EOMI  Cardiovascular:  regular rate and rhythm , murmur present  Respiratory: Montez catheter present over left side, no coughing/rhonchi/wheezing  GI: bowel sounds present, soft, nontender, distended  Extremities: RLE edema>LLE edema  Neuro: AOx4, 4+/5 symmetric  strength, moving all extremities spontaneously  Skin: brusing on arm      Relevant Results    Results for orders placed or performed during the hospital encounter of 11/21/24 (from the past 24 hours)   CBC and Auto Differential   Result Value Ref Range    WBC 6.4 4.4 - 11.3 x10*3/uL    nRBC 0.0 0.0 - 0.0 /100 WBCs    RBC 4.15 4.00 - 5.20 x10*6/uL    Hemoglobin 9.8 (L) 12.0 - 16.0 g/dL    Hematocrit 32.6 (L) 36.0 - 46.0 %    MCV 79 (L) 80 - 100 fL    MCH 23.6 (L) 26.0 - 34.0 pg    MCHC 30.1 (L) 32.0 - 36.0 g/dL    RDW 17.2 (H) 11.5 - 14.5 %    Platelets 156 150 - 450 x10*3/uL    Neutrophils % 83.9 40.0 - 80.0 %    Immature Granulocytes %, Automated 0.9 0.0 - 0.9 %    Lymphocytes % 9.4 13.0 - 44.0 %    Monocytes % 5.8 2.0  - 10.0 %    Eosinophils % 0.0 0.0 - 6.0 %    Basophils % 0.0 0.0 - 2.0 %    Neutrophils Absolute 5.36 1.20 - 7.70 x10*3/uL    Immature Granulocytes Absolute, Automated 0.06 0.00 - 0.70 x10*3/uL    Lymphocytes Absolute 0.60 (L) 1.20 - 4.80 x10*3/uL    Monocytes Absolute 0.37 0.10 - 1.00 x10*3/uL    Eosinophils Absolute 0.00 0.00 - 0.70 x10*3/uL    Basophils Absolute 0.00 0.00 - 0.10 x10*3/uL   Renal function panel   Result Value Ref Range    Glucose 99 74 - 99 mg/dL    Sodium 138 136 - 145 mmol/L    Potassium 4.0 3.5 - 5.3 mmol/L    Chloride 106 98 - 107 mmol/L    Bicarbonate 21 21 - 32 mmol/L    Anion Gap 15 10 - 20 mmol/L    Urea Nitrogen 15 6 - 23 mg/dL    Creatinine 0.93 0.50 - 1.05 mg/dL    eGFR 70 >60 mL/min/1.73m*2    Calcium 7.4 (L) 8.6 - 10.6 mg/dL    Phosphorus 4.1 2.5 - 4.9 mg/dL    Albumin 3.4 3.4 - 5.0 g/dL   Magnesium   Result Value Ref Range    Magnesium 2.07 1.60 - 2.40 mg/dL   CALCIUM, IONIZED   Result Value Ref Range    POCT Calcium, Ionized 1.07 (L) 1.1 - 1.33 mmol/L   TSH   Result Value Ref Range    Thyroid Stimulating Hormone 1.20 0.44 - 3.98 mIU/L   Drug Screen, Urine With Reflex to Confirmation   Result Value Ref Range    Amphetamine Screen, Urine Presumptive Negative Presumptive Negative    Barbiturate Screen, Urine Presumptive Negative Presumptive Negative    Benzodiazepines Screen, Urine Presumptive Negative Presumptive Negative    Cannabinoid Screen, Urine Presumptive Negative Presumptive Negative    Cocaine Metabolite Screen, Urine Presumptive Negative Presumptive Negative    Fentanyl Screen, Urine Presumptive Positive (A) Presumptive Negative    Opiate Screen, Urine Presumptive Negative Presumptive Negative    Oxycodone Screen, Urine Presumptive Negative Presumptive Negative    PCP Screen, Urine Presumptive Negative Presumptive Negative    Methadone Screen, Urine Presumptive Negative Presumptive Negative   CALCIUM, IONIZED   Result Value Ref Range    POCT Calcium, Ionized 1.06 (L) 1.1 -  1.33 mmol/L   Ethanol   Result Value Ref Range    Alcohol <10 <=10 mg/dL   POCT GLUCOSE   Result Value Ref Range    POCT Glucose 94 74 - 99 mg/dL     *Note: Due to a large number of results and/or encounters for the requested time period, some results have not been displayed. A complete set of results can be found in Results Review.        Scheduled medications  aspirin, 81 mg, oral, Daily  calcium carbonate-vitamin D3, 2 tablet, oral, BID  cholestyramine, 1 packet, oral, Daily  dilTIAZem CD, 180 mg, oral, Daily  [Transfer Hold] enoxaparin, 40 mg, subcutaneous, q24h  folic acid, 1 mg, oral, Daily  gabapentin, 800 mg, oral, TID  hydroxychloroquine, 200 mg, oral, BID  [Transfer Hold] macitentan, 10 mg, oral, Daily  magnesium chloride, 64 mg, oral, BID  melatonin, 5 mg, oral, Nightly  pantoprazole, 40 mg, oral, Daily before breakfast  potassium chloride CR, 20 mEq, oral, BID  venlafaxine XR, 150 mg, oral, Daily      Continuous medications  epoprostenol, 72 ng/kg/min (Order-Specific), Last Rate: 72 ng/kg/min (11/21/24 0800)      PRN medications       ECG 12 lead    Result Date: 11/21/2024  Supraventricular tachycardia Right bundle branch block Left posterior fascicular block Bifascicular block Inferior infarct (cited on or before 23-OCT-2024) Cannot rule out Anterior infarct , age undetermined Abnormal ECG When compared with ECG of 17-NOV-2024 16:52, Vent. rate has increased BY  98 BPM Minimal criteria for Anterior infarct are now Present    CT angio chest for pulmonary embolism    Result Date: 11/20/2024  Interpreted By:  Juan Diego Hernández, STUDY: CT ANGIO CHEST FOR PULMONARY EMBOLISM; CT ABDOMEN PELVIS W IV CONTRAST;  11/20/2024 12:49 pm   INDICATION: Signs/Symptoms:Concern for PE; Signs/Symptoms:edema.     COMPARISON: CT chest with contrast 22 October 2024; CT abdomen and pelvis with contrast 28 August 2024; other, older CTs back to the oldest available, CT abdomen and pelvis with contrast 1 April 2022 and CT chest with  contrast 12 April 2007   ACCESSION NUMBER(S): [id number]; [id number]   ORDERING CLINICIAN: ASHLEY CHAMBERS   TECHNIQUE: Pulmonary arterial phase CT chest after the uneventful administration of 75 mL IV contrast (Omnipaque 350)   Three dimensional maximum intensity projections (3-D MIPs) image/s were created on a separate dedicated workstation, reviewed and saved   CT of the abdomen and pelvis from the lung bases through the symphysis pubis after the uneventful administration of intravenous contrast (same bolus as for concurrent CT chest). No oral   FINDINGS: CT CHEST   CARDIOVASCULAR:   Acute pulmonary embolism:  Negative through the segmental (third order) branch level. Subsegmental and more distal branches not well enough opacified to evaluate. Acute right heart strain:  Negative. No CT evidence of acute right heart strain Cardiac thrombus:  Negative; no obvious right heart or other cardiac thrombus is seen Pulmonary arteries ectasia:  Unchanged sagittal ectasia   Heart size:  Impressive but unchanged cardiomegaly with right atrial and right ventricular dilation Pericardial effusion:  Impressive but unchanged, little if any different back through 23 April 2024, was present but smaller 7 January 2024, was present but tiny on 1 August 2023   Thoracic aortic aneurysm:  Negative Aortic dissection:  Negative   Heart failure change:  Negative.  No sign of interstitial or alveolar edema. Other:  Pulmonary venous engorgement   NONVASCULAR MEDIASTINUM: Esophagus:  Grossly normal by CT Mediastinal Mass:  Negative Hiatal hernia:  None Other:  n/a   LYMPH NODES: No thoracic adenopathy   LUNGS / AIRSPACES / AIRWAYS:   LARGE AIRWAYS Filling defect: Negative Wall thickening: Negative Bronchiectasis: Negative Other: N/A   AIRSPACES Fibrosis: Negative Emphysema: Negative Consolidation: Negative Ground glass airspace disease: Negative Edema: Probability of mild interstitial and alveolar edema Nodule / Mass: 4-5 mm subpleural right  lower lobe nodule (series 9, image 107 of today's exam) is benign, unchanged all the way back through oldest CT available 12 April 2007 (series 5, image 64 of that exam). No new or enlarging nodule Other: N/A   PLEURA: Effusion:  Both sides negative Pneumothorax:  Both sides negative Other:  n/a   CHEST WALL: Diffuse subcutaneous edema probably just slightly worse when compared to 22 October 2024   SKELETON: No acute or contributory abnormality   -------   CT ABDOMEN AND PELVIS   SKELETON: No acute skeletal findings.   LIVER:  Enlarged, with heterogeneous perfusion compatible with passive congestion. No acute hepatic venous thrombosis. No acute portal venous thrombosis. No focal liver lesion. Not overtly cirrhotic   SPLEEN: Mildly enlarged but unchanged   PANCREAS: Normal. No CT evidence of acute or chronic pancreatitis. No duct dilation. No mass.   GALLBLADDER:  Normal CT appearance. No dilation, calcified, or gas-containing stones.  Other types of gallstones could be occult on CT and detectable only by ultrasound.   BILE DUCTS:  Normal. No biliary duct dilation.   ADRENAL GLANDS: Left gland appears uniformly mildly thickened which may be from edema. Right side minimally so   KIDNEYS AND URETERS: Normal.  No hydronephrosis on either side.  No mass.  Symmetric enhancement.  No infarct or CT evidence of acute pyelonephritis.  No substantial radiodense stone.  Tiny stones and radiolucent stones could be occult on CT.   LYMPH NODES:  No adenopathy, intraperitoneal, retroperitoneal, pelvic or otherwise   APPENDIX:  The appendix is not definitively localized; however, no inflammatory process is suspected in the right lower quadrant or elsewhere.   COLON:  No longer any findings of acute diverticulitis that were present last CT 28 August 2024. 14 mm lipoma in the sigmoid colon is unchanged, without associated obstruction or intussusception   SMALL BOWEL: Normal. No small bowel dilation or any other sign of small bowel  obstruction. No sign of active inflammatory bowel disease.   STOMACH / DUODENUM: Grossly normal by CT which has limited sensitivity and specificity for the stomach and duodenum.   RETROPERITONEUM:  Normal.  No acute hemorrhage or inflammatory change. Lymph nodes in a separate dedicated section.   OMENTUM, MESENTERY AND PERITONEAL SPACES: Free intraperitoneal air:  Negative Free intraperitoneal fluid:  Unchanged very small quantity, far too small for paracentesis Abscess:  Negative Other:  n/a   URINARY BLADDER:  Normal. No wall thickening, large diverticula, radiodense stone or surrounding inflammatory change.   PELVIS:  Hysterectomy. No obvious acute adnexal abnormality by CT   VASCULATURE:  Aortic and iliac atherosclerotic calcifications without aneurysm or other acute finding. No high grade stenosis of the major abdominal aortic branch vessels. Portal venous system patent.   ABDOMINAL WALL: Hernia:  Negative Other:  No acute or contributory abnormality.       NO NEW ACUTE PROCESS IN THE CHEST, ABDOMEN OR PELVIS   MILD INTERSTITIAL AND ALVEOLAR EDEMA   NO ACUTE PULMONARY EMBOLISM THROUGH THE SEGMENTAL BRANCH LEVEL   NO AORTIC DISSECTION OR OTHER ACUTE THORACIC AORTIC FINDINGS   NO AIRSPACE CONSOLIDATION, GROUND-GLASS AIRSPACE DISEASE OR ANY OTHER SIGN OF ACTIVE INFECTION   NO PLEURAL EFFUSION   NO PNEUMOTHORAX   DIFFUSE IMPRESSIVE SUBCUTANEOUS EDEMA OVER THE TRUNK HAS, IF ANYTHING, SLIGHTLY WORSENED IN THE CHEST WHEN COMPARED TO 22 OCTOBER 2024. THE SUBCUTANEOUS EDEMA OVER THE ABDOMEN AND PELVIS HAS DEFINITELY WORSENED FROM ITS MOST RECENT COMPARISON EXAM, 28 AUGUST 2024. STILL ONLY A VERY SMALL QUANTITY OF ASCITES, FAR TOO SMALL FOR PARACENTESIS   MODERATE SIZE PERICARDIAL EFFUSION UNCHANGED   IMPRESSIVE CARDIOMEGALY WITH DILATED RIGHT-SIDED CHAMBERS AND LIVER FINDINGS OF PASSIVE CONGESTION   NO DEFINITE ACUTE THROMBOSIS OF SVC OR IVC OR GREAT PELVIC VEINS ALTHOUGH SENSITIVITY AND SPECIFICITY IS LIMITED DUE TO  CONTRAST BOLUS TIMING   THE ENTIRE PORTAL VENOUS SYSTEM INCLUDING THE SPLENIC VEIN, SMV AND ITS MAJOR TRIBUTARIES, MAIN AND INTRAHEPATIC PORTAL VEINS ARE ALL PATENT; NO ACUTE PORTAL VENOUS THROMBOSIS   ALL THREE HEPATIC VEINS ARE PATENT; NO ACUTE HEPATIC VENOUS THROMBOSIS   THE LAST CT OF THE ABDOMEN AND PELVIS 28 AUGUST 2024 SHOWED SIGMOID DIVERTICULITIS WHICH HAS SINCE RESOLVED. PRESENTLY NO ACUTE DIVERTICULITIS OR OTHER COLITIS, BOWEL OBSTRUCTION, PERFORATION OR ABSCESS   THIS REPORT SERVES AS THE DIAGNOSTIC INTERPRETATION FOR TWO EXAMS PERFORMED CONCURRENTLY: CT CHEST WITH IV CONTRAST FOR PULMONARY EMBOLISM EVALUATION; CT ABDOMEN AND PELVIS WITH IV CONTRAST   MACRO: None   Signed by: Juan Diego Hernández 11/20/2024 1:47 PM Dictation workstation:   [id number]    CT abdomen pelvis w IV contrast    Result Date: 11/20/2024  Interpreted By:  Juan Diego Hernández, STUDY: CT ANGIO CHEST FOR PULMONARY EMBOLISM; CT ABDOMEN PELVIS W IV CONTRAST;  11/20/2024 12:49 pm   INDICATION: Signs/Symptoms:Concern for PE; Signs/Symptoms:edema.     COMPARISON: CT chest with contrast 22 October 2024; CT abdomen and pelvis with contrast 28 August 2024; other, older CTs back to the oldest available, CT abdomen and pelvis with contrast 1 April 2022 and CT chest with contrast 12 April 2007   ACCESSION NUMBER(S): [id number]; [id number]   ORDERING CLINICIAN: ASHLEY CHAMBERS   TECHNIQUE: Pulmonary arterial phase CT chest after the uneventful administration of 75 mL IV contrast (Omnipaque 350)   Three dimensional maximum intensity projections (3-D MIPs) image/s were created on a separate dedicated workstation, reviewed and saved   CT of the abdomen and pelvis from the lung bases through the symphysis pubis after the uneventful administration of intravenous contrast (same bolus as for concurrent CT chest). No oral   FINDINGS: CT CHEST   CARDIOVASCULAR:   Acute pulmonary embolism:  Negative through the segmental (third order) branch level. Subsegmental and  more distal branches not well enough opacified to evaluate. Acute right heart strain:  Negative. No CT evidence of acute right heart strain Cardiac thrombus:  Negative; no obvious right heart or other cardiac thrombus is seen Pulmonary arteries ectasia:  Unchanged sagittal ectasia   Heart size:  Impressive but unchanged cardiomegaly with right atrial and right ventricular dilation Pericardial effusion:  Impressive but unchanged, little if any different back through 23 April 2024, was present but smaller 7 January 2024, was present but tiny on 1 August 2023   Thoracic aortic aneurysm:  Negative Aortic dissection:  Negative   Heart failure change:  Negative.  No sign of interstitial or alveolar edema. Other:  Pulmonary venous engorgement   NONVASCULAR MEDIASTINUM: Esophagus:  Grossly normal by CT Mediastinal Mass:  Negative Hiatal hernia:  None Other:  n/a   LYMPH NODES: No thoracic adenopathy   LUNGS / AIRSPACES / AIRWAYS:   LARGE AIRWAYS Filling defect: Negative Wall thickening: Negative Bronchiectasis: Negative Other: N/A   AIRSPACES Fibrosis: Negative Emphysema: Negative Consolidation: Negative Ground glass airspace disease: Negative Edema: Probability of mild interstitial and alveolar edema Nodule / Mass: 4-5 mm subpleural right lower lobe nodule (series 9, image 107 of today's exam) is benign, unchanged all the way back through oldest CT available 12 April 2007 (series 5, image 64 of that exam). No new or enlarging nodule Other: N/A   PLEURA: Effusion:  Both sides negative Pneumothorax:  Both sides negative Other:  n/a   CHEST WALL: Diffuse subcutaneous edema probably just slightly worse when compared to 22 October 2024   SKELETON: No acute or contributory abnormality   -------   CT ABDOMEN AND PELVIS   SKELETON: No acute skeletal findings.   LIVER:  Enlarged, with heterogeneous perfusion compatible with passive congestion. No acute hepatic venous thrombosis. No acute portal venous thrombosis. No focal liver  lesion. Not overtly cirrhotic   SPLEEN: Mildly enlarged but unchanged   PANCREAS: Normal. No CT evidence of acute or chronic pancreatitis. No duct dilation. No mass.   GALLBLADDER:  Normal CT appearance. No dilation, calcified, or gas-containing stones.  Other types of gallstones could be occult on CT and detectable only by ultrasound.   BILE DUCTS:  Normal. No biliary duct dilation.   ADRENAL GLANDS: Left gland appears uniformly mildly thickened which may be from edema. Right side minimally so   KIDNEYS AND URETERS: Normal.  No hydronephrosis on either side.  No mass.  Symmetric enhancement.  No infarct or CT evidence of acute pyelonephritis.  No substantial radiodense stone.  Tiny stones and radiolucent stones could be occult on CT.   LYMPH NODES:  No adenopathy, intraperitoneal, retroperitoneal, pelvic or otherwise   APPENDIX:  The appendix is not definitively localized; however, no inflammatory process is suspected in the right lower quadrant or elsewhere.   COLON:  No longer any findings of acute diverticulitis that were present last CT 28 August 2024. 14 mm lipoma in the sigmoid colon is unchanged, without associated obstruction or intussusception   SMALL BOWEL: Normal. No small bowel dilation or any other sign of small bowel obstruction. No sign of active inflammatory bowel disease.   STOMACH / DUODENUM: Grossly normal by CT which has limited sensitivity and specificity for the stomach and duodenum.   RETROPERITONEUM:  Normal.  No acute hemorrhage or inflammatory change. Lymph nodes in a separate dedicated section.   OMENTUM, MESENTERY AND PERITONEAL SPACES: Free intraperitoneal air:  Negative Free intraperitoneal fluid:  Unchanged very small quantity, far too small for paracentesis Abscess:  Negative Other:  n/a   URINARY BLADDER:  Normal. No wall thickening, large diverticula, radiodense stone or surrounding inflammatory change.   PELVIS:  Hysterectomy. No obvious acute adnexal abnormality by CT    VASCULATURE:  Aortic and iliac atherosclerotic calcifications without aneurysm or other acute finding. No high grade stenosis of the major abdominal aortic branch vessels. Portal venous system patent.   ABDOMINAL WALL: Hernia:  Negative Other:  No acute or contributory abnormality.       NO NEW ACUTE PROCESS IN THE CHEST, ABDOMEN OR PELVIS   MILD INTERSTITIAL AND ALVEOLAR EDEMA   NO ACUTE PULMONARY EMBOLISM THROUGH THE SEGMENTAL BRANCH LEVEL   NO AORTIC DISSECTION OR OTHER ACUTE THORACIC AORTIC FINDINGS   NO AIRSPACE CONSOLIDATION, GROUND-GLASS AIRSPACE DISEASE OR ANY OTHER SIGN OF ACTIVE INFECTION   NO PLEURAL EFFUSION   NO PNEUMOTHORAX   DIFFUSE IMPRESSIVE SUBCUTANEOUS EDEMA OVER THE TRUNK HAS, IF ANYTHING, SLIGHTLY WORSENED IN THE CHEST WHEN COMPARED TO 22 OCTOBER 2024. THE SUBCUTANEOUS EDEMA OVER THE ABDOMEN AND PELVIS HAS DEFINITELY WORSENED FROM ITS MOST RECENT COMPARISON EXAM, 28 AUGUST 2024. STILL ONLY A VERY SMALL QUANTITY OF ASCITES, FAR TOO SMALL FOR PARACENTESIS   MODERATE SIZE PERICARDIAL EFFUSION UNCHANGED   IMPRESSIVE CARDIOMEGALY WITH DILATED RIGHT-SIDED CHAMBERS AND LIVER FINDINGS OF PASSIVE CONGESTION   NO DEFINITE ACUTE THROMBOSIS OF SVC OR IVC OR GREAT PELVIC VEINS ALTHOUGH SENSITIVITY AND SPECIFICITY IS LIMITED DUE TO CONTRAST BOLUS TIMING   THE ENTIRE PORTAL VENOUS SYSTEM INCLUDING THE SPLENIC VEIN, SMV AND ITS MAJOR TRIBUTARIES, MAIN AND INTRAHEPATIC PORTAL VEINS ARE ALL PATENT; NO ACUTE PORTAL VENOUS THROMBOSIS   ALL THREE HEPATIC VEINS ARE PATENT; NO ACUTE HEPATIC VENOUS THROMBOSIS   THE LAST CT OF THE ABDOMEN AND PELVIS 28 AUGUST 2024 SHOWED SIGMOID DIVERTICULITIS WHICH HAS SINCE RESOLVED. PRESENTLY NO ACUTE DIVERTICULITIS OR OTHER COLITIS, BOWEL OBSTRUCTION, PERFORATION OR ABSCESS   THIS REPORT SERVES AS THE DIAGNOSTIC INTERPRETATION FOR TWO EXAMS PERFORMED CONCURRENTLY: CT CHEST WITH IV CONTRAST FOR PULMONARY EMBOLISM EVALUATION; CT ABDOMEN AND PELVIS WITH IV CONTRAST   MACRO: None    Signed by: Juan Diego Hernández 11/20/2024 1:47 PM Dictation workstation:   [id number]    Vascular US lower extremity venous duplex bilateral    Result Date: 11/20/2024  Interpreted By:  Valdo Fox, STUDY: [facility] US LOWER EXTREMITY VENOUS DUPLEX BILATERAL; 11/20/2024 11:59 am   INDICATION: Edema   COMPARISON: 09/07/2024   ACCESSION NUMBER(S): [id number]   ORDERING CLINICIAN: ASHLEY CHAMBERS   TECHNIQUE: Static grayscale, color Doppler, and spectral Doppler sonographic images of the bilateral lower extremities were obtained for duplex evaluation.   FINDINGS: LEFT LOWER EXTREMITY: There is no evidence of deep venous thrombus in the visualized portions of the common femoral vein, femoral vein, or popliteal vein. Respiratory variation and augmentation to calf pressure is noted. The visualized portion of the posterior tibial and peroneal veins are unremarkable.   RIGHT LOWER EXTREMITY: There is no evidence of deep venous thrombus in the visualized portions of the common femoral vein, femoral vein, or popliteal vein. Respiratory variation and augmentation to calf pressure is noted. The visualized portion of the posterior tibial, and peroneal veins are unremarkable.       1. No evidence of deep venous thrombus in the evaluated veins of the bilateral lower extremities from the inguinal ligament to the popliteal fossa.   Signed by: Valdo Fox 11/20/2024 12:02 PM Dictation workstation:   [id number]    ECG 12 lead    Result Date: 11/20/2024  Sinus tachycardia Incomplete right bundle branch block Possible Lateral infarct (cited on or before 23-OCT-2024) Abnormal ECG When compared with ECG of 20-NOV-2024 10:36, (unconfirmed) Vent. rate has decreased BY  89 BPM Questionable change in initial forces of Anterior leads Nonspecific T wave abnormality no longer evident in Inferior leads Confirmed by Anderson Niño ([id number]) on 11/20/2024 12:00:16 PM    XR chest 1 view    Result Date: 11/20/2024  Interpreted By:  Sea Hadley, STUDY: XR CHEST  1 VIEW   INDICATION: Signs/Symptoms:Shortness of breath status post cardioversion.   COMPARISON: October 23, 2024   ACCESSION NUMBER(S): [id number]   ORDERING CLINICIAN: ASHLEY CHAMBERS   FINDINGS: Cardiomegaly. Central line over the SVC unchanged. No effusion or pneumothorax.       Cardiomegaly. No evidence of acute intrathoracic abnormality   Signed by: Sea Hadley 11/20/2024 11:48 AM Dictation workstation:   [id number]    ECG 12 lead    Result Date: 11/19/2024  Poor data quality, interpretation may be adversely affected Sinus tachycardia Incomplete right bundle branch block Right ventricular hypertrophy Possible Inferior infarct (cited on or before 23-OCT-2024) ST & T wave abnormality, consider anterior ischemia Abnormal ECG When compared with ECG of 23-OCT-2024 18:50, ST now depressed in Anterior leads Confirmed by Christiano Calvillo ([id number]) on 11/19/2024 4:40:56 PM             Assessment & Plan  Supraventricular tachycardia (CMS-[facility])  Ronna Beckham is a 64 y.o. female w/PMHx pulm HTN (on macitentan and epoprostenol via Montez, NYHA Functional Class 3 and WHO Group 1), chronic hypoxic respiratory failure (baseline 4L NC), polysubstance use (ethanol, cocaine, amphetamines), SVT, SLE (on hydroxychloroquine), GERD, HARRIS, MDD presenting with shortness of breath and feelings of doom. Noted to have SVT with rates to 200s in the ED, received adenosine x2 with no improvement in HR. Underwent synchronized cardioversion at 50J with improvement to sinus tachycardia, she was then transferred to the MICU.  In MICU, she has been taken off diltiazem drip and resumed on home PO diltiazem, given resolution of SVT.  Patient transferred to floor on 11/21     Update 11/21   -Patient was transferred  to floor from MICU today   - Will continue to monitor electrolytes  (especially Mag, Ca)   - will consider workup up for chronic diarrhea   -timing of restarting home diuresis given electrolyte abnormalities   -prolonged QT, holding  loperamide and trying to avoid other QT prolonging meds as able           #Recurrent SVT  #Sinus tach  :: history of recurrent SVT, usually in the setting of dilt non-compliance  :: Imaging with PE negative  :: on 11/21 morning HR was in the low 100s S/P metop 5 mg and ECG showed sinus rhythm   -stop dilt drip  -resume home diltiazem 180mg daily  -tele  -aggressive repletion of electrolytes, K>4, Mg>2  -negative TSH, Utox     #Acute on chronic hypoxic respiratory failure  #PAH  ::follows with Dr. Prescott  ::baseline 4L NC, on presentation to MICU on 5L  ::CTPE with mild interstitial edema, without PE, no concern for PNA  ::currently on 5L NC  ::home lasix 60mg daily   Plan:  -strict I&O  -c/w home macitentan 10mg daily and epoprostenol  -goal O2 sat > 90%  -hold on diuresis until electrolytes repleted     #Moderate pericardial effusion  ::noted on CTA, small to moderate effusion noted on TTE 4/2024  ::10/2024 TTE with unchanged pericardial effusion from 04/2024  -unchanged on CT 11/20/2024     #Hypomagnesesmia  #Secondary hypocalcemia  ::initial Mg level 0.51 at OSH ED, repleted with IV Mg and level on arrival to  2.07  ::ddx for hypoMg: diarrhea iso Mg supplementation vs diuretic use  -repleting magnesium and calcium  -c/w Mg 64 mg BID, will not increase to 400 mg BID as rec'd by OSH cardiology due to diarrhea  -c/w home calcium-D3 500-5mcg  -needs GI FU/workup for diarrhea as outpt, will hold imodium given borderline prolonged QTc but will restart home cholestyramine 4g packet iso known cholecystectomy  -ethanol <10     #NSTEMI  -c/w home ASA 81mg   -consider starting statin     #Prolonged QTc  -EKG with borderline prolonged QTc at 495 ms (though with RBBB)  -avoid QT prolonging agents, will hold Imodium     #Anemia  #Thrombocytopenia  -c/w folate     #Polysusbtance Use  ::UDS 10/23 negative  -repeat UDS also negative     #SLE  -c/w home hydroxychloroquine 200mg BID     #HARRIS  #MDD  -c/w home gabapentin 800 TID,  venlafaxine 150mg daily  -patient denies taking topamax     F: diuresis  E: K>4, Mg>2  N: cardiac diet, 2L fluid restriction  A: PIV     DVT PPx: lovenox 40  GI PPx: home PPI             Lincoln Jo MD  PGY1 Neurology     Known to me , admitted with tachycardia, cardioverted. Now to floor.

## 2024-11-21 NOTE — PROGRESS NOTES
ICU to Tee Transfer Summary     I:  ICU Admission Reason & Brief ICU Course:      Ronna Beckham is a 64 y.o. female w/PMHx pulm HTN (on macitentan and epoprostenol via Montez, NYHA Functional Class 3 and WHO Group 1), chronic hypoxic respiratory failure (baseline 4L NC), polysubstance use (ethanol, cocaine, amphetamines), SVT, SLE (on hydroxychloroquine), GERD, HARRIS, MDD presenting with shortness of breath and feelings of doom. Noted to have SVT with rates to 200s in the ED, received adenosine x2 with no improvement in HR. Underwent synchronized cardioversion at 50J with improvement to sinus tachycardia, she was then transferred to the MICU.    Since arrival to MICU, she has been taken off diltiazem drip and resumed on home PO diltiazem, given resolution of SVT. Repleted electrolytes. HR in the low 100s during AM rounds, gave IV metoprolol 5 mg x1, EKG after showing sinus rhythm, persistence of prolonged QT.     To-Do for Floor Team:  -monitoring for electrolyte abnormalities (especially Mag, Ca)   -needs GI workup for chronic diarrhea  -prolonged QT, holding loperamide and trying to avoid other QT prolonging meds as able  -timing of restarting home diuresis given electrolyte abnormalities    C: Code Status/DPOA Info/Goals of Care/ACP Note    Full Code  DPOA/Contact Number: Diana Vera (Sister) [phone]    U: Unprescribing & Pertinent High-Risk Medications    Changes to home meds: holding home loperamide given prolonged QT     Anticoagulation: lovenox    Antibiotics:   [x] N/A - no current planned antimicrobioals    P: Pending Tests at the Time of Transfer   none      A: Active consultants, including Rehab:   []  Subspecialty Consultants:  none  [x]  PT  [x]  OT  []  SLP  []  Wound Care    U: Uncertainty Measure/Diagnostic Pause:    Working diagnosis at the time of transfer SVT (resolved), chronic diarrhea     Diagnosis Degree of Certainty: 1. High degree of certainty about the clinical diagnosis.     S:  Summary of Major Problems and To-Dos:     #Recurrent SVT  #Sinus tach  :: history of recurrent SVT, usually in the setting of dilt non-compliance  :: Imaging with PE negative  -stop dilt drip  -resume home diltiazem 180mg daily  -tele  -aggressive repletion of electrolytes, K>4, Mg>2  -negative TSH, Utox     #Acute on chronic hypoxic respiratory failure  #PAH  ::follows with Dr. Prescott  ::baseline 4L NC, on presentation to MICU on 5L  ::CTPE with mild interstitial edema, without PE, no concern for PNA  ::currently on 5L NC  ::home lasix 60mg daily   Plan:  -strict I&O  -c/w home macitentan 10mg daily and epoprostenol  -goal O2 sat > 90%  -hold on diuresis until electrolytes repleted     #Moderate pericardial effusion  ::noted on CTA, small to moderate effusion noted on TTE 4/2024  ::10/2024 TTE with unchanged pericardial effusion from 04/2024  -unchanged on CT 11/20/2024     #Hypomagnesesmia  #Secondary hypocalcemia  ::initial Mg level 0.51 at OSH ED, repleted with IV Mg and level on arrival to  2.07  ::ddx for hypoMg: diarrhea iso Mg supplementation vs diuretic use  -repleting magnesium and calcium  -c/w Mg 64 mg BID, will not increase to 400 mg BID as rec'd by OSH cardiology due to diarrhea  -c/w home calcium-D3 500-5mcg  -needs GI FU/workup for diarrhea as outpt, will hold imodium given borderline prolonged QTc but will restart home cholestyramine 4g packet iso known cholecystectomy  -ethanol <10     #NSTEMI  -c/w home ASA 81mg   -consider starting statin     #Prolonged QTc  -EKG with borderline prolonged QTc at 495 ms (though with RBBB)  -avoid QT prolonging agents, will hold Imodium     #Anemia  #Thrombocytopenia  -c/w folate     #Polysusbtance Use  ::UDS 10/23 negative  -repeat UDS also negative     #SLE  -c/w home hydroxychloroquine 200mg BID     #HARRIS  #MDD  -c/w home gabapentin 800 TID, venlafaxine 150mg daily  -patient denies taking topamax     F: diuresis  E: K>4, Mg>2  N: cardiac diet, 2L fluid  restriction  A: PIV     DVT PPx: lovenox 40  GI PPx: home PPI      E: Exam, including Lines/Drains/Airways & Data Review:     General: no acute distress, alert, conversant  Eyes: no scleral icterus, EOMI  Cardiovascular: +JVD, tachycardia, regular rate, murmur present  Respiratory: Hendrickson catheter present over left side, no coughing/rhonchi/wheezing  GI: bowel sounds present, soft, nontender, distended  Extremities: RLE edema>LLE edema  Neuro: AOx4, 4+/5 symmetric  strength, moving all extremities spontaneously  Skin: brusing on arm    Difficult airway? No  Lines/drains assessed for removal? Yes, describe: has hendrickson catheter in place    Within 30 minutes of the patient physically leaving the floor, a Floor Readiness Note needs to be placed with updated vitals.      Aakash Jay MD  PGY-1, Internal Medicine-Pediatrics

## 2024-11-21 NOTE — SIGNIFICANT EVENT
Floor Readiness Note       I, personally, evaluated Ronna Beckham prior to transfer to the floor, including reviewing all current laboratory and imaging studies. The patient remains appropriate for transfer to the floor. Bedside nurse and respiratory therapy are also in agreement of patient's readiness for the floor.     Brief summary:  Ronna Beckham is a 64 y.o. female who was admitted to the MICU on 11/20 for SVT. They have been treated with synchronized cardioversion and diltiazem and are now in sinus rhythm.     Updated focused Physical Exam:  General: no acute distress, alert, conversant  Eyes: no scleral icterus, EOMI  Cardiovascular: +JVD, tachycardia, regular rate, murmur present  Respiratory: Montez catheter present over left side, no coughing/rhonchi/wheezing  GI: bowel sounds present, soft, nontender, distended  Extremities: RLE edema>LLE edema  Neuro: AOx4, 4+/5 symmetric  strength, moving all extremities spontaneously  Skin: brusing on arm    Current Vital Signs:  Heart Rate: 92 (11/21/24 1100 : User, System Default)  BP: 97/68 (11/21/24 1100 : User, System Default)  Temp: 36.5 °C (97.7 °F) (11/21/24 0800 : Chemero Williamson)  Resp: 23 (11/21/24 1100 : User, System Default)  SpO2: 91 % (11/21/24 1100 : User, System Default)    Relevant updates since rounds:  -repeat EKG showing continued QT prolongation    Accepting team, Pushpa, received verbal sign out and the Provider Care team/Attending has been updated. Bedside nurse will now call accepting nurse for report and patient will be transferred to [place] [id number].    Aakash Jay MD

## 2024-11-21 NOTE — HOSPITAL COURSE
Ronna Beckham is a 64 y.o. female w/PMHx pulm HTN (on macitentan and epoprostenol via Montez, NYHA Functional Class 3 and WHO Group 1), chronic hypoxic respiratory failure (baseline 4L NC), polysubstance use (ethanol, cocaine, amphetamines), SVT, SLE (on hydroxychloroquine), GERD, HARRIS, MDD presenting with shortness of breath and feelings of doom.     On 11/20 afternoon, patient started to have increased dyspnea, feelings of doom, and dizziness. This felt similar to when she had SVT once in the past (July 2024). Denied chest pain, palpitations, blurry vision. She is on baseline 4L O2 at home and unable to ambulate very far so uses wheelchair. She recently changed her veletri disc. Denies increased leg swelling or abdominal bloating beyond her baseline, she chronically has increased right > left leg swelling. She has eaten a sandwich today. She has not drank anything. Did take her medications today and states she has been compliant with diltiazem    Noted to have SVT with rates to 200s in the ED, received adenosine x2 with no improvement in HR. Underwent synchronized cardioversion at 50J with improvement to sinus tachycardia, she was then transferred to the MICU.     In MICU, the diltiazem drip was stopped and resumed on home PO diltiazem, given a resolution of SVT. Repleted electrolytes. On 11/21, HR was in the low 100s during AM rounds; she was given IV metoprolol 5 mg x1, EKG after showed sinus rhythm and persistence of prolonged QT. Patient was transferred to the floor     Being discharged on Veletri 84ml/24h after discussion with pulmonology fellow and pharmacy.      TO DO:    -monitoring for electrolyte abnormalities (especially Mag, Ca)   -needs GI workup for chronic diarrhea  -prolonged QT, holding loperamide and trying to avoid other QT prolonging meds as able  -timing of restarting home diuresis given electrolyte abnormalities

## 2024-11-21 NOTE — SIGNIFICANT EVENT
Rapid Response Nurse Note: RADAR alert: 6    Pager time: [phone]  Arrival time: 1235  Event end time: 124  Location: [place] [address]  [x] Triage by phone or secure messaging    Rapid response initiated by:  [] Rapid response RN [] Family [] Nursing Supervisor [] Physician   [x] RADAR auto page [] Sepsis auto-page [] RN [] RT   [] NP/PA [] Other:     Primary reason for call:   [] BAT [] New CPAP/BiPAP [] Bleeding [] Change in mental status   [] Chest pain [] Code blue [] FiO2 >/= 50% [] HR </= 40 bpm   [] HR >/= 130 bpm [] Hyperglycemia [] Hypoglycemia [x] RADAR    [] RR </= 8 bpm [] RR >/= 30 bpm [] SBP </= 90 mmHg [] SpO2 < 90%   [] Seizure [] Sepsis [] Shortness of breath  [] Staff concern: see comments     Initial VS and/or RADAR VS: T 36.8 °C; HR 97; RR 22; /73; SPO2 92%.    Providers present at bedside (if applicable):     Name of ICU Provider contacted (if applicable):     Interventions:  [x] None [] ABG/VBG [] Assist w/ICU transfer [] BAT paged    [] Bag mask [] Blood [] Cardioversion [] Code Blue   [] Code blue for intubation [] Code status changed [] Chest x-ray [] EKG   [] IV fluid/bolus [] KUB x-ray [] Labs/cultures [] Medication   [] Nebulizer treatment [] NIPPV (CPAP/BiPAP) [] Oxygen [] Oral airway   [] Peripheral IV [] Palliative care consult [] CT/MRI [] Sepsis protocol    [] Suctioned [] Other:     Outcome:  [] Coded and  [] Code blue for intubation [] Coded and transferred to ICU []  on division   [x] Remained on division (no change) [] Remained on division + additional monitoring [] Remained in ED [] Transferred to ED   [] Transferred to ICU [] Transferred to inpatient status [] Transferred for interventions (procedure) [] Transferred to ICU stepdown    [] Transferred to surgery [] Transferred to telemetry [] Sepsis protocol [] STEMI protocol   [] Stroke protocol [x] Bedside nurse instructed to page rapid response for any concerns or acute change in condition/VS     Additional Comments:      Radar auto-page received for radar score of 6 with the above listed vital signs.  Vital signs were confirmed and reviewed with primary RN.  She is at her current baseline and RN has no concerns.  There are no indications for interventions by Rapid Response at this time.  RN to contact Rapid Response with any future concerns or signs of clinical decompensation.

## 2024-11-21 NOTE — NURSING NOTE
Pt transported to [facility] MICU 17 with all belongings,  Pt's daughter notified.

## 2024-11-21 NOTE — ASSESSMENT & PLAN NOTE
Ronna Beckham is a 64 y.o. female w/PMHx pulm HTN (on macitentan and epoprostenol via Montez, NYHA Functional Class 3 and WHO Group 1), chronic hypoxic respiratory failure (baseline 4L NC), polysubstance use (ethanol, cocaine, amphetamines), SVT, SLE (on hydroxychloroquine), GERD, HARRIS, MDD presenting with shortness of breath and feelings of doom. Noted to have SVT with rates to 200s in the ED, received adenosine x2 with no improvement in HR. Underwent synchronized cardioversion at 50J with improvement to sinus tachycardia, she was then transferred to the MICU.  In MICU, she has been taken off diltiazem drip and resumed on home PO diltiazem, given resolution of SVT.  Patient transferred to floor on 11/21

## 2024-11-21 NOTE — ASSESSMENT & PLAN NOTE
-likely precipitated by electrolyte disturbances  -s/p cardioversion now in sinus tachy  -diltiazem drip ordered

## 2024-11-21 NOTE — PROGRESS NOTES
Pharmacy Admission Order Reconciliation Review    Ronna Beckham is a 64 y.o. female admitted for Supraventricular tachycardia (CMS-[facility]). Pharmacy reviewed the patient's unreconciled admission medications.    Prior to admission medications that were reviewed and acted on by the pharmacist include:  Loperamide  These medications have been reconciled.     Any other unreconcilied medications have been addressed and will be ordered or held by the patient's medical team. Medications addressed by the pharmacist may be added or changed by the patient's medical team at any time.    Ankit Diaz, PharmD  Transitions of Care Pharmacist  [facility]  Please reach out via Secure Chat for questions

## 2024-11-21 NOTE — H&P
History Of Present Illness  Ronna Beckham is a 64 y.o. female admitted while awaiting transfer to a higher level of care    She reports that she woke up with the sensation of dread, which is how she knows that she is in SVT. She does not feel palpitations nor chest pain when this occurs, she always has some degree of shortness of breath    She called EMS and Adenosine attempted en route 6mg and 12mg without conversion. She was lethargic and hypotensive in the ER with supraventricular tachycardia at a rate of 200bpm. She received synchronized cardioversion and converted to sinus tachy 111bpm. She was found to have severe hypoMg to 0.89, CMP calcium 6.4, ionized calcium 0.80. Repletion initiated in ER and, due to there not appearing to be a bed available this evening at [facility], she was admitted to hospitalist at [facility] while awaiting bed     Past Medical History  Past Medical History:   Diagnosis Date    Anxiety     Depression     Does not refill medications appropriately 09/10/2024    GERD (gastroesophageal reflux disease)     Lupus     Personal history of other specified conditions 2020    History of seizure    Pulmonary hypertension (Multi)     Volume overload 09/10/2024       Surgical History  Past Surgical History:   Procedure Laterality Date    CARDIAC CATHETERIZATION N/A 2024    Procedure: Left Heart Cath, With LV, Angriography And Grafts;  Surgeon: Christiano Calvillo DO;  Location: [facility] Cardiac Cath Lab;  Service: Cardiovascular;  Laterality: N/A;    CHOLECYSTECTOMY      HYSTERECTOMY      OTHER SURGICAL HISTORY  2020    Cholecystectomy    OTHER SURGICAL HISTORY  2020    Esophagogastroduodenoscopy    OTHER SURGICAL HISTORY  2020    Colonoscopy    OTHER SURGICAL HISTORY  2020     section    OTHER SURGICAL HISTORY  2020    Hysterectomy        Social History  She reports that she has quit smoking. Her smoking use included cigarettes. She has quit using smokeless  "tobacco. She reports that she does not currently use alcohol. She reports that she does not currently use drugs after having used the following drugs: Cocaine and Other.    Family History  Family History   Problem Relation Name Age of Onset    No Known Problems Mother      No Known Problems Father          Allergies  Levetiracetam    Review of Systems   Respiratory:  Positive for shortness of breath.    Cardiovascular:  Positive for leg swelling.   Gastrointestinal:  Positive for abdominal distention.        Last Recorded Vitals  Blood pressure 135/78, pulse 97, temperature 36.9 °C (98.4 °F), temperature source Temporal, resp. rate 23, height 1.651 m (5' 5\"), weight 73.5 kg (162 lb 1.6 oz), SpO2 94%.    Vitals Reviewed: yes  Constitutional: Alert, no acute distress  Eyes: EOMI, normal conjunctiva  ENT: moist mucus membranes, airway patent  Pulm: Diffuse mild crackles, on oxygen 4L  Cardiac: tachycardic rate, regular rhythm, systolic murmur  GI: Soft, non tender, distended, overlying edema, normal bowel sounds  MSK: bilat R>L lower extremity edema  Skin: Warm and well perfused  Neuro: Oriented, no focal deficit, CN II-XII grossly intact  Psych: Cooperative, appropriate affect, normal judgement      Intake/Output Summary (Last 24 hours) at 11/21/2024 0110  Last data filed at 11/20/2024 2248  Gross per 24 hour   Intake 600 ml   Output 200 ml   Net 400 ml       Relevant Results  CTA chest for PE, CT abdomen/pelvis w IV contrast  Result Date: 11/20/2024  NO NEW ACUTE PROCESS IN THE CHEST, ABDOMEN OR PELVIS   MILD INTERSTITIAL AND ALVEOLAR EDEMA   NO ACUTE PULMONARY EMBOLISM THROUGH THE SEGMENTAL BRANCH LEVEL   NO AORTIC DISSECTION OR OTHER ACUTE THORACIC AORTIC FINDINGS   NO AIRSPACE CONSOLIDATION, GROUND-GLASS AIRSPACE DISEASE OR ANY OTHER SIGN OF ACTIVE INFECTION   NO PLEURAL EFFUSION   NO PNEUMOTHORAX   DIFFUSE IMPRESSIVE SUBCUTANEOUS EDEMA OVER THE TRUNK HAS, IF ANYTHING, SLIGHTLY WORSENED IN THE CHEST WHEN COMPARED " TO 22 OCTOBER 2024. THE SUBCUTANEOUS EDEMA OVER THE ABDOMEN AND PELVIS HAS DEFINITELY WORSENED FROM ITS MOST RECENT COMPARISON EXAM, 28 AUGUST 2024. STILL ONLY A VERY SMALL QUANTITY OF ASCITES, FAR TOO SMALL FOR PARACENTESIS   MODERATE SIZE PERICARDIAL EFFUSION UNCHANGED   IMPRESSIVE CARDIOMEGALY WITH DILATED RIGHT-SIDED CHAMBERS AND LIVER FINDINGS OF PASSIVE CONGESTION   NO DEFINITE ACUTE THROMBOSIS OF SVC OR IVC OR GREAT PELVIC VEINS ALTHOUGH SENSITIVITY AND SPECIFICITY IS LIMITED DUE TO CONTRAST BOLUS TIMING   THE ENTIRE PORTAL VENOUS SYSTEM INCLUDING THE SPLENIC VEIN, SMV AND ITS MAJOR TRIBUTARIES, MAIN AND INTRAHEPATIC PORTAL VEINS ARE ALL PATENT; NO ACUTE PORTAL VENOUS THROMBOSIS   ALL THREE HEPATIC VEINS ARE PATENT; NO ACUTE HEPATIC VENOUS THROMBOSIS   THE LAST CT OF THE ABDOMEN AND PELVIS 28 AUGUST 2024 SHOWED SIGMOID DIVERTICULITIS WHICH HAS SINCE RESOLVED. PRESENTLY NO ACUTE DIVERTICULITIS OR OTHER COLITIS, BOWEL OBSTRUCTION, PERFORATION OR ABSCESS      Vascular US lower extremity venous duplex bilateral  Result Date: 11/20/2024  1. No evidence of deep venous thrombus in the evaluated veins of the bilateral lower extremities from the inguinal ligament to the popliteal fossa.       XR chest 1 view  Result Date: 11/20/2024  Cardiomegaly. No evidence of acute intrathoracic abnormality       Scheduled medications  aspirin, 81 mg, oral, Daily  dilTIAZem CD, 180 mg, oral, Daily  gabapentin, 800 mg, oral, TID  hydroxychloroquine, 200 mg, oral, BID  macitentan, 10 mg, oral, Daily  magnesium sulfate, 4 g, intravenous, Once  melatonin, 5 mg, oral, Nightly  topiramate, 100 mg, oral, BID  venlafaxine XR, 150 mg, oral, Daily      Continuous medications  dilTIAZem, 5-15 mg/hr, Last Rate: 10 mg/hr (11/20/24 2232)  epoprostenol, 1.5 mg      PRN medications       Assessment/Plan   Assessment & Plan  SVT (supraventricular tachycardia) (CMS-HCC)  -likely precipitated by electrolyte disturbances  -s/p cardioversion now in  sinus tachy  -diltiazem drip ordered  PAH (pulmonary artery hypertension) (Multi)  -Veletri pump  Hypomagnesemia  -monitor, replete  Hypocalcemia  -monitor, replete  Acute on chronic right-sided heart failure  -initiate diuresis when electrolytes repleted, would benefit from higher level of care  Anasarca  -initiate diuresis when electrolytes repleted, would benefit from higher level of care  Neuropathy  -continue gabapentin  History of non-ST elevation myocardial infarction (NSTEMI)  -continue aspirin  Long-term use of high-risk medication  -continue Plaquenil(SLE), Opsumit (PAH)    DVT prophylaxis: SCDs  Disposition: Awaiting transfer to higher level of care     I spent 55 minutes in the professional and overall care of this patient.    Luda Javier MD

## 2024-11-21 NOTE — DISCHARGE SUMMARY
Discharge Diagnosis  SVT (supraventricular tachycardia) (CMS-[facility])    Issues Requiring Follow-Up  Electrolyte abnormalities  CHF  SVT  Hypomagnesemia  Hypocalcemia    Discharge Meds     Medication List      ASK your doctor about these medications     aspirin 81 mg EC tablet; Take 1 tablet (81 mg) by mouth once daily.   calcium carbonate-vitamin D3 500 mg-5 mcg (200 unit) tablet; Take 2   tablets by mouth 2 times a day.   cholestyramine 4 gram packet; Commonly known as: Questran; Take 1 packet   (4 g) by mouth once daily.   dilTIAZem  mg 24 hr capsule; Commonly known as: Cardizem CD; Take   1 capsule (180 mg) by mouth once daily.   epoprostenol 1.5 mg recon soln; Commonly known as: Veletri; 1.5 mg   (1,500 mcg) by central venous line infusion route continuously.   Reconstitute contents of vial with 5 ml diluent and mix cassette as   directed. Infuse continuously per physician orders. Allow for priming   volume. Dose range: 1-150 ng/kg/min.   folic acid 1 mg tablet; Commonly known as: Folvite   furosemide 40 mg tablet; Commonly known as: Lasix; Take 1.5 tablets (60   mg) by mouth once daily.   gabapentin 800 mg tablet; Commonly known as: Neurontin; Take 1 tablet   (800 mg) by mouth 3 times a day.   hydroxychloroquine 200 mg tablet; Commonly known as: Plaquenil; Take 1   tablet (200 mg) by mouth 2 times a day.   Mag 64 64 mg EC tablet; Generic drug: magnesium chloride; Take 1 tablet   (64 mg) by mouth 2 times a day.   melatonin 5 mg tablet; Take 1 tablet (5 mg) by mouth once daily at   bedtime.   omeprazole 40 mg DR capsule; Commonly known as: PriLOSEC; Take 1 capsule   (40 mg) by mouth once daily in the morning. Take before meals. Do not   crush or chew.   Opsumit 10 mg tablet tablet; Generic drug: macitentan; TAKE 1 TABLET BY   MOUTH DAILY. DO NOT HANDLE IF PREGNANT. DO NOT SPLIT, CRUSH, OR CHEW.   REVIEW MEDICATION GUIDE.   potassium chloride CR 20 mEq ER tablet; Commonly known as: Klor-Con M20;   Take 1 tablet  (20 mEq) by mouth 2 times a day. Do not crush or chew.   topiramate 25 mg tablet; Commonly known as: Topamax; Week 1 25mg at   bedtime, week 2 25mg BID, week 3 25mg qAM, 50mg at bedtime, week 4 50 mg   BID, week 5 50mg qAM 75mg at bedtime, week 6 75 mg BID, week 7 75mg qam   100mg at bedtime, week 8 100mg BID   venlafaxine  mg 24 hr capsule; Commonly known as: Effexor-XR; Take   1 capsule (150 mg) by mouth once daily. Do not crush or chew.       Test Results Pending At Discharge  Pending Labs       Order Current Status    Extra Urine Gray Tube Collected (11/20/24 1142)    Urinalysis with Reflex Culture and Microscopic In process    Blood Culture Preliminary result    Blood Culture Preliminary result            Hospital Course   Ronna Beckham is a 64 y.o. who presented initially with Irregular Heart Beat (Patient bib Ems from home, states she felt her heart fluttering. Found in SVT in the 200s, EMS gave 2 doses of adenosine with no relief. Hx of pulmonary HTN)  In the ER, she required emergent cardioversion which successfully converted her from SVT 200bpm to sinus tachy 111bpm. She was admitted in anticipation of no bed being available at [facility]. Electrolyte repletion was in progress. She was initiated on diltiazem drip  This patient was discharged in fair condition. 31 minutes spent performing discharge services    Relevant Physical Exam at Time of Discharge:  Visit Vitals  /78 (BP Location: Right arm, Patient Position: Lying)   Pulse 97   Temp 36.9 °C (98.4 °F) (Temporal)   Resp 23       Vitals Reviewed: yes  awake and alert, increased work of breathing, on nasal cannula oxygen(4L, SpO2 95%). Bilat LE edema R>L. Abdominal distention. Tachycardic, regular rhythm, prominent heart sounds with systolic murmur     Outpatient Follow-Up  Future Appointments   Date Time Provider Department Center   11/25/2024  3:30 PM [id number]   11/25/2024  4:00 PM Chicho Prescott DO  [id number] Academic   12/23/2024  9:00 AM Chelsea Wood MD [id number] [facility]         Luda Javier MD

## 2024-11-21 NOTE — PROGRESS NOTES
"Attending Attestation - Resident H&P Pending    Brief Attending Summary: 64 y.o. transferred to [facility] for tachyarrhythmia. PH NYHA 3-4, WHO group 1- on macitentan and treprostinil. On 4L/min at baseline. Lupus on Plaquenil.    Presented with \"sensation of dread\" and found to have tachycardia. Got adenosine and IV fluids via EMS. Monitor showed HR in the 200s -SVT. S/P synchronized cardioversion.     Vitals:    11/21/24 0300   BP: 119/78   Pulse: 94   Resp: 21   Temp:    SpO2: 91%   Above SpO2 is on 4L/min  MAP 91- arrived on a Diltiazem gtt      Mg 1.42, was 0.51 on initial eval  Ca 7.1, was 5.4 on initial eval  BNP 1.4K, normally 600-700     B/L LE Duplex 11/20: -ve for VTE  CT chest angio 11/20: no PE, atelectasis in lingula (present in 10/2024 chest CT as well)    TTE 10/24: small to mod effusion, no tamponade, RVSP increased to 105, EF 75%    - SVT: likely due to electrolyte disturbances, due to diarrhea, S/P synchronized cardioversion. Stop dil gtt, resume oral dilt (outpatient medication); holding diuretics till electrolytes have stabilized   - acute on chronic hypoxic RF with PAH: diuresis, epoprostenol 72 ng/kg/min, macitentan 10 mg daily, Hickmann in place  - pericardial effusion: noted on last TTE, monitor clinically; no clinical need to repeat TTE at this time  - Lupus: Plaquenil  (home medication)  - Anemia: chronic, baseline 9-10, now 10.3 gm/dL - monitor   - Hypomag and hypoca2+: likely due to diarrhea (chronic), also takes furosemide at home, triggered her SVT; now improved, replace as needed      I have reviewed and evaluated the most recent data and results, personally examined the patient, and formulated the plan of care as presented above. This patient was critically ill and required continued critical care treatment. Teaching and any separately billable procedures are not included in the time calculation.    Billing Provider Critical Care Time: 40 minutes    Abimael Worthy MD  Staff " Physician - Interventional Pulmonology  3:18 AM  11/21/24

## 2024-11-21 NOTE — CARE PLAN
The patient's goals for the shift include Patient wants to continue to feel better every day so she can go home    The clinical goals for the shift include Patient will not have any runs of VT    Over the shift, the patient transferred to [facility] from MICU.  Patient on Veletri, bag changed this am at 06:00 and Left Chest Montez dressing changed yesterday 11/20/24 per MICU RN.  Patient ambulates to bathroom with one assist and advised to call staff for assistance but patient will ambulate to bathroom on her own at times.  Patient on 4L/NC and remained safe free from falls and injury this shift.      Problem: Skin  Goal: Participates in plan/prevention/treatment measures  Outcome: Progressing  Flowsheets (Taken 11/21/2024 1825)  Participates in plan/prevention/treatment measures:   Elevate heels   Increase activity/out of bed for meals  Goal: Prevent/manage excess moisture  Outcome: Progressing  Flowsheets (Taken 11/21/2024 1825)  Prevent/manage excess moisture:   Follow provider orders for dressing changes   Moisturize dry skin   Monitor for/manage infection if present  Goal: Prevent/minimize sheer/friction injuries  Outcome: Progressing  Flowsheets (Taken 11/21/2024 1825)  Prevent/minimize sheer/friction injuries:   HOB 30 degrees or less   Increase activity/out of bed for meals   Turn/reposition every 2 hours/use positioning/transfer devices     Problem: Pain - Adult  Goal: Verbalizes/displays adequate comfort level or baseline comfort level  Outcome: Progressing     Problem: Safety - Adult  Goal: Free from fall injury  Outcome: Progressing

## 2024-11-21 NOTE — PROGRESS NOTES
"Physical Therapy    Physical Therapy Evaluation    Patient Name: Ronna Beckham  MRN: [id number]  Department: [facility] MICU  Room: 17/17-A  Today's Date: 11/21/2024   Time Calculation  Start Time: 1004  Stop Time: 1020  Time Calculation (min): 16 min    Assessment/Plan   PT Assessment  PT Assessment Results: Decreased strength, Decreased endurance, Impaired balance, Decreased mobility  Rehab Prognosis: Good  Barriers to Discharge: medical acuity  End of Session Communication: Bedside nurse  Assessment Comment: 65 y/o F who presents with SOB with hx of pHTN, CT PE (-). patient presents with impaired endurance and functional mobility. Would benefit from LOW intensity skilled PT services upon DC  End of Session Patient Position: Bed, 3 rail up, Alarm off, not on at start of session  IP OR SWING BED PT PLAN  Inpatient or Swing Bed: Inpatient  PT Plan  Treatment/Interventions: Bed mobility, Transfer training, Gait training, Stair training, Balance training, Therapeutic exercise, Therapeutic activity, Positioning, Postural re-education, Endurance training  PT Plan: Ongoing PT  PT Frequency: 4 times per week  PT Discharge Recommendations: Low intensity level of continued care  Equipment Recommended upon Discharge:  (patient mentioned that she would benefit from shower chair)  PT Recommended Transfer Status: Contact guard  PT - OK to Discharge: Yes    Subjective   General Visit Information:  General  Reason for Referral: presents with SOB and \"feeling of doom\"; CT PE (-)  Past Medical History Relevant to Rehab: pulm HTN (on macitentan and epoprostenol via Montez, NYHA Functional Class 3 and WHO Group 1), chronic hypoxic respiratory failure (baseline 4L NC), polysubstance use (ethanol, cocaine, amphetamines), SVT, SLE (on hydroxychloroquine), GERD, HARRIS, MDD; denied falls/6 months  Prior to Session Communication: Bedside nurse  Patient Position Received: Bed, 3 rail up, Alarm off, not on at start of session  General Comment: " "agreeable to participate in therapy with encouragement; patient declined wanting to stay OOB in recliner or ambulate within the room.  Home Living:  Home Living  Home Living Comments: currently resides in a 2-story house, 10-12 stairs with HR to 2nd floor, 2 HELEN; reports staying on 2nd floor where full bath (tub) and bedroom is located, reports not going to 1st floor (kitchen); mentioned moving to 1st floor apartment beginning of December  Prior Level of Function:  Prior Function Per Pt/Caregiver Report  Prior Function Comments: reports indep ADLs, family completes iADLs, (-) work/drive; reports grand-dtr completes iADLs but expressed concerns about completing iADLs when she moves into her apartment dince no one will be with her to help, mentioned occasional assist from \"\" whom she doesn't live with; reports independent household ambulation, uses WC for community distances, reports primarily only ambulating to/from bathroom on 2nd floor of home only. Further limited d/t reduced activity tolerance/increase SOB.   Precautions:  Precautions  Hearing/Visual Limitations: hearing appears WFL, reading glasses  Medical Precautions: Fall precautions, Oxygen therapy device and L/min  Precautions Comment: SpO2 >90%     Vital Signs     Vitals Session Pre PT During PT Post PT   Heart Rate (BPM) 88  93   Resp (RR) 23  23   SpO2 % 95  95   /74  130/82   MAP (mmHg) 86  98   72 ng epoprostenol via Montez, 5L NC       Objective   Pain:  Pain Assessment  Pain Assessment: 0-10  0-10 (Numeric) Pain Score: 0 - No pain  Cognition:  Cognition  Overall Cognitive Status: Within Functional Limits  Arousal/Alertness: Appropriate responses to stimuli  Orientation Level:  (AxO x3)  Following Commands: Follows one step commands consistently (appears capable of following commands, self-directed in session)    General Assessments:      Activity Tolerance  Early Mobility/Exercise Safety Screen: Proceed with mobilization - No exclusion " criteria met    Sensation  Sensation Comment: denied N/T    Strength  Strength Comments: globally 4/5 BUEs/BLEs  Postural Control  Postural Control: Within Functional Limits    Static Sitting Balance  Static Sitting-Balance Support: Feet supported, No upper extremity supported  Static Sitting-Level of Assistance: Distant supervision  Dynamic Sitting Balance  Dynamic Sitting-Balance Support: Feet supported, No upper extremity supported  Dynamic Sitting-Level of Assistance: Close supervision    Static Standing Balance  Static Standing-Balance Support: No upper extremity supported  Static Standing-Level of Assistance: Close supervision  Dynamic Standing Balance  Dynamic Standing-Balance Support: No upper extremity supported  Dynamic Standing-Level of Assistance: Close supervision  Dynamic Standing-Comments: x1  Functional Assessments:  Bed Mobility  Bed Mobility: Yes  Bed Mobility 1  Bed Mobility 1: Supine to sitting, Sitting to supine  Level of Assistance 1: Close supervision  Bed Mobility Comments 1: HOB elevated    Transfers  Transfer: Yes  Transfer 1  Transfer From 1: Sit to, Stand to  Transfer to 1: Sit, Stand  Technique 1: Sit to stand, Stand to sit  Transfer Level of Assistance 1: Close supervision    Ambulation/Gait Training  Ambulation/Gait Training Performed: Yes  Ambulation/Gait Training 1  Surface 1: Level tile  Device 1: No device  Assistance 1: Close supervision  Comments/Distance (ft) 1: multi-directional; x4 ft, patient declined further ambulation, wanted to return to bed. Patient appeared capable of ambulating further however.  Extremity/Trunk Assessments:  RUE   RUE :  (AROM WFL)  LUE   LUE:  (AROM WFL)  RLE   RLE :  (AROM WFL)  LLE   LLE :  (AROM WFL)  Outcome Measures:  [facility] Basic Mobility  Turning from your back to your side while in a flat bed without using bedrails: A little  Moving from lying on your back to sitting on the side of a flat bed without using bedrails: A little  Moving to and from  bed to chair (including a wheelchair): A little  Standing up from a chair using your arms (e.g. wheelchair or bedside chair): A little  To walk in hospital room: A little  Climbing 3-5 steps with railing: A little  Basic Mobility - Total Score: 18    FSS-ICU  Ambulation: Walks <50 feet with any assistance x1 or walks any distance with assistance x2 people  Rolling: Supervision or set-up only  Sitting: Supervision or set-up only  Transfer Sit-to-Stand: Minimal assistance (performs 75% or more of task)  Transfer Supine-to-Sit: Minimal assistance (performs 75% or more of task)  Total Score: 19      Early Mobility/Exercise Safety Screen: Proceed with mobilization - No exclusion criteria met  ICU Mobility Scale: Marching on spot (at bedside) [6]  E = Exercise and Early Mobility  Early Mobility/Exercise Safety Screen: Proceed with mobilization - No exclusion criteria met  ICU Mobility Scale: Marching on spot (at bedside)    Encounter Problems       Encounter Problems (Active)       PT Problem       Patient will complete bed mobility with independence        Start:  11/21/24    Expected End:  12/12/24            Patient will complete STS with independence using No Device without acute LOB         Start:  11/21/24    Expected End:  12/12/24            Patient will ambulate >/=100' with No Device with independence without acute LOB        Start:  11/21/24    Expected End:  12/12/24            Patient will ascend/descend 10 steps with x1 handrail and independence without acute LOB, taking </=1 stand rest break.          Start:  11/21/24    Expected End:  12/12/24            Patient will independently complete standing dynamic balance activities without acute LOB, while maintaining midline posture.        Start:  11/21/24    Expected End:  12/12/24               Pain - Adult              Education Documentation  Mobility Training, taught by Danae Up, PT at 11/21/2024 11:51 AM.  Learner: Patient  Readiness:  Acceptance  Method: Explanation  Response: Needs Reinforcement  Comment: encouraged OOB to chair/bathroom with staff assist, encouraged increase mobility throughout hospital stay    Education Comments  No comments found.      Danae Up, PT, DPT

## 2024-11-22 VITALS
SYSTOLIC BLOOD PRESSURE: 108 MMHG | WEIGHT: 158.95 LBS | TEMPERATURE: 97.2 F | BODY MASS INDEX: 26.48 KG/M2 | DIASTOLIC BLOOD PRESSURE: 70 MMHG | OXYGEN SATURATION: 95 % | HEART RATE: 97 BPM | HEIGHT: 65 IN | RESPIRATION RATE: 20 BRPM

## 2024-11-22 LAB
ALBUMIN SERPL BCP-MCNC: 3.1 G/DL (ref 3.4–5)
ALP SERPL-CCNC: 68 U/L (ref 33–136)
ALT SERPL W P-5'-P-CCNC: 7 U/L (ref 7–45)
ANION GAP SERPL CALC-SCNC: 12 MMOL/L (ref 10–20)
AST SERPL W P-5'-P-CCNC: 8 U/L (ref 9–39)
BASOPHILS # BLD AUTO: 0.01 X10*3/UL (ref 0–0.1)
BASOPHILS NFR BLD AUTO: 0.2 %
BILIRUB SERPL-MCNC: 0.7 MG/DL (ref 0–1.2)
BUN SERPL-MCNC: 11 MG/DL (ref 6–23)
CALCIUM SERPL-MCNC: 7.5 MG/DL (ref 8.6–10.6)
CHLORIDE SERPL-SCNC: 110 MMOL/L (ref 98–107)
CO2 SERPL-SCNC: 21 MMOL/L (ref 21–32)
CREAT SERPL-MCNC: 0.9 MG/DL (ref 0.5–1.05)
EGFRCR SERPLBLD CKD-EPI 2021: 72 ML/MIN/1.73M*2
EOSINOPHIL # BLD AUTO: 0 X10*3/UL (ref 0–0.7)
EOSINOPHIL NFR BLD AUTO: 0 %
ERYTHROCYTE [DISTWIDTH] IN BLOOD BY AUTOMATED COUNT: 17.6 % (ref 11.5–14.5)
FENTANYL UR CFM-MCNC: <2.5 NG/ML
GLUCOSE SERPL-MCNC: 86 MG/DL (ref 74–99)
HCT VFR BLD AUTO: 32 % (ref 36–46)
HGB BLD-MCNC: 9.6 G/DL (ref 12–16)
IMM GRANULOCYTES # BLD AUTO: 0.05 X10*3/UL (ref 0–0.7)
IMM GRANULOCYTES NFR BLD AUTO: 1.1 % (ref 0–0.9)
LYMPHOCYTES # BLD AUTO: 0.69 X10*3/UL (ref 1.2–4.8)
LYMPHOCYTES NFR BLD AUTO: 15 %
MAGNESIUM SERPL-MCNC: 1.51 MG/DL (ref 1.6–2.4)
MCH RBC QN AUTO: 23.5 PG (ref 26–34)
MCHC RBC AUTO-ENTMCNC: 30 G/DL (ref 32–36)
MCV RBC AUTO: 78 FL (ref 80–100)
MONOCYTES # BLD AUTO: 0.25 X10*3/UL (ref 0.1–1)
MONOCYTES NFR BLD AUTO: 5.4 %
NEUTROPHILS # BLD AUTO: 3.61 X10*3/UL (ref 1.2–7.7)
NEUTROPHILS NFR BLD AUTO: 78.3 %
NORFENTANYL UR CFM-MCNC: NORMAL NG/ML
NRBC BLD-RTO: 0 /100 WBCS (ref 0–0)
PLATELET # BLD AUTO: 174 X10*3/UL (ref 150–450)
POTASSIUM SERPL-SCNC: 4.3 MMOL/L (ref 3.5–5.3)
PROT SERPL-MCNC: 5.5 G/DL (ref 6.4–8.2)
RBC # BLD AUTO: 4.09 X10*6/UL (ref 4–5.2)
SODIUM SERPL-SCNC: 139 MMOL/L (ref 136–145)
WBC # BLD AUTO: 4.6 X10*3/UL (ref 4.4–11.3)

## 2024-11-22 PROCEDURE — 2500000002 HC RX 250 W HCPCS SELF ADMINISTERED DRUGS (ALT 637 FOR MEDICARE OP, ALT 636 FOR OP/ED)

## 2024-11-22 PROCEDURE — 84075 ASSAY ALKALINE PHOSPHATASE: CPT

## 2024-11-22 PROCEDURE — 2500000004 HC RX 250 GENERAL PHARMACY W/ HCPCS (ALT 636 FOR OP/ED)

## 2024-11-22 PROCEDURE — 96366 THER/PROPH/DIAG IV INF ADDON: CPT

## 2024-11-22 PROCEDURE — 94640 AIRWAY INHALATION TREATMENT: CPT

## 2024-11-22 PROCEDURE — G0378 HOSPITAL OBSERVATION PER HR: HCPCS

## 2024-11-22 PROCEDURE — 2500000001 HC RX 250 WO HCPCS SELF ADMINISTERED DRUGS (ALT 637 FOR MEDICARE OP)

## 2024-11-22 PROCEDURE — 99238 HOSP IP/OBS DSCHRG MGMT 30/<: CPT

## 2024-11-22 PROCEDURE — 97165 OT EVAL LOW COMPLEX 30 MIN: CPT | Mod: GO

## 2024-11-22 PROCEDURE — 96375 TX/PRO/DX INJ NEW DRUG ADDON: CPT

## 2024-11-22 PROCEDURE — 85025 COMPLETE CBC W/AUTO DIFF WBC: CPT

## 2024-11-22 PROCEDURE — 96367 TX/PROPH/DG ADDL SEQ IV INF: CPT

## 2024-11-22 PROCEDURE — 2500000004 HC RX 250 GENERAL PHARMACY W/ HCPCS (ALT 636 FOR OP/ED): Performed by: STUDENT IN AN ORGANIZED HEALTH CARE EDUCATION/TRAINING PROGRAM

## 2024-11-22 PROCEDURE — 83735 ASSAY OF MAGNESIUM: CPT

## 2024-11-22 RX ORDER — ALBUTEROL SULFATE 0.83 MG/ML
2.5 SOLUTION RESPIRATORY (INHALATION) ONCE
Status: COMPLETED | OUTPATIENT
Start: 2024-11-22 | End: 2024-11-22

## 2024-11-22 RX ORDER — MACITENTAN 10 MG/1
10 TABLET, FILM COATED ORAL DAILY
Qty: 30 TABLET | Refills: 0 | Status: SHIPPED | OUTPATIENT
Start: 2024-11-22 | End: 2024-12-22

## 2024-11-22 RX ORDER — MAGNESIUM SULFATE HEPTAHYDRATE 40 MG/ML
2 INJECTION, SOLUTION INTRAVENOUS ONCE
Status: COMPLETED | OUTPATIENT
Start: 2024-11-22 | End: 2024-11-22

## 2024-11-22 RX ORDER — SOTATERCEPT-CSRK 60 MG
1 KIT SUBCUTANEOUS
Qty: 3 KIT | Refills: 0 | Status: SHIPPED | OUTPATIENT
Start: 2024-11-22

## 2024-11-22 ASSESSMENT — COGNITIVE AND FUNCTIONAL STATUS - GENERAL
MOBILITY SCORE: 23
DAILY ACTIVITIY SCORE: 24
CLIMB 3 TO 5 STEPS WITH RAILING: A LITTLE
DRESSING REGULAR UPPER BODY CLOTHING: A LITTLE
DAILY ACTIVITIY SCORE: 24
DAILY ACTIVITIY SCORE: 19
DRESSING REGULAR LOWER BODY CLOTHING: A LITTLE
HELP NEEDED FOR BATHING: A LITTLE
PERSONAL GROOMING: A LITTLE
CLIMB 3 TO 5 STEPS WITH RAILING: A LITTLE
TOILETING: A LITTLE
MOBILITY SCORE: 23

## 2024-11-22 ASSESSMENT — PAIN SCALES - GENERAL
PAINLEVEL_OUTOF10: 0 - NO PAIN
PAINLEVEL_OUTOF10: 0 - NO PAIN

## 2024-11-22 ASSESSMENT — PAIN - FUNCTIONAL ASSESSMENT
PAIN_FUNCTIONAL_ASSESSMENT: 0-10

## 2024-11-22 ASSESSMENT — ACTIVITIES OF DAILY LIVING (ADL)
ADL_ASSISTANCE: INDEPENDENT
BATHING_ASSISTANCE: MINIMAL

## 2024-11-22 NOTE — SIGNIFICANT EVENT
Rapid Response Nurse Note: RADAR alert: 7    Pager time: 501  Arrival time: 502  Event end time: 505  Location: T5-31  [x] Triage by phone or secure messaging    Rapid response initiated by:  [] Rapid response RN [] Family [] Nursing Supervisor [] Physician   [x] RADAR auto page [] Sepsis auto-page [] RN [] RT   [] NP/PA [] Other:     Primary reason for call:   [] BAT [] New CPAP/BiPAP [] Bleeding [] Change in mental status   [] Chest pain [] Code blue [] FiO2 >/= 50% [] HR </= 40 bpm   [] HR >/= 130 bpm [] Hyperglycemia [] Hypoglycemia [x] RADAR    [] RR </= 8 bpm [] RR >/= 30 bpm [] SBP </= 90 mmHg [] SpO2 < 90%   [] Seizure [] Sepsis [] Shortness of breath  [] Staff concern: see comments     Initial VS and/or RADAR VS: T 36.4 °C; HR 92; RR 23; /72; SPO2 94%.    Providers present at bedside (if applicable):     Name of ICU Provider contacted (if applicable):     Interventions:  [x] None [] ABG/VBG [] Assist w/ICU transfer [] BAT paged    [] Bag mask [] Blood [] Cardioversion [] Code Blue   [] Code blue for intubation [] Code status changed [] Chest x-ray [] EKG   [] IV fluid/bolus [] KUB x-ray [] Labs/cultures [] Medication   [] Nebulizer treatment [] NIPPV (CPAP/BiPAP) [] Oxygen [] Oral airway   [] Peripheral IV [] Palliative care consult [] CT/MRI [] Sepsis protocol    [] Suctioned [] Other:     Outcome:  [] Coded and  [] Code blue for intubation [] Coded and transferred to ICU []  on division   [x] Remained on division (no change) [] Remained on division + additional monitoring [] Remained in ED [] Transferred to ED   [] Transferred to ICU [] Transferred to inpatient status [] Transferred for interventions (procedure) [] Transferred to ICU stepdown    [] Transferred to surgery [] Transferred to telemetry [] Sepsis protocol [] STEMI protocol   [] Stroke protocol [x] Bedside nurse instructed to page rapid response for any concerns or acute change in condition/VS     Additional Comments:   Bedside Rn has no concerns at this time.

## 2024-11-22 NOTE — SIGNIFICANT EVENT
Rapid Response Nurse Note: RADAR alert: 7    Pager time:   Arrival time:   Event end time:   Location:   [] Triage by phone or secure messaging    Rapid response initiated by:  [] Rapid response RN [] Family [] Nursing Supervisor [] Physician   [x] RADAR auto page [] Sepsis auto-page [] RN [] RT   [] NP/PA [] Other:     Primary reason for call:   [] BAT [] New CPAP/BiPAP [] Bleeding [] Change in mental status   [] Chest pain [] Code blue [] FiO2 >/= 50% [] HR </= 40 bpm   [] HR >/= 130 bpm [] Hyperglycemia [] Hypoglycemia [x] RADAR    [] RR </= 8 bpm [] RR >/= 30 bpm [] SBP </= 90 mmHg [] SpO2 < 90%   [] Seizure [] Sepsis [] Shortness of breath  [] Staff concern: see comments     Initial VS and/or RADAR VS: T 36.2 °C; ; RR 22; /73; SPO2 92%.    Providers present at bedside (if applicable): NA    Name of ICU Provider contacted (if applicable): NA    Interventions:  [x] None [] ABG/VBG [] Assist w/ICU transfer [] BAT paged    [] Bag mask [] Blood [] Cardioversion [] Code Blue   [] Code blue for intubation [] Code status changed [] Chest x-ray [] EKG   [] IV fluid/bolus [] KUB x-ray [] Labs/cultures [] Medication   [] Nebulizer treatment [] NIPPV (CPAP/BiPAP) [] Oxygen [] Oral airway   [] Peripheral IV [] Palliative care consult [] CT/MRI [] Sepsis protocol    [] Suctioned [] Other:     Outcome:  [] Coded and  [] Code blue for intubation [] Coded and transferred to ICU []  on division   [x] Remained on division (no change) [] Remained on division + additional monitoring [] Remained in ED [] Transferred to ED   [] Transferred to ICU [] Transferred to inpatient status [] Transferred for interventions (procedure) [] Transferred to ICU stepdown    [] Transferred to surgery [] Transferred to telemetry [] Sepsis protocol [] STEMI protocol   [] Stroke protocol [x] Bedside nurse instructed to page rapid response for any concerns or acute change in condition/VS     Additional Comments:    Reviewed above VS with bedside RN.  VS within patient's current trends.  No interventions by rapid response team indicated at this time.  Staff to page rapid response for any concerns or acute change in condition/VS. Yadiel Adames RN.

## 2024-11-22 NOTE — PROGRESS NOTES
Ronna Beckham is a 64 y.o. female on day 1 of admission presenting with Supraventricular tachycardia (CMS-HCC).      Subjective   Patient reports that she is feeling much better. She continues to have intermittent cough which is her baseline. Denies any SOB, chest pain or palpitations  No other concerns and is looking forward to discharge.         Objective     Last Recorded Vitals  /74 (BP Location: Left arm, Patient Position: Lying)   Pulse 92   Temp 36.4 °C (97.5 °F) (Temporal)   Resp 23   Wt 72.1 kg (158 lb 15.2 oz)   SpO2 92%   Intake/Output last 3 Shifts:    Intake/Output Summary (Last 24 hours) at 11/22/2024 1115  Last data filed at 11/22/2024 0457  Gross per 24 hour   Intake 516.34 ml   Output 900 ml   Net -383.66 ml       Admission Weight  Weight: 73.4 kg (161 lb 13.1 oz) (11/21/24 0114)    Daily Weight  11/22/24 : 72.1 kg (158 lb 15.2 oz)    Image Results      Physical Exam  General: no acute distress, alert, conversant  Eyes: no scleral icterus, EOMI  Cardiovascular:  regular rate and rhythm , murmur present  Respiratory: Montez catheter present over left side, no coughing/rhonchi/wheezing  GI: bowel sounds present, soft, nontender, distended  Extremities: RLE edema>LLE edema  Neuro: AOx4, 4+/5 symmetric  strength, moving all extremities spontaneously  Skin: brusing on arm      Relevant Results    Results for orders placed or performed during the hospital encounter of 11/21/24 (from the past 24 hours)   Comprehensive metabolic panel   Result Value Ref Range    Glucose 86 74 - 99 mg/dL    Sodium 139 136 - 145 mmol/L    Potassium 4.3 3.5 - 5.3 mmol/L    Chloride 110 (H) 98 - 107 mmol/L    Bicarbonate 21 21 - 32 mmol/L    Anion Gap 12 10 - 20 mmol/L    Urea Nitrogen 11 6 - 23 mg/dL    Creatinine 0.90 0.50 - 1.05 mg/dL    eGFR 72 >60 mL/min/1.73m*2    Calcium 7.5 (L) 8.6 - 10.6 mg/dL    Albumin 3.1 (L) 3.4 - 5.0 g/dL    Alkaline Phosphatase 68 33 - 136 U/L    Total Protein 5.5 (L) 6.4 - 8.2  g/dL    AST 8 (L) 9 - 39 U/L    Bilirubin, Total 0.7 0.0 - 1.2 mg/dL    ALT 7 7 - 45 U/L   CBC and Auto Differential   Result Value Ref Range    WBC 4.6 4.4 - 11.3 x10*3/uL    nRBC 0.0 0.0 - 0.0 /100 WBCs    RBC 4.09 4.00 - 5.20 x10*6/uL    Hemoglobin 9.6 (L) 12.0 - 16.0 g/dL    Hematocrit 32.0 (L) 36.0 - 46.0 %    MCV 78 (L) 80 - 100 fL    MCH 23.5 (L) 26.0 - 34.0 pg    MCHC 30.0 (L) 32.0 - 36.0 g/dL    RDW 17.6 (H) 11.5 - 14.5 %    Platelets 174 150 - 450 x10*3/uL    Neutrophils % 78.3 40.0 - 80.0 %    Immature Granulocytes %, Automated 1.1 (H) 0.0 - 0.9 %    Lymphocytes % 15.0 13.0 - 44.0 %    Monocytes % 5.4 2.0 - 10.0 %    Eosinophils % 0.0 0.0 - 6.0 %    Basophils % 0.2 0.0 - 2.0 %    Neutrophils Absolute 3.61 1.20 - 7.70 x10*3/uL    Immature Granulocytes Absolute, Automated 0.05 0.00 - 0.70 x10*3/uL    Lymphocytes Absolute 0.69 (L) 1.20 - 4.80 x10*3/uL    Monocytes Absolute 0.25 0.10 - 1.00 x10*3/uL    Eosinophils Absolute 0.00 0.00 - 0.70 x10*3/uL    Basophils Absolute 0.01 0.00 - 0.10 x10*3/uL   Magnesium   Result Value Ref Range    Magnesium 1.51 (L) 1.60 - 2.40 mg/dL     *Note: Due to a large number of results and/or encounters for the requested time period, some results have not been displayed. A complete set of results can be found in Results Review.        Scheduled medications  aspirin, 81 mg, oral, Daily  calcium carbonate-vitamin D3, 2 tablet, oral, BID  cholestyramine, 1 packet, oral, Daily  dilTIAZem CD, 180 mg, oral, Daily  enoxaparin, 40 mg, subcutaneous, q24h  folic acid, 1 mg, oral, Daily  gabapentin, 800 mg, oral, TID  hydroxychloroquine, 200 mg, oral, BID  macitentan, 10 mg, oral, Daily  magnesium chloride, 64 mg, oral, BID  magnesium sulfate, 2 g, intravenous, Once  melatonin, 5 mg, oral, Nightly  pantoprazole, 40 mg, oral, Daily before breakfast  potassium chloride CR, 20 mEq, oral, BID  venlafaxine XR, 150 mg, oral, Daily      Continuous medications  epoprostenol, 72 ng/kg/min  (Order-Specific), Last Rate: 72 ng/kg/min (11/22/24 0558)  epoprostenol, 72 ng/kg/min (Order-Specific)      PRN medications       Assessment & Plan  Supraventricular tachycardia (CMS-[facility])  Ronna Beckham is a 64 y.o. female w/PMHx pulm HTN (on macitentan and epoprostenol via Montez, NYHA Functional Class 3 and WHO Group 1), chronic hypoxic respiratory failure (baseline 4L NC), polysubstance use (ethanol, cocaine, amphetamines), SVT, SLE (on hydroxychloroquine), GERD, HARRIS, MDD presenting with shortness of breath and feelings of doom. Noted to have SVT with rates to 200s in the ED, received adenosine x2 with no improvement in HR. Underwent synchronized cardioversion at 50J with improvement to sinus tachycardia, she was then transferred to the MICU.  In MICU, she has been taken off diltiazem drip and resumed on home PO diltiazem, given resolution of SVT.  Patient transferred to floor on 11/21         Update 11/22   -Anticipate discharge today with PCP follow up      #Recurrent SVT  #Sinus tach  :: history of recurrent SVT, usually in the setting of dilt non-compliance  :: Imaging with PE negative  :: on 11/21 morning HR was in the low 100s S/P metop 5 mg and ECG showed sinus rhythm   -stop dilt drip  -resume home diltiazem 180mg daily  -tele  -aggressive repletion of electrolytes, K>4, Mg>2  -negative TSH, Utox     #Acute on chronic hypoxic respiratory failure  #PAH  ::follows with Dr. Prescott  ::baseline 4L NC, on presentation to MICU on 5L  ::CTPE with mild interstitial edema, without PE, no concern for PNA  ::currently on 5L NC  ::home lasix 60mg daily   Plan:  -strict I&O  -c/w home macitentan 10mg daily and epoprostenol  -goal O2 sat > 90%  -hold on diuresis until electrolytes repleted     #Moderate pericardial effusion  ::noted on CTA, small to moderate effusion noted on TTE 4/2024  ::10/2024 TTE with unchanged pericardial effusion from 04/2024  -unchanged on CT 11/20/2024     #Hypomagnesesmia  #Secondary  hypocalcemia  ::initial Mg level 0.51 at OSH ED, repleted with IV Mg and level on arrival to  2.07  ::ddx for hypoMg: diarrhea iso Mg supplementation vs diuretic use  -repleting magnesium and calcium  -c/w Mg 64 mg BID, will not increase to 400 mg BID as rec'd by OSH cardiology due to diarrhea  -c/w home calcium-D3 500-5mcg  -needs GI FU/workup for diarrhea as outpt, will hold imodium given borderline prolonged QTc but will restart home cholestyramine 4g packet iso known cholecystectomy  -ethanol <10     #NSTEMI  -c/w home ASA 81mg   -consider starting statin     #Prolonged QTc  -EKG with borderline prolonged QTc at 495 ms (though with RBBB)  -avoid QT prolonging agents, will hold Imodium     #Anemia  #Thrombocytopenia  -c/w folate     #Polysusbtance Use  ::UDS 10/23 negative  -repeat UDS also negative     #SLE  -c/w home hydroxychloroquine 200mg BID     #HARRIS  #MDD  -c/w home gabapentin 800 TID, venlafaxine 150mg daily  -patient denies taking topamax     F: diuresis  E: K>4, Mg>2  N: cardiac diet, 2L fluid restriction  A: PIV     DVT PPx: lovenox 40  GI PPx: home PPI           Eddy May MD  Internal Medicine PGY-1

## 2024-11-22 NOTE — PROGRESS NOTES
Occupational Therapy    Evaluation    Patient Name: Ronna Beckham  MRN: [id number]  Today's Date: 11/22/2024  Room: [address] [place] [address]  Time Calculation  Start Time: 1158  Stop Time: 1220  Time Calculation (min): 22 min    Assessment  IP OT Assessment  OT Assessment: pt motivated to engage in therapy session. Pt demo good tolerance to engage in ADL tasks and functional transfers/mobility w SBA-CGA for safety. Pt expressed concerns w condition getting worse and reports her daughter will be available to assist if needed. Pt limited by faitgue, decreased endurance, strength, activity tolerance, and balance. Pt would benefit from continued OT to address these deficits.  Prognosis: Good  Barriers to Discharge: None  Evaluation/Treatment Tolerance: Patient limited by fatigue  Medical Staff Made Aware: Yes  End of Session Communication: Bedside nurse  End of Session Patient Position: Bed, 3 rail up, Alarm off, not on at start of session  Plan:  Inpatient Plan  Treatment Interventions: ADL retraining, Functional transfer training, UE strengthening/ROM, Endurance training, Patient/family training, Equipment evaluation/education, Compensatory technique education  OT Frequency: 2 times per week  OT Discharge Recommendations: Low intensity level of continued care  Equipment Recommended upon Discharge:  (shower chair)  OT Recommended Transfer Status: Assist of 1  OT - OK to Discharge: Yes  OT Assessment  OT Assessment Results: Decreased ADL status, Decreased endurance, Decreased functional mobility, Decreased gross motor control, Decreased IADLs  Prognosis: Good  Barriers to Discharge: None  Evaluation/Treatment Tolerance: Patient limited by fatigue  Medical Staff Made Aware: Yes  Strengths: Attitude of self  Barriers to Participation: Comorbidities    Subjective   Current Problem:  1. Supraventricular tachycardia (CMS-HCC)        2. Pulmonary HTN (Multi)  sotatercept-csrk (Winrevair) 60 mg kit      3. Pulmonary hypertension (Multi)   "Opsumit 10 mg tablet tablet        General:  Reason for Referral: presents with SOB and \"feeling of doom\"; CT PE (-)  Past Medical History Relevant to Rehab: pulm HTN (on macitentan and epoprostenol via Montez, NYHA Functional Class 3 and WHO Group 1), chronic hypoxic respiratory failure (baseline 4L NC), polysubstance use (ethanol, cocaine, amphetamines), SVT, SLE (on hydroxychloroquine), GERD, HARRIS, MDD; denied falls/6 months  Prior to Session Communication: Bedside nurse  Patient Position Received: Bed, 3 rail up, Alarm off, not on at start of session  Family/Caregiver Present: No  General Comment: Pt awake and alert, supine in bed upon OT arrival. Pt was pleasant and agreeable to participate in therapy session. Per communication  w RN and pt report, pt received bad prognosis regarding condition this AM. Pt still open to receiving OT services at Low intensity.   Precautions:  Hearing/Visual Limitations: hearing appears WFL, reading glasses  Medical Precautions: Fall precautions, Oxygen therapy device and L/min (4L)  Precautions Comment: SpO2 >90%  Vital Signs:  Vital Signs (Past 2hrs)        Date/Time Vitals Session Patient Position Pulse Resp SpO2 BP MAP (mmHg)    11/22/24 1127 --  --  99  18  93 %  109/73  85     11/22/24 1158 During OT  --  106  22  --  --  --                   Pain:  Pain Assessment  Pain Assessment: 0-10  0-10 (Numeric) Pain Score: 0 - No pain  Response to Interventions:  (best at rest)  Lines/Tubes/Drains:  CVC 04/23/24 Single lumen Tunneled Left Subclavian (Active)   Number of days: 212         Objective   Cognition:  Overall Cognitive Status: Within Functional Limits  Arousal/Alertness: Appropriate responses to stimuli  Orientation Level: Oriented X4  Following Commands: Follows all commands and directions without difficulty           Home Living:  Type of Home: Condo  Lives With: Adult children, Grandchildren  Home Adaptive Equipment: None  Home Layout: One level  Bathroom Shower/Tub: " "Walk-in shower  Bathroom Toilet: Standard  Bathroom Equipment: None  Home Living Comments: pt reports after d/c she will be living in a condo w her daughter who will be available to assist w ADLs/IADLs. Pt reports the condo has a walk in shower, no DME. Pt reports prior to admission she was IND w ADLs but \"was mostly bed bound.\" Pt reports \"I don't really walk anywhere and if I do I use a scooter that the store has. I walk to the bathroom.\" Pt reports that her daughter works but if able to get paid, she will be the one assisting in taking care of her.  Pt reports her ex  will complete IADLs.   Prior Function:  Level of [place]: Independent with ADLs and functional transfers, Independent with homemaking with ambulation  Receives Help From: Family (daughter)  ADL Assistance: Independent  Homemaking Assistance: Needs assistance (pt reports family completes)  Ambulatory Assistance:  (pt reports \"I don't really walk anywhere and if I do I use the scooter at the store.\" Pt reports she is able to walk short distances)  Vocational: Unemployed  Leisure: TV  Hand Dominance: Left  Prior Function Comments: Pt reports IND for ADLs, family completed IADLs. Pt expressed concern w daughter getting paid to take care of her-directed questions to TCC.  IADL History:  Homemaking Responsibilities: No (pt reports ex  helps complete IADLs)  Occupation: Unemployed  Leisure and Hobbies: TV  ADL:  Eating Assistance: Independent  Grooming Assistance: Stand by  Grooming Deficit: Supervision/safety  Bathing Assistance: Minimal (anticipated)  Bathing Deficit: Increased time to complete , Supervision/safety  UE Dressing Assistance: Minimal (anticipated)  UE Dressing Deficit: Supervision/safety, Increased time to complete  LE Dressing Assistance: Minimal (anticipated)  LE Dressing Deficit: Supervision/safety, Increased time to complete  Toileting Assistance with Device: Stand by  Toileting Deficit: Supervison/safety, Increased " time to complete  Activity Tolerance:  Endurance: Tolerates 10 - 20 min exercise with multiple rests  Balance:  Dynamic Standing Balance  Dynamic Standing-Balance Support: No upper extremity supported  Dynamic Standing-Level of Assistance: Close supervision  Bed Mobility/Transfers: Bed Mobility  Bed Mobility: Yes  Bed Mobility 1  Bed Mobility 1: Supine to sitting, Sitting to supine  Level of Assistance 1: Close supervision  Bed Mobility Comments 1: HOB elevated  Functional Mobility  Functional Mobility Performed: Yes  Functional Mobility 1  Surface 1: Level tile  Assistance 1: Close supervision  Comments 1: pt completed functional mobility from bed <>bathroom in room w/o device and SBA for safety.   and Transfers  Transfer: Yes  Transfer 1  Transfer From 1: Sit to, Stand to  Transfer to 1: Stand, Sit  Technique 1: Sit to stand, Stand to sit  Transfer Level of Assistance 1: Contact guard, Close supervision  Trials/Comments 1: x2 trials; 1st trial from bed w HOB elevated, 2nd trial from toilet w use of grab bars  IADL's:   Homemaking Responsibilities: No (pt reports ex  helps complete IADLs)  Occupation: Unemployed  Leisure and Hobbies: TV  Vision: Vision - Basic Assessment  Current Vision: Wears glasses only for reading     Sensation:  Light Touch: No apparent deficits  Strength:  Strength Comments: BUE WFL  Perception:  Inattention/Neglect: Appears intact  Coordination:  Movements are Fluid and Coordinated: Yes   Hand Function:  Hand Function  Gross Grasp: Functional  Coordination: Functional  Extremities: RUE   RUE : Within Functional Limits (when grossly assessed), LUE   LUE: Within Functional Limits (when grossly assessed),  , and      Outcome Measures: [facility] Daily Activity  Putting on and taking off regular lower body clothing: A little  Bathing (including washing, rinsing, drying): A little  Putting on and taking off regular upper body clothing: A little  Toileting, which includes using toilet, bedpan or  urinal: A little  Taking care of personal grooming such as brushing teeth: A little  Eating Meals: None  Daily Activity - Total Score: 19         ,     OT Adult Other Outcome Measures  4AT: negative    Education Documentation  Body Mechanics, taught by Mahsa Mckinney OT at 11/22/2024 12:57 PM.  Learner: Patient  Readiness: Acceptance  Method: Explanation  Response: Verbalizes Understanding    Precautions, taught by Mahsa Mckinney OT at 11/22/2024 12:57 PM.  Learner: Patient  Readiness: Acceptance  Method: Explanation  Response: Verbalizes Understanding    ADL Training, taught by Mahsa Mckinney OT at 11/22/2024 12:57 PM.  Learner: Patient  Readiness: Acceptance  Method: Explanation  Response: Verbalizes Understanding    Education Comments  No comments found.        Goals:     Encounter Problems       Encounter Problems (Active)       ADLs       Patient with complete upper body dressing with independent level of assistance donning and doffing all UE clothes with PRN adaptive equipment while edge of bed. (Progressing)       Start:  11/22/24    Expected End:  12/13/24            Patient with complete lower body dressing with modified independent level of assistance donning and doffing all LE clothes  with PRN adaptive equipment while edge of bed. (Progressing)       Start:  11/22/24    Expected End:  12/13/24            Patient will complete toileting including hygiene clothing management/hygiene with independent level of assistance and raised toilet seat. (Progressing)       Start:  11/22/24    Expected End:  12/13/24               BALANCE       Patient will tolerate standing for 8 minutes to modified independent level of assistance with least restrictive device in order to improve functional activity tolerance for ADL tasks. (Progressing)       Start:  11/22/24    Expected End:  12/13/24               MOBILITY       Patient will perform Functional mobility max Household distances/Community Distances with modified  independent level of assistance and least restrictive device in order to improve safety and functional mobility. (Progressing)       Start:  11/22/24    Expected End:  12/13/24 11/22/24 at 12:59 PM   SVEN SAM, OT   Rehab Office: [phone]

## 2024-11-22 NOTE — SIGNIFICANT EVENT
Rapid Response Nurse Note: RADAR alert: 6    Pager time: 1144  Arrival time: 1215  Event end time: 1220  Location: T5  [] Triage by phone or secure messaging    Rapid response initiated by:  [] Rapid response RN [] Family [] Nursing Supervisor [] Physician   [x] RADAR auto page [] Sepsis auto-page [] RN [] RT   [] NP/PA [] Other:     Primary reason for call:   [] BAT [] New CPAP/BiPAP [] Bleeding [] Change in mental status   [] Chest pain [] Code blue [] FiO2 >/= 50% [] HR </= 40 bpm   [] HR >/= 130 bpm [] Hyperglycemia [] Hypoglycemia [x] RADAR    [] RR </= 8 bpm [] RR >/= 30 bpm [] SBP </= 90 mmHg [] SpO2 < 90%   [] Seizure [] Sepsis [] Shortness of breath  [] Staff concern: see comments     Initial VS and/or RADAR VS: T 36.2 °C; HR 99; RR 18; /73; SPO2 93%.    Providers present at bedside (if applicable):     Name of ICU Provider contacted (if applicable):     Interventions:  [] None [] ABG/VBG [] Assist w/ICU transfer [] BAT paged    [] Bag mask [] Blood [] Cardioversion [] Code Blue   [] Code blue for intubation [] Code status changed [] Chest x-ray [] EKG   [] IV fluid/bolus [] KUB x-ray [] Labs/cultures [] Medication   [] Nebulizer treatment [] NIPPV (CPAP/BiPAP) [] Oxygen [] Oral airway   [] Peripheral IV [] Palliative care consult [] CT/MRI [] Sepsis protocol    [] Suctioned [] Other:     Outcome:  [] Coded and  [] Code blue for intubation [] Coded and transferred to ICU []  on division   [x] Remained on division (no change) [] Remained on division + additional monitoring [] Remained in ED [] Transferred to ED   [] Transferred to ICU [] Transferred to inpatient status [] Transferred for interventions (procedure) [] Transferred to ICU stepdown    [] Transferred to surgery [] Transferred to telemetry [] Sepsis protocol [] STEMI protocol   [] Stroke protocol [x] Bedside nurse instructed to page rapid response for any concerns or acute change in condition/VS     Additional Comments: RADAR  alert- VS appear consistent with hospitalization. Upon arrival pt getting back into bed after ambulating to the bathroom- no complaints. Updated with bedside nurse and no immediate concerns-pt may be discharged home today.

## 2024-11-22 NOTE — PROGRESS NOTES
This patient was previously admitted within the past 30 days. Team has been made aware of patients readmission status.   Readmission with a Dx of Supraventricular tachycardia. Was discharge with a Dx of Acute and chronic respiratory failure with hypoxia.

## 2024-11-22 NOTE — NURSING NOTE
Discharge Nursing Note:  Discharge instructions including medications, appointments and other instructions reviewed with patient.  Patient taken off of telemetry and (2) IV's removed with catheter tips intact.  Patient switched over to Home Veletri CADD Pump for discharge.  Patient discharged to home with no home care needs, currently awaiting daughter for transportation home.

## 2024-11-22 NOTE — DISCHARGE INSTRUCTIONS
Dear Ms. Beckham,    You were admitted to [facility] after you came to the emergency department with shortness of breath. An EKG in the emergency department revealed that you were experiencing supraventricular tachycardia, a condition where the heart beats abnormally fast. You were admitted initially to the ICU, where you had a cardioversion procedure and were given medication to bring you back to a normal heart rate. After you were considered stable in the ICU, you were transferred to the regular floor, where you remained stable.     On discharge, please continue taking all medication as prescribed. Additionally, please follow up with your PCP for management and continued monitoring of your health concerns.    Thank you and take care,  [facility] Pulmonology Team

## 2024-11-22 NOTE — CARE PLAN
The patient's goals for the shift include Patient will not have any runs of VT    The clinical goals for the shift include Patient will not have any runs of VT    Over the shift, the patient did not have any runs of SVT or VT and remained in SR.  She reports 0/10 pain and continues with ALAMO on 4L/NC.  She had some exp wheeze today and albuteral tx given with good results, clear and dim post treatment.  Patient switched to Veletri home CADD cartridge.  Patient remained safe free from falls and injury this shift.  Patient to be discharged to home.  Patient awaiting daughter for transportation home.      Problem: Skin  Goal: Participates in plan/prevention/treatment measures  Outcome: Progressing  Goal: Prevent/manage excess moisture  Outcome: Progressing  Goal: Prevent/minimize sheer/friction injuries  Outcome: Progressing     Problem: Pain - Adult  Goal: Verbalizes/displays adequate comfort level or baseline comfort level  Outcome: Progressing     Problem: Safety - Adult  Goal: Free from fall injury  Outcome: Progressing

## 2024-11-23 NOTE — DISCHARGE SUMMARY
Discharge Diagnosis  Supraventricular tachycardia (CMS-HCC)    Issues Requiring Follow-Up  -Follow up with PCP re: chronic diarrhea    Discharge Meds     Medication List      CHANGE how you take these medications     Opsumit 10 mg tablet tablet; Generic drug: macitentan; Take 1 tablet (10   mg) by mouth once daily. Meds tob eds LT 5031; What changed: See the new   instructions.   Winrevair 60 mg kit; Generic drug: sotatercept-csrk; Inject 1 Dose under   the skin every 21 (twenty-one) days.; What changed: medication strength,   how much to take     CONTINUE taking these medications     aspirin 81 mg EC tablet; Take 1 tablet (81 mg) by mouth once daily.   calcium carbonate-vitamin D3 500 mg-5 mcg (200 unit) tablet; Take 2   tablets by mouth 2 times a day.   cholestyramine 4 gram packet; Commonly known as: Questran; Take 1 packet   (4 g) by mouth once daily.   dilTIAZem  mg 24 hr capsule; Commonly known as: Cardizem CD; Take   1 capsule (180 mg) by mouth once daily.   epoprostenol 1.5 mg recon soln; Commonly known as: Veletri; 1.5 mg   (1,500 mcg) by central venous line infusion route continuously.   Reconstitute contents of vial with 5 ml diluent and mix cassette as   directed. Infuse continuously per physician orders. Allow for priming   volume. Dose range: 1-150 ng/kg/min.   ergocalciferol 1.25 MG (25251 UT) capsule; Commonly known as: Vitamin   D-2   folic acid 1 mg tablet; Commonly known as: Folvite   furosemide 40 mg tablet; Commonly known as: Lasix; Take 1.5 tablets (60   mg) by mouth once daily.   gabapentin 800 mg tablet; Commonly known as: Neurontin; Take 1 tablet   (800 mg) by mouth 3 times a day.   hydroxychloroquine 200 mg tablet; Commonly known as: Plaquenil; Take 1   tablet (200 mg) by mouth 2 times a day.   loperamide 2 mg capsule; Commonly known as: Imodium   Mag 64 64 mg EC tablet; Generic drug: magnesium chloride; Take 1 tablet   (64 mg) by mouth 2 times a day.   melatonin 5 mg tablet; Take 1  tablet (5 mg) by mouth once daily at   bedtime.   multivitamin with minerals tablet   omeprazole 40 mg DR capsule; Commonly known as: PriLOSEC; Take 1 capsule   (40 mg) by mouth once daily in the morning. Take before meals. Do not   crush or chew.   potassium chloride CR 20 mEq ER tablet; Commonly known as: Klor-Con M20;   Take 1 tablet (20 mEq) by mouth 2 times a day. Do not crush or chew.   venlafaxine  mg 24 hr capsule; Commonly known as: Effexor-XR; Take   1 capsule (150 mg) by mouth once daily. Do not crush or chew.       Test Results Pending At Discharge  Pending Labs       Order Current Status    Fentanyl Confirmation, Urine Preliminary result            Hospital Course  Ronna Beckham is a 64 y.o. female w/PMHx pulm HTN (on macitentan and epoprostenol via Montez, NYHA Functional Class 3 and WHO Group 1), chronic hypoxic respiratory failure (baseline 4L NC), polysubstance use (ethanol, cocaine, amphetamines), SVT, SLE (on hydroxychloroquine), GERD, HARRIS, MDD who presented with shortness of breath and feelings of doom.     On 11/20 afternoon, patient started to have increased dyspnea, feelings of doom, and dizziness. This felt similar to when she had SVT once in the past (July 2024). Noted to have SVT with rates to 200s in the ED, received adenosine x2 with no improvement in HR. Underwent synchronized cardioversion at 50J with improvement to sinus tachycardia, she was then transferred to the MICU.     In MICU, the diltiazem drip was stopped and resumed on home PO diltiazem, given a resolution of SVT. On 11/21, HR was in the low 100s during AM rounds; she was given IV metoprolol 5 mg x1, EKG after showed sinus rhythm and persistence of prolonged QT. Patient was transferred to the floor, where she remained stable and was discharged after 2 days of observation.    Patient was discharged with Veletri 84ml/24h after discussion with pulmonology fellow and pharmacy.        Pertinent Physical Exam At Time of  Discharge  Physical Exam  General: no acute distress, alert, conversant  Eyes: no scleral icterus, EOMI  Cardiovascular:  regular rate and rhythm , murmur present  Respiratory: Montez catheter present over left side, no coughing/rhonchi/wheezing  GI: bowel sounds present, soft, nontender, distended  Extremities: RLE edema>LLE edema  Neuro: AOx4, 4+/5 symmetric  strength, moving all extremities spontaneously  Skin: brusing on arm    Outpatient Follow-Up  Future Appointments   Date Time Provider Department Center   12/9/2024  3:30 PM [id number] [id number]   12/9/2024  4:00 PM Chicho Prescott DO [id number] Academic   12/23/2024  9:00 AM Chelsea Wood MD [id number] [facility]         Eddy May MD

## 2024-11-24 LAB
BACTERIA BLD CULT: NORMAL
BACTERIA BLD CULT: NORMAL

## 2024-11-25 ENCOUNTER — HOSPITAL ENCOUNTER (EMERGENCY)
Facility: HOSPITAL | Age: 64
Discharge: HOME | End: 2024-11-25
Attending: STUDENT IN AN ORGANIZED HEALTH CARE EDUCATION/TRAINING PROGRAM
Payer: COMMERCIAL

## 2024-11-25 ENCOUNTER — APPOINTMENT (OUTPATIENT)
Dept: RESPIRATORY THERAPY | Facility: HOSPITAL | Age: 64
End: 2024-11-25
Payer: COMMERCIAL

## 2024-11-25 ENCOUNTER — APPOINTMENT (OUTPATIENT)
Dept: CARDIOLOGY | Facility: HOSPITAL | Age: 64
End: 2024-11-25
Payer: COMMERCIAL

## 2024-11-25 ENCOUNTER — APPOINTMENT (OUTPATIENT)
Dept: PULMONOLOGY | Facility: HOSPITAL | Age: 64
End: 2024-11-25
Payer: COMMERCIAL

## 2024-11-25 VITALS
SYSTOLIC BLOOD PRESSURE: 128 MMHG | DIASTOLIC BLOOD PRESSURE: 88 MMHG | HEART RATE: 97 BPM | BODY MASS INDEX: 26.66 KG/M2 | HEIGHT: 65 IN | WEIGHT: 160 LBS | OXYGEN SATURATION: 93 % | TEMPERATURE: 97.9 F | RESPIRATION RATE: 22 BRPM

## 2024-11-25 DIAGNOSIS — I27.20 PULMONARY HYPERTENSION (MULTI): ICD-10-CM

## 2024-11-25 DIAGNOSIS — Z79.899 LONG-TERM USE OF HIGH-RISK MEDICATION: ICD-10-CM

## 2024-11-25 DIAGNOSIS — E83.42 HYPOMAGNESEMIA: ICD-10-CM

## 2024-11-25 DIAGNOSIS — R00.2 PALPITATIONS: Primary | ICD-10-CM

## 2024-11-25 LAB
ALBUMIN SERPL BCP-MCNC: 3.4 G/DL (ref 3.4–5)
ALP SERPL-CCNC: 75 U/L (ref 33–136)
ALT SERPL W P-5'-P-CCNC: 11 U/L (ref 7–45)
ANION GAP SERPL CALCULATED.3IONS-SCNC: 10 MMOL/L (ref 10–20)
AST SERPL W P-5'-P-CCNC: 12 U/L (ref 9–39)
ATRIAL RATE: 90 BPM
BASOPHILS # BLD AUTO: 0 X10*3/UL (ref 0–0.1)
BASOPHILS NFR BLD AUTO: 0 %
BILIRUB SERPL-MCNC: 0.7 MG/DL (ref 0–1.2)
BNP SERPL-MCNC: 981 PG/ML (ref 0–99)
BUN SERPL-MCNC: 12 MG/DL (ref 6–23)
CALCIUM SERPL-MCNC: 8.3 MG/DL (ref 8.6–10.3)
CARDIAC TROPONIN I PNL SERPL HS: 5 NG/L (ref 0–13)
CARDIAC TROPONIN I PNL SERPL HS: 6 NG/L (ref 0–13)
CHLORIDE SERPL-SCNC: 109 MMOL/L (ref 98–107)
CO2 SERPL-SCNC: 25 MMOL/L (ref 21–32)
CREAT SERPL-MCNC: 1.06 MG/DL (ref 0.5–1.05)
EGFRCR SERPLBLD CKD-EPI 2021: 60 ML/MIN/1.73M*2
EOSINOPHIL # BLD AUTO: 0 X10*3/UL (ref 0–0.7)
EOSINOPHIL NFR BLD AUTO: 0 %
ERYTHROCYTE [DISTWIDTH] IN BLOOD BY AUTOMATED COUNT: 18.3 % (ref 11.5–14.5)
GLUCOSE SERPL-MCNC: 97 MG/DL (ref 74–99)
HCT VFR BLD AUTO: 35.5 % (ref 36–46)
HGB BLD-MCNC: 10.2 G/DL (ref 12–16)
IMM GRANULOCYTES # BLD AUTO: 0.04 X10*3/UL (ref 0–0.7)
IMM GRANULOCYTES NFR BLD AUTO: 1.1 % (ref 0–0.9)
LYMPHOCYTES # BLD AUTO: 0.63 X10*3/UL (ref 1.2–4.8)
LYMPHOCYTES NFR BLD AUTO: 17.8 %
MAGNESIUM SERPL-MCNC: 1.39 MG/DL (ref 1.6–2.4)
MCH RBC QN AUTO: 23.2 PG (ref 26–34)
MCHC RBC AUTO-ENTMCNC: 28.7 G/DL (ref 32–36)
MCV RBC AUTO: 81 FL (ref 80–100)
MONOCYTES # BLD AUTO: 0.2 X10*3/UL (ref 0.1–1)
MONOCYTES NFR BLD AUTO: 5.7 %
NEUTROPHILS # BLD AUTO: 2.66 X10*3/UL (ref 1.2–7.7)
NEUTROPHILS NFR BLD AUTO: 75.4 %
NRBC BLD-RTO: 0 /100 WBCS (ref 0–0)
P AXIS: 90 DEGREES
P OFFSET: 189 MS
P ONSET: 160 MS
PLATELET # BLD AUTO: 141 X10*3/UL (ref 150–450)
POTASSIUM SERPL-SCNC: 4.7 MMOL/L (ref 3.5–5.3)
PR INTERVAL: 100 MS
PROT SERPL-MCNC: 6.2 G/DL (ref 6.4–8.2)
Q ONSET: 210 MS
QRS COUNT: 15 BEATS
QRS DURATION: 108 MS
QT INTERVAL: 426 MS
QTC CALCULATION(BAZETT): 521 MS
QTC FREDERICIA: 487 MS
R AXIS: 153 DEGREES
RBC # BLD AUTO: 4.39 X10*6/UL (ref 4–5.2)
SODIUM SERPL-SCNC: 139 MMOL/L (ref 136–145)
T AXIS: -7 DEGREES
T OFFSET: 423 MS
TSH SERPL-ACNC: 3.5 MIU/L (ref 0.44–3.98)
VENTRICULAR RATE: 90 BPM
WBC # BLD AUTO: 3.5 X10*3/UL (ref 4.4–11.3)

## 2024-11-25 PROCEDURE — 83735 ASSAY OF MAGNESIUM: CPT | Performed by: STUDENT IN AN ORGANIZED HEALTH CARE EDUCATION/TRAINING PROGRAM

## 2024-11-25 PROCEDURE — 84443 ASSAY THYROID STIM HORMONE: CPT | Performed by: STUDENT IN AN ORGANIZED HEALTH CARE EDUCATION/TRAINING PROGRAM

## 2024-11-25 PROCEDURE — 80053 COMPREHEN METABOLIC PANEL: CPT | Performed by: STUDENT IN AN ORGANIZED HEALTH CARE EDUCATION/TRAINING PROGRAM

## 2024-11-25 PROCEDURE — 84484 ASSAY OF TROPONIN QUANT: CPT | Performed by: STUDENT IN AN ORGANIZED HEALTH CARE EDUCATION/TRAINING PROGRAM

## 2024-11-25 PROCEDURE — 85025 COMPLETE CBC W/AUTO DIFF WBC: CPT | Performed by: STUDENT IN AN ORGANIZED HEALTH CARE EDUCATION/TRAINING PROGRAM

## 2024-11-25 PROCEDURE — 93005 ELECTROCARDIOGRAM TRACING: CPT

## 2024-11-25 PROCEDURE — 2500000004 HC RX 250 GENERAL PHARMACY W/ HCPCS (ALT 636 FOR OP/ED): Performed by: STUDENT IN AN ORGANIZED HEALTH CARE EDUCATION/TRAINING PROGRAM

## 2024-11-25 PROCEDURE — 96366 THER/PROPH/DIAG IV INF ADDON: CPT

## 2024-11-25 PROCEDURE — 83880 ASSAY OF NATRIURETIC PEPTIDE: CPT | Performed by: STUDENT IN AN ORGANIZED HEALTH CARE EDUCATION/TRAINING PROGRAM

## 2024-11-25 PROCEDURE — 99284 EMERGENCY DEPT VISIT MOD MDM: CPT | Mod: 25

## 2024-11-25 PROCEDURE — 96365 THER/PROPH/DIAG IV INF INIT: CPT

## 2024-11-25 RX ORDER — MAGNESIUM SULFATE HEPTAHYDRATE 40 MG/ML
2 INJECTION, SOLUTION INTRAVENOUS ONCE
Status: COMPLETED | OUTPATIENT
Start: 2024-11-25 | End: 2024-11-25

## 2024-11-25 ASSESSMENT — PAIN - FUNCTIONAL ASSESSMENT: PAIN_FUNCTIONAL_ASSESSMENT: 0-10

## 2024-11-25 ASSESSMENT — PAIN SCALES - GENERAL: PAINLEVEL_OUTOF10: 0 - NO PAIN

## 2024-11-25 NOTE — ED PROVIDER NOTES
HPI   Chief Complaint   Patient presents with    Heart Problem     Patient seen last week for high hr and was cardioverted. Patient stated her hr was 189 and her heart rate problem hads been resolved.. patient 95% on 4 L NC which is baseline.        This is a 64-year-old female with a past medical history of SVT, pulmonary hypertension on continuous infusion of Veletri followed by Dr. Chavez at [facility] presenting the ED today for evaluation of palpitations.  She was recently admitted to the hospital after an episode of SVT requiring cardioversion.  She was discharged on some new medication and has been doing well at home until today.  She got up this morning and was sitting down at home when she started feeling palpitations.  She put her new pulse ox meter on her finger which told her that her heart rate was in the 180s.  She became very anxious because of this and called EMS.  She states that she walked downstairs and sat down and noticed that palpitations resolved after less than a minute or so.  She denies any associated shortness of breath, chest pain, lightheadedness or dizziness, any cough or congestion or recent illness otherwise.  She is taking all of her medications as prescribed.  She states that her symptoms are all by the time EMS got to her house and she initially did not want to come to the hospital because she had no ongoing symptoms although the EMS crew convince her to come in for an evaluation.  During our discussion she states she has no ongoing palpitations, no chest pain, no shortness of breath, she feels in her usual baseline state of health at this time.      History provided by:  Patient, medical records and EMS personnel   used: No            Patient History   Past Medical History:   Diagnosis Date    Anxiety     Depression     Does not refill medications appropriately 09/10/2024    GERD (gastroesophageal reflux disease)     Lupus     Personal history  of other specified conditions 2020    History of seizure    Pulmonary hypertension (Multi)     Volume overload 09/10/2024     Past Surgical History:   Procedure Laterality Date    CARDIAC CATHETERIZATION N/A 2024    Procedure: Left Heart Cath, With LV, Angriography And Grafts;  Surgeon: Christiano Calvillo DO;  Location: [facility] Cardiac Cath Lab;  Service: Cardiovascular;  Laterality: N/A;    CHOLECYSTECTOMY      HYSTERECTOMY      OTHER SURGICAL HISTORY  2020    Cholecystectomy    OTHER SURGICAL HISTORY  2020    Esophagogastroduodenoscopy    OTHER SURGICAL HISTORY  2020    Colonoscopy    OTHER SURGICAL HISTORY  2020     section    OTHER SURGICAL HISTORY  2020    Hysterectomy     Family History   Problem Relation Name Age of Onset    No Known Problems Mother      No Known Problems Father       Social History     Tobacco Use    Smoking status: Former     Types: Cigarettes    Smokeless tobacco: Former   Vaping Use    Vaping status: Never Used   Substance Use Topics    Alcohol use: Not Currently    Drug use: Not Currently     Types: Cocaine, Other     Comment: Meth       Physical Exam   ED Triage Vitals [24 1136]   Temperature Heart Rate Respirations BP   36.6 °C (97.9 °F) 100 19 105/72      Pulse Ox Temp Source Heart Rate Source Patient Position   95 % Temporal Monitor --      BP Location FiO2 (%)     -- --       Physical Exam  General: well developed, well nourished 64-year-old female who is awake and alert, in no apparent distress  Eyes: sclera clear bilaterally  HENT: normocephalic, atraumatic.   CV: regular rate and rhythm, no murmur, no gallops, or rubs. radial and dorsalis pedis pulses +2/4 bilaterally  Resp: clear to ascultation bilaterally, no wheezes, rales, or rhonchi  GI: abdomen soft, nontender without rigidity or guarding, no peritoneal signs, abdomen is nondistended, no masses palpated  MSK: strength +5/5 to upper and lower extremities bilaterally, no swelling  of the extremities.  Psych: Anxious, otherwise cooperative with exam  Skin: warm, dry, without evidence of rash or abrasions    ED Course & MDM   ED Course as of 11/25/24 1838   Mon Nov 25, 2024   1208 EKG on my interpretation shows normal sinus rhythm with rate of 97 beats minute.  Normal axis.  QTc 513 ms, [place] interval 170.  T wave inversions present in 3 and aVF, right bundle much block pattern is present.  No significant change in compared to prior EKG from November 21.  No ST elevation or depression, no acute ischemic.  No STEMI. [NT]      ED Course User Index  [NT] Demetrius ARLYN Benz DO         Diagnoses as of 11/25/24 1838   Palpitations   Hypomagnesemia                 No data recorded     [place] Coma Scale Score: 15 (11/25/24 1307 : Shabnam Franz RN)                           Medical Decision Making  The patient is in no acute distress on arrival, she is anxious but otherwise in no acute distress.  She is borderline tachycardic with heart rates in the 90s to low 100s, initial EKG shows no evidence of arrhythmia or acute ischemic injury pattern.  Cardiac workup was completely unremarkable in the ED.  She has normal electrolyte levels although she is mildly hypomagnesemic.  IV magnesium was given.  Troponin negative x 2, no evidence of ACS.  She has no leukocytosis or evidence of infection.  Lung sounds are clear and BNP is near the patient's baseline, she has no worsening shortness of breath or swelling or signs of worsening CHF/fluid overload.  On reassessment the patient states that her symptoms are still resolved and she has had no recurrence of her palpitations.  There is no evidence of any arrhythmia on telemetry monitoring during her stay in the ED.  Patient does want to be discharged home given that her workup is but otherwise unremarkable, I feel this is reasonable given that her symptoms were brief and have completely resolved.  She is to follow-up with her cardiologist regarding these episodes  of palpitations for further medical management as an outpatient, return cautions were discussed including persistent episodes that do not resolve on their own which would require reassessment and further management in the hospital.    No orders to display     Labs Reviewed   CBC WITH AUTO DIFFERENTIAL - Abnormal       Result Value    WBC 3.5 (*)     nRBC 0.0      RBC 4.39      Hemoglobin 10.2 (*)     Hematocrit 35.5 (*)     MCV 81      MCH 23.2 (*)     MCHC 28.7 (*)     RDW 18.3 (*)     Platelets 141 (*)     Neutrophils % 75.4      Immature Granulocytes %, Automated 1.1 (*)     Lymphocytes % 17.8      Monocytes % 5.7      Eosinophils % 0.0      Basophils % 0.0      Neutrophils Absolute 2.66      Immature Granulocytes Absolute, Automated 0.04      Lymphocytes Absolute 0.63 (*)     Monocytes Absolute 0.20      Eosinophils Absolute 0.00      Basophils Absolute 0.00     COMPREHENSIVE METABOLIC PANEL - Abnormal    Glucose 97      Sodium 139      Potassium 4.7      Chloride 109 (*)     Bicarbonate 25      Anion Gap 10      Urea Nitrogen 12      Creatinine 1.06 (*)     eGFR 60 (*)     Calcium 8.3 (*)     Albumin 3.4      Alkaline Phosphatase 75      Total Protein 6.2 (*)     AST 12      Bilirubin, Total 0.7      ALT 11     B-TYPE NATRIURETIC PEPTIDE - Abnormal     (*)     Narrative:        <100 pg/mL - Heart failure unlikely  100-299 pg/mL - Intermediate probability of acute heart                  failure exacerbation. Correlate with clinical                  context and patient history.    >=300 pg/mL - Heart Failure likely. Correlate with clinical                  context and patient history.    BNP testing is performed using different testing methodology at [facility] than at other [facility] hospitals. Direct result comparisons should only be made within the same method.      MAGNESIUM - Abnormal    Magnesium 1.39 (*)    SERIAL TROPONIN-INITIAL - Normal    Troponin I, High Sensitivity 6      Narrative:      Less than 99th percentile of normal range cutoff-  Female and children under 18 years old <14 ng/L; Male <21 ng/L: Negative  Repeat testing should be performed if clinically indicated.     Female and children under 18 years old 14-50 ng/L; Male 21-50 ng/L:  Consistent with possible cardiac damage and possible increased clinical   risk. Serial measurements may help to assess extent of myocardial damage.     >50 ng/L: Consistent with cardiac damage, increased clinical risk and  myocardial infarction. Serial measurements may help assess extent of   myocardial damage.      NOTE: Children less than 1 year old may have higher baseline troponin   levels and results should be interpreted in conjunction with the overall   clinical context.     NOTE: Troponin I testing is performed using a different   testing methodology at [facility] than at other   [facility]. Direct result comparisons should only   be made within the same method.   SERIAL TROPONIN, 1 HOUR - Normal    Troponin I, High Sensitivity 5      Narrative:     Less than 99th percentile of normal range cutoff-  Female and children under 18 years old <14 ng/L; Male <21 ng/L: Negative  Repeat testing should be performed if clinically indicated.     Female and children under 18 years old 14-50 ng/L; Male 21-50 ng/L:  Consistent with possible cardiac damage and possible increased clinical   risk. Serial measurements may help to assess extent of myocardial damage.     >50 ng/L: Consistent with cardiac damage, increased clinical risk and  myocardial infarction. Serial measurements may help assess extent of   myocardial damage.      NOTE: Children less than 1 year old may have higher baseline troponin   levels and results should be interpreted in conjunction with the overall   clinical context.     NOTE: Troponin I testing is performed using a different   testing methodology at [facility] than at other   [facility]. Direct result  comparisons should only   be made within the same method.   TSH WITH REFLEX TO FREE T4 IF ABNORMAL - Normal    Thyroid Stimulating Hormone 3.50      Narrative:     TSH testing is performed using different testing methodology at [facility] than at other [facility]. Direct result comparisons should only be made within the same method.     TROPONIN SERIES- (INITIAL, 1 HR)    Narrative:     The following orders were created for panel order Troponin I Series, High Sensitivity (0, 1 HR).  Procedure                               Abnormality         Status                     ---------                               -----------         ------                     Troponin I, High Sensiti...[[id number]]  Normal              Final result               Troponin, High Sensitivi...[[id number]]  Normal              Final result                 Please view results for these tests on the individual orders.         Procedure  Procedures     Demetrius Benz,   11/25/24 1838

## 2024-11-25 NOTE — DISCHARGE INSTRUCTIONS
Call your primary care physician in the next 24-48 hours to schedule a follow up appointment for further investigation and management of your symptoms. Most of the time, palpitations that are not associated with chest pain, lightheadedness, shortness of breath, or passing out are not dangerous. Your workup today has not shown any damage to the heart or any concerning arrhythmia on heart monitoring. If you have these symptoms or any new or concerning symptoms, you should return to the ED for further evaluation. You may require a temporary heart monitoring device to catch these episodes for further evaluation of your palpitations which can be done by your primary care physician or cardiologist.

## 2024-11-26 NOTE — PROGRESS NOTES
History Of Present Illness  Ronna Beckham is a 64 y.o. female presenting with pulmonary arterial hypertension follow up. Patient is NYHA Functional Class 3 and WHO Group 1. Last office visit was on 7/22/2024. She was referred by Dr. Calvillo on 9/11/2020.     Recent hospital admissions include 8/26-8/29, CHF exacerbation, problems refilling lasix. 9/7-9/9 for  hypervolemia, not taking medications per patient. 10/3-10/6 with vision changes and dizziness after 4 days of increased diarrhea. Had not been taking her home diltiazem. Found to be in SVT now s/p adenosine and in NSR. 10/23-10/26 for management of acute on chronic respiratory failure, hypomagnesia, hypocalcemia and moderate pericardial effusion. Patient had a mechanical fall on 10/25 where she tripped on O2 tubing while going to the bathroom, hitting her head on the linen cart. 11/20-11/22 admitted for SVT.    PAH Treatment:  Veletri, 72 ng/kg/min, 84ml/24hr, concentration 90,000 ng/ml (9/23/2020)  Opsumit (4/12/2021)  Winrevair  (11/1/2024)  Oxygen 4 LPM continuously  Infusion site: [facility]  Treatment history:   Tadalafil (5/4/2021-5/6/2021) headache    Today's testing includes 6 MWT and Labs    Interval History   Shortness of breath with exertion, dizziness with bending over, heart palpitations. Oxygen at 6 L NC at present. Diarrhea daily treated with Imodium. No edema. Accompanied by her sister.           Past Medical History  Patient Active Problem List   Diagnosis    Shortness of breath    NSTEMI (non-ST elevated myocardial infarction) (Multi)    Toothache    Systemic lupus erythematosus (Multi)    Syncope and collapse    Sore throat    Seizure disorder (Multi)    Right ventricular systolic dysfunction    Acute on chronic right-sided heart failure    Pulmonary hypertension (Multi)    Paresthesia    Hand pain    PAH (pulmonary artery hypertension) (Multi)    Olecranon bursitis, right elbow    Obesity    Neuropathy    Nasal congestion    Methamphetamine  abuse (Multi)    Major depressive disorder, single episode, moderate (Multi)    Liver disease    Insomnia    Increased oxygen demand    Hypomagnesemia    Hypocalcemia    Hyperlipidemia    History of non-ST elevation myocardial infarction (NSTEMI)    History of alcohol abuse    Fatigue    Electrolyte and fluid disorder    Prolonged Q-T interval on ECG    Lower leg edema    Edema of eyelid    Ear pain, bilateral    Drug abuse (Multi)    Dizziness    Diverticulosis    Depressive disorder    Dental infection    Decreased GFR    Cough    Chronic obstructive pulmonary disease (Multi)    Central line complication    Cellulitis    Blepharitis of both eyes    Blepharitis    Primary hypertension    Anxiety    Alcohol dependence, uncomplicated (Multi)    Acute otitis externa of left ear    Acute on chronic cholecystitis    Acute cor pulmonale (Multi)    Gastroesophageal reflux disease    Chronic respiratory failure with hypoxia, on home O2 therapy (Multi)    Acquired thrombocytopenia (CMS-HCC)    Acquired absence of cervix    Acidosis, unspecified    Abnormal findings on diagnostic imaging of breast    Pulmonary HTN (Multi)    Long-term use of high-risk medication    SVT (supraventricular tachycardia) (CMS-HCC)    Acute on chronic hypoxic respiratory failure (Multi)    Pneumonia of left lower lobe due to infectious organism    Volume overload    Does not refill medications appropriately    Repeated falls    Difficulty walking    Acute on chronic respiratory failure with hypoxia (Multi)    Pericardial effusion (HHS-HCC)    Acute on chronic respiratory failure (Multi)    Anasarca    Supraventricular tachycardia (CMS-HCC)        Surgical History  She has a past surgical history that includes Other surgical history (09/21/2020); Other surgical history (09/21/2020); Other surgical history (09/21/2020); Other surgical history (09/21/2020); Other surgical history (09/21/2020); Hysterectomy; Cholecystectomy; and Cardiac  catheterization (N/A, 1/8/2024).     Social History  She reports that she has quit smoking. Her smoking use included cigarettes. She has quit using smokeless tobacco. She reports that she does not currently use alcohol. She reports that she does not currently use drugs after having used the following drugs: Cocaine and Other.    Family History  Family History   Problem Relation Name Age of Onset    No Known Problems Mother      No Known Problems Father          Medications  Current Outpatient Medications:     aspirin 81 mg EC tablet, Take 1 tablet (81 mg) by mouth once daily., Disp: 30 tablet, Rfl: 0    calcium carbonate-vitamin D3 500 mg-5 mcg (200 unit) tablet, Take 2 tablets by mouth 2 times a day., Disp: 120 tablet, Rfl: 0    cholestyramine (Questran) 4 gram packet, Take 1 packet (4 g) by mouth once daily., Disp: 30 packet, Rfl: 0    dilTIAZem CD (Cardizem CD) 180 mg 24 hr capsule, Take 1 capsule (180 mg) by mouth once daily., Disp: 30 capsule, Rfl: 0    epoprostenol (Veletri) 1.5 mg recon soln, 1.5 mg (1,500 mcg) by central venous line infusion route continuously. Reconstitute contents of vial with 5 ml diluent and mix cassette as directed. Infuse continuously per physician orders. Allow for priming volume. Dose range: 1-150 ng/kg/min., Disp: 180 each, Rfl: 11    ergocalciferol (Vitamin D-2) 1.25 MG (72790 UT) capsule, Take 1 capsule (1,250 mcg) by mouth 1 (one) time per week. On Wednesday, Disp: , Rfl:     folic acid (Folvite) 1 mg tablet, Take 1 tablet (1 mg) by mouth once daily. Needs to resume, Disp: , Rfl:     furosemide (Lasix) 40 mg tablet, Take 1.5 tablets (60 mg) by mouth once daily., Disp: 30 tablet, Rfl: 0    gabapentin (Neurontin) 800 mg tablet, Take 1 tablet (800 mg) by mouth 3 times a day., Disp: 90 tablet, Rfl: 0    hydroxychloroquine (Plaquenil) 200 mg tablet, Take 1 tablet (200 mg) by mouth 2 times a day., Disp: 60 tablet, Rfl: 0    loperamide (Imodium) 2 mg capsule, Take 1 capsule (2 mg) by  mouth once daily as needed for diarrhea., Disp: , Rfl:     magnesium chloride (MagDelay) 64 mg EC tablet, Take 1 tablet (64 mg) by mouth 2 times a day., Disp: 60 tablet, Rfl: 0    melatonin 5 mg tablet, Take 1 tablet (5 mg) by mouth once daily at bedtime., Disp: 30 tablet, Rfl: 0    multivitamin-iron-folic acid  mg-mcg tablet, Take 1 tablet by mouth once daily., Disp: , Rfl:     omeprazole (PriLOSEC) 40 mg DR capsule, Take 1 capsule (40 mg) by mouth once daily in the morning. Take before meals. Do not crush or chew., Disp: 30 capsule, Rfl: 0    Opsumit 10 mg tablet tablet, Take 1 tablet (10 mg) by mouth once daily. Meds tob eds LT 5031, Disp: 30 tablet, Rfl: 0    potassium chloride CR 20 mEq ER tablet, Take 1 tablet (20 mEq) by mouth 2 times a day. Do not crush or chew., Disp: 60 tablet, Rfl: 0    sotatercept-csrk (Winrevair) 60 mg kit, Inject 1 Dose under the skin every 21 (twenty-one) days., Disp: 3 kit, Rfl: 0    venlafaxine XR (Effexor-XR) 150 mg 24 hr capsule, Take 1 capsule (150 mg) by mouth once daily. Do not crush or chew., Disp: 30 capsule, Rfl: 0  No current facility-administered medications for this visit.     Allergies  Levetiracetam    Review of Systems   Constitutional: Negative.    HENT: Negative.     Eyes: Negative.    Respiratory:  Positive for shortness of breath.    Cardiovascular:  Positive for palpitations.   Gastrointestinal:  Positive for diarrhea.   Endocrine: Negative.    Genitourinary: Negative.    Musculoskeletal: Negative.    Skin: Negative.    Allergic/Immunologic: Negative.    Hematological: Negative.    Psychiatric/Behavioral: Negative.         Last Recorded Vitals  There were no vitals taken for this visit.  Vitals:    12/09/24 1352   BP: 112/74   Pulse: (!) 112   SpO2: 94%         Physical Exam  Constitutional:       Appearance: Normal appearance.      Comments: Wheelchair , oxygen, flushed. Thin   HENT:      Head: Normocephalic.   Cardiovascular:      Rate and Rhythm: Normal  rate and regular rhythm.      Heart sounds: Murmur heard.      Comments: Increased second heart sound.   Pulmonary:      Effort: Pulmonary effort is normal.      Breath sounds: Normal breath sounds.   Abdominal:      General: Bowel sounds are normal.      Palpations: Abdomen is soft.   Musculoskeletal:      Right lower leg: No edema.      Left lower leg: No edema.   Skin:     Findings: Rash present.   Psychiatric:         Mood and Affect: Mood normal.         Judgment: Judgment normal.            Relevant Results    Patient with code blue running out of oxygen, now baseline.     Echo (10/24/2024)  Left Ventricle: Left ventricular ejection fraction is hyperdynamic, by visual estimate at 75%. The left ventricular cavity size is decreased. The interventricular septum is flattened in systole and diastole, consistent with right ventricular pressure and volume overload. Left ventricular diastolic filling was not assessed. The left ventricle appears underfilled.  Left Atrium: The left atrium was not assessed.  Right Ventricle: The right ventricle is severely enlarged. There is moderately reduced right ventricular systolic function.  Right Atrium: The right atrium is severely dilated. A dilated inferior vena cava demonstrates poor inspiratory collapse, consistent with elevated right atrial pressures.  Aortic Valve: The aortic valve is trileaflet. There is no evidence of aortic valve regurgitation.  Mitral Valve: The mitral valve is normal in structure. There is trace mitral valve regurgitation.  Tricuspid Valve: The tricuspid valve is abnormal. There is severe tricuspid regurgitation. The Doppler estimated RVSP is severely elevated at 105.1 mmHg.  Pulmonic Valve: The pulmonic valve is structurally normal. There is trace pulmonic valve regurgitation.  Pericardium: Small to moderate pericardial effusion. There is no evidence of cardiac tamponade.  Aorta: The aortic root was not assessed.  In comparison to the previous  echocardiogram(s): Compared with study dated 4/24/2024, there is no significant difference in RV size and function. There is again severe TR. The RVSP has increased from 89 to 105 mmHg. The pericardial effusion appears unchanged.     CONCLUSIONS:   1. Left ventricular ejection fraction is hyperdynamic, by visual estimate at 75%.   2. Left ventricular cavity size is decreased.   3. Right ventricular volume and pressure overload.   4. There is moderately reduced right ventricular systolic function.   5. Severely enlarged right ventricle.   6. Severely elevated right ventricular systolic pressure.   7. The right atrium is severely dilated.   8. Severe tricuspid regurgitation visualized.   9. Small to moderate pericardial effusion.  10. There is no evidence of cardiac tamponade.  11. A dilated inferior vena cava demonstrates poor inspiratory collapse, consistent with elevated right atrial pressures.  12. Compared with study dated 4/24/2024, there is no significant difference in RV size and function. There is again severe TR. The RVSP has increased from 89 to 105 mmHg. The pericardial effusion appears unchanged.     CT angio chest for pulmonary embolism (10/22/2024)  1. No evidence of acute pulmonary embolism.  2. There is a moderate size pericardial effusion noted.    6MWT (7/22/2024)  SP02-98%/96% on 4L  HR-106/114  CHEIKH-2/2  Actual Meters- 183 meters     6MWT (5/13/2024)  SP02- 96-96% /4 L NC  HR-   CHEIKH-0-4  Actual Meters 216 meters     CT angio chest for PE (5/30/2024)  1. No findings of acute pulmonary embolism.  2. Persistent marked cardiac enlargement. Hepatic heterogeneity and splenomegaly suggesting right heart dysfunction with associated not make liver and portal hypertension.  3. Bronchial thickening. There are couple of other stable pulmonary nodules when compared to most recent examination, however there are areas of ground-glass opacity seen in the lungs possibly reflecting inflammatory etiologies.  Findings of mild pulmonary edema and volume  Overload     Echo (4/24/2024) [facility]  Left Ventricle: The left ventricular systolic function is normal, with an estimated ejection fraction of 60-65%. There are no regional wall motion abnormalities. The left ventricular cavity size is normal. The interventricular septum is flattened in systole and diastole, consistent with right ventricular pressure and volume overload. Left ventricular diastolic filling was not assessed.  Left Atrium: The left atrium is normal in size. A bubble study using agitated saline was performed. Bubble study is positive. A small PFO (= 10 bubbles) was demonstrated. Agitated saline contrast study was positive for small intracardiac shunt.  Right Ventricle: The right ventricle is severely enlarged. There is severely reduced right ventricular systolic function. Although the TAPSE and RVA' are normal, the fractional area change is markedly reduced consistent wtih severe RV systolic dysfunction. Still able to generate high RVSP.  Right Atrium: The right atrium is severely dilated.  Aortic Valve: The aortic valve is trileaflet. There is minimal aortic valve cusp calcification. Aortic valve regurgitation was not assessed.  Mitral Valve: The mitral valve is normal in structure. Mitral valve regurgitation was not assessed.  Tricuspid Valve: The tricuspid valve is structurally normal. There is severe tricuspid regurgitation. The Doppler estimated RVSP is severely elevated at 88.6 mmHg.  Pulmonic Valve: The pulmonic valve is not well visualized. Pulmonic valve regurgitation was not assessed.  Pericardium: There is a small to moderate pericardial effusion. Small circumferential pericardial effusin though is small to moderate adjacent to the RA. No RV or RA collapse. The IVC is dilated, though likely due to the presence of severe pulmonary hypertension. No significant respiratory variation of the MV or TV inflows. Not diagnostic for tamponade though  difficult to dx tamponade in setting of severe pulmonary hypertension.  Aorta: The aortic root is normal.  Systemic Veins: The inferior vena cava appears dilated. There is IVC inspiratory collapse greater than 50%.  In comparison to the previous echocardiogram(s): Compared with study from 3/12/2022,. Compared with the prior exam from 3/12/2022 the RV had a similar appearance with severe dilatation and reduced function. Note the TR was not asessed on that exam to allow estimation of the RVSP at that time. The pericardial effusion appears similar in size.  CONCLUSIONS:   1. Left ventricular systolic function is normal with a 60-65% estimated ejection fraction.   2. Right ventricular volume and pressure overload.   3. Although the TAPSE and RVA' are normal, the fractional area change is markedly reduced consistent wtih severe RV systolic dysfunction. Still able to generate high RVSP.   4. Severely enlarged right ventricle.   5. There is severely reduced right ventricular systolic function.   6. Agitated saline contrast study was positive for small intracardiac shunt.   7. The right atrium is severely dilated.   8. There is a small to moderate pericardial effusion.   9. Small circumferential pericardial effusin though is small to moderate adjacent to the RA. No RV or RA collapse. The IVC is dilated, though likely due to the presence of severe pulmonary hypertension. No significant respiratory variation of the MV or TV inflows. Not diagnostic for tamponade though difficult to dx tamponade in setting of severe pulmonary hypertension.  10. Severe tricuspid regurgitation visualized.  11. Severely elevated right ventricular systolic pressure.  12. Compared with the prior exam from 3/12/2022 the RV had a similar appearance with severe dilatation and reduced function. Note the TR was not asessed on that exam to allow estimation of the RVSP at that time. The pericardial effusion appears similar in size.  13. A bubble study  using agitated saline was performed. Bubble study is positive. A small PFO (= 10 bubbles) was demonstrated.     6MWT (2023)  SpO2: 98% - 95% on 4L  HR: 97 - 116  Valentin: 1 - 7  Actual 314m     6MWT (2022)  SpO2: 94 - 90 on RA   HR: 78 - 111  Valentin: 0 - 1  Meters: 366      6MWT (2021)   SpO2: 92% - 87% on RA  HR: 81 - 94  Valentin: 3 - 4  Actual 311m     6MWT (2021)  HR 91 -102  Spo2 97 - 95 on RA  Valentin 1- 4  Meters 287/predicted 477     RHC (2020)   PAP- 73/31 (47), 83/26 (54)  PCWP- 8  CO/CI- 3.8/2.0      Echo (2020) from OSH   moderately to severely dilated RV with moderately reduced function and a moderately dilated RA.      Assessment/Plan     1) Pulmonary  a. PAH associated with methamphetamine use, presented FCIV, V/Q (-) , depressed CI, now on infusion therapy with dramatic subjective improvement, still high risk. Still with hectic follow up at best, (+) cocaine during hospitalization 2024. (+) Cannabinoid and amphetamine 2024.  In past,  adjust dosing for weight loss and follow resolution of epo rash and significant diarrhea. Patient went up without authorization. Seems stable from PAH standpoint,      Did not finish Keflex ordered for possible site infection. Better today. W     Intolerant to PDE5     On macitentan     2) Neuro - pre-existing neuropathy, alcohol vs ? Today complaining of classic sciatic pain onset after riding horse.      3) Cardiac - hypertension     4) Interval diagnosis of lupus on basis of ?     Plan    1) Continue current meds     2) May go home when sister brings oxygen. If not before closing time, to ED. To wait in a monitored situation.     3) Follow up in clinic.6-8 weeks with 6 minute walk and routine labs.     4) Decrease to 73 ml/24 hours over next week, 78,76,74,72 every other day. Call this Thursday for checkin.     5) 2+ cans boost per day. Will consider EPO->DESMOND if unsuccessful in weight loss.       Communicated with patient who understands.

## 2024-11-27 LAB
FENTANYL UR CFM-MCNC: <2.5 NG/ML
NORFENTANYL UR CFM-MCNC: <2.5 NG/ML

## 2024-11-29 LAB
ATRIAL RATE: 97 BPM
P AXIS: 69 DEGREES
P OFFSET: 178 MS
P ONSET: 124 MS
PR INTERVAL: 170 MS
Q ONSET: 209 MS
QRS COUNT: 16 BEATS
QRS DURATION: 110 MS
QT INTERVAL: 404 MS
QTC CALCULATION(BAZETT): 513 MS
QTC FREDERICIA: 474 MS
R AXIS: 135 DEGREES
T AXIS: 12 DEGREES
T OFFSET: 411 MS
VENTRICULAR RATE: 97 BPM

## 2024-12-05 ENCOUNTER — DOCUMENTATION (OUTPATIENT)
Dept: PHYSICAL THERAPY | Facility: CLINIC | Age: 64
End: 2024-12-05
Payer: COMMERCIAL

## 2024-12-05 DIAGNOSIS — R29.6 REPEATED FALLS: Primary | ICD-10-CM

## 2024-12-05 DIAGNOSIS — R26.2 DIFFICULTY WALKING: ICD-10-CM

## 2024-12-05 NOTE — PROGRESS NOTES
Physical Therapy    Discharge Summary    Name: Ronna Beckham  MRN: [id number]  : 1960  Date: 24    Discharge Summary: PT    Discharge Information: Date of discharge 24, Date of last visit 24, Date of evaluation 24, Number of attended visits 1, and Referred for Falls    Therapy Summary: Patient attended initial evaluation and no showed / cancelled remaining appointments.    Discharge Status: Discharge and resolve episode.     Rehab Discharge Reason: Failed to schedule and/or keep follow-up appointment(s)

## 2024-12-06 LAB
ATRIAL RATE: 90 BPM
P AXIS: 90 DEGREES
P OFFSET: 189 MS
P ONSET: 160 MS
PR INTERVAL: 100 MS
Q ONSET: 210 MS
QRS COUNT: 15 BEATS
QRS DURATION: 108 MS
QT INTERVAL: 426 MS
QTC CALCULATION(BAZETT): 521 MS
QTC FREDERICIA: 487 MS
R AXIS: 153 DEGREES
T AXIS: -7 DEGREES
T OFFSET: 423 MS
VENTRICULAR RATE: 90 BPM

## 2024-12-09 ENCOUNTER — OFFICE VISIT (OUTPATIENT)
Dept: PULMONOLOGY | Facility: HOSPITAL | Age: 64
End: 2024-12-09
Payer: COMMERCIAL

## 2024-12-09 ENCOUNTER — HOSPITAL ENCOUNTER (OUTPATIENT)
Dept: RESPIRATORY THERAPY | Facility: HOSPITAL | Age: 64
Discharge: HOME | End: 2024-12-09
Payer: COMMERCIAL

## 2024-12-09 VITALS
HEIGHT: 65 IN | BODY MASS INDEX: 24.16 KG/M2 | WEIGHT: 145 LBS | SYSTOLIC BLOOD PRESSURE: 112 MMHG | OXYGEN SATURATION: 94 % | HEART RATE: 112 BPM | DIASTOLIC BLOOD PRESSURE: 74 MMHG

## 2024-12-09 DIAGNOSIS — Z79.899 LONG-TERM USE OF HIGH-RISK MEDICATION: ICD-10-CM

## 2024-12-09 DIAGNOSIS — R06.02 SOB (SHORTNESS OF BREATH): ICD-10-CM

## 2024-12-09 DIAGNOSIS — I27.20 PULMONARY HYPERTENSION (MULTI): ICD-10-CM

## 2024-12-09 DIAGNOSIS — I27.20 PULMONARY HTN (MULTI): Primary | ICD-10-CM

## 2024-12-09 PROCEDURE — 99215 OFFICE O/P EST HI 40 MIN: CPT | Performed by: INTERNAL MEDICINE

## 2024-12-09 PROCEDURE — 1036F TOBACCO NON-USER: CPT | Performed by: INTERNAL MEDICINE

## 2024-12-09 PROCEDURE — 3008F BODY MASS INDEX DOCD: CPT | Performed by: INTERNAL MEDICINE

## 2024-12-09 PROCEDURE — 3074F SYST BP LT 130 MM HG: CPT | Performed by: INTERNAL MEDICINE

## 2024-12-09 PROCEDURE — 3078F DIAST BP <80 MM HG: CPT | Performed by: INTERNAL MEDICINE

## 2024-12-09 ASSESSMENT — ENCOUNTER SYMPTOMS
LOSS OF SENSATION IN FEET: 0
PALPITATIONS: 1
CONSTITUTIONAL NEGATIVE: 1
MUSCULOSKELETAL NEGATIVE: 1
ENDOCRINE NEGATIVE: 1
DIARRHEA: 1
PSYCHIATRIC NEGATIVE: 1
ALLERGIC/IMMUNOLOGIC NEGATIVE: 1
EYES NEGATIVE: 1
OCCASIONAL FEELINGS OF UNSTEADINESS: 0
HEMATOLOGIC/LYMPHATIC NEGATIVE: 1
SHORTNESS OF BREATH: 1
DEPRESSION: 0

## 2024-12-09 ASSESSMENT — PATIENT HEALTH QUESTIONNAIRE - PHQ9
2. FEELING DOWN, DEPRESSED OR HOPELESS: NOT AT ALL
1. LITTLE INTEREST OR PLEASURE IN DOING THINGS: NOT AT ALL
SUM OF ALL RESPONSES TO PHQ9 QUESTIONS 1 AND 2: 0

## 2024-12-09 ASSESSMENT — PAIN SCALES - GENERAL: PAINLEVEL_OUTOF10: 0-NO PAIN

## 2024-12-09 NOTE — ED PROCEDURE NOTE
Procedure  Critical Care    Performed by: Meagan Rodriguez PA-C  Authorized by: Isela Lyn MD    Critical care provider statement:     Critical care time (minutes):  33    Critical care time was exclusive of:  Separately billable procedures and treating other patients    Critical care was necessary to treat or prevent imminent or life-threatening deterioration of the following conditions: Electrolyte abnormality.    Critical care was time spent personally by me on the following activities:  Blood draw for specimens, discussions with consultants, examination of patient, obtaining history from patient or surrogate, re-evaluation of patient's condition and ordering and review of laboratory studies               Meagan Rodriguez PA-C  12/09/24 1036

## 2024-12-10 ENCOUNTER — DOCUMENTATION (OUTPATIENT)
Dept: PULMONOLOGY | Facility: HOSPITAL | Age: 64
End: 2024-12-10
Payer: COMMERCIAL

## 2024-12-10 NOTE — PROGRESS NOTES
Patient requesting to get labs drawn at [facility]. Faxed to [place] office at [phone] and will be forwarded to [place] office.

## 2024-12-11 ENCOUNTER — HOSPITAL ENCOUNTER (EMERGENCY)
Facility: HOSPITAL | Age: 64
Discharge: HOME | End: 2024-12-11
Payer: COMMERCIAL

## 2024-12-11 ENCOUNTER — APPOINTMENT (OUTPATIENT)
Dept: CARDIOLOGY | Facility: HOSPITAL | Age: 64
End: 2024-12-11
Payer: COMMERCIAL

## 2024-12-11 ENCOUNTER — DOCUMENTATION (OUTPATIENT)
Dept: PULMONOLOGY | Facility: HOSPITAL | Age: 64
End: 2024-12-11
Payer: COMMERCIAL

## 2024-12-11 VITALS
TEMPERATURE: 98.3 F | RESPIRATION RATE: 20 BRPM | SYSTOLIC BLOOD PRESSURE: 100 MMHG | HEART RATE: 93 BPM | WEIGHT: 145 LBS | DIASTOLIC BLOOD PRESSURE: 76 MMHG | BODY MASS INDEX: 24.16 KG/M2 | HEIGHT: 65 IN | OXYGEN SATURATION: 94 %

## 2024-12-11 DIAGNOSIS — E83.42 HYPOMAGNESEMIA: Primary | ICD-10-CM

## 2024-12-11 LAB
ALBUMIN SERPL BCP-MCNC: 3.8 G/DL (ref 3.4–5)
ALP SERPL-CCNC: 79 U/L (ref 33–136)
ALT SERPL W P-5'-P-CCNC: 13 U/L (ref 7–45)
ANION GAP SERPL CALCULATED.3IONS-SCNC: 12 MMOL/L (ref 10–20)
AST SERPL W P-5'-P-CCNC: 14 U/L (ref 9–39)
BASOPHILS # BLD AUTO: 0.01 X10*3/UL (ref 0–0.1)
BASOPHILS NFR BLD AUTO: 0.2 %
BILIRUB SERPL-MCNC: 0.8 MG/DL (ref 0–1.2)
BUN SERPL-MCNC: 22 MG/DL (ref 6–23)
CALCIUM SERPL-MCNC: 7.9 MG/DL (ref 8.6–10.3)
CHLORIDE SERPL-SCNC: 106 MMOL/L (ref 98–107)
CO2 SERPL-SCNC: 23 MMOL/L (ref 21–32)
CREAT SERPL-MCNC: 0.92 MG/DL (ref 0.5–1.05)
EGFRCR SERPLBLD CKD-EPI 2021: 71 ML/MIN/1.73M*2
EOSINOPHIL # BLD AUTO: 0 X10*3/UL (ref 0–0.7)
EOSINOPHIL NFR BLD AUTO: 0 %
ERYTHROCYTE [DISTWIDTH] IN BLOOD BY AUTOMATED COUNT: 19.9 % (ref 11.5–14.5)
GLUCOSE SERPL-MCNC: 101 MG/DL (ref 74–99)
HCT VFR BLD AUTO: 34.4 % (ref 36–46)
HGB BLD-MCNC: 10.2 G/DL (ref 12–16)
IMM GRANULOCYTES # BLD AUTO: 0.02 X10*3/UL (ref 0–0.7)
IMM GRANULOCYTES NFR BLD AUTO: 0.5 % (ref 0–0.9)
LYMPHOCYTES # BLD AUTO: 0.96 X10*3/UL (ref 1.2–4.8)
LYMPHOCYTES NFR BLD AUTO: 22.4 %
MAGNESIUM SERPL-MCNC: 1.02 MG/DL (ref 1.6–2.4)
MCH RBC QN AUTO: 23.8 PG (ref 26–34)
MCHC RBC AUTO-ENTMCNC: 29.7 G/DL (ref 32–36)
MCV RBC AUTO: 80 FL (ref 80–100)
MONOCYTES # BLD AUTO: 0.27 X10*3/UL (ref 0.1–1)
MONOCYTES NFR BLD AUTO: 6.3 %
NEUTROPHILS # BLD AUTO: 3.03 X10*3/UL (ref 1.2–7.7)
NEUTROPHILS NFR BLD AUTO: 70.6 %
NRBC BLD-RTO: 0 /100 WBCS (ref 0–0)
PLATELET # BLD AUTO: 132 X10*3/UL (ref 150–450)
POTASSIUM SERPL-SCNC: 4.6 MMOL/L (ref 3.5–5.3)
PROT SERPL-MCNC: 6.6 G/DL (ref 6.4–8.2)
RBC # BLD AUTO: 4.29 X10*6/UL (ref 4–5.2)
SODIUM SERPL-SCNC: 136 MMOL/L (ref 136–145)
WBC # BLD AUTO: 4.3 X10*3/UL (ref 4.4–11.3)

## 2024-12-11 PROCEDURE — 99284 EMERGENCY DEPT VISIT MOD MDM: CPT

## 2024-12-11 PROCEDURE — 96365 THER/PROPH/DIAG IV INF INIT: CPT

## 2024-12-11 PROCEDURE — 83735 ASSAY OF MAGNESIUM: CPT

## 2024-12-11 PROCEDURE — 93005 ELECTROCARDIOGRAM TRACING: CPT

## 2024-12-11 PROCEDURE — 80053 COMPREHEN METABOLIC PANEL: CPT

## 2024-12-11 PROCEDURE — 85025 COMPLETE CBC W/AUTO DIFF WBC: CPT

## 2024-12-11 PROCEDURE — 2500000004 HC RX 250 GENERAL PHARMACY W/ HCPCS (ALT 636 FOR OP/ED)

## 2024-12-11 RX ORDER — MAGNESIUM SULFATE HEPTAHYDRATE 40 MG/ML
2 INJECTION, SOLUTION INTRAVENOUS ONCE
Status: COMPLETED | OUTPATIENT
Start: 2024-12-11 | End: 2024-12-11

## 2024-12-11 ASSESSMENT — PAIN SCALES - GENERAL: PAINLEVEL_OUTOF10: 0 - NO PAIN

## 2024-12-11 ASSESSMENT — PAIN - FUNCTIONAL ASSESSMENT
PAIN_FUNCTIONAL_ASSESSMENT: 0-10
PAIN_FUNCTIONAL_ASSESSMENT: 0-10

## 2024-12-11 NOTE — DISCHARGE INSTRUCTIONS
Continue taking magnesium supplements at home and follow-up with your primary care provider in 1 to 2 days for reevaluation of symptoms.  Return to ER for any new onset or worsening of symptoms despite therapy.  Refer to the low magnesium level discharge instructions attached.    It is important to remember that your care does not end here and you must continue to monitor your condition closely. Please return to the emergency department for any worsening or concerning signs or symptoms as directed by our conversations and the discharge instructions. If you do not have a doctor please contact the referral number on your discharge instructions. Please contact any physician specialists provided in your discharge notes as it is very important to follow up with them regarding your condition. If you are unable to reach the physicians provided, please come back to the Emergency Department at any time.

## 2024-12-11 NOTE — ED TRIAGE NOTES
Patient states she got bloodwork yesterday and was told her magnesium is low. Patient takes magnesium daily but it doesn't help. Patient told to come here to have infusion. Patient states she is on O2 for pulmonary hypertension. Patient states she has some fluttering in chest.

## 2024-12-11 NOTE — ED PROVIDER NOTES
HPI   Chief Complaint   Patient presents with    Abnormal labs       HPI  Patient presents for evaluation of low magnesium.  States that she did have routine blood work drawn and her magnesium was low at a level of 0.9 thus they instructed her to come to ER for replacement.  Patient states that her magnesium is typically low and she does take supplementation but sometimes requires IV supplementation.  She states she has had some fluttering in her chest on and off but otherwise denies chest pain or increase in shortness of breath compared to normal.  Denies numbness or tingling.  Has no other acute complaints.  Does wear baseline oxygen for pulmonary hypertension.      Patient History   Past Medical History:   Diagnosis Date    Anxiety     Depression     Does not refill medications appropriately 09/10/2024    GERD (gastroesophageal reflux disease)     Lupus     Personal history of other specified conditions 2020    History of seizure    Pulmonary hypertension (Multi)     Volume overload 09/10/2024     Past Surgical History:   Procedure Laterality Date    CARDIAC CATHETERIZATION N/A 2024    Procedure: Left Heart Cath, With LV, Angriography And Grafts;  Surgeon: Christiano Calvillo DO;  Location: [facility] Cardiac Cath Lab;  Service: Cardiovascular;  Laterality: N/A;    CHOLECYSTECTOMY      HYSTERECTOMY      OTHER SURGICAL HISTORY  2020    Cholecystectomy    OTHER SURGICAL HISTORY  2020    Esophagogastroduodenoscopy    OTHER SURGICAL HISTORY  2020    Colonoscopy    OTHER SURGICAL HISTORY  2020     section    OTHER SURGICAL HISTORY  2020    Hysterectomy     Family History   Problem Relation Name Age of Onset    No Known Problems Mother      No Known Problems Father       Social History     Tobacco Use    Smoking status: Former     Types: Cigarettes    Smokeless tobacco: Former   Vaping Use    Vaping status: Never Used   Substance Use Topics    Alcohol use: Not Currently    Drug use:  Not Currently     Types: Cocaine, Other     Comment: Meth       Physical Exam   ED Triage Vitals [12/11/24 1441]   Temperature Heart Rate Respirations BP   36.8 °C (98.3 °F) 93 20 100/76      Pulse Ox Temp Source Heart Rate Source Patient Position   94 % Temporal Monitor --      BP Location FiO2 (%)     -- --       Physical Exam  Vitals and nursing note reviewed.   Constitutional:       General: She is not in acute distress.  Cardiovascular:      Rate and Rhythm: Normal rate and regular rhythm.      Heart sounds: Normal heart sounds.   Pulmonary:      Comments: Lungs clear to auscultation bilaterally  Skin:     General: Skin is warm and dry.   Neurological:      General: No focal deficit present.      Mental Status: She is alert and oriented to person, place, and time.           ED Course & MDM   ED Course as of 12/11/24 1817   Wed Dec 11, 2024   1513 ECG 12 lead  Performed at  1503, HR of 94, NSR, NAD, QTc 500, no sign of STEMI, T wave inversion in inferolateral leads    Reviewed and interpreted by me at time performed   [JM]      ED Course User Index  [JM] Julia Young MD         Diagnoses as of 12/11/24 1817   Hypomagnesemia                 No data recorded     [place] Coma Scale Score: 15 (12/11/24 1504 : Vandana Brandon RN)                           Medical Decision Making  Parts of this chart have been completed using voice recognition software. Please excuse any errors of transcription.  My thought process and reason for plan has been formulated from the time that I saw the patient until the time of disposition and is not specific to one specific moment during their visit and furthermore my MDM encompasses this entire chart and not only this text box.      HPI: Detailed above.    Exam: A medically appropriate exam performed, outlined above, given the known history and presentation.    History obtained from: Patient    EKG: Interpreted by attending physician and reviewed by me    Medications  given during visit:  Medications   magnesium sulfate 2 g in sterile water for injection 50 mL (0 g intravenous Stopped 12/11/24 1730)        Diagnostic/tests  Labs Reviewed   CBC WITH AUTO DIFFERENTIAL - Abnormal       Result Value    WBC 4.3 (*)     nRBC 0.0      RBC 4.29      Hemoglobin 10.2 (*)     Hematocrit 34.4 (*)     MCV 80      MCH 23.8 (*)     MCHC 29.7 (*)     RDW 19.9 (*)     Platelets 132 (*)     Neutrophils % 70.6      Immature Granulocytes %, Automated 0.5      Lymphocytes % 22.4      Monocytes % 6.3      Eosinophils % 0.0      Basophils % 0.2      Neutrophils Absolute 3.03      Immature Granulocytes Absolute, Automated 0.02      Lymphocytes Absolute 0.96 (*)     Monocytes Absolute 0.27      Eosinophils Absolute 0.00      Basophils Absolute 0.01     COMPREHENSIVE METABOLIC PANEL - Abnormal    Glucose 101 (*)     Sodium 136      Potassium 4.6      Chloride 106      Bicarbonate 23      Anion Gap 12      Urea Nitrogen 22      Creatinine 0.92      eGFR 71      Calcium 7.9 (*)     Albumin 3.8      Alkaline Phosphatase 79      Total Protein 6.6      AST 14      Bilirubin, Total 0.8      ALT 13     MAGNESIUM - Abnormal    Magnesium 1.02 (*)       No orders to display        Considerations/further MDM:  Patient is a 64-year-old female presenting for evaluation of low magnesium    Patient is well-appearing in no apparent distress.  Vital signs are stable and the patient's baseline oxygen requirement.  EKG is performed without evidence of acute ischemia or arrhythmia.  Laboratory workup is with hypomagnesemia which was supplemented with IV magnesium during ER visit.  Patient is otherwise without acute complaints.  Stable for discharge.  Instructed to continue her oral magnesium supplementation at home and follow-up with primary care provider in 2 to 3 days.  Return precautions discussed.  I discussed the laboratory and imaging findings with the patient at bedside. Patient's questions and concerns were  addressed. Patient was released in good condition, discharged with instructions to follow up with primary care provider and appropriate specialist, and to return to ED at any time for worsening symptoms or any other concerns. Patient demonstrates understanding of the findings and the importance of appropriate follow up care.       Procedure  Procedures     Silvana Pinto PA-C  12/11/24 1810

## 2024-12-11 NOTE — Clinical Note
Ronna Beckham was seen and treated in our emergency department on 12/11/2024.  She may return to work on 12/12/2024.       If you have any questions or concerns, please don't hesitate to call.      Silvana Pinto PA-C

## 2024-12-11 NOTE — PROGRESS NOTES
Patient seen in clinic 12/9/2024  Medication: Remodulin      Current dose:     76 ng/kg/min      80 mls/hr    concentration of 90,000    Weight  73kg kg    Titration plan: decrease every other day to goal of   69 ng/kg/min      78 mls/hr    concentration of 90,000    Side Effects (flushing/diarrhea/increased SOB/headache/nausea/1st bite jaw pain): diarrhea    Plan:   Decrease in clinic to   74 ng/kg/min      78 mls/hr    concentration of 90,000  Will continue creases every other day.

## 2024-12-12 ENCOUNTER — DOCUMENTATION (OUTPATIENT)
Dept: PULMONOLOGY | Facility: HOSPITAL | Age: 64
End: 2024-12-12
Payer: COMMERCIAL

## 2024-12-12 LAB
ATRIAL RATE: 94 BPM
P OFFSET: 170 MS
P ONSET: 114 MS
PR INTERVAL: 194 MS
Q ONSET: 211 MS
QRS COUNT: 15 BEATS
QRS DURATION: 100 MS
QT INTERVAL: 400 MS
QTC CALCULATION(BAZETT): 500 MS
QTC FREDERICIA: 464 MS
R AXIS: 128 DEGREES
T AXIS: -30 DEGREES
T OFFSET: 411 MS
VENTRICULAR RATE: 94 BPM

## 2024-12-12 NOTE — PROGRESS NOTES
Office called patient.   Medication: Veletri    Current dose:   72 ng/kg/min  76 mls/hr  concentration of 90,000    Weight 66 kg    Titration plan: decrease every other day to goal of   68 ng/kg/min     72 mls/hr    concentration of 90,000    Side Effects (flushing/diarrhea/increased SOB/headache/nausea/1st bite jaw pain): Denies    Plan:   Patient decreased dose yesterday, 12/11/2024 to  72 ng/kg/min  76 mls/hr  concentration of 90,000  Patient to call in tomorrow for check in with additional decrease.

## 2024-12-13 ENCOUNTER — DOCUMENTATION (OUTPATIENT)
Dept: PULMONOLOGY | Facility: HOSPITAL | Age: 64
End: 2024-12-13
Payer: COMMERCIAL

## 2024-12-13 NOTE — PROGRESS NOTES
Patient called office.   Medication: Veletri    Current dose:     Weight 66 kg    Titration plan: decrease every other day to goal of   68 ng/kg/min  72ml/24 hr concentration of 90,000    Side Effects (flushing/diarrhea/increased SOB/headache/nausea/1st bite jaw pain): Denies    Plan:   Decrease infusion today to  70 ng/kg/min   74 mls/24hr   concentration of 90,000    Decrease infusion on Sunday to  68 ng/kg/min  72ml/24 hr concentration of 90,000

## 2024-12-13 NOTE — PROGRESS NOTES
Sotatercept Monitoring    Dosing History:  2nd Sotatercept dose given on 12/13/2024.   Starting Dose: 45 mg kit, 0.3 mg/kg. Inject 0.4 ml.   Original Target Dose: 60 mg kit, 0.7mg/kg. Inject 1.0 ml.  Current Target Dose: Same as original    Holding History:  Held on 11/20/2024 to 12/13/2024 for multiple ED visits and hospitalizations.     Date: 10/26/2024 Baseline HGB 8.3.     Lab Results   Component Value Date    WBC 4.3 (L) 12/11/2024    HGB 10.2 (L) 12/11/2024    HCT 34.4 (L) 12/11/2024    MCV 80 12/11/2024     (L) 12/11/2024     Per Dr. Prescott, ok for next dose yes    Symptoms: black tarry stools/nosebleeds/heavy menstrual bleeding/petechia: Denies    Plan:  Patient received specialty nursing for second injection teaching. Patient was able to utilize Winrevair booklet as guide to help prepare medication and administer injection. Additional nursing visit requested by patient planned for 1/3/2025.

## 2024-12-16 ENCOUNTER — APPOINTMENT (OUTPATIENT)
Age: 64
End: 2024-12-16
Payer: COMMERCIAL

## 2024-12-17 ENCOUNTER — DOCUMENTATION (OUTPATIENT)
Dept: PULMONOLOGY | Facility: HOSPITAL | Age: 64
End: 2024-12-17
Payer: COMMERCIAL

## 2024-12-23 ENCOUNTER — OFFICE VISIT (OUTPATIENT)
Dept: CARDIOLOGY | Facility: CLINIC | Age: 64
End: 2024-12-23
Payer: COMMERCIAL

## 2024-12-23 ENCOUNTER — APPOINTMENT (OUTPATIENT)
Dept: NEUROLOGY | Facility: CLINIC | Age: 64
End: 2024-12-23
Payer: COMMERCIAL

## 2024-12-23 ENCOUNTER — HOSPITAL ENCOUNTER (OUTPATIENT)
Dept: NEUROLOGY | Facility: CLINIC | Age: 64
Discharge: HOME | End: 2024-12-23
Payer: COMMERCIAL

## 2024-12-23 VITALS — HEART RATE: 97 BPM | DIASTOLIC BLOOD PRESSURE: 60 MMHG | SYSTOLIC BLOOD PRESSURE: 98 MMHG | OXYGEN SATURATION: 90 %

## 2024-12-23 VITALS
HEART RATE: 92 BPM | RESPIRATION RATE: 16 BRPM | HEIGHT: 65 IN | WEIGHT: 146 LBS | DIASTOLIC BLOOD PRESSURE: 73 MMHG | BODY MASS INDEX: 24.32 KG/M2 | SYSTOLIC BLOOD PRESSURE: 108 MMHG

## 2024-12-23 DIAGNOSIS — G62.9 NEUROPATHY: Primary | ICD-10-CM

## 2024-12-23 DIAGNOSIS — I47.10 SVT (SUPRAVENTRICULAR TACHYCARDIA) (CMS-HCC): ICD-10-CM

## 2024-12-23 DIAGNOSIS — G62.9 NEUROPATHY: ICD-10-CM

## 2024-12-23 DIAGNOSIS — I27.21 PAH (PULMONARY ARTERY HYPERTENSION) (MULTI): Primary | ICD-10-CM

## 2024-12-23 PROCEDURE — 99213 OFFICE O/P EST LOW 20 MIN: CPT | Performed by: INTERNAL MEDICINE

## 2024-12-23 PROCEDURE — 95912 NRV CNDJ TEST 11-12 STUDIES: CPT | Performed by: PSYCHIATRY & NEUROLOGY

## 2024-12-23 PROCEDURE — 3078F DIAST BP <80 MM HG: CPT | Performed by: PSYCHIATRY & NEUROLOGY

## 2024-12-23 PROCEDURE — 3074F SYST BP LT 130 MM HG: CPT | Performed by: INTERNAL MEDICINE

## 2024-12-23 PROCEDURE — 3078F DIAST BP <80 MM HG: CPT | Performed by: INTERNAL MEDICINE

## 2024-12-23 PROCEDURE — 3008F BODY MASS INDEX DOCD: CPT | Performed by: PSYCHIATRY & NEUROLOGY

## 2024-12-23 PROCEDURE — 95912 NRV CNDJ TEST 11-12 STUDIES: CPT | Mod: GC | Performed by: PSYCHIATRY & NEUROLOGY

## 2024-12-23 PROCEDURE — 95886 MUSC TEST DONE W/N TEST COMP: CPT | Performed by: PSYCHIATRY & NEUROLOGY

## 2024-12-23 PROCEDURE — 3074F SYST BP LT 130 MM HG: CPT | Performed by: PSYCHIATRY & NEUROLOGY

## 2024-12-23 PROCEDURE — 95886 MUSC TEST DONE W/N TEST COMP: CPT | Mod: GC | Performed by: PSYCHIATRY & NEUROLOGY

## 2024-12-23 PROCEDURE — 1036F TOBACCO NON-USER: CPT | Performed by: PSYCHIATRY & NEUROLOGY

## 2024-12-23 PROCEDURE — 99214 OFFICE O/P EST MOD 30 MIN: CPT | Performed by: PSYCHIATRY & NEUROLOGY

## 2024-12-23 PROCEDURE — 1036F TOBACCO NON-USER: CPT | Performed by: INTERNAL MEDICINE

## 2024-12-23 ASSESSMENT — ENCOUNTER SYMPTOMS
SHORTNESS OF BREATH: 0
PND: 0
CLAUDICATION: 0
SYNCOPE: 0
LOSS OF SENSATION IN FEET: 0
PALPITATIONS: 1
DIAPHORESIS: 0
MYALGIAS: 0
OCCASIONAL FEELINGS OF UNSTEADINESS: 0
WEIGHT LOSS: 0
COUGH: 0
DIZZINESS: 0
NEAR-SYNCOPE: 0
WEAKNESS: 0
DEPRESSION: 0
ORTHOPNEA: 0
WHEEZING: 0
FEVER: 0
DYSPNEA ON EXERTION: 0
WEIGHT GAIN: 0
IRREGULAR HEARTBEAT: 0

## 2024-12-23 ASSESSMENT — PAIN SCALES - GENERAL
PAINLEVEL_OUTOF10: 6
PAINLEVEL_OUTOF10: 6

## 2024-12-23 NOTE — ASSESSMENT & PLAN NOTE
Continue diltiazem, emphasized med compliance she states she is taking. Will refer to EP to discuss possible catheter-based therapy

## 2024-12-23 NOTE — PROGRESS NOTES
Subjective      No chief complaint on file.       64-year-old female with history of pulmonary arterial hypertension secondary to methamphetamine use.  She is intolerant to PDE 5's and is now on macitentan.  She follows with Dr. Prescott in this capacity inconsistently.  I have seen her in the past, she was catheterized in January of this year in the setting of an NSTEMI to find mild to moderate nonobstructive disease.  I have not seen her since.  She has been having palpitations more so than before for about a week, lasting seconds and resolving spontaneously. She has a h/o SVT, was seen in the office 12/9 by Dr Prescott who notes she is not taking her diltiazem. Just prior to that she was in the ED for SVT requiring ultimately cardioversion.         Review of Systems   Constitutional: Negative for diaphoresis, fever, weight gain and weight loss.   Eyes:  Negative for visual disturbance.   Cardiovascular:  Positive for palpitations. Negative for chest pain, claudication, dyspnea on exertion, irregular heartbeat, leg swelling, near-syncope, orthopnea, paroxysmal nocturnal dyspnea and syncope.   Respiratory:  Negative for cough, shortness of breath and wheezing.    Musculoskeletal:  Negative for muscle weakness and myalgias.   Neurological:  Negative for dizziness and weakness.   All other systems reviewed and are negative.       Past Medical History:   Diagnosis Date    Anxiety     Depression     Does not refill medications appropriately 09/10/2024    GERD (gastroesophageal reflux disease)     Lupus     Personal history of other specified conditions 09/21/2020    History of seizure    Pulmonary hypertension (Multi)     Volume overload 09/10/2024        Past Surgical History:   Procedure Laterality Date    CARDIAC CATHETERIZATION N/A 1/8/2024    Procedure: Left Heart Cath, With LV, Angriography And Grafts;  Surgeon: Christiano Calvillo DO;  Location: [facility] Cardiac Cath Lab;  Service: Cardiovascular;  Laterality: N/A;     CHOLECYSTECTOMY      HYSTERECTOMY      OTHER SURGICAL HISTORY  2020    Cholecystectomy    OTHER SURGICAL HISTORY  2020    Esophagogastroduodenoscopy    OTHER SURGICAL HISTORY  2020    Colonoscopy    OTHER SURGICAL HISTORY  2020     section    OTHER SURGICAL HISTORY  2020    Hysterectomy        Social History     Socioeconomic History    Marital status: Legally      Spouse name: Not on file    Number of children: Not on file    Years of education: Not on file    Highest education level: Not on file   Occupational History    Not on file   Tobacco Use    Smoking status: Former     Types: Cigarettes    Smokeless tobacco: Former   Vaping Use    Vaping status: Never Used   Substance and Sexual Activity    Alcohol use: Not Currently    Drug use: Not Currently     Types: Cocaine, Other     Comment: Meth    Sexual activity: Defer   Other Topics Concern    Not on file   Social History Narrative    Not on file     Social Drivers of Health     Financial Resource Strain: Patient Declined (2024)    Overall Financial Resource Strain (CARDIA)     Difficulty of Paying Living Expenses: Patient declined   Recent Concern: Financial Resource Strain - Medium Risk (2024)    Overall Financial Resource Strain (CARDIA)     Difficulty of Paying Living Expenses: Somewhat hard   Food Insecurity: Patient Declined (2024)    Hunger Vital Sign     Worried About Running Out of Food in the Last Year: Patient declined     Ran Out of Food in the Last Year: Patient declined   Transportation Needs: Patient Declined (2024)    PRAPARE - Transportation     Lack of Transportation (Medical): Patient declined     Lack of Transportation (Non-Medical): Patient declined   Physical Activity: Inactive (2024)    Exercise Vital Sign     Days of Exercise per Week: 0 days     Minutes of Exercise per Session: 0 min   Stress: No Stress Concern Present (10/23/2024)    [facility] - Occupational Stress Questionnaire     Feeling of Stress : Not at all   Social Connections: Not on File (9/13/2024)    Received from [facility]    Social Connections     Connectedness: 0   Intimate Partner Violence: Not At Risk (11/21/2024)    Humiliation, Afraid, Rape, and Kick questionnaire     Fear of Current or Ex-Partner: No     Emotionally Abused: No     Physically Abused: No     Sexually Abused: No   Housing Stability: Patient Declined (11/21/2024)    Housing Stability Vital Sign     Unable to Pay for Housing in the Last Year: Patient declined     Number of Times Moved in the Last Year: 1     Homeless in the Last Year: Patient declined        Family History   Problem Relation Name Age of Onset    No Known Problems Mother      No Known Problems Father          OBJECTIVE:    Vitals:    12/23/24 1340   BP: 98/60   Pulse: 97   SpO2: 90%        Vitals reviewed.   Constitutional:       Appearance: Normal and healthy appearance. Not in distress.   Pulmonary:      Effort: Pulmonary effort is normal.      Breath sounds: Normal breath sounds.   Cardiovascular:      Normal rate. Regular rhythm. Normal S1. Normal S2.       Murmurs: There is no murmur.      S4 Gallop.  No click.   Pulses:     Intact distal pulses.   Edema:     Peripheral edema absent.   Skin:     General: Skin is warm and dry.   Neurological:      General: No focal deficit present.          Lab Review:   Lab Results   Component Value Date     12/11/2024    K 4.6 12/11/2024     12/11/2024    CO2 23 12/11/2024    BUN 22 12/11/2024    CREATININE 0.92 12/11/2024    GLUCOSE 101 (H) 12/11/2024    CALCIUM 7.9 (L) 12/11/2024     Lab Results   Component Value Date    CHOL 125 10/03/2024    TRIG 117 10/03/2024    HDL 39.3 10/03/2024    LDLDIRECT 172 (H) 07/23/2019       Lab Results   Component Value Date    LDLCALC 62 10/03/2024        PAH (pulmonary artery hypertension) (Multi)  NYHA Class III WHO group 1 associated with Methamphetamine  use. Sees Dr Prescott for this. On Veletri and Wynrevair    SVT (supraventricular tachycardia) (CMS-HCC)  Continue diltiazem, emphasized med compliance she states she is taking. Will refer to EP to discuss possible catheter-based therapy

## 2024-12-23 NOTE — PROGRESS NOTES
ate of Service: 12/23/2024  Patient: Ronna Beckham  MRN: [id number]    Primary Care Physician: NANCY Liz      History of Present Illness:   Ms. Beckham is a 64 y.o. female who is here for follow up for numbness in legs and the hands.  She came alone.  I evaluated her initially in August 2024.    She has had no change of her symptoms.  She has had numbness, tingling and burning pain in both feet.  The symptoms started about 5 years ago and has slowly ascended up to her knees.  More recently she has noted numbness in the fingers.  This is symmetrical.  He has had no imbalance or weakness.  She reports occasional orthostatic dizziness and had remote syncopal episodes.  She denies any bladder or bowel control difficulties.    She has a history of alcohol, cocaine and meth abuse.  She said she stopped drinking alcohol about 4 months ago.  She has several year history of pulmonary arterial hypertension felt to be associated with methamphetamine abuse.  She is currently on Opsumit and Remodulin for pulmonary arterial hypertension as well as on nasal 4L O2 24/7.  She also has a history of mild systemic lupus erythematosus on hydroxychloroquine.  She has no history of diabetes mellitus. She has had dry mouth but no dry eyes.  She has had chronic diarrhea which he believes related to her current treatment.      She was seen by pain management and is taking now gabapentin 800 mg 3 times daily.  Since the effect was not optimal she was started recently on topiramate.    Otherwise, the past medical history, social history, and review of systems were reviewed. There are no significant changes.    Medications:     Current Outpatient Medications:     aspirin 81 mg EC tablet, Take 1 tablet (81 mg) by mouth once daily., Disp: 30 tablet, Rfl: 0    calcium carbonate-vitamin D3 500 mg-5 mcg (200 unit) tablet, Take 2 tablets by mouth 2 times a day., Disp: 120 tablet, Rfl: 0    dilTIAZem CD (Cardizem CD) 180 mg 24 hr  capsule, Take 1 capsule (180 mg) by mouth once daily., Disp: 30 capsule, Rfl: 0    epoprostenol (Veletri) 1.5 mg recon soln, 1.5 mg (1,500 mcg) by central venous line infusion route continuously. Reconstitute contents of vial with 5 ml diluent and mix cassette as directed. Infuse continuously per physician orders. Allow for priming volume. Dose range: 1-150 ng/kg/min., Disp: 180 each, Rfl: 11    ergocalciferol (Vitamin D-2) 1.25 MG (34067 UT) capsule, Take 1 capsule (1,250 mcg) by mouth 1 (one) time per week. On Wednesday, Disp: , Rfl:     folic acid (Folvite) 1 mg tablet, Take 1 tablet (1 mg) by mouth once daily. Needs to resume, Disp: , Rfl:     furosemide (Lasix) 40 mg tablet, Take 1.5 tablets (60 mg) by mouth once daily. (Patient taking differently: Take 1 tablet (40 mg) by mouth once daily.), Disp: 30 tablet, Rfl: 0    gabapentin (Neurontin) 800 mg tablet, Take 1 tablet (800 mg) by mouth 3 times a day. (Patient taking differently: Take 1 tablet (800 mg) by mouth 2 times a day.), Disp: 90 tablet, Rfl: 0    hydroxychloroquine (Plaquenil) 200 mg tablet, Take 1 tablet (200 mg) by mouth 2 times a day., Disp: 60 tablet, Rfl: 0    loperamide (Imodium) 2 mg capsule, Take 1 capsule (2 mg) by mouth once daily as needed for diarrhea., Disp: , Rfl:     magnesium chloride (MagDelay) 64 mg EC tablet, Take 1 tablet (64 mg) by mouth 2 times a day., Disp: 60 tablet, Rfl: 0    melatonin 5 mg tablet, Take 1 tablet (5 mg) by mouth once daily at bedtime., Disp: 30 tablet, Rfl: 0    multivitamin-iron-folic acid  mg-mcg tablet, Take 1 tablet by mouth once daily., Disp: , Rfl:     omeprazole (PriLOSEC) 40 mg DR capsule, Take 1 capsule (40 mg) by mouth once daily in the morning. Take before meals. Do not crush or chew., Disp: 30 capsule, Rfl: 0    Opsumit 10 mg tablet tablet, Take 1 tablet (10 mg) by mouth once daily., Disp: 30 tablet, Rfl: 11    potassium chloride CR 20 mEq ER tablet, Take 1 tablet (20 mEq) by mouth 2 times a  day. Do not crush or chew., Disp: 60 tablet, Rfl: 0    sotatercept-csrk (Winrevair) 60 mg kit, Inject 1 Dose under the skin every 21 (twenty-one) days., Disp: 3 kit, Rfl: 0    cholestyramine (Questran) 4 gram packet, Take 1 packet (4 g) by mouth once daily., Disp: 30 packet, Rfl: 0    venlafaxine XR (Effexor-XR) 150 mg 24 hr capsule, Take 1 capsule (150 mg) by mouth once daily. Do not crush or chew., Disp: 30 capsule, Rfl: 0     Neuromuscular Exam:     She is mild respiratory distress using nasal oxygen.  She came in a wheelchair because of shortness of breath.     Muscle bulk and tone are normal. Neck flexion and neck extension are normal (MRC 5/5).    MANUAL MUSCLE TESTING IS AS FOLLOWS:    R L      5 5 Shoulder abduction   5 5 Elbow flexion   5 5 Elbow extension   5 5 Wrist flexion   5 5 Wrist extension   5 5 Finger flexion   5 5 Finger extension   5 5 Finger abduction   5 5 Thumb distal flexion   5 5 Thumb abduction     5 5 Hip flexion   5 5 Hip adduction   5 5 Hip abduction   5 5 Knee flexion   5 5 Knee extension   5 5 Ankle dorsiflexion   5 5 Ankle plantarflexion   5 5 Eversion   5 5 Inversion   5 5 Big toe extension   5 5 Toe flexion       Reflexes   R L    1 1 Biceps    1 1 Brachioradialis    1 1 Triceps    1 1 Patellar   0 0 Achilles     Plantars toes downgoing to plantar stimulation. No clonus or other pathologic reflexes present.      Sensory:   Pinprick sensation was reduced significantly in both legs symmetrically with a distal to proximal gradient up to the knees.  There is no definite loss of sensation to pinprick in the hands.  Vibration is absent at the toes, slightly reduced at the ankles and normal elsewhere.  Position sense is normal at the toes.  Temperature sensation is slightly reduced at the feet.     Coordination:  Finger-nose-finger is normal without dysmetria or overshoot and heel-to-shin is normal. Rapid alternating movements in hands and feet are normal.     Gait:  Station is stable with  a normal base. Gait is stable with a normal arm swing and speed. There is no ataxia, shuffling, steppage or waddling gait. Tandem gait is intact.  With Romberg maneuver he swings but does not fall.    Results:    I reviewed recent blood work from August 2024.  Hemoglobin A1c serum immunofixation, vitamin B12, methylmalonic acid, JAREK panel and celiac antibody panel were normal or negative except for weakly positive JAREK at 1/80 with negative JAREK panel    I personally reviewed the data of her EMG study done earlier. This showed a sensory and motor axonal peripheral polyneuropathy, without active denervation.    Impression:  Ronna Beckham is a 64 y.o. 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   This is manifesting by  numbness, reduced sensation and pain in both feet ascended to the knees, numbness in the hands, and reduced vibration, pinprick and temperature sensation in the distal to proximal gradient. I have excluded paraproteinemia, vitamin B12 deficiency, Sjogren syndrome and celiac disease.    She has also history of excessive alcohol consumption which she stopped about 4 months ago. She has a history of pulmonary arterial hypertension possibly due to amphetamine use.        I discussed this with her in details.  I stressed the importance of abstinence from alcohol.  She will continue gabapentin and topiramate per pain management.  I will see her as needed.     I personally spent 30 minutes on the day of the visit completing the review of the medical record and outside records, obtaining history and performing an appropriate physical exam, patient care, counseling and education,  independently reviewing results, communicating with the patient and other providers, coordinating care and performing appropriate clinical documentation.

## 2024-12-23 NOTE — ASSESSMENT & PLAN NOTE
NYHA Class III WHO group 1 associated with Methamphetamine use. Sees Dr Prescott for this. On Veletri and Wynrevair

## 2025-01-02 ENCOUNTER — LAB (OUTPATIENT)
Dept: LAB | Facility: LAB | Age: 65
End: 2025-01-02
Payer: COMMERCIAL

## 2025-01-02 DIAGNOSIS — R06.02 SOB (SHORTNESS OF BREATH): ICD-10-CM

## 2025-01-02 DIAGNOSIS — Z79.899 LONG-TERM USE OF HIGH-RISK MEDICATION: ICD-10-CM

## 2025-01-02 DIAGNOSIS — I27.20 PULMONARY HTN (MULTI): ICD-10-CM

## 2025-01-02 DIAGNOSIS — E83.51 HYPOCALCEMIA: ICD-10-CM

## 2025-01-02 DIAGNOSIS — E83.42 HYPOMAGNESEMIA: ICD-10-CM

## 2025-01-02 DIAGNOSIS — I27.20 PULMONARY HYPERTENSION (MULTI): ICD-10-CM

## 2025-01-02 LAB
ALBUMIN SERPL BCP-MCNC: 4 G/DL (ref 3.4–5)
ALP SERPL-CCNC: 140 U/L (ref 33–136)
ALT SERPL W P-5'-P-CCNC: 28 U/L (ref 7–45)
ANION GAP SERPL CALC-SCNC: 13 MMOL/L (ref 10–20)
AST SERPL W P-5'-P-CCNC: 18 U/L (ref 9–39)
BASOPHILS # BLD AUTO: 0 X10*3/UL (ref 0–0.1)
BASOPHILS NFR BLD AUTO: 0 %
BILIRUB SERPL-MCNC: 1.1 MG/DL (ref 0–1.2)
BNP SERPL-MCNC: 510 PG/ML (ref 0–99)
BUN SERPL-MCNC: 20 MG/DL (ref 6–23)
CALCIUM SERPL-MCNC: 8.4 MG/DL (ref 8.6–10.3)
CHLORIDE SERPL-SCNC: 105 MMOL/L (ref 98–107)
CO2 SERPL-SCNC: 26 MMOL/L (ref 21–32)
CREAT SERPL-MCNC: 0.9 MG/DL (ref 0.5–1.05)
EGFRCR SERPLBLD CKD-EPI 2021: 72 ML/MIN/1.73M*2
EOSINOPHIL # BLD AUTO: 0 X10*3/UL (ref 0–0.7)
EOSINOPHIL NFR BLD AUTO: 0 %
ERYTHROCYTE [DISTWIDTH] IN BLOOD BY AUTOMATED COUNT: 19.8 % (ref 11.5–14.5)
GLUCOSE SERPL-MCNC: 84 MG/DL (ref 74–99)
HCT VFR BLD AUTO: 38.7 % (ref 36–46)
HGB BLD-MCNC: 11.2 G/DL (ref 12–16)
IMM GRANULOCYTES # BLD AUTO: 0.07 X10*3/UL (ref 0–0.7)
IMM GRANULOCYTES NFR BLD AUTO: 1.3 % (ref 0–0.9)
LYMPHOCYTES # BLD AUTO: 0.85 X10*3/UL (ref 1.2–4.8)
LYMPHOCYTES NFR BLD AUTO: 16 %
MAGNESIUM SERPL-MCNC: 1.83 MG/DL (ref 1.6–2.4)
MCH RBC QN AUTO: 23.6 PG (ref 26–34)
MCHC RBC AUTO-ENTMCNC: 28.9 G/DL (ref 32–36)
MCV RBC AUTO: 82 FL (ref 80–100)
MONOCYTES # BLD AUTO: 0.3 X10*3/UL (ref 0.1–1)
MONOCYTES NFR BLD AUTO: 5.6 %
NEUTROPHILS # BLD AUTO: 4.09 X10*3/UL (ref 1.2–7.7)
NEUTROPHILS NFR BLD AUTO: 77.1 %
NRBC BLD-RTO: 0 /100 WBCS (ref 0–0)
PHOSPHATE SERPL-MCNC: 4.2 MG/DL (ref 2.5–4.9)
PLATELET # BLD AUTO: 137 X10*3/UL (ref 150–450)
POTASSIUM SERPL-SCNC: 4.2 MMOL/L (ref 3.5–5.3)
PROT SERPL-MCNC: 6.5 G/DL (ref 6.4–8.2)
RBC # BLD AUTO: 4.74 X10*6/UL (ref 4–5.2)
SODIUM SERPL-SCNC: 140 MMOL/L (ref 136–145)
WBC # BLD AUTO: 5.3 X10*3/UL (ref 4.4–11.3)

## 2025-01-02 PROCEDURE — 80053 COMPREHEN METABOLIC PANEL: CPT

## 2025-01-02 PROCEDURE — 85025 COMPLETE CBC W/AUTO DIFF WBC: CPT

## 2025-01-02 PROCEDURE — 84100 ASSAY OF PHOSPHORUS: CPT

## 2025-01-02 PROCEDURE — 83735 ASSAY OF MAGNESIUM: CPT

## 2025-01-02 PROCEDURE — 83880 ASSAY OF NATRIURETIC PEPTIDE: CPT

## 2025-01-03 ENCOUNTER — DOCUMENTATION (OUTPATIENT)
Dept: PULMONOLOGY | Facility: HOSPITAL | Age: 65
End: 2025-01-03
Payer: COMMERCIAL

## 2025-01-03 ENCOUNTER — APPOINTMENT (OUTPATIENT)
Dept: CARDIOLOGY | Facility: CLINIC | Age: 65
End: 2025-01-03
Payer: COMMERCIAL

## 2025-01-03 DIAGNOSIS — I47.10 SVT (SUPRAVENTRICULAR TACHYCARDIA) (CMS-HCC): Primary | ICD-10-CM

## 2025-01-03 NOTE — PROGRESS NOTES
Patient called office to check lab status. Confirmed labs are ok for next dose. Patient confirmed she had the medication but wants to wait for [facility] RN to help administer again. No appointment scheduled, will reach out to attempt to schedule for Monday.     Nosebleed:  Patient also reports having bloody nose for 2+ days. On first day it was on and off. The next day was continuous with large clot. She went to primary care for help. Today it resolved on it's own. Now using humidification with oxygen and feeling better.

## 2025-01-07 ENCOUNTER — DOCUMENTATION (OUTPATIENT)
Dept: PULMONOLOGY | Facility: HOSPITAL | Age: 65
End: 2025-01-07
Payer: COMMERCIAL

## 2025-01-07 NOTE — PROGRESS NOTES
Sotatercept Monitoring    Dosing History:  3rd Sotatercept dose given on 1/7/2025.   Starting Dose: 45 mg kit, 0.3 mg/kg. Inject 0.4 ml.   Target Dose: 60 mg kit, 0.7mg/kg. Inject 1.0 ml.      Holding History:  Held on 11/20/2024 to 12/13/2024 for multiple ED visits and hospitalizations.     Date: 10/26/2024 Baseline HGB 8.3    Lab Results   Component Value Date    WBC 5.3 01/02/2025    HGB 11.2 (L) 01/02/2025    HCT 38.7 01/02/2025    MCV 82 01/02/2025     (L) 01/02/2025     Per Dr. Prescott, ok for next dose yes    Symptoms: black tarry stools/nosebleeds/heavy menstrual bleeding/petechia: none    Plan:  Patient administered dose #3 with assistance from [facility] specialty pharmacy nursing. Per nursing, patient is now independent with medication administration including mixing and injection. No further nursing visits at this time.

## 2025-01-07 NOTE — PROGRESS NOTES
Patient called office.   Medication: Veletri    Current dose:     68ng/kg/min     72 mls/hr    concentration of 90,000    Weight 66 kg    Titration plan: not titrating. Need to decrease for symptoms    Side Effects (flushing/diarrhea/increased SOB/headache/nausea/1st bite jaw pain):  Reporting increased jaw pain for a couple weeks. Pain previously resolved after a few bites, now does not and patient is c/o increased discomfort. Denies all other symptoms.     Plan:   Decrease to   67 ng/kg/min   71 mls/hr    concentration of 90,000  Patient to call office on Thursday/Friday to assess

## 2025-01-13 ENCOUNTER — APPOINTMENT (OUTPATIENT)
Dept: PULMONOLOGY | Facility: HOSPITAL | Age: 65
End: 2025-01-13
Payer: COMMERCIAL

## 2025-01-13 ENCOUNTER — APPOINTMENT (OUTPATIENT)
Dept: RESPIRATORY THERAPY | Facility: HOSPITAL | Age: 65
End: 2025-01-13
Payer: COMMERCIAL

## 2025-01-17 ENCOUNTER — LAB (OUTPATIENT)
Dept: LAB | Facility: LAB | Age: 65
End: 2025-01-17
Payer: COMMERCIAL

## 2025-01-17 DIAGNOSIS — R06.02 SOB (SHORTNESS OF BREATH): ICD-10-CM

## 2025-01-17 DIAGNOSIS — I27.20 PULMONARY HYPERTENSION (MULTI): ICD-10-CM

## 2025-01-17 LAB
BASOPHILS # BLD AUTO: 0.01 X10*3/UL (ref 0–0.1)
BASOPHILS NFR BLD AUTO: 0.2 %
EOSINOPHIL # BLD AUTO: 0 X10*3/UL (ref 0–0.7)
EOSINOPHIL NFR BLD AUTO: 0 %
ERYTHROCYTE [DISTWIDTH] IN BLOOD BY AUTOMATED COUNT: 19.7 % (ref 11.5–14.5)
HCT VFR BLD AUTO: 33.3 % (ref 36–46)
HGB BLD-MCNC: 10 G/DL (ref 12–16)
IMM GRANULOCYTES # BLD AUTO: 0.21 X10*3/UL (ref 0–0.7)
IMM GRANULOCYTES NFR BLD AUTO: 3.8 % (ref 0–0.9)
LYMPHOCYTES # BLD AUTO: 1.09 X10*3/UL (ref 1.2–4.8)
LYMPHOCYTES NFR BLD AUTO: 19.9 %
MCH RBC QN AUTO: 24.4 PG (ref 26–34)
MCHC RBC AUTO-ENTMCNC: 30 G/DL (ref 32–36)
MCV RBC AUTO: 81 FL (ref 80–100)
MONOCYTES # BLD AUTO: 0.34 X10*3/UL (ref 0.1–1)
MONOCYTES NFR BLD AUTO: 6.2 %
NEUTROPHILS # BLD AUTO: 3.84 X10*3/UL (ref 1.2–7.7)
NEUTROPHILS NFR BLD AUTO: 69.9 %
NRBC BLD-RTO: 1.5 /100 WBCS (ref 0–0)
PLATELET # BLD AUTO: 139 X10*3/UL (ref 150–450)
RBC # BLD AUTO: 4.09 X10*6/UL (ref 4–5.2)
WBC # BLD AUTO: 5.5 X10*3/UL (ref 4.4–11.3)

## 2025-01-17 PROCEDURE — 85025 COMPLETE CBC W/AUTO DIFF WBC: CPT

## 2025-01-21 ENCOUNTER — DOCUMENTATION (OUTPATIENT)
Dept: PULMONOLOGY | Facility: HOSPITAL | Age: 65
End: 2025-01-21
Payer: COMMERCIAL

## 2025-01-21 NOTE — PROGRESS NOTES
Sotatercept Monitoring    Dosing History:  4th Sotatercept dose to be given on 1/28/2025.   Starting Dose: 45 mg kit, 0.3 mg/kg. Inject 0.4 ml.   Target Dose: 60 mg kit, 0.7mg/kg. Inject 1.0 ml.      Holding History:  Held on 11/20/2024 to 12/13/2024 for multiple ED visits and hospitalizations.     Date: 10/26/2024 Baseline HGB 8.3    Lab Results   Component Value Date    WBC 5.5 01/17/2025    HGB 10.0 (L) 01/17/2025    HCT 33.3 (L) 01/17/2025    MCV 81 01/17/2025     (L) 01/17/2025     Per junito Momin for next dose yes    Symptoms: black tarry stools/nosebleeds/heavy menstrual bleeding/petechia: Denies    Plan:  Patient to self inject dose #4. Continue labs prior to doses.

## 2025-01-21 NOTE — PROGRESS NOTES
Patient called into office to report erythema at [place] site. Further states has had more drainage at the site. Pictures sent in after dressing change.     Plan:  Per Dr. Prescott, begin to change dressing every other day. No need for antibiotics at this time. Patient to call in if symptoms worsen.

## 2025-01-22 ENCOUNTER — DOCUMENTATION (OUTPATIENT)
Dept: PULMONOLOGY | Facility: HOSPITAL | Age: 65
End: 2025-01-22
Payer: COMMERCIAL

## 2025-01-22 NOTE — PROGRESS NOTES
"Patient called into office to report \"eye dizziness\" that began about 15-20 minutes ago. Denies, general dizziness, lightheadedness, headache, nausea, vomiting, edema. Does endorse slight increase in SOB overnight.     Plan:  \"Eye dizziness\" not an expected symptom of PAH. Advised pt per Dr. Prescott if no resolution in the next 10 minutes to go to ED for evaluation. Patient verbalized understanding.  "

## 2025-02-14 DIAGNOSIS — I27.20 PULMONARY HYPERTENSION (MULTI): ICD-10-CM

## 2025-02-14 DIAGNOSIS — R06.02 SOB (SHORTNESS OF BREATH): ICD-10-CM

## 2025-02-14 DIAGNOSIS — Z79.899 LONG-TERM USE OF HIGH-RISK MEDICATION: ICD-10-CM

## 2025-02-14 ASSESSMENT — ENCOUNTER SYMPTOMS
EYES NEGATIVE: 1
DIARRHEA: 1
MUSCULOSKELETAL NEGATIVE: 1
PSYCHIATRIC NEGATIVE: 1
HEMATOLOGIC/LYMPHATIC NEGATIVE: 1
ENDOCRINE NEGATIVE: 1
CONSTITUTIONAL NEGATIVE: 1
PALPITATIONS: 1
ALLERGIC/IMMUNOLOGIC NEGATIVE: 1

## 2025-02-14 NOTE — PROGRESS NOTES
History Of Present Illness  Ronna Beckham is a 64 y.o. female presenting with pulmonary arterial hypertension follow up. Patient is NYHA Functional Class 3+ and WHO Group 1. Last office visit was on 12/9/2024. She was referred by Dr. Calvillo on 9/11/2020.     Recent hospital admissions include 8/26-8/29, CHF exacerbation, problems refilling lasix. 9/7-9/9 for  hypervolemia, not taking medications per patient. 10/3-10/6 with vision changes and dizziness after 4 days of increased diarrhea. Had not been taking her home diltiazem. Found to be in SVT now s/p adenosine and in NSR. 10/23-10/26 for management of acute on chronic respiratory failure, hypomagnesia, hypocalcemia and moderate pericardial effusion. Patient had a mechanical fall on 10/25 where she tripped on O2 tubing while going to the bathroom, hitting her head on the linen cart. 11/20-11/22 admitted for SVT.    PAH Treatment:  Veletri, 67 ng/kg/min, 71ml/24hr, concentration 90,000 ng/ml (9/23/2020)  Opsumit (4/12/2021)  Winrevair  (11/1/2024)  Oxygen 4 LPM continuously  Infusion site: [place]  Treatment history:   Tadalafil (5/4/2021-5/6/2021) headache    Today's testing includes 6 MWT and Labs    Interval History   Echo done today. Diarrhea only when not taking Imodium. Palpitations infrequent. Seeing Dr. Le on 2/21 for this. Exercises with oxygen at 4 L NC. Denies shortness of breath while exercising.      Past Medical History  Patient Active Problem List   Diagnosis    Shortness of breath    NSTEMI (non-ST elevated myocardial infarction) (Multi)    Toothache    Systemic lupus erythematosus (Multi)    Syncope and collapse    Sore throat    Seizure disorder (Multi)    Right ventricular systolic dysfunction    Acute on chronic right-sided heart failure    Pulmonary hypertension (Multi)    Paresthesia    Hand pain    PAH (pulmonary artery hypertension) (Multi)    Olecranon bursitis, right elbow    Obesity    Neuropathy    Nasal congestion     Methamphetamine abuse (Multi)    Major depressive disorder, single episode, moderate (Multi)    Liver disease    Insomnia    Increased oxygen demand    Hypomagnesemia    Hypocalcemia    Hyperlipidemia    History of non-ST elevation myocardial infarction (NSTEMI)    History of alcohol abuse    Fatigue    Electrolyte and fluid disorder    Prolonged Q-T interval on ECG    Lower leg edema    Edema of eyelid    Ear pain, bilateral    Drug abuse (Multi)    Dizziness    Diverticulosis    Depressive disorder    Dental infection    Decreased GFR    Cough    Chronic obstructive pulmonary disease (Multi)    Central line complication    Cellulitis    Blepharitis of both eyes    Blepharitis    Primary hypertension    Anxiety    Alcohol dependence, uncomplicated (Multi)    Acute otitis externa of left ear    Acute on chronic cholecystitis    Acute cor pulmonale (Multi)    Gastroesophageal reflux disease    Chronic respiratory failure with hypoxia, on home O2 therapy (Multi)    Acquired thrombocytopenia (CMS-HCC)    Acquired absence of cervix    Acidosis, unspecified    Abnormal findings on diagnostic imaging of breast    Pulmonary HTN (Multi)    Long-term use of high-risk medication    SVT (supraventricular tachycardia) (CMS-HCC)    Acute on chronic hypoxic respiratory failure (Multi)    Pneumonia of left lower lobe due to infectious organism    Volume overload    Does not refill medications appropriately    Repeated falls    Difficulty walking    Acute on chronic respiratory failure with hypoxia (Multi)    Pericardial effusion (HHS-HCC)    Acute on chronic respiratory failure (Multi)    Anasarca    Supraventricular tachycardia (CMS-HCC)        Surgical History  She has a past surgical history that includes Other surgical history (09/21/2020); Other surgical history (09/21/2020); Other surgical history (09/21/2020); Other surgical history (09/21/2020); Other surgical history (09/21/2020); Hysterectomy; Cholecystectomy; and  Cardiac catheterization (N/A, 1/8/2024).     Social History  She reports that she has quit smoking. Her smoking use included cigarettes. She has quit using smokeless tobacco. She reports that she does not currently use alcohol. She reports that she does not currently use drugs after having used the following drugs: Cocaine and Other.    Family History  Family History   Problem Relation Name Age of Onset    No Known Problems Mother      No Known Problems Father          Medications  Current Outpatient Medications:     aspirin 81 mg EC tablet, Take 1 tablet (81 mg) by mouth once daily., Disp: 30 tablet, Rfl: 0    calcium carbonate-vitamin D3 500 mg-5 mcg (200 unit) tablet, Take 2 tablets by mouth 2 times a day., Disp: 120 tablet, Rfl: 0    cholestyramine (Questran) 4 gram packet, Take 1 packet (4 g) by mouth once daily., Disp: 30 packet, Rfl: 0    dilTIAZem CD (Cardizem CD) 180 mg 24 hr capsule, Take 1 capsule (180 mg) by mouth once daily., Disp: 30 capsule, Rfl: 0    epoprostenol (Veletri) 1.5 mg recon soln, 1.5 mg (1,500 mcg) by central venous line infusion route continuously. Reconstitute contents of vial with 5 ml diluent and mix cassette as directed. Infuse continuously per physician orders. Allow for priming volume. Dose range: 1-150 ng/kg/min., Disp: 180 each, Rfl: 11    ergocalciferol (Vitamin D-2) 1.25 MG (56505 UT) capsule, Take 1 capsule (1,250 mcg) by mouth 1 (one) time per week. On Wednesday, Disp: , Rfl:     folic acid (Folvite) 1 mg tablet, Take 1 tablet (1 mg) by mouth once daily. Needs to resume, Disp: , Rfl:     furosemide (Lasix) 40 mg tablet, Take 1.5 tablets (60 mg) by mouth once daily., Disp: 30 tablet, Rfl: 0    gabapentin (Neurontin) 800 mg tablet, Take 1 tablet (800 mg) by mouth 3 times a day., Disp: 90 tablet, Rfl: 0    hydroxychloroquine (Plaquenil) 200 mg tablet, Take 1 tablet (200 mg) by mouth 2 times a day., Disp: 60 tablet, Rfl: 0    loperamide (Imodium) 2 mg capsule, Take 1 capsule (2  mg) by mouth once daily as needed for diarrhea., Disp: , Rfl:     magnesium chloride (MagDelay) 64 mg EC tablet, Take 1 tablet (64 mg) by mouth 2 times a day., Disp: 60 tablet, Rfl: 0    melatonin 5 mg tablet, Take 1 tablet (5 mg) by mouth once daily at bedtime., Disp: 30 tablet, Rfl: 0    multivitamin-iron-folic acid  mg-mcg tablet, Take 1 tablet by mouth once daily., Disp: , Rfl:     omeprazole (PriLOSEC) 40 mg DR capsule, Take 1 capsule (40 mg) by mouth once daily in the morning. Take before meals. Do not crush or chew., Disp: 30 capsule, Rfl: 0    Opsumit 10 mg tablet tablet, Take 1 tablet (10 mg) by mouth once daily., Disp: 30 tablet, Rfl: 11    potassium chloride CR 20 mEq ER tablet, Take 1 tablet (20 mEq) by mouth 2 times a day. Do not crush or chew., Disp: 60 tablet, Rfl: 0    sotatercept-csrk (Winrevair) 60 mg kit, Inject 1 Dose under the skin every 21 (twenty-one) days., Disp: 3 kit, Rfl: 0    venlafaxine XR (Effexor-XR) 150 mg 24 hr capsule, Take 1 capsule (150 mg) by mouth once daily. Do not crush or chew., Disp: 30 capsule, Rfl: 0     Allergies  Levetiracetam    Review of Systems   Constitutional: Negative.    HENT: Negative.     Eyes: Negative.    Cardiovascular:  Positive for palpitations.   Gastrointestinal:  Positive for diarrhea.   Endocrine: Negative.    Genitourinary: Negative.    Musculoskeletal: Negative.    Skin: Negative.    Allergic/Immunologic: Negative.    Neurological: Negative.    Hematological: Negative.    Psychiatric/Behavioral: Negative.         Last Recorded Vitals  There were no vitals taken for this visit.       Physical Exam  Constitutional:       Appearance: Normal appearance.      Comments: Wheelchair , oxygen, flushed. Thin   HENT:      Head: Normocephalic.   Cardiovascular:      Rate and Rhythm: Normal rate and regular rhythm.      Heart sounds: Murmur heard.      Comments: Increased second heart sound.   Pulmonary:      Effort: Pulmonary effort is normal.      Breath  sounds: Normal breath sounds.   Abdominal:      General: Bowel sounds are normal.      Palpations: Abdomen is soft.   Musculoskeletal:      Right lower leg: No edema.      Left lower leg: No edema.   Skin:     Findings: Rash present.   Psychiatric:         Mood and Affect: Mood normal.         Judgment: Judgment normal.     Relevant Results    6MWT (2/17/2025)  SP02-  HR-  CHEIKH-  Actual Meters-     Echo (2/17/2025) pending - still with severe RV dilation although RA appears smaller with slightly better LV filling.    Patient with code blue running out of oxygen, now baseline. (12/9/2024)    Echo (10/24/2024)  Left Ventricle: Left ventricular ejection fraction is hyperdynamic, by visual estimate at 75%. The left ventricular cavity size is decreased. The interventricular septum is flattened in systole and diastole, consistent with right ventricular pressure and volume overload. Left ventricular diastolic filling was not assessed. The left ventricle appears underfilled.  Left Atrium: The left atrium was not assessed.  Right Ventricle: The right ventricle is severely enlarged. There is moderately reduced right ventricular systolic function.  Right Atrium: The right atrium is severely dilated. A dilated inferior vena cava demonstrates poor inspiratory collapse, consistent with elevated right atrial pressures.  Aortic Valve: The aortic valve is trileaflet. There is no evidence of aortic valve regurgitation.  Mitral Valve: The mitral valve is normal in structure. There is trace mitral valve regurgitation.  Tricuspid Valve: The tricuspid valve is abnormal. There is severe tricuspid regurgitation. The Doppler estimated RVSP is severely elevated at 105.1 mmHg.  Pulmonic Valve: The pulmonic valve is structurally normal. There is trace pulmonic valve regurgitation.  Pericardium: Small to moderate pericardial effusion. There is no evidence of cardiac tamponade.  Aorta: The aortic root was not assessed.  In comparison to the  previous echocardiogram(s): Compared with study dated 4/24/2024, there is no significant difference in RV size and function. There is again severe TR. The RVSP has increased from 89 to 105 mmHg. The pericardial effusion appears unchanged.     CONCLUSIONS:   1. Left ventricular ejection fraction is hyperdynamic, by visual estimate at 75%.   2. Left ventricular cavity size is decreased.   3. Right ventricular volume and pressure overload.   4. There is moderately reduced right ventricular systolic function.   5. Severely enlarged right ventricle.   6. Severely elevated right ventricular systolic pressure.   7. The right atrium is severely dilated.   8. Severe tricuspid regurgitation visualized.   9. Small to moderate pericardial effusion.  10. There is no evidence of cardiac tamponade.  11. A dilated inferior vena cava demonstrates poor inspiratory collapse, consistent with elevated right atrial pressures.  12. Compared with study dated 4/24/2024, there is no significant difference in RV size and function. There is again severe TR. The RVSP has increased from 89 to 105 mmHg. The pericardial effusion appears unchanged.     CT angio chest for pulmonary embolism (10/22/2024)  1. No evidence of acute pulmonary embolism.  2. There is a moderate size pericardial effusion noted.    6MWT (7/22/2024)  SP02-98%/96% on 4L  HR-106/114  CHEIKH-2/2  Actual Meters- 183 meters     6MWT (5/13/2024)  SP02- 96-96% /4 L NC  HR-   CHEIKH-0-4  Actual Meters 216 meters     CT angio chest for PE (5/30/2024)  1. No findings of acute pulmonary embolism.  2. Persistent marked cardiac enlargement. Hepatic heterogeneity and splenomegaly suggesting right heart dysfunction with associated not make liver and portal hypertension.  3. Bronchial thickening. There are couple of other stable pulmonary nodules when compared to most recent examination, however there are areas of ground-glass opacity seen in the lungs possibly reflecting inflammatory  etiologies. Findings of mild pulmonary edema and volume  Overload     Echo (4/24/2024) [facility]  Left Ventricle: The left ventricular systolic function is normal, with an estimated ejection fraction of 60-65%. There are no regional wall motion abnormalities. The left ventricular cavity size is normal. The interventricular septum is flattened in systole and diastole, consistent with right ventricular pressure and volume overload. Left ventricular diastolic filling was not assessed.  Left Atrium: The left atrium is normal in size. A bubble study using agitated saline was performed. Bubble study is positive. A small PFO (= 10 bubbles) was demonstrated. Agitated saline contrast study was positive for small intracardiac shunt.  Right Ventricle: The right ventricle is severely enlarged. There is severely reduced right ventricular systolic function. Although the TAPSE and RVA' are normal, the fractional area change is markedly reduced consistent wtih severe RV systolic dysfunction. Still able to generate high RVSP.  Right Atrium: The right atrium is severely dilated.  Aortic Valve: The aortic valve is trileaflet. There is minimal aortic valve cusp calcification. Aortic valve regurgitation was not assessed.  Mitral Valve: The mitral valve is normal in structure. Mitral valve regurgitation was not assessed.  Tricuspid Valve: The tricuspid valve is structurally normal. There is severe tricuspid regurgitation. The Doppler estimated RVSP is severely elevated at 88.6 mmHg.  Pulmonic Valve: The pulmonic valve is not well visualized. Pulmonic valve regurgitation was not assessed.  Pericardium: There is a small to moderate pericardial effusion. Small circumferential pericardial effusin though is small to moderate adjacent to the RA. No RV or RA collapse. The IVC is dilated, though likely due to the presence of severe pulmonary hypertension. No significant respiratory variation of the MV or TV inflows. Not diagnostic for tamponade  though difficult to dx tamponade in setting of severe pulmonary hypertension.  Aorta: The aortic root is normal.  Systemic Veins: The inferior vena cava appears dilated. There is IVC inspiratory collapse greater than 50%.  In comparison to the previous echocardiogram(s): Compared with study from 3/12/2022,. Compared with the prior exam from 3/12/2022 the RV had a similar appearance with severe dilatation and reduced function. Note the TR was not asessed on that exam to allow estimation of the RVSP at that time. The pericardial effusion appears similar in size.  CONCLUSIONS:   1. Left ventricular systolic function is normal with a 60-65% estimated ejection fraction.   2. Right ventricular volume and pressure overload.   3. Although the TAPSE and RVA' are normal, the fractional area change is markedly reduced consistent wtih severe RV systolic dysfunction. Still able to generate high RVSP.   4. Severely enlarged right ventricle.   5. There is severely reduced right ventricular systolic function.   6. Agitated saline contrast study was positive for small intracardiac shunt.   7. The right atrium is severely dilated.   8. There is a small to moderate pericardial effusion.   9. Small circumferential pericardial effusin though is small to moderate adjacent to the RA. No RV or RA collapse. The IVC is dilated, though likely due to the presence of severe pulmonary hypertension. No significant respiratory variation of the MV or TV inflows. Not diagnostic for tamponade though difficult to dx tamponade in setting of severe pulmonary hypertension.  10. Severe tricuspid regurgitation visualized.  11. Severely elevated right ventricular systolic pressure.  12. Compared with the prior exam from 3/12/2022 the RV had a similar appearance with severe dilatation and reduced function. Note the TR was not asessed on that exam to allow estimation of the RVSP at that time. The pericardial effusion appears similar in size.  13. A bubble  study using agitated saline was performed. Bubble study is positive. A small PFO (= 10 bubbles) was demonstrated.     6MWT (2023)  SpO2: 98% - 95% on 4L  HR: 97 - 116  Valentin: 1 - 7  Actual 314m     6MWT (2022)  SpO2: 94 - 90 on RA   HR: 78 - 111  Valentin: 0 - 1  Meters: 366      6MWT (2021)   SpO2: 92% - 87% on RA  HR: 81 - 94  Valentin: 3 - 4  Actual 311m     6MWT (2021)  HR 91 -102  Spo2 97 - 95 on RA  Valentin 1- 4  Meters 287/predicted 477     RHC (2020)   PAP- 73/31 (47), 83/26 (54)  PCWP- 8  CO/CI- 3.8/2.0      Echo (2020) from OSH   moderately to severely dilated RV with moderately reduced function and a moderately dilated RA.      Assessment/Plan     1) Pulmonary  a. PAH associated with methamphetamine use, presented FCIV, V/Q (-) , depressed CI, now on infusion therapy with dramatic subjective improvement, still high risk. Still with hectic follow up at best, (+) cocaine during hospitalization 2024. (+) Cannabinoid and amphetamine 2024.  In past, adjust dosing for weight loss and follow resolution of epo rash and significant diarrhea. Patient went up without authorization. Seems stable from PAH standpoint,      2024 Did not finish Keflex ordered for possible site infection. Better today.      Intolerant to PDE5     On macitentan     2) Neuro - pre-existing neuropathy, alcohol vs ?     2024 complaining of classic sciatic pain onset after riding horse.      3) Cardiac - hypertension     4) Interval diagnosis of lupus on basis of ?     Plan    1) Continue current meds     2) May go home when sister brings oxygen. If not before closing time, to ED. To wait in a monitored situation.     3) Follow up in clinic. 8 weeks with 6 minute walk and routine labs.     4) Decrease to 73 ml/24 hours over next week, 78,76,74,72 every other day. Call this Thursday for checkin.     5) 2+ cans boost per day. Will consider EPO->DESMOND if unsuccessful in halting weight loss.     6) Patient will  call in weekly, if no weight gain and persistent extra imodium needed, will consider further epoprostenol dose reduction.     7) Will review 6MW to be done after this visit.       Communicated with patient who understands.

## 2025-02-17 ENCOUNTER — HOSPITAL ENCOUNTER (OUTPATIENT)
Dept: CARDIOLOGY | Facility: HOSPITAL | Age: 65
Discharge: HOME | End: 2025-02-17
Payer: COMMERCIAL

## 2025-02-17 ENCOUNTER — APPOINTMENT (OUTPATIENT)
Dept: RESPIRATORY THERAPY | Facility: HOSPITAL | Age: 65
End: 2025-02-17
Payer: COMMERCIAL

## 2025-02-17 ENCOUNTER — HOSPITAL ENCOUNTER (OUTPATIENT)
Dept: RESPIRATORY THERAPY | Facility: HOSPITAL | Age: 65
Discharge: HOME | End: 2025-02-17
Payer: COMMERCIAL

## 2025-02-17 ENCOUNTER — OFFICE VISIT (OUTPATIENT)
Dept: PULMONOLOGY | Facility: HOSPITAL | Age: 65
End: 2025-02-17
Payer: COMMERCIAL

## 2025-02-17 VITALS
DIASTOLIC BLOOD PRESSURE: 69 MMHG | SYSTOLIC BLOOD PRESSURE: 108 MMHG | OXYGEN SATURATION: 100 % | HEIGHT: 65 IN | HEART RATE: 80 BPM | WEIGHT: 149 LBS | TEMPERATURE: 98.4 F | BODY MASS INDEX: 24.83 KG/M2

## 2025-02-17 DIAGNOSIS — R60.0 LOWER LEG EDEMA: ICD-10-CM

## 2025-02-17 DIAGNOSIS — I27.20 PULMONARY HYPERTENSION (MULTI): ICD-10-CM

## 2025-02-17 DIAGNOSIS — I27.20 PULMONARY HTN (MULTI): Primary | ICD-10-CM

## 2025-02-17 DIAGNOSIS — Z79.899 LONG-TERM USE OF HIGH-RISK MEDICATION: ICD-10-CM

## 2025-02-17 DIAGNOSIS — I27.20 PULMONARY HTN (MULTI): ICD-10-CM

## 2025-02-17 DIAGNOSIS — I27.21 PAH (PULMONARY ARTERY HYPERTENSION) (MULTI): Primary | ICD-10-CM

## 2025-02-17 DIAGNOSIS — I21.4 NSTEMI (NON-ST ELEVATED MYOCARDIAL INFARCTION) (MULTI): Primary | ICD-10-CM

## 2025-02-17 LAB
AORTIC VALVE PEAK VELOCITY: 1.34 M/S
AV PEAK GRADIENT: 7 MMHG
AVA (PEAK VEL): 2.28 CM2
EJECTION FRACTION APICAL 4 CHAMBER: 65.8
EJECTION FRACTION: 63 %
LEFT ATRIUM VOLUME AREA LENGTH INDEX BSA: 30.5 ML/M2
LEFT VENTRICLE INTERNAL DIMENSION DIASTOLE: 3.84 CM (ref 3.5–6)
LEFT VENTRICULAR OUTFLOW TRACT DIAMETER: 2.07 CM
MITRAL VALVE E/A RATIO: 0.56
RIGHT VENTRICLE FREE WALL PEAK S': 10 CM/S
RIGHT VENTRICLE PEAK SYSTOLIC PRESSURE: 91.5 MMHG
TRICUSPID ANNULAR PLANE SYSTOLIC EXCURSION: 2 CM

## 2025-02-17 PROCEDURE — 93306 TTE W/DOPPLER COMPLETE: CPT

## 2025-02-17 PROCEDURE — 1036F TOBACCO NON-USER: CPT | Performed by: INTERNAL MEDICINE

## 2025-02-17 PROCEDURE — 93306 TTE W/DOPPLER COMPLETE: CPT | Performed by: INTERNAL MEDICINE

## 2025-02-17 PROCEDURE — 94618 PULMONARY STRESS TESTING: CPT | Performed by: STUDENT IN AN ORGANIZED HEALTH CARE EDUCATION/TRAINING PROGRAM

## 2025-02-17 PROCEDURE — 94618 PULMONARY STRESS TESTING: CPT

## 2025-02-17 PROCEDURE — 3078F DIAST BP <80 MM HG: CPT | Performed by: INTERNAL MEDICINE

## 2025-02-17 PROCEDURE — 3074F SYST BP LT 130 MM HG: CPT | Performed by: INTERNAL MEDICINE

## 2025-02-17 PROCEDURE — 3008F BODY MASS INDEX DOCD: CPT | Performed by: INTERNAL MEDICINE

## 2025-02-17 PROCEDURE — 99215 OFFICE O/P EST HI 40 MIN: CPT | Performed by: INTERNAL MEDICINE

## 2025-02-17 RX ORDER — MAGNESIUM HYDROXIDE 400 MG/5ML
2 SUSPENSION, ORAL (FINAL DOSE FORM) ORAL DAILY
COMMUNITY

## 2025-02-17 ASSESSMENT — ENCOUNTER SYMPTOMS
NEUROLOGICAL NEGATIVE: 1
LOSS OF SENSATION IN FEET: 0
DEPRESSION: 0
OCCASIONAL FEELINGS OF UNSTEADINESS: 0

## 2025-02-17 ASSESSMENT — PAIN SCALES - GENERAL: PAINLEVEL_OUTOF10: 0-NO PAIN

## 2025-02-18 LAB
ALBUMIN SERPL-MCNC: 4.2 G/DL (ref 3.6–5.1)
ALP SERPL-CCNC: 135 U/L (ref 37–153)
ALT SERPL-CCNC: 25 U/L (ref 6–29)
ANION GAP SERPL CALCULATED.4IONS-SCNC: 8 MMOL/L (CALC) (ref 7–17)
AST SERPL-CCNC: 24 U/L (ref 10–35)
BILIRUB SERPL-MCNC: 0.7 MG/DL (ref 0.2–1.2)
BNP SERPL-MCNC: 228 PG/ML
BUN SERPL-MCNC: 23 MG/DL (ref 7–25)
CALCIUM SERPL-MCNC: 8.9 MG/DL (ref 8.6–10.4)
CHLORIDE SERPL-SCNC: 103 MMOL/L (ref 98–110)
CO2 SERPL-SCNC: 26 MMOL/L (ref 20–32)
CREAT SERPL-MCNC: 0.86 MG/DL (ref 0.5–1.05)
EGFRCR SERPLBLD CKD-EPI 2021: 76 ML/MIN/1.73M2
GLUCOSE SERPL-MCNC: 82 MG/DL (ref 65–139)
MAGNESIUM SERPL-MCNC: 2.1 MG/DL (ref 1.5–2.5)
POTASSIUM SERPL-SCNC: 5 MMOL/L (ref 3.5–5.3)
PROT SERPL-MCNC: 6.9 G/DL (ref 6.1–8.1)
SODIUM SERPL-SCNC: 137 MMOL/L (ref 135–146)

## 2025-02-21 ENCOUNTER — OFFICE VISIT (OUTPATIENT)
Dept: CARDIOLOGY | Facility: CLINIC | Age: 65
End: 2025-02-21
Payer: COMMERCIAL

## 2025-02-21 VITALS
DIASTOLIC BLOOD PRESSURE: 60 MMHG | SYSTOLIC BLOOD PRESSURE: 90 MMHG | WEIGHT: 145 LBS | BODY MASS INDEX: 24.13 KG/M2 | HEART RATE: 92 BPM

## 2025-02-21 DIAGNOSIS — I47.10 SVT (SUPRAVENTRICULAR TACHYCARDIA) (CMS-HCC): Primary | ICD-10-CM

## 2025-02-21 PROCEDURE — 99214 OFFICE O/P EST MOD 30 MIN: CPT | Mod: 25 | Performed by: INTERNAL MEDICINE

## 2025-02-21 PROCEDURE — 93005 ELECTROCARDIOGRAM TRACING: CPT | Performed by: INTERNAL MEDICINE

## 2025-02-21 ASSESSMENT — ENCOUNTER SYMPTOMS
OCCASIONAL FEELINGS OF UNSTEADINESS: 0
LOSS OF SENSATION IN FEET: 0
DEPRESSION: 0

## 2025-02-21 ASSESSMENT — PATIENT HEALTH QUESTIONNAIRE - PHQ9
1. LITTLE INTEREST OR PLEASURE IN DOING THINGS: NOT AT ALL
SUM OF ALL RESPONSES TO PHQ9 QUESTIONS 1 AND 2: 0
2. FEELING DOWN, DEPRESSED OR HOPELESS: NOT AT ALL

## 2025-02-21 ASSESSMENT — PAIN SCALES - GENERAL: PAINLEVEL_OUTOF10: 0-NO PAIN

## 2025-02-21 NOTE — PROGRESS NOTES
Chief Complaint   Patient presents with    Establish Care     REF  BY SINGH            The patient is a 64-year-old female with a history of severe pulmonary arterial hypertension (likely secondary to methamphetamine use).  This is in the setting of preserved LV systolic function but dilated RV and RA.  She has been having tachycardia over the past several years with episodes usually lasting a few minutes but more dramatic presentation last November with a heart rate documented at 200 bpm with a narrow QRS complex.  This was unresponsive to adenosine and she eventually required urgent cardioversion in the emergency room to restore sinus rhythm.  She has been maintained on Cardizem for both her arrhythmia as well as had her pulmonary hypertension.  She felt quite poorly with a more dramatic episode and had a near syncopal episode associated with that.  She chronically wears oxygen but has not utilized CPAP.         Active Ambulatory Problems     Diagnosis Date Noted    Shortness of breath 01/07/2024    NSTEMI (non-ST elevated myocardial infarction) (Multi) 01/07/2024    Toothache 04/23/2024    Systemic lupus erythematosus (Multi) 08/03/2021    Syncope and collapse 04/23/2024    Sore throat 04/23/2024    Seizure disorder (Multi) 04/23/2024    Right ventricular systolic dysfunction 04/23/2024    Acute on chronic right-sided heart failure 04/23/2024    Pulmonary hypertension (Multi) 08/03/2021    Paresthesia 05/31/2023    Hand pain 04/23/2024    PAH (pulmonary artery hypertension) (Multi) 04/23/2024    Olecranon bursitis, right elbow 08/03/2021    Obesity 04/23/2024    Neuropathy 03/21/2024    Nasal congestion 04/23/2024    Methamphetamine abuse (Multi) 04/23/2024    Major depressive disorder, single episode, moderate (Multi) 08/03/2021    Liver disease 04/23/2024    Insomnia 08/03/2021    Increased oxygen demand 03/21/2024    Hypomagnesemia 03/21/2024    Hypocalcemia 03/21/2024    Hyperlipidemia 04/23/2024    History  of non-ST elevation myocardial infarction (NSTEMI) 03/21/2024    History of alcohol abuse 03/21/2024    Fatigue 04/23/2024    Electrolyte and fluid disorder 05/31/2023    Prolonged Q-T interval on ECG 05/31/2023    Lower leg edema 04/23/2024    Edema of eyelid 04/23/2024    Ear pain, bilateral 03/21/2024    Drug abuse (Multi) 04/23/2024    Dizziness 03/21/2024    Diverticulosis 04/23/2024    Depressive disorder 03/21/2024    Dental infection 04/04/2024    Decreased GFR 03/21/2024    Cough 03/21/2024    Chronic obstructive pulmonary disease (Multi) 06/01/2023    Central line complication 03/26/2024    Cellulitis 04/23/2024    Blepharitis of both eyes 04/23/2024    Blepharitis 04/23/2024    Primary hypertension 07/13/2023    Anxiety 07/13/2023    Alcohol dependence, uncomplicated (Multi) 08/03/2021    Acute otitis externa of left ear 03/21/2024    Acute on chronic cholecystitis 04/23/2024    Acute cor pulmonale (Multi) 04/23/2024    Gastroesophageal reflux disease 11/25/2020    Chronic respiratory failure with hypoxia, on home O2 therapy (Multi) 11/11/2022    Acquired thrombocytopenia (CMS-HCC) 06/01/2023    Acquired absence of cervix 11/04/2022    Acidosis, unspecified 07/13/2023    Abnormal findings on diagnostic imaging of breast 04/23/2024    Pulmonary HTN (Multi) 04/23/2024    Long-term use of high-risk medication 05/13/2024    SVT (supraventricular tachycardia) (CMS-HCC) 07/05/2024    Acute on chronic hypoxic respiratory failure (Multi) 08/26/2024    Pneumonia of left lower lobe due to infectious organism 08/26/2024    Volume overload 09/10/2024    Does not refill medications appropriately 09/10/2024    Repeated falls 09/24/2024    Difficulty walking 09/25/2024    Acute on chronic respiratory failure with hypoxia (Multi) 10/23/2024    Pericardial effusion ([facility]) 10/23/2024    Acute on chronic respiratory failure (Multi) 10/23/2024    Anasarca 11/20/2024    Supraventricular tachycardia (CMS-HCC) 11/21/2024      Resolved Ambulatory Problems     Diagnosis Date Noted    No Resolved Ambulatory Problems     Past Medical History:   Diagnosis Date    Depression     GERD (gastroesophageal reflux disease)     Lupus     Personal history of other specified conditions 09/21/2020        Review of Systems   All other systems reviewed and are negative.       Objective     There were no vitals filed for this visit.     Vitals and nursing note reviewed.   Constitutional:       Appearance: Healthy appearance.   HENT:    Mouth/Throat:      Pharynx: Oropharynx is clear.   Pulmonary:      Effort: Pulmonary effort is normal.      Breath sounds: Normal breath sounds.   Cardiovascular:      PMI at left midclavicular line. Normal rate. Regular rhythm. Normal S1. Normal S2.       Murmurs: There is a grade 2/6 holosystolic murmur.      No gallop.  No click. No rub.   Pulses:     Intact distal pulses.   Edema:     Peripheral edema absent.   Abdominal:      General: Bowel sounds are normal.   Musculoskeletal:      Cervical back: Normal range of motion. Skin:     General: Skin is warm and dry.   Neurological:      General: No focal deficit present.      Mental Status: Alert and oriented to person, place and time.            Lab Review:   Hospital Outpatient Visit on 02/17/2025   Component Date Value    AV pk nat 02/17/2025 1.34     LVOT diam 02/17/2025 2.07     MV E/A ratio 02/17/2025 0.56     LA vol index A/L 02/17/2025 30.5     Tricuspid annular plane * 02/17/2025 2.0     LV EF 02/17/2025 63     RV free wall pk S' 02/17/2025 10.00     LVIDd 02/17/2025 3.84     RVSP 02/17/2025 91.5     Aortic Valve Area by Con* 02/17/2025 2.28     AV pk grad 02/17/2025 7     LV A4C EF 02/17/2025 65.8    Orders Only on 02/14/2025   Component Date Value    B TYPE NATRIURETIC PEPTI* 02/17/2025 228 (H)     GLUCOSE 02/17/2025 82     UREA NITROGEN (BUN) 02/17/2025 23     CREATININE 02/17/2025 0.86     EGFR 02/17/2025 76     SODIUM 02/17/2025 137     POTASSIUM 02/17/2025  5.0     CHLORIDE 02/17/2025 103     CARBON DIOXIDE 02/17/2025 26     ELECTROLYTE BALANCE 02/17/2025 8     CALCIUM 02/17/2025 8.9     PROTEIN, TOTAL 02/17/2025 6.9     ALBUMIN 02/17/2025 4.2     BILIRUBIN, TOTAL 02/17/2025 0.7     ALKALINE PHOSPHATASE 02/17/2025 135     AST 02/17/2025 24     ALT 02/17/2025 25     MAGNESIUM 02/17/2025 2.1    Lab on 01/17/2025   Component Date Value    WBC 01/17/2025 5.5     nRBC 01/17/2025 1.5 (H)     RBC 01/17/2025 4.09     Hemoglobin 01/17/2025 10.0 (L)     Hematocrit 01/17/2025 33.3 (L)     MCV 01/17/2025 81     MCH 01/17/2025 24.4 (L)     MCHC 01/17/2025 30.0 (L)     RDW 01/17/2025 19.7 (H)     Platelets 01/17/2025 139 (L)     Neutrophils % 01/17/2025 69.9     Immature Granulocytes %,* 01/17/2025 3.8 (H)     Lymphocytes % 01/17/2025 19.9     Monocytes % 01/17/2025 6.2     Eosinophils % 01/17/2025 0.0     Basophils % 01/17/2025 0.2     Neutrophils Absolute 01/17/2025 3.84     Immature Granulocytes Ab* 01/17/2025 0.21     Lymphocytes Absolute 01/17/2025 1.09 (L)     Monocytes Absolute 01/17/2025 0.34     Eosinophils Absolute 01/17/2025 0.00     Basophils Absolute 01/17/2025 0.01    Lab on 01/02/2025   Component Date Value    WBC 01/02/2025 5.3     nRBC 01/02/2025 0.0     RBC 01/02/2025 4.74     Hemoglobin 01/02/2025 11.2 (L)     Hematocrit 01/02/2025 38.7     MCV 01/02/2025 82     MCH 01/02/2025 23.6 (L)     MCHC 01/02/2025 28.9 (L)     RDW 01/02/2025 19.8 (H)     Platelets 01/02/2025 137 (L)     Neutrophils % 01/02/2025 77.1     Immature Granulocytes %,* 01/02/2025 1.3 (H)     Lymphocytes % 01/02/2025 16.0     Monocytes % 01/02/2025 5.6     Eosinophils % 01/02/2025 0.0     Basophils % 01/02/2025 0.0     Neutrophils Absolute 01/02/2025 4.09     Immature Granulocytes Ab* 01/02/2025 0.07     Lymphocytes Absolute 01/02/2025 0.85 (L)     Monocytes Absolute 01/02/2025 0.30     Eosinophils Absolute 01/02/2025 0.00     Basophils Absolute 01/02/2025 0.00     Glucose 01/02/2025 84     Sodium  01/02/2025 140     Potassium 01/02/2025 4.2     Chloride 01/02/2025 105     Bicarbonate 01/02/2025 26     Anion Gap 01/02/2025 13     Urea Nitrogen 01/02/2025 20     Creatinine 01/02/2025 0.90     eGFR 01/02/2025 72     Calcium 01/02/2025 8.4 (L)     Albumin 01/02/2025 4.0     Alkaline Phosphatase 01/02/2025 140 (H)     Total Protein 01/02/2025 6.5     AST 01/02/2025 18     Bilirubin, Total 01/02/2025 1.1     ALT 01/02/2025 28     Magnesium 01/02/2025 1.83     BNP 01/02/2025 510 (H)     Phosphorus 01/02/2025 4.2        ECG:  Normal sinus rhythm at 92 bpm [place] interval 170 ms QRS duration 110 ms QTc 500 ms incomplete right bundle branch block and left posterior fascicular block    Assessment/plan:  History as above.  Related to her heart diagnosis includes one-to-one atrial flutter particularly given her dilated right atrium, and the fact that the rhythm was unresponsive to adenosine.  Of course the differential also includes other SVT including AVNRT and AVRT as well as atrial tachycardia although I do suspect she will have one-to-one atrial flutter.  I discussed that with the patient and she is amenable to proceeding forward with electrophysiology study and possible catheter-based therapy.  I would utilize MAC and have anesthesia at bedside given her severe pulmonary arterial hypertension.  She had a small pericardial effusion on her last echocardiogram last fall and I would repeat this prior to her procedure.    Problem List Items Addressed This Visit       SVT (supraventricular tachycardia) (CMS-[facility]) - Primary    Relevant Orders    ECG 12 lead (Clinic Performed)

## 2025-02-21 NOTE — ASSESSMENT & PLAN NOTE
History as above.  Related to her heart diagnosis includes one-to-one atrial flutter particularly given her dilated right atrium, and the fact that the rhythm was unresponsive to adenosine.  Of course the differential also includes other SVT including AVNRT and AVRT as well as atrial tachycardia although I do suspect she will have one-to-one atrial flutter.  I discussed that with the patient and she is amenable to proceeding forward with electrophysiology study and possible catheter-based therapy.  I would utilize MAC and have anesthesia at bedside given her severe pulmonary arterial hypertension.

## 2025-02-26 LAB
ERYTHROCYTE [DISTWIDTH] IN BLOOD BY AUTOMATED COUNT: 16.7 % (ref 11–15)
HCT VFR BLD AUTO: 38.6 % (ref 35–45)
HGB BLD-MCNC: 11.5 G/DL (ref 11.7–15.5)
MCH RBC QN AUTO: 23.8 PG (ref 27–33)
MCHC RBC AUTO-ENTMCNC: 29.8 G/DL (ref 32–36)
MCV RBC AUTO: 79.8 FL (ref 80–100)
PLATELET # BLD AUTO: 148 THOUSAND/UL (ref 140–400)
PMV BLD REES-ECKER: 9.3 FL (ref 7.5–12.5)
RBC # BLD AUTO: 4.84 MILLION/UL (ref 3.8–5.1)
WBC # BLD AUTO: 5.4 THOUSAND/UL (ref 3.8–10.8)

## 2025-02-27 ENCOUNTER — DOCUMENTATION (OUTPATIENT)
Dept: PULMONOLOGY | Facility: HOSPITAL | Age: 65
End: 2025-02-27
Payer: COMMERCIAL

## 2025-02-27 NOTE — PROGRESS NOTES
Office called patient.   Medication: Veletri    Current dose:     67ng/kg/min     90,000 mls/hr    concentration of 90,000    Weight  66 kg    Titration plan: decrease    Side Effects (flushing/diarrhea/increased SOB/headache/nausea/1st bite jaw pain):  Patient endorsing she continues to have diarrhea every other day. Taking imodium BID. Otherwise says she is feeling great.     Plan:   Per Dr. Prescott, decrease dose to  66ng/kg/min     90,000 mls/hr    concentration of 90,000  Will touch base next week to assess diarrhea.     [place]:  Patient additionally reporting she is having consistent small amounts of bleeding noticeable on dressing with dressing changes. Patient to send picture with next dressing change. Denies fever, chills, redness, pain, malodorous drainage or cuff exposure.

## 2025-02-27 NOTE — PROGRESS NOTES
Sotatercept Monitoring    Dosing History:  5th Sotatercept dose given on 2/27/2025.   Starting Dose: 45 mg kit, 0.3 mg/kg. Inject 0.4 ml.   Original Target Dose: 60 mg kit, 0.7mg/kg. Inject 1.0 ml.      Holding History:  Held on 11/20/2024 to 12/13/2024 for multiple ED visits and hospitalizations.     Date: 10/26/2024 Baseline HGB 8.3    Lab Results   Component Value Date    WBC 5.4 02/25/2025    HGB 11.5 (L) 02/25/2025    HCT 38.6 02/25/2025    MCV 79.8 (L) 02/25/2025     02/25/2025     Per Dr. Prescott, ok for next dose yes    Symptoms: black tarry stools/nosebleeds/heavy menstrual bleeding/petechia:    Plan:  Continue labs before doses.    no chest pain and no edema.

## 2025-03-10 ENCOUNTER — DOCUMENTATION (OUTPATIENT)
Dept: PULMONOLOGY | Facility: HOSPITAL | Age: 65
End: 2025-03-10
Payer: COMMERCIAL

## 2025-03-10 NOTE — PROGRESS NOTES
Patient sent picture of Montez on Friday. Office called and left VM with callback number. Originally patient C/O of consistent small amounts of bleeding. Updated picture appears to have Montez cuff exposure. Advised patient to reinforce using several dependant loops.

## 2025-03-11 DIAGNOSIS — I47.10 SVT (SUPRAVENTRICULAR TACHYCARDIA) (CMS-HCC): Primary | ICD-10-CM

## 2025-03-11 ASSESSMENT — ENCOUNTER SYMPTOMS
MUSCULOSKELETAL NEGATIVE: 1
PSYCHIATRIC NEGATIVE: 1
EYES NEGATIVE: 1
ALLERGIC/IMMUNOLOGIC NEGATIVE: 1
HEMATOLOGIC/LYMPHATIC NEGATIVE: 1
ENDOCRINE NEGATIVE: 1
PALPITATIONS: 1
NEUROLOGICAL NEGATIVE: 1
CONSTITUTIONAL NEGATIVE: 1
DIARRHEA: 1

## 2025-03-11 NOTE — PROGRESS NOTES
Attempted to call patient to make her aware of need for type and screen prior to ablation on 3/24/25 but patient did not answer. Message left asking that she call the office back regarding the message

## 2025-03-11 NOTE — PROGRESS NOTES
History Of Present Illness  Ronna Beckham is a 64 y.o. female presenting with pulmonary arterial hypertension follow up. Patient is NYHA Functional Class 3+ and WHO Group 1. Last office visit was on 2/17/2025. She was referred by Dr. Calvillo on 9/11/2020.       PAH Treatment:  Veletri, 67 ng/kg/min, 71ml/24hr, concentration 90,000 ng/ml (9/23/2020)  Opsumit (4/12/2021)  Winrevair  (11/1/2024)  Oxygen 4 LPM continuously  Infusion site: [facility]  Treatment history:   Tadalafil (5/4/2021-5/6/2021) headache    Today's testing includes 6 MWT and Labs    Interval History         Past Medical History  Patient Active Problem List   Diagnosis   • Shortness of breath   • NSTEMI (non-ST elevated myocardial infarction) (Multi)   • Toothache   • Systemic lupus erythematosus (Multi)   • Syncope and collapse   • Sore throat   • Seizure disorder (Multi)   • Right ventricular systolic dysfunction   • Acute on chronic right-sided heart failure   • Pulmonary hypertension (Multi)   • Paresthesia   • Hand pain   • PAH (pulmonary artery hypertension) (Multi)   • Olecranon bursitis, right elbow   • Obesity   • Neuropathy   • Nasal congestion   • Methamphetamine abuse (Multi)   • Major depressive disorder, single episode, moderate (Multi)   • Liver disease   • Insomnia   • Increased oxygen demand   • Hypomagnesemia   • Hypocalcemia   • Hyperlipidemia   • History of non-ST elevation myocardial infarction (NSTEMI)   • History of alcohol abuse   • Fatigue   • Electrolyte and fluid disorder   • Prolonged Q-T interval on ECG   • Lower leg edema   • Edema of eyelid   • Ear pain, bilateral   • Drug abuse (Multi)   • Dizziness   • Diverticulosis   • Depressive disorder   • Dental infection   • Decreased GFR   • Cough   • Chronic obstructive pulmonary disease (Multi)   • Central line complication   • Cellulitis   • Blepharitis of both eyes   • Blepharitis   • Primary hypertension   • Anxiety   • Alcohol dependence, uncomplicated (Multi)   • Acute  otitis externa of left ear   • Acute on chronic cholecystitis   • Acute cor pulmonale (Multi)   • Gastroesophageal reflux disease   • Chronic respiratory failure with hypoxia, on home O2 therapy (Multi)   • Acquired thrombocytopenia (CMS-HCC)   • Acquired absence of cervix   • Acidosis, unspecified   • Abnormal findings on diagnostic imaging of breast   • Pulmonary HTN (Multi)   • Long-term use of high-risk medication   • SVT (supraventricular tachycardia) (CMS-HCC)   • Acute on chronic hypoxic respiratory failure (Multi)   • Pneumonia of left lower lobe due to infectious organism   • Volume overload   • Does not refill medications appropriately   • Repeated falls   • Difficulty walking   • Acute on chronic respiratory failure with hypoxia (Multi)   • Pericardial effusion ([facility])   • Acute on chronic respiratory failure (Multi)   • Anasarca   • Supraventricular tachycardia (CMS-HCC)        Surgical History  She has a past surgical history that includes Other surgical history (09/21/2020); Other surgical history (09/21/2020); Other surgical history (09/21/2020); Other surgical history (09/21/2020); Other surgical history (09/21/2020); Hysterectomy; Cholecystectomy; and Cardiac catheterization (N/A, 1/8/2024).     Social History  She reports that she has quit smoking. Her smoking use included cigarettes. She has quit using smokeless tobacco. She reports that she does not currently use alcohol. She reports that she does not currently use drugs after having used the following drugs: Cocaine and Other.    Family History  Family History   Problem Relation Name Age of Onset   • No Known Problems Mother     • No Known Problems Father          Medications  Current Outpatient Medications:   •  aspirin 81 mg EC tablet, Take 1 tablet (81 mg) by mouth once daily., Disp: 30 tablet, Rfl: 0  •  calcium carbonate-vitamin D3 500 mg-5 mcg (200 unit) tablet, Take 2 tablets by mouth 2 times a day. (Patient not taking: Reported on  2/17/2025), Disp: 120 tablet, Rfl: 0  •  cholestyramine (Questran) 4 gram packet, Take 1 packet (4 g) by mouth once daily., Disp: 30 packet, Rfl: 0  •  dilTIAZem CD (Cardizem CD) 180 mg 24 hr capsule, Take 1 capsule (180 mg) by mouth once daily., Disp: 30 capsule, Rfl: 0  •  epoprostenol (Veletri) 1.5 mg recon soln, 1.5 mg (1,500 mcg) by central venous line infusion route continuously. Reconstitute contents of vial with 5 ml diluent and mix cassette as directed. Infuse continuously per physician orders. Allow for priming volume. Dose range: 1-150 ng/kg/min., Disp: 180 each, Rfl: 11  •  ergocalciferol (Vitamin D-2) 1.25 MG (53693 UT) capsule, Take 1 capsule (1,250 mcg) by mouth 1 (one) time per week. On Wednesday, Disp: , Rfl:   •  furosemide (Lasix) 40 mg tablet, Take 1.5 tablets (60 mg) by mouth once daily., Disp: 30 tablet, Rfl: 0  •  gabapentin (Neurontin) 800 mg tablet, Take 1 tablet (800 mg) by mouth 3 times a day., Disp: 90 tablet, Rfl: 0  •  hydroxychloroquine (Plaquenil) 200 mg tablet, Take 1 tablet (200 mg) by mouth 2 times a day., Disp: 60 tablet, Rfl: 0  •  loperamide (Imodium) 2 mg capsule, Take 1 capsule (2 mg) by mouth once daily as needed for diarrhea., Disp: , Rfl:   •  magnesium chloride (MagDelay) 64 mg EC tablet, Take 1 tablet (64 mg) by mouth 2 times a day., Disp: 60 tablet, Rfl: 0  •  melatonin 5 mg tablet, Take 1 tablet (5 mg) by mouth once daily at bedtime., Disp: 30 tablet, Rfl: 0  •  multivitamin-iron-folic acid  mg-mcg tablet, Take 1 tablet by mouth once daily., Disp: , Rfl:   •  omeprazole (PriLOSEC) 40 mg DR capsule, Take 1 capsule (40 mg) by mouth once daily in the morning. Take before meals. Do not crush or chew., Disp: 30 capsule, Rfl: 0  •  Opsumit 10 mg tablet tablet, Take 1 tablet (10 mg) by mouth once daily., Disp: 30 tablet, Rfl: 11  •  potassium chloride CR 20 mEq ER tablet, Take 1 tablet (20 mEq) by mouth 2 times a day. Do not crush or chew., Disp: 60 tablet, Rfl: 0  •   potassium gluconate 595 mg (99 mg) tablet, Take 2 tablets by mouth once daily., Disp: , Rfl:   •  sotatercept-csrk (Winrevair) 60 mg kit, Inject 1 Dose under the skin every 21 (twenty-one) days., Disp: 3 kit, Rfl: 0  •  venlafaxine XR (Effexor-XR) 150 mg 24 hr capsule, Take 1 capsule (150 mg) by mouth once daily. Do not crush or chew., Disp: 30 capsule, Rfl: 0    Current Facility-Administered Medications:   •  perflutren lipid microspheres (Definity) injection 0.5-10 mL of dilution, 0.5-10 mL of dilution, intravenous, Once, Chicho Prsecott,      Allergies  Levetiracetam    Review of Systems   Constitutional: Negative.    HENT: Negative.     Eyes: Negative.    Cardiovascular:  Positive for palpitations.   Gastrointestinal:  Positive for diarrhea.   Endocrine: Negative.    Genitourinary: Negative.    Musculoskeletal: Negative.    Skin: Negative.    Allergic/Immunologic: Negative.    Neurological: Negative.    Hematological: Negative.    Psychiatric/Behavioral: Negative.         Last Recorded Vitals  There were no vitals taken for this visit.       Physical Exam  Constitutional:       Appearance: Normal appearance.      Comments: Wheelchair , oxygen, flushed. Thin   HENT:      Head: Normocephalic.   Cardiovascular:      Rate and Rhythm: Normal rate and regular rhythm.      Heart sounds: Murmur heard.      Comments: Increased second heart sound.   Pulmonary:      Effort: Pulmonary effort is normal.      Breath sounds: Normal breath sounds.   Abdominal:      General: Bowel sounds are normal.      Palpations: Abdomen is soft.   Musculoskeletal:      Right lower leg: No edema.      Left lower leg: No edema.   Skin:     Findings: Rash present.   Psychiatric:         Mood and Affect: Mood normal.         Judgment: Judgment normal.     Relevant Results    6MWT (  SP02-  HR-  CHEIKH-  Actual Meters-     6MWT (2/17/2025)  SP02- 99-97% on 4L  HR-   CHEIKH-0-4  Actual Meters- 305     Echo (2/17/2025)   still with severe RV  dilation although RA appears smaller with slightly better LV filling.  Left Ventricle: Left ventricular ejection fraction is normal, by visual estimate at 60-65%. There are no regional left ventricular wall motion abnormalities. The left ventricular cavity size is normal. There is normal septal and mildly increased posterior left ventricular wall thickness. There is left ventricular concentric remodeling. The interventricular septum is flattened in systole and diastole, consistent with right ventricular pressure and volume overload. Spectral Doppler shows a Grade II (pseudonormal pattern) of left ventricular diastolic filling with an elevated left atrial pressure.  Left Atrium: The left atrial size is normal.  Right Ventricle: The right ventricle is severely enlarged. There is severely reduced right ventricular systolic function.  Right Atrium: The right atrium is moderate to severely dilated.  Aortic Valve: The aortic valve is trileaflet. There is trace aortic valve regurgitation. The peak instantaneous gradient of the aortic valve is 7 mmHg.  Mitral Valve: The mitral valve is normal in structure. There is trace mitral valve regurgitation.  Tricuspid Valve: The tricuspid valve is structurally normal. There is severe tricuspid regurgitation. The Doppler estimated RVSP is severely elevated at 91.5 mmHg. Reported right ventricular systolic pressure may be underestimated due to severe tricuspid regurgitation. TR likely severe given shape of TR CW Doppler envelope shape, though no flow reversal in the hepatic veins, so possibly moderate to severe TR.  Pulmonic Valve: The pulmonic valve is not well visualized. There is physiologic pulmonic valve regurgitation.  Pericardium: Small pericardial effusion. There is no evidence of cardiac tamponade.  Aorta: The aortic root is normal.  Systemic Veins: The inferior vena cava appears dilated, with IVC inspiratory collapse greater than 50%.  In comparison to the previous  echocardiogram(s): Compared with the prior exam from 10/24/2024, there was already a severley dilated RV with severely reduced systolic function. The prior RVSP was severely elevated at 105.1mmHg, but appears to be a little lower today(91mmHg) with some inspiratory collapse of IVC. The pericaridal effusion was larger previously, still with no tamponade. There wa already seere TR o the prior study.     CONCLUSIONS:   1. Left ventricular ejection fraction is normal, by visual estimate at 60-65%.   2. Spectral Doppler shows a Grade II (pseudonormal pattern) of left ventricular diastolic filling with an elevated left atrial pressure.   3. Right ventricular volume and pressure overload.   4. There is severely reduced right ventricular systolic function.   5. Severely enlarged right ventricle.   6. The right atrium is moderate to severely dilated.   7. There is no evidence of cardiac tamponade.   8. Severely elevated right ventricular systolic pressure.   9. TR likely severe given shape of TR CW Doppler envelope shape, though no flow reversal in the hepatic veins, so possibly moderate to severe TR.  10. Compared with the prior exam from 10/24/2024, there was already a severley dilated RV with severely reduced systolic function. The prior RVSP was severely elevated at 105.1mmHg, but appears to be a little lower today(91mmHg) with some inspiratory collapse of IVC. The pericaridal effusion was larger previously, still with no tamponade. There wa already seere TR o the prior study.    Patient with code blue running out of oxygen, now baseline. (12/9/2024)    Echo (10/24/2024)  Left Ventricle: Left ventricular ejection fraction is hyperdynamic, by visual estimate at 75%. The left ventricular cavity size is decreased. The interventricular septum is flattened in systole and diastole, consistent with right ventricular pressure and volume overload. Left ventricular diastolic filling was not assessed. The left ventricle appears  underfilled.  Left Atrium: The left atrium was not assessed.  Right Ventricle: The right ventricle is severely enlarged. There is moderately reduced right ventricular systolic function.  Right Atrium: The right atrium is severely dilated. A dilated inferior vena cava demonstrates poor inspiratory collapse, consistent with elevated right atrial pressures.  Aortic Valve: The aortic valve is trileaflet. There is no evidence of aortic valve regurgitation.  Mitral Valve: The mitral valve is normal in structure. There is trace mitral valve regurgitation.  Tricuspid Valve: The tricuspid valve is abnormal. There is severe tricuspid regurgitation. The Doppler estimated RVSP is severely elevated at 105.1 mmHg.  Pulmonic Valve: The pulmonic valve is structurally normal. There is trace pulmonic valve regurgitation.  Pericardium: Small to moderate pericardial effusion. There is no evidence of cardiac tamponade.  Aorta: The aortic root was not assessed.  In comparison to the previous echocardiogram(s): Compared with study dated 4/24/2024, there is no significant difference in RV size and function. There is again severe TR. The RVSP has increased from 89 to 105 mmHg. The pericardial effusion appears unchanged.     CONCLUSIONS:   1. Left ventricular ejection fraction is hyperdynamic, by visual estimate at 75%.   2. Left ventricular cavity size is decreased.   3. Right ventricular volume and pressure overload.   4. There is moderately reduced right ventricular systolic function.   5. Severely enlarged right ventricle.   6. Severely elevated right ventricular systolic pressure.   7. The right atrium is severely dilated.   8. Severe tricuspid regurgitation visualized.   9. Small to moderate pericardial effusion.  10. There is no evidence of cardiac tamponade.  11. A dilated inferior vena cava demonstrates poor inspiratory collapse, consistent with elevated right atrial pressures.  12. Compared with study dated 4/24/2024, there is no  significant difference in RV size and function. There is again severe TR. The RVSP has increased from 89 to 105 mmHg. The pericardial effusion appears unchanged.     CT angio chest for pulmonary embolism (10/22/2024)  1. No evidence of acute pulmonary embolism.  2. There is a moderate size pericardial effusion noted.    6MWT (7/22/2024)  SP02-98%/96% on 4L  HR-106/114  CHEIKH-2/2  Actual Meters- 183 meters     6MWT (5/13/2024)  SP02- 96-96% /4 L NC  HR-   CHEIKH-0-4  Actual Meters 216 meters     CT angio chest for PE (5/30/2024)  1. No findings of acute pulmonary embolism.  2. Persistent marked cardiac enlargement. Hepatic heterogeneity and splenomegaly suggesting right heart dysfunction with associated not make liver and portal hypertension.  3. Bronchial thickening. There are couple of other stable pulmonary nodules when compared to most recent examination, however there are areas of ground-glass opacity seen in the lungs possibly reflecting inflammatory etiologies. Findings of mild pulmonary edema and volume  Overload     Echo (4/24/2024) [facility]  Left Ventricle: The left ventricular systolic function is normal, with an estimated ejection fraction of 60-65%. There are no regional wall motion abnormalities. The left ventricular cavity size is normal. The interventricular septum is flattened in systole and diastole, consistent with right ventricular pressure and volume overload. Left ventricular diastolic filling was not assessed.  Left Atrium: The left atrium is normal in size. A bubble study using agitated saline was performed. Bubble study is positive. A small PFO (= 10 bubbles) was demonstrated. Agitated saline contrast study was positive for small intracardiac shunt.  Right Ventricle: The right ventricle is severely enlarged. There is severely reduced right ventricular systolic function. Although the TAPSE and RVA' are normal, the fractional area change is markedly reduced consistent wtih severe RV systolic  dysfunction. Still able to generate high RVSP.  Right Atrium: The right atrium is severely dilated.  Aortic Valve: The aortic valve is trileaflet. There is minimal aortic valve cusp calcification. Aortic valve regurgitation was not assessed.  Mitral Valve: The mitral valve is normal in structure. Mitral valve regurgitation was not assessed.  Tricuspid Valve: The tricuspid valve is structurally normal. There is severe tricuspid regurgitation. The Doppler estimated RVSP is severely elevated at 88.6 mmHg.  Pulmonic Valve: The pulmonic valve is not well visualized. Pulmonic valve regurgitation was not assessed.  Pericardium: There is a small to moderate pericardial effusion. Small circumferential pericardial effusin though is small to moderate adjacent to the RA. No RV or RA collapse. The IVC is dilated, though likely due to the presence of severe pulmonary hypertension. No significant respiratory variation of the MV or TV inflows. Not diagnostic for tamponade though difficult to dx tamponade in setting of severe pulmonary hypertension.  Aorta: The aortic root is normal.  Systemic Veins: The inferior vena cava appears dilated. There is IVC inspiratory collapse greater than 50%.  In comparison to the previous echocardiogram(s): Compared with study from 3/12/2022,. Compared with the prior exam from 3/12/2022 the RV had a similar appearance with severe dilatation and reduced function. Note the TR was not asessed on that exam to allow estimation of the RVSP at that time. The pericardial effusion appears similar in size.  CONCLUSIONS:   1. Left ventricular systolic function is normal with a 60-65% estimated ejection fraction.   2. Right ventricular volume and pressure overload.   3. Although the TAPSE and RVA' are normal, the fractional area change is markedly reduced consistent wtih severe RV systolic dysfunction. Still able to generate high RVSP.   4. Severely enlarged right ventricle.   5. There is severely reduced right  ventricular systolic function.   6. Agitated saline contrast study was positive for small intracardiac shunt.   7. The right atrium is severely dilated.   8. There is a small to moderate pericardial effusion.   9. Small circumferential pericardial effusin though is small to moderate adjacent to the RA. No RV or RA collapse. The IVC is dilated, though likely due to the presence of severe pulmonary hypertension. No significant respiratory variation of the MV or TV inflows. Not diagnostic for tamponade though difficult to dx tamponade in setting of severe pulmonary hypertension.  10. Severe tricuspid regurgitation visualized.  11. Severely elevated right ventricular systolic pressure.  12. Compared with the prior exam from 3/12/2022 the RV had a similar appearance with severe dilatation and reduced function. Note the TR was not asessed on that exam to allow estimation of the RVSP at that time. The pericardial effusion appears similar in size.  13. A bubble study using agitated saline was performed. Bubble study is positive. A small PFO (= 10 bubbles) was demonstrated.     6MWT (2023)  SpO2: 98% - 95% on 4L  HR: 97 - 116  Valentin: 1 - 7  Actual 314m     6MWT (2022)  SpO2: 94 - 90 on RA   HR: 78 - 111  Valentin: 0 - 1  Meters: 366      6MWT (2021)   SpO2: 92% - 87% on RA  HR: 81 - 94  Valentin: 3 - 4  Actual 311m     6MWT (2021)  HR 91 -102  Spo2 97 - 95 on RA  Valentin 1- 4  Meters 287/predicted 477     RHC (2020)   PAP- 73/31 (47), 83/26 (54)  PCWP- 8  CO/CI- 3.8/2.0      Echo (2020) from OSH   moderately to severely dilated RV with moderately reduced function and a moderately dilated RA.      Assessment/Plan     1) Pulmonary  a. PAH associated with methamphetamine use, presented FCIV, V/Q (-) , depressed CI, now on infusion therapy with dramatic subjective improvement, still high risk. Still with hectic follow up at best, (+) cocaine during hospitalization 2024. (+) Cannabinoid and amphetamine  7/5/2024.  In past, adjust dosing for weight loss and follow resolution of epo rash and significant diarrhea. Patient went up without authorization. Seems stable from PAH standpoint,      7/22/2024 Did not finish Keflex ordered for possible site infection. Better today.      Intolerant to PDE5     On macitentan     2) Neuro - pre-existing neuropathy, alcohol vs ?     7/22/2024 complaining of classic sciatic pain onset after riding horse.      3) Cardiac - hypertension     4) Interval diagnosis of lupus on basis of ?     Plan    1) Continue current meds     2) May go home when sister brings oxygen. If not before closing time, to ED. To wait in a monitored situation.     3) Follow up in clinic. 8 weeks with 6 minute walk and routine labs.     4) Decrease to 73 ml/24 hours over next week, 78,76,74,72 every other day. Call this Thursday for checkin.     5) 2+ cans boost per day. Will consider EPO->DESMOND if unsuccessful in halting weight loss.     6) Patient will call in weekly, if no weight gain and persistent extra imodium needed, will consider further epoprostenol dose reduction.     7) Will review 6MW to be done after this visit.       Communicated with patient who understands.

## 2025-03-14 ENCOUNTER — DOCUMENTATION (OUTPATIENT)
Dept: PULMONOLOGY | Facility: HOSPITAL | Age: 65
End: 2025-03-14
Payer: COMMERCIAL

## 2025-03-14 NOTE — PROGRESS NOTES
Patient sent picture via email with biopatch on [facility] dressing. Drainage appears to be purulent. Previous communications and pictures showed small amounts of bloody drainage. Biopatch had been in place for four days.     Denies fever, chills, pain at site (aside from burning when cleaning with alcohol), tracking symptoms. Could not assess if [facility] cuff was exposed.     Plan:  Patient advised to go to emergency room for assessment. Reporting that she cannot go until Sunday at 2p. Advised patient if she develops fever, chills, pain at site or pain tracking to shoulder must go immediately. Patient verbalized understanding. ED charge made aware for weekend team.

## 2025-03-15 ENCOUNTER — APPOINTMENT (OUTPATIENT)
Dept: RADIOLOGY | Facility: HOSPITAL | Age: 65
End: 2025-03-15
Payer: COMMERCIAL

## 2025-03-15 ENCOUNTER — CLINICAL SUPPORT (OUTPATIENT)
Dept: EMERGENCY MEDICINE | Facility: HOSPITAL | Age: 65
End: 2025-03-15
Payer: COMMERCIAL

## 2025-03-15 ENCOUNTER — HOSPITAL ENCOUNTER (EMERGENCY)
Facility: HOSPITAL | Age: 65
End: 2025-03-15
Payer: COMMERCIAL

## 2025-03-15 ENCOUNTER — HOSPITAL ENCOUNTER (INPATIENT)
Facility: HOSPITAL | Age: 65
LOS: 3 days | Discharge: HOME | End: 2025-03-19
Attending: STUDENT IN AN ORGANIZED HEALTH CARE EDUCATION/TRAINING PROGRAM | Admitting: INTERNAL MEDICINE
Payer: COMMERCIAL

## 2025-03-15 DIAGNOSIS — I27.20 PULMONARY HTN (MULTI): ICD-10-CM

## 2025-03-15 DIAGNOSIS — T80.211A CATHETER-RELATED BLOODSTREAM INFECTION, INITIAL ENCOUNTER: Primary | ICD-10-CM

## 2025-03-15 DIAGNOSIS — R19.7 DIARRHEA, UNSPECIFIED TYPE: ICD-10-CM

## 2025-03-15 DIAGNOSIS — I21.4 NSTEMI (NON-ST ELEVATED MYOCARDIAL INFARCTION) (MULTI): ICD-10-CM

## 2025-03-15 LAB
AMPHETAMINES UR QL SCN: NORMAL
ANION GAP SERPL CALC-SCNC: 13 MMOL/L (ref 10–20)
BARBITURATES UR QL SCN: NORMAL
BASOPHILS # BLD AUTO: 0.02 X10*3/UL (ref 0–0.1)
BASOPHILS NFR BLD AUTO: 0.3 %
BENZODIAZ UR QL SCN: NORMAL
BUN SERPL-MCNC: 23 MG/DL (ref 6–23)
BZE UR QL SCN: NORMAL
CALCIUM SERPL-MCNC: 9 MG/DL (ref 8.6–10.6)
CANNABINOIDS UR QL SCN: NORMAL
CHLORIDE SERPL-SCNC: 102 MMOL/L (ref 98–107)
CO2 SERPL-SCNC: 27 MMOL/L (ref 21–32)
CREAT SERPL-MCNC: 0.82 MG/DL (ref 0.5–1.05)
EGFRCR SERPLBLD CKD-EPI 2021: 81 ML/MIN/1.73M*2
EOSINOPHIL # BLD AUTO: 0 X10*3/UL (ref 0–0.7)
EOSINOPHIL NFR BLD AUTO: 0 %
ERYTHROCYTE [DISTWIDTH] IN BLOOD BY AUTOMATED COUNT: 18.1 % (ref 11.5–14.5)
FENTANYL+NORFENTANYL UR QL SCN: NORMAL
GLUCOSE SERPL-MCNC: 83 MG/DL (ref 74–99)
HCT VFR BLD AUTO: 34.2 % (ref 36–46)
HGB BLD-MCNC: 10.2 G/DL (ref 12–16)
IMM GRANULOCYTES # BLD AUTO: 0.21 X10*3/UL (ref 0–0.7)
IMM GRANULOCYTES NFR BLD AUTO: 3.6 % (ref 0–0.9)
LYMPHOCYTES # BLD AUTO: 1.09 X10*3/UL (ref 1.2–4.8)
LYMPHOCYTES NFR BLD AUTO: 18.5 %
MCH RBC QN AUTO: 23.5 PG (ref 26–34)
MCHC RBC AUTO-ENTMCNC: 29.8 G/DL (ref 32–36)
MCV RBC AUTO: 79 FL (ref 80–100)
METHADONE UR QL SCN: NORMAL
MONOCYTES # BLD AUTO: 0.41 X10*3/UL (ref 0.1–1)
MONOCYTES NFR BLD AUTO: 7 %
NEUTROPHILS # BLD AUTO: 4.15 X10*3/UL (ref 1.2–7.7)
NEUTROPHILS NFR BLD AUTO: 70.6 %
NRBC BLD-RTO: 0.9 /100 WBCS (ref 0–0)
OPIATES UR QL SCN: NORMAL
OXYCODONE+OXYMORPHONE UR QL SCN: NORMAL
PCP UR QL SCN: NORMAL
PLATELET # BLD AUTO: 97 X10*3/UL (ref 150–450)
POTASSIUM SERPL-SCNC: 5.4 MMOL/L (ref 3.5–5.3)
RBC # BLD AUTO: 4.34 X10*6/UL (ref 4–5.2)
SODIUM SERPL-SCNC: 137 MMOL/L (ref 136–145)
WBC # BLD AUTO: 5.9 X10*3/UL (ref 4.4–11.3)

## 2025-03-15 PROCEDURE — G0378 HOSPITAL OBSERVATION PER HR: HCPCS

## 2025-03-15 PROCEDURE — 36415 COLL VENOUS BLD VENIPUNCTURE: CPT | Performed by: STUDENT IN AN ORGANIZED HEALTH CARE EDUCATION/TRAINING PROGRAM

## 2025-03-15 PROCEDURE — 99285 EMERGENCY DEPT VISIT HI MDM: CPT | Performed by: STUDENT IN AN ORGANIZED HEALTH CARE EDUCATION/TRAINING PROGRAM

## 2025-03-15 PROCEDURE — 71046 X-RAY EXAM CHEST 2 VIEWS: CPT | Performed by: RADIOLOGY

## 2025-03-15 PROCEDURE — 85025 COMPLETE CBC W/AUTO DIFF WBC: CPT | Performed by: STUDENT IN AN ORGANIZED HEALTH CARE EDUCATION/TRAINING PROGRAM

## 2025-03-15 PROCEDURE — 2500000001 HC RX 250 WO HCPCS SELF ADMINISTERED DRUGS (ALT 637 FOR MEDICARE OP): Mod: SE | Performed by: NUTRITIONIST

## 2025-03-15 PROCEDURE — 80307 DRUG TEST PRSMV CHEM ANLYZR: CPT | Performed by: NUTRITIONIST

## 2025-03-15 PROCEDURE — 99223 1ST HOSP IP/OBS HIGH 75: CPT | Performed by: NUTRITIONIST

## 2025-03-15 PROCEDURE — 96366 THER/PROPH/DIAG IV INF ADDON: CPT

## 2025-03-15 PROCEDURE — 99285 EMERGENCY DEPT VISIT HI MDM: CPT | Mod: 25 | Performed by: STUDENT IN AN ORGANIZED HEALTH CARE EDUCATION/TRAINING PROGRAM

## 2025-03-15 PROCEDURE — 96365 THER/PROPH/DIAG IV INF INIT: CPT

## 2025-03-15 PROCEDURE — 93005 ELECTROCARDIOGRAM TRACING: CPT

## 2025-03-15 PROCEDURE — 2500000004 HC RX 250 GENERAL PHARMACY W/ HCPCS (ALT 636 FOR OP/ED): Mod: SE | Performed by: STUDENT IN AN ORGANIZED HEALTH CARE EDUCATION/TRAINING PROGRAM

## 2025-03-15 PROCEDURE — 2500000004 HC RX 250 GENERAL PHARMACY W/ HCPCS (ALT 636 FOR OP/ED): Mod: SE | Performed by: PHARMACIST

## 2025-03-15 PROCEDURE — 80048 BASIC METABOLIC PNL TOTAL CA: CPT | Performed by: STUDENT IN AN ORGANIZED HEALTH CARE EDUCATION/TRAINING PROGRAM

## 2025-03-15 PROCEDURE — 87040 BLOOD CULTURE FOR BACTERIA: CPT | Performed by: STUDENT IN AN ORGANIZED HEALTH CARE EDUCATION/TRAINING PROGRAM

## 2025-03-15 PROCEDURE — 2500000004 HC RX 250 GENERAL PHARMACY W/ HCPCS (ALT 636 FOR OP/ED): Mod: SE

## 2025-03-15 PROCEDURE — 71046 X-RAY EXAM CHEST 2 VIEWS: CPT

## 2025-03-15 RX ORDER — SIMETHICONE 80 MG
40 TABLET,CHEWABLE ORAL ONCE
Status: DISCONTINUED | OUTPATIENT
Start: 2025-03-16 | End: 2025-03-17

## 2025-03-15 RX ORDER — POLYETHYLENE GLYCOL 3350 17 G/17G
17 POWDER, FOR SOLUTION ORAL DAILY
Status: DISCONTINUED | OUTPATIENT
Start: 2025-03-15 | End: 2025-03-19 | Stop reason: HOSPADM

## 2025-03-15 RX ORDER — FUROSEMIDE 40 MG/1
60 TABLET ORAL DAILY
Status: DISCONTINUED | OUTPATIENT
Start: 2025-03-15 | End: 2025-03-16

## 2025-03-15 RX ORDER — ERGOCALCIFEROL 1.25 MG/1
1250 CAPSULE ORAL
Status: DISCONTINUED | OUTPATIENT
Start: 2025-03-16 | End: 2025-03-19 | Stop reason: HOSPADM

## 2025-03-15 RX ORDER — ENOXAPARIN SODIUM 100 MG/ML
40 INJECTION SUBCUTANEOUS EVERY 24 HOURS
Status: DISCONTINUED | OUTPATIENT
Start: 2025-03-15 | End: 2025-03-16

## 2025-03-15 RX ORDER — PSYLLIUM HUSK 0.4 G
2 CAPSULE ORAL 2 TIMES DAILY
Status: DISCONTINUED | OUTPATIENT
Start: 2025-03-15 | End: 2025-03-19 | Stop reason: HOSPADM

## 2025-03-15 RX ORDER — VANCOMYCIN HYDROCHLORIDE 1 G/20ML
INJECTION, POWDER, LYOPHILIZED, FOR SOLUTION INTRAVENOUS DAILY PRN
Status: DISCONTINUED | OUTPATIENT
Start: 2025-03-15 | End: 2025-03-18

## 2025-03-15 RX ORDER — PANTOPRAZOLE SODIUM 40 MG/1
40 TABLET, DELAYED RELEASE ORAL
Status: DISCONTINUED | OUTPATIENT
Start: 2025-03-16 | End: 2025-03-19 | Stop reason: HOSPADM

## 2025-03-15 RX ORDER — PANTOPRAZOLE SODIUM 40 MG/1
40 TABLET, DELAYED RELEASE ORAL
Status: DISCONTINUED | OUTPATIENT
Start: 2025-03-16 | End: 2025-03-15

## 2025-03-15 RX ORDER — VANCOMYCIN HYDROCHLORIDE 1 G/200ML
1000 INJECTION, SOLUTION INTRAVENOUS ONCE
Status: COMPLETED | OUTPATIENT
Start: 2025-03-15 | End: 2025-03-15

## 2025-03-15 RX ORDER — DILTIAZEM HYDROCHLORIDE 180 MG/1
180 CAPSULE, COATED, EXTENDED RELEASE ORAL DAILY
Status: DISCONTINUED | OUTPATIENT
Start: 2025-03-15 | End: 2025-03-19 | Stop reason: HOSPADM

## 2025-03-15 RX ORDER — POTASSIUM CHLORIDE 20 MEQ/1
20 TABLET, EXTENDED RELEASE ORAL 2 TIMES DAILY
Status: DISCONTINUED | OUTPATIENT
Start: 2025-03-15 | End: 2025-03-19 | Stop reason: HOSPADM

## 2025-03-15 RX ORDER — LOPERAMIDE HYDROCHLORIDE 2 MG/1
2 CAPSULE ORAL DAILY PRN
Status: DISCONTINUED | OUTPATIENT
Start: 2025-03-15 | End: 2025-03-17

## 2025-03-15 RX ORDER — VENLAFAXINE HYDROCHLORIDE 150 MG/1
150 CAPSULE, EXTENDED RELEASE ORAL DAILY
Status: DISCONTINUED | OUTPATIENT
Start: 2025-03-15 | End: 2025-03-19 | Stop reason: HOSPADM

## 2025-03-15 RX ORDER — MAGNESIUM HYDROXIDE 400 MG/5ML
2 SUSPENSION, ORAL (FINAL DOSE FORM) ORAL DAILY
Status: DISCONTINUED | OUTPATIENT
Start: 2025-03-15 | End: 2025-03-15 | Stop reason: ENTERED-IN-ERROR

## 2025-03-15 RX ORDER — ACETAMINOPHEN 500 MG
5 TABLET ORAL NIGHTLY
Status: DISCONTINUED | OUTPATIENT
Start: 2025-03-15 | End: 2025-03-19 | Stop reason: HOSPADM

## 2025-03-15 RX ORDER — ASPIRIN 81 MG/1
81 TABLET ORAL DAILY
Status: DISCONTINUED | OUTPATIENT
Start: 2025-03-15 | End: 2025-03-19 | Stop reason: HOSPADM

## 2025-03-15 RX ORDER — VANCOMYCIN HYDROCHLORIDE 750 MG/150ML
750 INJECTION, SOLUTION INTRAVENOUS EVERY 12 HOURS
Status: DISCONTINUED | OUTPATIENT
Start: 2025-03-16 | End: 2025-03-18

## 2025-03-15 RX ORDER — MAGNESIUM CHLORIDE 64 MG
64 TABLET, DELAYED RELEASE (ENTERIC COATED) ORAL 2 TIMES DAILY
Status: DISCONTINUED | OUTPATIENT
Start: 2025-03-15 | End: 2025-03-19 | Stop reason: HOSPADM

## 2025-03-15 RX ORDER — OXYMETAZOLINE HCL 0.05 %
2 SPRAY, NON-AEROSOL (ML) NASAL EVERY 12 HOURS PRN
Status: DISPENSED | OUTPATIENT
Start: 2025-03-15 | End: 2025-03-18

## 2025-03-15 RX ORDER — HYDROXYCHLOROQUINE SULFATE 200 MG/1
200 TABLET, FILM COATED ORAL 2 TIMES DAILY
Status: DISCONTINUED | OUTPATIENT
Start: 2025-03-15 | End: 2025-03-19 | Stop reason: HOSPADM

## 2025-03-15 RX ORDER — PANTOPRAZOLE SODIUM 40 MG/10ML
40 INJECTION, POWDER, LYOPHILIZED, FOR SOLUTION INTRAVENOUS
Status: DISCONTINUED | OUTPATIENT
Start: 2025-03-16 | End: 2025-03-15

## 2025-03-15 RX ADMIN — EPOPROSTENOL 67 NG/KG/MIN: 1.5 INJECTION, POWDER, LYOPHILIZED, FOR SOLUTION INTRAVENOUS at 23:16

## 2025-03-15 RX ADMIN — Medication 5 MG: at 20:59

## 2025-03-15 RX ADMIN — ASPIRIN 81 MG: 81 TABLET, COATED ORAL at 20:59

## 2025-03-15 RX ADMIN — VANCOMYCIN HYDROCHLORIDE 1000 MG: 1 INJECTION, SOLUTION INTRAVENOUS at 13:44

## 2025-03-15 RX ADMIN — Medication 2 TABLET: at 20:59

## 2025-03-15 RX ADMIN — HYDROXYCHLOROQUINE SULFATE 200 MG: 200 TABLET, FILM COATED ORAL at 20:59

## 2025-03-15 RX ADMIN — GABAPENTIN 800 MG: 400 CAPSULE ORAL at 20:59

## 2025-03-15 RX ADMIN — PIPERACILLIN SODIUM AND TAZOBACTAM SODIUM 3.38 G: 3; .375 INJECTION, SOLUTION INTRAVENOUS at 20:10

## 2025-03-15 RX ADMIN — PIPERACILLIN SODIUM AND TAZOBACTAM SODIUM 3.38 G: 3; .375 INJECTION, SOLUTION INTRAVENOUS at 13:43

## 2025-03-15 SDOH — SOCIAL STABILITY: SOCIAL INSECURITY: WITHIN THE LAST YEAR, HAVE YOU BEEN AFRAID OF YOUR PARTNER OR EX-PARTNER?: NO

## 2025-03-15 SDOH — HEALTH STABILITY: MENTAL HEALTH: HOW OFTEN DO YOU HAVE A DRINK CONTAINING ALCOHOL?: NEVER

## 2025-03-15 SDOH — SOCIAL STABILITY: SOCIAL INSECURITY: ARE YOU OR HAVE YOU BEEN THREATENED OR ABUSED PHYSICALLY, EMOTIONALLY, OR SEXUALLY BY ANYONE?: NO

## 2025-03-15 SDOH — ECONOMIC STABILITY: FOOD INSECURITY: WITHIN THE PAST 12 MONTHS, YOU WORRIED THAT YOUR FOOD WOULD RUN OUT BEFORE YOU GOT THE MONEY TO BUY MORE.: NEVER TRUE

## 2025-03-15 SDOH — SOCIAL STABILITY: SOCIAL INSECURITY: HAVE YOU HAD THOUGHTS OF HARMING ANYONE ELSE?: NO

## 2025-03-15 SDOH — HEALTH STABILITY: MENTAL HEALTH: HOW OFTEN DO YOU HAVE SIX OR MORE DRINKS ON ONE OCCASION?: NEVER

## 2025-03-15 SDOH — SOCIAL STABILITY: SOCIAL INSECURITY: DO YOU FEEL UNSAFE GOING BACK TO THE PLACE WHERE YOU ARE LIVING?: NO

## 2025-03-15 SDOH — SOCIAL STABILITY: SOCIAL INSECURITY: HAS ANYONE EVER THREATENED TO HURT YOUR FAMILY OR YOUR PETS?: NO

## 2025-03-15 SDOH — ECONOMIC STABILITY: FOOD INSECURITY: WITHIN THE PAST 12 MONTHS, THE FOOD YOU BOUGHT JUST DIDN'T LAST AND YOU DIDN'T HAVE MONEY TO GET MORE.: NEVER TRUE

## 2025-03-15 SDOH — ECONOMIC STABILITY: HOUSING INSECURITY: AT ANY TIME IN THE PAST 12 MONTHS, WERE YOU HOMELESS OR LIVING IN A SHELTER (INCLUDING NOW)?: NO

## 2025-03-15 SDOH — SOCIAL STABILITY: SOCIAL INSECURITY: WITHIN THE LAST YEAR, HAVE YOU BEEN HUMILIATED OR EMOTIONALLY ABUSED IN OTHER WAYS BY YOUR PARTNER OR EX-PARTNER?: NO

## 2025-03-15 SDOH — SOCIAL STABILITY: SOCIAL INSECURITY: DOES ANYONE TRY TO KEEP YOU FROM HAVING/CONTACTING OTHER FRIENDS OR DOING THINGS OUTSIDE YOUR HOME?: NO

## 2025-03-15 SDOH — SOCIAL STABILITY: SOCIAL INSECURITY: WERE YOU ABLE TO COMPLETE ALL THE BEHAVIORAL HEALTH SCREENINGS?: YES

## 2025-03-15 SDOH — ECONOMIC STABILITY: HOUSING INSECURITY: IN THE PAST 12 MONTHS, HOW MANY TIMES HAVE YOU MOVED WHERE YOU WERE LIVING?: 2

## 2025-03-15 SDOH — ECONOMIC STABILITY: INCOME INSECURITY: IN THE PAST 12 MONTHS HAS THE ELECTRIC, GAS, OIL, OR WATER COMPANY THREATENED TO SHUT OFF SERVICES IN YOUR HOME?: NO

## 2025-03-15 SDOH — HEALTH STABILITY: MENTAL HEALTH: HOW MANY DRINKS CONTAINING ALCOHOL DO YOU HAVE ON A TYPICAL DAY WHEN YOU ARE DRINKING?: PATIENT DOES NOT DRINK

## 2025-03-15 SDOH — ECONOMIC STABILITY: TRANSPORTATION INSECURITY: IN THE PAST 12 MONTHS, HAS LACK OF TRANSPORTATION KEPT YOU FROM MEDICAL APPOINTMENTS OR FROM GETTING MEDICATIONS?: NO

## 2025-03-15 SDOH — SOCIAL STABILITY: SOCIAL INSECURITY: ABUSE: ADULT

## 2025-03-15 SDOH — ECONOMIC STABILITY: FOOD INSECURITY: HOW HARD IS IT FOR YOU TO PAY FOR THE VERY BASICS LIKE FOOD, HOUSING, MEDICAL CARE, AND HEATING?: NOT HARD AT ALL

## 2025-03-15 SDOH — ECONOMIC STABILITY: HOUSING INSECURITY: IN THE LAST 12 MONTHS, WAS THERE A TIME WHEN YOU WERE NOT ABLE TO PAY THE MORTGAGE OR RENT ON TIME?: NO

## 2025-03-15 SDOH — SOCIAL STABILITY: SOCIAL INSECURITY: DO YOU FEEL ANYONE HAS EXPLOITED OR TAKEN ADVANTAGE OF YOU FINANCIALLY OR OF YOUR PERSONAL PROPERTY?: NO

## 2025-03-15 ASSESSMENT — ACTIVITIES OF DAILY LIVING (ADL)
TOILETING: INDEPENDENT
JUDGMENT_ADEQUATE_SAFELY_COMPLETE_DAILY_ACTIVITIES: YES
HEARING - LEFT EAR: FUNCTIONAL
ASSISTIVE_DEVICE: EYEGLASSES
FEEDING YOURSELF: INDEPENDENT
PATIENT'S MEMORY ADEQUATE TO SAFELY COMPLETE DAILY ACTIVITIES?: YES
DRESSING YOURSELF: INDEPENDENT
HEARING - RIGHT EAR: FUNCTIONAL
ADEQUATE_TO_COMPLETE_ADL: YES
GROOMING: INDEPENDENT
LACK_OF_TRANSPORTATION: NO
WALKS IN HOME: INDEPENDENT
BATHING: INDEPENDENT

## 2025-03-15 ASSESSMENT — COGNITIVE AND FUNCTIONAL STATUS - GENERAL
DAILY ACTIVITIY SCORE: 24
MOBILITY SCORE: 24
PATIENT BASELINE BEDBOUND: NO
DAILY ACTIVITIY SCORE: 24
MOBILITY SCORE: 24

## 2025-03-15 ASSESSMENT — PATIENT HEALTH QUESTIONNAIRE - PHQ9
1. LITTLE INTEREST OR PLEASURE IN DOING THINGS: NOT AT ALL
2. FEELING DOWN, DEPRESSED OR HOPELESS: NOT AT ALL
SUM OF ALL RESPONSES TO PHQ9 QUESTIONS 1 & 2: 0

## 2025-03-15 ASSESSMENT — LIFESTYLE VARIABLES
SKIP TO QUESTIONS 9-10: 1
AUDIT-C TOTAL SCORE: 0
HOW MANY STANDARD DRINKS CONTAINING ALCOHOL DO YOU HAVE ON A TYPICAL DAY: PATIENT DOES NOT DRINK
AUDIT-C TOTAL SCORE: 0
HOW OFTEN DO YOU HAVE A DRINK CONTAINING ALCOHOL: NEVER
HOW OFTEN DO YOU HAVE 6 OR MORE DRINKS ON ONE OCCASION: NEVER
SKIP TO QUESTIONS 9-10: 1
AUDIT-C TOTAL SCORE: 0

## 2025-03-15 ASSESSMENT — PAIN DESCRIPTION - PAIN TYPE: TYPE: ACUTE PAIN

## 2025-03-15 ASSESSMENT — PAIN SCALES - GENERAL
PAINLEVEL_OUTOF10: 0 - NO PAIN
PAINLEVEL_OUTOF10: 0 - NO PAIN

## 2025-03-15 ASSESSMENT — PAIN - FUNCTIONAL ASSESSMENT
PAIN_FUNCTIONAL_ASSESSMENT: 0-10
PAIN_FUNCTIONAL_ASSESSMENT: 0-10

## 2025-03-15 NOTE — ED TRIAGE NOTES
Pt to ED c/o L chest medication infusion site infection. Pt states changed site 2 days ago and noted puss at sight. Pt states told to present to ED for evaluation. Pt denies fever/chills, but states intermittent double vision since 0930 this AM.

## 2025-03-15 NOTE — H&P
History Of Present Illness  Ronna Beckham is a 64 y.o. female presenting with a MHx of Pul. HTN (on macitentan and epoprostenol via left int jub Hendrickson placed 2020, NYHA Functional Class 3 and WHO Group 1) follows with Dr. Prescott(aware pt is admitted), chronic hypoxic respiratory failure (baseline 4L NC), polysubstance use (ethanol, cocaine, amphetamines), SVT, SLE (on hydroxychloroquine), GERD, HARRIS, MDD presenting to the ED 3/15 with complaints of purulent drainage from her hendrickson catheter site for 1 day.     Pt reports experiencing sx for hendrickson in the past (communications in epic about this as well) where she received oral antibiotics to take at home but now she has had drainage of brownish and sometimes purulent material from her catheter site for several months but noticed increased drainage, redness of the site 3/14. She reached out to Dr. Chua, sent him pictures of the site who then advised she comes to the ED for further evaluation.  She denies any preceding trauma. NO fever, chills, lightheadedness, chest pain, increased SOB, cough, abd pain, n/v, diarrhea,constipation or bilateral leg swelling. She is not on any anticoagulation. Endorses bloody nose, vision changes, sob 3/14 pm, generalized erythema as baseline with medication side effect.      In the ED:  Vitals:  T 37, HR 96, /68, RR 20, SpO2 93% on RA     Labs:  WBC 5.9, Hgb 10.2, plt 97  Na 137, K 5.4, Cl 102, HCO3 27, BUN 23, Cr 0.82, glu 83, Ca   Blood culture pending       XR chest 2 views: 3/15/2025  1.  Mild cardiomegaly with mildly prominent perihilar opacities may be seen with mild interstitial edema.   2. Left internal jugular catheter distal tip projects over the lower SVC, similar to prior.      Tx: Started vanc/zosyn       Social History  Former smoker, quit 12 years ago, smoked 1ppd x20 years  Denies alcohol (last drink 7-8 months ago) and illicit drug use (cocaine/meth last use 7-8 months ago)   Lives by herself    Past  Medical History  She has a past medical history of Anxiety, Depression, Does not refill medications appropriately (09/10/2024), GERD (gastroesophageal reflux disease), Lupus, Personal history of other specified conditions (09/21/2020), Pulmonary hypertension (Multi), and Volume overload (09/10/2024).    Surgical History  She has a past surgical history that includes Other surgical history (09/21/2020); Other surgical history (09/21/2020); Other surgical history (09/21/2020); Other surgical history (09/21/2020); Other surgical history (09/21/2020); Hysterectomy; Cholecystectomy; and Cardiac catheterization (N/A, 1/8/2024).     Social History  She reports that she has quit smoking. Her smoking use included cigarettes. She has quit using smokeless tobacco. She reports that she does not currently use alcohol. She reports that she does not currently use drugs after having used the following drugs: Cocaine and Other.    Family History  Family History   Problem Relation Name Age of Onset    No Known Problems Mother      No Known Problems Father          Allergies  Levetiracetam       Physical Exam  Constitutional:       General: She is not in acute distress.     Appearance: She is not diaphoretic.   HENT:      Head: Normocephalic and atraumatic.      Nose:      Comments: Sanguinous crust. Wearing NC     Mouth/Throat:      Mouth: Mucous membranes are moist.   Eyes:      General: No scleral icterus.        Right eye: No discharge.         Left eye: No discharge.      Extraocular Movements: Extraocular movements intact.      Comments: Pupils equal and round   Cardiovascular:      Rate and Rhythm: Regular rhythm. Tachycardia present.      Pulses: Normal pulses.      Heart sounds: No murmur heard.     No friction rub. No gallop.   Pulmonary:      Effort: Pulmonary effort is normal. No respiratory distress.      Breath sounds: No wheezing or rhonchi.   Chest:      Chest wall: No tenderness.   Abdominal:      General: Abdomen is  flat. Bowel sounds are normal. There is no distension.      Palpations: Abdomen is soft.      Tenderness: There is no abdominal tenderness. There is no guarding or rebound.   Musculoskeletal:         General: No tenderness or signs of injury.      Comments: Trace non pitting edema   Skin:     General: Skin is warm and dry.      Coloration: Skin is not jaundiced or pale.      Findings: Erythema present.      Comments: Left int jug hickmann with brown crust at opening of the skin, covered with dressing, non-tender. Generalized erythema as med side effect but specific to hickmann.    Neurological:      General: No focal deficit present.      Mental Status: She is alert and oriented to person, place, and time. Mental status is at baseline.   Psychiatric:         Mood and Affect: Mood normal.         Behavior: Behavior normal.           Last Recorded Vitals  /65   Pulse 96   Temp 37 °C (98.6 °F) (Oral)   Resp (!) 22   Wt 68 kg (150 lb)   SpO2 96%     A/P:  Ronna Beckham is a 64 y.o. female presenting with a MHx of Pul. HTN (on macitentan and epoprostenol via left int jub Hendrickson placed 2020, NYHA Functional Class 3 and WHO Group 1) follows with Dr. Prescott(aware pt is admitted), chronic hypoxic respiratory failure (baseline 4L NC), polysubstance use (ethanol, cocaine, amphetamines), SVT, SLE (on hydroxychloroquine), GERD, HARRIS, MDD presenting to the ED 3/15 with complaints of purulent drainage from her hendrickson catheter site for 1 day.  HDS on arrival to the ED. Labs with thrombocytopenia, hyperkalemia (5.4) with no EKG changes. Hendrickson site appears red with brownish drainage around entry point however non tender. Presentation concerning for possible catheter site infection.      #Hendrickson catheter site infection.   -catheter was placed 2020 and has not been changed since then  -Progressive purulent drainage for months, worse 3/14  -No systemic signs of infection  -s/p IV Vanc/Zosyn in the ED  -Blood Cx drawn  3/15  Plan:  -Continue IV Vanc and Zosyn 3/15-xx, narrow when able to   -fu blood cx 3/15  -ordered MRSA nares 3/15 (pos 10/2024)  -Consult IR for further eval of catheter site and possible replacement, order placed 3/15     #Hyperkalemia  #History of Prolonged QTc  -On Kcl 20mEq at home  -K 5.4 o arrival  -Chroinc prologation of Qtc, at 529 (3/15)   Plan:  -ctm   -Avoid QT prolonging agents, will hold Imodium (pt not using iso normal bms)     #Chronic hypoxic respiratory failure  #PAH  -follows with Dr. Prescott. Last seen 2/17  -baseline 4L NC, on presentation to MICU on 5L  Plan:  -holding home laxis 60mg daily on admission iso c/f sepsis, plan to restart if pt remains stable   -strict I&O  -c/w home macitentan 10mg daily and epoprostenol (need micu fellow to order michelle once hickmann evaluates by IR +/- replacement)  -c/w home home Winrevair  -goal O2 sat > 90%  -Will update Dr. Prescott 3/16 (but he is aware pt is admitted)     #Recurrent SVT  #NSTEMI  -history of recurrent SVT, usually in the setting of dilt non-compliance  -c/w home diltiazem 180mg daily  -Will place on tele  -c/w home ASA 81mg (consider holding iso bleeding)     #Anemia  #Thrombocytopenia  #Epistaxis   -No concern for active bleeding at this time, however patient with history of occasional nose bleeds  -We will continue to monitor  -hold dvt ppx lovenox, start scds  -cw asa for now but consider holding      #Polysusbtance Use  -Denies any active drug use  -follow up on Utox     #SLE  -c/w home hydroxychloroquine 200mg BID     #HARRIS  #MDD  -c/w home gabapentin 800 TID  -c/w venlafaxine 150mg daily     #GERD  -Omeprazole 40 mg daily            F prn  E prn  N cardiac  A left int jug hendrickson (c/f infection), piv  DVT prophylaxis: SCD iso thrombocytopenia   CODE: FULL  NOK: Diana Vera (sister) [phone]     Cherlele Castillo MD

## 2025-03-15 NOTE — SIGNIFICANT EVENT
Please refer to Dr. Villarreal's note for detailed HPI    MEDICINE ADMISSION NOTE  HPI  Ronna Beckahm is a 64 y.o. female with PMHx of Pul. HTN (on macitentan and epoprostenol via Hendrickson, NYHA Functional Class 3 and WHO Group 1) follows with Dr. Prescott , chronic hypoxic respiratory failure (baseline 4L NC), polysubstance use (ethanol, cocaine, amphetamines), SVT, SLE (on hydroxychloroquine), GERD, HARRIS, MDD presenting to the ED with complaints of purulent drainage from her hendrickson catheter site of a day duration.  According to the patient, she has had drainage of brownish and sometimes purulent material from her catheter site for several months but noticed increased drainage, redness of the site yesterday. She reached out to Dr. Chua, sent him pictures of the site who then advised she comes to the ED for further evaluation.  She denies any preceding trauma. NO fever, chills, lightheadedness, chest pain, Increased SOB, cough, abd pain, n/v, diarrhea,constipation or bilateral leg swelling. She is not on any anticoagulation.    ED Course:  Vitals:  T 37, HR 96, /68, RR 20, SpO2 93% on RA    Labs:  WBC 5.9, Hgb 10.2, plt 97  Na 137, K 5.4, Cl 102, HCO3 27, BUN 23, Cr 0.82, glu 83, Ca   Blood culture pending    Imaging:  XR chest 2 views: 3/15/2025  1.  Mild cardiomegaly with mildly prominent perihilar opacities may be seen with mild interstitial edema.   2. Left internal jugular catheter distal tip projects over the lower SVC, similar to prior.       Past Medical History  As above    Social History  Former smoker, quit 12 years ago, smoked 1ppd x20 years  Denies alcohol and illicit drug use  Lives by herself     Allergies  Levetiracetam     Physical Exam   Constitutional: Alert, in no acute distress, resting comfortably in bed, cooperative  HEENT: Normocephalic, atraumatic, PERRLA, EOMI, moist mucous membranes, no pharyngeal erythema  Cardiovascular: RRR, normal S1/S2, no murmurs noted  Pulmonary: Clear to  auscultation b/l, no wheezes/crackles/rhonchi, no increased work of breathing, on 4L supplemental oxygen  GI: Soft, non-tender, non-distended, normoactive bowel sounds  : No wells catheter  Skin: redness over montez catheter site (left), non tender, brownish drainage at the entry site however not expressed on palpation.  Lower extremities: Warm and well perfused, no lower extremity edema  Neuro: A&O x3, able to move all 4 extremities spontaneously  Psych: Appropriate mood and affect     Home Medications  Cholestyramine 4g packet daily (not taking any more)  Diltiazem 180 mg daily  Veletri 1.5 mg   Laxis 60 mg daily  Gabapentine 800 mg TID  Hydroxychloroquine 200 mg BID  Omeprazole 40 mg daily  Opsumit 10 mg daily   Venlafaxine  mg daily   Aspirin 81 mg daily   Continuous medications       Assessment/Plan     64 y.o. female with PMHx of Pul. HTN (on macitentan and epoprostenol via Montez, NYHA Functional Class 3 and WHO Group 1) follows with Dr. Prescott , chronic hypoxic respiratory failure (baseline 4L NC), polysubstance use (ethanol, cocaine, amphetamines), SVT, SLE (on hydroxychloroquine), GERD, HARRIS, MDD presenting to the ED following Dr Prescott advise with complaints of purulent drainage from her montez catheter site of a day duration. HDS on arrival to the ED. Labs with thrombocytopenia, hyperkalemia (5.4) with no EKG changes. Montez site appears red with brownish drainage around entry point however non tender. Presentation concerning for possible catheter site infection.     #Montez catheter site infection.   -catheter was placed 2020 and has not been changed since then  -Progressive purulent drainage for months, worse within then past 1 day  -No systemic signs of infection  -s/p IV Vanc/Zosyn in the ED  -Blood Cx drawn  Plan  -Continue IV Vanc and Zosyn for now pending blood culture  -Will get MRSA nares   -Consult IR for further eval of catheter site and possible  replacement    #Hyperkalemia  #History of Prolonged QTc  -On Kcl 20mEq at home  -K 5.4 o arrival  -Chroinc prologation of Qtc, at 529 (3/15)   Plan  -Repeat K levels and monitor  -Avoid QT prolonging agents, will hold Imodium    #Chronic hypoxic respiratory failure  #PAH  -follows with Dr. Prescott. Last seen 2/17  -baseline 4L NC, on presentation to MICU on 5L  Plan:  -Continue home laxis 60mg daily   -strict I&O  -c/w home macitentan 10mg daily and epoprostenol  -c/w home home Winrevair  -goal O2 sat > 90%  -Will update Dr. Prescott    #Recurrent SVT  #NSTEMI  -history of recurrent SVT, usually in the setting of dilt non-compliance  -c/w home diltiazem 180mg daily  -Will place on tele  -c/w home ASA 81mg     #Anemia  #Thrombocytopenia  -No concern for active bleeding at this time, however patient with history of occasional nose bleeds  -We will continue to monitor    #Polysusbtance Use  -Denies any active drug use  -follow up on Utox     #SLE  -c/w home hydroxychloroquine 200mg BID    #HARRIS  #MDD  -c/w home gabapentin 800 TID  -c/w venlafaxine 150mg daily    #GERD  -Omeprazole 40 mg daily    F : PRN  E: PRN  N: Cardiac diet  A: PIV, Montez    DVT prophylaxis: SCD    Code Status: Full Code (confirmed on admission)   NOK: Extended Emergency Contact Information  Primary Emergency Contact: KOLE PITTMAN  Address: [address] [place] [address] of [place]  Home Phone: [phone]  Work Phone: [phone]  Mobile Phone: [phone]  Relation: Sister  Secondary Emergency Contact: Leticia Parson  Mobile Phone: [phone]  Relation: Daughter  Preferred language: English   needed? No      Yesy-Damien Ochoa MD MPH

## 2025-03-15 NOTE — ED PROVIDER NOTES
History of Present Illness     History provided by: Patient  Limitations to History: None  External Records Reviewed with Brief Summary: Outpatient progress note from 11/25/24 which showed ED visit for heart palpitations    HPI:  Ronna Beckham is a 64 y.o. female with a past medical history of SVT, pulmonary hypertension on Velitri through hickmann catheter, sees Dr. Chavez who presents with a Montez catheter site infection.  Patient states that she noted having a few days of purulent drainage from her site.  It is also red.  A nurse had seen the drainage and recommended she come to the ER yesterday.  She ended up coming here today.  Denies have any lightheadedness, weakness, fevers or chills.  Some mild shortness of breath last night.  No nausea or vomiting.    Physical Exam   Triage vitals:  T 37 °C (98.6 °F)  HR 96  /68  RR 20  O2 (!) 93 % Supplemental oxygen (4LPM)    General: Well appearing and in no acute distress.  HEENT: NCAT. PERRL. Oropharynx pink and moist.  CHEST: There is mild erythema and purulence surrounding the Hickmann site.  CV: Regular rate, regular rhythm. No murmurs, rubs, or gallops.  Resp: Normal effort. CTAB.  GI: Soft. No tenderness to palpation. No rebound or guarding.  Extremities: No lower extremity edema. 2+ radial pulses bilaterally.  Neuro: Moving all extremities bilaterally.  Psych: Appropriate mood and affect    Medical Decision Making & ED Course   Medical Decision Making:  This is a 64 y.o. female with a past medical history of SVT, pulmonary hypertension on Velitri through hickmann catheter, sees Dr. Chavez who presents with a Montez catheter site infection.  Patient has had purulent drainage from her Montez site.  Nurse recommend for patient to come in here.  Exam she is well-appearing.  Her vitals look normal.  She is on her baseline oxygen.  She does have some mild erythematous surrounding the Montez site.  Basic labs obtained were overall unremarkable.  Patient  was started on Vanco Zosyn.  Case discussed with the medicine service and the patient was admitted for further management.  ----    Differential diagnoses considered include but are not limited to: catheter site infection, endocarditis, bacteremia, sepsis, pulmonary infacts     Social Determinants of Health which Significantly Impact Care: None identified     EKG Independent Interpretation: EKG not obtained    Independent Result Review and Interpretation: Relevant laboratory and radiographic results were reviewed and independently interpreted by myself.  As necessary, they are commented on in the ED Course.    Chronic conditions affecting the patient's care: As documented above in MDM    The patient was discussed with the following consultants/services: None    Care Considerations: As documented above in [facility]    ED Course:  Diagnoses as of 03/15/25 1441   Catheter-related bloodstream infection, initial encounter     Disposition   As a result of their workup, the patient will require admission to the hospital.  The patient was informed of her diagnosis.  The patient was given the opportunity to ask questions and I answered them. The patient agreed to be admitted to the hospital.    Torey Martinez MD  Emergency Medicine     Torey Martinez MD  03/15/25 1442

## 2025-03-15 NOTE — CONSULTS
"Vancomycin Dosing by Pharmacy- Initial    Ronna Beckham is a 64 y.o. year old female who Pharmacy has been consulted for vancomycin dosing for line infections. Based on the patient's indication and renal status this patient is being dosed based on a goal AUC of 400-600.     Renal function can be described as No Renal Function Information Available at this time    Renal function is at patient's baseline      Visit Vitals  /70   Pulse 96   Temp 37 °C (98.6 °F) (Oral)   Resp 20        Lab Results   Component Value Date    CREATININE 0.86 02/17/2025    CREATININE 0.90 01/02/2025    CREATININE 0.92 12/11/2024    CREATININE 1.06 (H) 11/25/2024    CREATININE 0.90 11/22/2024        Patient weight is No results found for: \"PTWEIGHT\"    No results found for: \"VANCORANDOM\", \"CULTURE\"     No intake/output data recorded.    Lab Results   Component Value Date    PATIENTTEMP 37.0 10/23/2024    PATIENTTEMP 37.0 10/04/2024    PATIENTTEMP 37.0 10/03/2024          Assessment/Plan    Patient will be given a loading dose of 1000 mg.  Will initiate vancomycin maintenance,  750 mg every 12 hours.    This dosing regimen is predicted by AorTxRx to result in the following pharmacokinetic parameters:    Loading dose: N/A  Regimen: 750 mg IV every 12 hours.  Start time: 13:16 on 03/15/2025  Exposure target: AUC24 (range)400-600 mg/L.hr   [id number]: 361 mg/L.hr  AUC24,ss: 471 mg/L.hr  Probability of AUC24 > 400: 69 %  Ctrough,ss: 15.3 mg/L  Probability of Ctrough,ss > 20: 24 %      Follow-up level will be ordered on 3/16 at AM labs unless clinically indicated sooner.  Will continue to monitor renal function daily while on vancomycin and order serum creatinine at least every 48 hours if not already ordered.  Follow for continued vancomycin needs, clinical response, and signs/symptoms of toxicity.     Thank you for allowing me to participate in the care of this patient.     Radha Chavez, PharmD             "

## 2025-03-16 LAB
ALBUMIN SERPL BCP-MCNC: 3.4 G/DL (ref 3.4–5)
ANION GAP SERPL CALC-SCNC: 11 MMOL/L (ref 10–20)
BASOPHILS # BLD AUTO: 0.01 X10*3/UL (ref 0–0.1)
BASOPHILS NFR BLD AUTO: 0.2 %
BUN SERPL-MCNC: 18 MG/DL (ref 6–23)
CALCIUM SERPL-MCNC: 8.3 MG/DL (ref 8.6–10.6)
CHLORIDE SERPL-SCNC: 105 MMOL/L (ref 98–107)
CO2 SERPL-SCNC: 24 MMOL/L (ref 21–32)
CREAT SERPL-MCNC: 0.87 MG/DL (ref 0.5–1.05)
EGFRCR SERPLBLD CKD-EPI 2021: 75 ML/MIN/1.73M*2
EOSINOPHIL # BLD AUTO: 0 X10*3/UL (ref 0–0.7)
EOSINOPHIL NFR BLD AUTO: 0 %
ERYTHROCYTE [DISTWIDTH] IN BLOOD BY AUTOMATED COUNT: 18.1 % (ref 11.5–14.5)
GLUCOSE SERPL-MCNC: 107 MG/DL (ref 74–99)
HCT VFR BLD AUTO: 31.8 % (ref 36–46)
HGB BLD-MCNC: 8.9 G/DL (ref 12–16)
IMM GRANULOCYTES # BLD AUTO: 0.18 X10*3/UL (ref 0–0.7)
IMM GRANULOCYTES NFR BLD AUTO: 4.3 % (ref 0–0.9)
LYMPHOCYTES # BLD AUTO: 0.91 X10*3/UL (ref 1.2–4.8)
LYMPHOCYTES NFR BLD AUTO: 21.9 %
MAGNESIUM SERPL-MCNC: 1.75 MG/DL (ref 1.6–2.4)
MCH RBC QN AUTO: 23.7 PG (ref 26–34)
MCHC RBC AUTO-ENTMCNC: 28 G/DL (ref 32–36)
MCV RBC AUTO: 85 FL (ref 80–100)
MONOCYTES # BLD AUTO: 0.32 X10*3/UL (ref 0.1–1)
MONOCYTES NFR BLD AUTO: 7.7 %
NEUTROPHILS # BLD AUTO: 2.73 X10*3/UL (ref 1.2–7.7)
NEUTROPHILS NFR BLD AUTO: 65.9 %
NRBC BLD-RTO: 1 /100 WBCS (ref 0–0)
PHOSPHATE SERPL-MCNC: 4.8 MG/DL (ref 2.5–4.9)
PLATELET # BLD AUTO: 83 X10*3/UL (ref 150–450)
POTASSIUM SERPL-SCNC: 4.4 MMOL/L (ref 3.5–5.3)
RBC # BLD AUTO: 3.75 X10*6/UL (ref 4–5.2)
SODIUM SERPL-SCNC: 136 MMOL/L (ref 136–145)
VANCOMYCIN SERPL-MCNC: 12.5 UG/ML (ref 5–20)
WBC # BLD AUTO: 4.2 X10*3/UL (ref 4.4–11.3)

## 2025-03-16 PROCEDURE — 2500000001 HC RX 250 WO HCPCS SELF ADMINISTERED DRUGS (ALT 637 FOR MEDICARE OP): Mod: SE | Performed by: NUTRITIONIST

## 2025-03-16 PROCEDURE — 2500000001 HC RX 250 WO HCPCS SELF ADMINISTERED DRUGS (ALT 637 FOR MEDICARE OP): Mod: SE

## 2025-03-16 PROCEDURE — 2500000004 HC RX 250 GENERAL PHARMACY W/ HCPCS (ALT 636 FOR OP/ED): Mod: SE | Performed by: STUDENT IN AN ORGANIZED HEALTH CARE EDUCATION/TRAINING PROGRAM

## 2025-03-16 PROCEDURE — 1200000002 HC GENERAL ROOM WITH TELEMETRY DAILY

## 2025-03-16 PROCEDURE — 85025 COMPLETE CBC W/AUTO DIFF WBC: CPT | Performed by: NUTRITIONIST

## 2025-03-16 PROCEDURE — 80069 RENAL FUNCTION PANEL: CPT | Performed by: NUTRITIONIST

## 2025-03-16 PROCEDURE — 80202 ASSAY OF VANCOMYCIN: CPT | Performed by: PHARMACIST

## 2025-03-16 PROCEDURE — 2500000001 HC RX 250 WO HCPCS SELF ADMINISTERED DRUGS (ALT 637 FOR MEDICARE OP): Mod: SE | Performed by: STUDENT IN AN ORGANIZED HEALTH CARE EDUCATION/TRAINING PROGRAM

## 2025-03-16 PROCEDURE — 2500000004 HC RX 250 GENERAL PHARMACY W/ HCPCS (ALT 636 FOR OP/ED): Mod: SE

## 2025-03-16 PROCEDURE — 2500000004 HC RX 250 GENERAL PHARMACY W/ HCPCS (ALT 636 FOR OP/ED): Mod: SE | Performed by: NUTRITIONIST

## 2025-03-16 PROCEDURE — 83735 ASSAY OF MAGNESIUM: CPT | Performed by: NUTRITIONIST

## 2025-03-16 RX ORDER — HEPARIN SODIUM 5000 [USP'U]/ML
5000 INJECTION, SOLUTION INTRAVENOUS; SUBCUTANEOUS EVERY 8 HOURS SCHEDULED
Status: COMPLETED | OUTPATIENT
Start: 2025-03-16 | End: 2025-03-16

## 2025-03-16 RX ORDER — FUROSEMIDE 40 MG/1
40 TABLET ORAL DAILY
Status: DISCONTINUED | OUTPATIENT
Start: 2025-03-17 | End: 2025-03-19 | Stop reason: HOSPADM

## 2025-03-16 RX ADMIN — PIPERACILLIN SODIUM AND TAZOBACTAM SODIUM 3.38 G: 3; .375 INJECTION, SOLUTION INTRAVENOUS at 22:06

## 2025-03-16 RX ADMIN — PIPERACILLIN SODIUM AND TAZOBACTAM SODIUM 3.38 G: 3; .375 INJECTION, SOLUTION INTRAVENOUS at 01:47

## 2025-03-16 RX ADMIN — GABAPENTIN 800 MG: 400 CAPSULE ORAL at 14:02

## 2025-03-16 RX ADMIN — Medication 2 TABLET: at 08:30

## 2025-03-16 RX ADMIN — VENLAFAXINE HYDROCHLORIDE 150 MG: 150 CAPSULE, EXTENDED RELEASE ORAL at 08:30

## 2025-03-16 RX ADMIN — HYDROXYCHLOROQUINE SULFATE 200 MG: 200 TABLET, FILM COATED ORAL at 08:30

## 2025-03-16 RX ADMIN — EPOPROSTENOL 67 NG/KG/MIN: 1.5 INJECTION, POWDER, LYOPHILIZED, FOR SOLUTION INTRAVENOUS at 22:53

## 2025-03-16 RX ADMIN — Medication 64 MG: at 21:59

## 2025-03-16 RX ADMIN — HEPARIN SODIUM 5000 UNITS: 5000 INJECTION, SOLUTION INTRAVENOUS; SUBCUTANEOUS at 13:58

## 2025-03-16 RX ADMIN — HYDROXYCHLOROQUINE SULFATE 200 MG: 200 TABLET, FILM COATED ORAL at 21:59

## 2025-03-16 RX ADMIN — PIPERACILLIN SODIUM AND TAZOBACTAM SODIUM 3.38 G: 3; .375 INJECTION, SOLUTION INTRAVENOUS at 13:57

## 2025-03-16 RX ADMIN — HEPARIN SODIUM 5000 UNITS: 5000 INJECTION, SOLUTION INTRAVENOUS; SUBCUTANEOUS at 21:59

## 2025-03-16 RX ADMIN — Medication 5 MG: at 21:58

## 2025-03-16 RX ADMIN — ASPIRIN 81 MG: 81 TABLET, COATED ORAL at 21:58

## 2025-03-16 RX ADMIN — Medication 2 TABLET: at 21:59

## 2025-03-16 RX ADMIN — VANCOMYCIN HYDROCHLORIDE 750 MG: 750 INJECTION, SOLUTION INTRAVENOUS at 01:47

## 2025-03-16 RX ADMIN — ERGOCALCIFEROL 1250 MCG: 1.25 CAPSULE ORAL at 08:30

## 2025-03-16 RX ADMIN — GABAPENTIN 800 MG: 400 CAPSULE ORAL at 21:58

## 2025-03-16 RX ADMIN — VANCOMYCIN HYDROCHLORIDE 750 MG: 750 INJECTION, SOLUTION INTRAVENOUS at 13:57

## 2025-03-16 RX ADMIN — MACITENTAN 10 MG: 10 TABLET, FILM COATED ORAL at 16:14

## 2025-03-16 RX ADMIN — DILTIAZEM HYDROCHLORIDE 180 MG: 180 CAPSULE, COATED, EXTENDED RELEASE ORAL at 08:30

## 2025-03-16 RX ADMIN — GABAPENTIN 800 MG: 400 CAPSULE ORAL at 08:30

## 2025-03-16 RX ADMIN — PIPERACILLIN SODIUM AND TAZOBACTAM SODIUM 3.38 G: 3; .375 INJECTION, SOLUTION INTRAVENOUS at 08:30

## 2025-03-16 RX ADMIN — PANTOPRAZOLE SODIUM 40 MG: 40 TABLET, DELAYED RELEASE ORAL at 06:26

## 2025-03-16 RX ADMIN — POLYETHYLENE GLYCOL 3350 17 G: 17 POWDER, FOR SOLUTION ORAL at 08:30

## 2025-03-16 ASSESSMENT — COGNITIVE AND FUNCTIONAL STATUS - GENERAL
DAILY ACTIVITIY SCORE: 24
MOBILITY SCORE: 24
DAILY ACTIVITIY SCORE: 24
DAILY ACTIVITIY SCORE: 24
CLIMB 3 TO 5 STEPS WITH RAILING: A LITTLE
DAILY ACTIVITIY SCORE: 24
MOBILITY SCORE: 22
DAILY ACTIVITIY SCORE: 24
MOBILITY SCORE: 24
WALKING IN HOSPITAL ROOM: A LITTLE
MOBILITY SCORE: 24
MOBILITY SCORE: 24

## 2025-03-16 ASSESSMENT — PAIN SCALES - GENERAL
PAINLEVEL_OUTOF10: 0 - NO PAIN
PAINLEVEL_OUTOF10: 0 - NO PAIN

## 2025-03-16 ASSESSMENT — PAIN - FUNCTIONAL ASSESSMENT
PAIN_FUNCTIONAL_ASSESSMENT: 0-10
PAIN_FUNCTIONAL_ASSESSMENT: 0-10

## 2025-03-16 NOTE — PROGRESS NOTES
Pharmacy Medication History Review    Ronna Beckham is a 64 y.o. female admitted for Catheter-related bloodstream infection, initial encounter. Pharmacy reviewed the patient's [id number] medications and allergies for accuracy.    Medications ADDED:  None  Medications CHANGED:  None  Medications REMOVED:   None    The list below reflects the updated PTA list.   Prior to Admission Medications   Prescriptions Last Dose Informant   Opsumit 10 mg tablet tablet  Self   Sig: Take 1 tablet (10 mg) by mouth once daily.   aspirin 81 mg EC tablet  Self   Sig: Take 1 tablet (81 mg) by mouth once daily.   calcium carbonate-vitamin D3 500 mg-5 mcg (200 unit) tablet  Self   Sig: Take 2 tablets by mouth 2 times a day.   cholestyramine (Questran) 4 gram packet Not Taking Self   Sig: Take 1 packet (4 g) by mouth once daily.   Patient not taking: Reported on 3/16/2025   dilTIAZem CD (Cardizem CD) 180 mg 24 hr capsule  Self   Sig: Take 1 capsule (180 mg) by mouth once daily.   epoprostenol (Veletri) 1.5 mg recon soln  Self   Si.5 mg (1,500 mcg) by central venous line infusion route continuously. Reconstitute contents of vial with 5 ml diluent and mix cassette as directed. Infuse continuously per physician orders. Allow for priming volume. Dose range: 1-150 ng/kg/min.   ergocalciferol (Vitamin D-2) 1.25 MG (32470 UT) capsule  Self   Sig: Take 1 capsule (1,250 mcg) by mouth 1 (one) time per week. On Wednesday   furosemide (Lasix) 40 mg tablet  Self   Sig: Take 1.5 tablets (60 mg) by mouth once daily.   Patient taking differently: Take 1 tablet (40 mg) by mouth once daily.   gabapentin (Neurontin) 800 mg tablet  Self   Sig: Take 1 tablet (800 mg) by mouth 3 times a day.   hydroxychloroquine (Plaquenil) 200 mg tablet  Self   Sig: Take 1 tablet (200 mg) by mouth 2 times a day.   loperamide (Imodium) 2 mg capsule  Self   Sig: Take 1 capsule (2 mg) by mouth 2 times a day.   magnesium chloride (MagDelay) 64 mg EC tablet  Self  "  Sig: Take 1 tablet (64 mg) by mouth 2 times a day.   melatonin 5 mg tablet  Self   Sig: Take 1 tablet (5 mg) by mouth once daily at bedtime.   multivitamin-iron-folic acid  mg-mcg tablet  Self   Sig: Take 1 tablet by mouth once daily.   omeprazole (PriLOSEC) 40 mg DR capsule  Self   Sig: Take 1 capsule (40 mg) by mouth once daily in the morning. Take before meals. Do not crush or chew.   potassium chloride CR 20 mEq ER tablet  Self   Sig: Take 1 tablet (20 mEq) by mouth 2 times a day. Do not crush or chew.   Patient taking differently: Take 2 tablets (40 mEq) by mouth 2 times a day. Do not crush or chew.   potassium gluconate 595 mg (99 mg) tablet  Self   Sig: Take 2 tablets by mouth once daily.   sotatercept-csrk (Winrevair) 60 mg kit  Self   Sig: Inject 1 Dose under the skin every 21 (twenty-one) days.   venlafaxine XR (Effexor-XR) 150 mg 24 hr capsule  Self   Sig: Take 1 capsule (150 mg) by mouth once daily. Do not crush or chew.      Facility-Administered Medications Last Administration Doses Remaining   perflutren lipid microspheres (Definity) injection 0.5-10 mL of dilution None recorded 1             The list below reflects the updated allergy list. Please review each documented allergy for additional clarification and justification.  Allergies  Reviewed by Florecita Garcia on 3/16/2025        Severity Reactions Comments    Levetiracetam Medium Other Weakness, unable to walk            Patient accepts M2B at discharge.     Sources:   -Patient interview, Good historian   -Outpatient pharmacy dispense history  -OARRS  -Care everywhere   -Chart review      Additional Comments:  None      FLORECITA GARCIA  Pharmacy Technician  03/16/25     Secure Chat preferred   If no response call [id number] or [employer] \"Med Rec\"    "

## 2025-03-16 NOTE — PROGRESS NOTES
Ronna Beckham is a 64 y.o. female on day 0 of admission presenting with Catheter-related bloodstream infection, initial encounter.      Subjective   NAEON.     Objective     Last Recorded Vitals  BP 98/63 (BP Location: Right arm, Patient Position: Lying)   Pulse 84   Temp 36.7 °C (98.1 °F) (Temporal)   Resp 20   Wt 70.8 kg (156 lb 1.4 oz)   SpO2 94%   Intake/Output last 3 Shifts:    Intake/Output Summary (Last 24 hours) at 3/16/2025 1005  Last data filed at 3/15/2025 2332  Gross per 24 hour   Intake 220 ml   Output --   Net 220 ml       Admission Weight  Weight: 68 kg (150 lb) (03/15/25 1242)    Daily Weight  03/16/25 : 70.8 kg (156 lb 1.4 oz)    Image Results  XR chest 2 views  Narrative: Interpreted By:  Maggy Sam and Liu Scott   STUDY:  XR CHEST 2 VIEWS;  3/15/2025 1:30 pm      INDICATION:  Signs/Symptoms:hendrickson catheter site infection, mild sob.      COMPARISON:  Chest x-ray 11/20/2024  CT chest dated 11/20/2024.      ACCESSION NUMBER(S):  [id number]      ORDERING CLINICIAN:  JORJE PITTMAN      FINDINGS:  PA and lateral radiographs of the chest were provided.      Redemonstration of left internal jugular catheter with distal tip  projecting over the lower SVC.      CARDIOMEDIASTINAL SILHOUETTE:  Cardiomediastinal silhouette is mildly enlarged in size and  configuration, similar to prior      LUNGS:  Mild prominence of perihilar interstitial markings. No focal  consolidation, pleural effusion or pneumothorax.      ABDOMEN:  No remarkable upper abdominal findings.      BONES:  No acute osseous changes.      Impression: 1.  Mild cardiomegaly with mildly prominent perihilar opacities may  be seen with mild interstitial edema.  2. Left internal jugular catheter distal tip projects over the lower  SVC, similar to prior.      I personally reviewed the images/study and I agree with the findings  as stated by resident physician Matteo Mcgee MD. This study was  interpreted at [facility],  [place], [place].      MACRO:  None      Signed by: Maggy Senaabby 3/15/2025 2:33 PM  Dictation workstation:   [id number]      Physical Exam  Constitutional:       Appearance: She is not toxic-appearing or diaphoretic.   HENT:      Head: Normocephalic and atraumatic.      Mouth/Throat:      Mouth: Mucous membranes are moist.   Eyes:      Comments: Wearing glasses   Cardiovascular:      Rate and Rhythm: Normal rate.      Heart sounds: No murmur heard.     No friction rub. No gallop.   Pulmonary:      Effort: Pulmonary effort is normal. No respiratory distress.      Breath sounds: No wheezing, rhonchi or rales.   Chest:      Chest wall: No tenderness.   Abdominal:      General: Abdomen is flat. Bowel sounds are normal. There is no distension.      Palpations: Abdomen is soft.      Tenderness: There is no abdominal tenderness. There is no guarding or rebound.   Musculoskeletal:         General: No tenderness or signs of injury.      Comments: Trace non pitting edema   Skin:     General: Skin is warm and dry.      Coloration: Skin is not jaundiced or pale.      Findings: Erythema present.      Comments: Generalized erythema medication side effect per pt. Montez placed on left chest (left int jug) with brown discharge at skin insertion site. Not painful.    Neurological:      General: No focal deficit present.      Mental Status: She is alert and oriented to person, place, and time. Mental status is at baseline.      Motor: No weakness.   Psychiatric:         Mood and Affect: Mood normal.         Behavior: Behavior normal.         A/P:  Ronna Beckham is a 64 y.o. female presenting with a MHx of Pul. HTN (on macitentan and epoprostenol via left int jub Montez placed 2020, NYHA Functional Class 3 and WHO Group 1) follows with Dr. Prescott(aware pt is admitted), chronic hypoxic respiratory failure (baseline 4L NC), polysubstance use (ethanol, cocaine, amphetamines), SVT, SLE (on  hydroxychloroquine), GERD, HARRIS, MDD presenting to the ED 3/15 with complaints of purulent drainage from her montez catheter site for 1 day.  HDS on arrival to the ED. Labs with thrombocytopenia, hyperkalemia (5.4) with no EKG changes. Montez site appears red with brownish drainage around entry point however non tender. Presentation concerning for possible catheter site infection. IR consulted for montez replacement planning for 3/17 so npo midnight and hold subcutaneous hep at 4am, restarted opsumit 3/16, next dose of winrevair is due 3/19 will need support from Dr. Prescott's team to obtain it if pt is still in the hospital.     Dispo: Pending IR eval and montez replacement, then medical stability tent dc 3/18      Updates:  -IR consulted, planning for 3/17 montez replacement so pt npo midnight and holding subcutaneous hep at 4am  -restarted opsumit 3/16  -piv placed with veletri running   -next dose of winrevair is due 3/19 will need support from Dr. Prescott's team to obtain it if pt is still in the hospital  -epistaxis resolved and thrombocytopenia stable so started subcutaneous hep for dvt ppx  -cw vanc/zosyn  -afrin ordered prn inpt and will send pt home with it outpt   -utox prelim neg  -Blood Cx drawn 3/15, mrsa pending      #Montez catheter site infection.   -catheter was placed 2020 and has not been changed since then  -Progressive purulent drainage for months, worse 3/14  -No systemic signs of infection  -s/p IV Vanc/Zosyn in the ED  -Blood Cx drawn 3/15  Plan:  -Continue IV Vanc and Zosyn 3/15-xx, narrow when able to   -fu blood cx 3/15  -ordered MRSA nares 3/15 (pos 10/2024)  -Consult IR for further eval of catheter site and possible replacement, order placed 3/15     #Hyperkalemia  #History of Prolonged QTc  -On Kcl 20mEq at home  -K 5.4 o arrival  -Chroinc prologation of Qtc, at 529 (3/15)   Plan:  -ctm   -Avoid QT prolonging agents, will hold Imodium (pt not using iso normal bms)     #Chronic  hypoxic respiratory failure  #PAH  -follows with Dr. Prescott. Last seen 2/17  -baseline 4L NC, on presentation to MICU on 5L  Plan:  -holding home laxis 60mg daily on admission iso c/f sepsis, plan to restart if pt remains stable   -strict I&O  -c/w home macitentan 10mg daily and epoprostenol (need micu fellow to order michelle once hickmann evaluates by IR +/- replacement)  -c/w home home Winrevair  -goal O2 sat > 90%  -Will update Dr. Prescott 3/16 (but he is aware pt is admitted)     #Recurrent SVT  #NSTEMI  -history of recurrent SVT, usually in the setting of dilt non-compliance  -c/w home diltiazem 180mg daily  -Will place on tele  -c/w home ASA 81mg      #Anemia  #Thrombocytopenia  #Epistaxis   -No concern for active bleeding at this time, however patient with history of occasional nose bleeds  -We will continue to monitor  -subqhep, start scds  -cw asa for now but consider holding      #Polysusbtance Use  -Denies any active drug use  -follow up on Utox     #SLE  -c/w home hydroxychloroquine 200mg BID     #HARRIS  #MDD  -c/w home gabapentin 800 TID  -c/w venlafaxine 150mg daily     #GERD  -Omeprazole 40 mg daily              F prn  E prn  N cardiac->npo midnight for hendrickson exchange 3/17  A left int jug hendrickson (c/f infection), piv  DVT prophylaxis: subqhep ->hold 4am for ir hendrickson replacement 3/17  CODE: FULL  NOK: Diana Vera (sister) [phone]     Cherelle Castillo MD

## 2025-03-16 NOTE — CARE PLAN
Problem: Chronic Conditions and Co-morbidities  Goal: Patient's chronic conditions and co-morbidity symptoms are monitored and maintained or improved  Outcome: Progressing     Problem: Skin  Goal: Prevent/manage excess moisture  Outcome: Progressing     Problem: Skin  Goal: Prevent/minimize sheer/friction injuries  Outcome: Progressing     Problem: Fall/Injury  Goal: Be free from injury by end of the shift  Outcome: Progressing     Problem: Pain  Goal: Walks with improved pain control throughout the shift  Outcome: Progressing     Problem: Respiratory  Goal: Minimal/no exertional discomfort or dyspnea this shift  Outcome: Progressing     Problem: Respiratory  Goal: No signs of respiratory distress (eg. Use of accessory muscles. Peds grunting)  Outcome: Progressing       The clinical goals for the shift include Maintain infusion per order.

## 2025-03-16 NOTE — CARE PLAN
The patient's goals for the shift include      The clinical goals for the shift include Patient to have no new signs of infection    No new signs of infection continue to monitor.      Problem: Pain - Adult  Goal: Verbalizes/displays adequate comfort level or baseline comfort level  Outcome: Progressing     Problem: Safety - Adult  Goal: Free from fall injury  Outcome: Progressing     Problem: Discharge Planning  Goal: Discharge to home or other facility with appropriate resources  Outcome: Progressing     Problem: Chronic Conditions and Co-morbidities  Goal: Patient's chronic conditions and co-morbidity symptoms are monitored and maintained or improved  Outcome: Progressing     Problem: Nutrition  Goal: Nutrient intake appropriate for maintaining nutritional needs  Outcome: Progressing

## 2025-03-16 NOTE — PROGRESS NOTES
Vancomycin Dosing by Pharmacy- FOLLOW UP    Ronna Beckham is a 64 y.o. year old female who Pharmacy has been consulted for vancomycin dosing for line infections. Based on the patient's indication and renal status this patient is being dosed based on a goal AUC of 400-600.     Renal function is currently stable.    Current vancomycin dose: 750 mg given every 12 hours    Estimated vancomycin AUC on current dose: 429 mg/L.hr     Visit Vitals  BP 98/63 (BP Location: Right arm, Patient Position: Lying)   Pulse 84   Temp 36.7 °C (98.1 °F) (Temporal)   Resp 20        Lab Results   Component Value Date    CREATININE 0.87 2025    CREATININE 0.82 03/15/2025    CREATININE 0.86 2025    CREATININE 0.90 2025    CREATININE 0.92 2024        Patient weight is as follows:   Vitals:    25 0508   Weight: 70.8 kg (156 lb 1.4 oz)       Cultures:  No results found for the encounter in last 14 days.       I/O last 3 completed shifts:  In: 220 (3.1 mL/kg) [P.O.:220]  Out: - (0 mL/kg)   Weight: 70.8 kg   I/O during current shift:  No intake/output data recorded.    Temp (24hrs), Av.7 °C (98.1 °F), Min:36.4 °C (97.5 °F), Max:37 °C (98.6 °F)      Assessment/Plan    Within goal AUC range. Continue current vancomycin regimen.    This dosing regimen is predicted by [employer] to result in the following pharmacokinetic parameters:  Regimen: 750 mg IV every 12 hours.  Start time: 13:47 on 2025  Exposure target: AUC24 (range)400-600 mg/L.hr   [id number]: 429 mg/L.hr  AUC24,ss: 475 mg/L.hr  Probability of AUC24 > 400: 79 %  Ctrough,ss: 15.5 mg/L  Probability of Ctrough,ss > 20: 19 %    The next level will be obtained on 3/18 at AM labs. May be obtained sooner if clinically indicated.   Will continue to monitor renal function daily while on vancomycin and order serum creatinine at least every 48 hours if not already ordered.  Follow for continued vancomycin needs, clinical response, and signs/symptoms of  toxicity.       Julia Nelson, PharmD

## 2025-03-17 ENCOUNTER — APPOINTMENT (OUTPATIENT)
Dept: RADIOLOGY | Facility: HOSPITAL | Age: 65
End: 2025-03-17
Payer: COMMERCIAL

## 2025-03-17 LAB
ALBUMIN SERPL BCP-MCNC: 3.5 G/DL (ref 3.4–5)
ANION GAP SERPL CALC-SCNC: 12 MMOL/L (ref 10–20)
BASOPHILS # BLD MANUAL: 0 X10*3/UL (ref 0–0.1)
BASOPHILS NFR BLD MANUAL: 0 %
BUN SERPL-MCNC: 16 MG/DL (ref 6–23)
CALCIUM SERPL-MCNC: 8.9 MG/DL (ref 8.6–10.6)
CHLORIDE SERPL-SCNC: 105 MMOL/L (ref 98–107)
CO2 SERPL-SCNC: 25 MMOL/L (ref 21–32)
CREAT SERPL-MCNC: 0.8 MG/DL (ref 0.5–1.05)
EGFRCR SERPLBLD CKD-EPI 2021: 83 ML/MIN/1.73M*2
EOSINOPHIL # BLD MANUAL: 0 X10*3/UL (ref 0–0.7)
EOSINOPHIL NFR BLD MANUAL: 0 %
ERYTHROCYTE [DISTWIDTH] IN BLOOD BY AUTOMATED COUNT: 17.8 % (ref 11.5–14.5)
GLUCOSE SERPL-MCNC: 114 MG/DL (ref 74–99)
HCT VFR BLD AUTO: 33.1 % (ref 36–46)
HGB BLD-MCNC: 9.7 G/DL (ref 12–16)
IMM GRANULOCYTES # BLD AUTO: 0.18 X10*3/UL (ref 0–0.7)
IMM GRANULOCYTES NFR BLD AUTO: 5.4 % (ref 0–0.9)
LYMPHOCYTES # BLD MANUAL: 0.89 X10*3/UL (ref 1.2–4.8)
LYMPHOCYTES NFR BLD MANUAL: 27 %
MAGNESIUM SERPL-MCNC: 1.66 MG/DL (ref 1.6–2.4)
MCH RBC QN AUTO: 23.5 PG (ref 26–34)
MCHC RBC AUTO-ENTMCNC: 29.3 G/DL (ref 32–36)
MCV RBC AUTO: 80 FL (ref 80–100)
MONOCYTES # BLD MANUAL: 0.07 X10*3/UL (ref 0.1–1)
MONOCYTES NFR BLD MANUAL: 2 %
MYELOCYTES # BLD MANUAL: 0.07 X10*3/UL
MYELOCYTES NFR BLD MANUAL: 2 %
NEUTS SEG # BLD MANUAL: 2.28 X10*3/UL (ref 1.2–7)
NEUTS SEG NFR BLD MANUAL: 69 %
NRBC BLD-RTO: 0.9 /100 WBCS (ref 0–0)
PHOSPHATE SERPL-MCNC: 4.6 MG/DL (ref 2.5–4.9)
PLATELET # BLD AUTO: 72 X10*3/UL (ref 150–450)
POTASSIUM SERPL-SCNC: 4.2 MMOL/L (ref 3.5–5.3)
RBC # BLD AUTO: 4.13 X10*6/UL (ref 4–5.2)
RBC MORPH BLD: ABNORMAL
SODIUM SERPL-SCNC: 138 MMOL/L (ref 136–145)
TOTAL CELLS COUNTED BLD: 100
WBC # BLD AUTO: 3.3 X10*3/UL (ref 4.4–11.3)

## 2025-03-17 PROCEDURE — 2500000004 HC RX 250 GENERAL PHARMACY W/ HCPCS (ALT 636 FOR OP/ED): Mod: SE

## 2025-03-17 PROCEDURE — 2500000004 HC RX 250 GENERAL PHARMACY W/ HCPCS (ALT 636 FOR OP/ED): Mod: SE | Performed by: NUTRITIONIST

## 2025-03-17 PROCEDURE — 85027 COMPLETE CBC AUTOMATED: CPT | Performed by: NUTRITIONIST

## 2025-03-17 PROCEDURE — 2500000001 HC RX 250 WO HCPCS SELF ADMINISTERED DRUGS (ALT 637 FOR MEDICARE OP): Mod: SE | Performed by: NUTRITIONIST

## 2025-03-17 PROCEDURE — 0JPT3XZ REMOVAL OF TUNNELED VASCULAR ACCESS DEVICE FROM TRUNK SUBCUTANEOUS TISSUE AND FASCIA, PERCUTANEOUS APPROACH: ICD-10-PCS | Performed by: NURSE PRACTITIONER

## 2025-03-17 PROCEDURE — 2500000004 HC RX 250 GENERAL PHARMACY W/ HCPCS (ALT 636 FOR OP/ED): Mod: SE | Performed by: STUDENT IN AN ORGANIZED HEALTH CARE EDUCATION/TRAINING PROGRAM

## 2025-03-17 PROCEDURE — 99232 SBSQ HOSP IP/OBS MODERATE 35: CPT | Performed by: STUDENT IN AN ORGANIZED HEALTH CARE EDUCATION/TRAINING PROGRAM

## 2025-03-17 PROCEDURE — 1200000002 HC GENERAL ROOM WITH TELEMETRY DAILY

## 2025-03-17 PROCEDURE — 80069 RENAL FUNCTION PANEL: CPT | Performed by: NUTRITIONIST

## 2025-03-17 PROCEDURE — 2500000001 HC RX 250 WO HCPCS SELF ADMINISTERED DRUGS (ALT 637 FOR MEDICARE OP): Mod: SE

## 2025-03-17 PROCEDURE — 02PYX3Z REMOVAL OF INFUSION DEVICE FROM GREAT VESSEL, EXTERNAL APPROACH: ICD-10-PCS | Performed by: NURSE PRACTITIONER

## 2025-03-17 PROCEDURE — 36589 REMOVAL TUNNELED CV CATH: CPT | Performed by: PHYSICIAN ASSISTANT

## 2025-03-17 PROCEDURE — 85007 BL SMEAR W/DIFF WBC COUNT: CPT | Performed by: NUTRITIONIST

## 2025-03-17 PROCEDURE — 2500000001 HC RX 250 WO HCPCS SELF ADMINISTERED DRUGS (ALT 637 FOR MEDICARE OP): Mod: SE | Performed by: STUDENT IN AN ORGANIZED HEALTH CARE EDUCATION/TRAINING PROGRAM

## 2025-03-17 PROCEDURE — 83735 ASSAY OF MAGNESIUM: CPT | Performed by: NUTRITIONIST

## 2025-03-17 RX ORDER — SIMETHICONE 80 MG
40 TABLET,CHEWABLE ORAL ONCE
Status: COMPLETED | OUTPATIENT
Start: 2025-03-17 | End: 2025-03-17

## 2025-03-17 RX ADMIN — VENLAFAXINE HYDROCHLORIDE 150 MG: 150 CAPSULE, EXTENDED RELEASE ORAL at 08:47

## 2025-03-17 RX ADMIN — Medication 64 MG: at 08:47

## 2025-03-17 RX ADMIN — PIPERACILLIN SODIUM AND TAZOBACTAM SODIUM 3.38 G: 3; .375 INJECTION, SOLUTION INTRAVENOUS at 15:17

## 2025-03-17 RX ADMIN — EPOPROSTENOL 67 NG/KG/MIN: 1.5 INJECTION, POWDER, LYOPHILIZED, FOR SOLUTION INTRAVENOUS at 22:33

## 2025-03-17 RX ADMIN — GABAPENTIN 800 MG: 400 CAPSULE ORAL at 13:46

## 2025-03-17 RX ADMIN — PANTOPRAZOLE SODIUM 40 MG: 40 TABLET, DELAYED RELEASE ORAL at 06:55

## 2025-03-17 RX ADMIN — GABAPENTIN 800 MG: 400 CAPSULE ORAL at 21:25

## 2025-03-17 RX ADMIN — PIPERACILLIN SODIUM AND TAZOBACTAM SODIUM 3.38 G: 3; .375 INJECTION, SOLUTION INTRAVENOUS at 21:20

## 2025-03-17 RX ADMIN — VANCOMYCIN HYDROCHLORIDE 750 MG: 750 INJECTION, SOLUTION INTRAVENOUS at 13:47

## 2025-03-17 RX ADMIN — PIPERACILLIN SODIUM AND TAZOBACTAM SODIUM 3.38 G: 3; .375 INJECTION, SOLUTION INTRAVENOUS at 08:48

## 2025-03-17 RX ADMIN — ASPIRIN 81 MG: 81 TABLET, COATED ORAL at 21:25

## 2025-03-17 RX ADMIN — HYDROXYCHLOROQUINE SULFATE 200 MG: 200 TABLET, FILM COATED ORAL at 21:25

## 2025-03-17 RX ADMIN — Medication 5 MG: at 21:25

## 2025-03-17 RX ADMIN — VANCOMYCIN HYDROCHLORIDE 750 MG: 750 INJECTION, SOLUTION INTRAVENOUS at 01:30

## 2025-03-17 RX ADMIN — DILTIAZEM HYDROCHLORIDE 180 MG: 180 CAPSULE, COATED, EXTENDED RELEASE ORAL at 08:47

## 2025-03-17 RX ADMIN — Medication 2 TABLET: at 21:25

## 2025-03-17 RX ADMIN — PIPERACILLIN SODIUM AND TAZOBACTAM SODIUM 3.38 G: 3; .375 INJECTION, SOLUTION INTRAVENOUS at 04:21

## 2025-03-17 RX ADMIN — Medication 2 TABLET: at 08:47

## 2025-03-17 RX ADMIN — GABAPENTIN 800 MG: 400 CAPSULE ORAL at 08:47

## 2025-03-17 RX ADMIN — HYDROXYCHLOROQUINE SULFATE 200 MG: 200 TABLET, FILM COATED ORAL at 08:47

## 2025-03-17 RX ADMIN — MACITENTAN 10 MG: 10 TABLET, FILM COATED ORAL at 10:12

## 2025-03-17 RX ADMIN — Medication 64 MG: at 21:25

## 2025-03-17 RX ADMIN — SIMETHICONE 40 MG: 80 TABLET, CHEWABLE ORAL at 01:30

## 2025-03-17 ASSESSMENT — COGNITIVE AND FUNCTIONAL STATUS - GENERAL
DAILY ACTIVITIY SCORE: 24
DAILY ACTIVITIY SCORE: 24
MOBILITY SCORE: 24
DAILY ACTIVITIY SCORE: 24
MOBILITY SCORE: 24
DAILY ACTIVITIY SCORE: 24
DAILY ACTIVITIY SCORE: 24
MOBILITY SCORE: 24

## 2025-03-17 ASSESSMENT — PAIN SCALES - GENERAL
PAINLEVEL_OUTOF10: 0 - NO PAIN

## 2025-03-17 ASSESSMENT — PAIN - FUNCTIONAL ASSESSMENT
PAIN_FUNCTIONAL_ASSESSMENT: 0-10
PAIN_FUNCTIONAL_ASSESSMENT: 0-10

## 2025-03-17 ASSESSMENT — ACTIVITIES OF DAILY LIVING (ADL): LACK_OF_TRANSPORTATION: NO

## 2025-03-17 NOTE — CARE PLAN
Problem: Pain - Adult  Goal: Verbalizes/displays adequate comfort level or baseline comfort level  Outcome: Progressing     Problem: Chronic Conditions and Co-morbidities  Goal: Patient's chronic conditions and co-morbidity symptoms are monitored and maintained or improved  Outcome: Progressing     Problem: Skin  Goal: Prevent/minimize sheer/friction injuries  Outcome: Progressing     Problem: Skin  Goal: Promote skin healing  Outcome: Progressing     Problem: Fall/Injury  Goal: Be free from injury by end of the shift  Outcome: Progressing     Problem: Respiratory  Goal: Minimal/no exertional discomfort or dyspnea this shift  Outcome: Progressing     Problem: Respiratory  Goal: No signs of respiratory distress (eg. Use of accessory muscles. Peds grunting)  Outcome: Progressing       The clinical goals for the shift include Maintain Velitri Infusion per orders throughout shift.

## 2025-03-17 NOTE — PROGRESS NOTES
03/17/25 1340   Discharge Planning   Living Arrangements Alone   Support Systems Family members   Assistance Needed none   Type of Residence Private residence   Do you have animals or pets at home? No   Home or Post Acute Services None   Expected Discharge Disposition Home   Financial Resource Strain   How hard is it for you to pay for the very basics like food, housing, medical care, and heating? Not very   Housing Stability   In the last 12 months, was there a time when you were not able to pay the mortgage or rent on time? N   In the past 12 months, how many times have you moved where you were living? 1   At any time in the past 12 months, were you homeless or living in a shelter (including now)? N   Transportation Needs   In the past 12 months, has lack of transportation kept you from medical appointments or from getting medications? no   In the past 12 months, has lack of transportation kept you from meetings, work, or from getting things needed for daily living? No   Patient Choice   Provider Choice list and CMS website (https://medicare.gov/care-compare#search) for post-acute Quality and Resource Measure Data were provided and reviewed with: Patient   Patient / Family choosing to utilize agency / facility established prior to hospitalization No   Stroke Family Assessment   Stroke Family Assessment Needed No     Transitional Care Coordination Progress Note:  Patient discussed during interdisciplinary rounds.   Team members present: RN LUDWIG AMBROSE   Plan per Medical/Surgical team: On  IV antibiotics   Payor: [employer] [facility]   Discharge disposition: Home   Potential Barriers: Home   ADOD: 3-     Previous Home Care: None   DME: None   Pharmacy: [facility]   Falls: None   PCP:  NORBERT CRAWFORD   Dialysis: None

## 2025-03-17 NOTE — PROGRESS NOTES
"65 y/o F PMH chronic hypoxic respiratory failure (on 4L NC) secondary group 1 pulmonary hypertension (on Macitentan, continuous Epoprostenol infusion via L internal jugular Hendrickson, and monthly Winrevair (Sotatercept) injections, follows with Dr. Prescott), SVT (on Diltiazem), and lupus (on Hydroxychloroquine) who is admitted for concern for line associated infection.    Subjective   Overnight, no acute events. This morning she reports feeling well, denies fevers or chills. She said that the site of her Hendrickson looks normal to her (she has red skin at baseline) but she cannot tell if there is any additional drainage due to the line dressing. She is wondering if she needs to be NPO for possible line removal or not.       Objective     Last Recorded Vitals  BP 97/66   Pulse 86   Temp 36.3 °C (97.3 °F)   Resp 18   Ht 1.651 m (5' 5\")   Wt 70.9 kg (156 lb 4.9 oz)   SpO2 93%   BMI 26.01 kg/m²        Physical Exam  Physical Exam:  General appearance/Constitutional: no acute distress, resting comfortably in bed  Respiratory/Chest Wall: red skin on chest near clavicle across the chest (appears to be baseline), no raised lesions, no palpable fluctuance or induration at site of left internal jugular Hendrickson line, dressing is clean, dry, and intact, no respiratory distress on 4L NC  Cardiovascular: regular rate and rhythm, no murmurs  Psychiatric: appropriate mood  Lines/Drains: left internal jugular hendrickson catheter (placed in 2020), R forearm 20 gauge pIV    Scheduled medications  aspirin, 81 mg, oral, Daily  calcium carbonate-vitamin D3, 2 tablet, oral, BID  dilTIAZem CD, 180 mg, oral, Daily  ergocalciferol, 1,250 mcg, oral, Every Sunday  [Held by provider] furosemide, 40 mg, oral, Daily  gabapentin, 800 mg, oral, TID  hydroxychloroquine, 200 mg, oral, BID  macitentan, 10 mg, oral, Daily  magnesium chloride, 64 mg, oral, BID  melatonin, 5 mg, oral, Nightly  pantoprazole, 40 mg, oral, Daily before breakfast  [Held by " provider] perflutren lipid microspheres, 0.5-10 mL of dilution, intravenous, Once  piperacillin-tazobactam, 3.375 g, intravenous, q6h  polyethylene glycol, 17 g, oral, Daily  [Held by provider] potassium chloride CR, 20 mEq, oral, BID  [Held by provider] sotatercept-csrk, 1 Dose, subcutaneous, q21 days  vancomycin, 750 mg, intravenous, q12h  venlafaxine XR, 150 mg, oral, Daily      Continuous medications  epoprostenol, 67 ng/kg/min (Order-Specific), Last Rate: 67 ng/kg/min (03/16/25 9593)      PRN medications  PRN medications: [Held by provider] loperamide, oxymetazoline, vancomycin     Relevant Results  CBC: WBC 3.3 (L) from 4.2 (L), Hb 9.7 (L) from 8.9 (L), Plt 72 (L) from 82 (L)  RFP: Na 138 (N), K 4.2 (N), Cl 105 (N), HCO3 25 (N), BUN 16 (N), Cr 0.8 (N), Mg 1.66 (N)  Blood cultures 3/15 no growth x 1 day    Assessment and Plan by Problem:   Assessment & Plan  Catheter-related bloodstream infection, initial encounter    63 y/o F PMH chronic hypoxic respiratory failure (on 4L NC) secondary group 1 pulmonary hypertension (on Macitentan, continuous Epoprostenol infusion via L internal jugular Montez, and monthly Winrevair (Sotatercept) injections, follows with Dr. Prescott), SVT (on Diltiazem), and lupus (on Hydroxychloroquine) who is admitted for concern for line associated infection.    Updates 3/17:  - No pain, swelling, or obvious signs of infection on chest wall exam today. IR to remove Montez at bedside today for line holiday  - She has a 20 gauge pIV in the R arm- must always have IV access for her continuous infusion  - Blood culture from 3/15 with no growth x 1 day, today is day 2 of Vancomycin and Zosyn (start date 3/16 - )  - Held subQ Heparin in setting of possible IR-line removal- will resume when clinically appropriate    #Concern for Montez catheter line-associated infection  #Chronic hypoxic respiratory failure (on 4L NC) secondary to Group 1 pulmonary hypertension (on Macitentan, continuous  Epoprostenol infusion via L internal jugular Montez, and monthly Winrevair (Sotatercept) injections, follows with Dr. Prescott)  - Pulmonary hypertension attributed to methamphetamine use, follows with Dr. Prescott, has had Montez catheter in place since 2020  - Over the few days prior to admission, she noticed whitish yellow discharge under her Montez line dressing, she sent the photo to Dr. Prescott who suggested she present to the hospital for assessment of possible infection  - She denies systemic symptoms of infection, she has leukopenia, otherwise no SIRS criteria  - Blood culture 3/15- no growth x 1 day  - Currently no induration or fluctuance near Montez, unable to assess for purulence due to dressing in place  - IR to assess today to see if line needs removed for line holiday    Plan:  - Follow up IR assessment to consider removal of Montez line due to concern for it being source of infection- does not need to be NPO for this  - Held subQ Heparin on 3/17 for IR line removal- resume when clinically appropriate  - Follow up blood culture from 3/15  - Continue Vancomycin (3/16- ) and Zosyn (start 3/16- )  - Continue home Macitentan 10 mg daily, Epoprostenol continuous infusion  - Hold home Lasix 60 mg daily in setting of initial concern for sepsis- consider resuming in coming days if stable to avoid volume overload  - Hold home Kcl 20 mEq BID while inpt, replete potassium as needed to goal K > 4  - Due for monthly Winrevair (Sotatercept) injection on 3/19- should be given if she remains inpatient  - SpO2 goal > 90%  - Requires IV access for Epoprostenol infusion    #Pancytopenia  #Epistaxis- resolved  - Pt with chronic anemia (baseline ~9 (L)) and chronic thrombocytopenia (baseline ~100 (L)), with borderline leukopenia (baseline ~5 (N)).  - Has had thrombocytopenia at least since 2022, has not seen hematology based on our EMR- could be secondary to Winrevair  - Leukocyte count and platelet count slowly  downtrending since admission  - No current bleeding    Plan:  - Afrin PRN for epistaxis  - Resume subQ Heparin for DVT chemoprophylaxis once IR Hendrickson line assessment/manipulation is complete  - Consider hematology referral on discharge if no medication-associated explanation for cytopenias    #History of Recurrent SVT  #CAD (cath 1/2024) with history of NSTEMI  #History of Prolonged Qtc  - Baseline Qtc ~500 msec in setting of right bundle branch block  - Recurrent episodes of SVT with medication nonadherence    Plan:  - Continue home Diltazem 180 mg daily for SVT  - Continue home Aspirin 81 mg daily  - Continue telemetry monitoring  - Avoid QT prolonging agents    #Lupus  - Continue Home Hydroxychloroquine 200 mg BID     MISC Chronic Medical Issues Not Actively Being Managed:  - Peripheral neuropathy- Continue home Gabapentin 800 mg TID  - Depression- Continue home Venlafaxine 150 mg daily  - Vitamin D Deficiency- (Vitamin D 41 (N) on 10/22/24)- continue home Vitamin D 50,000 units weekly on Sundays  - GERD- Continue home Omeprazole 40 mg daily as Pantoprazole 40 mg daily while inpatient due to formulary  - Chronic hypomagnesemia- Continue home Magnesium Oxide 64 mg BID  - Chronic hypocalcemia- Continue home Calcium carbonate-Vitamin D3 500 mg - 5 mcg 2 tab BID     Fluids: none indicated  Nutrition: regular diet  Access: left internal jugular hendrickson catheter (placed in 2020), R forearm 20 gauge pIV  DVT PPX: holding subQ Heparin in setting of possible IR-line removal- resume pending IR removal  GI PPX: continuing home Omeprazole 40 mg daily as Pantoprazole 40 mg daily while inpt  Dispo: home when medically ready (no need for PT/OT as [facility] is [facility]), may need homecare for IV antibiotics depending on duration  Surrogate Decision Maker if Needed: Sister (Diana Vera) [phone]  CODE STATUS: FULL CODE    Ms. Beckham was seen, discussed, and examined with attending Dr. Wade.       Samia Norton MD  Internal  Medicine PGY4

## 2025-03-17 NOTE — POST-PROCEDURE NOTE
INTERVENTIONAL RADIOLOGY ADVANCED PRACTICE PROCEDURE  [facility]    The existing skin site over the tunneled catheter insertion site was prepped with antiseptic, dried, and draped with maximal sterile manner.  The skin and subcutaneous tissues at the original catheter site were anesthetized with 1% lidocaine.   Using blunt dissection, the catheter cuff was externalized and subsequently the catheter was completely removed.   Hemostasis was obtained using manual compression at the vein access site.  The incision site was covered with a sterile dressing and tegaderm was subsequently applied.  There were no immediate or post-procedural complications.

## 2025-03-17 NOTE — SIGNIFICANT EVENT
Rapid Response Nurse Note: RADAR alert: 7    Pager time: 21  Arrival time: 35  Event end time: 40  Location:   [x] Triage by phone or secure messaging    Rapid response initiated by:  [] Rapid response RN [] Family [] Nursing Supervisor [] Physician   [x] RADAR auto page [] Sepsis auto-page [] RN [] RT   [] NP/PA [] Other:     Primary reason for call:   [] BAT [] New CPAP/BiPAP [] Bleeding [] Change in mental status   [] Chest pain [] Code blue [] FiO2 >/= 50% [] HR </= 40 bpm   [] HR >/= 130 bpm [] Hyperglycemia [] Hypoglycemia [x] RADAR    [] RR </= 8 bpm [] RR >/= 30 bpm [] SBP </= 90 mmHg [] SpO2 < 90%   [] Seizure [] Sepsis [] Shortness of breath  [] Staff concern: see comments     Initial VS and/or RADAR VS: T 36.5 °C; HR 92; RR 18; BP 92/61; SPO2 92%.    Providers present at bedside (if applicable): NA    Name of ICU Provider contacted (if applicable): NA    Interventions:  [x] None [] ABG/VBG [] Assist w/ICU transfer [] BAT paged    [] Bag mask [] Blood [] Cardioversion [] Code Blue   [] Code blue for intubation [] Code status changed [] Chest x-ray [] EKG   [] IV fluid/bolus [] KUB x-ray [] Labs/cultures [] Medication   [] Nebulizer treatment [] NIPPV (CPAP/BiPAP) [] Oxygen [] Oral airway   [] Peripheral IV [] Palliative care consult [] CT/MRI [] Sepsis protocol    [] Suctioned [] Other:     Outcome:  [] Coded and  [] Code blue for intubation [] Coded and transferred to ICU []  on division   [x] Remained on division (no change) [] Remained on division + additional monitoring [] Remained in ED [] Transferred to ED   [] Transferred to ICU [] Transferred to inpatient status [] Transferred for interventions (procedure) [] Transferred to ICU stepdown    [] Transferred to surgery [] Transferred to telemetry [] Sepsis protocol [] STEMI protocol   [] Stroke protocol [x] Bedside nurse instructed to page rapid response for any concerns or acute change in condition/VS     Additional Comments:    Reviewed above RADAR VS with bedside RN via phone.  Vital signs within patient's current trends.  RN to evaluate and reach out if any concerns are present.  No interventions by rapid response team indicated at this time.  Staff to page rapid response for any concerns or acute change in condition or VS.

## 2025-03-18 LAB
ALBUMIN SERPL BCP-MCNC: 3.5 G/DL (ref 3.4–5)
ANION GAP SERPL CALC-SCNC: 13 MMOL/L (ref 10–20)
BASOPHILS # BLD MANUAL: 0 X10*3/UL (ref 0–0.1)
BASOPHILS NFR BLD MANUAL: 0 %
BUN SERPL-MCNC: 11 MG/DL (ref 6–23)
CALCIUM SERPL-MCNC: 9 MG/DL (ref 8.6–10.6)
CHLORIDE SERPL-SCNC: 108 MMOL/L (ref 98–107)
CO2 SERPL-SCNC: 22 MMOL/L (ref 21–32)
CREAT SERPL-MCNC: 0.78 MG/DL (ref 0.5–1.05)
EGFRCR SERPLBLD CKD-EPI 2021: 86 ML/MIN/1.73M*2
EOSINOPHIL # BLD MANUAL: 0 X10*3/UL (ref 0–0.7)
EOSINOPHIL NFR BLD MANUAL: 0 %
ERYTHROCYTE [DISTWIDTH] IN BLOOD BY AUTOMATED COUNT: 17.8 % (ref 11.5–14.5)
GLUCOSE SERPL-MCNC: 105 MG/DL (ref 74–99)
HCT VFR BLD AUTO: 32.9 % (ref 36–46)
HGB BLD-MCNC: 9.6 G/DL (ref 12–16)
HYPOCHROMIA BLD QL SMEAR: ABNORMAL
IMM GRANULOCYTES # BLD AUTO: 0.26 X10*3/UL (ref 0–0.7)
IMM GRANULOCYTES NFR BLD AUTO: 6.3 % (ref 0–0.9)
LYMPHOCYTES # BLD MANUAL: 0.77 X10*3/UL (ref 1.2–4.8)
LYMPHOCYTES NFR BLD MANUAL: 18.3 %
MAGNESIUM SERPL-MCNC: 1.63 MG/DL (ref 1.6–2.4)
MCH RBC QN AUTO: 23.3 PG (ref 26–34)
MCHC RBC AUTO-ENTMCNC: 29.2 G/DL (ref 32–36)
MCV RBC AUTO: 80 FL (ref 80–100)
MONOCYTES # BLD MANUAL: 0.26 X10*3/UL (ref 0.1–1)
MONOCYTES NFR BLD MANUAL: 6.1 %
MYELOCYTES # BLD MANUAL: 0.07 X10*3/UL
MYELOCYTES NFR BLD MANUAL: 1.7 %
NEUTROPHILS # BLD MANUAL: 3.11 X10*3/UL (ref 1.2–7.7)
NEUTS BAND # BLD MANUAL: 0.04 X10*3/UL (ref 0–0.7)
NEUTS BAND NFR BLD MANUAL: 0.9 %
NEUTS SEG # BLD MANUAL: 3.07 X10*3/UL (ref 1.2–7)
NEUTS SEG NFR BLD MANUAL: 73 %
NRBC BLD-RTO: 1.4 /100 WBCS (ref 0–0)
OVALOCYTES BLD QL SMEAR: ABNORMAL
PHOSPHATE SERPL-MCNC: 4.1 MG/DL (ref 2.5–4.9)
PLATELET # BLD AUTO: 69 X10*3/UL (ref 150–450)
POTASSIUM SERPL-SCNC: 4.2 MMOL/L (ref 3.5–5.3)
RBC # BLD AUTO: 4.12 X10*6/UL (ref 4–5.2)
RBC MORPH BLD: ABNORMAL
SODIUM SERPL-SCNC: 139 MMOL/L (ref 136–145)
TOTAL CELLS COUNTED BLD: 115
VANCOMYCIN SERPL-MCNC: 15.9 UG/ML (ref 5–20)
WBC # BLD AUTO: 4.2 X10*3/UL (ref 4.4–11.3)

## 2025-03-18 PROCEDURE — 99232 SBSQ HOSP IP/OBS MODERATE 35: CPT

## 2025-03-18 PROCEDURE — 85027 COMPLETE CBC AUTOMATED: CPT | Performed by: NUTRITIONIST

## 2025-03-18 PROCEDURE — 2500000001 HC RX 250 WO HCPCS SELF ADMINISTERED DRUGS (ALT 637 FOR MEDICARE OP): Mod: SE

## 2025-03-18 PROCEDURE — 36415 COLL VENOUS BLD VENIPUNCTURE: CPT | Performed by: NUTRITIONIST

## 2025-03-18 PROCEDURE — 84100 ASSAY OF PHOSPHORUS: CPT | Performed by: NUTRITIONIST

## 2025-03-18 PROCEDURE — 2500000004 HC RX 250 GENERAL PHARMACY W/ HCPCS (ALT 636 FOR OP/ED): Mod: SE

## 2025-03-18 PROCEDURE — 1200000002 HC GENERAL ROOM WITH TELEMETRY DAILY

## 2025-03-18 PROCEDURE — 2500000001 HC RX 250 WO HCPCS SELF ADMINISTERED DRUGS (ALT 637 FOR MEDICARE OP): Mod: SE | Performed by: STUDENT IN AN ORGANIZED HEALTH CARE EDUCATION/TRAINING PROGRAM

## 2025-03-18 PROCEDURE — 99254 IP/OBS CNSLTJ NEW/EST MOD 60: CPT

## 2025-03-18 PROCEDURE — 2500000001 HC RX 250 WO HCPCS SELF ADMINISTERED DRUGS (ALT 637 FOR MEDICARE OP): Mod: SE | Performed by: NUTRITIONIST

## 2025-03-18 PROCEDURE — 2500000004 HC RX 250 GENERAL PHARMACY W/ HCPCS (ALT 636 FOR OP/ED): Mod: SE | Performed by: NUTRITIONIST

## 2025-03-18 PROCEDURE — 80202 ASSAY OF VANCOMYCIN: CPT | Performed by: INTERNAL MEDICINE

## 2025-03-18 PROCEDURE — 85007 BL SMEAR W/DIFF WBC COUNT: CPT | Performed by: NUTRITIONIST

## 2025-03-18 PROCEDURE — 83735 ASSAY OF MAGNESIUM: CPT | Performed by: NUTRITIONIST

## 2025-03-18 PROCEDURE — 2500000004 HC RX 250 GENERAL PHARMACY W/ HCPCS (ALT 636 FOR OP/ED): Mod: SE | Performed by: STUDENT IN AN ORGANIZED HEALTH CARE EDUCATION/TRAINING PROGRAM

## 2025-03-18 RX ORDER — DOXYCYCLINE HYCLATE 100 MG
100 TABLET ORAL EVERY 12 HOURS SCHEDULED
Status: DISCONTINUED | OUTPATIENT
Start: 2025-03-18 | End: 2025-03-19 | Stop reason: HOSPADM

## 2025-03-18 RX ORDER — OXYMETAZOLINE HCL 0.05 %
2 SPRAY, NON-AEROSOL (ML) NASAL EVERY 12 HOURS PRN
Qty: 30 ML | Refills: 0 | Status: CANCELLED | OUTPATIENT
Start: 2025-03-18

## 2025-03-18 RX ORDER — FUROSEMIDE 40 MG/1
40 TABLET ORAL ONCE
Status: COMPLETED | OUTPATIENT
Start: 2025-03-18 | End: 2025-03-18

## 2025-03-18 RX ADMIN — Medication 64 MG: at 20:29

## 2025-03-18 RX ADMIN — Medication 5 MG: at 20:29

## 2025-03-18 RX ADMIN — FUROSEMIDE 40 MG: 40 TABLET ORAL at 10:48

## 2025-03-18 RX ADMIN — HYDROXYCHLOROQUINE SULFATE 200 MG: 200 TABLET, FILM COATED ORAL at 20:29

## 2025-03-18 RX ADMIN — HYDROXYCHLOROQUINE SULFATE 200 MG: 200 TABLET, FILM COATED ORAL at 08:45

## 2025-03-18 RX ADMIN — MACITENTAN 10 MG: 10 TABLET, FILM COATED ORAL at 08:45

## 2025-03-18 RX ADMIN — ASPIRIN 81 MG: 81 TABLET, COATED ORAL at 20:29

## 2025-03-18 RX ADMIN — PANTOPRAZOLE SODIUM 40 MG: 40 TABLET, DELAYED RELEASE ORAL at 06:23

## 2025-03-18 RX ADMIN — VENLAFAXINE HYDROCHLORIDE 150 MG: 150 CAPSULE, EXTENDED RELEASE ORAL at 08:45

## 2025-03-18 RX ADMIN — Medication 64 MG: at 08:45

## 2025-03-18 RX ADMIN — GABAPENTIN 800 MG: 400 CAPSULE ORAL at 20:29

## 2025-03-18 RX ADMIN — PIPERACILLIN SODIUM AND TAZOBACTAM SODIUM 3.38 G: 3; .375 INJECTION, SOLUTION INTRAVENOUS at 03:50

## 2025-03-18 RX ADMIN — DOXYCYCLINE HYCLATE 100 MG: 100 TABLET, COATED ORAL at 20:29

## 2025-03-18 RX ADMIN — PIPERACILLIN SODIUM AND TAZOBACTAM SODIUM 3.38 G: 3; .375 INJECTION, SOLUTION INTRAVENOUS at 08:46

## 2025-03-18 RX ADMIN — Medication 2 TABLET: at 20:29

## 2025-03-18 RX ADMIN — EPOPROSTENOL 67 NG/KG/MIN: 1.5 INJECTION, POWDER, LYOPHILIZED, FOR SOLUTION INTRAVENOUS at 23:01

## 2025-03-18 RX ADMIN — GABAPENTIN 800 MG: 400 CAPSULE ORAL at 14:12

## 2025-03-18 RX ADMIN — VANCOMYCIN HYDROCHLORIDE 750 MG: 750 INJECTION, SOLUTION INTRAVENOUS at 14:12

## 2025-03-18 RX ADMIN — GABAPENTIN 800 MG: 400 CAPSULE ORAL at 08:45

## 2025-03-18 RX ADMIN — DILTIAZEM HYDROCHLORIDE 180 MG: 180 CAPSULE, COATED, EXTENDED RELEASE ORAL at 08:45

## 2025-03-18 RX ADMIN — Medication 2 TABLET: at 08:45

## 2025-03-18 RX ADMIN — VANCOMYCIN HYDROCHLORIDE 750 MG: 750 INJECTION, SOLUTION INTRAVENOUS at 01:57

## 2025-03-18 ASSESSMENT — COGNITIVE AND FUNCTIONAL STATUS - GENERAL
DAILY ACTIVITIY SCORE: 24
MOBILITY SCORE: 24
MOBILITY SCORE: 24
DAILY ACTIVITIY SCORE: 24
MOBILITY SCORE: 24
MOBILITY SCORE: 24
DAILY ACTIVITIY SCORE: 24
MOBILITY SCORE: 24

## 2025-03-18 ASSESSMENT — ENCOUNTER SYMPTOMS
GASTROINTESTINAL NEGATIVE: 1
RESPIRATORY NEGATIVE: 1
NEUROLOGICAL NEGATIVE: 1
CONSTITUTIONAL NEGATIVE: 1
MUSCULOSKELETAL NEGATIVE: 1

## 2025-03-18 ASSESSMENT — PAIN - FUNCTIONAL ASSESSMENT: PAIN_FUNCTIONAL_ASSESSMENT: 0-10

## 2025-03-18 ASSESSMENT — PAIN SCALES - GENERAL
PAINLEVEL_OUTOF10: 0 - NO PAIN

## 2025-03-18 NOTE — PROGRESS NOTES
Vancomycin Dosing by Pharmacy- Cessation of Therapy    Consult to pharmacy for vancomycin dosing has been discontinued by the prescriber, pharmacy will sign off at this time.    Please call pharmacy if there are further questions or re-enter a consult if vancomycin is resumed.     Anderson Wilhelm, KathleenD

## 2025-03-18 NOTE — CONSULTS
"Inpatient consult to Infectious Diseases  Consult performed by: Shireen Farris DO  Consult ordered by: Kaleigh Wade MD      Referred by Kaleigh Wade MD    Primary MD: MICHEL Liz-CNP    Reason For Consult  PO abx for line infection and duration     History Of Present Illness  Ronna Beckham is a 64 y.o. female with past medical history of pulmonary hypertension, chronic hypoxic respiratory failure (base line 4L NC), polysubstance use (ethanol, cocaine, amphetamines), SVT, SLE on hydroxychloroquine, GERD, HARRIS, MDD who presented to [facility] ED on 3/15 for complaints of purulent drainage from her hendrickson catheter site. Patient had contacted pulmonology office to report small amounts of bleeding seen on dressing with dressing changes. She sent pictures of the site and her pulmonologist advised her to report to the ED for assessment.  Blood cultures were obtained and she was started on vancomycin and zosyn on 3/15. IR was consulted for evaluation and possible replacement of hendrickson catheter. Blood cultures were negative for 48 hours and catheter was removed on 3/17 for line holiday. ID was consulted for recommendations regarding PO antibiotics for line infection.     Patient seen and examined at bedside. Patient is feeling well this morning. She states that prior to her hospitalization she reports her catheter site had intermittent bleeding for a couple of months. She attributed the bleeding to \"cleaning it too much\". She states she noticed pus coming from the site for about a week before presenting to the ED. She denied fevers, chills, nausea or vomiting during this time. States that IR will be replacing catheter today.      Past Medical History  She has a past medical history of Anxiety, Depression, Does not refill medications appropriately (09/10/2024), GERD (gastroesophageal reflux disease), Lupus, Personal history of other specified conditions (09/21/2020), Pulmonary hypertension (Multi), and " Volume overload (09/10/2024).    Surgical History  She has a past surgical history that includes Other surgical history (09/21/2020); Other surgical history (09/21/2020); Other surgical history (09/21/2020); Other surgical history (09/21/2020); Other surgical history (09/21/2020); Hysterectomy; Cholecystectomy; and Cardiac catheterization (N/A, 1/8/2024).     Social History     Occupational History    Not on file   Tobacco Use    Smoking status: Former     Types: Cigarettes    Smokeless tobacco: Former   Vaping Use    Vaping status: Never Used   Substance and Sexual Activity    Alcohol use: Not Currently    Drug use: Not Currently     Types: Cocaine, Other     Comment: Meth    Sexual activity: Defer     Travel History   Travel since 02/18/25    No documented travel since 02/18/25            Family History  Family History   Problem Relation Name Age of Onset    No Known Problems Mother      No Known Problems Father       Allergies  Levetiracetam     Immunization History   Administered Date(s) Administered    Moderna COVID-19 vaccine, 12 years and older (50mcg/0.5mL)(Spikevax) 11/01/2024    Moderna SARS-CoV-2 Vaccination 10/19/2021, 12/16/2021     Medications  Home medications:  Facility-Administered Medications Prior to Admission   Medication Dose Route Frequency Provider Last Rate Last Admin    perflutren lipid microspheres (Definity) injection 0.5-10 mL of dilution  0.5-10 mL of dilution intravenous Once Chicho Prescott, DO         Medications Prior to Admission   Medication Sig Dispense Refill Last Dose/Taking    aspirin 81 mg EC tablet Take 1 tablet (81 mg) by mouth once daily. 30 tablet 0     calcium carbonate-vitamin D3 500 mg-5 mcg (200 unit) tablet Take 2 tablets by mouth 2 times a day. 120 tablet 0     cholestyramine (Questran) 4 gram packet Take 1 packet (4 g) by mouth once daily. (Patient not taking: Reported on 3/16/2025) 30 packet 0 Not Taking    dilTIAZem CD (Cardizem CD) 180 mg 24 hr capsule Take 1  capsule (180 mg) by mouth once daily. 30 capsule 0     epoprostenol (Veletri) 1.5 mg recon soln 1.5 mg (1,500 mcg) by central venous line infusion route continuously. Reconstitute contents of vial with 5 ml diluent and mix cassette as directed. Infuse continuously per physician orders. Allow for priming volume. Dose range: 1-150 ng/kg/min. 180 each 11     ergocalciferol (Vitamin D-2) 1.25 MG (86260 UT) capsule Take 1 capsule (1,250 mcg) by mouth 1 (one) time per week. On Wednesday       furosemide (Lasix) 40 mg tablet Take 1.5 tablets (60 mg) by mouth once daily. (Patient taking differently: Take 1 tablet (40 mg) by mouth once daily.) 30 tablet 0     gabapentin (Neurontin) 800 mg tablet Take 1 tablet (800 mg) by mouth 3 times a day. 90 tablet 0     hydroxychloroquine (Plaquenil) 200 mg tablet Take 1 tablet (200 mg) by mouth 2 times a day. 60 tablet 0     loperamide (Imodium) 2 mg capsule Take 1 capsule (2 mg) by mouth 2 times a day.       magnesium chloride (MagDelay) 64 mg EC tablet Take 1 tablet (64 mg) by mouth 2 times a day. 60 tablet 0     melatonin 5 mg tablet Take 1 tablet (5 mg) by mouth once daily at bedtime. 30 tablet 0     multivitamin-iron-folic acid  mg-mcg tablet Take 1 tablet by mouth once daily.       omeprazole (PriLOSEC) 40 mg DR capsule Take 1 capsule (40 mg) by mouth once daily in the morning. Take before meals. Do not crush or chew. 30 capsule 0     Opsumit 10 mg tablet tablet Take 1 tablet (10 mg) by mouth once daily. 30 tablet 11     potassium chloride CR 20 mEq ER tablet Take 1 tablet (20 mEq) by mouth 2 times a day. Do not crush or chew. (Patient taking differently: Take 2 tablets (40 mEq) by mouth 2 times a day. Do not crush or chew.) 60 tablet 0     potassium gluconate 595 mg (99 mg) tablet Take 2 tablets by mouth once daily.       sotatercept-csrk (Winrevair) 60 mg kit Inject 1 Dose under the skin every 21 (twenty-one) days. 3 kit 0     venlafaxine XR (Effexor-XR) 150 mg 24 hr  capsule Take 1 capsule (150 mg) by mouth once daily. Do not crush or chew. 30 capsule 0      Current medications:  Scheduled medications  aspirin, 81 mg, oral, Daily  calcium carbonate-vitamin D3, 2 tablet, oral, BID  dilTIAZem CD, 180 mg, oral, Daily  ergocalciferol, 1,250 mcg, oral, Every Sunday  furosemide, 40 mg, oral, Daily  furosemide, 40 mg, oral, Once  gabapentin, 800 mg, oral, TID  hydroxychloroquine, 200 mg, oral, BID  macitentan, 10 mg, oral, Daily  magnesium chloride, 64 mg, oral, BID  melatonin, 5 mg, oral, Nightly  pantoprazole, 40 mg, oral, Daily before breakfast  polyethylene glycol, 17 g, oral, Daily  [Held by provider] potassium chloride CR, 20 mEq, oral, BID  [Held by provider] sotatercept-csrk, 1 Dose, subcutaneous, q21 days  vancomycin, 750 mg, intravenous, q12h  venlafaxine XR, 150 mg, oral, Daily      Continuous medications  epoprostenol, 67 ng/kg/min (Order-Specific), Last Rate: 67 ng/kg/min (03/17/25 2233)      PRN medications  PRN medications: oxymetazoline, vancomycin    Review of Systems   Constitutional: Negative.    HENT: Negative.     Respiratory: Negative.     Gastrointestinal: Negative.    Genitourinary: Negative.    Musculoskeletal: Negative.    Skin:         Blood then pus drainage from catheter site.    Neurological: Negative.         Objective  Range of Vitals (last 24 hours)  Heart Rate:  [80-93]   Temp:  [35.9 °C (96.6 °F)-36.6 °C (97.9 °F)]   Resp:  [15-18]   BP: (103-132)/(59-73)   Weight:  [69.7 kg (153 lb 10.6 oz)]   SpO2:  [94 %-97 %]   Daily Weight  03/18/25 : 69.7 kg (153 lb 10.6 oz)    Body mass index is 25.57 kg/m².     Physical Exam  Constitutional:       Appearance: Normal appearance.   HENT:      Mouth/Throat:      Mouth: Mucous membranes are moist.      Pharynx: Oropharynx is clear.   Eyes:      Extraocular Movements: Extraocular movements intact.      Conjunctiva/sclera: Conjunctivae normal.   Cardiovascular:      Rate and Rhythm: Normal rate and regular rhythm.       Heart sounds: Murmur heard.   Pulmonary:      Effort: Pulmonary effort is normal.      Breath sounds: No wheezing, rhonchi or rales.   Chest:      Comments: Bandaged catheter site on left upper chest.   Abdominal:      General: Abdomen is flat.      Palpations: Abdomen is soft.      Tenderness: There is no abdominal tenderness. There is no guarding or rebound.   Musculoskeletal:      Right lower leg: No edema.      Left lower leg: No edema.   Skin:     General: Skin is warm and dry.      Findings: Erythema (surrounding previous catheter site) present.   Neurological:      General: No focal deficit present.      Mental Status: She is alert.          Labs  Results from last 72 hours   Lab Units 03/18/25  0837 03/17/25  0704 03/16/25  0726 03/15/25  1322   WBC AUTO x10*3/uL 4.2* 3.3* 4.2* 5.9   HEMOGLOBIN g/dL 9.6* 9.7* 8.9* 10.2*   HEMATOCRIT % 32.9* 33.1* 31.8* 34.2*   PLATELETS AUTO x10*3/uL 69* 72* 83* 97*   NEUTROS PCT AUTO %  --   --  65.9 70.6   LYMPHO PCT MAN % 18.3 27.0  --   --    LYMPHS PCT AUTO %  --   --  21.9 18.5   MONO PCT MAN % 6.1 2.0  --   --    MONOS PCT AUTO %  --   --  7.7 7.0   EOSINO PCT MAN % 0.0 0.0  --   --    EOS PCT AUTO %  --   --  0.0 0.0     Results from last 72 hours   Lab Units 03/18/25  0837 03/17/25  0704 03/16/25  0726   SODIUM mmol/L 139 138 136   POTASSIUM mmol/L 4.2 4.2 4.4   CHLORIDE mmol/L 108* 105 105   CO2 mmol/L 22 25 24   BUN mg/dL 11 16 18   CREATININE mg/dL 0.78 0.80 0.87   GLUCOSE mg/dL 105* 114* 107*   CALCIUM mg/dL 9.0 8.9 8.3*   ANION GAP mmol/L 13 12 11   EGFR mL/min/1.73m*2 86 83 75   PHOSPHORUS mg/dL 4.1 4.6 4.8     Results from last 72 hours   Lab Units 03/18/25  0837 03/17/25  0704 03/16/25  0726   ALBUMIN g/dL 3.5 3.5 3.4     Estimated Creatinine Clearance: 73.3 mL/min (by C-G formula based on SCr of 0.78 mg/dL).  C-Reactive Protein   Date Value Ref Range Status   04/23/2024 0.12 <1.00 mg/dL Final     CRP   Date Value Ref Range Status   03/09/2022 0.46  mg/dL Final     Comment:     REF VALUE  < 1.00     08/14/2020 0.9 0 - 2.0 MG/DL Final     Comment:     Performed at [address] [address]     Sedimentation Rate   Date Value Ref Range Status   04/23/2024 15 0 - 30 mm/h Final   03/09/2022 5 0 - 30 mm/h Final   08/14/2020 7 0 - 20 MM/HR Final     Comment:     Performed at [address] [address]     HIV 1 and 2 Screen   Date Value Ref Range Status   09/21/2020 NONREACTIVE NONREACTIVE Final     Comment:      HIV Ag/Ab screen is performed using the Siemens [employer]   HIV Ag/Ab Combo assay which detects the presence of HIV    p24 antigen as well as antibodies to HIV-1   (Group M and O) and HIV-2.       Hepatitis C Ab   Date Value Ref Range Status   06/02/2023   Final    NONREACTIVE  Reference range: NONREACTIVE     Results from patients taking biotin supplements or receiving   high-dose biotin therapy should be interpreted with caution   due to possible interference with this test. Providers may    contact their local laboratory for further information.  Test performed at:  [facility] [address] [facility] [address]  Performed at [address] [address]       Assessment/Plan   Ronna Beckham is a 64 y.o. female with past medical history of pulmonary hypertension, chronic hypoxic respiratory failure (base line 4L NC), polysubstance use (ethanol, cocaine, amphetamines), SVT, SLE on hydroxychloroquine, GERD, HARRIS, MDD who presented to [facility] ED on 3/15 for complaints of purulent drainage from her hendrickson catheter site.     #Exit site infection  -Negative blood cultures 2 days  -Vanc/zosyn (3/16-)  Recommendations:  -PO doxycycline for 10 day course   -Line can be replaced, however should be done at a different site.     Shireen Farris, DO PGY-1 Internal Medicine  This is a preliminary note, please await attending attestation for final A/P.

## 2025-03-18 NOTE — PROGRESS NOTES
Ronna Beckham is a 64 y.o. female on day 2 of admission presenting with Catheter-related bloodstream infection, initial encounter.      Subjective   NAEON. Central line taken out, 2 pIV in place. Patient feels well with no systemic symptoms.        Objective     Last Recorded Vitals  /73   Pulse 90   Temp 35.9 °C (96.6 °F) (Temporal)   Resp 18   Wt 69.7 kg (153 lb 10.6 oz)   SpO2 94%   Intake/Output last 3 Shifts:  No intake or output data in the 24 hours ending 03/18/25 0829    Admission Weight  Weight: 68 kg (150 lb) (03/15/25 1242)    Daily Weight  03/18/25 : 69.7 kg (153 lb 10.6 oz)    Image Results    Physical Exam  Const: NAD  HEENT: NCAT  CV: RRR  Pulm: RA  Abd: NTND  Skin: warm and dry, no tenderness, induration, discharge or drainage noted near site of central line that was removed  Neuro: alert  Psych: appropriate mood and affect    Relevant Results  Scheduled medications  aspirin, 81 mg, oral, Daily  calcium carbonate-vitamin D3, 2 tablet, oral, BID  dilTIAZem CD, 180 mg, oral, Daily  ergocalciferol, 1,250 mcg, oral, Every Sunday  [Held by provider] furosemide, 40 mg, oral, Daily  gabapentin, 800 mg, oral, TID  hydroxychloroquine, 200 mg, oral, BID  macitentan, 10 mg, oral, Daily  magnesium chloride, 64 mg, oral, BID  melatonin, 5 mg, oral, Nightly  pantoprazole, 40 mg, oral, Daily before breakfast  piperacillin-tazobactam, 3.375 g, intravenous, q6h  polyethylene glycol, 17 g, oral, Daily  [Held by provider] potassium chloride CR, 20 mEq, oral, BID  [Held by provider] sotatercept-csrk, 1 Dose, subcutaneous, q21 days  vancomycin, 750 mg, intravenous, q12h  venlafaxine XR, 150 mg, oral, Daily      Continuous medications  epoprostenol, 67 ng/kg/min (Order-Specific), Last Rate: 67 ng/kg/min (03/17/25 2233)      PRN medications  PRN medications: oxymetazoline, vancomycin         Assessment/Plan          Assessment & Plan  Catheter-related bloodstream infection, initial  encounter    Assessment:    Ronna Beckham is a 64 y.o. year old female  w a hx of chronic hypoxic respiratory failure (on 4LNC) 2/2 group 1 pHTN (on Macitentatn, continuous Epoprostenol infusion via L internal ugular Montez, and monthly Winrevair (Sotatercept) injections follows Dr. Prescott), SVT (on Diltiazem), and lupus (on plaquenil) who is admitted for line associated infection. Montez removed 3/17, patient pending replacement per ID recommendations. Remainder of plan as follows.    Updates 03/18/25  - Consult ID for abx duration and line replacement  - 2 pIV in place  - dc zosyn  - resume home lasix    #C/f Montez catheter line-associated infection  #Chronic hypoxic respiratory failure (on 4LNC) 2/2 group 1 pHTN  :: pHTN 2/2 methamphetamine use, follows with Dr. Prescott, Montez placed 2020  :: On Macitentan, continuous Epoprostenol infusion via L internal jugular Montez, monthly Winrevair (Sotatercept) injections  Plan:  - c/w vanc (3/16-), zosyn (3/16-3/17)  - ID consulted for abx duration and to determine line placement  - Held subQ heparin for line removal, on SCDs for now can resume after line replaced  - C/w home Macitentan 10 daily, epoprostenol continuous infusion requires IV access  - Resume home lasix 60 daily  - Due ofr monthly Winrevair (Sotatercept) on 3/19  - Bcx 3/15 NGTD  - Line removed 3/17, no cx drawn  - SpO2 goal> 90%    #Pancytopenia  #Epistaxis, resolved  :: Chronic anemia (baseline 9), thrombocytopenia (baseline 100), borderline leukopenia (baseline 5)  Plan:  - Afrin PRN for epistaxis  - Consider heme referral on dc    #History of Recurrent SVT  #CAD (cath 1/2024) with history of NSTEMI  #History of Prolonged Qtc  :: B qTc 500 msec iso RBBB  Plan:  - Continue home Diltazem 180 mg daily for SVT  - Continue home Aspirin 81 mg daily  - Continue telemetry monitoring  - Avoid QT prolonging agents    #Lupus  - Continue Home Hydroxychloroquine 200 mg BID    MISC Chronic Medical Issues Not  Actively Being Managed:  - Peripheral neuropathy- Continue home Gabapentin 800 mg TID  - Depression- Continue home Venlafaxine 150 mg daily  - Vitamin D Deficiency- (Vitamin D 41 (N) on 10/22/24)- continue home Vitamin D 50,000 units weekly on Sundays  - GERD- Continue home Omeprazole 40 mg daily as Pantoprazole 40 mg daily while inpatient due to formulary  - Chronic hypomagnesemia- Continue home Magnesium Oxide 64 mg BID  - Chronic hypocalcemia- Continue home Calcium carbonate-Vitamin D3 500 mg - 5 mcg 2 tab BID    F: none  E: K > 4, Mg > 2  N: Adult diet Regular  NPO Diet Except: Other (specify); Additional Details: Clear liquids until 0500. May have clears up to 2 hours prior to procedure if exact time is known.; Effective midnight    GI: omeprazole and pantoprazole     A: 2 pIV    DVT: SCD    Abx: vanc (3/16-), zosyn (3/16-3/17)    Pain: none    Dispo: home    Code: Full Code (confirmed on admission)  NOK: Extended Emergency Contact Information  Primary Emergency Contact: KOLE PITTMAN  Address: [address] [place] [address] of [place]  Home Phone: [phone]  Work Phone: [phone]  Mobile Phone: [phone]  Relation: Sister  Secondary Emergency Contact: Bj Parsonna  Mobile Phone: [phone]  Relation: Daughter  Preferred language: English   needed? No    Shakira Ochoa MD  Neurology PGY1              Shakira Ochoa MD

## 2025-03-18 NOTE — HOSPITAL COURSE
63 y/o F PMH chronic hypoxic respiratory failure (on 4L NC) secondary group 1 pulmonary hypertension (on Macitentan, continuous Epoprostenol infusion via L internal jugular Montez, and monthly Winrevair (Sotatercept) injections, follows with Dr. Prescott), SVT (on Diltiazem), and lupus (on Hydroxychloroquine) who is admitted for concern for line associated infection. Started on BSAbx. Bcx NGTD. Montez line removed by IR on 3/17 and replaced on 3/19. ID consulted and recommended transitioning to doxycycline to be continued until 3/23. Patient stable and ready for discharge.    Follow up:  - Pulm fu with Dr. Prescott. Doxycycline until 3/23.

## 2025-03-18 NOTE — CARE PLAN
Problem: Pain - Adult  Goal: Verbalizes/displays adequate comfort level or baseline comfort level  Outcome: Progressing     Problem: Chronic Conditions and Co-morbidities  Goal: Patient's chronic conditions and co-morbidity symptoms are monitored and maintained or improved  Outcome: Progressing     Problem: Skin  Goal: Prevent/manage excess moisture  Outcome: Progressing     Problem: Skin  Goal: Prevent/minimize sheer/friction injuries  Outcome: Progressing     Problem: Fall/Injury  Goal: Be free from injury by end of the shift  Outcome: Progressing     Problem: Respiratory  Goal: Minimal/no exertional discomfort or dyspnea this shift  Outcome: Progressing     Problem: Respiratory  Goal: No signs of respiratory distress (eg. Use of accessory muscles. Peds grunting)  Outcome: Progressing       The clinical goals for the shift include Maintain Veletri infusion per orders throughout shift.

## 2025-03-18 NOTE — DISCHARGE INSTRUCTIONS
Theresa Beckham,    You were originally admitted for a line infection. We replaced your Montez line and started you on antibiotics. You will be taking doxycycline until 3/23.    Please take your medications as instructed and attend all your follow up appointments.    Thank you for allowing [facility] to be a part of your care!

## 2025-03-18 NOTE — PROGRESS NOTES
Vancomycin Dosing by Pharmacy- FOLLOW UP    Ronna Beckham is a 64 y.o. year old female who Pharmacy has been consulted for vancomycin dosing for line infections. Based on the patient's indication and renal status this patient is being dosed based on a goal AUC of 400-600.     Renal function is currently stable.    Current vancomycin dose: 750 mg given every 12 hours    Estimated vancomycin AUC on current dose: 426 mg/L.hr     Visit Vitals  /68 (BP Location: Left arm, Patient Position: Lying)   Pulse 80   Temp 36.5 °C (97.7 °F) (Temporal)   Resp 15        Lab Results   Component Value Date    CREATININE 0.78 2025    CREATININE 0.80 2025    CREATININE 0.87 2025    CREATININE 0.82 03/15/2025    CREATININE 0.86 2025        Patient weight is as follows:   Vitals:    25 0540   Weight: 69.7 kg (153 lb 10.6 oz)       Cultures:  No results found for the encounter in last 14 days.       I/O last 3 completed shifts:  In: 460 (6.6 mL/kg) [P.O.:460]  Out: - (0 mL/kg)   Weight: 69.7 kg   I/O during current shift:  No intake/output data recorded.    Temp (24hrs), Av.4 °C (97.5 °F), Min:35.9 °C (96.6 °F), Max:36.6 °C (97.9 °F)      Assessment/Plan    Within goal AUC range. Continue current vancomycin regimen.    This dosing regimen is predicted by [employer] to result in the following pharmacokinetic parameters:  Regimen: 750 mg IV every 12 hours.  Start time: 13:57 on 2025  Exposure target: AUC24 (range)400-600 mg/L.hr   [id number]: 421 mg/L.hr  AUC24,ss: 426 mg/L.hr  Probability of AUC24 > 400: 67 %  Ctrough,ss: 13.3 mg/L  Probability of Ctrough,ss > 20: 2 %      The next level will be obtained on 3/21 at am lab draw. May be obtained sooner if clinically indicated.   Will continue to monitor renal function daily while on vancomycin and order serum creatinine at least every 48 hours if not already ordered.  Follow for continued vancomycin needs, clinical response, and signs/symptoms of  toxicity.       Mir Rivera, PharmD

## 2025-03-19 ENCOUNTER — APPOINTMENT (OUTPATIENT)
Dept: RADIOLOGY | Facility: HOSPITAL | Age: 65
End: 2025-03-19
Payer: COMMERCIAL

## 2025-03-19 ENCOUNTER — PHARMACY VISIT (OUTPATIENT)
Dept: PHARMACY | Facility: CLINIC | Age: 65
End: 2025-03-19
Payer: MEDICAID

## 2025-03-19 VITALS
TEMPERATURE: 98.1 F | OXYGEN SATURATION: 96 % | HEIGHT: 65 IN | RESPIRATION RATE: 17 BRPM | HEART RATE: 96 BPM | BODY MASS INDEX: 24.72 KG/M2 | WEIGHT: 148.37 LBS | SYSTOLIC BLOOD PRESSURE: 120 MMHG | DIASTOLIC BLOOD PRESSURE: 80 MMHG

## 2025-03-19 LAB
ALBUMIN SERPL BCP-MCNC: 3.9 G/DL (ref 3.4–5)
ANION GAP SERPL CALC-SCNC: 14 MMOL/L (ref 10–20)
BACTERIA BLD CULT: NORMAL
BACTERIA BLD CULT: NORMAL
BASOPHILS # BLD MANUAL: 0 X10*3/UL (ref 0–0.1)
BASOPHILS NFR BLD MANUAL: 0 %
BUN SERPL-MCNC: 12 MG/DL (ref 6–23)
CALCIUM SERPL-MCNC: 9.6 MG/DL (ref 8.6–10.6)
CHLORIDE SERPL-SCNC: 108 MMOL/L (ref 98–107)
CO2 SERPL-SCNC: 24 MMOL/L (ref 21–32)
CREAT SERPL-MCNC: 0.79 MG/DL (ref 0.5–1.05)
EGFRCR SERPLBLD CKD-EPI 2021: 85 ML/MIN/1.73M*2
EOSINOPHIL # BLD MANUAL: 0 X10*3/UL (ref 0–0.7)
EOSINOPHIL NFR BLD MANUAL: 0 %
ERYTHROCYTE [DISTWIDTH] IN BLOOD BY AUTOMATED COUNT: 17.9 % (ref 11.5–14.5)
GLUCOSE SERPL-MCNC: 107 MG/DL (ref 74–99)
HCT VFR BLD AUTO: 37.3 % (ref 36–46)
HGB BLD-MCNC: 10.9 G/DL (ref 12–16)
HYPOCHROMIA BLD QL SMEAR: ABNORMAL
IMM GRANULOCYTES # BLD AUTO: 0.28 X10*3/UL (ref 0–0.7)
IMM GRANULOCYTES NFR BLD AUTO: 6.3 % (ref 0–0.9)
LYMPHOCYTES # BLD MANUAL: 0.38 X10*3/UL (ref 1.2–4.8)
LYMPHOCYTES NFR BLD MANUAL: 8.7 %
MAGNESIUM SERPL-MCNC: 1.54 MG/DL (ref 1.6–2.4)
MCH RBC QN AUTO: 23.3 PG (ref 26–34)
MCHC RBC AUTO-ENTMCNC: 29.2 G/DL (ref 32–36)
MCV RBC AUTO: 80 FL (ref 80–100)
METAMYELOCYTES # BLD MANUAL: 0.07 X10*3/UL
METAMYELOCYTES NFR BLD MANUAL: 1.7 %
MONOCYTES # BLD MANUAL: 0.15 X10*3/UL (ref 0.1–1)
MONOCYTES NFR BLD MANUAL: 3.5 %
MYELOCYTES # BLD MANUAL: 0.07 X10*3/UL
MYELOCYTES NFR BLD MANUAL: 1.7 %
NEUTS SEG # BLD MANUAL: 3.56 X10*3/UL (ref 1.2–7)
NEUTS SEG NFR BLD MANUAL: 80.9 %
NRBC BLD-RTO: 1.6 /100 WBCS (ref 0–0)
OVALOCYTES BLD QL SMEAR: ABNORMAL
PHOSPHATE SERPL-MCNC: 4.3 MG/DL (ref 2.5–4.9)
PLATELET # BLD AUTO: 70 X10*3/UL (ref 150–450)
POTASSIUM SERPL-SCNC: 4.1 MMOL/L (ref 3.5–5.3)
RBC # BLD AUTO: 4.68 X10*6/UL (ref 4–5.2)
RBC MORPH BLD: ABNORMAL
SODIUM SERPL-SCNC: 142 MMOL/L (ref 136–145)
TOTAL CELLS COUNTED BLD: 115
VARIANT LYMPHS # BLD MANUAL: 0.15 X10*3/UL (ref 0–0.5)
VARIANT LYMPHS NFR BLD: 3.5 %
WBC # BLD AUTO: 4.4 X10*3/UL (ref 4.4–11.3)

## 2025-03-19 PROCEDURE — 2720000007 HC OR 272 NO HCPCS

## 2025-03-19 PROCEDURE — 2500000004 HC RX 250 GENERAL PHARMACY W/ HCPCS (ALT 636 FOR OP/ED): Mod: SE | Performed by: INTERNAL MEDICINE

## 2025-03-19 PROCEDURE — 36415 COLL VENOUS BLD VENIPUNCTURE: CPT | Performed by: NUTRITIONIST

## 2025-03-19 PROCEDURE — 99152 MOD SED SAME PHYS/QHP 5/>YRS: CPT

## 2025-03-19 PROCEDURE — 2500000001 HC RX 250 WO HCPCS SELF ADMINISTERED DRUGS (ALT 637 FOR MEDICARE OP): Mod: SE | Performed by: NUTRITIONIST

## 2025-03-19 PROCEDURE — 36558 INSERT TUNNELED CV CATH: CPT | Performed by: RADIOLOGY

## 2025-03-19 PROCEDURE — 2500000001 HC RX 250 WO HCPCS SELF ADMINISTERED DRUGS (ALT 637 FOR MEDICARE OP): Mod: SE

## 2025-03-19 PROCEDURE — 99238 HOSP IP/OBS DSCHRG MGMT 30/<: CPT

## 2025-03-19 PROCEDURE — 2500000001 HC RX 250 WO HCPCS SELF ADMINISTERED DRUGS (ALT 637 FOR MEDICARE OP): Mod: SE | Performed by: STUDENT IN AN ORGANIZED HEALTH CARE EDUCATION/TRAINING PROGRAM

## 2025-03-19 PROCEDURE — 85027 COMPLETE CBC AUTOMATED: CPT | Performed by: NUTRITIONIST

## 2025-03-19 PROCEDURE — 76937 US GUIDE VASCULAR ACCESS: CPT | Performed by: RADIOLOGY

## 2025-03-19 PROCEDURE — 85007 BL SMEAR W/DIFF WBC COUNT: CPT | Performed by: NUTRITIONIST

## 2025-03-19 PROCEDURE — 2500000004 HC RX 250 GENERAL PHARMACY W/ HCPCS (ALT 636 FOR OP/ED): Mod: SE | Performed by: RADIOLOGY

## 2025-03-19 PROCEDURE — RXMED WILLOW AMBULATORY MEDICATION CHARGE

## 2025-03-19 PROCEDURE — 7100000010 HC PHASE TWO TIME - EACH INCREMENTAL 1 MINUTE

## 2025-03-19 PROCEDURE — C1751 CATH, INF, PER/CENT/MIDLINE: HCPCS

## 2025-03-19 PROCEDURE — 77001 FLUOROGUIDE FOR VEIN DEVICE: CPT | Performed by: RADIOLOGY

## 2025-03-19 PROCEDURE — 7100000009 HC PHASE TWO TIME - INITIAL BASE CHARGE

## 2025-03-19 PROCEDURE — 80069 RENAL FUNCTION PANEL: CPT | Performed by: NUTRITIONIST

## 2025-03-19 PROCEDURE — 02HV33Z INSERTION OF INFUSION DEVICE INTO SUPERIOR VENA CAVA, PERCUTANEOUS APPROACH: ICD-10-PCS | Performed by: RADIOLOGY

## 2025-03-19 PROCEDURE — C1769 GUIDE WIRE: HCPCS

## 2025-03-19 PROCEDURE — 83735 ASSAY OF MAGNESIUM: CPT | Performed by: NUTRITIONIST

## 2025-03-19 RX ORDER — LOPERAMIDE HYDROCHLORIDE 2 MG/1
2 CAPSULE ORAL 2 TIMES DAILY
Qty: 30 CAPSULE | Refills: 0 | Status: SHIPPED | OUTPATIENT
Start: 2025-03-19 | End: 2025-03-19 | Stop reason: HOSPADM

## 2025-03-19 RX ORDER — LOPERAMIDE HYDROCHLORIDE 2 MG/1
2 CAPSULE ORAL 4 TIMES DAILY PRN
Status: DISCONTINUED | OUTPATIENT
Start: 2025-03-19 | End: 2025-03-19 | Stop reason: HOSPADM

## 2025-03-19 RX ORDER — LOPERAMIDE HYDROCHLORIDE 2 MG/1
2 CAPSULE ORAL 2 TIMES DAILY
Status: DISCONTINUED | OUTPATIENT
Start: 2025-03-19 | End: 2025-03-19

## 2025-03-19 RX ORDER — FENTANYL CITRATE 50 UG/ML
INJECTION, SOLUTION INTRAMUSCULAR; INTRAVENOUS
Status: COMPLETED | OUTPATIENT
Start: 2025-03-19 | End: 2025-03-19

## 2025-03-19 RX ORDER — DOXYCYCLINE 100 MG/1
100 CAPSULE ORAL EVERY 12 HOURS SCHEDULED
Qty: 8 CAPSULE | Refills: 0 | Status: ON HOLD | OUTPATIENT
Start: 2025-03-19 | End: 2025-03-24

## 2025-03-19 RX ORDER — MIDAZOLAM HYDROCHLORIDE 1 MG/ML
INJECTION INTRAMUSCULAR; INTRAVENOUS
Status: COMPLETED | OUTPATIENT
Start: 2025-03-19 | End: 2025-03-19

## 2025-03-19 RX ORDER — FUROSEMIDE 40 MG/1
40 TABLET ORAL DAILY
Qty: 30 TABLET | Refills: 0 | Status: SHIPPED | OUTPATIENT
Start: 2025-03-19 | End: 2025-04-18

## 2025-03-19 RX ADMIN — GABAPENTIN 800 MG: 400 CAPSULE ORAL at 09:37

## 2025-03-19 RX ADMIN — HYDROXYCHLOROQUINE SULFATE 200 MG: 200 TABLET, FILM COATED ORAL at 09:37

## 2025-03-19 RX ADMIN — DOXYCYCLINE HYCLATE 100 MG: 100 TABLET, COATED ORAL at 09:37

## 2025-03-19 RX ADMIN — FUROSEMIDE 40 MG: 40 TABLET ORAL at 09:37

## 2025-03-19 RX ADMIN — Medication 64 MG: at 09:37

## 2025-03-19 RX ADMIN — MACITENTAN 10 MG: 10 TABLET, FILM COATED ORAL at 09:37

## 2025-03-19 RX ADMIN — PANTOPRAZOLE SODIUM 40 MG: 40 TABLET, DELAYED RELEASE ORAL at 06:45

## 2025-03-19 RX ADMIN — FENTANYL CITRATE 50 MCG: 50 INJECTION, SOLUTION INTRAMUSCULAR; INTRAVENOUS at 11:13

## 2025-03-19 RX ADMIN — Medication 2 TABLET: at 09:37

## 2025-03-19 RX ADMIN — MIDAZOLAM HYDROCHLORIDE 1 MG: 2 INJECTION, SOLUTION INTRAMUSCULAR; INTRAVENOUS at 11:13

## 2025-03-19 RX ADMIN — LOPERAMIDE HYDROCHLORIDE 2 MG: 2 CAPSULE ORAL at 10:05

## 2025-03-19 RX ADMIN — DILTIAZEM HYDROCHLORIDE 180 MG: 180 CAPSULE, COATED, EXTENDED RELEASE ORAL at 09:37

## 2025-03-19 RX ADMIN — GABAPENTIN 800 MG: 400 CAPSULE ORAL at 14:46

## 2025-03-19 RX ADMIN — VENLAFAXINE HYDROCHLORIDE 150 MG: 150 CAPSULE, EXTENDED RELEASE ORAL at 09:38

## 2025-03-19 RX ADMIN — EPOPROSTENOL 67 NG/KG/MIN: 1.5 INJECTION, POWDER, LYOPHILIZED, FOR SOLUTION INTRAVENOUS at 15:41

## 2025-03-19 ASSESSMENT — PAIN - FUNCTIONAL ASSESSMENT
PAIN_FUNCTIONAL_ASSESSMENT: 0-10

## 2025-03-19 ASSESSMENT — PAIN SCALES - GENERAL
PAINLEVEL_OUTOF10: 0 - NO PAIN

## 2025-03-19 NOTE — CARE PLAN
Problem: Pain - Adult  Goal: Verbalizes/displays adequate comfort level or baseline comfort level  Outcome: Progressing     Problem: Safety - Adult  Goal: Free from fall injury  Outcome: Progressing     Problem: Discharge Planning  Goal: Discharge to home or other facility with appropriate resources  Outcome: Progressing     Problem: Fall/Injury  Goal: Be free from injury by end of the shift  Outcome: Progressing     Problem: Respiratory  Goal: Minimal/no exertional discomfort or dyspnea this shift  Outcome: Progressing   The patient's goals for the shift include      The clinical goals for the shift include Patient will remain HDS this shift.    Veletri running at 3.26ml/hr. Pt has been NPO since midnight for Hickaman insertion today.

## 2025-03-19 NOTE — POST-PROCEDURE NOTE
Interventional Radiology Brief Postprocedure Note    Attending: Kartik Araujo MD    Diagnosis: Montez catheter for epoprostenol infusion    Description of procedure: Image-guided placement of right internal jugular Montez catheter. Catheter is ready for use.    Anesthesia:  Local with moderate sedation    Complications: None    Estimated Blood Loss: minimal    See detailed result report with images in PACS.    The patient tolerated the procedure well without incident or complication and is in stable condition.

## 2025-03-19 NOTE — PRE-PROCEDURE NOTE
Interventional Radiology Preprocedure Note    Indication for procedure: The primary encounter diagnosis was Catheter-related bloodstream infection, initial encounter. A diagnosis of NSTEMI (non-ST elevated myocardial infarction) (Multi) was also pertinent to this visit. Need for Montez catheter    Relevant review of systems: NA    Relevant Labs:   Lab Results   Component Value Date    CREATININE 0.79 03/19/2025    EGFR 85 03/19/2025    INR 1.3 (H) 11/20/2024    PROTIME 13.5 (H) 11/20/2024       Planned Sedation/Anesthesia: Moderate    Airway assessment: normal    Directed physical examination:    AOOx3, dressing over left wall, skin at base of right neck clear    Mallampati: II (hard and soft palate, upper portion of tonsils and uvula visible)    ASA Score: ASA 3 - Patient with moderate systemic disease with functional limitations    Benefits, risks and alternatives of procedure and planned sedation have been discussed with the patient and/or their representative. All questions answered and they agree to proceed.

## 2025-03-19 NOTE — DISCHARGE SUMMARY
Discharge Diagnosis  Catheter-related bloodstream infection, initial encounter    Issues Requiring Follow-Up  Follow up:  - Lissa henry with Dr. Prescott. Doxycycline until 3/23.    Discharge Meds     Medication List      START taking these medications     doxycycline 100 mg capsule; Commonly known as: Vibramycin; Take 1   capsule (100 mg) by mouth every 12 hours for 8 doses. Take with a full   glass of water and do not lie down for at least 30 minutes after.     CHANGE how you take these medications     * loperamide 2 mg capsule; Commonly known as: Imodium; What changed:   Another medication with the same name was added. Make sure you understand   how and when to take each.   * loperamide 2 mg capsule; Commonly known as: Imodium; Take 1 capsule (2   mg) by mouth 2 times a day.; What changed: You were already taking a   medication with the same name, and this prescription was added. Make sure   you understand how and when to take each.   potassium chloride CR 20 mEq ER tablet; Commonly known as: Klor-Con M20;   Take 1 tablet (20 mEq) by mouth 2 times a day. Do not crush or chew.  * This list has 2 medication(s) that are the same as other medications   prescribed for you. Read the directions carefully, and ask your doctor or   other care provider to review them with you.     CONTINUE taking these medications     aspirin 81 mg EC tablet; Take 1 tablet (81 mg) by mouth once daily.   calcium carbonate-vitamin D3 500 mg-5 mcg (200 unit) tablet; Take 2   tablets by mouth 2 times a day.   dilTIAZem  mg 24 hr capsule; Commonly known as: Cardizem CD; Take   1 capsule (180 mg) by mouth once daily.   epoprostenol 1.5 mg recon soln; Commonly known as: Veletri; 1.5 mg   (1,500 mcg) by central venous line infusion route continuously.   Reconstitute contents of vial with 5 ml diluent and mix cassette as   directed. Infuse continuously per physician orders. Allow for priming   volume. Dose range: 1-150 ng/kg/min.   ergocalciferol  1250 mcg (50,000 units) capsule; Commonly known as:   Vitamin D-2   furosemide 40 mg tablet; Commonly known as: Lasix; Take 1 tablet (40 mg)   by mouth once daily.   gabapentin 800 mg tablet; Commonly known as: Neurontin; Take 1 tablet   (800 mg) by mouth 3 times a day.   hydroxychloroquine 200 mg tablet; Commonly known as: Plaquenil; Take 1   tablet (200 mg) by mouth 2 times a day.   Mag 64 64 mg EC tablet; Generic drug: magnesium chloride; Take 1 tablet   (64 mg) by mouth 2 times a day.   melatonin 5 mg tablet; Take 1 tablet (5 mg) by mouth once daily at   bedtime.   multivitamin with minerals tablet   omeprazole 40 mg DR capsule; Commonly known as: PriLOSEC; Take 1 capsule   (40 mg) by mouth once daily in the morning. Take before meals. Do not   crush or chew.   Opsumit 10 mg tablet tablet; Generic drug: macitentan; Take 1 tablet (10   mg) by mouth once daily.   potassium gluconate 595 mg (99 mg) tablet   venlafaxine  mg 24 hr capsule; Commonly known as: Effexor-XR; Take   1 capsule (150 mg) by mouth once daily. Do not crush or chew.   Winrevair 60 mg kit; Generic drug: sotatercept-csrk; Inject 1 Dose under   the skin every 21 (twenty-one) days.     STOP taking these medications     cholestyramine 4 gram packet; Commonly known as: Questran       Test Results Pending At Discharge  Pending Labs       Order Current Status    Blood Culture Preliminary result    Blood Culture Preliminary result            Hospital Course  63 y/o F PMH chronic hypoxic respiratory failure (on 4L NC) secondary group 1 pulmonary hypertension (on Macitentan, continuous Epoprostenol infusion via L internal jugular Montez, and monthly Winrevair (Sotatercept) injections, follows with Dr. Prescott), SVT (on Diltiazem), and lupus (on Hydroxychloroquine) who is admitted for concern for line associated infection. Started on BSAbx. Bcx NGTD. Montez line removed by IR on 3/17 and replaced on 3/19. ID consulted and recommended transitioning to  doxycycline to be continued until 3/23. Patient stable and ready for discharge.    Follow up:  - Pulm fu with Dr. Prescott. Doxycycline until 3/23.    Pertinent Physical Exam At Time of Discharge  Physical Exam  Const: NAD  HEENT: NCAT  CV: RRR  Pulm: RA  Abd: NTND  Skin: warm and dry, no tenderness, induration, discharge or drainage noted near site of central line that was removed  Neuro: alert  Psych: appropriate mood and affect    Outpatient Follow-Up  Future Appointments   Date Time Provider Department Center   4/22/2025  9:30 AM Chicho Le MD [id number] [facility]   4/24/2025  2:00 PM [facility] [id number] [facility] [id number] [facility]   4/24/2025  2:30 PM [facility] ECHO 1 [id number] [facility]   4/24/2025  3:30 PM Chicho Prescott,  [id number] [facility]         Shakira Ochoa MD

## 2025-03-20 ENCOUNTER — APPOINTMENT (OUTPATIENT)
Dept: PULMONOLOGY | Facility: HOSPITAL | Age: 65
End: 2025-03-20
Payer: COMMERCIAL

## 2025-03-20 ENCOUNTER — DOCUMENTATION (OUTPATIENT)
Dept: PULMONOLOGY | Facility: HOSPITAL | Age: 65
End: 2025-03-20
Payer: COMMERCIAL

## 2025-03-20 NOTE — DOCUMENTATION CLARIFICATION NOTE
"    PATIENT:               CARMEN MADRIGAL  ACCT #:                  [id number]  MRN:                       [id number]  :                       1960  ADMIT DATE:       3/15/2025 12:51 PM  DISCH DATE:        3/19/2025 4:57 PM  RESPONDING PROVIDER #:        [id number]          PROVIDER RESPONSE TEXT:    History of NSTEMI 2024    CDI QUERY TEXT:    Clarification        Instruction:    Based on your assessment of the patient and the clinical information, please provide the requested documentation by clicking on the appropriate radio button and enter any additional information if prompted.    Question: Please further clarify the diagnosis of NSTEMI as    When answering this query, please exercise your independent professional judgment. The fact that a question is being asked, does not imply that any particular answer is desired or expected.    The patient's clinical indicators include:  Clinical Information: 65y/o female presenting with Monetz catheter infection    Documented Diagnosis: \"NSTEMI\" is documented in H&P and Pulmonology Progress Note, 3/15-3/16 and \"history of NSTEMI\" documented in 3/17-3/18 Pulmonology PN    Clinical Indicators and Documentation:  -Vital Signs on admission to ED, 3/15 at 1242: T 37- HR 96- RR 20- /68,SPO2 93% on 4LNC  -Troponin trend: n/a  -EKG findings: 3/15 H&P, \"no EKG changes\"  -ECHO findings: n/a  -Physical Exam or Documented/Presenting symptoms: n/a  -Cardiac Interventions: n/a  -Cardiac Consult: n/a    Treatment: Telemetry, Aspirin 81mg PO 3/15-3/16    Risk Factors: CAD, History of smoking, History of SVT, Hyperkalemia  Options provided:  -- History of NSTEMI, Please specify date of NSTEMI  -- NSTEMI as evidenced by, Please specify additional information below  -- Other - I will add my own diagnosis  -- Refer to Clinical Documentation Reviewer    Query created by: Rhina Do on 3/19/2025 4:48 PM      Electronically signed by:  GABRIELLA GRANDA MD 3/20/2025 2:33 " PM

## 2025-03-20 NOTE — PROGRESS NOTES
Sotatercept Monitoring    Dosing History:  6th  Sotatercept dose given on 3/21/2025.   Starting Dose: 45 mg kit, 0.3 mg/kg. Inject 0.4 ml.   Original Target Dose: 60 mg kit, 0.7mg/kg. Inject 1.0 ml.      Holding History:  Held on 11/20/2024 to 12/13/2024 for multiple ED visits and hospitalizations.      Date: 10/26/2024 Baseline HGB 8.3    Lab Results   Component Value Date    WBC 4.4 03/19/2025    HGB 10.9 (L) 03/19/2025    HCT 37.3 03/19/2025    MCV 80 03/19/2025    PLT 70 (L) 03/19/2025     Per Dr. Prescott, ok for next dose yes    Symptoms: black tarry stools/nosebleeds/heavy menstrual bleeding/petechia: Denies    Plan:  Continue labs before each dose

## 2025-03-21 ENCOUNTER — DOCUMENTATION (OUTPATIENT)
Dept: PULMONOLOGY | Facility: HOSPITAL | Age: 65
End: 2025-03-21
Payer: COMMERCIAL

## 2025-03-23 NOTE — H&P
History Of Present Illness  Ronna Beckham is a 64 y.o. female presenting with a history of recurrent SVT in the setting of significant pulmonary hypertension.  She had a narrow QRS complex tachycardia up to 200 bpm that was unresponsive to adenosine and highly suspect for one-to-one atrial flutter.  She is presenting for electrophysiology study and likely catheter-based therapy   Past Medical History  Past Medical History:   Diagnosis Date    Anxiety     Depression     Does not refill medications appropriately 09/10/2024    GERD (gastroesophageal reflux disease)     Lupus     Personal history of other specified conditions 2020    History of seizure    Pulmonary hypertension (Multi)     Volume overload 09/10/2024       Surgical History  Past Surgical History:   Procedure Laterality Date    CARDIAC CATHETERIZATION N/A 2024    Procedure: Left Heart Cath, With LV, Angriography And Grafts;  Surgeon: Christiano Calvillo DO;  Location: [facility];  Service: Cardiovascular;  Laterality: N/A;    CHOLECYSTECTOMY      HYSTERECTOMY      OTHER SURGICAL HISTORY  2020    Cholecystectomy    OTHER SURGICAL HISTORY  2020    Esophagogastroduodenoscopy    OTHER SURGICAL HISTORY  2020    Colonoscopy    OTHER SURGICAL HISTORY  2020     section    OTHER SURGICAL HISTORY  2020    Hysterectomy        Social History  She reports that she has quit smoking. Her smoking use included cigarettes. She has quit using smokeless tobacco. She reports that she does not currently use alcohol. She reports that she does not currently use drugs after having used the following drugs: Cocaine and Other.    Family History  Family History   Problem Relation Name Age of Onset    No Known Problems Mother      No Known Problems Father          Allergies  Levetiracetam    Review of Systems   All other systems reviewed and are negative.       Physical Exam   General: Alert, oriented to person, place,  date/time, pleasant  HEENT: Normocephalic, eyes normal ears normal  Heart: Normal S1, S2 , rhythm regular, no rubs or gallops   Respiratory: Lungs clear to auscultation bilaterally, no rales, crackles, wheezes or rhonchi  Abdomen: Bowel sounds present in all quadrants  Extremities: No upper or lower extremity edema appreciated  Dermatology: Skin Normal  Genitourinary: Deferred  Neurological: Nonfocal, moves all extremities  Psychology :Appropriate affect and mood   Last Recorded Vitals  There were no vitals taken for this visit.    Relevant Results             Assessment/Plan   Assessment & Plan  SVT (supraventricular tachycardia) (CMS-HCC)      As noted from previous encounters the patient likely has atrial flutter with one-to-one conduction.  She is here for catheter-based therapy.           Chicho Le MD

## 2025-03-24 ENCOUNTER — ANESTHESIA EVENT (OUTPATIENT)
Dept: CARDIOLOGY | Facility: HOSPITAL | Age: 65
End: 2025-03-24
Payer: COMMERCIAL

## 2025-03-24 ENCOUNTER — PHARMACY VISIT (OUTPATIENT)
Dept: PHARMACY | Facility: CLINIC | Age: 65
End: 2025-03-24
Payer: COMMERCIAL

## 2025-03-24 ENCOUNTER — HOSPITAL ENCOUNTER (OUTPATIENT)
Facility: HOSPITAL | Age: 65
Setting detail: OUTPATIENT SURGERY
Discharge: HOME | End: 2025-03-24
Attending: INTERNAL MEDICINE | Admitting: INTERNAL MEDICINE
Payer: COMMERCIAL

## 2025-03-24 ENCOUNTER — ANESTHESIA (OUTPATIENT)
Dept: CARDIOLOGY | Facility: HOSPITAL | Age: 65
End: 2025-03-24
Payer: COMMERCIAL

## 2025-03-24 VITALS
OXYGEN SATURATION: 94 % | HEART RATE: 82 BPM | DIASTOLIC BLOOD PRESSURE: 71 MMHG | TEMPERATURE: 98.7 F | RESPIRATION RATE: 12 BRPM | SYSTOLIC BLOOD PRESSURE: 118 MMHG

## 2025-03-24 DIAGNOSIS — I48.3 TYPICAL ATRIAL FLUTTER (MULTI): Primary | ICD-10-CM

## 2025-03-24 DIAGNOSIS — I47.10 SVT (SUPRAVENTRICULAR TACHYCARDIA) (CMS-HCC): ICD-10-CM

## 2025-03-24 DIAGNOSIS — T80.211A CATHETER-RELATED BLOODSTREAM INFECTION, INITIAL ENCOUNTER: ICD-10-CM

## 2025-03-24 PROCEDURE — 2500000004 HC RX 250 GENERAL PHARMACY W/ HCPCS (ALT 636 FOR OP/ED): Performed by: ANESTHESIOLOGIST ASSISTANT

## 2025-03-24 PROCEDURE — C1760 CLOSURE DEV, VASC: HCPCS | Performed by: INTERNAL MEDICINE

## 2025-03-24 PROCEDURE — RXMED WILLOW AMBULATORY MEDICATION CHARGE

## 2025-03-24 PROCEDURE — 2500000005 HC RX 250 GENERAL PHARMACY W/O HCPCS: Performed by: INTERNAL MEDICINE

## 2025-03-24 PROCEDURE — 7100000010 HC PHASE TWO TIME - EACH INCREMENTAL 1 MINUTE: Performed by: INTERNAL MEDICINE

## 2025-03-24 PROCEDURE — 3700000002 HC GENERAL ANESTHESIA TIME - EACH INCREMENTAL 1 MINUTE: Performed by: INTERNAL MEDICINE

## 2025-03-24 PROCEDURE — 7100000009 HC PHASE TWO TIME - INITIAL BASE CHARGE: Performed by: INTERNAL MEDICINE

## 2025-03-24 PROCEDURE — 93653 COMPRE EP EVAL TX SVT: CPT | Performed by: INTERNAL MEDICINE

## 2025-03-24 PROCEDURE — A93653 PR EPHYS EVAL W/ABLATION SUPRAVENT ARRHYTHMIA: Performed by: ANESTHESIOLOGY

## 2025-03-24 PROCEDURE — C1889 IMPLANT/INSERT DEVICE, NOC: HCPCS | Performed by: INTERNAL MEDICINE

## 2025-03-24 PROCEDURE — 2780000003 HC OR 278 NO HCPCS: Performed by: INTERNAL MEDICINE

## 2025-03-24 PROCEDURE — A93653 PR EPHYS EVAL W/ABLATION SUPRAVENT ARRHYTHMIA: Performed by: ANESTHESIOLOGIST ASSISTANT

## 2025-03-24 PROCEDURE — 2500000004 HC RX 250 GENERAL PHARMACY W/ HCPCS (ALT 636 FOR OP/ED): Performed by: INTERNAL MEDICINE

## 2025-03-24 PROCEDURE — 2720000007 HC OR 272 NO HCPCS: Performed by: INTERNAL MEDICINE

## 2025-03-24 PROCEDURE — 3700000001 HC GENERAL ANESTHESIA TIME - INITIAL BASE CHARGE: Performed by: INTERNAL MEDICINE

## 2025-03-24 PROCEDURE — C1730 CATH, EP, 19 OR FEW ELECT: HCPCS | Performed by: INTERNAL MEDICINE

## 2025-03-24 RX ORDER — ALBUTEROL SULFATE 0.83 MG/ML
2.5 SOLUTION RESPIRATORY (INHALATION) ONCE
Status: CANCELLED | OUTPATIENT
Start: 2025-03-24 | End: 2025-03-24

## 2025-03-24 RX ORDER — HYDRALAZINE HYDROCHLORIDE 20 MG/ML
10 INJECTION INTRAMUSCULAR; INTRAVENOUS EVERY 30 MIN PRN
Status: CANCELLED | OUTPATIENT
Start: 2025-03-24

## 2025-03-24 RX ORDER — PROPOFOL 10 MG/ML
INJECTION, EMULSION INTRAVENOUS CONTINUOUS PRN
Status: DISCONTINUED | OUTPATIENT
Start: 2025-03-24 | End: 2025-03-24

## 2025-03-24 RX ORDER — DOXYCYCLINE 100 MG/1
100 CAPSULE ORAL EVERY 12 HOURS SCHEDULED
Qty: 8 CAPSULE | Refills: 0 | Status: SHIPPED | OUTPATIENT
Start: 2025-03-24 | End: 2025-03-28

## 2025-03-24 RX ORDER — FENTANYL CITRATE 50 UG/ML
INJECTION, SOLUTION INTRAMUSCULAR; INTRAVENOUS AS NEEDED
Status: DISCONTINUED | OUTPATIENT
Start: 2025-03-24 | End: 2025-03-24

## 2025-03-24 RX ORDER — GLYCOPYRROLATE 0.2 MG/ML
0.2 INJECTION INTRAMUSCULAR; INTRAVENOUS ONCE
Status: CANCELLED | OUTPATIENT
Start: 2025-03-24 | End: 2025-03-24

## 2025-03-24 RX ORDER — MIDAZOLAM HYDROCHLORIDE 1 MG/ML
1 INJECTION, SOLUTION INTRAMUSCULAR; INTRAVENOUS ONCE AS NEEDED
Status: CANCELLED | OUTPATIENT
Start: 2025-03-24

## 2025-03-24 RX ORDER — LANOLIN ALCOHOL/MO/W.PET/CERES
400 CREAM (GRAM) TOPICAL DAILY
Status: DISCONTINUED | OUTPATIENT
Start: 2025-03-24 | End: 2025-03-24 | Stop reason: HOSPADM

## 2025-03-24 RX ORDER — NORETHINDRONE AND ETHINYL ESTRADIOL 0.5-0.035
50 KIT ORAL ONCE
Status: CANCELLED | OUTPATIENT
Start: 2025-03-24 | End: 2025-03-24

## 2025-03-24 RX ORDER — ALBUTEROL SULFATE 0.83 MG/ML
2.5 SOLUTION RESPIRATORY (INHALATION) ONCE AS NEEDED
Status: CANCELLED | OUTPATIENT
Start: 2025-03-24

## 2025-03-24 RX ORDER — FAMOTIDINE 20 MG/1
20 TABLET, FILM COATED ORAL ONCE
Status: CANCELLED | OUTPATIENT
Start: 2025-03-24 | End: 2025-03-24

## 2025-03-24 RX ORDER — LIDOCAINE HYDROCHLORIDE 10 MG/ML
INJECTION, SOLUTION EPIDURAL; INFILTRATION; INTRACAUDAL; PERINEURAL AS NEEDED
Status: DISCONTINUED | OUTPATIENT
Start: 2025-03-24 | End: 2025-03-24 | Stop reason: HOSPADM

## 2025-03-24 RX ORDER — ONDANSETRON 4 MG/1
4 TABLET, ORALLY DISINTEGRATING ORAL ONCE AS NEEDED
Status: CANCELLED | OUTPATIENT
Start: 2025-03-24

## 2025-03-24 RX ORDER — ONDANSETRON HYDROCHLORIDE 2 MG/ML
4 INJECTION, SOLUTION INTRAVENOUS EVERY 8 HOURS PRN
Status: CANCELLED | OUTPATIENT
Start: 2025-03-24

## 2025-03-24 RX ORDER — METOCLOPRAMIDE 10 MG/1
10 TABLET ORAL ONCE
Status: CANCELLED | OUTPATIENT
Start: 2025-03-24 | End: 2025-03-24

## 2025-03-24 RX ORDER — ONDANSETRON HYDROCHLORIDE 2 MG/ML
4 INJECTION, SOLUTION INTRAVENOUS ONCE AS NEEDED
Status: CANCELLED | OUTPATIENT
Start: 2025-03-24

## 2025-03-24 RX ORDER — SODIUM CHLORIDE, SODIUM LACTATE, POTASSIUM CHLORIDE, CALCIUM CHLORIDE 600; 310; 30; 20 MG/100ML; MG/100ML; MG/100ML; MG/100ML
INJECTION, SOLUTION INTRAVENOUS CONTINUOUS PRN
Status: DISCONTINUED | OUTPATIENT
Start: 2025-03-24 | End: 2025-03-24

## 2025-03-24 RX ORDER — OXYCODONE HYDROCHLORIDE 5 MG/1
5 TABLET ORAL ONCE AS NEEDED
Status: CANCELLED | OUTPATIENT
Start: 2025-03-24

## 2025-03-24 RX ORDER — LIDOCAINE HYDROCHLORIDE 10 MG/ML
INJECTION, SOLUTION INTRAVENOUS AS NEEDED
Status: DISCONTINUED | OUTPATIENT
Start: 2025-03-24 | End: 2025-03-24

## 2025-03-24 RX ORDER — LABETALOL HYDROCHLORIDE 5 MG/ML
10 INJECTION, SOLUTION INTRAVENOUS ONCE AS NEEDED
Status: CANCELLED | OUTPATIENT
Start: 2025-03-24

## 2025-03-24 RX ORDER — MIDAZOLAM HYDROCHLORIDE 1 MG/ML
INJECTION, SOLUTION INTRAMUSCULAR; INTRAVENOUS AS NEEDED
Status: DISCONTINUED | OUTPATIENT
Start: 2025-03-24 | End: 2025-03-24

## 2025-03-24 RX ORDER — DIPHENHYDRAMINE HYDROCHLORIDE 50 MG/ML
12.5 INJECTION, SOLUTION INTRAMUSCULAR; INTRAVENOUS ONCE AS NEEDED
Status: CANCELLED | OUTPATIENT
Start: 2025-03-24

## 2025-03-24 RX ORDER — LIDOCAINE HYDROCHLORIDE 10 MG/ML
0.1 INJECTION, SOLUTION INFILTRATION; PERINEURAL ONCE
Status: CANCELLED | OUTPATIENT
Start: 2025-03-24 | End: 2025-03-24

## 2025-03-24 RX ADMIN — Medication 400 MG: at 08:01

## 2025-03-24 RX ADMIN — PROPOFOL 50 MCG/KG/MIN: 10 INJECTION, EMULSION INTRAVENOUS at 08:32

## 2025-03-24 RX ADMIN — SODIUM CHLORIDE, SODIUM LACTATE, POTASSIUM CHLORIDE, AND CALCIUM CHLORIDE: .6; .31; .03; .02 INJECTION, SOLUTION INTRAVENOUS at 08:30

## 2025-03-24 RX ADMIN — FENTANYL CITRATE 25 MCG: 50 INJECTION, SOLUTION INTRAMUSCULAR; INTRAVENOUS at 08:32

## 2025-03-24 RX ADMIN — MIDAZOLAM 2 MG: 1 INJECTION INTRAMUSCULAR; INTRAVENOUS at 08:32

## 2025-03-24 RX ADMIN — LIDOCAINE HYDROCHLORIDE 30 MG: 10 INJECTION, SOLUTION INTRAVENOUS at 08:32

## 2025-03-24 SDOH — HEALTH STABILITY: MENTAL HEALTH: CURRENT SMOKER: 0

## 2025-03-24 ASSESSMENT — PAIN SCALES - GENERAL
PAIN_LEVEL: 2
PAINLEVEL_OUTOF10: 0 - NO PAIN

## 2025-03-24 NOTE — ANESTHESIA PREPROCEDURE EVALUATION
Patient: Ronna Beckham    Procedure Information       Date/Time: 03/24/25 0815    Procedures:       EP Study - EP STUDY WITH POSSIBLE SVT ABLATION, POSSIBLE FLUTTER ABLATION WITH MAC CPT 34315 POSSIBLE 97551; PLEASE BOOK FOR 3 HOURS      Ablation SVT      Ablation Atrial Flutter    Location: [facility] CATH LAB 2 (EP) / [facility]    Providers: Chicho Le MD            Relevant Problems   Cardiac   (+) Acute cor pulmonale (Multi)   (+) Acute on chronic right-sided heart failure   (+) Hyperlipidemia   (+) NSTEMI (non-ST elevated myocardial infarction) (Multi)   (+) Primary hypertension   (+) Prolonged Q-T interval on ECG   (+) SVT (supraventricular tachycardia) (CMS-HCC)   (+) Supraventricular tachycardia (CMS-HCC)      Pulmonary   (+) Chronic obstructive pulmonary disease (Multi)   (+) PAH (pulmonary artery hypertension) (Multi)   (+) Pneumonia of left lower lobe due to infectious organism   (+) Pulmonary HTN (Multi)   (+) Pulmonary hypertension (Multi)      Neuro   (+) Anxiety   (+) Depressive disorder   (+) Major depressive disorder, single episode, moderate (Multi)   (+) Seizure disorder (Multi)      GI   (+) Gastroesophageal reflux disease      Liver   (+) Acute on chronic cholecystitis   (+) Liver disease      Endocrine   (+) Obesity      Hematology   (+) Acquired thrombocytopenia (CMS-HCC)      ID   (+) Catheter-related bloodstream infection, initial encounter   (+) Dental infection   (+) Pneumonia of left lower lobe due to infectious organism       Clinical information reviewed:                   NPO Detail:  No data recorded     Physical Exam    Airway  Mallampati: II  TM distance: >3 FB     Cardiovascular   Rhythm: regular  Rate: normal     Dental - normal exam     Pulmonary   Breath sounds clear to auscultation     Abdominal   Abdomen: soft             Anesthesia Plan    History of general anesthesia?: unknown/emergency  History of complications of general anesthesia?: no    ASA 3      general and other   There is reasoning for not using neuraxial anesthesia or a peripheral nerve block.  (Mild anemia and thrombocytopenia, SR, pacs, RV hypertrophy, IRBBB +/- Al ischemia  Echo unchanged from prev. RVSP 100+mmHg, chronic hypoxia on O2, noted tox screen NEG 9d ago with Hx +tox screens  GETA for dar w/o diff 2020  Considering limited propofol in favor of sedation with less impact on PHTN.)  The patient is not a current smoker.  Education provided regarding risk of obstructive sleep apnea. (in appropriate circumstances)  intravenous induction   Anesthetic plan and risks discussed with patient.    Plan discussed with CRNA, CAA and attending.

## 2025-03-24 NOTE — NURSING NOTE
END OF PROCEDURE MONITORING CRITERIA  01. BP is within +/- 20% of preprocedure  02. Oxygen sat is at 92% or above on RA, or existing order for O2 treatment, or at pre-sedation levels, otherwise new O2 order needed.  03. Unless the patient has a pre-procedure history of diminished level of consciousness, s/he is easily arousable and when aroused is able to responds appropriately for his/her age.  04. Significant complications related to the specific procedure are absent, have been controlled, or have been evaluated, including:      A. Pain.      B. Wound drainage.      C. All drains and tubes are patent.      D. Nausea and Vomiting. Vomiting is not persisten and has not occurred within 15 minutes prior to discharge.      E. Bladder distention. Voided bladder and/or no symptoms or urinary retention (e.g., bladder distention, frequent voiding in small amounts).      F. Neurovascular status.      G. Level of Consciousness consistent with pre procedural status.           affirmed that they were driving the patient home.

## 2025-03-24 NOTE — POST-PROCEDURE NOTE
Physician Transition of Care Summary  Invasive Cardiovascular Lab    Procedure Date: 3/24/2025  Attending:    * Chicho Le - Primary  Resident/Fellow/Other Assistant: Surgeons and Role:  * No surgeons found with a matching role *    Indications:   Pre-op Diagnosis      * SVT (supraventricular tachycardia) (CMS-HCC) [I47.10]    Post-procedure diagnosis:   Post-op Diagnosis     * SVT (supraventricular tachycardia) (CMS-HCC) [I47.10]    Procedure(s):   EP Study  85525 - VT COMPRE EP EVAL R ATR VNTRC PACG&REC HIS BNDL REC    Ablation SVT  45950 - VT COMPRE EP EVAL ABLTJ 3D MAPG TX SVT    Ablation Atrial Flutter  25281 - VT ABLATION ARRHYTHMOGENIC FOCI/PATHWAY W/O BYPASS        Procedure Findings:   See below    Description of the Procedure:   After written witnessed informed consent was obtained the procedure from the patient, the patient was transferred to the EP laboratory. The patient was in the fasting state. A grounding pad was placed. The patient was set up for monitoring of surface ECG and intracardiac electrograms. The right and left groins were prepped and draped in the usual sterile fashion. Bupivacaine was administered locally for anesthetic. Right femoral vein access was obtained. The vessel was accessed using the modified Seldinger technique.     The catheters were positioned as follows:  Right femoral vein  7 Fr  High RA Duodeca Super Large Curl  Right femoral vein 6 Fr coronary sinus EPXT octopolar catheter  Right femoral vein  7 Fr Flexibility SJM open irrigation catheter      Arrhythmias induced and treatment:  The patient was brought to laboratory while in typical atrial flutter with a cycle length of 210 milliseconds, Mechanism was confirmed with concealed entrainment from the CTI. Linear ablation was applied to the right cavo tricuspid isthmus utilizing  3 D mapping with the Cubito SJM system.  The atrial flutter terminated and subsequent bidirectional conduction block was achieved with  change of activation sequence and timing of  152 ms. Conduction block across the CTI was confirmed 30 minutes post ablation.    Summary:  Successful radiofrequency ablation of typical atrial flutter.     Complications:   none    Stents/Implants:   Implants       No implant documentation for this case.            Anticoagulation/Antiplatelet Plan:   none    Estimated Blood Loss:   10 mL    Anesthesia: Monitor Anesthesia Care Anesthesia Staff: Anesthesiologist: DO EVELINA Martinez-AA: ROBERTO Michael; ROBERTO Castellanos    Any Specimen(s) Removed:   No specimens collected during this procedure.    Disposition:   stable      Electronically signed by: Chicho Le MD, 3/24/2025 10:05 AM

## 2025-03-24 NOTE — ANESTHESIA POSTPROCEDURE EVALUATION
Patient: Ronna Beckham    Procedure Summary       Date: 03/24/25 Room / Location: [facility] CATH LAB 2 (EP) / [facility]    Anesthesia Start: 0816 Anesthesia Stop:     Procedures:       EP Study      Ablation SVT      Ablation Atrial Flutter Diagnosis:       SVT (supraventricular tachycardia) (CMS-HCC)      (SVT (supraventricular tachycardia) (CMS-HCC) [I47.10])    Providers: Chicho Le MD Responsible Provider: Dutch Franklin DO    Anesthesia Type: general, other ASA Status: 3            Anesthesia Type: general, other  /75   Pulse 82   Temp 37.1 °C (98.7 °F)   Resp 16   SpO2 97%         Anesthesia Post Evaluation    Patient location during evaluation: bedside  Patient participation: complete - patient participated  Level of consciousness: awake  Pain score: 2  Pain management: adequate  Airway patency: patent  Two or more strategies used to mitigate risk of obstructive sleep apnea  Cardiovascular status: acceptable  Respiratory status: acceptable  Hydration status: acceptable  Postoperative Nausea and Vomiting: none  Comments: See intraoperative record for anesthetic actions related to the preoperative anesthesia plan.        No notable events documented.

## 2025-03-25 ASSESSMENT — PAIN SCALES - GENERAL: PAINLEVEL_OUTOF10: 0 - NO PAIN

## 2025-03-31 ENCOUNTER — HOSPITAL ENCOUNTER (EMERGENCY)
Facility: HOSPITAL | Age: 65
Discharge: HOME | End: 2025-04-01
Attending: EMERGENCY MEDICINE
Payer: COMMERCIAL

## 2025-03-31 VITALS
OXYGEN SATURATION: 94 % | WEIGHT: 150 LBS | RESPIRATION RATE: 18 BRPM | TEMPERATURE: 97.9 F | DIASTOLIC BLOOD PRESSURE: 72 MMHG | SYSTOLIC BLOOD PRESSURE: 103 MMHG | BODY MASS INDEX: 24.99 KG/M2 | HEART RATE: 98 BPM | HEIGHT: 65 IN

## 2025-03-31 DIAGNOSIS — T14.8XXA BLEEDING FROM WOUND: Primary | ICD-10-CM

## 2025-03-31 PROCEDURE — 99283 EMERGENCY DEPT VISIT LOW MDM: CPT | Performed by: EMERGENCY MEDICINE

## 2025-03-31 PROCEDURE — 12001 RPR S/N/AX/GEN/TRNK 2.5CM/<: CPT

## 2025-03-31 ASSESSMENT — PAIN - FUNCTIONAL ASSESSMENT: PAIN_FUNCTIONAL_ASSESSMENT: 0-10

## 2025-03-31 ASSESSMENT — PAIN SCALES - GENERAL: PAINLEVEL_OUTOF10: 0 - NO PAIN

## 2025-04-01 PROBLEM — T14.8XXA BLEEDING FROM WOUND: Status: ACTIVE | Noted: 2025-04-01

## 2025-04-01 NOTE — ED PROVIDER NOTES
HPI   Chief Complaint   Patient presents with    Post-op Problem     Pt states she is bleeding from a surgical site on her right chest.  Pt had her Montez relocated from the left chest to the right last week.  Pt states that the site has been bleeding for a few hours.  The site has a steady drip of blood coming out.  Site was dressed in triage.       HPI  64-year-old female presents with complaint of bleeding from her surgical site.  Patient had a catheter placed in right upper chest wall few days ago tonight she noticed that there was little swelling above the site and she had some bleeding which she is unable to get it controlled.  She is on Eliquis.  It is not at the site of the catheter.  She has no other complaints.      Patient History   Past Medical History:   Diagnosis Date    Anxiety     Depression     Does not refill medications appropriately 09/10/2024    GERD (gastroesophageal reflux disease)     Lupus     Personal history of other specified conditions 09/21/2020    History of seizure    Pulmonary hypertension (Multi)     Volume overload 09/10/2024     Past Surgical History:   Procedure Laterality Date    CARDIAC CATHETERIZATION N/A 1/8/2024    Procedure: Left Heart Cath, With LV, Angriography And Grafts;  Surgeon: Christiano Calvillo DO;  Location: [facility] Cardiac Cath Lab;  Service: Cardiovascular;  Laterality: N/A;    CARDIAC ELECTROPHYSIOLOGY PROCEDURE N/A 3/24/2025    Procedure: EP Study;  Surgeon: Chicho Le MD;  Location: [facility] Cardiac Cath Lab;  Service: Electrophysiology;  Laterality: N/A;  EP STUDY WITH POSSIBLE SVT ABLATION, POSSIBLE FLUTTER ABLATION WITH MAC CPT 66053 POSSIBLE 58910; PLEASE BOOK FOR 3 HOURS    CARDIAC ELECTROPHYSIOLOGY PROCEDURE N/A 3/24/2025    Procedure: Ablation SVT;  Surgeon: Chicho Le MD;  Location: [facility] Cardiac Cath Lab;  Service: Electrophysiology;  Laterality: N/A;    CARDIAC ELECTROPHYSIOLOGY PROCEDURE N/A 3/24/2025    Procedure: Ablation Atrial Flutter;   Surgeon: Chicho Le MD;  Location: [facility] Cardiac Cath Lab;  Service: Electrophysiology;  Laterality: N/A;    CHOLECYSTECTOMY      HYSTERECTOMY      OTHER SURGICAL HISTORY  2020    Cholecystectomy    OTHER SURGICAL HISTORY  2020    Esophagogastroduodenoscopy    OTHER SURGICAL HISTORY  2020    Colonoscopy    OTHER SURGICAL HISTORY  2020     section    OTHER SURGICAL HISTORY  2020    Hysterectomy     Family History   Problem Relation Name Age of Onset    No Known Problems Mother      No Known Problems Father       Social History     Tobacco Use    Smoking status: Former     Types: Cigarettes    Smokeless tobacco: Former   Vaping Use    Vaping status: Never Used   Substance Use Topics    Alcohol use: Not Currently    Drug use: Not Currently     Types: Cocaine, Other     Comment: Meth       Physical Exam   ED Triage Vitals [25 2325]   Temperature Heart Rate Respirations BP   36.6 °C (97.9 °F) 98 18 103/72      Pulse Ox Temp Source Heart Rate Source Patient Position   94 % Oral Monitor Sitting      BP Location FiO2 (%)     Left arm --       Physical Exam  General:  Awake, alert, no acute distress.  Head: Normocephalic, Atraumatic  Neck: Supple, trachea midline, no stridor  Skin: Warm and dry, 1 cm area of slight venous ooze right upper chest wall that does not involve the surgical site.  No evidence of infection.  Lungs:  No acute respiratory distress, speaking in full sentences without difficulty  Neuro:  No gross focal neurologic deficits, NIH is 0  Musculoskeletal:  Full range of motion in all 4 extremities  Psychiatric:  Alert oriented x 3, Good insight into condition.    ED Course & MDM   Diagnoses as of 25 0037   Bleeding from wound                 No data recorded                                 Medical Decision Making  And a Gelfoam dressing was placed to the site which slowed the ooze but did not resolve it therefore I placed a figure-of-eight Vicryl suture  and Steri-Strip and dressing.  She is advised to monitor her condition and follow-up with her doctor.  Stable for discharge.    Procedure  Laceration Repair    Performed by: Augustin Christopher DO  Authorized by: Augustin Christopher DO    Consent:     Consent obtained:  Verbal    Consent given by:  Patient    Alternatives discussed:  No treatment  Universal protocol:     Patient identity confirmed:  Verbally with patient  Anesthesia:     Anesthesia method:  Local infiltration    Local anesthetic:  Lidocaine 1% w/o epi  Laceration details:     Location: Right upper chest wall.    Length (cm):  1  Exploration:     Imaging outcome: foreign body not noted      Contaminated: no    Treatment:     Area cleansed with:  Povidone-iodine    Amount of cleaning:  Standard  Skin repair:     Repair method:  Sutures    Suture size:  4-0    Wound skin closure material used: Vicryl.    Suture technique:  Figure eight    Number of sutures:  1  Approximation:     Approximation:  Close  Repair type:     Repair type:  Simple  Post-procedure details:     Dressing:  Adhesive bandage    Procedure completion:  Tolerated       Augustin Christopher DO  04/01/25 0042

## 2025-04-01 NOTE — DISCHARGE INSTRUCTIONS
Thank you for choosing [facility]. It was my pleasure to be involved in your care today.         As of today's visit, based on reasonable likelihood, that it is safe for you to be discharged back to your residence to follow-up as an outpatient for ongoing management of your medical problem. You should follow-up with any referrals / primary provider as soon as possible. The contacts (number, addresses) are listed below.         Important:  Even though we think it is safe for you to go home, there is always a small chance that we are missing something that could require hospitalization.  Therefore it is very important that if you get worse or develops any new symptoms that you return here as soon as possible to be re-evaluated.  This includes return of symptoms that have resolved such as fainting, chest pain, or symptoms that could be warning signs for stroke important:  Even though we think it is safe for you to go home, there is always a small chance that we are missing something that could require hospitalization.  Therefore it is very important that if you get worse or develops any new symptoms that you return here as soon as possible to be re-evaluated.  This includes return of symptoms that have resolved such as fainting, chest pain, or symptoms that could be warning signs for stroke         Make sure your pharmacy and primary doctor is aware of any new medications prescribed today.          It is your responsibility to contact as soon as possible, and follow through with, any referrals you were given today. We do recommend you inform them you are a [facility] follow-up patient, as often they can better accommodate your need to be seen, provided their schedules allow. We will, and have, made every effort to ensure you have access to adequate follow-up specialists available.          All problems may not be able to be fixed in one ER visit. This is why timely ongoing care is important, and this  is a responsibility you share in. Further, you are free to follow up with any provider you choose, and this is not limited to our suggestion.          If cultures were obtained today, you will be contacted should anything result that would require further treatment. Please contact the ED at the number provided with questions.          Having trouble affording medications? Try [email or web address]! (This is not a hospital endorsed website, merely a recommendation based on my own personal experiences with [facility])

## 2025-04-03 ENCOUNTER — DOCUMENTATION (OUTPATIENT)
Dept: PULMONOLOGY | Facility: HOSPITAL | Age: 65
End: 2025-04-03
Payer: COMMERCIAL

## 2025-04-03 NOTE — PROGRESS NOTES
Pt called and LM for our office after hours. Returned pt call this morning. She states that she feels dizzy/lightheaded when standing up sometimes. Last episode was yesterday evening. Pt had recent EP procedure for SVT with Dr. Le. Reviewed signs and symptoms of bleeding, and dehydration -pt denies. States she had a small bump above the site of her procedure in which she squeezed it and blood came out. Stated it bled for a while and then stopped. Feels fine at the time of this phone conversation. Took BP and it was 117/64 with heart rate in the 90's. Was started on Eliquis post procedure. Instructed pt to call Dr. Le's office to notify them of dizziness/light headedness that she continues to get intermittently or go to ED if any worsening of symptoms. Pt agrees with plan and will call his office.

## 2025-04-07 ENCOUNTER — TELEPHONE (OUTPATIENT)
Facility: CLINIC | Age: 65
End: 2025-04-07
Payer: COMMERCIAL

## 2025-04-07 ENCOUNTER — TELEPHONE (OUTPATIENT)
Dept: VASCULAR SURGERY | Facility: CLINIC | Age: 65
End: 2025-04-07
Payer: COMMERCIAL

## 2025-04-07 ENCOUNTER — DOCUMENTATION (OUTPATIENT)
Dept: CARDIOLOGY | Facility: HOSPITAL | Age: 65
End: 2025-04-07
Payer: COMMERCIAL

## 2025-04-07 ENCOUNTER — DOCUMENTATION (OUTPATIENT)
Dept: PULMONOLOGY | Facility: HOSPITAL | Age: 65
End: 2025-04-07
Payer: COMMERCIAL

## 2025-04-07 NOTE — PROGRESS NOTES
Mimi's office received a call today regarding bleeding.  Patient underwent RFA for typical flutter on 3/24 thereafter started on apixaban 5 mg twice daily.  To review on 3/19 patient had undergone removal of her left sided Montez catheter being moved to R sec to recent line infection which had been treated.  Patient utilizes Montez catheter for pulm htn medications per primary pulmonologist  Dr. Chicho Prescott [facility].    Patient had spoken to CHARITY Reece for Dr. Prescott re bleeding on 2 occasions 4/3 initially, rising patient to contact Dr. Le's office regarding apixaban and have continued to have oozing present to the emergency room.  Patient was seen at [facility] on 3/31 pressure was held ultimately required 1 Vicryl suture with a Steri-Strip.  Again patient called today regarding using both to Dr. Le's office and Dr. Prescott   Stated she was going to the emergency room though has not presented yet.  Voicemail patient's home to either call myself or our office regarding her Eliquis.  We would like to hold her Eliquis over the next 1 to 2 days until bleeding subsides  In light of recent ablation goal was to continue apixaban for the short-term until seen by Dr. Le.  Discussed with Dr. Le

## 2025-04-07 NOTE — PROGRESS NOTES
"Pt called the office c/o bleeding from right neck after recent Montez site change. Instructed to remove the dressing, take a picture and send to our office via email. Pt did as above. Dr. Prescott reviewed and orders received to wipe site with alcohol, apply small amount of neosporin or bacitracin and cover with a dressing or bandage. Pt agrees to do so. Instructed to monitor site and call if any changes in condition. Continues to get intermittent lightheadedness in which pt said she has a call out to Dr. Le who did recent ablation procedure. Instructed to go to ED or call 911 if symptoms persist. Pt agrees. Will follow up in a few days with our office.      Addendum 4/9/2025  Patient sent picture through email of R neck site related to recent Montez change. States it is a \"bump\". In comparison to previous picture, wound worsening. Instructed patient to go to ED. Will try to find ride to come to [facility].     Addendum 4/9/2025- Pt called the office back stating she has a ride to [facility] ED and will be coming in this afternoon. ED charge nurse notified of incoming pt.   "

## 2025-04-07 NOTE — TELEPHONE ENCOUNTER
Called patient back earlier regarding her being put on eliquis and she stated that she has been bleeding and she is on her way to the er and I did reach out to morenita colbert and she did stated that she will go see patient when she get to the hospital and talk to her regarding her eliquis left voice mail

## 2025-04-09 ENCOUNTER — APPOINTMENT (OUTPATIENT)
Dept: RADIOLOGY | Facility: HOSPITAL | Age: 65
End: 2025-04-09
Payer: COMMERCIAL

## 2025-04-09 ENCOUNTER — HOSPITAL ENCOUNTER (INPATIENT)
Facility: HOSPITAL | Age: 65
End: 2025-04-09
Attending: EMERGENCY MEDICINE | Admitting: STUDENT IN AN ORGANIZED HEALTH CARE EDUCATION/TRAINING PROGRAM
Payer: COMMERCIAL

## 2025-04-09 DIAGNOSIS — R78.81 BACTEREMIA DUE TO METHICILLIN RESISTANT STAPHYLOCOCCUS AUREUS: ICD-10-CM

## 2025-04-09 DIAGNOSIS — R19.7 DIARRHEA, UNSPECIFIED TYPE: ICD-10-CM

## 2025-04-09 DIAGNOSIS — R06.02 SHORTNESS OF BREATH: ICD-10-CM

## 2025-04-09 DIAGNOSIS — D61.818 PANCYTOPENIA: ICD-10-CM

## 2025-04-09 DIAGNOSIS — B95.62 MRSA BACTEREMIA: ICD-10-CM

## 2025-04-09 DIAGNOSIS — E87.79 OTHER HYPERVOLEMIA: ICD-10-CM

## 2025-04-09 DIAGNOSIS — I27.20 PULMONARY HYPERTENSION (MULTI): ICD-10-CM

## 2025-04-09 DIAGNOSIS — I27.20 PULMONARY HTN (MULTI): ICD-10-CM

## 2025-04-09 DIAGNOSIS — R22.2 CHEST WALL MASS: Primary | ICD-10-CM

## 2025-04-09 DIAGNOSIS — G40.909 SEIZURE DISORDER (MULTI): ICD-10-CM

## 2025-04-09 DIAGNOSIS — I27.21 PAH (PULMONARY ARTERY HYPERTENSION) (MULTI): ICD-10-CM

## 2025-04-09 DIAGNOSIS — I21.4 NSTEMI (NON-ST ELEVATED MYOCARDIAL INFARCTION) (MULTI): ICD-10-CM

## 2025-04-09 DIAGNOSIS — I48.92 ATRIAL FLUTTER WITH CONTROLLED RESPONSE (MULTI): Chronic | ICD-10-CM

## 2025-04-09 DIAGNOSIS — Z79.899 LONG-TERM USE OF PLAQUENIL: ICD-10-CM

## 2025-04-09 DIAGNOSIS — R78.81 MRSA BACTEREMIA: ICD-10-CM

## 2025-04-09 DIAGNOSIS — B95.62 BACTEREMIA DUE TO METHICILLIN RESISTANT STAPHYLOCOCCUS AUREUS: ICD-10-CM

## 2025-04-09 DIAGNOSIS — T82.514D HICKMAN CATHETER DYSFUNCTION, SUBSEQUENT ENCOUNTER: ICD-10-CM

## 2025-04-09 DIAGNOSIS — R60.1 ANASARCA: ICD-10-CM

## 2025-04-09 DIAGNOSIS — R00.0 TACHYARRHYTHMIA: ICD-10-CM

## 2025-04-09 DIAGNOSIS — R78.81 BACTEREMIA: ICD-10-CM

## 2025-04-09 LAB
ABO GROUP (TYPE) IN BLOOD: NORMAL
ALBUMIN SERPL BCP-MCNC: 3.6 G/DL (ref 3.4–5)
ALP SERPL-CCNC: 171 U/L (ref 33–136)
ALT SERPL W P-5'-P-CCNC: 30 U/L (ref 7–45)
ANION GAP SERPL CALC-SCNC: 13 MMOL/L
ANTIBODY SCREEN: NORMAL
APTT PPP: 35 SECONDS (ref 26–36)
AST SERPL W P-5'-P-CCNC: 23 U/L (ref 9–39)
BASOPHILS # BLD MANUAL: 0 X10*3/UL (ref 0–0.1)
BASOPHILS NFR BLD MANUAL: 0 %
BILIRUB SERPL-MCNC: 1.3 MG/DL (ref 0–1.2)
BUN SERPL-MCNC: 17 MG/DL (ref 6–23)
CALCIUM SERPL-MCNC: 8.5 MG/DL (ref 8.6–10.6)
CHLORIDE SERPL-SCNC: 97 MMOL/L (ref 98–107)
CO2 SERPL-SCNC: 24 MMOL/L (ref 21–32)
CREAT SERPL-MCNC: 0.86 MG/DL (ref 0.5–1.05)
EGFRCR SERPLBLD CKD-EPI 2021: 76 ML/MIN/1.73M*2
EOSINOPHIL # BLD MANUAL: 0 X10*3/UL (ref 0–0.7)
EOSINOPHIL NFR BLD MANUAL: 0 %
ERYTHROCYTE [DISTWIDTH] IN BLOOD BY AUTOMATED COUNT: 17.9 % (ref 11.5–14.5)
GLUCOSE SERPL-MCNC: 120 MG/DL (ref 74–99)
HCT VFR BLD AUTO: 30.4 % (ref 36–46)
HGB BLD-MCNC: 9.2 G/DL (ref 12–16)
HYPOCHROMIA BLD QL SMEAR: ABNORMAL
HYPOCHROMIA BLD QL SMEAR: NORMAL
IMM GRANULOCYTES # BLD AUTO: 0.44 X10*3/UL (ref 0–0.7)
IMM GRANULOCYTES NFR BLD AUTO: 6 % (ref 0–0.9)
INR PPP: 2.2 (ref 0.9–1.1)
LYMPHOCYTES # BLD MANUAL: 0.63 X10*3/UL (ref 1.2–4.8)
LYMPHOCYTES NFR BLD MANUAL: 8.5 %
MCH RBC QN AUTO: 22.2 PG (ref 26–34)
MCHC RBC AUTO-ENTMCNC: 30.3 G/DL (ref 32–36)
MCV RBC AUTO: 73 FL (ref 80–100)
METAMYELOCYTES # BLD MANUAL: 0.13 X10*3/UL
METAMYELOCYTES NFR BLD MANUAL: 1.7 %
MONOCYTES # BLD MANUAL: 0.44 X10*3/UL (ref 0.1–1)
MONOCYTES NFR BLD MANUAL: 6 %
NEUTROPHILS # BLD MANUAL: 6.2 X10*3/UL (ref 1.2–7.7)
NEUTS BAND # BLD MANUAL: 1.58 X10*3/UL (ref 0–0.7)
NEUTS BAND NFR BLD MANUAL: 21.4 %
NEUTS SEG # BLD MANUAL: 4.62 X10*3/UL (ref 1.2–7)
NEUTS SEG NFR BLD MANUAL: 62.4 %
NRBC BLD-RTO: 1.8 /100 WBCS (ref 0–0)
OVALOCYTES BLD QL SMEAR: ABNORMAL
OVALOCYTES BLD QL SMEAR: NORMAL
PLATELET # BLD AUTO: 73 X10*3/UL (ref 150–450)
POTASSIUM SERPL-SCNC: 4.4 MMOL/L (ref 3.5–5.3)
PROT SERPL-MCNC: 7.1 G/DL (ref 6.4–8.2)
PROTHROMBIN TIME: 24 SECONDS (ref 9.8–12.4)
RBC # BLD AUTO: 4.15 X10*6/UL (ref 4–5.2)
RBC MORPH BLD: ABNORMAL
RBC MORPH BLD: NORMAL
RH FACTOR (ANTIGEN D): NORMAL
SODIUM SERPL-SCNC: 130 MMOL/L (ref 136–145)
STOMATOCYTES BLD QL SMEAR: ABNORMAL
STOMATOCYTES BLD QL SMEAR: NORMAL
TOTAL CELLS COUNTED BLD: 117
WBC # BLD AUTO: 7.4 X10*3/UL (ref 4.4–11.3)

## 2025-04-09 PROCEDURE — 71275 CT ANGIOGRAPHY CHEST: CPT | Mod: FOREIGN READ | Performed by: RADIOLOGY

## 2025-04-09 PROCEDURE — 71046 X-RAY EXAM CHEST 2 VIEWS: CPT | Performed by: RADIOLOGY

## 2025-04-09 PROCEDURE — 99285 EMERGENCY DEPT VISIT HI MDM: CPT | Mod: 25 | Performed by: EMERGENCY MEDICINE

## 2025-04-09 PROCEDURE — 86901 BLOOD TYPING SEROLOGIC RH(D): CPT | Performed by: EMERGENCY MEDICINE

## 2025-04-09 PROCEDURE — 85610 PROTHROMBIN TIME: CPT | Performed by: EMERGENCY MEDICINE

## 2025-04-09 PROCEDURE — 99285 EMERGENCY DEPT VISIT HI MDM: CPT

## 2025-04-09 PROCEDURE — 71275 CT ANGIOGRAPHY CHEST: CPT

## 2025-04-09 PROCEDURE — 2500000005 HC RX 250 GENERAL PHARMACY W/O HCPCS: Mod: SE

## 2025-04-09 PROCEDURE — 85007 BL SMEAR W/DIFF WBC COUNT: CPT | Performed by: EMERGENCY MEDICINE

## 2025-04-09 PROCEDURE — 1210000001 HC SEMI-PRIVATE ROOM DAILY

## 2025-04-09 PROCEDURE — 80053 COMPREHEN METABOLIC PANEL: CPT | Performed by: EMERGENCY MEDICINE

## 2025-04-09 PROCEDURE — 71046 X-RAY EXAM CHEST 2 VIEWS: CPT

## 2025-04-09 PROCEDURE — 85027 COMPLETE CBC AUTOMATED: CPT | Performed by: EMERGENCY MEDICINE

## 2025-04-09 PROCEDURE — 83615 LACTATE (LD) (LDH) ENZYME: CPT

## 2025-04-09 PROCEDURE — 2550000001 HC RX 255 CONTRASTS: Mod: SE | Performed by: EMERGENCY MEDICINE

## 2025-04-09 RX ORDER — TRANEXAMIC ACID 100 MG/ML
500 INJECTION, SOLUTION INTRAVENOUS ONCE
Status: COMPLETED | OUTPATIENT
Start: 2025-04-09 | End: 2025-04-09

## 2025-04-09 RX ORDER — TRANEXAMIC ACID 100 MG/ML
INJECTION, SOLUTION INTRAVENOUS
Status: COMPLETED
Start: 2025-04-09 | End: 2025-04-09

## 2025-04-09 RX ADMIN — TRANEXAMIC ACID 500 MG: 100 INJECTION, SOLUTION INTRAVENOUS at 15:11

## 2025-04-09 RX ADMIN — TRANEXAMIC ACID 500 MG: 100 INJECTION INTRAVENOUS at 15:11

## 2025-04-09 RX ADMIN — IOHEXOL 90 ML: 350 INJECTION, SOLUTION INTRAVENOUS at 17:42

## 2025-04-09 ASSESSMENT — LIFESTYLE VARIABLES
TOTAL SCORE: 0
EVER FELT BAD OR GUILTY ABOUT YOUR DRINKING: NO
HAVE YOU EVER FELT YOU SHOULD CUT DOWN ON YOUR DRINKING: NO
EVER HAD A DRINK FIRST THING IN THE MORNING TO STEADY YOUR NERVES TO GET RID OF A HANGOVER: NO
HAVE PEOPLE ANNOYED YOU BY CRITICIZING YOUR DRINKING: NO

## 2025-04-09 ASSESSMENT — PAIN SCALES - GENERAL: PAINLEVEL_OUTOF10: 0 - NO PAIN

## 2025-04-09 ASSESSMENT — PAIN - FUNCTIONAL ASSESSMENT: PAIN_FUNCTIONAL_ASSESSMENT: 0-10

## 2025-04-09 NOTE — ED TRIAGE NOTES
Pt presents to ED for bleeding on her R upper chest. Pt has a Montez pump, that appears to be successfully delivering medication, but pt states that she had a blood blister form above her insertion site for her Montez that popped this morning after she scratched it and pt has not stopped bleeding since. PT states she is on Elaquis S/p an ablation for afib. Pt endorses lightheadedness, dizziness, and lethargy. Pt is Aox4.

## 2025-04-09 NOTE — ED PROVIDER NOTES
"HPI   Chief Complaint   Patient presents with    Coagulation Disorder   This is a 64-year-old female with a past medical history significant for hypertension, SLE, neuropathy, A-fib (on Eliquis), CAD, COPD and seizures who presents to the ED for bleeding.  Patient states that she has a Montez for PAH.  She recently had the Montez site changed from the left chest to the right.  She developed a \"blood blister\" above the insertion site that she has been picking at.  She states that she has had intermittent oozing from the blister for the past week.  Denies any current complaints of pain.  She was seen at [facility] ED on 3/31/2025 and had a figure-of-eight stitch placed.  Patient states that the site continued to ooze in spite of being stitched.     Limitations to history: None  Independent Historians: Patient  External Records Reviewed: [facility] ED note    Patient History   Past Medical History:   Diagnosis Date    Anxiety     Depression     Does not refill medications appropriately 09/10/2024    GERD (gastroesophageal reflux disease)     Lupus     Personal history of other specified conditions 09/21/2020    History of seizure    Pulmonary hypertension (Multi)     Volume overload 09/10/2024     Past Surgical History:   Procedure Laterality Date    CARDIAC CATHETERIZATION N/A 1/8/2024    Procedure: Left Heart Cath, With LV, Angriography And Grafts;  Surgeon: Christiano aClvillo DO;  Location: [facility] Cardiac Cath Lab;  Service: Cardiovascular;  Laterality: N/A;    CARDIAC ELECTROPHYSIOLOGY PROCEDURE N/A 3/24/2025    Procedure: EP Study;  Surgeon: Chicho Le MD;  Location: [facility] Cardiac Cath Lab;  Service: Electrophysiology;  Laterality: N/A;  EP STUDY WITH POSSIBLE SVT ABLATION, POSSIBLE FLUTTER ABLATION WITH MAC CPT 90372 POSSIBLE 96496; PLEASE BOOK FOR 3 HOURS    CARDIAC ELECTROPHYSIOLOGY PROCEDURE N/A 3/24/2025    Procedure: Ablation SVT;  Surgeon: Chicho eL MD;  Location: [facility] Cardiac Cath Lab;  Service: " Electrophysiology;  Laterality: N/A;    CARDIAC ELECTROPHYSIOLOGY PROCEDURE N/A 3/24/2025    Procedure: Ablation Atrial Flutter;  Surgeon: Chicho Le MD;  Location: [facility] Cardiac Cath Lab;  Service: Electrophysiology;  Laterality: N/A;    CHOLECYSTECTOMY      HYSTERECTOMY      OTHER SURGICAL HISTORY  2020    Cholecystectomy    OTHER SURGICAL HISTORY  2020    Esophagogastroduodenoscopy    OTHER SURGICAL HISTORY  2020    Colonoscopy    OTHER SURGICAL HISTORY  2020     section    OTHER SURGICAL HISTORY  2020    Hysterectomy     Family History   Problem Relation Name Age of Onset    No Known Problems Mother      No Known Problems Father       Social History     Tobacco Use    Smoking status: Former     Types: Cigarettes    Smokeless tobacco: Former   Vaping Use    Vaping status: Never Used   Substance Use Topics    Alcohol use: Not Currently    Drug use: Not Currently     Types: Cocaine, Other     Comment: Meth       Physical Exam   ED Triage Vitals [25 1301]   Temperature Heart Rate Respirations BP   37.3 °C (99.2 °F) (!) 106 18 108/67      Pulse Ox Temp Source Heart Rate Source Patient Position   94 % Oral Monitor Sitting      BP Location FiO2 (%)     Left arm --       Physical Exam  Constitutional:       General: She is not in acute distress.     Appearance: She is not ill-appearing or diaphoretic.   Cardiovascular:      Rate and Rhythm: Normal rate and regular rhythm.      Heart sounds: Normal heart sounds. No murmur heard.     No friction rub. No gallop.      Comments: Solid mass to the anterior right chest, superior to the Montez insertion site, actively oozing blood  Pulmonary:      Effort: Pulmonary effort is normal. No respiratory distress.      Breath sounds: Normal breath sounds. No wheezing, rhonchi or rales.   Chest:      Chest wall: No tenderness.   Abdominal:      Palpations: Abdomen is soft.      Tenderness: There is no abdominal tenderness. There is no  "guarding or rebound.   Musculoskeletal:         General: No deformity or signs of injury.   Skin:     General: Skin is warm and dry.      Coloration: Skin is not pale.   Neurological:      General: No focal deficit present.      Mental Status: She is alert and oriented to person, place, and time.               ED Course & MDM   Diagnoses as of 04/09/25 1543   Chest wall mass       Medical Decision Making  This is a 64-year-old female with a past medical history significant for hypertension, SLE, neuropathy, A-fib (on Eliquis), CAD, COPD and seizures who presents to the ED for bleeding.  Patient states that she has a Montez for PAH.  She recently had the Montez site changed from the left chest to the right.  She developed a \"blood blister\" above the insertion site that she has been picking at.  She states that she has had intermittent oozing from the blister for the past week.  Denies any current complaints of pain.    On physical exam, patient is not ill-appearing, nondiaphoretic in no acute distress.  There is a solid mass to the anterior right chest, superior to the Montez insertion site, actively oozing blood.  Heart sounds are normal with a regular rate and rhythm, no murmurs, gallops or rubs.  Lungs are clear to auscultation bilaterally, no wheezes, rales or rhonchi.  Vitals are stable    Obtained basic lab work.  CBC did not show any leukocytosis or new onset anemia.  Hemoglobin is 9.2, stable from prior lab values.  CMP does not show any critical electrolyte disturbances.  Coagulation screening is elevated as anticipated given patient's anticoagulation with Eliquis. Chest Xray shows moderate enlargement of the cardiac silhouette with pulmonary vascular congestion.     Applied topical Tranexamic acid to mass. Ordered CT angio chest for further mass evaluation.  At the end of my shift, awaiting results of CT.  Signout given to oncoming provider.  Anticipate discharge if hemostasis can be achieved.       Labs " Reviewed   CBC WITH AUTO DIFFERENTIAL - Abnormal       Result Value    WBC 7.4      nRBC 1.8 (*)     RBC 4.15      Hemoglobin 9.2 (*)     Hematocrit 30.4 (*)     MCV 73 (*)     MCH 22.2 (*)     MCHC 30.3 (*)     RDW 17.9 (*)     Platelets 73 (*)     Immature Granulocytes %, Automated 6.0 (*)     Immature Granulocytes Absolute, Automated 0.44      Narrative:     The previously reported component Neutrophils % is no longer being reported.  The previously reported component Lymphocytes % is no longer being reported.  The previously reported component Monocytes % is no longer being reported.  The previously   reported component Eosinophils % is no longer being reported.  The previously reported component Basophils % is no longer being reported.  The previously reported component Absolute Neutrophils is no longer being reported.  The previously reported   component Absolute Lymphocytes is no longer being reported.  The previously reported component Absolute Monocytes is no longer being reported.  The previously reported component Absolute Eosinophils is no longer being reported.  The previously reported   component Absolute Basophils is no longer being reported.   COMPREHENSIVE METABOLIC PANEL - Abnormal    Glucose 120 (*)     Sodium 130 (*)     Potassium 4.4      Chloride 97 (*)     Bicarbonate 24      Anion Gap 13      Urea Nitrogen 17      Creatinine 0.86      eGFR 76      Calcium 8.5 (*)     Albumin 3.6      Alkaline Phosphatase 171 (*)     Total Protein 7.1      AST 23      Bilirubin, Total 1.3 (*)     ALT 30     COAGULATION SCREEN - Abnormal    Protime 24.0 (*)     INR 2.2 (*)     aPTT 35      Narrative:     The APTT is no longer used for monitoring Unfractionated Heparin Therapy. For monitoring Heparin Therapy, use the Heparin Assay.   MANUAL DIFFERENTIAL - Abnormal    Neutrophils %, Manual 62.4      Bands %, Manual 21.4      Lymphocytes %, Manual 8.5      Monocytes %, Manual 6.0      Eosinophils %, Manual 0.0       Basophils %, Manual 0.0      Metamyelocytes %, Manual 1.7      Seg Neutrophils Absolute, Manual 4.62      Bands Absolute, Manual 1.58 (*)     Lymphocytes Absolute, Manual 0.63 (*)     Monocytes Absolute, Manual 0.44      Eosinophils Absolute, Manual 0.00      Basophils Absolute, Manual 0.00      Metamyelocytes Absolute, Manual 0.13      Total Cells Counted 117      Neutrophils Absolute, Manual 6.20      RBC Morphology See Below      Hypochromia Mild      Ovalocytes Few      Stomatocytes Few     TYPE AND SCREEN   MORPHOLOGY    RBC Morphology See Below      Hypochromia Mild      Ovalocytes Few      Stomatocytes Few            XR chest 2 views   Final Result   Moderate enlargement of the cardiac silhouette with pulmonary   vascular congestion             MACRO:   None        Signed by: Kalin Silva 4/9/2025 2:36 PM   Dictation workstation:   [id number]      CT angio chest w and wo IV contrast    (Results Pending)         Autumn Frye PA-C  04/09/25 1545

## 2025-04-09 NOTE — ED PROVIDER NOTES
"Emergency Department Transition of Care Note       Signout   I received Ronna Beckham in signout from Autumn Frye PA-C.  Please see the ED Provider Note for all HPI, PE and MDM up to the time of signout at 1500.  This is in addition to the primary record.    In brief Ronna Beckham is an 64 y.o. female presenting for bleeding from her Montez site. Pt developed a \"blood blister\" above the insertion site and has been compliant with her daily Eliquis for a fib. Pt originally presented to [facility] ED on 3/31/35 and had a figure 8 stitch place, however it has continued to ooze. Bleeding improved after 500 mg of topical TXA.    At the time of signout we were awaiting:  CT angio chest w and wo    ED Course & Medical Decision Making   Medical Decision Making:  Under my care, CT angio chest w and wo showed a small hematoma on the right side of her chest. Her wound was hemostatic for several hours with TXA and steri strips. However, she will need to be admitted for definitive management of her hematoma which would require pulmonology and IR consults. Pt admitted to medicine.    ED Course:  Diagnoses as of 04/09/25 1611   Chest wall mass       Disposition   ADMIT    Procedures   Procedures    Patient seen and discussed with ED attending physician.    Delfina Robles MD  Emergency Medicine      Delfina Robles MD  Resident  04/09/25 7764    "

## 2025-04-10 LAB
ALBUMIN SERPL BCP-MCNC: 3.4 G/DL (ref 3.4–5)
ALP SERPL-CCNC: 166 U/L (ref 33–136)
ALT SERPL W P-5'-P-CCNC: 27 U/L (ref 7–45)
ANION GAP SERPL CALC-SCNC: 16 MMOL/L (ref 10–20)
APTT PPP: 32 SECONDS (ref 26–36)
AST SERPL W P-5'-P-CCNC: 22 U/L (ref 9–39)
BASOPHILS # BLD MANUAL: 0 X10*3/UL (ref 0–0.1)
BASOPHILS # BLD MANUAL: 0 X10*3/UL (ref 0–0.1)
BASOPHILS NFR BLD MANUAL: 0 %
BASOPHILS NFR BLD MANUAL: 0 %
BILIRUB DIRECT SERPL-MCNC: 0.2 MG/DL (ref 0–0.3)
BILIRUB SERPL-MCNC: 1.1 MG/DL (ref 0–1.2)
BLASTS # BLD MANUAL: 0 X10*3/UL
BLASTS NFR BLD MANUAL: 0 %
BUN SERPL-MCNC: 15 MG/DL (ref 6–23)
CALCIUM SERPL-MCNC: 8.1 MG/DL (ref 8.6–10.6)
CHLORIDE SERPL-SCNC: 100 MMOL/L (ref 98–107)
CO2 SERPL-SCNC: 21 MMOL/L (ref 21–32)
CREAT SERPL-MCNC: 0.78 MG/DL (ref 0.5–1.05)
DAT-POLYSPECIFIC: NORMAL
EGFRCR SERPLBLD CKD-EPI 2021: 86 ML/MIN/1.73M*2
EOSINOPHIL # BLD MANUAL: 0 X10*3/UL (ref 0–0.7)
EOSINOPHIL # BLD MANUAL: 0 X10*3/UL (ref 0–0.7)
EOSINOPHIL NFR BLD MANUAL: 0 %
EOSINOPHIL NFR BLD MANUAL: 0 %
ERYTHROCYTE [DISTWIDTH] IN BLOOD BY AUTOMATED COUNT: 17.9 % (ref 11.5–14.5)
ERYTHROCYTE [DISTWIDTH] IN BLOOD BY AUTOMATED COUNT: 18.1 % (ref 11.5–14.5)
FERRITIN SERPL-MCNC: 63 NG/ML (ref 8–150)
FIBRINOGEN PPP-MCNC: 461 MG/DL (ref 200–400)
GLUCOSE SERPL-MCNC: 118 MG/DL (ref 74–99)
HAPTOGLOB SERPL NEPH-MCNC: 193 MG/DL (ref 30–200)
HCT VFR BLD AUTO: 27.9 % (ref 36–46)
HCT VFR BLD AUTO: 29.2 % (ref 36–46)
HGB BLD-MCNC: 8.6 G/DL (ref 12–16)
HGB BLD-MCNC: 8.8 G/DL (ref 12–16)
IMM GRANULOCYTES # BLD AUTO: 0.36 X10*3/UL (ref 0–0.7)
IMM GRANULOCYTES # BLD AUTO: 0.49 X10*3/UL (ref 0–0.7)
IMM GRANULOCYTES NFR BLD AUTO: 6.2 % (ref 0–0.9)
IMM GRANULOCYTES NFR BLD AUTO: 8.5 % (ref 0–0.9)
INR PPP: 1.7 (ref 0.9–1.1)
LDH SERPL L TO P-CCNC: 226 U/L (ref 84–246)
LYMPHOCYTES # BLD MANUAL: 0.34 X10*3/UL (ref 1.2–4.8)
LYMPHOCYTES # BLD MANUAL: 0.35 X10*3/UL (ref 1.2–4.8)
LYMPHOCYTES NFR BLD MANUAL: 6 %
LYMPHOCYTES NFR BLD MANUAL: 6.1 %
MAGNESIUM SERPL-MCNC: 1.74 MG/DL (ref 1.6–2.4)
MCH RBC QN AUTO: 21.8 PG (ref 26–34)
MCH RBC QN AUTO: 22.3 PG (ref 26–34)
MCHC RBC AUTO-ENTMCNC: 30.1 G/DL (ref 32–36)
MCHC RBC AUTO-ENTMCNC: 30.8 G/DL (ref 32–36)
MCV RBC AUTO: 72 FL (ref 80–100)
MCV RBC AUTO: 73 FL (ref 80–100)
METAMYELOCYTES # BLD MANUAL: 0.07 X10*3/UL
METAMYELOCYTES # BLD MANUAL: 0.1 X10*3/UL
METAMYELOCYTES NFR BLD MANUAL: 1.2 %
METAMYELOCYTES NFR BLD MANUAL: 1.7 %
MONOCYTES # BLD MANUAL: 0.07 X10*3/UL (ref 0.1–1)
MONOCYTES # BLD MANUAL: 0.15 X10*3/UL (ref 0.1–1)
MONOCYTES NFR BLD MANUAL: 1.2 %
MONOCYTES NFR BLD MANUAL: 2.6 %
MYELOCYTES # BLD MANUAL: 0.07 X10*3/UL
MYELOCYTES # BLD MANUAL: 0.29 X10*3/UL
MYELOCYTES NFR BLD MANUAL: 1.2 %
MYELOCYTES NFR BLD MANUAL: 5.1 %
NEUTROPHILS # BLD MANUAL: 4.77 X10*3/UL (ref 1.2–7.7)
NEUTROPHILS # BLD MANUAL: 4.96 X10*3/UL (ref 1.2–7.7)
NEUTS BAND # BLD MANUAL: 0.73 X10*3/UL (ref 0–0.7)
NEUTS BAND # BLD MANUAL: 1.42 X10*3/UL (ref 0–0.7)
NEUTS BAND NFR BLD MANUAL: 12.8 %
NEUTS BAND NFR BLD MANUAL: 24.4 %
NEUTS SEG # BLD MANUAL: 3.54 X10*3/UL (ref 1.2–7)
NEUTS SEG # BLD MANUAL: 4.04 X10*3/UL (ref 1.2–7)
NEUTS SEG NFR BLD MANUAL: 61 %
NEUTS SEG NFR BLD MANUAL: 70.9 %
NRBC BLD MANUAL-RTO: 0 % (ref 0–0)
NRBC BLD-RTO: 2.4 /100 WBCS (ref 0–0)
NRBC BLD-RTO: 3 /100 WBCS (ref 0–0)
OVALOCYTES BLD QL SMEAR: ABNORMAL
OVALOCYTES BLD QL SMEAR: ABNORMAL
OVALOCYTES BLD QL SMEAR: NORMAL
PATH REVIEW-CBC DIFFERENTIAL: NORMAL
PHOSPHATE SERPL-MCNC: 4 MG/DL (ref 2.5–4.9)
PLASMA CELLS # BLD MANUAL: 0 X10*3/UL
PLASMA CELLS NFR BLD MANUAL: 0 %
PLATELET # BLD AUTO: 59 X10*3/UL (ref 150–450)
PLATELET # BLD AUTO: 69 X10*3/UL (ref 150–450)
POLYCHROMASIA BLD QL SMEAR: ABNORMAL
POTASSIUM SERPL-SCNC: 4.2 MMOL/L (ref 3.5–5.3)
PROMYELOCYTES # BLD MANUAL: 0.05 X10*3/UL
PROMYELOCYTES # BLD MANUAL: 0.07 X10*3/UL
PROMYELOCYTES NFR BLD MANUAL: 0.9 %
PROMYELOCYTES NFR BLD MANUAL: 1.2 %
PROT SERPL-MCNC: 6 G/DL (ref 6.4–8.2)
PROTHROMBIN TIME: 18.3 SECONDS (ref 9.8–12.4)
RBC # BLD AUTO: 3.85 X10*6/UL (ref 4–5.2)
RBC # BLD AUTO: 4.04 X10*6/UL (ref 4–5.2)
RBC MORPH BLD: ABNORMAL
RBC MORPH BLD: ABNORMAL
RBC MORPH BLD: NORMAL
SODIUM SERPL-SCNC: 133 MMOL/L (ref 136–145)
STOMATOCYTES BLD QL SMEAR: ABNORMAL
STOMATOCYTES BLD QL SMEAR: NORMAL
TOTAL CELLS COUNTED BLD: 117
TOTAL CELLS COUNTED BLD: 82
VARIANT LYMPHS # BLD MANUAL: 0.21 X10*3/UL (ref 0–0.5)
VARIANT LYMPHS NFR BLD: 3.7 %
WBC # BLD AUTO: 5.7 X10*3/UL (ref 4.4–11.3)
WBC # BLD AUTO: 5.8 X10*3/UL (ref 4.4–11.3)

## 2025-04-10 PROCEDURE — 83010 ASSAY OF HAPTOGLOBIN QUANT: CPT

## 2025-04-10 PROCEDURE — 85027 COMPLETE CBC AUTOMATED: CPT

## 2025-04-10 PROCEDURE — 99233 SBSQ HOSP IP/OBS HIGH 50: CPT

## 2025-04-10 PROCEDURE — 2500000004 HC RX 250 GENERAL PHARMACY W/ HCPCS (ALT 636 FOR OP/ED): Mod: SE

## 2025-04-10 PROCEDURE — 2500000002 HC RX 250 W HCPCS SELF ADMINISTERED DRUGS (ALT 637 FOR MEDICARE OP, ALT 636 FOR OP/ED): Mod: SE

## 2025-04-10 PROCEDURE — 82728 ASSAY OF FERRITIN: CPT

## 2025-04-10 PROCEDURE — 85384 FIBRINOGEN ACTIVITY: CPT

## 2025-04-10 PROCEDURE — 82248 BILIRUBIN DIRECT: CPT

## 2025-04-10 PROCEDURE — 1200000002 HC GENERAL ROOM WITH TELEMETRY DAILY

## 2025-04-10 PROCEDURE — 85007 BL SMEAR W/DIFF WBC COUNT: CPT

## 2025-04-10 PROCEDURE — 85610 PROTHROMBIN TIME: CPT

## 2025-04-10 PROCEDURE — 80053 COMPREHEN METABOLIC PANEL: CPT

## 2025-04-10 PROCEDURE — 86880 COOMBS TEST DIRECT: CPT

## 2025-04-10 PROCEDURE — 2500000001 HC RX 250 WO HCPCS SELF ADMINISTERED DRUGS (ALT 637 FOR MEDICARE OP): Mod: SE

## 2025-04-10 PROCEDURE — 85060 BLOOD SMEAR INTERPRETATION: CPT

## 2025-04-10 PROCEDURE — 84100 ASSAY OF PHOSPHORUS: CPT

## 2025-04-10 PROCEDURE — 83735 ASSAY OF MAGNESIUM: CPT

## 2025-04-10 PROCEDURE — 86235 NUCLEAR ANTIGEN ANTIBODY: CPT

## 2025-04-10 PROCEDURE — 86038 ANTINUCLEAR ANTIBODIES: CPT

## 2025-04-10 PROCEDURE — 2500000005 HC RX 250 GENERAL PHARMACY W/O HCPCS: Mod: SE

## 2025-04-10 RX ORDER — FUROSEMIDE 40 MG/1
40 TABLET ORAL DAILY
Status: DISCONTINUED | OUTPATIENT
Start: 2025-04-10 | End: 2025-04-20 | Stop reason: HOSPADM

## 2025-04-10 RX ORDER — ASPIRIN 81 MG/1
81 TABLET ORAL DAILY
Status: DISCONTINUED | OUTPATIENT
Start: 2025-04-10 | End: 2025-04-20 | Stop reason: HOSPADM

## 2025-04-10 RX ORDER — DILTIAZEM HYDROCHLORIDE 180 MG/1
180 CAPSULE, COATED, EXTENDED RELEASE ORAL DAILY
Status: DISCONTINUED | OUTPATIENT
Start: 2025-04-10 | End: 2025-04-14

## 2025-04-10 RX ORDER — ACETAMINOPHEN 500 MG
5 TABLET ORAL NIGHTLY
Status: DISCONTINUED | OUTPATIENT
Start: 2025-04-10 | End: 2025-04-20 | Stop reason: HOSPADM

## 2025-04-10 RX ORDER — HYDROXYCHLOROQUINE SULFATE 200 MG/1
200 TABLET, FILM COATED ORAL 2 TIMES DAILY
Status: DISCONTINUED | OUTPATIENT
Start: 2025-04-10 | End: 2025-04-17

## 2025-04-10 RX ORDER — EPOPROSTENOL 1.5 MG/10ML
1.5 INJECTION, POWDER, LYOPHILIZED, FOR SOLUTION INTRAVENOUS CONTINUOUS
Status: DISCONTINUED | OUTPATIENT
Start: 2025-04-10 | End: 2025-04-10 | Stop reason: SDUPTHER

## 2025-04-10 RX ORDER — POLYETHYLENE GLYCOL 3350 17 G/17G
17 POWDER, FOR SOLUTION ORAL DAILY
Status: DISCONTINUED | OUTPATIENT
Start: 2025-04-10 | End: 2025-04-15

## 2025-04-10 RX ORDER — POTASSIUM CHLORIDE 20 MEQ/1
20 TABLET, EXTENDED RELEASE ORAL 2 TIMES DAILY
Status: DISCONTINUED | OUTPATIENT
Start: 2025-04-10 | End: 2025-04-18

## 2025-04-10 RX ORDER — LOPERAMIDE HYDROCHLORIDE 2 MG/1
2 CAPSULE ORAL 2 TIMES DAILY
Status: DISCONTINUED | OUTPATIENT
Start: 2025-04-10 | End: 2025-04-15

## 2025-04-10 RX ORDER — MAGNESIUM CHLORIDE 64 MG
64 TABLET, DELAYED RELEASE (ENTERIC COATED) ORAL 2 TIMES DAILY
Status: DISCONTINUED | OUTPATIENT
Start: 2025-04-10 | End: 2025-04-20 | Stop reason: HOSPADM

## 2025-04-10 RX ORDER — ACETAMINOPHEN 325 MG/1
650 TABLET ORAL EVERY 6 HOURS PRN
Status: DISCONTINUED | OUTPATIENT
Start: 2025-04-10 | End: 2025-04-13

## 2025-04-10 RX ORDER — VENLAFAXINE HYDROCHLORIDE 150 MG/1
150 CAPSULE, EXTENDED RELEASE ORAL DAILY
Status: DISCONTINUED | OUTPATIENT
Start: 2025-04-10 | End: 2025-04-20 | Stop reason: HOSPADM

## 2025-04-10 RX ORDER — TRANEXAMIC ACID 100 MG/ML
INJECTION, SOLUTION INTRAVENOUS
Status: DISPENSED
Start: 2025-04-10 | End: 2025-04-10

## 2025-04-10 RX ORDER — CHOLECALCIFEROL (VITAMIN D3) 25 MCG
25 TABLET ORAL 2 TIMES DAILY
COMMUNITY
Start: 2025-03-27

## 2025-04-10 RX ORDER — TRANEXAMIC ACID 100 MG/ML
500 INJECTION, SOLUTION INTRAVENOUS ONCE
Status: DISCONTINUED | OUTPATIENT
Start: 2025-04-10 | End: 2025-04-10

## 2025-04-10 RX ORDER — PANTOPRAZOLE SODIUM 40 MG/1
40 TABLET, DELAYED RELEASE ORAL
Status: DISCONTINUED | OUTPATIENT
Start: 2025-04-10 | End: 2025-04-13

## 2025-04-10 RX ORDER — PSYLLIUM HUSK 0.4 G
2 CAPSULE ORAL 2 TIMES DAILY
Status: DISCONTINUED | OUTPATIENT
Start: 2025-04-10 | End: 2025-04-20 | Stop reason: HOSPADM

## 2025-04-10 RX ORDER — ERGOCALCIFEROL 1.25 MG/1
1.25 CAPSULE ORAL
Status: DISCONTINUED | OUTPATIENT
Start: 2025-04-13 | End: 2025-04-20 | Stop reason: HOSPADM

## 2025-04-10 RX ADMIN — POTASSIUM CHLORIDE 20 MEQ: 1500 TABLET, EXTENDED RELEASE ORAL at 01:25

## 2025-04-10 RX ADMIN — Medication 64 MG: at 02:28

## 2025-04-10 RX ADMIN — HYDROXYCHLOROQUINE SULFATE 200 MG: 200 TABLET, FILM COATED ORAL at 21:01

## 2025-04-10 RX ADMIN — Medication 5 MG: at 21:02

## 2025-04-10 RX ADMIN — HYDROXYCHLOROQUINE SULFATE 200 MG: 200 TABLET, FILM COATED ORAL at 01:25

## 2025-04-10 RX ADMIN — DILTIAZEM HYDROCHLORIDE 180 MG: 180 CAPSULE, COATED, EXTENDED RELEASE ORAL at 08:52

## 2025-04-10 RX ADMIN — Medication 64 MG: at 08:52

## 2025-04-10 RX ADMIN — GABAPENTIN 800 MG: 400 CAPSULE ORAL at 08:35

## 2025-04-10 RX ADMIN — HYDROMORPHONE HYDROCHLORIDE 0.4 MG: 0.5 INJECTION, SOLUTION INTRAMUSCULAR; INTRAVENOUS; SUBCUTANEOUS at 01:59

## 2025-04-10 RX ADMIN — POTASSIUM CHLORIDE 20 MEQ: 1500 TABLET, EXTENDED RELEASE ORAL at 08:35

## 2025-04-10 RX ADMIN — POTASSIUM CHLORIDE 20 MEQ: 1500 TABLET, EXTENDED RELEASE ORAL at 21:02

## 2025-04-10 RX ADMIN — Medication 2 TABLET: at 01:24

## 2025-04-10 RX ADMIN — LOPERAMIDE HYDROCHLORIDE 2 MG: 2 CAPSULE ORAL at 01:25

## 2025-04-10 RX ADMIN — THROMBIN, TOPICAL (BOVINE): KIT at 01:26

## 2025-04-10 RX ADMIN — HYDROXYCHLOROQUINE SULFATE 200 MG: 200 TABLET, FILM COATED ORAL at 08:35

## 2025-04-10 RX ADMIN — ACETAMINOPHEN 650 MG: 325 TABLET, FILM COATED ORAL at 21:07

## 2025-04-10 RX ADMIN — Medication 2 TABLET: at 21:02

## 2025-04-10 RX ADMIN — HYDROMORPHONE HYDROCHLORIDE 0.1 MG: 1 INJECTION, SOLUTION INTRAMUSCULAR; INTRAVENOUS; SUBCUTANEOUS at 17:12

## 2025-04-10 RX ADMIN — Medication 2 TABLET: at 08:35

## 2025-04-10 RX ADMIN — GABAPENTIN 800 MG: 400 CAPSULE ORAL at 14:32

## 2025-04-10 RX ADMIN — Medication 5 MG: at 01:25

## 2025-04-10 RX ADMIN — LOPERAMIDE HYDROCHLORIDE 2 MG: 2 CAPSULE ORAL at 08:35

## 2025-04-10 RX ADMIN — LOPERAMIDE HYDROCHLORIDE 2 MG: 2 CAPSULE ORAL at 21:02

## 2025-04-10 RX ADMIN — EPOPROSTENOL 67 NG/KG/MIN: 1.5 INJECTION, POWDER, LYOPHILIZED, FOR SOLUTION INTRAVENOUS at 19:42

## 2025-04-10 RX ADMIN — Medication 64 MG: at 21:01

## 2025-04-10 RX ADMIN — GABAPENTIN 800 MG: 400 CAPSULE ORAL at 21:02

## 2025-04-10 RX ADMIN — ACETAMINOPHEN 650 MG: 325 TABLET, FILM COATED ORAL at 10:54

## 2025-04-10 RX ADMIN — EPOPROSTENOL 67 NG/KG/MIN: 1.5 INJECTION, POWDER, LYOPHILIZED, FOR SOLUTION INTRAVENOUS at 02:43

## 2025-04-10 RX ADMIN — ASPIRIN 81 MG: 81 TABLET, COATED ORAL at 08:34

## 2025-04-10 RX ADMIN — VENLAFAXINE HYDROCHLORIDE 150 MG: 150 CAPSULE, EXTENDED RELEASE ORAL at 08:52

## 2025-04-10 RX ADMIN — MACITENTAN 10 MG: 10 TABLET, FILM COATED ORAL at 08:52

## 2025-04-10 RX ADMIN — PANTOPRAZOLE SODIUM 40 MG: 40 TABLET, DELAYED RELEASE ORAL at 06:19

## 2025-04-10 SDOH — SOCIAL STABILITY: SOCIAL INSECURITY: HAS ANYONE EVER THREATENED TO HURT YOUR FAMILY OR YOUR PETS?: NO

## 2025-04-10 SDOH — SOCIAL STABILITY: SOCIAL INSECURITY: WITHIN THE LAST YEAR, HAVE YOU BEEN AFRAID OF YOUR PARTNER OR EX-PARTNER?: NO

## 2025-04-10 SDOH — ECONOMIC STABILITY: FOOD INSECURITY: WITHIN THE PAST 12 MONTHS, THE FOOD YOU BOUGHT JUST DIDN'T LAST AND YOU DIDN'T HAVE MONEY TO GET MORE.: NEVER TRUE

## 2025-04-10 SDOH — ECONOMIC STABILITY: FOOD INSECURITY: WITHIN THE PAST 12 MONTHS, YOU WORRIED THAT YOUR FOOD WOULD RUN OUT BEFORE YOU GOT THE MONEY TO BUY MORE.: NEVER TRUE

## 2025-04-10 SDOH — SOCIAL STABILITY: SOCIAL INSECURITY: WERE YOU ABLE TO COMPLETE ALL THE BEHAVIORAL HEALTH SCREENINGS?: YES

## 2025-04-10 SDOH — ECONOMIC STABILITY: FOOD INSECURITY: HOW HARD IS IT FOR YOU TO PAY FOR THE VERY BASICS LIKE FOOD, HOUSING, MEDICAL CARE, AND HEATING?: NOT HARD AT ALL

## 2025-04-10 SDOH — SOCIAL STABILITY: SOCIAL INSECURITY: WITHIN THE LAST YEAR, HAVE YOU BEEN HUMILIATED OR EMOTIONALLY ABUSED IN OTHER WAYS BY YOUR PARTNER OR EX-PARTNER?: NO

## 2025-04-10 SDOH — SOCIAL STABILITY: SOCIAL INSECURITY: ARE YOU OR HAVE YOU BEEN THREATENED OR ABUSED PHYSICALLY, EMOTIONALLY, OR SEXUALLY BY ANYONE?: NO

## 2025-04-10 SDOH — SOCIAL STABILITY: SOCIAL INSECURITY: HAVE YOU HAD THOUGHTS OF HARMING ANYONE ELSE?: NO

## 2025-04-10 SDOH — ECONOMIC STABILITY: INCOME INSECURITY: IN THE PAST 12 MONTHS HAS THE ELECTRIC, GAS, OIL, OR WATER COMPANY THREATENED TO SHUT OFF SERVICES IN YOUR HOME?: NO

## 2025-04-10 SDOH — SOCIAL STABILITY: SOCIAL INSECURITY: DO YOU FEEL UNSAFE GOING BACK TO THE PLACE WHERE YOU ARE LIVING?: NO

## 2025-04-10 SDOH — SOCIAL STABILITY: SOCIAL INSECURITY: ABUSE: ADULT

## 2025-04-10 SDOH — SOCIAL STABILITY: SOCIAL INSECURITY: DOES ANYONE TRY TO KEEP YOU FROM HAVING/CONTACTING OTHER FRIENDS OR DOING THINGS OUTSIDE YOUR HOME?: NO

## 2025-04-10 SDOH — SOCIAL STABILITY: SOCIAL INSECURITY: ARE THERE ANY APPARENT SIGNS OF INJURIES/BEHAVIORS THAT COULD BE RELATED TO ABUSE/NEGLECT?: NO

## 2025-04-10 SDOH — SOCIAL STABILITY: SOCIAL INSECURITY: DO YOU FEEL ANYONE HAS EXPLOITED OR TAKEN ADVANTAGE OF YOU FINANCIALLY OR OF YOUR PERSONAL PROPERTY?: NO

## 2025-04-10 ASSESSMENT — COGNITIVE AND FUNCTIONAL STATUS - GENERAL
PATIENT BASELINE BEDBOUND: NO
MOBILITY SCORE: 24
DAILY ACTIVITIY SCORE: 24

## 2025-04-10 ASSESSMENT — PAIN SCALES - GENERAL
PAINLEVEL_OUTOF10: 3
PAINLEVEL_OUTOF10: 0 - NO PAIN
PAINLEVEL_OUTOF10: 10 - WORST POSSIBLE PAIN
PAINLEVEL_OUTOF10: 8
PAINLEVEL_OUTOF10: 8
PAINLEVEL_OUTOF10: 6

## 2025-04-10 ASSESSMENT — ENCOUNTER SYMPTOMS
PALPITATIONS: 1
NEUROLOGICAL NEGATIVE: 1
ALLERGIC/IMMUNOLOGIC NEGATIVE: 1
MUSCULOSKELETAL NEGATIVE: 1
EYES NEGATIVE: 1
CONSTITUTIONAL NEGATIVE: 1
ENDOCRINE NEGATIVE: 1
HEMATOLOGIC/LYMPHATIC NEGATIVE: 1
PSYCHIATRIC NEGATIVE: 1
DIARRHEA: 1

## 2025-04-10 ASSESSMENT — ACTIVITIES OF DAILY LIVING (ADL)
ADEQUATE_TO_COMPLETE_ADL: YES
LACK_OF_TRANSPORTATION: NO
HEARING - LEFT EAR: FUNCTIONAL
GROOMING: INDEPENDENT
WALKS IN HOME: INDEPENDENT
TOILETING: INDEPENDENT
BATHING: INDEPENDENT
DRESSING YOURSELF: INDEPENDENT
PATIENT'S MEMORY ADEQUATE TO SAFELY COMPLETE DAILY ACTIVITIES?: YES
FEEDING YOURSELF: INDEPENDENT
HEARING - RIGHT EAR: FUNCTIONAL
JUDGMENT_ADEQUATE_SAFELY_COMPLETE_DAILY_ACTIVITIES: YES

## 2025-04-10 ASSESSMENT — LIFESTYLE VARIABLES
SKIP TO QUESTIONS 9-10: 1
AUDIT-C TOTAL SCORE: 0
AUDIT-C TOTAL SCORE: 0
HOW MANY STANDARD DRINKS CONTAINING ALCOHOL DO YOU HAVE ON A TYPICAL DAY: PATIENT DOES NOT DRINK
HOW OFTEN DO YOU HAVE A DRINK CONTAINING ALCOHOL: NEVER
HOW OFTEN DO YOU HAVE 6 OR MORE DRINKS ON ONE OCCASION: NEVER

## 2025-04-10 ASSESSMENT — PAIN DESCRIPTION - ORIENTATION: ORIENTATION: RIGHT

## 2025-04-10 ASSESSMENT — PAIN DESCRIPTION - LOCATION: LOCATION: CHEST

## 2025-04-10 ASSESSMENT — PATIENT HEALTH QUESTIONNAIRE - PHQ9
SUM OF ALL RESPONSES TO PHQ9 QUESTIONS 1 & 2: 2
1. LITTLE INTEREST OR PLEASURE IN DOING THINGS: SEVERAL DAYS
2. FEELING DOWN, DEPRESSED OR HOPELESS: SEVERAL DAYS

## 2025-04-10 ASSESSMENT — PAIN - FUNCTIONAL ASSESSMENT
PAIN_FUNCTIONAL_ASSESSMENT: 0-10
PAIN_FUNCTIONAL_ASSESSMENT: 0-10

## 2025-04-10 NOTE — PROGRESS NOTES
Daily Progress Note      Ronna Beckham is a 64 y.o. female admitted on 4/9/2025  1:20 PM for Chest Wall Hematoma, in setting of Pulmonary Nodules and Lymphadenopathy, Quiescent Systemic Lupus Erythematous.     Overnight:      There were no acute events overnight.     Subjective   Patient was seen in the emergency department. She denied any active issues, but did appear in discomfort in relation to the chest wall mass. States that it is no longer bleeding. Did mention that she has been hot recently. No other usual history ascertained.     Objective   Vitals: I/O:   Vitals:    04/10/25 0452   BP: 117/70   Pulse: (!) 105   Resp: 18   Temp:    SpO2: 94%        24hr Min/Max:  Temp  Min: 37 °C (98.6 °F)  Max: 37.3 °C (99.2 °F)  Pulse  Min: 83  Max: 106  BP  Min: 108/67  Max: 119/73  Resp  Min: 16  Max: 18  SpO2  Min: 93 %  Max: 97 % No intake or output data in the 24 hours ending 04/10/25 0730     Net IO Since Admission: No IO data has been entered for this period [04/10/25 0730]      Physical Exam  Constitutional:       General: She is awake.   Cardiovascular:      Rate and Rhythm: Normal rate and regular rhythm.      Heart sounds: Normal heart sounds, S1 normal and S2 normal.   Pulmonary:      Effort: Pulmonary effort is normal.      Breath sounds: Normal breath sounds. No stridor or decreased air movement.      Comments: Auscultation of the chest notable for crisp vesicular breath sounds.   Chest:      Chest wall: Mass present.      Comments: Examination of the chest wall notable for Montez access site in place on the right, well bandaged. There is appearance of a grape/cherry sized, hard mass protruding out of the chest wall. Colour is very dark, more than maroon. The mass itself is not fluctuant. Palpation elicits pain. The mass itself has flecks of blood, no active bleeding.   Neurological:      General: No focal deficit present.      Mental Status: She is alert.   Psychiatric:         Attention and Perception:  Attention and perception normal.         Behavior: Behavior is cooperative.          Laboratory Studies:      CBC RFP   Lab Results   Component Value Date    WBC 5.8 04/10/2025    HGB 8.6 (L) 04/10/2025    HCT 27.9 (L) 04/10/2025    MCV 73 (L) 04/10/2025    PLT 59 (L) 04/10/2025    NEUTROABS 2.73 03/16/2025    Lab Results   Component Value Date     (L) 04/10/2025    K 4.2 04/10/2025     04/10/2025    CO2 21 04/10/2025    BUN 15 04/10/2025    CREATININE 0.78 04/10/2025    CREATININE 0.86 04/09/2025     Lab Results   Component Value Date    MG 1.74 04/10/2025    PHOS 4.0 04/10/2025    CALCIUM 8.1 (L) 04/10/2025    ALBUMINELP 3.2 (L) 08/26/2024         Hepatic Function ABG/VBG   Lab Results   Component Value Date    ALT 27 04/10/2025    AST 22 04/10/2025     (H) 07/23/2019    ALKPHOS 166 (H) 04/10/2025     Lab Results   Component Value Date    BILIDIR 0.2 04/10/2025      Lab Results   Component Value Date    PROTIME 18.3 (H) 04/10/2025    APTT 32 04/10/2025    INR 1.7 (H) 04/10/2025    ALBUMINELP 3.2 (L) 08/26/2024    Lab Results   Component Value Date    LACTATE 1.7 11/20/2024        Results from last 7 days   Lab Units 04/10/25  0113 04/09/25  1318   GLUCOSE mg/dL 118* 120*       Imaging:    Imaging  CT angio chest w and wo IV contrast    Result Date: 4/9/2025  1.  Right-sided subclavian central venous line is identified with the distal tip extending to the cavoatrial junction.  Mild focal soft tissue swelling along surrounding the distal aspect of the central venous line within the upper chest wall suggestive for small hematoma.  Correlate clinically. 2.  No evidence of central pulmonary embolus. 3.  Multiple small irregular bilateral pulmonary nodules are reidentified with several new additional small pulmonary nodules as described above.  Findings may be infectious, inflammatory or neoplastic in etiology.  Correlate clinically.  Short-term follow-up CT imaging recommended to assess for stability  or resolution. 4.  Nodular thickening of the left adrenal gland.  Nonspecific, possibly reflecting underlying adrenal nodule or mass..  Dedicated follow-up CT or MRI of the abdomen utilizing an adrenal mass protocol can be performed for further evaluation. Signed by Satinder Gilbert MD    XR chest 2 views    Result Date: 4/9/2025  Moderate enlargement of the cardiac silhouette with pulmonary vascular congestion     MACRO: None   Signed by: Kalin Silva 4/9/2025 2:36 PM Dictation workstation:   [id number]     Cardiology, Vascular, and Other Imaging  No other imaging results found for the past 2 days       EKG:      Encounter Date: 02/21/25   ECG 12 lead (Clinic Performed)    Narrative    Normal sinus rhythm at 92 bpm OK interval 170 ms QRS duration 110 ms QTc   500 ms incomplete right bundle branch block and left posterior fascicular   block       Medications:      Scheduled Medications:  PRN Medications:    [Held by provider] apixaban, 5 mg, oral, BID  aspirin, 81 mg, oral, Daily  calcium carbonate-vitamin D3, 2 tablet, oral, BID  dilTIAZem CD, 180 mg, oral, Daily  [START ON 4/13/2025] ergocalciferol, 1.25 mg, oral, Every Sunday  furosemide, 40 mg, oral, Daily  gabapentin, 800 mg, oral, TID  hydroxychloroquine, 200 mg, oral, BID  loperamide, 2 mg, oral, BID  macitentan, 10 mg, oral, Daily  magnesium chloride, 64 mg, oral, BID  melatonin, 5 mg, oral, Nightly  pantoprazole, 40 mg, oral, Daily before breakfast  perflutren lipid microspheres, 0.5-10 mL of dilution, intravenous, Once  perflutren lipid microspheres, 0.5-10 mL of dilution, intravenous, Once  polyethylene glycol, 17 g, oral, Daily  potassium chloride CR, 20 mEq, oral, BID  thrombin-bovine, , Topical, Once  tranexamic acid, 500 mg, Topical, Once  venlafaxine XR, 150 mg, oral, Daily          Assessment/Plan     Assessment/Plan:   Ronna Beckham is a 64 y.o. female presenting with a past medical history of Pulmonary Arterial Hypertension Group 1, Atrial  Fibrillation s/p Ablation (03/2025), Systemic Lupus Erythematosus that presents to the emergency department for worsening findings of chest wall. Recently had increasing mass on chest wall after Montez procedure from interventional radiology. Imaging consistent with hematoma, continuing with conservative measures. Also findings of scattered pulmonary nodules and lymphadenopathy, concerning for neoplastic process versus infection process in the setting of lupus. Planning for upcoming baroscopy while off anticoagulation.         #Hematoma, s/p Montez Procedure, Active, Stable  :: Presenting with worsening findings of soft tissue mass on chest wall. Recently visited emergency department sutured and sent home. Now representing with worsening findings. A CT-angiogram showing findings of hematoma mass. Low concern of pseudoaneurysm formation with thrombus from vascular surgery standpoint. Can consider engaging interventional radiology for further investigation. Although, hemoglobin is currently stable around her baseline.    - Continue to monitor     #Pulmonary Nodules, Scattered, Bilateral  #Lymphadenopathy, Intra-Thoracic, Bilateral  :: Interestingly, there is findings of scattered pulmonary nodules with lymphadenopathy stretching from mediastinal to axillary. The most recent CT-scan to refer to from 11/2024 not nothing any of these findings, suggesting acute change. Given history of systemic lupus, neoplastic process can not be excluded, can consider lymphoma or other primary. Also findings of adrenal gland features that can not rule out neoplastic process, roughly 10-Hounsfield units. Can consider infectious process as well from fungal (Aspergillus spp.) to Mycobacterium tuberculosis.   :: Up to date on mammogram without concerning findings, no colonoscopy on file. However, CT-abdomen/pelvis not showing any mass.   - Pending endobronchial ultrasound (EBUS)     #Pulmonary Arterial Hypertension, Group 1  :: Currently  receiving dual drug treatments for pulmonary arterial hypertension, with Montez in place. Also receiving montly Winrevair.   - C/w macitentatn (Opsumit) 10 mg PO every day   - C/w epoprostenol (Veletri) gtt     #Atrial Fibrillation, s/p ablation  #Coronary Artery Disease, Chronic, Stable  :: Recently managed through the cardiology electrophysiology team for findings of supraventricular tachycardia, was on apixaban for atrial fibrillation. Plans to hold for at least 48-hours until bleeding is contained.   - C/w aspirin 81 mg PO every day   - C/w diltiazem 180 mg PO every day   - C/w furosemide 40 mg PO every day     #Anemia, Microcytic,  #Thrombocytopenia, Acute, Stable    :: Findings of worsening thrombocytopenia, with previous baseline of >100 K/uL, now with counts in the 60s. Can be a feature of lupus issues, but also consider other potential differentials like acute illness or other. Also findings of microcytic anemia, pending ferritin. However likely to be anemia of chronic disease.   - Pending ferritin   - C/w CBC with differential every day     #Systemic Lupus Erythematosus, Stable, Quiescent   :: Patient states that her lupus is currently getting managed by her primary care physician, although can not directly find records in the chart. Currently on Plaquenil testing. Denies any clinical symptoms of active flare including joint pain, muscle pain, issues with urination. If there are further issues, can consider rheumatology consultation or anti-double stranded DNA antibody testing for flare tracking.   - C/w hydroxychloroquine 200 mg PO BID      #Vitamin D Deficiency   :: Chronic, stable  - C/w vitamin D2 1.25 mg PO once weekly     #Peripheral Neuropathy  :: Chronic, stable  - C/w gabapentin 800 mg PO TID     #Gastroesophageal Reflux Disease  :: Chronic, stable   - C/w pantoprazole 40 mg PO every day     #Major Depressive Disorder  :: Chronic, stable  - C/w venlafaxine 150 mg PO every day         F: PRN  E:  PRN  N: Adult diet Regular  Access/Lines: peripheral IV (left)     DVT Ppx: contraindicated d/t concern for bleed (holding apixaban)   GI Ppx: pantoprazole 40 mg PO every day       Abx: none   O2: none     Pain regimen: acetaminophen 650 mg PO every day Q6H PRN   GI Laxative: Miralax 17 g PO every day     Code status: Full Code  NOK/Surrogate Decision Maker: Diana JYOTHI Vera (sister) [phone]    Patient assessment and plan staffed with the attending physician on service, Dr. Roxie Cottrell.     Ej Moreno MD  Categorical PGY-1  Department of Internal Medicine

## 2025-04-10 NOTE — PROGRESS NOTES
History Of Present Illness  Ronna Beckham is a 64 y.o. female presenting with pulmonary arterial hypertension follow up. Patient is NYHA Functional Class 3+ and WHO Group 1. Last office visit was on 2/17/2025. She was originally referred by Dr. Calvillo on 9/11/2020.       PAH Treatment:  Veletri, 67 ng/kg/min, 71ml/24hr, concentration 90,000 ng/ml (9/23/2020)  Opsumit (4/12/2021)  Winrevair  (11/1/2024)  Oxygen 4 LPM continuously  Infusion site: [facility]  Treatment history:   Tadalafil (5/4/2021-5/6/2021) headache    Today's testing includes 6 MWT and Labs    Interval History   Patient was hospitalized from 3/15/2025-3/19/2025 for Montez associated infection. Montez line removed by IR on 3/17/2025 and replaced 3/19/2025. Atb course completed.     Presented to ED on 4/1 for concerns for bleeding and swelling above her Montez site. Sutures and steri strips applied.     Presented again to ED following office advice for worsening Montez site wound and bleeding. Found to have hematoma. Apixiban stopped for 1-2 days until bleeding stops. (Apixiban was for short term use following ablation)      Past Medical History  Patient Active Problem List   Diagnosis    Shortness of breath    NSTEMI (non-ST elevated myocardial infarction) (Multi)    Toothache    Systemic lupus erythematosus (Multi)    Syncope and collapse    Sore throat    Seizure disorder (Multi)    Right ventricular systolic dysfunction    Acute on chronic right-sided heart failure    Pulmonary hypertension (Multi)    Paresthesia    Hand pain    PAH (pulmonary artery hypertension) (Multi)    Olecranon bursitis, right elbow    Obesity    Neuropathy    Nasal congestion    Methamphetamine abuse    Major depressive disorder, single episode, moderate (Multi)    Liver disease    Insomnia    Increased oxygen demand    Hypomagnesemia    Hypocalcemia    Hyperlipidemia    History of non-ST elevation myocardial infarction (NSTEMI)    History of alcohol abuse    Fatigue     Electrolyte and fluid disorder    Prolonged Q-T interval on ECG    Lower leg edema    Edema of eyelid    Ear pain, bilateral    Drug abuse    Dizziness    Diverticulosis    Depressive disorder    Dental infection    Decreased GFR    Cough    Chronic obstructive pulmonary disease (Multi)    Central line complication    Cellulitis    Blepharitis of both eyes    Blepharitis    Primary hypertension    Anxiety    Alcohol dependence, uncomplicated (Multi)    Acute otitis externa of left ear    Acute on chronic cholecystitis    Acute cor pulmonale (Multi)    Gastroesophageal reflux disease    Chronic respiratory failure with hypoxia, on home O2 therapy    Acquired thrombocytopenia (CMS-HCC)    Acquired absence of cervix    Acidosis, unspecified    Abnormal findings on diagnostic imaging of breast    Pulmonary HTN (Multi)    Long-term use of high-risk medication    SVT (supraventricular tachycardia) (CMS-HCC)    Acute on chronic hypoxic respiratory failure    Pneumonia of left lower lobe due to infectious organism    Volume overload    Does not refill medications appropriately    Repeated falls    Difficulty walking    Acute on chronic respiratory failure with hypoxia    Pericardial effusion (HHS-HCC)    Acute on chronic respiratory failure    Anasarca    Supraventricular tachycardia    Catheter-related bloodstream infection, initial encounter    Bleeding from wound    Chest wall mass        Surgical History  She has a past surgical history that includes Other surgical history (09/21/2020); Other surgical history (09/21/2020); Other surgical history (09/21/2020); Other surgical history (09/21/2020); Other surgical history (09/21/2020); Hysterectomy; Cholecystectomy; Cardiac catheterization (N/A, 1/8/2024); Cardiac electrophysiology procedure (N/A, 3/24/2025); Cardiac electrophysiology procedure (N/A, 3/24/2025); and Cardiac electrophysiology procedure (N/A, 3/24/2025).     Social History  She reports that she has quit  smoking. Her smoking use included cigarettes. She has quit using smokeless tobacco. She reports that she does not currently use alcohol. She reports that she does not currently use drugs after having used the following drugs: Cocaine and Other.    Family History  Family History   Problem Relation Name Age of Onset    No Known Problems Mother      No Known Problems Father          Medications  Current Facility-Administered Medications:     perflutren lipid microspheres (Definity) injection 0.5-10 mL of dilution, 0.5-10 mL of dilution, intravenous, Once, Chicho Prescott,     Current Outpatient Medications:     apixaban (Eliquis) 5 mg tablet, Take 1 tablet (5 mg) by mouth 2 times a day., Disp: 60 tablet, Rfl: 6    aspirin 81 mg EC tablet, Take 1 tablet (81 mg) by mouth once daily., Disp: 30 tablet, Rfl: 0    calcium carbonate-vitamin D3 500 mg-5 mcg (200 unit) tablet, Take 2 tablets by mouth 2 times a day., Disp: 120 tablet, Rfl: 0    dilTIAZem CD (Cardizem CD) 180 mg 24 hr capsule, Take 1 capsule (180 mg) by mouth once daily., Disp: 30 capsule, Rfl: 0    epoprostenol (Veletri) 1.5 mg recon soln, 1.5 mg (1,500 mcg) by central venous line infusion route continuously. Reconstitute contents of vial with 5 ml diluent and mix cassette as directed. Infuse continuously per physician orders. Allow for priming volume. Dose range: 1-150 ng/kg/min., Disp: 180 each, Rfl: 11    ergocalciferol (Vitamin D-2) 1.25 MG (54100 UT) capsule, Take 1 capsule (1,250 mcg) by mouth 1 (one) time per week. On Wednesday, Disp: , Rfl:     furosemide (Lasix) 40 mg tablet, Take 1 tablet (40 mg) by mouth once daily., Disp: 30 tablet, Rfl: 0    gabapentin (Neurontin) 800 mg tablet, Take 1 tablet (800 mg) by mouth 3 times a day., Disp: 90 tablet, Rfl: 0    hydroxychloroquine (Plaquenil) 200 mg tablet, Take 1 tablet (200 mg) by mouth 2 times a day., Disp: 60 tablet, Rfl: 0    loperamide (Imodium) 2 mg capsule, Take 1 capsule (2 mg) by mouth 2 times a  day., Disp: , Rfl:     magnesium chloride (MagDelay) 64 mg EC tablet, Take 1 tablet (64 mg) by mouth 2 times a day., Disp: 60 tablet, Rfl: 0    melatonin 5 mg tablet, Take 1 tablet (5 mg) by mouth once daily at bedtime., Disp: 30 tablet, Rfl: 0    multivitamin-iron-folic acid  mg-mcg tablet, Take 1 tablet by mouth once daily., Disp: , Rfl:     omeprazole (PriLOSEC) 40 mg DR capsule, Take 1 capsule (40 mg) by mouth once daily in the morning. Take before meals. Do not crush or chew., Disp: 30 capsule, Rfl: 0    Opsumit 10 mg tablet tablet, Take 1 tablet (10 mg) by mouth once daily., Disp: 30 tablet, Rfl: 11    potassium chloride CR 20 mEq ER tablet, Take 1 tablet (20 mEq) by mouth 2 times a day. Do not crush or chew., Disp: 60 tablet, Rfl: 0    venlafaxine XR (Effexor-XR) 150 mg 24 hr capsule, Take 1 capsule (150 mg) by mouth once daily. Do not crush or chew., Disp: 30 capsule, Rfl: 0    Facility-Administered Medications Ordered in Other Visits:     [Held by provider] apixaban (Eliquis) tablet 5 mg, 5 mg, oral, BID, Manpreet Flores MD    aspirin EC tablet 81 mg, 81 mg, oral, Daily, Manpreet Flores MD, 81 mg at 04/10/25 0834    calcium carbonate-vitamin D3 500 mg-5 mcg (200 unit) per tablet 2 tablet, 2 tablet, oral, BID, Manpreet Flores MD, 2 tablet at 04/10/25 0835    dilTIAZem CD (Cardizem CD) 24 hr capsule 180 mg, 180 mg, oral, Daily, Manpreet Flores MD, 180 mg at 04/10/25 0852    epoprostenol (Veletri) 0.3 mg/mL 9,000 mcg in sodium chloride 0.9% 100 mL (90 mcg/mL) bag, 67 ng/kg/min (Order-Specific), intravenous, Continuous, Manpreet Flores MD, Last Rate: 3.26 mL/hr at 04/10/25 0940, 67 ng/kg/min at 04/10/25 0940    [START ON 4/13/2025] ergocalciferol (Vitamin D-2) capsule 1.25 mg, 1.25 mg, oral, Every Sunday, Manpreet Flores MD    furosemide (Lasix) tablet 40 mg, 40 mg, oral, Daily, Manpreet Flores MD    gabapentin (Neurontin) capsule 800 mg, 800  mg, oral, TID, Manpreet Flores MD, 800 mg at 04/10/25 0835    hydroxychloroquine (Plaquenil) tablet 200 mg, 200 mg, oral, BID, Manpreet Flores MD, 200 mg at 04/10/25 0835    loperamide (Imodium) capsule 2 mg, 2 mg, oral, BID, Manpreet Flores MD, 2 mg at 04/10/25 0835    macitentan (Opsumit) tablet tablet 10 mg, 10 mg, oral, Daily, Manpreet Flores MD, 10 mg at 04/10/25 0852    magnesium chloride (MagDelay) EC tablet 64 mg, 64 mg, oral, BID, Manpreet Flores MD, 64 mg at 04/10/25 0852    melatonin tablet 5 mg, 5 mg, oral, Nightly, Manpreet Flores MD, 5 mg at 04/10/25 0125    pantoprazole (ProtoNix) EC tablet 40 mg, 40 mg, oral, Daily before breakfast, Manpreet Flores MD, 40 mg at 04/10/25 0619    perflutren lipid microspheres (Definity) injection 0.5-10 mL of dilution, 0.5-10 mL of dilution, intravenous, Once, Manpreet Flores MD    polyethylene glycol (Glycolax, Miralax) packet 17 g, 17 g, oral, Daily, Manpreet Flores MD    potassium chloride CR (Klor-Con M20) ER tablet 20 mEq, 20 mEq, oral, BID, Manpreet Flores MD, 20 mEq at 04/10/25 0835    thrombin-bovine (JMI) 5,000 unit topical solution, , Topical, Once, Manpreet Flores MD    tranexamic acid (Cyklokapron) injection 500 mg, 500 mg, Topical, Once, Manpreet Flores MD    venlafaxine XR (Effexor-XR) 24 hr capsule 150 mg, 150 mg, oral, Daily, Manpreet Flores MD, 150 mg at 04/10/25 0852     Allergies  Levetiracetam    Review of Systems   Constitutional: Negative.    HENT: Negative.     Eyes: Negative.    Cardiovascular:  Positive for palpitations.   Gastrointestinal:  Positive for diarrhea.   Endocrine: Negative.    Genitourinary: Negative.    Musculoskeletal: Negative.    Skin: Negative.    Allergic/Immunologic: Negative.    Neurological: Negative.    Hematological: Negative.    Psychiatric/Behavioral: Negative.         Last Recorded Vitals  There were no  vitals taken for this visit.       Physical Exam  Constitutional:       Appearance: Normal appearance.      Comments: Wheelchair , oxygen, flushed. Thin   HENT:      Head: Normocephalic.   Cardiovascular:      Rate and Rhythm: Normal rate and regular rhythm.      Heart sounds: Murmur heard.      Comments: Increased second heart sound.   Pulmonary:      Effort: Pulmonary effort is normal.      Breath sounds: Normal breath sounds.   Abdominal:      General: Bowel sounds are normal.      Palpations: Abdomen is soft.   Musculoskeletal:      Right lower leg: No edema.      Left lower leg: No edema.   Skin:     Findings: Rash present.   Psychiatric:         Mood and Affect: Mood normal.         Judgment: Judgment normal.       Relevant Results    6MWT (4/16/2025)  SP02-  HR-  CHEIKH-  Actual Meters-     6MWT (2/17/2025)  SP02- 99-97% on 4L  HR-   CHEIKH-0-4  Actual Meters- 305     Echo (2/17/2025)   still with severe RV dilation although RA appears smaller with slightly better LV filling.  Left Ventricle: Left ventricular ejection fraction is normal, by visual estimate at 60-65%. There are no regional left ventricular wall motion abnormalities. The left ventricular cavity size is normal. There is normal septal and mildly increased posterior left ventricular wall thickness. There is left ventricular concentric remodeling. The interventricular septum is flattened in systole and diastole, consistent with right ventricular pressure and volume overload. Spectral Doppler shows a Grade II (pseudonormal pattern) of left ventricular diastolic filling with an elevated left atrial pressure.  Left Atrium: The left atrial size is normal.  Right Ventricle: The right ventricle is severely enlarged. There is severely reduced right ventricular systolic function.  Right Atrium: The right atrium is moderate to severely dilated.  Aortic Valve: The aortic valve is trileaflet. There is trace aortic valve regurgitation. The peak instantaneous  gradient of the aortic valve is 7 mmHg.  Mitral Valve: The mitral valve is normal in structure. There is trace mitral valve regurgitation.  Tricuspid Valve: The tricuspid valve is structurally normal. There is severe tricuspid regurgitation. The Doppler estimated RVSP is severely elevated at 91.5 mmHg. Reported right ventricular systolic pressure may be underestimated due to severe tricuspid regurgitation. TR likely severe given shape of TR CW Doppler envelope shape, though no flow reversal in the hepatic veins, so possibly moderate to severe TR.  Pulmonic Valve: The pulmonic valve is not well visualized. There is physiologic pulmonic valve regurgitation.  Pericardium: Small pericardial effusion. There is no evidence of cardiac tamponade.  Aorta: The aortic root is normal.  Systemic Veins: The inferior vena cava appears dilated, with IVC inspiratory collapse greater than 50%.  In comparison to the previous echocardiogram(s): Compared with the prior exam from 10/24/2024, there was already a severley dilated RV with severely reduced systolic function. The prior RVSP was severely elevated at 105.1mmHg, but appears to be a little lower today(91mmHg) with some inspiratory collapse of IVC. The pericaridal effusion was larger previously, still with no tamponade. There wa already seere TR o the prior study.     CONCLUSIONS:   1. Left ventricular ejection fraction is normal, by visual estimate at 60-65%.   2. Spectral Doppler shows a Grade II (pseudonormal pattern) of left ventricular diastolic filling with an elevated left atrial pressure.   3. Right ventricular volume and pressure overload.   4. There is severely reduced right ventricular systolic function.   5. Severely enlarged right ventricle.   6. The right atrium is moderate to severely dilated.   7. There is no evidence of cardiac tamponade.   8. Severely elevated right ventricular systolic pressure.   9. TR likely severe given shape of TR CW Doppler envelope shape,  though no flow reversal in the hepatic veins, so possibly moderate to severe TR.  10. Compared with the prior exam from 10/24/2024, there was already a severley dilated RV with severely reduced systolic function. The prior RVSP was severely elevated at 105.1mmHg, but appears to be a little lower today(91mmHg) with some inspiratory collapse of IVC. The pericaridal effusion was larger previously, still with no tamponade. There wa already seere TR o the prior study.    Patient with code blue running out of oxygen, now baseline. (12/9/2024)    Echo (10/24/2024)  Left Ventricle: Left ventricular ejection fraction is hyperdynamic, by visual estimate at 75%. The left ventricular cavity size is decreased. The interventricular septum is flattened in systole and diastole, consistent with right ventricular pressure and volume overload. Left ventricular diastolic filling was not assessed. The left ventricle appears underfilled.  Left Atrium: The left atrium was not assessed.  Right Ventricle: The right ventricle is severely enlarged. There is moderately reduced right ventricular systolic function.  Right Atrium: The right atrium is severely dilated. A dilated inferior vena cava demonstrates poor inspiratory collapse, consistent with elevated right atrial pressures.  Aortic Valve: The aortic valve is trileaflet. There is no evidence of aortic valve regurgitation.  Mitral Valve: The mitral valve is normal in structure. There is trace mitral valve regurgitation.  Tricuspid Valve: The tricuspid valve is abnormal. There is severe tricuspid regurgitation. The Doppler estimated RVSP is severely elevated at 105.1 mmHg.  Pulmonic Valve: The pulmonic valve is structurally normal. There is trace pulmonic valve regurgitation.  Pericardium: Small to moderate pericardial effusion. There is no evidence of cardiac tamponade.  Aorta: The aortic root was not assessed.  In comparison to the previous echocardiogram(s): Compared with study dated  4/24/2024, there is no significant difference in RV size and function. There is again severe TR. The RVSP has increased from 89 to 105 mmHg. The pericardial effusion appears unchanged.     CONCLUSIONS:   1. Left ventricular ejection fraction is hyperdynamic, by visual estimate at 75%.   2. Left ventricular cavity size is decreased.   3. Right ventricular volume and pressure overload.   4. There is moderately reduced right ventricular systolic function.   5. Severely enlarged right ventricle.   6. Severely elevated right ventricular systolic pressure.   7. The right atrium is severely dilated.   8. Severe tricuspid regurgitation visualized.   9. Small to moderate pericardial effusion.  10. There is no evidence of cardiac tamponade.  11. A dilated inferior vena cava demonstrates poor inspiratory collapse, consistent with elevated right atrial pressures.  12. Compared with study dated 4/24/2024, there is no significant difference in RV size and function. There is again severe TR. The RVSP has increased from 89 to 105 mmHg. The pericardial effusion appears unchanged.     CT angio chest for pulmonary embolism (10/22/2024)  1. No evidence of acute pulmonary embolism.  2. There is a moderate size pericardial effusion noted.    6MWT (7/22/2024)  SP02-98%/96% on 4L  HR-106/114  CHEIKH-2/2  Actual Meters- 183 meters     6MWT (5/13/2024)  SP02- 96-96% /4 L NC  HR-   CHEIKH-0-4  Actual Meters 216 meters     CT angio chest for PE (5/30/2024)  1. No findings of acute pulmonary embolism.  2. Persistent marked cardiac enlargement. Hepatic heterogeneity and splenomegaly suggesting right heart dysfunction with associated not make liver and portal hypertension.  3. Bronchial thickening. There are couple of other stable pulmonary nodules when compared to most recent examination, however there are areas of ground-glass opacity seen in the lungs possibly reflecting inflammatory etiologies. Findings of mild pulmonary edema and  volume  Overload     Echo (4/24/2024) [facility]  Left Ventricle: The left ventricular systolic function is normal, with an estimated ejection fraction of 60-65%. There are no regional wall motion abnormalities. The left ventricular cavity size is normal. The interventricular septum is flattened in systole and diastole, consistent with right ventricular pressure and volume overload. Left ventricular diastolic filling was not assessed.  Left Atrium: The left atrium is normal in size. A bubble study using agitated saline was performed. Bubble study is positive. A small PFO (= 10 bubbles) was demonstrated. Agitated saline contrast study was positive for small intracardiac shunt.  Right Ventricle: The right ventricle is severely enlarged. There is severely reduced right ventricular systolic function. Although the TAPSE and RVA' are normal, the fractional area change is markedly reduced consistent wtih severe RV systolic dysfunction. Still able to generate high RVSP.  Right Atrium: The right atrium is severely dilated.  Aortic Valve: The aortic valve is trileaflet. There is minimal aortic valve cusp calcification. Aortic valve regurgitation was not assessed.  Mitral Valve: The mitral valve is normal in structure. Mitral valve regurgitation was not assessed.  Tricuspid Valve: The tricuspid valve is structurally normal. There is severe tricuspid regurgitation. The Doppler estimated RVSP is severely elevated at 88.6 mmHg.  Pulmonic Valve: The pulmonic valve is not well visualized. Pulmonic valve regurgitation was not assessed.  Pericardium: There is a small to moderate pericardial effusion. Small circumferential pericardial effusin though is small to moderate adjacent to the RA. No RV or RA collapse. The IVC is dilated, though likely due to the presence of severe pulmonary hypertension. No significant respiratory variation of the MV or TV inflows. Not diagnostic for tamponade though difficult to dx tamponade in setting of  severe pulmonary hypertension.  Aorta: The aortic root is normal.  Systemic Veins: The inferior vena cava appears dilated. There is IVC inspiratory collapse greater than 50%.  In comparison to the previous echocardiogram(s): Compared with study from 3/12/2022,. Compared with the prior exam from 3/12/2022 the RV had a similar appearance with severe dilatation and reduced function. Note the TR was not asessed on that exam to allow estimation of the RVSP at that time. The pericardial effusion appears similar in size.  CONCLUSIONS:   1. Left ventricular systolic function is normal with a 60-65% estimated ejection fraction.   2. Right ventricular volume and pressure overload.   3. Although the TAPSE and RVA' are normal, the fractional area change is markedly reduced consistent wtih severe RV systolic dysfunction. Still able to generate high RVSP.   4. Severely enlarged right ventricle.   5. There is severely reduced right ventricular systolic function.   6. Agitated saline contrast study was positive for small intracardiac shunt.   7. The right atrium is severely dilated.   8. There is a small to moderate pericardial effusion.   9. Small circumferential pericardial effusin though is small to moderate adjacent to the RA. No RV or RA collapse. The IVC is dilated, though likely due to the presence of severe pulmonary hypertension. No significant respiratory variation of the MV or TV inflows. Not diagnostic for tamponade though difficult to dx tamponade in setting of severe pulmonary hypertension.  10. Severe tricuspid regurgitation visualized.  11. Severely elevated right ventricular systolic pressure.  12. Compared with the prior exam from 3/12/2022 the RV had a similar appearance with severe dilatation and reduced function. Note the TR was not asessed on that exam to allow estimation of the RVSP at that time. The pericardial effusion appears similar in size.  13. A bubble study using agitated saline was performed. Bubble  study is positive. A small PFO (= 10 bubbles) was demonstrated.     6MWT (2023)  SpO2: 98% - 95% on 4L  HR: 97 - 116  Valentin: 1 - 7  Actual 314m     6MWT (2022)  SpO2: 94 - 90 on RA   HR: 78 - 111  Valentin: 0 - 1  Meters: 366      6MWT (2021)   SpO2: 92% - 87% on RA  HR: 81 - 94  Valentin: 3 - 4  Actual 311m     6MWT (2021)  HR 91 -102  Spo2 97 - 95 on RA  Valentin 1- 4  Meters 287/predicted 477     RHC (2020)   PAP- 73/31 (47), 83/26 (54)  PCWP- 8  CO/CI- 3.8/2.0      Echo (2020) from OSH   moderately to severely dilated RV with moderately reduced function and a moderately dilated RA.      Assessment/Plan     1) Pulmonary  a. PAH associated with methamphetamine use, presented FCIV, V/Q (-) , depressed CI, now on infusion therapy with dramatic subjective improvement, still high risk. Still with hectic follow up at best, (+) cocaine during hospitalization 2024. (+) Cannabinoid and amphetamine 2024.  In past, adjust dosing for weight loss and follow resolution of epo rash and significant diarrhea. Patient went up without authorization. Seems stable from PAH standpoint,      2024 Did not finish Keflex ordered for possible site infection. Better today.      Intolerant to PDE5     On macitentan     2) Neuro - pre-existing neuropathy, alcohol vs ?     2024 complaining of classic sciatic pain onset after riding horse.      3) Cardiac - hypertension Cont meds,     4) Interval diagnosis of lupus on basis of ?     Plan    1) Continue current meds     2) Follow up in clinic. 8 weeks with 6 minute walk and routine labs.     3) Decrease to 73 ml/24 hours over next week, 78,76,74,72 every other day. Call this Thursday for checkin. Patient now at 67 ng/kg/min, 71ml/24hr    4) 2+ cans boost per day. Will consider EPO->DESMOND if unsuccessful in halting weight loss.     5) Patient will call in weekly, if no weight gain and persistent extra imodium needed, will consider further epoprostenol dose  reduction.

## 2025-04-10 NOTE — PROGRESS NOTES
Pharmacy Medication History Review    Ronna Beckham is a 64 y.o. female admitted for Chest wall mass. Pharmacy reviewed the patient's jqcsh-jv-izznoymie medications and allergies for accuracy.    The list below reflects the updated PTA list.   Prior to Admission Medications   Prescriptions Last Dose Informant   Opsumit 10 mg tablet tablet 2025 Self   Sig: Take 1 tablet (10 mg) by mouth once daily.   apixaban (Eliquis) 5 mg tablet 2025 Self   Sig: Take 1 tablet (5 mg) by mouth 2 times a day.   aspirin 81 mg EC tablet 2025 Morning Self   Sig: Take 1 tablet (81 mg) by mouth once daily.   calcium carbonate-vitamin D3 500 mg-5 mcg (200 unit) tablet 2025 Self   Sig: Take 2 tablets by mouth 2 times a day.   Patient taking differently: Take 1 tablet by mouth 2 times a day.   cholecalciferol (Vitamin D-3) 25 mcg (1,000 units) tablet     Sig: Take 1 tablet (25 mcg) by mouth 2 times a day.   dilTIAZem CD (Cardizem CD) 180 mg 24 hr capsule 2025 Self   Sig: Take 1 capsule (180 mg) by mouth once daily.   epoprostenol (Veletri) 1.5 mg recon soln  Self   Si.5 mg (1,500 mcg) by central venous line infusion route continuously. Reconstitute contents of vial with 5 ml diluent and mix cassette as directed. Infuse continuously per physician orders. Allow for priming volume. Dose range: 1-150 ng/kg/min.   ergocalciferol (Vitamin D-2) 1.25 MG (68208 UT) capsule 2025 Self   Sig: Take 1 capsule (1.25 mg) by mouth 1 (one) time per week. On Wednesday   furosemide (Lasix) 40 mg tablet 2025 Self   Sig: Take 1 tablet (40 mg) by mouth once daily.   gabapentin (Neurontin) 800 mg tablet 2025 Self   Sig: Take 1 tablet (800 mg) by mouth 3 times a day.   Patient taking differently: Take 1 tablet (800 mg) by mouth 2 times a day.   hydroxychloroquine (Plaquenil) 200 mg tablet 2025 Self   Sig: Take 1 tablet (200 mg) by mouth 2 times a day.   loperamide (Imodium) 2 mg capsule 2025 Self   Sig: Take 1 capsule (2  mg) by mouth 2 times a day.   magnesium chloride (MagDelay) 64 mg EC tablet 4/9/2025 Self   Sig: Take 1 tablet (64 mg) by mouth 2 times a day.   melatonin 5 mg tablet 4/8/2025 Self   Sig: Take 1 tablet (5 mg) by mouth once daily at bedtime.   multivitamin-iron-folic acid  mg-mcg tablet 4/9/2025 Self   Sig: Take 1 tablet by mouth once daily.   omeprazole (PriLOSEC) 40 mg DR capsule 4/9/2025 Self   Sig: Take 1 capsule (40 mg) by mouth once daily in the morning. Take before meals. Do not crush or chew.   potassium chloride CR 20 mEq ER tablet 4/9/2025 Self   Sig: Take 1 tablet (20 mEq) by mouth 2 times a day. Do not crush or chew.   sotatercept-csrk 60 mg kit  Winrevair 3/19/2025    Sig: Inject one dose under the skin every 21 days .    Patient uses every 21 days due for dose 4/9/25 but did not receive per patient    venlafaxine XR (Effexor-XR) 150 mg 24 hr capsule 4/9/2025 Self   Sig: Take 1 capsule (150 mg) by mouth once daily. Do not crush or chew.      Facility-Administered Medications Last Administration Doses Remaining   perflutren lipid microspheres (Definity) injection 0.5-10 mL of dilution None recorded 1           The list below reflects the updated allergy list. Please review each documented allergy for additional clarification and justification.  Allergies  Reviewed by Yonas Hsu RN on 4/9/2025        Severity Reactions Comments    Levetiracetam Medium Other Weakness, unable to walk            Patient accepts M2B at discharge. Pharmacy has been updated to [facility].    Sources used to complete the med history include:    [facility]  Pharmacy dispense history  Patient Interview Good historian  Chart Review  Care Everywhere     Below are additional concerns with the patient's PTA list.  Patient knew medications well. Patient stated she was due for a injection of sotatercept-csrk 60 mg kit but did not receive it. Patient also said she buys extra magnesium , vitamin D and potassium because she does not get  enough through her prescription medications.       Medications ADDED:  sotatercept-csrk 60 mg kit  Winrevair  Medications CHANGED:  Gabapentin directions from 3 times a day to 2 times a day   Vitamin d 1000 units from 1 daily to 2 times a day   Calcium carbonate with D3 500 mg / 5 mcg from 2 tablets 2 times a day to 1 tablet 2 times a day  Medications REMOVED:   None     Clint Fournier [facility].   Transitions of Care Pharmacist    Please reach out via Secure Chat for questions, or if no response call [phone] or vocera [facility]

## 2025-04-10 NOTE — H&P
History Of Present Illness  65 y/o F PMH chronic hypoxic respiratory failure (on 4L NC) secondary group 1 pulmonary hypertension (on Macitentan, continuous Epoprostenol infusion via L internal jugular Montez, and monthly Winrevair (Sotatercept) injections, follows with Dr. Prescott), SVT (on Diltiazem), and lupus (on Hydroxychloroquine), Afib on Eliquis. Came to the ED with Hx of Bleeding from wound above the insertion site of Montez line.     Her story started when she had to change her Montez line from the left chest to the right chest for concerns for infections, where she required admission at that time with pulmonary service in 3/19/2025, she was discharged in good condition at that time.     She then followed with cardiology for the concerns of palpitations and SVT of 200bpm and then she was admitted for EP study and she was found to have one-to-one atrial flutter. She underwent cath based ablation through femoral access and she was started on Apixaban on 3/24/2025.     She presented to the ED on 4/1 for concerns of Bleeding and swelling above her insertion site of the Montez, her HGB was stable and she required figure-of-eight Vicryl suture and Steri-Strip and dressing and she was discharged in stable condition.     She presented again after the suturing to the ED on 4/9 for the same concerns with some oozing for the site of the suturing site as well. Media pictures are attached. When she presented she denied that she had no active anemia symptoms and she had no chest pains or palpitations and she denied any bleeding from any other side. She denied any recent Abx use and she denied any sick contact or fevers.     The plan for apixaban was discussed with Dr Dr. Le by the NP and they recommend to stop for 1-2 days until the bleeding stopped, the plan for apixaban was for short term since she was ablated.      Past Medical History  She has a past medical history of Anxiety, Depression, Does not refill  medications appropriately (09/10/2024), GERD (gastroesophageal reflux disease), Lupus, Personal history of other specified conditions (09/21/2020), Pulmonary hypertension (Multi), and Volume overload (09/10/2024).    Surgical History  She has a past surgical history that includes Other surgical history (09/21/2020); Other surgical history (09/21/2020); Other surgical history (09/21/2020); Other surgical history (09/21/2020); Other surgical history (09/21/2020); Hysterectomy; Cholecystectomy; Cardiac catheterization (N/A, 1/8/2024); Cardiac electrophysiology procedure (N/A, 3/24/2025); Cardiac electrophysiology procedure (N/A, 3/24/2025); and Cardiac electrophysiology procedure (N/A, 3/24/2025).     Social History  She reports that she has quit smoking. Her smoking use included cigarettes. She has quit using smokeless tobacco. She reports that she does not currently use alcohol. She reports that she does not currently use drugs after having used the following drugs: Cocaine and Other.    Family History  Family History   Problem Relation Name Age of Onset    No Known Problems Mother      No Known Problems Father          Allergies  Levetiracetam    Review of Systems  No history of fever, night sweats or weight loss   No cough, shortness of breath, or contact with sick patient.   No rhinorrhea, or nasal congestion   No chest pain, palpitations, or diaphoresis   No orthopnea, PND, or lower limb edema   No dysuria, or change in urine color or smell or frequency   No abdominal pain, constipation, or nausea/vomiting   No difficulty swallowing or sore throat   No diarrhea, melena, or hematochezia   No dizziness, weakness, LOC, or seizures.   No vision changes, no slurred speech   No rashes, no oral ulcers, no joint pain or swelling   No recent travel, no recent antibiotic use or new medication      Physical Exam  Last Recorded Vitals  /70 (Patient Position: Lying)   Pulse (!) 104   Temp 37.1 °C (98.8 °F) (Tympanic)    Resp 18   Wt 68 kg (150 lb)   SpO2 94%   General examinations: comfortable, oriented to time person and place, not in distress.   There is a solid mass to the anterior right chest, superior to the Montez insertion site, Not oozing blood and covered with Thrombin gauze   Respiratory examinations: normal vesicular breathing equal air entry no wheezing or crackles.   Cardiovascular examinations: S1+S2+0 murmur no LL edema   Gastrointestinal examinations: soft and lax no tenderness or rigidity.   CNS examinations: GCS 15/15 no weakness or sensory symptoms.     Relevant Results  Results for orders placed or performed during the hospital encounter of 04/09/25 (from the past 24 hours)   CBC and Auto Differential   Result Value Ref Range    WBC 7.4 4.4 - 11.3 x10*3/uL    nRBC 1.8 (H) 0.0 - 0.0 /100 WBCs    RBC 4.15 4.00 - 5.20 x10*6/uL    Hemoglobin 9.2 (L) 12.0 - 16.0 g/dL    Hematocrit 30.4 (L) 36.0 - 46.0 %    MCV 73 (L) 80 - 100 fL    MCH 22.2 (L) 26.0 - 34.0 pg    MCHC 30.3 (L) 32.0 - 36.0 g/dL    RDW 17.9 (H) 11.5 - 14.5 %    Platelets 73 (L) 150 - 450 x10*3/uL    Immature Granulocytes %, Automated 6.0 (H) 0.0 - 0.9 %    Immature Granulocytes Absolute, Automated 0.44 0.00 - 0.70 x10*3/uL   Comprehensive metabolic panel   Result Value Ref Range    Glucose 120 (H) 74 - 99 mg/dL    Sodium 130 (L) 136 - 145 mmol/L    Potassium 4.4 3.5 - 5.3 mmol/L    Chloride 97 (L) 98 - 107 mmol/L    Bicarbonate 24 21 - 32 mmol/L    Anion Gap 13 mmol/L    Urea Nitrogen 17 6 - 23 mg/dL    Creatinine 0.86 0.50 - 1.05 mg/dL    eGFR 76 >60 mL/min/1.73m*2    Calcium 8.5 (L) 8.6 - 10.6 mg/dL    Albumin 3.6 3.4 - 5.0 g/dL    Alkaline Phosphatase 171 (H) 33 - 136 U/L    Total Protein 7.1 6.4 - 8.2 g/dL    AST 23 9 - 39 U/L    Bilirubin, Total 1.3 (H) 0.0 - 1.2 mg/dL    ALT 30 7 - 45 U/L   Type and screen   Result Value Ref Range    ABO TYPE A     Rh TYPE POS     ANTIBODY SCREEN NEG    Coagulation Screen   Result Value Ref Range    Protime  24.0 (H) 9.8 - 12.4 seconds    INR 2.2 (H) 0.9 - 1.1    aPTT 35 26 - 36 seconds   Manual Differential   Result Value Ref Range    Neutrophils %, Manual 62.4 40.0 - 80.0 %    Bands %, Manual 21.4 0.0 - 5.0 %    Lymphocytes %, Manual 8.5 13.0 - 44.0 %    Monocytes %, Manual 6.0 2.0 - 10.0 %    Eosinophils %, Manual 0.0 0.0 - 6.0 %    Basophils %, Manual 0.0 0.0 - 2.0 %    Metamyelocytes %, Manual 1.7 0.0 - 0.0 %    Seg Neutrophils Absolute, Manual 4.62 1.20 - 7.00 x10*3/uL    Bands Absolute, Manual 1.58 (H) 0.00 - 0.70 x10*3/uL    Lymphocytes Absolute, Manual 0.63 (L) 1.20 - 4.80 x10*3/uL    Monocytes Absolute, Manual 0.44 0.10 - 1.00 x10*3/uL    Eosinophils Absolute, Manual 0.00 0.00 - 0.70 x10*3/uL    Basophils Absolute, Manual 0.00 0.00 - 0.10 x10*3/uL    Metamyelocytes Absolute, Manual 0.13 0.00 - 0.00 x10*3/uL    Total Cells Counted 117     Neutrophils Absolute, Manual 6.20 1.20 - 7.70 x10*3/uL    RBC Morphology See Below     Hypochromia Mild     Ovalocytes Few     Stomatocytes Few    Morphology   Result Value Ref Range    RBC Morphology See Below     Hypochromia Mild     Ovalocytes Few     Stomatocytes Few      *Note: Due to a large number of results and/or encounters for the requested time period, some results have not been displayed. A complete set of results can be found in Results Review.        CT Angio Chest 4/9:   1.  Right-sided subclavian central venous line is identified with the  distal tip extending to the cavoatrial junction.  Mild focal soft  tissue swelling along surrounding the distal aspect of the central  venous line within the upper chest wall suggestive for small hematoma.   Correlate clinically.  2.  No evidence of central pulmonary embolus.  3.  Multiple small irregular bilateral pulmonary nodules are  reidentified with several new additional small pulmonary nodules as  described above.  Findings may be infectious, inflammatory or  neoplastic in etiology.  Correlate clinically.  Short-term  follow-up  CT imaging recommended to assess for stability or resolution.  4.  Nodular thickening of the left adrenal gland.  Nonspecific,  possibly reflecting underlying adrenal nodule or mass..          Assessment:   64-year-old female with a history of chronic hypoxic respiratory failure, pulmonary hypertension, SVT, lupus, and Afib presented to the emergency department (ED) due to bleeding from a wound near her Montez line. She had switched her Montez line from left to right in March 2025 due to infection worries. Afterward, she was treated for SVT, diagnosed with one-to-one atrial flutter, and underwent catheter-based ablation, starting Apixaban a week ago.     On April 1, she returned to the ED with bleeding and swelling at the insertion site, received sutures, and was discharged in stable condition. On April 9, she presented again with oozing from the site and reported no other symptoms. The plan to pause Apixaban for 1-2 days as discussed with Dr. Le to manage the bleeding issue.    On Exam, she was vitally stable and she received IV Tranexamic acid and Thrombin plaster while in the ED, There is a solid mass to the anterior right chest, superior to the Montez insertion site, Not oozing blood and covered with Thrombin gauze . Her Labs showed stable CBC, however her INR was 2, while her LFTs were WNL. She denied any bleeding from any other orifices and her CTA showed small hematoma or as per radiology some soft tissue swelling with no Active bleeding. I have no concerns to reverse her apixaban also taking into account that she on prostacyclin analogue that might prolonged the bleeding effect of apixaban and caused some PLT dysfunction while causing VD of the vessels.           #Oozing from suture line while on Apixaban and prostacyclin analogue.   - She had stable HGB and bleeding already stopped by local measures.   - Last Apix dose was 9AM 4/9/2025.   - She received IV Tranexamic Acid and thrombin  in the ED.   Plan:   - STAT CBC   - Send for hemolysis work up and send for Fibrinogen.   - Hold Apixaban as discussed with Cards attending by the NP, See notes.    - CTM the site   - Local measures for bleeding. ( Topical TNX acid and Thrombin)       # Chronic hypoxic respiratory failure, pulmonary hypertension Group 1.   - She is on Macitentan, continuous Epoprostenol infusion via L internal jugular Montez, and monthly Winrevair (Sotatercept) injections, follows with Dr. Prescott).   Plan:   - CTM and C/w Medications     #SVT and Afib on Apixaban and s/p ablation.   #History of Recurrent SVT  #CAD (cath 1/2024) with history of NSTEMI   - She was managed with Cards EP team.   - She required ablation after she was diagnosed with Aflutter and then she was kept on short term Apixaban.     Plan:   - BL ECG while in the ED   - Hold Apixaban   - Telemetry       #Lupus  - Continue Home Hydroxychloroquine 200 mg BID      Full code   Primary Emergency Contact: KOLE PITTMAN  Address: [address] [place] [address] of [facility]  Home Phone: [phone]  Work Phone: [phone]  Mobile Phone: [phone]  Relation: Sister  Secondary Emergency Contact: Leticia Parson  Mobile Phone: [phone]  Relation: Daughter  Preferred language: English        Manpreet Flores MD

## 2025-04-10 NOTE — PROGRESS NOTES
"  Emergency Department Transition of Care Note        Signout   I received Ronna Beckham in signout from Autumn Frye PA-C.  Please see the ED Provider Note for all HPI, PE and MDM up to the time of signout at 1500.  This is in addition to the primary record.     In brief Ronna Beckham is an 64 y.o. female presenting for bleeding from her Montez site. Pt developed a \"blood blister\" above the insertion site and has been compliant with her daily Eliquis for a fib. Pt originally presented to [facility] ED on 3/31/35 and had a figure 8 stitch place, however it has continued to ooze. Bleeding improved after 500 mg of topical TXA.     At the time of signout we were awaiting:  CT angio chest w and wo     ED Course & Medical Decision Making   Medical Decision Making:  Under my care, CT angio chest w and wo showed a small hematoma on the right side of her chest. Her wound was hemostatic for several hours with TXA and steri strips. However, she will need to be admitted for definitive management of her hematoma which would require pulmonology and IR consults. Pt admitted to medicine.     ED Course:  Diagnoses as of 04/09/25 1611   Chest wall mass         Disposition   ADMIT     Procedures   Procedures     Patient seen and discussed with ED attending physician.     Delfina Robles MD  Emergency Medicine      "

## 2025-04-11 ENCOUNTER — APPOINTMENT (OUTPATIENT)
Dept: CARDIOLOGY | Facility: HOSPITAL | Age: 65
End: 2025-04-11
Payer: COMMERCIAL

## 2025-04-11 LAB
ALBUMIN SERPL BCP-MCNC: 3.2 G/DL (ref 3.4–5)
ALP SERPL-CCNC: 151 U/L (ref 33–136)
ALT SERPL W P-5'-P-CCNC: 25 U/L (ref 7–45)
ANION GAP SERPL CALC-SCNC: 12 MMOL/L (ref 10–20)
APTT PPP: 28 SECONDS (ref 26–36)
AST SERPL W P-5'-P-CCNC: 18 U/L (ref 9–39)
BASOPHILS # BLD MANUAL: 0 X10*3/UL (ref 0–0.1)
BASOPHILS # BLD MANUAL: 0 X10*3/UL (ref 0–0.1)
BASOPHILS NFR BLD MANUAL: 0 %
BASOPHILS NFR BLD MANUAL: 0 %
BILIRUB DIRECT SERPL-MCNC: 0.2 MG/DL (ref 0–0.3)
BILIRUB SERPL-MCNC: 0.9 MG/DL (ref 0–1.2)
BUN SERPL-MCNC: 15 MG/DL (ref 6–23)
CALCIUM SERPL-MCNC: 8.2 MG/DL (ref 8.6–10.6)
CHLORIDE SERPL-SCNC: 103 MMOL/L (ref 98–107)
CO2 SERPL-SCNC: 25 MMOL/L (ref 21–32)
CREAT SERPL-MCNC: 0.62 MG/DL (ref 0.5–1.05)
EGFRCR SERPLBLD CKD-EPI 2021: >90 ML/MIN/1.73M*2
EOSINOPHIL # BLD MANUAL: 0 X10*3/UL (ref 0–0.7)
EOSINOPHIL # BLD MANUAL: 0 X10*3/UL (ref 0–0.7)
EOSINOPHIL NFR BLD MANUAL: 0 %
EOSINOPHIL NFR BLD MANUAL: 0 %
ERYTHROCYTE [DISTWIDTH] IN BLOOD BY AUTOMATED COUNT: 18 % (ref 11.5–14.5)
ERYTHROCYTE [DISTWIDTH] IN BLOOD BY AUTOMATED COUNT: 18.2 % (ref 11.5–14.5)
GLUCOSE SERPL-MCNC: 119 MG/DL (ref 74–99)
HCT VFR BLD AUTO: 26.2 % (ref 36–46)
HCT VFR BLD AUTO: 27.8 % (ref 36–46)
HGB BLD-MCNC: 7.6 G/DL (ref 12–16)
HGB BLD-MCNC: 8.1 G/DL (ref 12–16)
IMM GRANULOCYTES # BLD AUTO: 0.52 X10*3/UL (ref 0–0.7)
IMM GRANULOCYTES # BLD AUTO: 0.52 X10*3/UL (ref 0–0.7)
IMM GRANULOCYTES NFR BLD AUTO: 10 % (ref 0–0.9)
IMM GRANULOCYTES NFR BLD AUTO: 9.9 % (ref 0–0.9)
INR PPP: 1.3 (ref 0.9–1.1)
LYMPHOCYTES # BLD MANUAL: 0.09 X10*3/UL (ref 1.2–4.8)
LYMPHOCYTES # BLD MANUAL: 0.49 X10*3/UL (ref 1.2–4.8)
LYMPHOCYTES NFR BLD MANUAL: 1.7 %
LYMPHOCYTES NFR BLD MANUAL: 9.4 %
MAGNESIUM SERPL-MCNC: 1.64 MG/DL (ref 1.6–2.4)
MCH RBC QN AUTO: 21.8 PG (ref 26–34)
MCH RBC QN AUTO: 22.1 PG (ref 26–34)
MCHC RBC AUTO-ENTMCNC: 29 G/DL (ref 32–36)
MCHC RBC AUTO-ENTMCNC: 29.1 G/DL (ref 32–36)
MCV RBC AUTO: 75 FL (ref 80–100)
MCV RBC AUTO: 76 FL (ref 80–100)
METAMYELOCYTES # BLD MANUAL: 0.09 X10*3/UL
METAMYELOCYTES NFR BLD MANUAL: 1.7 %
MONOCYTES # BLD MANUAL: 0.23 X10*3/UL (ref 0.1–1)
MONOCYTES # BLD MANUAL: 0.31 X10*3/UL (ref 0.1–1)
MONOCYTES NFR BLD MANUAL: 4.4 %
MONOCYTES NFR BLD MANUAL: 6 %
MYELOCYTES # BLD MANUAL: 0.09 X10*3/UL
MYELOCYTES # BLD MANUAL: 0.14 X10*3/UL
MYELOCYTES NFR BLD MANUAL: 1.7 %
MYELOCYTES NFR BLD MANUAL: 2.6 %
NEUTROPHILS # BLD MANUAL: 3.96 X10*3/UL (ref 1.2–7.7)
NEUTROPHILS # BLD MANUAL: 4.66 X10*3/UL (ref 1.2–7.7)
NEUTS BAND # BLD MANUAL: 0.27 X10*3/UL (ref 0–0.7)
NEUTS BAND # BLD MANUAL: 0.45 X10*3/UL (ref 0–0.7)
NEUTS BAND NFR BLD MANUAL: 5.1 %
NEUTS BAND NFR BLD MANUAL: 8.7 %
NEUTS SEG # BLD MANUAL: 3.69 X10*3/UL (ref 1.2–7)
NEUTS SEG # BLD MANUAL: 4.21 X10*3/UL (ref 1.2–7)
NEUTS SEG NFR BLD MANUAL: 70.9 %
NEUTS SEG NFR BLD MANUAL: 80.9 %
NRBC BLD-RTO: 2.9 /100 WBCS (ref 0–0)
NRBC BLD-RTO: 3.6 /100 WBCS (ref 0–0)
PATH REVIEW-CBC DIFFERENTIAL: NORMAL
PHOSPHATE SERPL-MCNC: 3.6 MG/DL (ref 2.5–4.9)
PLATELET # BLD AUTO: 54 X10*3/UL (ref 150–450)
PLATELET # BLD AUTO: 59 X10*3/UL (ref 150–450)
POTASSIUM SERPL-SCNC: 4.2 MMOL/L (ref 3.5–5.3)
PROMYELOCYTES # BLD MANUAL: 0.05 X10*3/UL
PROMYELOCYTES NFR BLD MANUAL: 0.9 %
PROT SERPL-MCNC: 6.1 G/DL (ref 6.4–8.2)
PROTHROMBIN TIME: 14.1 SECONDS (ref 9.8–12.4)
RBC # BLD AUTO: 3.48 X10*6/UL (ref 4–5.2)
RBC # BLD AUTO: 3.67 X10*6/UL (ref 4–5.2)
RBC MORPH BLD: ABNORMAL
RBC MORPH BLD: ABNORMAL
SCHISTOCYTES BLD QL SMEAR: ABNORMAL
SODIUM SERPL-SCNC: 136 MMOL/L (ref 136–145)
TOTAL CELLS COUNTED BLD: 115
TOTAL CELLS COUNTED BLD: 117
VARIANT LYMPHS # BLD MANUAL: 0.31 X10*3/UL (ref 0–0.5)
VARIANT LYMPHS NFR BLD: 6 %
WBC # BLD AUTO: 5.2 X10*3/UL (ref 4.4–11.3)
WBC # BLD AUTO: 5.2 X10*3/UL (ref 4.4–11.3)

## 2025-04-11 PROCEDURE — 99223 1ST HOSP IP/OBS HIGH 75: CPT | Performed by: SURGERY

## 2025-04-11 PROCEDURE — 85610 PROTHROMBIN TIME: CPT

## 2025-04-11 PROCEDURE — 84100 ASSAY OF PHOSPHORUS: CPT

## 2025-04-11 PROCEDURE — 2500000001 HC RX 250 WO HCPCS SELF ADMINISTERED DRUGS (ALT 637 FOR MEDICARE OP): Mod: SE

## 2025-04-11 PROCEDURE — 93010 ELECTROCARDIOGRAM REPORT: CPT | Performed by: INTERNAL MEDICINE

## 2025-04-11 PROCEDURE — 1200000002 HC GENERAL ROOM WITH TELEMETRY DAILY

## 2025-04-11 PROCEDURE — 85007 BL SMEAR W/DIFF WBC COUNT: CPT

## 2025-04-11 PROCEDURE — 2500000004 HC RX 250 GENERAL PHARMACY W/ HCPCS (ALT 636 FOR OP/ED): Mod: SE

## 2025-04-11 PROCEDURE — 85027 COMPLETE CBC AUTOMATED: CPT

## 2025-04-11 PROCEDURE — 83735 ASSAY OF MAGNESIUM: CPT

## 2025-04-11 PROCEDURE — 99233 SBSQ HOSP IP/OBS HIGH 50: CPT

## 2025-04-11 PROCEDURE — 36416 COLLJ CAPILLARY BLOOD SPEC: CPT

## 2025-04-11 PROCEDURE — 80053 COMPREHEN METABOLIC PANEL: CPT

## 2025-04-11 PROCEDURE — 2500000002 HC RX 250 W HCPCS SELF ADMINISTERED DRUGS (ALT 637 FOR MEDICARE OP, ALT 636 FOR OP/ED): Mod: SE

## 2025-04-11 PROCEDURE — 93005 ELECTROCARDIOGRAM TRACING: CPT

## 2025-04-11 PROCEDURE — 82248 BILIRUBIN DIRECT: CPT

## 2025-04-11 RX ORDER — MAGNESIUM SULFATE HEPTAHYDRATE 40 MG/ML
2 INJECTION, SOLUTION INTRAVENOUS ONCE
Status: COMPLETED | OUTPATIENT
Start: 2025-04-11 | End: 2025-04-11

## 2025-04-11 RX ADMIN — Medication 2 TABLET: at 08:33

## 2025-04-11 RX ADMIN — GABAPENTIN 800 MG: 400 CAPSULE ORAL at 15:08

## 2025-04-11 RX ADMIN — LOPERAMIDE HYDROCHLORIDE 2 MG: 2 CAPSULE ORAL at 20:15

## 2025-04-11 RX ADMIN — GABAPENTIN 800 MG: 400 CAPSULE ORAL at 20:15

## 2025-04-11 RX ADMIN — HYDROXYCHLOROQUINE SULFATE 200 MG: 200 TABLET, FILM COATED ORAL at 08:35

## 2025-04-11 RX ADMIN — ASPIRIN 81 MG: 81 TABLET, COATED ORAL at 08:33

## 2025-04-11 RX ADMIN — MAGNESIUM SULFATE HEPTAHYDRATE 2 G: 40 INJECTION, SOLUTION INTRAVENOUS at 09:51

## 2025-04-11 RX ADMIN — Medication 64 MG: at 20:16

## 2025-04-11 RX ADMIN — ACETAMINOPHEN 650 MG: 325 TABLET, FILM COATED ORAL at 20:15

## 2025-04-11 RX ADMIN — PANTOPRAZOLE SODIUM 40 MG: 40 TABLET, DELAYED RELEASE ORAL at 08:33

## 2025-04-11 RX ADMIN — POTASSIUM CHLORIDE 20 MEQ: 1500 TABLET, EXTENDED RELEASE ORAL at 20:18

## 2025-04-11 RX ADMIN — VENLAFAXINE HYDROCHLORIDE 150 MG: 150 CAPSULE, EXTENDED RELEASE ORAL at 08:35

## 2025-04-11 RX ADMIN — Medication 64 MG: at 08:35

## 2025-04-11 RX ADMIN — EPOPROSTENOL 67 NG/KG/MIN: 1.5 INJECTION, POWDER, LYOPHILIZED, FOR SOLUTION INTRAVENOUS at 17:16

## 2025-04-11 RX ADMIN — POTASSIUM CHLORIDE 20 MEQ: 1500 TABLET, EXTENDED RELEASE ORAL at 08:35

## 2025-04-11 RX ADMIN — Medication 2 TABLET: at 20:15

## 2025-04-11 RX ADMIN — Medication 5 MG: at 20:15

## 2025-04-11 RX ADMIN — DILTIAZEM HYDROCHLORIDE 180 MG: 180 CAPSULE, COATED, EXTENDED RELEASE ORAL at 08:36

## 2025-04-11 RX ADMIN — FUROSEMIDE 40 MG: 40 TABLET ORAL at 09:51

## 2025-04-11 RX ADMIN — HYDROXYCHLOROQUINE SULFATE 200 MG: 200 TABLET, FILM COATED ORAL at 20:16

## 2025-04-11 RX ADMIN — ACETAMINOPHEN 650 MG: 325 TABLET, FILM COATED ORAL at 08:36

## 2025-04-11 RX ADMIN — GABAPENTIN 800 MG: 400 CAPSULE ORAL at 08:35

## 2025-04-11 RX ADMIN — LOPERAMIDE HYDROCHLORIDE 2 MG: 2 CAPSULE ORAL at 08:35

## 2025-04-11 RX ADMIN — MACITENTAN 10 MG: 10 TABLET, FILM COATED ORAL at 08:35

## 2025-04-11 ASSESSMENT — COGNITIVE AND FUNCTIONAL STATUS - GENERAL
MOBILITY SCORE: 24
DAILY ACTIVITIY SCORE: 24
DAILY ACTIVITIY SCORE: 24
MOBILITY SCORE: 24

## 2025-04-11 ASSESSMENT — PAIN - FUNCTIONAL ASSESSMENT
PAIN_FUNCTIONAL_ASSESSMENT: 0-10

## 2025-04-11 ASSESSMENT — PAIN SCALES - GENERAL
PAINLEVEL_OUTOF10: 0 - NO PAIN
PAINLEVEL_OUTOF10: 3
PAINLEVEL_OUTOF10: 0 - NO PAIN
PAINLEVEL_OUTOF10: 3

## 2025-04-11 ASSESSMENT — ACTIVITIES OF DAILY LIVING (ADL): LACK_OF_TRANSPORTATION: NO

## 2025-04-11 NOTE — CONSULTS
VASCULAR SURGERY CONSULT NOTE    Assessment/Plan   Ms. Beckham is a 64 year-old female with history of Type 1 PAH (managed by macitentan, epoprostenol infusions through a Montez catheter), afib (s/p ablation on 03/2025, started on eliquis), and SLE who presented to the ED with pain and swelling in her right chest wall that began after she had a Montez line placed. She is presently admitted to the pulmonology service for management of this issue, and vascular surgery was consulted for evaluation.   CTA Chest done on 4/9 demonstrated a possible hematoma in the anterior chest wall. On evaluation, she has a 2 x 3 cm area of swelling with overlying eschar. There is no concern for pseudoaneurysm on imaging or exam.  Given that the line was placed by Interventional Radiology, we recommend engaging them for evaluation.    Plan:  Recommend engaging IR for evaluation and management of Montez-associated hematoma.     Vascular Surgery will be signing off at this time.     D/w attending, Dr. Presley Crystal MD   PGY-1, Vascular Surgery    Subjective   HPI:  Ms. Beckham is a 64 year-old female with history of Type 1 PAH (managed by macitentan, epoprostenol infusions through a Montez catheter), afib (s/p ablation on 03/2025, started on eliquis), and SLE who presented to the ED with pain and swelling in her right chest wall that began after she had a Montez line placed. She is presently admitted to the pulmonology service for management of this issue, and vascular surgery was consulted for evaluation.   She had her Montez switched over from the left to the right side back in March due to concern for line infections; shortly thereafter, she underwent an ablation with EP for atrial flutter, and started on apixaban on 3/24/25.   On 4/1 she presented to the ED with concerns of pain, swelling, and bleeding from right above the insertion site of her Montez catheter; at the time, this was managed with a suture and  steri-strips in the ED, and she was discharged home in stable condition.   She re-presented to the ED on 4/9 with similar concerns of pain, swelling, and bleeding from the same site. She remained HDS in the ED, hgb wnl. She was admitted to the pulmonology service.   On evaluation, she states that she still has a lot of pain at that site, but denies any other symptoms at this time including weakness, lightheadedness, shortness of breath.     Vascular History:  None    PMH: As above    PSH: hysterectomy, cholecystectomy, EGD, colonoscopy     Home Meds:  No current facility-administered medications on file prior to encounter.     Current Outpatient Medications on File Prior to Encounter   Medication Sig Dispense Refill    apixaban (Eliquis) 5 mg tablet Take 1 tablet (5 mg) by mouth 2 times a day. 60 tablet 6    aspirin 81 mg EC tablet Take 1 tablet (81 mg) by mouth once daily. 30 tablet 0    calcium carbonate-vitamin D3 500 mg-5 mcg (200 unit) tablet Take 2 tablets by mouth 2 times a day. (Patient taking differently: Take 1 tablet by mouth 2 times a day.) 120 tablet 0    cholecalciferol (Vitamin D-3) 25 mcg (1,000 units) tablet Take 1 tablet (25 mcg) by mouth 2 times a day.      dilTIAZem CD (Cardizem CD) 180 mg 24 hr capsule Take 1 capsule (180 mg) by mouth once daily. 30 capsule 0    ergocalciferol (Vitamin D-2) 1.25 MG (89370 UT) capsule Take 1 capsule (1.25 mg) by mouth 1 (one) time per week. On Wednesday      gabapentin (Neurontin) 800 mg tablet Take 1 tablet (800 mg) by mouth 3 times a day. (Patient taking differently: Take 1 tablet (800 mg) by mouth 2 times a day.) 90 tablet 0    hydroxychloroquine (Plaquenil) 200 mg tablet Take 1 tablet (200 mg) by mouth 2 times a day. 60 tablet 0    loperamide (Imodium) 2 mg capsule Take 1 capsule (2 mg) by mouth 2 times a day.      magnesium chloride (MagDelay) 64 mg EC tablet Take 1 tablet (64 mg) by mouth 2 times a day. 60 tablet 0    multivitamin-iron-folic acid   mg-mcg tablet Take 1 tablet by mouth once daily.      omeprazole (PriLOSEC) 40 mg DR capsule Take 1 capsule (40 mg) by mouth once daily in the morning. Take before meals. Do not crush or chew. 30 capsule 0    Opsumit 10 mg tablet tablet Take 1 tablet (10 mg) by mouth once daily. 30 tablet 11    potassium chloride CR 20 mEq ER tablet Take 1 tablet (20 mEq) by mouth 2 times a day. Do not crush or chew. 60 tablet 0    venlafaxine XR (Effexor-XR) 150 mg 24 hr capsule Take 1 capsule (150 mg) by mouth once daily. Do not crush or chew. 30 capsule 0    epoprostenol (Veletri) 1.5 mg recon soln 1.5 mg (1,500 mcg) by central venous line infusion route continuously. Reconstitute contents of vial with 5 ml diluent and mix cassette as directed. Infuse continuously per physician orders. Allow for priming volume. Dose range: 1-150 ng/kg/min. 180 each 11    furosemide (Lasix) 40 mg tablet Take 1 tablet (40 mg) by mouth once daily. 30 tablet 0    melatonin 5 mg tablet Take 1 tablet (5 mg) by mouth once daily at bedtime. 30 tablet 0    sotatercept-csrk 60 mg kit Inject one dose under the skin every 21 days .      [DISCONTINUED] sotatercept-csrk (Winrevair) 60 mg kit Inject 1 Dose under the skin every 21 (twenty-one) days. 3 kit 0        Allergies:  Allergies   Allergen Reactions    Levetiracetam Other     Weakness, unable to walk       SH/FH: never smoker; no alcohol or illicit drug use     ROS: 12 system negative except HPI    Objective   Vitals:  Heart Rate:  []   Temp:  [36 °C (96.8 °F)-36.9 °C (98.4 °F)]   Resp:  [16-18]   BP: (101-136)/(62-79)   Weight:  [73.2 kg (161 lb 6 oz)]   SpO2:  [90 %-97 %]     Exam:  Constitutional: No acute distress, resting comfortably  Neuro:  AOx3, grossly intact  ENMT: moist mucous membranes  Head/neck: atraumatic  CV: no tachycardia. Right-sided hendrickson catheter well placed, with 2 x 3 cm area of raised, tender skin with overlying eschar. TTP. Small area of skin opening and oozing blood noted  on the edge.   Pulm: non-labored on room air  GI: soft, non-tender, non-distended  Skin: warm and dry  Musculoskeletal: moving all extremities            Labs:  Results from last 7 days   Lab Units 04/11/25  1227 04/11/25  0705 04/10/25  0949   WBC AUTO x10*3/uL 5.2 5.2 5.7   HEMOGLOBIN g/dL 8.1* 7.6* 8.8*   PLATELETS AUTO x10*3/uL 59* 54* 69*      Results from last 7 days   Lab Units 04/11/25  0705 04/10/25  0113 04/09/25  1318 04/09/25  1318   SODIUM mmol/L 136 133*  --  130*   POTASSIUM mmol/L 4.2 4.2  --  4.4   CHLORIDE mmol/L 103 100  --  97*   CO2 mmol/L 25 21  --  24   BUN mg/dL 15 15  --  17   CREATININE mg/dL 0.62 0.78  --  0.86   GLUCOSE mg/dL 119* 118*  --  120*   MAGNESIUM mg/dL 1.64 1.74   < >  --    PHOSPHORUS mg/dL 3.6 4.0   < >  --     < > = values in this interval not displayed.      Results from last 7 days   Lab Units 04/11/25  0705 04/10/25  0113 04/09/25  1318   INR  1.3* 1.7* 2.2*   PROTIME seconds 14.1* 18.3* 24.0*   APTT seconds 28 32 35           Imaging:  Reviewed independently by vascular team:  IMPRESSION:  1.  Right-sided subclavian central venous line is identified with the  distal tip extending to the cavoatrial junction.  Mild focal soft  tissue swelling along surrounding the distal aspect of the central  venous line within the upper chest wall suggestive for small hematoma.   Correlate clinically.  2.  No evidence of central pulmonary embolus.  3.  Multiple small irregular bilateral pulmonary nodules are  reidentified with several new additional small pulmonary nodules as  described above.  Findings may be infectious, inflammatory or  neoplastic in etiology.  Correlate clinically.  Short-term follow-up  CT imaging recommended to assess for stability or resolution.  4.  Nodular thickening of the left adrenal gland.  Nonspecific,  possibly reflecting underlying adrenal nodule or mass..  Dedicated  follow-up CT or MRI of the abdomen utilizing an adrenal mass protocol  can be performed  for further evaluation.

## 2025-04-11 NOTE — CARE PLAN
The patient's goals for the shift include      The clinical goals for the shift include no drainage from hendrickson site    Over the shift, the patient did not make progress toward the following goals. Barriers to progression include . Recommendations to address these barriers include .

## 2025-04-11 NOTE — PROGRESS NOTES
Daily Progress Note      Ronna Beckham is a 64 y.o. female admitted on 4/9/2025  1:20 PM for Chest Wall Hematoma, in setting of Pulmonary Nodules and Lymphadenopathy, Quiescent Systemic Lupus Erythematous.     Overnight:      There were no major events overnight.     Subjective   Patient was seen in her bed. There is no active complaints or active bleeding from hematoma site.     Objective   Vitals: I/O:   Vitals:    04/11/25 1059   BP: 118/70   Pulse: 94   Resp: 18   Temp: 36.8 °C (98.2 °F)   SpO2: 92%        24hr Min/Max:  Temp  Min: 36 °C (96.8 °F)  Max: 36.9 °C (98.4 °F)  Pulse  Min: 86  Max: 118  BP  Min: 101/62  Max: 136/79  Resp  Min: 16  Max: 18  SpO2  Min: 90 %  Max: 97 %   Intake/Output Summary (Last 24 hours) at 4/11/2025 1338  Last data filed at 4/11/2025 1156  Gross per 24 hour   Intake 386.28 ml   Output --   Net 386.28 ml        Net IO Since Admission: 386.28 mL [04/11/25 1338]      Physical Exam  Constitutional:       General: She is awake.   Cardiovascular:      Rate and Rhythm: Normal rate and regular rhythm.      Heart sounds: Normal heart sounds, S1 normal and S2 normal.   Pulmonary:      Effort: Pulmonary effort is normal.      Breath sounds: Normal breath sounds. No stridor or decreased air movement.      Comments: Auscultation of the chest notable for crisp vesicular breath sounds.   Chest:      Chest wall: Mass present.      Comments: Examination of the chest wall notable for Montez access site in place on the right, well bandaged. There is appearance of a grape/cherry sized, hard mass protruding out of the chest wall. Colour is very dark, more than maroon. The mass itself is not fluctuant. Palpation elicits pain. The mass itself has flecks of blood, no active bleeding.   Neurological:      General: No focal deficit present.      Mental Status: She is alert.   Psychiatric:         Attention and Perception: Attention and perception normal.         Behavior: Behavior is cooperative.           Laboratory Studies:      CBC RFP   Lab Results   Component Value Date    WBC 5.2 04/11/2025    HGB 8.1 (L) 04/11/2025    HCT 27.8 (L) 04/11/2025    MCV 76 (L) 04/11/2025    PLT 59 (L) 04/11/2025    NEUTROABS 2.73 03/16/2025    Lab Results   Component Value Date     04/11/2025    K 4.2 04/11/2025     04/11/2025    CO2 25 04/11/2025    BUN 15 04/11/2025    CREATININE 0.62 04/11/2025    CREATININE 0.78 04/10/2025     Lab Results   Component Value Date    MG 1.64 04/11/2025    PHOS 3.6 04/11/2025    CALCIUM 8.2 (L) 04/11/2025    ALBUMINELP 3.2 (L) 08/26/2024         Hepatic Function ABG/VBG   Lab Results   Component Value Date    ALT 25 04/11/2025    AST 18 04/11/2025     (H) 07/23/2019    ALKPHOS 151 (H) 04/11/2025     Lab Results   Component Value Date    BILIDIR 0.2 04/11/2025      Lab Results   Component Value Date    PROTIME 14.1 (H) 04/11/2025    APTT 28 04/11/2025    INR 1.3 (H) 04/11/2025    ALBUMINELP 3.2 (L) 08/26/2024    Lab Results   Component Value Date    LACTATE 1.7 11/20/2024        Results from last 7 days   Lab Units 04/11/25  0705 04/10/25  0113 04/09/25  1318   GLUCOSE mg/dL 119* 118* 120*       Imaging:    Imaging  CT angio chest w and wo IV contrast    Result Date: 4/9/2025  1.  Right-sided subclavian central venous line is identified with the distal tip extending to the cavoatrial junction.  Mild focal soft tissue swelling along surrounding the distal aspect of the central venous line within the upper chest wall suggestive for small hematoma.  Correlate clinically. 2.  No evidence of central pulmonary embolus. 3.  Multiple small irregular bilateral pulmonary nodules are reidentified with several new additional small pulmonary nodules as described above.  Findings may be infectious, inflammatory or neoplastic in etiology.  Correlate clinically.  Short-term follow-up CT imaging recommended to assess for stability or resolution. 4.  Nodular thickening of the left adrenal gland.   Nonspecific, possibly reflecting underlying adrenal nodule or mass..  Dedicated follow-up CT or MRI of the abdomen utilizing an adrenal mass protocol can be performed for further evaluation. Signed by Satinder Gilbert MD    XR chest 2 views    Result Date: 4/9/2025  Moderate enlargement of the cardiac silhouette with pulmonary vascular congestion     MACRO: None   Signed by: Kalin Silva 4/9/2025 2:36 PM Dictation workstation:   [id number]     Cardiology, Vascular, and Other Imaging  No other imaging results found for the past 2 days       EKG:      Encounter Date: 02/21/25   ECG 12 lead (Clinic Performed)    Narrative    Normal sinus rhythm at 92 bpm MN interval 170 ms QRS duration 110 ms QTc   500 ms incomplete right bundle branch block and left posterior fascicular   block       Medications:      Scheduled Medications:  PRN Medications:    [Held by provider] apixaban, 5 mg, oral, BID  aspirin, 81 mg, oral, Daily  calcium carbonate-vitamin D3, 2 tablet, oral, BID  dilTIAZem CD, 180 mg, oral, Daily  [START ON 4/13/2025] ergocalciferol, 1.25 mg, oral, Every Sunday  furosemide, 40 mg, oral, Daily  gabapentin, 800 mg, oral, TID  hydroxychloroquine, 200 mg, oral, BID  loperamide, 2 mg, oral, BID  macitentan, 10 mg, oral, Daily  magnesium chloride, 64 mg, oral, BID  melatonin, 5 mg, oral, Nightly  pantoprazole, 40 mg, oral, Daily before breakfast  polyethylene glycol, 17 g, oral, Daily  potassium chloride CR, 20 mEq, oral, BID  venlafaxine XR, 150 mg, oral, Daily     PRN medications: acetaminophen     Assessment/Plan     Assessment/Plan:   Ronna Beckham is a 64 y.o. female presenting with a past medical history of Pulmonary Arterial Hypertension Group 1, Atrial Fibrillation s/p Ablation (03/2025), Systemic Lupus Erythematosus that presents to the emergency department for worsening findings of chest wall. Recently had increasing mass on chest wall after Montez procedure from interventional radiology. Imaging  consistent with hematoma, continuing with conservative measures. Also findings of scattered pulmonary nodules and lymphadenopathy, concerning for neoplastic process versus infection process in the setting of lupus. Planning for upcoming bronchoscopy while off anticoagulation.     Updates  04/11  [] May need to reengage the vascular surgery team for commentary regarding hematoma that is more internal.   [] Will keep patient over the weekend for planned endobronchial ultrasound biopsy or interventional radiology guided biopsy of the axillary lymph node.   [] Found to have acute drop in the hemoglobin, repeat stable and minimally increased.     #Hematoma, s/p Montez Procedure, Active, Stable  :: Presenting with worsening findings of soft tissue mass on chest wall. Recently visited emergency department sutured and sent home. Now representing with worsening findings. A CT-angiogram showing findings of hematoma mass. Low concern of pseudoaneurysm formation with thrombus from vascular surgery standpoint. Can consider engaging interventional radiology for further investigation. Hemoglobin was acutely down, however with history of lupus, could be rheumatological in origin rather than active bleed.    - Consider vascular surgery input  - Consider interventional radiology input     #Pulmonary Nodules, Scattered, Bilateral  #Lymphadenopathy, Intra-Thoracic, Bilateral  :: Interestingly, there is findings of scattered pulmonary nodules with lymphadenopathy stretching from mediastinal to axillary. The most recent CT-scan to refer to from 11/2024 not nothing any of these findings, suggesting acute change. Given history of systemic lupus, neoplastic process can not be excluded, can consider lymphoma or other primary. Also findings of adrenal gland features that can not rule out neoplastic process, roughly 10-Hounsfield units. Can consider infectious process as well from fungal (Aspergillus spp.) to Mycobacterium tuberculosis.   ::  Up to date on mammogram without concerning findings, no colonoscopy on file. However, CT-abdomen/pelvis not showing any mass.   - Pending endobronchial ultrasound (EBUS) on 04/14 (tentative)     #Pulmonary Arterial Hypertension, Group 1  :: Currently receiving dual drug treatments for pulmonary arterial hypertension, with Montez in place. Also receiving montly Winrevair.   - C/w macitentatn (Opsumit) 10 mg PO every day   - C/w epoprostenol (Veletri) gtt     #Atrial Fibrillation, s/p ablation  #Coronary Artery Disease, Chronic, Stable  :: Recently managed through the cardiology electrophysiology team for findings of supraventricular tachycardia, was on apixaban for atrial fibrillation. Plans to hold for at least 48-hours until bleeding is contained.   - C/w aspirin 81 mg PO every day   - C/w diltiazem 180 mg PO every day   - C/w furosemide 40 mg PO every day     #Anemia, Microcytic,  #Thrombocytopenia, Acute, Stable    :: Findings of worsening thrombocytopenia, with previous baseline of >100 K/uL, now with counts in the 60s. Can be a feature of lupus issues, but also consider other potential differentials like acute illness or other. Also findings of microcytic anemia, pending ferritin. However likely to be anemia of chronic disease.   - C/w CBC with differential every day     #Systemic Lupus Erythematosus, Stable, Quiescent   :: Patient states that her lupus is currently getting managed by her primary care physician, although can not directly find records in the chart. Currently on Plaquenil testing. Denies any clinical symptoms of active flare including joint pain, muscle pain, issues with urination. If there are further issues, can consider rheumatology consultation or anti-double stranded DNA antibody testing for flare tracking.   - C/w hydroxychloroquine 200 mg PO BID      #Vitamin D Deficiency   :: Chronic, stable  - C/w vitamin D2 1.25 mg PO once weekly     #Peripheral Neuropathy  :: Chronic, stable  - C/w  gabapentin 800 mg PO TID     #Gastroesophageal Reflux Disease  :: Chronic, stable   - C/w pantoprazole 40 mg PO every day     #Major Depressive Disorder  :: Chronic, stable  - C/w venlafaxine 150 mg PO every day         F: PRN  E: PRN  N: Adult diet Regular  Access/Lines: peripheral IV (left)     DVT Ppx: contraindicated d/t concern for bleed (holding apixaban)   GI Ppx: pantoprazole 40 mg PO every day       Abx: none   O2: none     Pain regimen: acetaminophen 650 mg PO every day Q6H PRN   GI Laxative: Miralax 17 g PO every day     Code status: Full Code  NOK/Surrogate Decision Maker: Diana Vera (sister) [phone]    Patient assessment and plan staffed with the attending physician on service, Dr. Roxie Cottrell.     Ej Moreno MD  Categorical PGY-1  Department of Internal Medicine

## 2025-04-11 NOTE — PROGRESS NOTES
TCC met with patient at bedside for discharge assessment. Patient uses insurance transport for medical appts. DME- wheelchair (trunk of car), pharmacy- [facility]. Does patient feel safe- Yes, Current discharge recommendations- TBD. TCC will continue to follow for discharge planning.     04/11/25 1239   Discharge Planning   Living Arrangements Alone   Support Systems Family members   Assistance Needed TBD   Type of Residence Private residence   Number of Stairs to Enter Residence 0   Number of Stairs Within Residence 0   Do you have animals or pets at home? Yes   Type of Animals or Pets 1- Bird   Who is requesting discharge planning? Provider   Home or Post Acute Services None   Expected Discharge Disposition Home   Does the patient need discharge transport arranged? No   Financial Resource Strain   How hard is it for you to pay for the very basics like food, housing, medical care, and heating? Not hard   Housing Stability   In the last 12 months, was there a time when you were not able to pay the mortgage or rent on time? N   At any time in the past 12 months, were you homeless or living in a shelter (including now)? N   Transportation Needs   In the past 12 months, has lack of transportation kept you from medical appointments or from getting medications? no   In the past 12 months, has lack of transportation kept you from meetings, work, or from getting things needed for daily living? No   Stroke Family Assessment   Stroke Family Assessment Needed No   Intensity of Service   Intensity of Service 0-30 min       MILLIE Reyna, RN  [facility], [facility]  Transitional Care Coordinator, Mon-Fri  Cell: [phone], Office: [phone]  Email: [email or web address]

## 2025-04-11 NOTE — CONSULTS
"  Wound Care Consult     Visit Date: 4/11/2025      Patient Name: Ronna Beckham         MRN: [id number]           YOB: 1960     Reason for Consult: Recommendations for wound to right chest Montez site.         Wound History:   In brief Ronna Beckham is an 64 y.o. female presenting for bleeding from her Montez site. Pt developed a \"blood blister\" above the insertion site and has been compliant with her daily Eliquis for a fib. Pt originally presented to [facility] ED on 3/31/35 and had a figure 8 stitch place, however it has continued to ooze. Bleeding improved after 500 mg of topical TXA.            Wound Team Summary Assessment: Patient awake and alert. States she does not want anyone to touch or clean the wound for the fear it will start bleeding again. I suggested an Adaptic under the gauze so that it will not stick. But, she does not want to try that at this time. Dressing left as a piece of dry gauze as per patients request.      Wound Team Plan: Wound team will sign off. Please re-consult if needed.      SUZANNE GERHARDT, RN, BSN, CWOCN  4/11/2025  2:11 PM        "

## 2025-04-11 NOTE — CARE PLAN
The patient's goals for the shift include      The clinical goals for the shift include no drainage from hendrickson site    Over the shift, the patient did not make progress toward the following goals. Barriers to progression include wound to chest. Recommendations to address these barriers include wound care.

## 2025-04-11 NOTE — NURSING NOTE
Pt arrived to floor with ED nurse and transport. Pt veletri infusion bag dry and needed to be replaced at time of arrival. Pt on 4L oxygen, denies any respiratory distress, ambulated to bed from cart independently, denies any pain, call light within reach

## 2025-04-11 NOTE — CONSULTS
"Interventional Radiology Consult    HPI:  Ronna Beckham is a 64 y.o. with a PMHx of type 1 PAH managed by macitentan, epoprostenol infusions through a Hendrickson catheter that was placed 3/19/2025 and afib s/p ablation 3/24/2025 which she was then started on eliquis. Patient presented to the ED 4/1 with bleeding, pain and swelling near the hendrickson venotomy site. She was discharged in stable condition at that time. She re-presented 4/9 with similar concerns of pain, swelling, and bleeding at the same site. Patient remained stable.     Patient had the venotomy site sutured along with topical TXA on 4/1 in the ED. Upon interviewing patient, she stated that she started to pick at the \"blood blister\" (referring to the venotomy site) prior to presenting to the ED on 4/1. She continued to pick at it until the site started rebleeding 4/9 and decided to return to the ED.     On exam today, the large eschar was noted overlying the expected venotomy site. Patient states that the catheter was never exposed. She states that the venotomy site was fine until she started to irritate it by picking at it and that is when the \"blood blister\" formed. Does have some soreness in the right supraclavicular region, to be expected. There is no active bleeding.    On review of patients imaging and pertinent labs, there are no acute abnormalities. There is a small area of soft tissue dense material overlying the expected venotomy site on CT, which represents blood products. There is no radiographic evidence of catheter discontinuity, kinking, or additional abnormalities. No evidence of pseudoaneurysm or active bleeding.    Patient is thrombocytopenic with PLT 59, anemic with hgb of 8.1, WBC 5.2, and INR 1.3. Patient is HDS.    Assessment/Plan:  No acute intervention at this time  Allow eschar overlying venotomy site to heal and patient should not irritate it further  Additional management per primary team    Please reach out with any questions " or concerns. The above was communicated with the primary team.    Clint Penny MD, PGY-6  Vascular & Interventional Radiology  IR pager: [phone]    NON-Urgent on call weekends and after hours weekdays (5pm - 5am) IR pager: [phone]  Urgent & emergent on call weekends and after hours weekdays (5pm-7am) IR pager: [phone]      Past Medical/Surgical History:  Past Medical History:   Diagnosis Date    Anxiety     Depression     Does not refill medications appropriately 09/10/2024    GERD (gastroesophageal reflux disease)     Lupus     Personal history of other specified conditions 2020    History of seizure    Pulmonary hypertension (Multi)     Volume overload 09/10/2024     Past Surgical History:   Procedure Laterality Date    CARDIAC CATHETERIZATION N/A 2024    Procedure: Left Heart Cath, With LV, Angriography And Grafts;  Surgeon: Christiano Calvillo DO;  Location: [facility] Cardiac Cath Lab;  Service: Cardiovascular;  Laterality: N/A;    CARDIAC ELECTROPHYSIOLOGY PROCEDURE N/A 3/24/2025    Procedure: EP Study;  Surgeon: Chicho Le MD;  Location: [facility] Cardiac Cath Lab;  Service: Electrophysiology;  Laterality: N/A;  EP STUDY WITH POSSIBLE SVT ABLATION, POSSIBLE FLUTTER ABLATION WITH MAC CPT 93387 POSSIBLE 49750; PLEASE BOOK FOR 3 HOURS    CARDIAC ELECTROPHYSIOLOGY PROCEDURE N/A 3/24/2025    Procedure: Ablation SVT;  Surgeon: Chicho Le MD;  Location: [facility] Cardiac Cath Lab;  Service: Electrophysiology;  Laterality: N/A;    CARDIAC ELECTROPHYSIOLOGY PROCEDURE N/A 3/24/2025    Procedure: Ablation Atrial Flutter;  Surgeon: Chicho Le MD;  Location: [facility] Cardiac Cath Lab;  Service: Electrophysiology;  Laterality: N/A;    CHOLECYSTECTOMY      HYSTERECTOMY      OTHER SURGICAL HISTORY  2020    Cholecystectomy    OTHER SURGICAL HISTORY  2020    Esophagogastroduodenoscopy    OTHER SURGICAL HISTORY  2020    Colonoscopy    OTHER SURGICAL HISTORY  2020     section    OTHER  SURGICAL HISTORY  09/21/2020    Hysterectomy     Social History     Socioeconomic History    Marital status: Legally      Spouse name: Not on file    Number of children: Not on file    Years of education: Not on file    Highest education level: Not on file   Occupational History    Not on file   Tobacco Use    Smoking status: Former     Types: Cigarettes    Smokeless tobacco: Former   Vaping Use    Vaping status: Never Used   Substance and Sexual Activity    Alcohol use: Not Currently    Drug use: Not Currently     Types: Cocaine, Other     Comment: Meth    Sexual activity: Defer   Other Topics Concern    Not on file   Social History Narrative    Not on file     Social Drivers of Health     Financial Resource Strain: Low Risk  (4/11/2025)    Overall Financial Resource Strain (CARDIA)     Difficulty of Paying Living Expenses: Not hard at all   Food Insecurity: No Food Insecurity (4/10/2025)    Hunger Vital Sign     Worried About Running Out of Food in the Last Year: Never true     Ran Out of Food in the Last Year: Never true   Transportation Needs: No Transportation Needs (4/11/2025)    PRAPARE - Transportation     Lack of Transportation (Medical): No     Lack of Transportation (Non-Medical): No   Physical Activity: Inactive (8/27/2024)    Exercise Vital Sign     Days of Exercise per Week: 0 days     Minutes of Exercise per Session: 0 min   Stress: No Stress Concern Present (10/23/2024)    [employer] - Occupational Stress Questionnaire     Feeling of Stress : Not at all   Social Connections: Not on File (9/13/2024)    Received from [facility]    Social Connections     Connectedness: 0   Intimate Partner Violence: Not At Risk (4/10/2025)    Humiliation, Afraid, Rape, and Kick questionnaire     Fear of Current or Ex-Partner: No     Emotionally Abused: No     Physically Abused: No     Sexually Abused: No   Housing Stability: Low Risk  (4/11/2025)    Housing Stability Vital Sign      Unable to Pay for Housing in the Last Year: No     Number of Times Moved in the Last Year: 1     Homeless in the Last Year: No   Recent Concern: Housing Stability - High Risk (3/15/2025)    Housing Stability Vital Sign     Unable to Pay for Housing in the Last Year: No     Number of Times Moved in the Last Year: 2     Homeless in the Last Year: No       Objective:  Visit Vitals  /84 (BP Location: Left arm, Patient Position: Lying)   Pulse 100   Temp 37 °C (98.6 °F) (Temporal)   Resp 18     Lab Results   Component Value Date    CREATININE 0.62 04/11/2025    EGFR >90 04/11/2025    INR 1.3 (H) 04/11/2025    PROTIME 14.1 (H) 04/11/2025       Current Facility-Administered Medications:     acetaminophen (Tylenol) tablet 650 mg, 650 mg, oral, q6h PRN, Ej Moreno MD, 650 mg at 04/11/25 0836    [Held by provider] apixaban (Eliquis) tablet 5 mg, 5 mg, oral, BID, Azam Reyna MD    aspirin EC tablet 81 mg, 81 mg, oral, Daily, Manpreet Flores MD, 81 mg at 04/11/25 0833    calcium carbonate-vitamin D3 500 mg-5 mcg (200 unit) per tablet 2 tablet, 2 tablet, oral, BID, Manpreet Flores MD, 2 tablet at 04/11/25 0833    dilTIAZem CD (Cardizem CD) 24 hr capsule 180 mg, 180 mg, oral, Daily, Manpreet Flores MD, 180 mg at 04/11/25 0836    epoprostenol (Veletri) 0.3 mg/mL 9,000 mcg in sodium chloride 0.9% 100 mL (90 mcg/mL) bag, 67 ng/kg/min (Order-Specific), intravenous, Continuous, Manpreet Flores MD, Last Rate: 3.26 mL/hr at 04/11/25 1716, 67 ng/kg/min at 04/11/25 1716    [START ON 4/13/2025] ergocalciferol (Vitamin D-2) capsule 1.25 mg, 1.25 mg, oral, Every Sunday, Manpreet Flores MD    furosemide (Lasix) tablet 40 mg, 40 mg, oral, Daily, Manpreet Flores MD, 40 mg at 04/11/25 0951    gabapentin (Neurontin) capsule 800 mg, 800 mg, oral, TID, Manpreet Flores MD, 800 mg at 04/11/25 1508    hydroxychloroquine (Plaquenil) tablet 200 mg, 200 mg, oral, BID,  Manpreet Flores MD, 200 mg at 04/11/25 0835    loperamide (Imodium) capsule 2 mg, 2 mg, oral, BID, Manpreet Flores MD, 2 mg at 04/11/25 0835    macitentan (Opsumit) tablet tablet 10 mg, 10 mg, oral, Daily, Manpreet Flores MD, 10 mg at 04/11/25 0835    magnesium chloride (MagDelay) EC tablet 64 mg, 64 mg, oral, BID, Manpreet Flores MD, 64 mg at 04/11/25 0835    melatonin tablet 5 mg, 5 mg, oral, Nightly, Manpreet Flores MD, 5 mg at 04/10/25 2102    pantoprazole (ProtoNix) EC tablet 40 mg, 40 mg, oral, Daily before breakfast, Manpreet Flores MD, 40 mg at 04/11/25 0833    polyethylene glycol (Glycolax, Miralax) packet 17 g, 17 g, oral, Daily, Manpreet Flores MD    potassium chloride CR (Klor-Con M20) ER tablet 20 mEq, 20 mEq, oral, BID, Manpreet Flores MD, 20 mEq at 04/11/25 0835    venlafaxine XR (Effexor-XR) 24 hr capsule 150 mg, 150 mg, oral, Daily, Manpreet Flores MD, 150 mg at 04/11/25 0835

## 2025-04-12 ENCOUNTER — APPOINTMENT (OUTPATIENT)
Dept: RADIOLOGY | Facility: HOSPITAL | Age: 65
End: 2025-04-12
Payer: COMMERCIAL

## 2025-04-12 ENCOUNTER — APPOINTMENT (OUTPATIENT)
Dept: CARDIOLOGY | Facility: HOSPITAL | Age: 65
End: 2025-04-12
Payer: COMMERCIAL

## 2025-04-12 LAB
25(OH)D3 SERPL-MCNC: 81 NG/ML (ref 30–100)
ABO GROUP (TYPE) IN BLOOD: NORMAL
ALBUMIN SERPL BCP-MCNC: 3.2 G/DL (ref 3.4–5)
ALBUMIN SERPL BCP-MCNC: 3.4 G/DL (ref 3.4–5)
ALP SERPL-CCNC: 149 U/L (ref 33–136)
ALT SERPL W P-5'-P-CCNC: 19 U/L (ref 7–45)
AMPHETAMINES UR QL SCN: NORMAL
ANION GAP BLDA CALCULATED.4IONS-SCNC: 13 MMO/L (ref 10–25)
ANION GAP BLDV CALCULATED.4IONS-SCNC: 13 MMOL/L (ref 10–25)
ANION GAP SERPL CALC-SCNC: 15 MMOL/L (ref 10–20)
ANION GAP SERPL CALC-SCNC: 18 MMOL/L (ref 10–20)
ANTIBODY SCREEN: NORMAL
APPEARANCE UR: CLEAR
APTT PPP: 28 SECONDS (ref 26–36)
AST SERPL W P-5'-P-CCNC: 15 U/L (ref 9–39)
BARBITURATES UR QL SCN: NORMAL
BASE EXCESS BLDA CALC-SCNC: -1.4 MMOL/L (ref -2–3)
BASE EXCESS BLDV CALC-SCNC: -1.8 MMOL/L (ref -2–3)
BASOPHILS # BLD AUTO: 0.02 X10*3/UL (ref 0–0.1)
BASOPHILS # BLD MANUAL: 0 X10*3/UL (ref 0–0.1)
BASOPHILS # BLD MANUAL: 0 X10*3/UL (ref 0–0.1)
BASOPHILS NFR BLD AUTO: 0.2 %
BASOPHILS NFR BLD MANUAL: 0 %
BASOPHILS NFR BLD MANUAL: 0 %
BENZODIAZ UR QL SCN: NORMAL
BILIRUB DIRECT SERPL-MCNC: 0.3 MG/DL (ref 0–0.3)
BILIRUB SERPL-MCNC: 1.2 MG/DL (ref 0–1.2)
BILIRUB UR STRIP.AUTO-MCNC: NEGATIVE MG/DL
BLASTS # BLD MANUAL: 0 X10*3/UL
BLASTS NFR BLD MANUAL: 0 %
BODY TEMPERATURE: 37 DEGREES CELSIUS
BODY TEMPERATURE: ABNORMAL
BUN SERPL-MCNC: 14 MG/DL (ref 6–23)
BUN SERPL-MCNC: 14 MG/DL (ref 6–23)
BZE UR QL SCN: NORMAL
CA-I BLDA-SCNC: 1.08 MMOL/L (ref 1.1–1.33)
CA-I BLDV-SCNC: 1.12 MMOL/L (ref 1.1–1.33)
CALCIUM SERPL-MCNC: 8.1 MG/DL (ref 8.6–10.6)
CALCIUM SERPL-MCNC: 8.2 MG/DL (ref 8.6–10.6)
CANNABINOIDS UR QL SCN: NORMAL
CENTROMERE B AB SER-ACNC: <0.2 AI
CHLORIDE BLDA-SCNC: 97 MMOL/L (ref 98–107)
CHLORIDE BLDV-SCNC: 98 MMOL/L (ref 98–107)
CHLORIDE SERPL-SCNC: 100 MMOL/L (ref 98–107)
CHLORIDE SERPL-SCNC: 97 MMOL/L (ref 98–107)
CHROMATIN AB SERPL-ACNC: <0.2 AI
CO2 SERPL-SCNC: 22 MMOL/L (ref 21–32)
CO2 SERPL-SCNC: 23 MMOL/L (ref 21–32)
COLOR UR: ABNORMAL
CREAT SERPL-MCNC: 0.69 MG/DL (ref 0.5–1.05)
CREAT SERPL-MCNC: 0.71 MG/DL (ref 0.5–1.05)
DSDNA AB SER-ACNC: 1 IU/ML
EGFRCR SERPLBLD CKD-EPI 2021: >90 ML/MIN/1.73M*2
EGFRCR SERPLBLD CKD-EPI 2021: >90 ML/MIN/1.73M*2
ENA JO1 AB SER QL IA: <0.2 AI
ENA RNP AB SER IA-ACNC: <0.2 AI
ENA SCL70 AB SER QL IA: <0.2 AI
ENA SM AB SER IA-ACNC: <0.2 AI
ENA SM+RNP AB SER QL IA: <0.2 AI
ENA SS-A AB SER IA-ACNC: 0.2 AI
ENA SS-B AB SER IA-ACNC: <0.2 AI
EOSINOPHIL # BLD AUTO: 0 X10*3/UL (ref 0–0.7)
EOSINOPHIL # BLD MANUAL: 0 X10*3/UL (ref 0–0.7)
EOSINOPHIL # BLD MANUAL: 0 X10*3/UL (ref 0–0.7)
EOSINOPHIL NFR BLD AUTO: 0 %
EOSINOPHIL NFR BLD MANUAL: 0 %
EOSINOPHIL NFR BLD MANUAL: 0 %
ERYTHROCYTE [DISTWIDTH] IN BLOOD BY AUTOMATED COUNT: 17.9 % (ref 11.5–14.5)
ERYTHROCYTE [DISTWIDTH] IN BLOOD BY AUTOMATED COUNT: 18.1 % (ref 11.5–14.5)
ERYTHROCYTE [DISTWIDTH] IN BLOOD BY AUTOMATED COUNT: 18.3 % (ref 11.5–14.5)
FENTANYL+NORFENTANYL UR QL SCN: NORMAL
GLUCOSE BLDA-MCNC: 125 MG/DL (ref 74–99)
GLUCOSE BLDV-MCNC: 94 MG/DL (ref 74–99)
GLUCOSE SERPL-MCNC: 84 MG/DL (ref 74–99)
GLUCOSE SERPL-MCNC: 98 MG/DL (ref 74–99)
GLUCOSE UR STRIP.AUTO-MCNC: NORMAL MG/DL
HCO3 BLDA-SCNC: 22.2 MMOL/L (ref 22–26)
HCO3 BLDV-SCNC: 23.1 MMOL/L (ref 22–26)
HCT VFR BLD AUTO: 24.7 % (ref 36–46)
HCT VFR BLD AUTO: 27.3 % (ref 36–46)
HCT VFR BLD AUTO: 29.5 % (ref 36–46)
HCT VFR BLD EST: 25 % (ref 36–46)
HCT VFR BLD EST: 29 % (ref 36–46)
HGB BLD-MCNC: 7.6 G/DL (ref 12–16)
HGB BLD-MCNC: 7.7 G/DL (ref 12–16)
HGB BLD-MCNC: 8.6 G/DL (ref 12–16)
HGB BLDA-MCNC: 8.3 G/DL (ref 12–16)
HGB BLDV-MCNC: 9.6 G/DL (ref 12–16)
HYPOCHROMIA BLD QL SMEAR: NORMAL
IMM GRANULOCYTES # BLD AUTO: 0.58 X10*3/UL (ref 0–0.7)
IMM GRANULOCYTES # BLD AUTO: 0.66 X10*3/UL (ref 0–0.7)
IMM GRANULOCYTES # BLD AUTO: 0.66 X10*3/UL (ref 0–0.7)
IMM GRANULOCYTES NFR BLD AUTO: 6.3 % (ref 0–0.9)
IMM GRANULOCYTES NFR BLD AUTO: 6.7 % (ref 0–0.9)
IMM GRANULOCYTES NFR BLD AUTO: 7.3 % (ref 0–0.9)
INHALED O2 CONCENTRATION: 40 %
INHALED O2 CONCENTRATION: 70 %
INR PPP: 1.4 (ref 0.9–1.1)
KETONES UR STRIP.AUTO-MCNC: NEGATIVE MG/DL
LACTATE BLDA-SCNC: 0.5 MMOL/L (ref 0.4–2)
LACTATE BLDV-SCNC: 1.7 MMOL/L (ref 0.4–2)
LEUKOCYTE ESTERASE UR QL STRIP.AUTO: NEGATIVE
LYMPHOCYTES # BLD AUTO: 0.88 X10*3/UL (ref 1.2–4.8)
LYMPHOCYTES # BLD MANUAL: 0.27 X10*3/UL (ref 1.2–4.8)
LYMPHOCYTES # BLD MANUAL: 0.33 X10*3/UL (ref 1.2–4.8)
LYMPHOCYTES NFR BLD AUTO: 8.4 %
LYMPHOCYTES NFR BLD MANUAL: 3.4 %
LYMPHOCYTES NFR BLD MANUAL: 3.4 %
MAGNESIUM SERPL-MCNC: 1.6 MG/DL (ref 1.6–2.4)
MCH RBC QN AUTO: 21.9 PG (ref 26–34)
MCH RBC QN AUTO: 21.9 PG (ref 26–34)
MCH RBC QN AUTO: 22.2 PG (ref 26–34)
MCHC RBC AUTO-ENTMCNC: 28.2 G/DL (ref 32–36)
MCHC RBC AUTO-ENTMCNC: 29.2 G/DL (ref 32–36)
MCHC RBC AUTO-ENTMCNC: 30.8 G/DL (ref 32–36)
MCV RBC AUTO: 71 FL (ref 80–100)
MCV RBC AUTO: 76 FL (ref 80–100)
MCV RBC AUTO: 78 FL (ref 80–100)
METAMYELOCYTES # BLD MANUAL: 0 X10*3/UL
METAMYELOCYTES NFR BLD MANUAL: 0 %
METHADONE UR QL SCN: NORMAL
MONOCYTES # BLD AUTO: 0.78 X10*3/UL (ref 0.1–1)
MONOCYTES # BLD MANUAL: 0.21 X10*3/UL (ref 0.1–1)
MONOCYTES # BLD MANUAL: 0.51 X10*3/UL (ref 0.1–1)
MONOCYTES NFR BLD AUTO: 7.5 %
MONOCYTES NFR BLD MANUAL: 2.6 %
MONOCYTES NFR BLD MANUAL: 5.2 %
MYELOCYTES # BLD MANUAL: 0.17 X10*3/UL
MYELOCYTES # BLD MANUAL: 0.21 X10*3/UL
MYELOCYTES NFR BLD MANUAL: 1.7 %
MYELOCYTES NFR BLD MANUAL: 2.6 %
NEUTROPHILS # BLD AUTO: 8.08 X10*3/UL (ref 1.2–7.7)
NEUTROPHILS # BLD MANUAL: 7.32 X10*3/UL (ref 1.2–7.7)
NEUTROPHILS # BLD MANUAL: 8.7 X10*3/UL (ref 1.2–7.7)
NEUTROPHILS NFR BLD AUTO: 77.6 %
NEUTS BAND # BLD MANUAL: 0 X10*3/UL (ref 0–0.7)
NEUTS BAND # BLD MANUAL: 0.14 X10*3/UL (ref 0–0.7)
NEUTS BAND NFR BLD MANUAL: 0 %
NEUTS BAND NFR BLD MANUAL: 1.7 %
NEUTS SEG # BLD MANUAL: 7.18 X10*3/UL (ref 1.2–7)
NEUTS SEG # BLD MANUAL: 8.7 X10*3/UL (ref 1.2–7)
NEUTS SEG NFR BLD MANUAL: 88.8 %
NEUTS SEG NFR BLD MANUAL: 89.7 %
NITRITE UR QL STRIP.AUTO: NEGATIVE
NRBC BLD MANUAL-RTO: 0 % (ref 0–0)
NRBC BLD-RTO: 2.1 /100 WBCS (ref 0–0)
NRBC BLD-RTO: 3 /100 WBCS (ref 0–0)
NRBC BLD-RTO: 3.8 /100 WBCS (ref 0–0)
OPIATES UR QL SCN: NORMAL
OXYCODONE+OXYMORPHONE UR QL SCN: NORMAL
OXYHGB MFR BLDA: 95.8 % (ref 94–98)
OXYHGB MFR BLDV: 42.1 % (ref 45–75)
PCO2 BLDA: 32 MM HG (ref 38–42)
PCO2 BLDV: 39 MM HG (ref 41–51)
PCP UR QL SCN: NORMAL
PH BLDA: 7.45 PH (ref 7.38–7.42)
PH BLDV: 7.38 PH (ref 7.33–7.43)
PH UR STRIP.AUTO: 6 [PH]
PHOSPHATE SERPL-MCNC: 3.2 MG/DL (ref 2.5–4.9)
PHOSPHATE SERPL-MCNC: 3.9 MG/DL (ref 2.5–4.9)
PLASMA CELLS # BLD MANUAL: 0 X10*3/UL
PLASMA CELLS NFR BLD MANUAL: 0 %
PLATELET # BLD AUTO: 55 X10*3/UL (ref 150–450)
PLATELET # BLD AUTO: 66 X10*3/UL (ref 150–450)
PLATELET # BLD AUTO: 73 X10*3/UL (ref 150–450)
PO2 BLDA: 83 MM HG (ref 85–95)
PO2 BLDV: 29 MM HG (ref 35–45)
POTASSIUM BLDA-SCNC: 4 MMOL/L (ref 3.5–5.3)
POTASSIUM BLDV-SCNC: 4.6 MMOL/L (ref 3.5–5.3)
POTASSIUM SERPL-SCNC: 4.4 MMOL/L (ref 3.5–5.3)
POTASSIUM SERPL-SCNC: 4.6 MMOL/L (ref 3.5–5.3)
PROCALCITONIN SERPL-MCNC: 0.58 NG/ML
PROMYELOCYTES # BLD MANUAL: 0 X10*3/UL
PROMYELOCYTES NFR BLD MANUAL: 0 %
PROT SERPL-MCNC: 6.2 G/DL (ref 6.4–8.2)
PROT UR STRIP.AUTO-MCNC: ABNORMAL MG/DL
PROTHROMBIN TIME: 15.7 SECONDS (ref 9.8–12.4)
RBC # BLD AUTO: 3.47 X10*6/UL (ref 4–5.2)
RBC # BLD AUTO: 3.51 X10*6/UL (ref 4–5.2)
RBC # BLD AUTO: 3.88 X10*6/UL (ref 4–5.2)
RBC # UR STRIP.AUTO: ABNORMAL MG/DL
RBC #/AREA URNS AUTO: NORMAL /HPF
RBC MORPH BLD: ABNORMAL
RBC MORPH BLD: ABNORMAL
RBC MORPH BLD: NORMAL
RH FACTOR (ANTIGEN D): NORMAL
RIBOSOMAL P AB SER-ACNC: <0.2 AI
SAO2 % BLDA: 98 % (ref 94–100)
SAO2 % BLDV: 43 % (ref 45–75)
SODIUM BLDA-SCNC: 128 MMOL/L (ref 136–145)
SODIUM BLDV-SCNC: 129 MMOL/L (ref 136–145)
SODIUM SERPL-SCNC: 133 MMOL/L (ref 136–145)
SODIUM SERPL-SCNC: 133 MMOL/L (ref 136–145)
SP GR UR STRIP.AUTO: 1.02
TOTAL CELLS COUNTED BLD: 116
TOTAL CELLS COUNTED BLD: 117
UROBILINOGEN UR STRIP.AUTO-MCNC: NORMAL MG/DL
VARIANT LYMPHS # BLD MANUAL: 0.09 X10*3/UL (ref 0–0.5)
VARIANT LYMPHS NFR BLD: 0.9 %
WBC # BLD AUTO: 10.4 X10*3/UL (ref 4.4–11.3)
WBC # BLD AUTO: 8 X10*3/UL (ref 4.4–11.3)
WBC # BLD AUTO: 9.8 X10*3/UL (ref 4.4–11.3)
WBC #/AREA URNS AUTO: NORMAL /HPF

## 2025-04-12 PROCEDURE — 2500000004 HC RX 250 GENERAL PHARMACY W/ HCPCS (ALT 636 FOR OP/ED): Mod: SE

## 2025-04-12 PROCEDURE — 86235 NUCLEAR ANTIGEN ANTIBODY: CPT

## 2025-04-12 PROCEDURE — 84100 ASSAY OF PHOSPHORUS: CPT

## 2025-04-12 PROCEDURE — 85027 COMPLETE CBC AUTOMATED: CPT

## 2025-04-12 PROCEDURE — 71045 X-RAY EXAM CHEST 1 VIEW: CPT | Performed by: RADIOLOGY

## 2025-04-12 PROCEDURE — 82435 ASSAY OF BLOOD CHLORIDE: CPT

## 2025-04-12 PROCEDURE — 2500000005 HC RX 250 GENERAL PHARMACY W/O HCPCS: Mod: SE

## 2025-04-12 PROCEDURE — 93005 ELECTROCARDIOGRAM TRACING: CPT

## 2025-04-12 PROCEDURE — 2020000001 HC ICU ROOM DAILY

## 2025-04-12 PROCEDURE — 86036 ANCA SCREEN EACH ANTIBODY: CPT

## 2025-04-12 PROCEDURE — 36415 COLL VENOUS BLD VENIPUNCTURE: CPT

## 2025-04-12 PROCEDURE — 87631 RESP VIRUS 3-5 TARGETS: CPT

## 2025-04-12 PROCEDURE — 37799 UNLISTED PX VASCULAR SURGERY: CPT

## 2025-04-12 PROCEDURE — 80048 BASIC METABOLIC PNL TOTAL CA: CPT | Mod: CCI

## 2025-04-12 PROCEDURE — 5A0955A ASSISTANCE WITH RESPIRATORY VENTILATION, GREATER THAN 96 CONSECUTIVE HOURS, HIGH NASAL FLOW/VELOCITY: ICD-10-PCS | Performed by: STUDENT IN AN ORGANIZED HEALTH CARE EDUCATION/TRAINING PROGRAM

## 2025-04-12 PROCEDURE — 71045 X-RAY EXAM CHEST 1 VIEW: CPT

## 2025-04-12 PROCEDURE — 81001 URINALYSIS AUTO W/SCOPE: CPT

## 2025-04-12 PROCEDURE — 99233 SBSQ HOSP IP/OBS HIGH 50: CPT

## 2025-04-12 PROCEDURE — 82248 BILIRUBIN DIRECT: CPT

## 2025-04-12 PROCEDURE — 2500000001 HC RX 250 WO HCPCS SELF ADMINISTERED DRUGS (ALT 637 FOR MEDICARE OP): Mod: SE

## 2025-04-12 PROCEDURE — 85007 BL SMEAR W/DIFF WBC COUNT: CPT

## 2025-04-12 PROCEDURE — 87641 MR-STAPH DNA AMP PROBE: CPT

## 2025-04-12 PROCEDURE — 85025 COMPLETE CBC W/AUTO DIFF WBC: CPT

## 2025-04-12 PROCEDURE — 93010 ELECTROCARDIOGRAM REPORT: CPT | Performed by: INTERNAL MEDICINE

## 2025-04-12 PROCEDURE — 84145 PROCALCITONIN (PCT): CPT

## 2025-04-12 PROCEDURE — 86901 BLOOD TYPING SEROLOGIC RH(D): CPT

## 2025-04-12 PROCEDURE — 82306 VITAMIN D 25 HYDROXY: CPT

## 2025-04-12 PROCEDURE — 84075 ASSAY ALKALINE PHOSPHATASE: CPT

## 2025-04-12 PROCEDURE — 2500000002 HC RX 250 W HCPCS SELF ADMINISTERED DRUGS (ALT 637 FOR MEDICARE OP, ALT 636 FOR OP/ED): Mod: SE

## 2025-04-12 PROCEDURE — 87634 RSV DNA/RNA AMP PROBE: CPT

## 2025-04-12 PROCEDURE — 83735 ASSAY OF MAGNESIUM: CPT

## 2025-04-12 PROCEDURE — 87040 BLOOD CULTURE FOR BACTERIA: CPT

## 2025-04-12 PROCEDURE — 85610 PROTHROMBIN TIME: CPT

## 2025-04-12 PROCEDURE — 80307 DRUG TEST PRSMV CHEM ANLYZR: CPT

## 2025-04-12 PROCEDURE — 99291 CRITICAL CARE FIRST HOUR: CPT

## 2025-04-12 RX ORDER — VANCOMYCIN 2 GRAM/500 ML IN 0.9 % SODIUM CHLORIDE INTRAVENOUS
2000 ONCE
Status: COMPLETED | OUTPATIENT
Start: 2025-04-12 | End: 2025-04-13

## 2025-04-12 RX ORDER — ALBUTEROL SULFATE 0.83 MG/ML
2.5 SOLUTION RESPIRATORY (INHALATION) EVERY 4 HOURS PRN
Status: DISCONTINUED | OUTPATIENT
Start: 2025-04-12 | End: 2025-04-20 | Stop reason: HOSPADM

## 2025-04-12 RX ORDER — VANCOMYCIN HYDROCHLORIDE 1 G/20ML
INJECTION, POWDER, LYOPHILIZED, FOR SOLUTION INTRAVENOUS DAILY PRN
Status: DISCONTINUED | OUTPATIENT
Start: 2025-04-12 | End: 2025-04-17

## 2025-04-12 RX ORDER — MAGNESIUM SULFATE HEPTAHYDRATE 40 MG/ML
2 INJECTION, SOLUTION INTRAVENOUS ONCE
Status: DISCONTINUED | OUTPATIENT
Start: 2025-04-12 | End: 2025-04-12

## 2025-04-12 RX ORDER — VANCOMYCIN HYDROCHLORIDE 1 G/200ML
1000 INJECTION, SOLUTION INTRAVENOUS EVERY 12 HOURS
Status: DISCONTINUED | OUTPATIENT
Start: 2025-04-13 | End: 2025-04-14

## 2025-04-12 RX ORDER — MAGNESIUM SULFATE HEPTAHYDRATE 40 MG/ML
4 INJECTION, SOLUTION INTRAVENOUS ONCE
Status: COMPLETED | OUTPATIENT
Start: 2025-04-12 | End: 2025-04-12

## 2025-04-12 RX ADMIN — PIPERACILLIN SODIUM AND TAZOBACTAM SODIUM 4.5 G: 4; .5 INJECTION, SOLUTION INTRAVENOUS at 13:14

## 2025-04-12 RX ADMIN — MAGNESIUM SULFATE HEPTAHYDRATE 4 G: 40 INJECTION, SOLUTION INTRAVENOUS at 09:21

## 2025-04-12 RX ADMIN — ACETAMINOPHEN 650 MG: 325 TABLET, FILM COATED ORAL at 06:21

## 2025-04-12 RX ADMIN — MACITENTAN 10 MG: 10 TABLET, FILM COATED ORAL at 08:39

## 2025-04-12 RX ADMIN — VENLAFAXINE HYDROCHLORIDE 150 MG: 150 CAPSULE, EXTENDED RELEASE ORAL at 08:39

## 2025-04-12 RX ADMIN — LOPERAMIDE HYDROCHLORIDE 2 MG: 2 CAPSULE ORAL at 08:39

## 2025-04-12 RX ADMIN — ACETAMINOPHEN 650 MG: 325 TABLET, FILM COATED ORAL at 17:14

## 2025-04-12 RX ADMIN — HYDROXYCHLOROQUINE SULFATE 200 MG: 200 TABLET, FILM COATED ORAL at 08:38

## 2025-04-12 RX ADMIN — ASPIRIN 81 MG: 81 TABLET, COATED ORAL at 08:38

## 2025-04-12 RX ADMIN — GABAPENTIN 800 MG: 400 CAPSULE ORAL at 14:40

## 2025-04-12 RX ADMIN — PANTOPRAZOLE SODIUM 40 MG: 40 TABLET, DELAYED RELEASE ORAL at 06:09

## 2025-04-12 RX ADMIN — DILTIAZEM HYDROCHLORIDE 180 MG: 180 CAPSULE, COATED, EXTENDED RELEASE ORAL at 08:38

## 2025-04-12 RX ADMIN — SODIUM CHLORIDE, SODIUM LACTATE, POTASSIUM CHLORIDE, AND CALCIUM CHLORIDE 500 ML: .6; .31; .03; .02 INJECTION, SOLUTION INTRAVENOUS at 14:40

## 2025-04-12 RX ADMIN — Medication 50 L/MIN: at 21:43

## 2025-04-12 RX ADMIN — PIPERACILLIN SODIUM AND TAZOBACTAM SODIUM 4.5 G: 4; .5 INJECTION, SOLUTION INTRAVENOUS at 21:49

## 2025-04-12 RX ADMIN — EPOPROSTENOL 67 NG/KG/MIN: 1.5 INJECTION, POWDER, LYOPHILIZED, FOR SOLUTION INTRAVENOUS at 16:47

## 2025-04-12 RX ADMIN — ALBUTEROL SULFATE 2.5 MG: 2.5 SOLUTION RESPIRATORY (INHALATION) at 20:05

## 2025-04-12 RX ADMIN — Medication 64 MG: at 08:38

## 2025-04-12 RX ADMIN — FUROSEMIDE 40 MG: 40 TABLET ORAL at 08:39

## 2025-04-12 RX ADMIN — Medication 2 TABLET: at 08:39

## 2025-04-12 RX ADMIN — GABAPENTIN 800 MG: 400 CAPSULE ORAL at 08:39

## 2025-04-12 ASSESSMENT — COGNITIVE AND FUNCTIONAL STATUS - GENERAL
MOBILITY SCORE: 24
DAILY ACTIVITIY SCORE: 24

## 2025-04-12 ASSESSMENT — PAIN - FUNCTIONAL ASSESSMENT
PAIN_FUNCTIONAL_ASSESSMENT: 0-10

## 2025-04-12 ASSESSMENT — PAIN SCALES - GENERAL
PAINLEVEL_OUTOF10: 3
PAINLEVEL_OUTOF10: 0 - NO PAIN
PAINLEVEL_OUTOF10: 3
PAINLEVEL_OUTOF10: 0 - NO PAIN

## 2025-04-12 ASSESSMENT — PAIN DESCRIPTION - LOCATION: LOCATION: HEAD

## 2025-04-12 NOTE — CARE PLAN
Problem: Skin  Goal: Decreased wound size/increased tissue granulation at next dressing change  Outcome: Progressing  Goal: Participates in plan/prevention/treatment measures  Outcome: Progressing  Goal: Prevent/manage excess moisture  Outcome: Progressing  Goal: Prevent/minimize sheer/friction injuries  Outcome: Progressing  Goal: Promote/optimize nutrition  Outcome: Progressing  Goal: Promote skin healing  Outcome: Progressing       The clinical goals for the shift include Pt will be free from s/s of SOB throughout shift.      04/11/25 at 10:01 PM - ALESSIA DANIELS RN

## 2025-04-12 NOTE — SIGNIFICANT EVENT
Rapid Response Nurse Note: RADAR alert: 7    Pager time: [phone]  Arrival time: 828  Event end time: 832  Location: [place] [id number]  [x] Triage by phone or secure messaging    Rapid response initiated by:  [] Rapid response RN [] Family [] Nursing Supervisor [] Physician   [x] RADAR auto page [] Sepsis auto-page [] RN [] RT   [] NP/PA [] Other:     Primary reason for call:   [] BAT [] New CPAP/BiPAP [] Bleeding [] Change in mental status   [] Chest pain [] Code blue [] FiO2 >/= 50% [] HR </= 40 bpm   [] HR >/= 130 bpm [] Hyperglycemia [] Hypoglycemia [x] RADAR    [] RR </= 8 bpm [] RR >/= 30 bpm [] SBP </= 90 mmHg [] SpO2 < 90%   [] Seizure [] Sepsis [] Shortness of breath  [] Staff concern: see comments     Initial VS and/or RADAR VS: T 37.3 °C; ; RR 18; /61; SPO2 92%.    Providers present at bedside (if applicable):     Name of ICU Provider contacted (if applicable):     Interventions:  [x] None [] ABG/VBG [] Assist w/ICU transfer [] BAT paged    [] Bag mask [] Blood [] Cardioversion [] Code Blue   [] Code blue for intubation [] Code status changed [] Chest x-ray [] EKG   [] IV fluid/bolus [] KUB x-ray [] Labs/cultures [] Medication   [] Nebulizer treatment [] NIPPV (CPAP/BiPAP) [] Oxygen [] Oral airway   [] Peripheral IV [] Palliative care consult [] CT/MRI [] Sepsis protocol    [] Suctioned [] Other:     Outcome:  [] Coded and  [] Code blue for intubation [] Coded and transferred to ICU []  on division   [x] Remained on division (no change) [] Remained on division + additional monitoring [] Remained in ED [] Transferred to ED   [] Transferred to ICU [] Transferred to inpatient status [] Transferred for interventions (procedure) [] Transferred to ICU stepdown    [] Transferred to surgery [] Transferred to telemetry [] Sepsis protocol [] STEMI protocol   [] Stroke protocol [x] Bedside nurse instructed to page rapid response for any concerns or acute change in condition/VS     Additional Comments:      Radar auto-page received for a radar score of 7 with the above listed vital signs.  Vital signs were confirmed and reviewed with the primary RN.  She is at her current baseline and the primary RN has no present concerns.  There are no indications for interventions by Rapid Response at this time.  RN to contact Rapid Response with any future concerns or signs of clinical decompensation.

## 2025-04-12 NOTE — PROGRESS NOTES
Daily Progress Note      Ronna Beckham is a 64 y.o. female admitted on 4/9/2025  1:20 PM for Chest Wall Hematoma and Eschar, in setting of Pulmonary Nodules and Lymphadenopathy, Questionable Systemic Lupus Erythematous.     Overnight:      Patient was found to have tachycardia in the 120s around 2000, an EKG obtained showing most probably sinus tachycardia, although return of atrial fibrillation or even atrial flutter could not be ruled out.     Subjective   Patient was seen in her bed. There were no active complaints. Regarding her history, she reports that she was picking at her eschar several times before arriving to the hospital. She has a history of illicit drug abuse including methamphetamine, endorses that she quit this several months back at the time she discovered she had a diagnosis of pulmonary arterial hypertension.     In the afternoon, rapid response called for fatigue and tachycardia again in the setting of desaturations even on nasal cannula. Hemodynamic status other remarkable for being afebrile, but tachycardic with normal pressures. An EKG obtained during episode showing questionable sinus tachycardia versus atrial flutter or atrial fibrillation, history of known atrial fibrillation s/p ablation. Two sets of blood cultures obtained, venous blood gas notable for pH of 7.38, pCO2 of 39 mmHg, pO2 of 29 mmHg, lactate 1.7 mmol, and HCO3 of 23.1 mEq. A procalcitonin mildly elevated, a plain film showing stable findings. Off anti-coagulation, can consider a CT-pulmonary embolism. Cardiology fellow inquired regarding EKG findings, will have formal consultation tomorrow given concern for atrial fibrillation.     Objective   Vitals: I/O:   Vitals:    04/12/25 0306   BP: 104/62   Pulse: (!) 127   Resp: 18   Temp: 37.1 °C (98.8 °F)   SpO2: 92%        24hr Min/Max:  Temp  Min: 36.1 °C (97 °F)  Max: 37.1 °C (98.8 °F)  Pulse  Min: 90  Max: 127  BP  Min: 104/62  Max: 136/79  Resp  Min: 18  Max: 18  SpO2  Min:  92 %  Max: 95 %   Intake/Output Summary (Last 24 hours) at 4/12/2025 0718  Last data filed at 4/12/2025 0600  Gross per 24 hour   Intake 1535.08 ml   Output --   Net 1535.08 ml        Net IO Since Admission: 1,535.08 mL [04/12/25 0718]      Physical Exam  Constitutional:       General: She is awake.   Cardiovascular:      Rate and Rhythm: Normal rate and regular rhythm.      Heart sounds: Normal heart sounds, S1 normal and S2 normal.   Pulmonary:      Effort: Pulmonary effort is normal.      Breath sounds: Normal breath sounds. No stridor or decreased air movement.      Comments: Auscultation of the chest notable for crisp vesicular breath sounds.   Chest:      Chest wall: Mass present.      Comments: Examination of the chest wall notable for Montez access site in place on the right, well bandaged. There is appearance of a grape/cherry sized, hard mass protruding out of the chest wall. Colour is very dark, more than maroon. The mass itself is not fluctuant. Palpation elicits pain. The mass itself has flecks of blood, no active bleeding.   Neurological:      General: No focal deficit present.      Mental Status: She is alert.   Psychiatric:         Attention and Perception: Attention and perception normal.         Behavior: Behavior is cooperative.          Laboratory Studies:      CBC RFP   Lab Results   Component Value Date    WBC 5.2 04/11/2025    HGB 8.1 (L) 04/11/2025    HCT 27.8 (L) 04/11/2025    MCV 76 (L) 04/11/2025    PLT 59 (L) 04/11/2025    NEUTROABS 2.73 03/16/2025    Lab Results   Component Value Date     04/11/2025    K 4.2 04/11/2025     04/11/2025    CO2 25 04/11/2025    BUN 15 04/11/2025    CREATININE 0.62 04/11/2025    CREATININE 0.78 04/10/2025     Lab Results   Component Value Date    MG 1.64 04/11/2025    PHOS 3.6 04/11/2025    CALCIUM 8.2 (L) 04/11/2025    ALBUMINELP 3.2 (L) 08/26/2024         Hepatic Function ABG/VBG   Lab Results   Component Value Date    ALT 25 04/11/2025    AST  18 04/11/2025     (H) 07/23/2019    ALKPHOS 151 (H) 04/11/2025     Lab Results   Component Value Date    BILIDIR 0.2 04/11/2025      Lab Results   Component Value Date    PROTIME 14.1 (H) 04/11/2025    APTT 28 04/11/2025    INR 1.3 (H) 04/11/2025    ALBUMINELP 3.2 (L) 08/26/2024    Lab Results   Component Value Date    LACTATE 1.7 11/20/2024        Results from last 7 days   Lab Units 04/11/25  0705 04/10/25  0113 04/09/25  1318   GLUCOSE mg/dL 119* 118* 120*       Imaging:    Imaging  No results found.    Cardiology, Vascular, and Other Imaging  No other imaging results found for the past 2 days       EKG:      Encounter Date: 02/21/25   ECG 12 lead (Clinic Performed)    Narrative    Normal sinus rhythm at 92 bpm NE interval 170 ms QRS duration 110 ms QTc   500 ms incomplete right bundle branch block and left posterior fascicular   block       Medications:      Scheduled Medications:  PRN Medications:    [Held by provider] apixaban, 5 mg, oral, BID  aspirin, 81 mg, oral, Daily  calcium carbonate-vitamin D3, 2 tablet, oral, BID  dilTIAZem CD, 180 mg, oral, Daily  [START ON 4/13/2025] ergocalciferol, 1.25 mg, oral, Every Sunday  furosemide, 40 mg, oral, Daily  gabapentin, 800 mg, oral, TID  hydroxychloroquine, 200 mg, oral, BID  loperamide, 2 mg, oral, BID  macitentan, 10 mg, oral, Daily  magnesium chloride, 64 mg, oral, BID  melatonin, 5 mg, oral, Nightly  pantoprazole, 40 mg, oral, Daily before breakfast  polyethylene glycol, 17 g, oral, Daily  potassium chloride CR, 20 mEq, oral, BID  venlafaxine XR, 150 mg, oral, Daily     PRN medications: acetaminophen     Assessment/Plan     Assessment/Plan:   Ronna Beckham is a 64 y.o. female presenting with a past medical history of Pulmonary Arterial Hypertension Group 1, Atrial Fibrillation s/p Ablation (03/2025), Systemic Lupus Erythematosus that presents to the emergency department for worsening findings of chest wall. Recently had increasing mass on chest wall  after Montez procedure from interventional radiology. Imaging consistent with hematoma, continuing with conservative measures. Also findings of scattered pulmonary nodules and lymphadenopathy, concerning for neoplastic process versus infection process in the setting of lupus. However, given chart review and clinical history, the diagnosis of systemic lupus erythematosus is under question. Furthermore, now sinus tachycardia during this hospital stay, concerning for return of ablated atrial fibrillation/flutter, will consult cardiology for further recommendations.     Updates  04/12  [] Regarding rapid response today, pending full diagnostic work-up but most concerning for return of atrial fibrillation/flutter despite ablation. Will place official consultation tomorrow. Started on Zosyn given concern for hospital-acquired pneumonia, mildly elevated pro calcitonin.   [] The cell counts today notable for decreased hemoglobin again at 7.6 g/dL, consent obtained for blood products yesterday.   [] Also questionable diagnosis of systemic lupus erythematosus. Per chart review, the highest titer of anti-double stranded DNA was 4.0 IU/mL, which is negative. The anti-nuclear panel back in 2020 with a titer of 1:1280. Has been on hydroxychloroquine all this time, unclear if she even received an OCT-maculae. Placed further studies to rule in our out diagnosis of systemic lupus erythematosus. Would recommend morganley discontinuing the Plaquenil with negative testing and close rheumatology follow up for confirmation.     #Superficial Hematoma, Eschar Anterior Chest Wall s/p Montez Procedure, Active, Stable  :: Presenting with worsening findings of soft tissue mass on chest wall. Recently visited emergency department sutured and sent home. Now representing with worsening findings. A CT-angiogram showing findings of hematoma mass. Low concern of pseudoaneurysm formation with thrombus from vascular surgery and interventional  radiology standpoint, conservative management at this time.   :: Given history of illicit drug abuse, can consider also substance abuse induced vasculitis form methamphetamines. Also can consider toxic exposure of levamisole or xylazine. Will continue further studies to rule out vasculitis component.   - Consult Vascular Surgery, appreciate recommendations  - Consult Interventional Radiology, appreciate recommendations   - Pending ANCA   - Pending urine drug screen     #Pulmonary Nodules, Scattered, Bilateral  #Lymphadenopathy, Intra-Thoracic, Bilateral  :: Interestingly, there is findings of scattered pulmonary nodules with lymphadenopathy stretching from mediastinal to axillary. The most recent CT-scan to refer to from 11/2024 not nothing any of these findings, suggesting acute change. Given history of systemic lupus, neoplastic process can not be excluded, can consider lymphoma or other primary. Also findings of adrenal gland features that can not rule out neoplastic process, roughly 10-Hounsfield units. Can consider infectious process as well from fungal (Aspergillus spp.) to Mycobacterium tuberculosis.   :: Up to date on mammogram without concerning findings, no colonoscopy on file. However, CT-abdomen/pelvis not showing any mass.   - Pending endobronchial ultrasound (EBUS) on 04/14 (tentative)     #Pulmonary Arterial Hypertension, Group 1  :: Currently receiving dual drug treatments for pulmonary arterial hypertension, with Montez in place. Also receiving montly Winrevair.   - C/w macitentatn (Opsumit) 10 mg PO every day   - C/w epoprostenol (Veletri) gtt     #Atrial Fibrillation, s/p ablation  #Coronary Artery Disease, Chronic, Stable  :: Recently managed through the cardiology electrophysiology team for findings of supraventricular tachycardia, was on apixaban for atrial fibrillation. Plans to hold for at least 48-hours until bleeding is contained.   :: Now presenting with two episodes of tachycardia  thought to be sinus tachycardia, although can not entirely rule out return of atrial fibrillation or atrial flutter. Has been off anticoagulation for at least 72-hours at this rate, can not cardiovert. Continue with the diltiazem medication for now, have to hold anticoagulation given tenuous hemoglobin level.   - Consult Cardiology, appreciate recommendations   - C/w aspirin 81 mg PO every day   - C/w diltiazem 180 mg PO every day   - C/w furosemide 40 mg PO every day     #Anemia, Microcytic,  #Thrombocytopenia, Acute, Stable    :: Findings of worsening thrombocytopenia, with previous baseline of >100 K/uL, now with counts in the 60s. Can be a feature of lupus issues, but also consider other potential differentials like acute illness or other. Also findings of microcytic anemia, pending ferritin. However likely to be anemia of chronic disease.   - C/w CBC with differential every day     #Systemic Lupus Erythematosus, Stable, Questionable Diagnosis   :: Patient states that her lupus is currently getting managed by her primary care physician, although can not directly find records in the chart. Currently on Plaquenil testing. Denies any clinical symptoms of active flare including joint pain, muscle pain, issues with urination. Furthermore, there doesn't seem to be a history of this subjectively. Chart review demonstrates that she has a positive anti-nuclear panel with a 1:1280 titer, however the anti-double stranded DNA was 4.0 IU/mL which is negative; never had a positive anti-double stranded DNA. Unusual to be started on Plaquenil for this. Will repeat those laboratory studies as if this is not lupus, then would recommend discontinuing this medication entirely.    - C/w hydroxychloroquine 200 mg PO BID    - Pending urine drug screen  - Pending ANCA  - Pending RIOS   - Pending JAREK with RIOS reflex   - Pending anti-double stranded DNA antibody     #Vitamin D Deficiency   :: Chronic, stable  - C/w vitamin D2 1.25 mg PO  once weekly   - Pending vitamin D     #Peripheral Neuropathy  :: Chronic, stable  - C/w gabapentin 800 mg PO TID     #Gastroesophageal Reflux Disease  :: Chronic, stable   - C/w pantoprazole 40 mg PO every day     #Major Depressive Disorder  :: Chronic, stable  - C/w venlafaxine 150 mg PO every day         F: PRN  E: PRN  N: Adult diet Regular  Access/Lines: peripheral IV (left)     DVT Ppx: contraindicated d/t concern for bleed (holding apixaban)   GI Ppx: pantoprazole 40 mg PO every day       Abx: none   O2: none     Pain regimen: acetaminophen 650 mg PO every day Q6H PRN   GI Laxative: Miralax 17 g PO every day     Code status: Full Code  NOK/Surrogate Decision Maker: Diana Vera (sister) [phone]    Patient assessment and plan staffed with the attending physician on service, Dr. Roxie Cottrell.     Ej Moreno MD  Categorical PGY-1  Department of Internal Medicine

## 2025-04-12 NOTE — SIGNIFICANT EVENT
Cardiology was contacted to review patient's EKG to confirm it was sinus tachycardia.     On initial review of the EKG by the general cardiology fellow for 4/12/2025 11:26, EKG it was thought that patient was in sinus tach.     However on further review by the cardiology consult attending there was concern regarding atrial flutter. Patient on diltiazem for rate control.     Primary team was instructed to place a general cardiology consult for patient to be seen and further evaluation.     If any concerns in interim please reach out to CICU fellow for guidance before patient can be seen by the gen cards team on 4/13.

## 2025-04-12 NOTE — SIGNIFICANT EVENT
Rapid Response Follow Up    I was notified by primary RN that patient now with HR in the high 120s and with increased O2 demand after oxygen desaturation to 84% on baseline oxygen.    Oxygen increased from 4 to 6 liters.    Pushpa team at the bedside.    Patient reports increased SOB but better with increased O2.  She feels like her heart is racing.    EKG : Sinus Tachycardia    Blood cultures x 2, CBC, RFP, VBG ordered.    CXR ordered.    Started on zosyn.    VB.38, 39, 29, SvO2 43%, Lactate 1.7, HCO3 23.1    Primary team to defer CT PE at this time.  RN to contact Rapid Response with any further concerns or signs of clinical decompensation.

## 2025-04-13 ENCOUNTER — APPOINTMENT (OUTPATIENT)
Dept: RADIOLOGY | Facility: HOSPITAL | Age: 65
End: 2025-04-13
Payer: COMMERCIAL

## 2025-04-13 LAB
ALBUMIN SERPL BCP-MCNC: 2.8 G/DL (ref 3.4–5)
ALBUMIN SERPL BCP-MCNC: 3.1 G/DL (ref 3.4–5)
ALP SERPL-CCNC: 137 U/L (ref 33–136)
ALP SERPL-CCNC: 146 U/L (ref 33–136)
ALT SERPL W P-5'-P-CCNC: 16 U/L (ref 7–45)
ALT SERPL W P-5'-P-CCNC: 19 U/L (ref 7–45)
ANION GAP SERPL CALC-SCNC: 15 MMOL/L (ref 10–20)
ANION GAP SERPL CALC-SCNC: 17 MMOL/L
APTT PPP: 28 SECONDS (ref 26–36)
AST SERPL W P-5'-P-CCNC: 13 U/L (ref 9–39)
AST SERPL W P-5'-P-CCNC: 15 U/L (ref 9–39)
BACTERIA BLD AEROBE CULT: ABNORMAL
BACTERIA BLD AEROBE CULT: ABNORMAL
BACTERIA BLD CULT: ABNORMAL
BACTERIA BLD CULT: ABNORMAL
BACTERIA BLD CULT: NORMAL
BACTERIA BLD CULT: NORMAL
BASOPHILS # BLD AUTO: 0.03 X10*3/UL (ref 0–0.1)
BASOPHILS # BLD MANUAL: 0 X10*3/UL (ref 0–0.1)
BASOPHILS NFR BLD AUTO: 0.3 %
BASOPHILS NFR BLD MANUAL: 0 %
BILIRUB DIRECT SERPL-MCNC: 0.4 MG/DL (ref 0–0.3)
BILIRUB DIRECT SERPL-MCNC: 0.5 MG/DL (ref 0–0.3)
BILIRUB SERPL-MCNC: 1.3 MG/DL (ref 0–1.2)
BILIRUB SERPL-MCNC: 1.4 MG/DL (ref 0–1.2)
BLASTS # BLD MANUAL: 0 X10*3/UL
BLASTS NFR BLD MANUAL: 0 %
BNP SERPL-MCNC: 586 PG/ML (ref 0–99)
BUN SERPL-MCNC: 14 MG/DL (ref 6–23)
BUN SERPL-MCNC: 15 MG/DL (ref 6–23)
CALCIUM SERPL-MCNC: 7.5 MG/DL (ref 8.6–10.6)
CALCIUM SERPL-MCNC: 7.5 MG/DL (ref 8.6–10.6)
CHLORIDE SERPL-SCNC: 97 MMOL/L (ref 98–107)
CHLORIDE SERPL-SCNC: 98 MMOL/L (ref 98–107)
CO2 SERPL-SCNC: 22 MMOL/L (ref 21–32)
CO2 SERPL-SCNC: 23 MMOL/L (ref 21–32)
CREAT SERPL-MCNC: 0.75 MG/DL (ref 0.5–1.05)
CREAT SERPL-MCNC: 0.75 MG/DL (ref 0.5–1.05)
DACRYOCYTES BLD QL SMEAR: ABNORMAL
EGFRCR SERPLBLD CKD-EPI 2021: 90 ML/MIN/1.73M*2
EGFRCR SERPLBLD CKD-EPI 2021: 90 ML/MIN/1.73M*2
EOSINOPHIL # BLD AUTO: 0 X10*3/UL (ref 0–0.7)
EOSINOPHIL # BLD MANUAL: 0 X10*3/UL (ref 0–0.7)
EOSINOPHIL NFR BLD AUTO: 0 %
EOSINOPHIL NFR BLD MANUAL: 0 %
ERYTHROCYTE [DISTWIDTH] IN BLOOD BY AUTOMATED COUNT: 17.8 % (ref 11.5–14.5)
ERYTHROCYTE [DISTWIDTH] IN BLOOD BY AUTOMATED COUNT: 17.9 % (ref 11.5–14.5)
FLUAV RNA RESP QL NAA+PROBE: NOT DETECTED
FLUBV RNA RESP QL NAA+PROBE: NOT DETECTED
GLUCOSE BLD MANUAL STRIP-MCNC: 106 MG/DL (ref 74–99)
GLUCOSE BLD MANUAL STRIP-MCNC: 113 MG/DL (ref 74–99)
GLUCOSE BLD MANUAL STRIP-MCNC: 113 MG/DL (ref 74–99)
GLUCOSE BLD MANUAL STRIP-MCNC: 122 MG/DL (ref 74–99)
GLUCOSE SERPL-MCNC: 115 MG/DL (ref 74–99)
GLUCOSE SERPL-MCNC: 119 MG/DL (ref 74–99)
GRAM STN SPEC: ABNORMAL
HADV DNA SPEC QL NAA+PROBE: NOT DETECTED
HCT VFR BLD AUTO: 23.2 % (ref 36–46)
HCT VFR BLD AUTO: 23.9 % (ref 36–46)
HGB BLD-MCNC: 7.2 G/DL (ref 12–16)
HGB BLD-MCNC: 7.4 G/DL (ref 12–16)
HMPV RNA SPEC QL NAA+PROBE: NOT DETECTED
HOLD SPECIMEN: NORMAL
HPIV1 RNA SPEC QL NAA+PROBE: NOT DETECTED
HPIV2 RNA SPEC QL NAA+PROBE: NOT DETECTED
HPIV3 RNA SPEC QL NAA+PROBE: NOT DETECTED
HPIV4 RNA SPEC QL NAA+PROBE: NOT DETECTED
HYPOCHROMIA BLD QL SMEAR: ABNORMAL
IMM GRANULOCYTES # BLD AUTO: 0.67 X10*3/UL (ref 0–0.7)
IMM GRANULOCYTES # BLD AUTO: 0.9 X10*3/UL (ref 0–0.7)
IMM GRANULOCYTES NFR BLD AUTO: 7 % (ref 0–0.9)
IMM GRANULOCYTES NFR BLD AUTO: 9.5 % (ref 0–0.9)
INR PPP: 1.6 (ref 0.9–1.1)
LYMPHOCYTES # BLD AUTO: 0.69 X10*3/UL (ref 1.2–4.8)
LYMPHOCYTES # BLD MANUAL: 0.83 X10*3/UL (ref 1.2–4.8)
LYMPHOCYTES NFR BLD AUTO: 7.2 %
LYMPHOCYTES NFR BLD MANUAL: 8.7 %
MAGNESIUM SERPL-MCNC: 2.01 MG/DL (ref 1.6–2.4)
MAGNESIUM SERPL-MCNC: 2.13 MG/DL (ref 1.6–2.4)
MCH RBC QN AUTO: 21.8 PG (ref 26–34)
MCH RBC QN AUTO: 21.8 PG (ref 26–34)
MCHC RBC AUTO-ENTMCNC: 31 G/DL (ref 32–36)
MCHC RBC AUTO-ENTMCNC: 31 G/DL (ref 32–36)
MCV RBC AUTO: 70 FL (ref 80–100)
MCV RBC AUTO: 71 FL (ref 80–100)
METAMYELOCYTES # BLD MANUAL: 0 X10*3/UL
METAMYELOCYTES NFR BLD MANUAL: 0 %
MONOCYTES # BLD AUTO: 0.76 X10*3/UL (ref 0.1–1)
MONOCYTES # BLD MANUAL: 0.33 X10*3/UL (ref 0.1–1)
MONOCYTES NFR BLD AUTO: 7.9 %
MONOCYTES NFR BLD MANUAL: 3.5 %
MRSA DNA SPEC QL NAA+PROBE: DETECTED
MYELOCYTES # BLD MANUAL: 0.16 X10*3/UL
MYELOCYTES NFR BLD MANUAL: 1.7 %
NEUTROPHILS # BLD AUTO: 7.41 X10*3/UL (ref 1.2–7.7)
NEUTROPHILS # BLD MANUAL: 8.18 X10*3/UL (ref 1.2–7.7)
NEUTROPHILS NFR BLD AUTO: 77.6 %
NEUTS BAND # BLD MANUAL: 0 X10*3/UL (ref 0–0.7)
NEUTS BAND NFR BLD MANUAL: 0 %
NEUTS SEG # BLD MANUAL: 8.18 X10*3/UL (ref 1.2–7)
NEUTS SEG NFR BLD MANUAL: 86.1 %
NRBC BLD MANUAL-RTO: 0 % (ref 0–0)
NRBC BLD-RTO: 2.2 /100 WBCS (ref 0–0)
NRBC BLD-RTO: 2.4 /100 WBCS (ref 0–0)
OVALOCYTES BLD QL SMEAR: ABNORMAL
PHOSPHATE SERPL-MCNC: 4.2 MG/DL (ref 2.5–4.9)
PHOSPHATE SERPL-MCNC: 4.6 MG/DL (ref 2.5–4.9)
PLASMA CELLS # BLD MANUAL: 0 X10*3/UL
PLASMA CELLS NFR BLD MANUAL: 0 %
PLATELET # BLD AUTO: 56 X10*3/UL (ref 150–450)
PLATELET # BLD AUTO: 60 X10*3/UL (ref 150–450)
POLYCHROMASIA BLD QL SMEAR: ABNORMAL
POTASSIUM SERPL-SCNC: 3.7 MMOL/L (ref 3.5–5.3)
POTASSIUM SERPL-SCNC: 3.8 MMOL/L (ref 3.5–5.3)
PROCALCITONIN SERPL-MCNC: 0.74 NG/ML
PROMYELOCYTES # BLD MANUAL: 0 X10*3/UL
PROMYELOCYTES NFR BLD MANUAL: 0 %
PROT SERPL-MCNC: 5.5 G/DL (ref 6.4–8.2)
PROT SERPL-MCNC: 5.8 G/DL (ref 6.4–8.2)
PROTHROMBIN TIME: 17.2 SECONDS (ref 9.8–12.4)
RBC # BLD AUTO: 3.31 X10*6/UL (ref 4–5.2)
RBC # BLD AUTO: 3.39 X10*6/UL (ref 4–5.2)
RBC MORPH BLD: ABNORMAL
RBC MORPH BLD: NORMAL
RHINOVIRUS RNA UPPER RESP QL NAA+PROBE: NOT DETECTED
RSV RNA RESP QL NAA+PROBE: NOT DETECTED
SARS-COV-2 RNA RESP QL NAA+PROBE: NOT DETECTED
SODIUM SERPL-SCNC: 132 MMOL/L (ref 136–145)
SODIUM SERPL-SCNC: 132 MMOL/L (ref 136–145)
TOTAL CELLS COUNTED BLD: 115
VARIANT LYMPHS # BLD MANUAL: 0 X10*3/UL (ref 0–0.5)
VARIANT LYMPHS NFR BLD: 0 %
WBC # BLD AUTO: 9.5 X10*3/UL (ref 4.4–11.3)
WBC # BLD AUTO: 9.6 X10*3/UL (ref 4.4–11.3)

## 2025-04-13 PROCEDURE — 2500000004 HC RX 250 GENERAL PHARMACY W/ HCPCS (ALT 636 FOR OP/ED): Mod: SE

## 2025-04-13 PROCEDURE — 37799 UNLISTED PX VASCULAR SURGERY: CPT

## 2025-04-13 PROCEDURE — 94640 AIRWAY INHALATION TREATMENT: CPT

## 2025-04-13 PROCEDURE — 85610 PROTHROMBIN TIME: CPT

## 2025-04-13 PROCEDURE — 71275 CT ANGIOGRAPHY CHEST: CPT | Performed by: RADIOLOGY

## 2025-04-13 PROCEDURE — 2500000005 HC RX 250 GENERAL PHARMACY W/O HCPCS: Mod: SE

## 2025-04-13 PROCEDURE — 2500000002 HC RX 250 W HCPCS SELF ADMINISTERED DRUGS (ALT 637 FOR MEDICARE OP, ALT 636 FOR OP/ED): Mod: SE

## 2025-04-13 PROCEDURE — 99254 IP/OBS CNSLTJ NEW/EST MOD 60: CPT | Performed by: STUDENT IN AN ORGANIZED HEALTH CARE EDUCATION/TRAINING PROGRAM

## 2025-04-13 PROCEDURE — 99291 CRITICAL CARE FIRST HOUR: CPT

## 2025-04-13 PROCEDURE — 84100 ASSAY OF PHOSPHORUS: CPT

## 2025-04-13 PROCEDURE — 83880 ASSAY OF NATRIURETIC PEPTIDE: CPT

## 2025-04-13 PROCEDURE — 82248 BILIRUBIN DIRECT: CPT

## 2025-04-13 PROCEDURE — G0545 PR INHERENT VISIT TO INPT: HCPCS | Performed by: INTERNAL MEDICINE

## 2025-04-13 PROCEDURE — 99255 IP/OBS CONSLTJ NEW/EST HI 80: CPT | Performed by: INTERNAL MEDICINE

## 2025-04-13 PROCEDURE — 85060 BLOOD SMEAR INTERPRETATION: CPT | Performed by: PATHOLOGY

## 2025-04-13 PROCEDURE — 36415 COLL VENOUS BLD VENIPUNCTURE: CPT

## 2025-04-13 PROCEDURE — 82947 ASSAY GLUCOSE BLOOD QUANT: CPT

## 2025-04-13 PROCEDURE — 83735 ASSAY OF MAGNESIUM: CPT

## 2025-04-13 PROCEDURE — 87040 BLOOD CULTURE FOR BACTERIA: CPT

## 2025-04-13 PROCEDURE — 2550000001 HC RX 255 CONTRASTS: Mod: SE | Performed by: STUDENT IN AN ORGANIZED HEALTH CARE EDUCATION/TRAINING PROGRAM

## 2025-04-13 PROCEDURE — 85007 BL SMEAR W/DIFF WBC COUNT: CPT

## 2025-04-13 PROCEDURE — 2060000001 HC INTERMEDIATE ICU ROOM DAILY

## 2025-04-13 PROCEDURE — 85027 COMPLETE CBC AUTOMATED: CPT

## 2025-04-13 PROCEDURE — 84145 PROCALCITONIN (PCT): CPT

## 2025-04-13 PROCEDURE — 71275 CT ANGIOGRAPHY CHEST: CPT

## 2025-04-13 PROCEDURE — 85025 COMPLETE CBC W/AUTO DIFF WBC: CPT

## 2025-04-13 PROCEDURE — 80053 COMPREHEN METABOLIC PANEL: CPT

## 2025-04-13 RX ORDER — IPRATROPIUM BROMIDE AND ALBUTEROL SULFATE 2.5; .5 MG/3ML; MG/3ML
3 SOLUTION RESPIRATORY (INHALATION)
Status: DISCONTINUED | OUTPATIENT
Start: 2025-04-13 | End: 2025-04-14

## 2025-04-13 RX ORDER — PANTOPRAZOLE SODIUM 40 MG/10ML
40 INJECTION, POWDER, LYOPHILIZED, FOR SOLUTION INTRAVENOUS DAILY
Status: DISCONTINUED | OUTPATIENT
Start: 2025-04-13 | End: 2025-04-15

## 2025-04-13 RX ORDER — IPRATROPIUM BROMIDE AND ALBUTEROL SULFATE 2.5; .5 MG/3ML; MG/3ML
3 SOLUTION RESPIRATORY (INHALATION)
Status: DISCONTINUED | OUTPATIENT
Start: 2025-04-13 | End: 2025-04-13

## 2025-04-13 RX ORDER — CEFAZOLIN SODIUM 2 G/100ML
2 INJECTION, SOLUTION INTRAVENOUS EVERY 8 HOURS
Status: DISCONTINUED | OUTPATIENT
Start: 2025-04-13 | End: 2025-04-14

## 2025-04-13 RX ORDER — LIDOCAINE HYDROCHLORIDE 10 MG/ML
10 INJECTION, SOLUTION INFILTRATION; PERINEURAL ONCE
Status: COMPLETED | OUTPATIENT
Start: 2025-04-13 | End: 2025-04-13

## 2025-04-13 RX ORDER — ACETAMINOPHEN 10 MG/ML
1000 INJECTION, SOLUTION INTRAVENOUS
Status: DISCONTINUED | OUTPATIENT
Start: 2025-04-13 | End: 2025-04-20 | Stop reason: HOSPADM

## 2025-04-13 RX ORDER — HEPARIN SODIUM 5000 [USP'U]/ML
5000 INJECTION, SOLUTION INTRAVENOUS; SUBCUTANEOUS EVERY 8 HOURS
Status: DISCONTINUED | OUTPATIENT
Start: 2025-04-13 | End: 2025-04-14

## 2025-04-13 RX ORDER — HYDROMORPHONE HYDROCHLORIDE 1 MG/ML
0.2 INJECTION, SOLUTION INTRAMUSCULAR; INTRAVENOUS; SUBCUTANEOUS
Status: DISCONTINUED | OUTPATIENT
Start: 2025-04-13 | End: 2025-04-15

## 2025-04-13 RX ADMIN — IOHEXOL 85 ML: 350 INJECTION, SOLUTION INTRAVENOUS at 08:43

## 2025-04-13 RX ADMIN — VANCOMYCIN HYDROCHLORIDE 1000 MG: 1 INJECTION, SOLUTION INTRAVENOUS at 10:54

## 2025-04-13 RX ADMIN — IPRATROPIUM BROMIDE AND ALBUTEROL SULFATE 3 ML: .5; 3 SOLUTION RESPIRATORY (INHALATION) at 12:07

## 2025-04-13 RX ADMIN — PIPERACILLIN SODIUM AND TAZOBACTAM SODIUM 4.5 G: 4; .5 INJECTION, SOLUTION INTRAVENOUS at 02:35

## 2025-04-13 RX ADMIN — Medication 40 L/MIN: at 16:48

## 2025-04-13 RX ADMIN — CEFAZOLIN SODIUM 2 G: 2 INJECTION, SOLUTION INTRAVENOUS at 12:51

## 2025-04-13 RX ADMIN — LIDOCAINE HYDROCHLORIDE 10 ML: 10 INJECTION, SOLUTION INFILTRATION; PERINEURAL at 18:09

## 2025-04-13 RX ADMIN — IPRATROPIUM BROMIDE AND ALBUTEROL SULFATE 3 ML: .5; 3 SOLUTION RESPIRATORY (INHALATION) at 09:00

## 2025-04-13 RX ADMIN — HEPARIN SODIUM 5000 UNITS: 5000 INJECTION, SOLUTION INTRAVENOUS; SUBCUTANEOUS at 10:54

## 2025-04-13 RX ADMIN — IPRATROPIUM BROMIDE AND ALBUTEROL SULFATE 3 ML: .5; 3 SOLUTION RESPIRATORY (INHALATION) at 20:51

## 2025-04-13 RX ADMIN — HYDROMORPHONE HYDROCHLORIDE 0.2 MG: 1 INJECTION, SOLUTION INTRAMUSCULAR; INTRAVENOUS; SUBCUTANEOUS at 17:57

## 2025-04-13 RX ADMIN — CEFAZOLIN SODIUM 2 G: 2 INJECTION, SOLUTION INTRAVENOUS at 20:09

## 2025-04-13 RX ADMIN — HEPARIN SODIUM 5000 UNITS: 5000 INJECTION, SOLUTION INTRAVENOUS; SUBCUTANEOUS at 17:56

## 2025-04-13 RX ADMIN — PANTOPRAZOLE SODIUM 40 MG: 40 INJECTION, POWDER, FOR SOLUTION INTRAVENOUS at 14:02

## 2025-04-13 RX ADMIN — EPOPROSTENOL 67 NG/KG/MIN: 1.5 INJECTION, POWDER, LYOPHILIZED, FOR SOLUTION INTRAVENOUS at 16:37

## 2025-04-13 RX ADMIN — IPRATROPIUM BROMIDE AND ALBUTEROL SULFATE 3 ML: .5; 3 SOLUTION RESPIRATORY (INHALATION) at 16:48

## 2025-04-13 RX ADMIN — Medication 84 PERCENT: at 09:03

## 2025-04-13 RX ADMIN — PIPERACILLIN SODIUM AND TAZOBACTAM SODIUM 4.5 G: 4; .5 INJECTION, SOLUTION INTRAVENOUS at 06:33

## 2025-04-13 RX ADMIN — VANCOMYCIN HYDROCHLORIDE 1000 MG: 1 INJECTION, SOLUTION INTRAVENOUS at 22:26

## 2025-04-13 RX ADMIN — Medication 2000 MG: at 00:30

## 2025-04-13 RX ADMIN — ACETAMINOPHEN 1000 MG: 10 INJECTION INTRAVENOUS at 17:56

## 2025-04-13 ASSESSMENT — PAIN - FUNCTIONAL ASSESSMENT
PAIN_FUNCTIONAL_ASSESSMENT: 0-10

## 2025-04-13 ASSESSMENT — PAIN SCALES - GENERAL
PAINLEVEL_OUTOF10: 0 - NO PAIN
PAINLEVEL_OUTOF10: 3

## 2025-04-13 NOTE — NURSING NOTE
Pt transferred to MICU-28 on AirVo and Veletri running continuous through Montez. Pt's belonging's sent to MICU. Transferred w/ Rapid Response team.

## 2025-04-13 NOTE — H&P (VIEW-ONLY)
Inpatient consult to Cardiology  Consult performed by: Garyson Watters MD  Consult ordered by: Ashkan Thurman MD        History Of Present Illness:    Ronna Beckham is a 64 y.o. female with a PMHx of Pulmonary Arterial Hypertension Group 1 (Montez in place), Atrial Fibrillation s/p Ablation (03/2025 on Eliquis and PO diltiazem 180 mg), Systemic Lupus Erythematosus (questionable diagnosis) who initially presented to the ED on 4/9/2025 for worsening hematoma at the site of her Montez cathter, now transferred to MICU with increasing oxygen requirements necessitating AirVo 50L/70% (baseline oxygen 4L/min) and tachyarrhythmia for which cardiology team is consulted.     Patient hemodynamically stable this AM with stable oxygen requirements. No acute cardiac complaints.   EKGs reviewed:  EKG on 11/25/2024: Normal sinus rhythm with RBBB, left axis deviation   EKG on 3/15/2025: P axis in leads I and II are now negative indicating that this is unlikely a normal sinus rhythm but rather an ectopic atrial rhythm.   EKG 4/11 and 4/12/2025: ectopic atrial rhythm and RBBB. May be consistent with Atach or atypical Aflutter 2:1 conduction.     Blood cultures positive for GPC.       Echocardiogram 10/24/2024:   1. Left ventricular ejection fraction is hyperdynamic, by visual estimate at 75%.   2. Left ventricular cavity size is decreased.   3. Right ventricular volume and pressure overload.   4. There is moderately reduced right ventricular systolic function.   5. Severely enlarged right ventricle.   6. Severely elevated right ventricular systolic pressure.   7. The right atrium is severely dilated.   8. Severe tricuspid regurgitation visualized.   9. Small to moderate pericardial effusion.  10. There is no evidence of cardiac tamponade.  11. A dilated inferior vena cava demonstrates poor inspiratory collapse, consistent with elevated right atrial pressures.  12. Compared with study dated 4/24/2024, there is no significant  difference in RV size and function. There is again severe TR. The RVSP has increased from 89 to 105 mmHg. The pericardial effusion appears unchanged.     Last Recorded Vitals:  Vitals:    04/13/25 0700 04/13/25 0800 04/13/25 0900 04/13/25 0903   BP: 92/74 95/68 97/54    Pulse: 105 103 98    Resp: (!) 27 23 23    Temp:  36.8 °C (98.2 °F)     TempSrc:  Temporal     SpO2: 91% 97% 99% 98%   Weight:       Height:           Last Labs:  CBC - 4/13/2025:  4:05 AM  9.6 7.2 60    23.2      CMP - 4/13/2025:  4:05 AM  7.5 5.5 13 --- 1.4   4.6 2.8 16 137      PTT - 4/13/2025:  4:05 AM  1.6   17.2 28     Troponin I, High Sensitivity   Date/Time Value Ref Range Status   11/25/2024 12:37 PM 5 0 - 13 ng/L Final   11/25/2024 11:45 AM 6 0 - 13 ng/L Final   11/20/2024 12:27 PM 6 0 - 13 ng/L Final     B TYPE NATRIURETIC PEPTIDE (BNP)   Date/Time Value Ref Range Status   02/17/2025 03:58  (H) <100 pg/mL Final       Hemoglobin A1C   Date/Time Value Ref Range Status   10/03/2024 11:57 PM 5.7 (H) See comment % Final   08/26/2024 09:34 AM 5.4 see below % Final     LDL Calculated   Date/Time Value Ref Range Status   10/03/2024 11:57 PM 62 <=99 mg/dL Final       Past Medical History:  She has a past medical history of Anxiety, Depression, Does not refill medications appropriately (09/10/2024), GERD (gastroesophageal reflux disease), Lupus, Personal history of other specified conditions (09/21/2020), Pulmonary hypertension (Multi), and Volume overload (09/10/2024).    Past Surgical History:  She has a past surgical history that includes Other surgical history (09/21/2020); Other surgical history (09/21/2020); Other surgical history (09/21/2020); Other surgical history (09/21/2020); Other surgical history (09/21/2020); Hysterectomy; Cholecystectomy; Cardiac catheterization (N/A, 1/8/2024); Cardiac electrophysiology procedure (N/A, 3/24/2025); Cardiac electrophysiology procedure (N/A, 3/24/2025); and Cardiac electrophysiology procedure  (N/A, 3/24/2025).      Social History:  She reports that she has quit smoking. Her smoking use included cigarettes. She has quit using smokeless tobacco. She reports that she does not currently use alcohol. She reports that she does not currently use drugs after having used the following drugs: Cocaine and Other.    Family History:  Family History   Problem Relation Name Age of Onset    No Known Problems Mother      No Known Problems Father          Allergies:  Levetiracetam    Inpatient Medications:  Scheduled medications   Medication Dose Route Frequency    [Transfer Hold] apixaban  5 mg oral BID    aspirin  81 mg oral Daily    calcium carbonate-vitamin D3  2 tablet oral BID    [Held by provider] dilTIAZem CD  180 mg oral Daily    ergocalciferol  1.25 mg oral Every Sunday    [Held by provider] furosemide  40 mg oral Daily    gabapentin  800 mg oral TID    hydroxychloroquine  200 mg oral BID    ipratropium-albuteroL  3 mL nebulization q6h    loperamide  2 mg oral BID    macitentan  10 mg oral Daily    magnesium chloride  64 mg oral BID    melatonin  5 mg oral Nightly    oxygen   inhalation Continuous - Inhalation    pantoprazole  40 mg oral Daily before breakfast    piperacillin-tazobactam  4.5 g intravenous q6h    polyethylene glycol  17 g oral Daily    [Held by provider] potassium chloride CR  20 mEq oral BID    vancomycin  1,000 mg intravenous q12h    venlafaxine XR  150 mg oral Daily     PRN medications   Medication    acetaminophen    albuterol    vancomycin     Continuous Medications   Medication Dose Last Rate    epoprostenol  67 ng/kg/min (Order-Specific) 67 ng/kg/min (04/12/25 2146)     Outpatient Medications:  Current Outpatient Medications   Medication Instructions    aspirin 81 mg, oral, Daily    calcium carbonate-vitamin D3 500 mg-5 mcg (200 unit) tablet 2 tablets, oral, 2 times daily    cholecalciferol (VITAMIN D-3) 25 mcg, oral, 2 times daily    dilTIAZem CD (CARDIZEM CD) 180 mg, oral, Daily     Eliquis 5 mg, oral, 2 times daily    epoprostenol (VELETRI) 1,500 mcg, central venous line infusion, Continuous, Reconstitute contents of vial with 5 ml diluent and mix cassette as directed. Infuse continuously per physician orders. Allow for priming volume. Dose range: 1-150 ng/kg/min.    ergocalciferol (Vitamin D-2) 1.25 MG (83453 UT) capsule 1 capsule, Once Weekly    furosemide (LASIX) 40 mg, oral, Daily    gabapentin (NEURONTIN) 800 mg, oral, 3 times daily    hydroxychloroquine (PLAQUENIL) 200 mg, oral, 2 times daily    loperamide (IMODIUM) 2 mg, 2 times daily    Mag 64 64 mg, oral, 2 times daily    melatonin 5 mg, oral, Nightly    multivitamin-iron-folic acid  mg-mcg tablet 1 tablet, Daily    omeprazole (PRILOSEC) 40 mg, oral, Daily before breakfast, Do not crush or chew.    Opsumit 10 mg, oral, Daily    potassium chloride CR 20 mEq ER tablet 20 mEq, oral, 2 times daily, Do not crush or chew.    sotatercept-csrk 60 mg kit Inject one dose under the skin every 21 days .     venlafaxine XR (EFFEXOR-XR) 150 mg, oral, Daily, Do not crush or chew.       Physical Exam:  General: NAD, AOx3  HEENT: EOMI, MMM, no LAD, no thyroid nodule or enlargement.   Neck: -JVD, supple  Cardiac: Normal S, S2. No murmurs noted  Lungs:  Good bilateral air entry, clear lungs bilaterally  Abdomen: Soft, non-tender, Non-distended   Extremities: No LE edema   Neuro: AOx3, no apparent focal deficits       Assessment/Plan   Ronna Beckham is a 64 y.o. female with a PMHx of Pulmonary Arterial Hypertension Group 1 (Montez in place) with severly dilated RV and RV dysfunction, Atrial Fibrillation s/p Ablation (03/2025) on Eliquis, Systemic Lupus Erythematosus (questionable diagnosis) who initially presented to the ED on 4/9/2025 for worsening hematoma at the site of her Montez cathter, now transferred to MICU with increasing oxygen requirements necessitating AirVo 50L/70% (baseline oxygen 4L/min) and tachyarrhythmia for which cardiology  team is consulted.     # Tachyarrhythmia likely Atach  or atypical Aflutter 2:1 conduction  EKGs reviewed:  EKG on 11/25/2024: Normal sinus rhythm with RBBB, left axis deviation   EKG on 3/15/2025: P axis in leads I and II are now negative indicating that this is unlikely a normal sinus rhythm but rather an ectopic atrial rhythm.   EKG 4/11 and 4/12/2025: ectopic atrial rhythm and RBBB. May be consistent with Atach  or atypical Aflutter 2:1 conduction.       Recommendations:  - Her Atrial arhythmia is likely driven by her severe pulmonary disease and bacteremia  - May be difficult to rate control and may require DCCV  - Patient with moderately reduced RV function. Would be very cautious and if able avoid BB or CCB as it may lead to acute RV failure and decompensation. Especially that Atach or flutter is notoriously known to be difficult yo control and would not achieve adequate control with BB or CCB  - Would target a -120 (no need for aggressive rate control in this acute critical setting)  - IV digoxin may not be ideal in atach and unlikely to help.   - Patient has been off AC due to pocket hematoma and as such would avoid antiarrythmic meds. If patient is hemodynamically unstable, then IV amio may be considered (has AV alexander blocking effects in first 24-48 hrs) with low conversion rate but risk remains there for chemical cardioversion and stroke. As such would not resort to IV amio unless necessary.   - maintain Mg> 2 and K>4  - Once patient clears her bacteremia is medically optimized, will likely need to involve EP for cardioversion.      Case dicussed with Dr. Randle  Cardiology will continue to follow      Grayson Watters MD

## 2025-04-13 NOTE — SIGNIFICANT EVENT
Rapid Response Nurse Note: [x] Rapid Response   Pager time: 2023  Arrival time: 2025  Event end time: 2120  Location:  5056      Rapid response initiated by:  [] Rapid Response RN [] Family [] Nursing Supervisor [] Physician   [] RADAR auto-page [] Sepsis auto-page [x] RN [] RT   [] NP/PA [] Other:     Primary reason for call:   [] BAT [] New CPAP/BiPAP [] Bleeding [] Change in mental status   [] Chest pain [] Code blue [] FiO2 >/= 50% [] HR </= 40 bpm   [] HR >/= 130 bpm [] Hyperglycemia [] Hypoglycemia [] RADAR    [] RR </= 8 bpm [] RR >/= 30 bpm [] SBP </= 90 mmHg [x] SpO2 < 90%   [] Seizure [] Sepsis [] Staff concern:     Initial VS and/or RADAR VS:  Radar during Rapid (8)  vital signs in bold below  Vitals:    04/12/25 1146 04/12/25 1535 04/12/25 1931 04/12/25 2100   BP: 103/63 113/65 110/66 127/77   BP Location: Left arm Left arm     Patient Position: Lying Lying     Pulse: (!) 118 (!) 118 (!) 125 (!) 119   Resp: 22 20 20 26   Temp: 37.2 °C (99 °F) 37.2 °C (99 °F) 37.5 °C (99.5 °F)    TempSrc: Temporal Temporal Temporal    SpO2: 92% 92% 90% 95%   Weight:       Height:            Providers present at bedside (if applicable): Dr JULIANA Reina ([facility]) & Dr Moreno ([facility])  Objective/Subjective data relevant to event (if applicable): Called to bedside for increased FIO2 requirements  Interventions:  [] None [x] ABG [] Assist w/ICU transfer [] BAT paged    [] Bag mask [] Blood [] Cardioversion [] Code Blue   [] Code blue for intubation [] Code status changed [x] Chest x-ray [] EKG   [] IV fluid/bolus [] KUB x-ray [] Labs/cultures [] Medication   [] Nebulizer treatment [] NIPPV (CPAP/BiPAP) [] Oxygen [] Oral airway   [] Peripheral IV [] Palliative care consult [] CT/MRI [] Sepsis protocol    [] Suctioned [x] Other: AirVO     ABG obtained on 50 Liters 70 % AirVo  Recent Labs     04/12/25  2053 10/04/24  0332 10/03/24  2157   PHART 7.45* 7.34* 7.43*   [id number] 32* 37* 33*   PO2ART 83* 66* 68*   MXQ0HSC 22.2 20.0* 21.9*    SO2ART 98 91* 92*   BEART -1.4 -5.3* -1.9   LACTATEART 0.5 0.4 0.9       Name of ICU Provider contacted (if applicable): Dr Nj (MICU fellow)   Outcome:  [] Coded and  [] Code blue for intubation [] Coded and transferred to ICU []  on division   [] Remained on division (no change) [] Remained on division + additional monitoring [] Remained in ED [] Transferred to ED   [x] Transferred to ICU [] Transferred to inpatient status [] Transferred for interventions (procedure) [] Transferred to ICU stepdown    [] Transferred to surgery [] Transferred to telemetry [] Sepsis protocol [] STEMI protocol   [] Stroke protocol [] Bedside nurse instructed to page rapid response for any concerns or acute change in condition/VS     Additional Comments: Upon arrival to room, RT at bedside.  Per RT, pt's FIO2 requirements increased from 6 liters nasal cannula to to 15 liters High flow nasal cannula.  Pt is normally on 4 liters nasal cannula at baseline.  Pox 88 % on HFNC.  Lung sounds with expiratory wheezes.  Rt reported that a nebulizer treatment was given without improvement.  Pt was placed on AirVo with improvement in pox to 95 %.  ABG obtained on AirVo.  Dr Nj notified regarding results of ABG and new chest x ray.  Pt was accepted to MICU.  Report called to MICU by bedside RN.  Pt transferred on monitored bed while on 100 % NRB.  Transfer to MICU was uneventful.

## 2025-04-13 NOTE — SIGNIFICANT EVENT
Reassessed patient after call from primary team. HR remains <120 (mostly 110s) and SBP >120, and on exam she feels well, mild crackles on lung auscultation, extremities warm and without significant edema. Telemetry still probably atrial flutter.     OK to remain on telemetry floor for now. Recommended continuing rate control (has not restarted apixaban yet) with PO diltiazem. If HR persistently above 120, consider switching to diltiazem infusion starting at 5 mg/hr if there is BP room.

## 2025-04-13 NOTE — SIGNIFICANT EVENT
Patient was a code white on LT 5 transferred to MICU on NRB, placed on AIRVO 50L/70%. Pox 96%.    Allergy; Do Not Use Extremity;

## 2025-04-13 NOTE — CONSULTS
Vancomycin Dosing by Pharmacy  INITIAL CONSULT      Ronna Beckham is a 64 y.o. female who Pharmacy is consulted to dose vancomycin for pneumonia.     Based on the patient's indication and renal status, this patient will be dosed based on a goal vancomycin AUC of 400-600.     Renal function is currently stable.    Estimated Creatinine Clearance: 84.9 mL/min (by C-G formula based on SCr of 0.69 mg/dL).    Results from last 7 days   Lab Units 04/12/25  1253 04/12/25  0545 04/12/25  0544 04/11/25  1227 04/11/25  0705 04/10/25  0949 04/10/25  0113   CREATININE mg/dL 0.69 0.71  --   --  0.62  --  0.78   BUN mg/dL 14 14  --   --  15  --  15   WBC AUTO x10*3/uL 9.8  --  8.0 5.2 5.2   < > 5.8    < > = values in this interval not displayed.        Visit Vitals  /66   Pulse (!) 125   Temp 37.5 °C (99.5 °F) (Temporal)   Resp 20       Staph/MRSA Screen Culture   Date/Time Value Ref Range Status   10/23/2024 12:06 PM (A)  Final    Isolated: Methicillin Resistant Staphylococcus aureus (MRSA)     Blood Culture   Date/Time Value Ref Range Status   04/12/2025 12:53 PM Loaded on Instrument - Culture in progress  Preliminary   04/12/2025 12:53 PM Loaded on Instrument - Culture in progress  Preliminary        No results found for the last 90 days.      Assessment/Plan     Will order loading dose of 2000 mg followed by maintenance dose of 1000 mg every 12 hours. This dosing regimen is predicted by [employer] to result in the following pharmacokinetic parameters:  Loading dose: 2000 mg at 22:00 04/12/2025.  Regimen: 1000 mg IV every 12 hours.  Start time: 10:00 on 04/13/2025  Exposure target: AUC24 (range)400-600 mg/L.hr   [id number]: 490 mg/L.hr  AUC24,ss: 520 mg/L.hr  Probability of AUC24 > 400: 76 %  Ctrough,ss: 16.2 mg/L  Probability of Ctrough,ss > 20: 33 %    Vancomycin follow-up level will be ordered for 4/14 at AM , unless clinically indicated sooner.  Will continue to monitor renal function daily while on vancomycin and  order serum creatinine at least every 48 hours if not already ordered.  Will follow for continued vancomycin needs, clinical response, and signs/symptoms of toxicity.       Norm Candelaria, PharmD

## 2025-04-13 NOTE — POST-PROCEDURE NOTE
Interventional Radiology Brief Postprocedure Note    Briefly, pt is a 63yo F with Type 1 pulmonary hypertension receiving epoprostenol infusions through Montez catheter, afib on Eliquis currently admitted to MICU for acute on chronic hypoxic respiratory failure. Pt also developed a hematoma at the venotomy site of her Montez catheter. Multiple bottles of blood cultures returned positive for Staph aureus, IR was consulted for Montez line removal.    The pt was prepped in sterile technique and 1% lidocaine was administered subcutaneously. Blunt dissection was performed around the the cuff of the Right sided Montez catheter, allowing successful removal of the catheter. Pressure dressing was placed following catheter removal. Pt tolerated the procedure well without immediate complications.      Michael Merino MD PGY-3  Diagnostic Radiology

## 2025-04-13 NOTE — PROGRESS NOTES
"Medical Intensive Care - Daily Progress Note   Subjective    Ronna Beckham is a 64 y.o. year old female patient admitted on 4/9/2025 with following ICU needs: acute vs subacute on chronic hypoxic respiratory failure requiring airvo.     Interval History:  Transferred to the MICU overnight, no acute events since. She is doing ok this morning, reports some neck pain due to how she slept but ROS is otherwise -ve.     Meds    Scheduled medications  [Transfer Hold] apixaban, 5 mg, oral, BID  aspirin, 81 mg, oral, Daily  calcium carbonate-vitamin D3, 2 tablet, oral, BID  [Held by provider] dilTIAZem CD, 180 mg, oral, Daily  ergocalciferol, 1.25 mg, oral, Every Sunday  [Held by provider] furosemide, 40 mg, oral, Daily  gabapentin, 800 mg, oral, TID  hydroxychloroquine, 200 mg, oral, BID  loperamide, 2 mg, oral, BID  macitentan, 10 mg, oral, Daily  magnesium chloride, 64 mg, oral, BID  melatonin, 5 mg, oral, Nightly  oxygen, , inhalation, Continuous - Inhalation  pantoprazole, 40 mg, oral, Daily before breakfast  piperacillin-tazobactam, 4.5 g, intravenous, q6h  polyethylene glycol, 17 g, oral, Daily  [Held by provider] potassium chloride CR, 20 mEq, oral, BID  vancomycin, 1,000 mg, intravenous, q12h  venlafaxine XR, 150 mg, oral, Daily      Continuous medications  epoprostenol, 67 ng/kg/min (Order-Specific), Last Rate: 67 ng/kg/min (04/12/25 2144)      PRN medications  PRN medications: acetaminophen, albuterol, vancomycin     Objective    Blood pressure 92/74, pulse 105, temperature 36 °C (96.8 °F), temperature source Temporal, resp. rate (!) 27, height 1.651 m (5' 5\"), weight 72.7 kg (160 lb 4.4 oz), SpO2 91%.     Physical Exam   General: NAD. A&Ox4.   HEENT: NC/AT. Pupils reactive to light.   Cardiac: Tachycardic, sinus. No m/r/g.   Pulmonary: Coarse breath sounds most prominent in bilateral lower lung fields. Inspiratory and expiratory wheezes appreciated throughout. HFNC 40L/80%.   Abdominal: Soft, " nontender/nondistended. No rebound tenderness or involuntary guarding.  Ext: No edema bilateral lower ext.  Skin: No rash, lesion, bruising.   Neuro: No focal deficit.  Psych: Appropriate mood and affect.        Intake/Output Summary (Last 24 hours) at 4/13/2025 0733  Last data filed at 4/13/2025 0404  Gross per 24 hour   Intake 2233.83 ml   Output 500 ml   Net 1733.83 ml     Labs:   Results from last 72 hours   Lab Units 04/13/25 0405 04/12/25 2207 04/12/25  1253   SODIUM mmol/L 132* 132* 133*   POTASSIUM mmol/L 3.7 3.8 4.4   CHLORIDE mmol/L 98 97* 97*   CO2 mmol/L 23 22 22   BUN mg/dL 14 15 14   CREATININE mg/dL 0.75 0.75 0.69   GLUCOSE mg/dL 115* 119* 84   CALCIUM mg/dL 7.5* 7.5* 8.2*   ANION GAP mmol/L 15 17 18   EGFR mL/min/1.73m*2 90 90 >90   PHOSPHORUS mg/dL 4.6 4.2 3.9      Results from last 72 hours   Lab Units 04/13/25 0405 04/12/25 2207 04/12/25  1253   WBC AUTO x10*3/uL 9.6 10.4 9.8   HEMOGLOBIN g/dL 7.2* 7.6* 8.6*   HEMATOCRIT % 23.2* 24.7* 29.5*   PLATELETS AUTO x10*3/uL 60* 55* 66*   NEUTROS PCT AUTO % 77.6 77.6  --    LYMPHO PCT MAN %  --   --  3.4   LYMPHS PCT AUTO % 7.2 8.4  --    MONO PCT MAN %  --   --  5.2   MONOS PCT AUTO % 7.9 7.5  --    EOSINO PCT MAN %  --   --  0.0   EOS PCT AUTO % 0.0 0.0  --       Results from last 72 hours   Lab Units 04/12/25 2053   POCT PH, ARTERIAL pH 7.45*   POCT PCO2, ARTERIAL mm Hg 32*   POCT PO2, ARTERIAL mm Hg 83*   POCT SO2, ARTERIAL % 98     Results from last 72 hours   Lab Units 04/12/25  1252   POCT PH, VENOUS pH 7.38   POCT PCO2, VENOUS mm Hg 39*   POCT PO2, VENOUS mm Hg 29*        Micro/ID:     Lab Results   Component Value Date    BLOODCULT Loaded on Instrument - Culture in progress 04/13/2025    BLOODCULT Loaded on Instrument - Culture in progress 04/13/2025       Summary of key imaging results from the last 24 hours    === 4/13/25 ===  CTA CHEST FOR  PE     IMPRESSION:  1. No evidence of acute pulmonary embolism to segmental level. Please note that,  assessment of subsegmental branches is limited and small peripheral emboli are not entirely excluded.  2. Interval development of trace/small left pleural effusion. Left basilar consolidative opacity. Additional new consolidative opacity in the posterior right middle lobe and areas of ground-glass opacity in the right lower lobe. Findings are likely atelectatic, however  infectious process should be excluded in appropriate clinical setting.  3. Severe enlargement of the right ventricle and right atrium with dilated main pulmonary artery in keeping with patient's known pulmonary artery hypertension.  4. There is suggestion of a defect in the base of interventricular septum (VSD). Correlate with echocardiogram and consider cardiac MRI for shunt assessment. Correlate with concern for Eisenmenger syndrome.  5. Peribronchovascular and centrilobular ground-glass halo in bilateral lungs, likely sequela of elevated pulmonary artery pressure/pulmonary artery hypertension.  6. Liver appears enlarged, partially imaged. Findings might be due to hepatic congestion/congestive hepatopathy secondary to right heart dysfunction    === 4/12/25 ===  XR CHEST 1 VIEW    IMPRESSION:  1.  Trace left pleural effusion with adjacent atelectasis.  2. Slight interval increase in bibasilar opacity which might be due to increasing atelectasis and edema. Cannot exclude superimposed or developing infiltrate and infection.      Assessment and Plan     Assessment: Ronna Beckham is a 64 y.o. year old female with pulmonary hypertension group 1 (hendrickson in place), atrial fibrillation on apixiban s/p ablation 3/2025, lupus(?) admitted on 4/9/2025 for evaluation of hematoma at the site of her hendrickson catheter. Transferred to the micu on 4/12/25 for acute vs subacute on chronic hypoxic respiratory failure requiring airvo.     The differential diagnosis for her acute hypoxia is broad and includes pulmonary embolism, pneumonia, pulmonary edema, and shunt.  At this point favor an underlying infectious process but will still rule out PE. Shunt is less likely given the improvement in hypoxia with supplemental oxygen.     Mechanical Ventilation: none  Sedation/Analgesia:  none  Restraints: no    Summary for 04/13/25  :  hemodynamically stable and satting >90% on HFNC 40L/80%  bcx collected 4/12 during rapid, 4/4 growing gram +ve cocci in clusters  on vancomycin and pip-tazo, ID consulted and recommended vanc and cefazolin  IR consulted for line removal, will repeat bcx after it's removed   in the meantime she will need a peripheral IV to be used exclusively for epoprostenol administration  CTA chest for PE pending report, wet read -ve for acute PE  pending TTE with bubble study    Plan:  NEUROLOGY/PSYCH:  #peripheral neuropathy   : :chronic, stable  management:  -continue gabapentin 800 mg PO TID  *holding all PO meds while on HFNC*    #MDD  : :chronic, stable  management:  -continue venlafaxine 150 mg PO daily   *holding all PO meds while on HFNC*    CARDIOVASCULAR:  #typical atrial flutter 1:1 conduction s/p ablation   #supraventricular tachycardia, on diltiazem   : :h/o SVT iso significant pulmonary hypertension, narrow QRS up to 200 bpm unresponsive to adenosine and highly suspicious for one to one atrial flutter  : :recently underwent flutter ablation 3/23/25   : :on diltiazem 180 mg PO daily and apixiban   management:  -telemetry   -cardiology consulted   -holding apixiban   -holding diltiazem     #coronary artery disease  : :chronic, stable   : :on aspirin at home  management:  -telemetry   -continue aspirin 81 mg PO daily   *holding all PO meds while on HFNC*    PULMONARY:  #acute on chronic hypoxic respiratory failure   : :on 4L NC at baseline; now on airvo 50L/80%  : :ddx includes pneumonia vs PE   : :collected bcx 4/12, 4/4 growing gram +ve cocci in clusters, likely line associated   : :admitted at end of march for SSTI with purulent drainage around hendrickson  site (left IJ)- line replaced (right IJ) and started on 10 day course of doxycycline (end date 3/23/25)   management:  -wean HFNC as tolerated  -TTE with bubble study   -CT PE to evaluate for PE  -ID following  -continue abx as per ID recs  pip-tazo 4/12 - 4/13   vancomycin 4/12 -   cefazolin 4/13 -      #pulmonary arterial hypertension, group 1  : :regimen includes macitentan 10 mg PO daily, continuous epoprostenol infusion via right subclavian hendrickson and monthly sotatercept injections  : :follows outpatient with dr. Prescott   : :hendrickson was on her left side until last month when she was admitted for purulent drainage and treated for line associated SSTI with 10 day course of doxycycline  management:  -pending line removal with IR  -continue epoprostenol  -continue macitenan 10 mg PO daily   -will need additional peripheral access solely for administration of epoprostenol     RENAL/GENITOURINARY:  No active issues    GASTROENTEROLOGY:  #GERD  : :chronic, stable  : :on pantoprazole 40 mg PO daily at home   management:  -continue PPI, can do IV while on high flow    ENDOCRINOLOGY:  No active issues    RHEUMATOLOGY:  #systemic lupus erythematosus(?)   : :chronic, stable  : : her lupus is currently managed by her pcp, although can not directly find records in the chart  : :currently on plaquenil testing  : :denies any clinical symptoms of active flare including joint pain, muscle pain, issues with urination, there doesn't seem to be a history of this subjectively   : :per EMR she had +ve anti-nuclear panel with a 1:1280 titer, however the anti-dsDNA was 4.0 IU/mL which is -ve; never had a +ve anti-dsDNA; unusual to be started on plaquenil for this  : :will repeat those laboratory studies as if this is not lupus, then would consider discontinuing this medication entirely  management:  -continue hydroxychloroquine 200 mg PO BID for now  -will repeat autoimmune workup   -can consider dc'ing lupus meds entirely if workup  -ve    HEMATOLOGY:  #acute on chronic anemia, microcytic  #acute on chronic thrombocytopenia  : :baseline hemoglobin 10, 8.8 on admit and now 7  : :worsening thrombocytopenia, baseline >100   management:  -continue to annabel cbc  -goal hb >7   -goal platelet >10, >50 if bleeding   -iron studies  -peripheral smear     SKIN:  no active issues    MUSCULOSKELETAL:  #superficial hematoma, eschar anterior chest wall s/p hendrickson procedure   : :CTA showing small hematoma with no active extravasation   : :low c/f [id number] formation with thrombus per vascular sx and IR  : :recommend conservative mgmt  : :given her h/o substance use disorder consider drug-induced vasculitis 2/2 methamphetamine vs toxic exposure of levamisole or xylazine   : :further studies to rule out vasculitis component warranted  management:  -urine drug screen -ve  -vasc sx consulted  -IR consulted  -pending ANCA     INFECTIOUS DISEASE:  #gram +ve bacteremia, possibly line-associated   #possible pneumonia   : :bcx collected 4/12, 4/4 growing gram +ve cocci in clusters  : :mrsa nares +ve on 4/12   : :viral respiratory panel -ve on 4/12  : :procal 4/12 0.58, pending repeat this morning  : :hendrickson recently replaced during march admission for line associated SSTI- presented with purulent drainage from hendrickson, briefly on IV abx then completed 10 days of doxycycline  management:  -continue IV abx as per ID recs   pip-tazo 4/12 - 4/13   vancomycin 4/12 -   cefazolin 4/13 -    -pending line removal with IR  -will need repeat bcx after line removed   -pending TTE to evaluate for endocarditis     ICU Check List       ICU Liberation: Intervention:   Assess, Prevent, Manage Pain CPOT - monitor   Both SAT and SBT [] SAT  [] SBT 30-60 min [] Extubate to NC  [] Extubate to NIV  [] Discuss Trach   Choice of analgesia and sedation RASS: 0  none monitor   Delirium: Assess, prevent and manage CAM - monitor   Early Mobility and Exercise  [x] PT /OT consult   Family  Engagement and Empowerment [x]Family updated []SW consult     FEN  Fluids: if concern for sepsis then give IVF with caution otherwise hold off d/t PAH  Electrolytes: PRN  Nutrition: npo while on HFNC  Prophylaxis:  DVT ppx: subcutaneous heparin   GI ppx: PPI   Bowel care: miralax, senna   Hardware:         CVC 03/19/25 Tunneled Right Subclavian (Active)   Placement Date/Time: 03/19/25 1121   CVC Type: Tunneled  Orientation: Right  Location: Subclavian   Number of days: 24       Social:  Code: Full Code    HPOA: Diana Vera (sister) [phone]   Disposition: micu pending improvement in hypoxia    Marychuy Contreras MD   04/13/25 at 7:33 AM     Disclaimer: Documentation completed with the information available at the time of input. The times in the chart may not be reflective of actual patient care times, interventions, or procedures. Documentation occurs after the physical care of the patient.

## 2025-04-13 NOTE — HOSPITAL COURSE
Patient is a 64-year old female with a past medical history of Pulmonary Arterial Hypertension Group 1 (Montez in place), Atrial Fibrillation s/p Ablation (03/2025) on Eliquis, Systemic Lupus Erythematosus (questionable diagnosis) that presents to the emergency department for worsening findings of contained bleed on the anterior chest wall. In the emergency department, patient was hemodynamically stable with borderline sinus tachycardia noted. She was found to have a grape sized hematoma on the anterior chest wall. She was informed to hold her Eliquis prior to arrival by the cardiology clinic. A CT-angiogram of chest was obtained showing small hematoma on chest surface per radiology report with soft tissue swelling and no active extravasation. There is no concern for pulmonary embolism. Also with findings of scattered pulmonary nodules and intra-thoracic lymphadenopathy and questionable adrenal gland nodule. Laboratory studies notable for anemia at 8.8 g/dL (baseline ~10 g/dL), and thrombocytopenia.     Patient was then admitted on 04/10 with concern for hematoma with contained bleed in the setting of dropping hemoglobin. Vascular surgery was consulted and evaluated the patient and stated findings of hematoma with no concern for pseudoaneurysm formation, or contained thrombus in a pseudoaneurysm. The interventional radiology team was consulted and evaluated the patient and recommended no evacuation of the hematoma, continuing on conservative management. Wound care consulted and provide recommendations, however patient declined. On 04/11 evening patient was found to be tachycardic, with an EKG showing sinus tachycardia but questionable findings of atrial fibrillation/flutter; stable oxygen requirements. On 04/12 patient was found to be slightly more lethargic, with persistent anemia without concern for active bleeding. Rapid response called in the mid afternoon for tachycardia to the 120s. EKG obtained then also  showing sinus tachycardia with questionable findings of atrial fibrillation and atrial flutter. Repeated infectious workup, which resulted in ***. Started on Zosyn (04/12-). Notified the cardiology team for concern of recurrence of atrial fibrillation/atrial flutter, recommending formal consultation next day. However in the evening, patient had continued tachycardia to the 130s. The CICU evaluated at bedside and recommended no further escalation unless tachycardic greater than 130 bpms. Rapid response then called again due to escalating oxygen requirements from baseline of 4 L/min. Unable to tolerate HFNC. Started on vancomycin (04/12-), admitted into the Medicine ICU for further evaluation of acute hypoxia and oxygen requirements.Regarding     discharge planning  [  ] follow up with primary care physician for dedicated MRI and CT scan of nodules  [  ] follow up on the lymph node biopsy for the time being, will need this outpatient

## 2025-04-13 NOTE — CONSULTS
Inpatient consult to Cardiology  Consult performed by: Grayson Watters MD  Consult ordered by: Ashkan Thurman MD        History Of Present Illness:    Ronna Beckham is a 64 y.o. female with a PMHx of Pulmonary Arterial Hypertension Group 1 (Montez in place), Atrial Fibrillation s/p Ablation (03/2025 on Eliquis and PO diltiazem 180 mg), Systemic Lupus Erythematosus (questionable diagnosis) who initially presented to the ED on 4/9/2025 for worsening hematoma at the site of her Montez cathter, now transferred to MICU with increasing oxygen requirements necessitating AirVo 50L/70% (baseline oxygen 4L/min) and tachyarrhythmia for which cardiology team is consulted.     Patient hemodynamically stable this AM with stable oxygen requirements. No acute cardiac complaints.   EKGs reviewed:  EKG on 11/25/2024: Normal sinus rhythm with RBBB, left axis deviation   EKG on 3/15/2025: P axis in leads I and II are now negative indicating that this is unlikely a normal sinus rhythm but rather an ectopic atrial rhythm.   EKG 4/11 and 4/12/2025: ectopic atrial rhythm and RBBB. May be consistent with Atach or atypical Aflutter 2:1 conduction.     Blood cultures positive for GPC.       Echocardiogram 10/24/2024:   1. Left ventricular ejection fraction is hyperdynamic, by visual estimate at 75%.   2. Left ventricular cavity size is decreased.   3. Right ventricular volume and pressure overload.   4. There is moderately reduced right ventricular systolic function.   5. Severely enlarged right ventricle.   6. Severely elevated right ventricular systolic pressure.   7. The right atrium is severely dilated.   8. Severe tricuspid regurgitation visualized.   9. Small to moderate pericardial effusion.  10. There is no evidence of cardiac tamponade.  11. A dilated inferior vena cava demonstrates poor inspiratory collapse, consistent with elevated right atrial pressures.  12. Compared with study dated 4/24/2024, there is no significant  difference in RV size and function. There is again severe TR. The RVSP has increased from 89 to 105 mmHg. The pericardial effusion appears unchanged.     Last Recorded Vitals:  Vitals:    04/13/25 0700 04/13/25 0800 04/13/25 0900 04/13/25 0903   BP: 92/74 95/68 97/54    Pulse: 105 103 98    Resp: (!) 27 23 23    Temp:  36.8 °C (98.2 °F)     TempSrc:  Temporal     SpO2: 91% 97% 99% 98%   Weight:       Height:           Last Labs:  CBC - 4/13/2025:  4:05 AM  9.6 7.2 60    23.2      CMP - 4/13/2025:  4:05 AM  7.5 5.5 13 --- 1.4   4.6 2.8 16 137      PTT - 4/13/2025:  4:05 AM  1.6   17.2 28     Troponin I, High Sensitivity   Date/Time Value Ref Range Status   11/25/2024 12:37 PM 5 0 - 13 ng/L Final   11/25/2024 11:45 AM 6 0 - 13 ng/L Final   11/20/2024 12:27 PM 6 0 - 13 ng/L Final     B TYPE NATRIURETIC PEPTIDE (BNP)   Date/Time Value Ref Range Status   02/17/2025 03:58  (H) <100 pg/mL Final       Hemoglobin A1C   Date/Time Value Ref Range Status   10/03/2024 11:57 PM 5.7 (H) See comment % Final   08/26/2024 09:34 AM 5.4 see below % Final     LDL Calculated   Date/Time Value Ref Range Status   10/03/2024 11:57 PM 62 <=99 mg/dL Final       Past Medical History:  She has a past medical history of Anxiety, Depression, Does not refill medications appropriately (09/10/2024), GERD (gastroesophageal reflux disease), Lupus, Personal history of other specified conditions (09/21/2020), Pulmonary hypertension (Multi), and Volume overload (09/10/2024).    Past Surgical History:  She has a past surgical history that includes Other surgical history (09/21/2020); Other surgical history (09/21/2020); Other surgical history (09/21/2020); Other surgical history (09/21/2020); Other surgical history (09/21/2020); Hysterectomy; Cholecystectomy; Cardiac catheterization (N/A, 1/8/2024); Cardiac electrophysiology procedure (N/A, 3/24/2025); Cardiac electrophysiology procedure (N/A, 3/24/2025); and Cardiac electrophysiology procedure  (N/A, 3/24/2025).      Social History:  She reports that she has quit smoking. Her smoking use included cigarettes. She has quit using smokeless tobacco. She reports that she does not currently use alcohol. She reports that she does not currently use drugs after having used the following drugs: Cocaine and Other.    Family History:  Family History   Problem Relation Name Age of Onset    No Known Problems Mother      No Known Problems Father          Allergies:  Levetiracetam    Inpatient Medications:  Scheduled medications   Medication Dose Route Frequency    [Transfer Hold] apixaban  5 mg oral BID    aspirin  81 mg oral Daily    calcium carbonate-vitamin D3  2 tablet oral BID    [Held by provider] dilTIAZem CD  180 mg oral Daily    ergocalciferol  1.25 mg oral Every Sunday    [Held by provider] furosemide  40 mg oral Daily    gabapentin  800 mg oral TID    hydroxychloroquine  200 mg oral BID    ipratropium-albuteroL  3 mL nebulization q6h    loperamide  2 mg oral BID    macitentan  10 mg oral Daily    magnesium chloride  64 mg oral BID    melatonin  5 mg oral Nightly    oxygen   inhalation Continuous - Inhalation    pantoprazole  40 mg oral Daily before breakfast    piperacillin-tazobactam  4.5 g intravenous q6h    polyethylene glycol  17 g oral Daily    [Held by provider] potassium chloride CR  20 mEq oral BID    vancomycin  1,000 mg intravenous q12h    venlafaxine XR  150 mg oral Daily     PRN medications   Medication    acetaminophen    albuterol    vancomycin     Continuous Medications   Medication Dose Last Rate    epoprostenol  67 ng/kg/min (Order-Specific) 67 ng/kg/min (04/12/25 2143)     Outpatient Medications:  Current Outpatient Medications   Medication Instructions    aspirin 81 mg, oral, Daily    calcium carbonate-vitamin D3 500 mg-5 mcg (200 unit) tablet 2 tablets, oral, 2 times daily    cholecalciferol (VITAMIN D-3) 25 mcg, oral, 2 times daily    dilTIAZem CD (CARDIZEM CD) 180 mg, oral, Daily     Eliquis 5 mg, oral, 2 times daily    epoprostenol (VELETRI) 1,500 mcg, central venous line infusion, Continuous, Reconstitute contents of vial with 5 ml diluent and mix cassette as directed. Infuse continuously per physician orders. Allow for priming volume. Dose range: 1-150 ng/kg/min.    ergocalciferol (Vitamin D-2) 1.25 MG (01926 UT) capsule 1 capsule, Once Weekly    furosemide (LASIX) 40 mg, oral, Daily    gabapentin (NEURONTIN) 800 mg, oral, 3 times daily    hydroxychloroquine (PLAQUENIL) 200 mg, oral, 2 times daily    loperamide (IMODIUM) 2 mg, 2 times daily    Mag 64 64 mg, oral, 2 times daily    melatonin 5 mg, oral, Nightly    multivitamin-iron-folic acid  mg-mcg tablet 1 tablet, Daily    omeprazole (PRILOSEC) 40 mg, oral, Daily before breakfast, Do not crush or chew.    Opsumit 10 mg, oral, Daily    potassium chloride CR 20 mEq ER tablet 20 mEq, oral, 2 times daily, Do not crush or chew.    sotatercept-csrk 60 mg kit Inject one dose under the skin every 21 days .     venlafaxine XR (EFFEXOR-XR) 150 mg, oral, Daily, Do not crush or chew.       Physical Exam:  General: NAD, AOx3  HEENT: EOMI, MMM, no LAD, no thyroid nodule or enlargement.   Neck: -JVD, supple  Cardiac: Normal S, S2. No murmurs noted  Lungs:  Good bilateral air entry, clear lungs bilaterally  Abdomen: Soft, non-tender, Non-distended   Extremities: No LE edema   Neuro: AOx3, no apparent focal deficits       Assessment/Plan   Ronna Beckham is a 64 y.o. female with a PMHx of Pulmonary Arterial Hypertension Group 1 (Montez in place) with severly dilated RV and RV dysfunction, Atrial Fibrillation s/p Ablation (03/2025) on Eliquis, Systemic Lupus Erythematosus (questionable diagnosis) who initially presented to the ED on 4/9/2025 for worsening hematoma at the site of her Montez cathter, now transferred to MICU with increasing oxygen requirements necessitating AirVo 50L/70% (baseline oxygen 4L/min) and tachyarrhythmia for which cardiology  team is consulted.     # Tachyarrhythmia likely Atach  or atypical Aflutter 2:1 conduction  EKGs reviewed:  EKG on 11/25/2024: Normal sinus rhythm with RBBB, left axis deviation   EKG on 3/15/2025: P axis in leads I and II are now negative indicating that this is unlikely a normal sinus rhythm but rather an ectopic atrial rhythm.   EKG 4/11 and 4/12/2025: ectopic atrial rhythm and RBBB. May be consistent with Atach  or atypical Aflutter 2:1 conduction.       Recommendations:  - Her Atrial arhythmia is likely driven by her severe pulmonary disease and bacteremia  - May be difficult to rate control and may require DCCV  - Patient with moderately reduced RV function. Would be very cautious and if able avoid BB or CCB as it may lead to acute RV failure and decompensation. Especially that Atach or flutter is notoriously known to be difficult yo control and would not achieve adequate control with BB or CCB  - Would target a -120 (no need for aggressive rate control in this acute critical setting)  - IV digoxin may not be ideal in atach and unlikely to help.   - Patient has been off AC due to pocket hematoma and as such would avoid antiarrythmic meds. If patient is hemodynamically unstable, then IV amio may be considered (has AV alexander blocking effects in first 24-48 hrs) with low conversion rate but risk remains there for chemical cardioversion and stroke. As such would not resort to IV amio unless necessary.   - maintain Mg> 2 and K>4  - Once patient clears her bacteremia is medically optimized, will likely need to involve EP for cardioversion.      Case dicussed with Dr. Randle  Cardiology will continue to follow      Grayson Watters MD

## 2025-04-13 NOTE — H&P
Medical Intensive Care - History and Physical   Subjective    Carmen Beckham is a 64 y.o. year old female patient admitted on 4/9/2025 with following ICU needs: Acute on chronic hypoxic respiratory failure requiring AirVo.     HPI:  CARMEN BECKHAM is a 65 y/o F with a PMHx of Pulmonary Arterial Hypertension Group 1 (Montez in place), Atrial Fibrillation s/p Ablation (03/2025) on Eliquis, Systemic Lupus Erythematosus (questionable diagnosis) who initially presented to the ED on 4/9/2025 for worsening hematoma at the site of her Montez cathter.     In the ED, patient was hemodynamically stable with borderline sinus tachycardia noted. She was found to have a grape sized hematoma on the anterior chest wall. She was informed to hold her Eliquis prior to arrival by the cardiology clinic. A CT-angiogram of chest was obtained showing small hematoma on chest surface per radiology report with soft tissue swelling and no active extravasation. There is no concern for pulmonary embolism. Also with findings of scattered pulmonary nodules and intra-thoracic lymphadenopathy and questionable adrenal gland nodule. Laboratory studies notable for anemia at 8.8 g/dL (baseline ~10 g/dL), and thrombocytopenia.     Vascular surgery and IR were consulted and ruled out pseudoaneurysm and determined no need for evacuation of the hematoma. On 04/12 patient was found to be slightly more lethargic, with persistent anemia without concern for active bleeding. Rapid response called in the mid afternoon for tachycardia to the 120s. EKG obtained showed atrial flutter. Repeated infectious workup was ordered, and patient was started on Zosyn (04/12-). Patient continued to be tachycardic to the 130s. The CICU fellow evaluated at bedside and recommended no further escalation unless tachycardic greater than 130 bpms. Rapid response then called again due to escalating oxygen requirements from baseline of 4 L/min to AirVo 50L/70%. She was transferred  to the MICU for further evaluation of acute hypoxic respiratory failure.       Meds    Home medications:  Current Outpatient Medications   Medication Instructions    aspirin 81 mg, oral, Daily    calcium carbonate-vitamin D3 500 mg-5 mcg (200 unit) tablet 2 tablets, oral, 2 times daily    cholecalciferol (VITAMIN D-3) 25 mcg, oral, 2 times daily    dilTIAZem CD (CARDIZEM CD) 180 mg, oral, Daily    Eliquis 5 mg, oral, 2 times daily    epoprostenol (VELETRI) 1,500 mcg, central venous line infusion, Continuous, Reconstitute contents of vial with 5 ml diluent and mix cassette as directed. Infuse continuously per physician orders. Allow for priming volume. Dose range: 1-150 ng/kg/min.    ergocalciferol (Vitamin D-2) 1.25 MG (70802 UT) capsule 1 capsule, Once Weekly    furosemide (LASIX) 40 mg, oral, Daily    gabapentin (NEURONTIN) 800 mg, oral, 3 times daily    hydroxychloroquine (PLAQUENIL) 200 mg, oral, 2 times daily    loperamide (IMODIUM) 2 mg, 2 times daily    Mag 64 64 mg, oral, 2 times daily    melatonin 5 mg, oral, Nightly    multivitamin-iron-folic acid  mg-mcg tablet 1 tablet, Daily    omeprazole (PRILOSEC) 40 mg, oral, Daily before breakfast, Do not crush or chew.    Opsumit 10 mg, oral, Daily    potassium chloride CR 20 mEq ER tablet 20 mEq, oral, 2 times daily, Do not crush or chew.    sotatercept-csrk 60 mg kit Inject one dose under the skin every 21 days .     venlafaxine XR (EFFEXOR-XR) 150 mg, oral, Daily, Do not crush or chew.        Inpatient medications:  Scheduled medications  [Transfer Hold] apixaban, 5 mg, oral, BID  aspirin, 81 mg, oral, Daily  calcium carbonate-vitamin D3, 2 tablet, oral, BID  [Held by provider] dilTIAZem CD, 180 mg, oral, Daily  [START ON 4/13/2025] ergocalciferol, 1.25 mg, oral, Every Sunday  [Held by provider] furosemide, 40 mg, oral, Daily  gabapentin, 800 mg, oral, TID  hydroxychloroquine, 200 mg, oral, BID  loperamide, 2 mg, oral, BID  macitentan, 10 mg, oral,  "Daily  magnesium chloride, 64 mg, oral, BID  melatonin, 5 mg, oral, Nightly  oxygen, , inhalation, Continuous - Inhalation  pantoprazole, 40 mg, oral, Daily before breakfast  piperacillin-tazobactam, 4.5 g, intravenous, q6h  polyethylene glycol, 17 g, oral, Daily  [Held by provider] potassium chloride CR, 20 mEq, oral, BID  vancomycin, 2,000 mg, intravenous, Once   Followed by  [START ON 4/13/2025] vancomycin, 1,000 mg, intravenous, q12h  venlafaxine XR, 150 mg, oral, Daily      Continuous medications  epoprostenol, 67 ng/kg/min (Order-Specific), Last Rate: 67 ng/kg/min (04/12/25 2144)      PRN medications  PRN medications: acetaminophen, albuterol, vancomycin     Objective    Blood pressure 93/69, pulse (!) 115, temperature 37.5 °C (99.5 °F), temperature source Temporal, resp. rate (!) 27, height 1.651 m (5' 5\"), weight 73.5 kg (162 lb 0.6 oz), SpO2 91%.     Physical Exam  Constitutional:       General: She is not in acute distress.  Cardiovascular:      Rate and Rhythm: Tachycardia present.      Heart sounds: Normal heart sounds. No murmur heard.  Pulmonary:      Comments: Ronchi most prominent in R lower lung fields.   End expiratory wheezes present throughout.   Abdominal:      General: Abdomen is flat.      Palpations: Abdomen is soft.   Musculoskeletal:      Right lower leg: No edema.      Left lower leg: No edema.   Skin:     General: Skin is warm.   Neurological:      General: No focal deficit present.      Mental Status: She is alert.   Psychiatric:         Mood and Affect: Mood normal.            Intake/Output Summary (Last 24 hours) at 4/12/2025 2257  Last data filed at 4/12/2025 2200  Gross per 24 hour   Intake 2219.54 ml   Output 500 ml   Net 1719.54 ml     Labs:   Results from last 72 hours   Lab Units 04/12/25  1253 04/12/25  0545 04/11/25  0705   SODIUM mmol/L 133* 133* 136   POTASSIUM mmol/L 4.4 4.6 4.2   CHLORIDE mmol/L 97* 100 103   CO2 mmol/L 22 23 25   BUN mg/dL 14 14 15   CREATININE mg/dL 0.69 " 0.71 0.62   GLUCOSE mg/dL 84 98 119*   CALCIUM mg/dL 8.2* 8.1* 8.2*   ANION GAP mmol/L 18 15 12   EGFR mL/min/1.73m*2 >90 >90 >90   PHOSPHORUS mg/dL 3.9 3.2 3.6      Results from last 72 hours   Lab Units 04/12/25  1253 04/12/25  0544 04/11/25  1227   WBC AUTO x10*3/uL 9.8 8.0 5.2   HEMOGLOBIN g/dL 8.6* 7.7* 8.1*   HEMATOCRIT % 29.5* 27.3* 27.8*   PLATELETS AUTO x10*3/uL 66* 73* 59*   LYMPHO PCT MAN % 3.4 3.4 9.4   MONO PCT MAN % 5.2 2.6 6.0   EOSINO PCT MAN % 0.0 0.0 0.0      Results from last 72 hours   Lab Units 04/12/25  2053   POCT PH, ARTERIAL pH 7.45*   POCT PCO2, ARTERIAL mm Hg 32*   POCT PO2, ARTERIAL mm Hg 83*   POCT SO2, ARTERIAL % 98     Results from last 72 hours   Lab Units 04/12/25  1252   POCT PH, VENOUS pH 7.38   POCT PCO2, VENOUS mm Hg 39*   POCT PO2, VENOUS mm Hg 29*        Micro/ID:     Lab Results   Component Value Date    BLOODCULT Loaded on Instrument - Culture in progress 04/12/2025    BLOODCULT Loaded on Instrument - Culture in progress 04/12/2025       Summary of Key Imaging Results  CXR   1. Trace left pleural effusion with adjacent atelectasis.  2. Similar appearance of pulmonary vascular congestion and mild interstitial pulmonary edema.    CTPE: Pending     Assessment and Plan     Assessment:  CARMEN MADRIGAL is a 63 y/o F with a PMHx of Pulmonary Arterial Hypertension Group 1 (Montez in place), Atrial Fibrillation s/p Ablation (03/2025) on Eliquis, Systemic Lupus Erythematosus (questionable diagnosis) who initially presented to the ED on 4/9/2025 for worsening hematoma at the site of her Montez cathter and admitted to the MICU on 04/12/25 for acute on chronic hypoxic respiratory failure requiring AirVo.     Differential for patient's acute hypoxia is broad and includes PE, pneumonia, pulmonary edema, and shunt. At this time favor an underlying infectious process and will treat empirically, but will rule out PE. Shunt is less likely given improvement of hypoxia with supplemental  oxygen.     Mechanical Ventilation: none  Sedation/Analgesia:  none  Restraints: no    NEUROLOGY/PSYCH:  # Peripheral neuropathy   :: Chronic, stable  [ ] Gabapentin 800 mg PO TID      # Major Depressive Disorder  :: Chronic, stable  [ ] Venlafaxine 150 mg PO every day      CARDIOVASCULAR:  # Atrial Fibrillation, s/p ablation  # Coronary Artery Disease, Chronic, Stable  :: Recently managed through the cardiology electrophysiology team for findings of supraventricular tachycardia, was on apixaban for atrial fibrillation.  - Cardiology, appreciate recommendations   [ ] Aspirin 81 mg PO every day   [ ] Diltiazem 180 mg PO every day   - HOLDING Apixaban   - HOLDING Furosemide 40 mg PO every day      PULMONARY:  # Acute on chronic hypoxemic respiratory failure  :: Baseline 4L NC, now on Airvo 50L/70%  [ ] CTPE to eval for PE and lung parenchyma  [ ] TTE w/ bubble   [ ] Empiric abx and work up per ID    # Pulmonary Arterial Hypertension, Group 1  :: Currently receiving dual drug treatments for pulmonary arterial hypertension, with Montez in place. Also receiving montly Winrevair.   [ ] Macitentan (Opsumit) 10 mg PO every day   [ ] Epoprostenol (Veletri) gtt     # Pulmonary Nodules, Scattered, Bilateral  # Lymphadenopathy, Intra-Thoracic, Bilateral  :: Interestingly, there is findings of scattered pulmonary nodules with lymphadenopathy stretching from mediastinal to axillary. The most recent CT-scan to refer to from 11/2024 not nothing any of these findings, suggesting acute change. Given history of systemic lupus, neoplastic process can not be excluded, can consider lymphoma or other primary. Also findings of adrenal gland features that can not rule out neoplastic process, roughly 10-Hounsfield units. Can consider infectious process as well from fungal (Aspergillus spp.) to Mycobacterium tuberculosis.   :: Up to date on mammogram without concerning findings, no colonoscopy on file. However, CT-abdomen/pelvis not showing  any mass.   - Was tentatively planned for endobronchial ultrasound (EBUS) on 04/14; however, will defer given worsening respiratory status     RENAL/GENITOURINARY:  No active issues    GASTROENTEROLOGY:  # Gastroesophageal Reflux Disease  :: Chronic, stable   [ ] Pantoprazole 40 mg PO every day     ENDOCRINOLOGY:  No active issues    RHEUMATOLOGY:   # Systemic Lupus Erythematosus, Stable, Questionable Diagnosis   :: Patient states that her lupus is currently getting managed by her primary care physician, although can not directly find records in the chart. Currently on Plaquenil testing. Denies any clinical symptoms of active flare including joint pain, muscle pain, issues with urination. Furthermore, there doesn't seem to be a history of this subjectively. Chart review demonstrates that she has a positive anti-nuclear panel with a 1:1280 titer, however the anti-double stranded DNA was 4.0 IU/mL which is negative; never had a positive anti-double stranded DNA. Unusual to be started on Plaquenil for this. Will repeat those laboratory studies as if this is not lupus, then would consider discontinuing this medication entirely.    [ ] Hydroxychloroquine 200 mg PO BID    - Pending urine drug screen  - Pending ANCA  - Pending RIOS   - Pending JAREK with RIOS reflex   - Pending anti-double stranded DNA antibody     HEMATOLOGY:  # Anemia, Microcytic,  # Thrombocytopenia, Acute, Stable    :: Findings of worsening thrombocytopenia, with previous baseline of >100 K/uL, now with counts in the 60s. Can be a feature of lupus issues, but also consider other potential differentials like acute illness or other. Also findings of microcytic anemia, pending ferritin. However likely to be anemia of chronic disease.   - Continue to monitor     SKIN:  No active issues    MUSCULOSKELETAL:  # Superficial Hematoma, Eschar Anterior Chest Wall s/p Montez Procedure, Active, Stable  :: CT-angiogram showing findings of hematoma mass. Low concern of  pseudoaneurysm formation with thrombus per vascular surgery and interventional radiology. Rec conservative management at this time.   :: Given history of illicit drug abuse, can consider also substance abuse induced vasculitis form methamphetamines. Also can consider toxic exposure of levamisole or xylazine. Will continue further studies to rule out vasculitis component.   :: Urine drug screen negative   - Vascular Surgery consulted, appreciate recommendations  - Interventional Radiology consulted, appreciate recommendations   - Pending ANCA     INFECTIOUS DISEASE:  # Possible PNA  :: Infectious work up   - Procal (4/12): 0.58, repeat in AM   - MRSA nares (4/12): positive   - Respiratory Viral Panel (4/12): negative    - BCx (4/12): in progress   - Sputum Cx: pending collection  - Antimicrobials   [ ] Vancomycin (4/12 - current)   [ ] Zosyn (4/12 - current)     ICU Check List     ICU Liberation: Intervention:   Assess, Prevent, Manage Pain 0 - No pain    Both SAT and SBT [] SAT  [] SBT 30-60 min [] Extubate to NC  [] Extubate to NIV  [] Discuss Trach   Choice of analgesia and sedation Betancourt Agitation Sedation Scale (RASS): Alert and calm none     Delirium: Assess, prevent and manage  CAM ICU Negative Delirium precautions   Early Mobility and Exercise   [] PT /OT consult   Family Engagement and Empowerment []Family updated []SW consult     FEN  Fluids: PRN, tenuous volume status given RV dysfunction  Electrolytes: PRN  Nutrition: NPO while on high AirVo settings  Prophylaxis:  DVT ppx: Holding pharm ppx, SCDs   GI ppx: Home PPI  Bowel care: Miralax daily  Hardware:                CVC 03/19/25 Tunneled Right [facility] (Active)   Placement Date/Time: 03/19/25 1121   CVC Type: Tunneled  Orientation: Right  Location: [facility]   Number of days: 24       Social:  Code: Full Code    NOK/Surrogate Decision Maker: Diana JYOTHI Chuy (sister) [phone]   Disposition: MICU pending improvement in hypoxia              Dev  TABATHA Tao MD   04/12/25 at 10:57 PM     Disclaimer: Documentation completed with the information available at the time of input. The times in the chart may not be reflective of actual patient care times, interventions, or procedures. Documentation occurs after the physical care of the patient.

## 2025-04-13 NOTE — SIGNIFICANT EVENT
DACR Rapid Response Note    Rapid Response called for AHRF    Ronna Beckham is a 64 y.o. female with PAH on Epo and 4L NC baseline, Afib, ?SLE initially admitted for chest wall hematoma following Montze placement. RR called this afternoon for tachycardia and increase in O2 requirements (4->6L), bcx x2 sent, procal 0.58. started on Zosyn, and cardiology consulted w/ c/f Aflutter. RR called overnight given increase in O2 requirements to 15L HFNC, HR 110s, SBP 120s. Given nebs and placed on Airvo 50%/70L (repeat AB.45/32/83/0.5). Repeat CXR obtained, similar from prior but with congestion. Abx broadened to vanc/zosyn. AC has been held iso hematoma, c/f PE, worsening infection (new cough), volume overload iso PAH. Decision made to escalate care to MICU given O2 requirements. Discussed with MICU fellow and will assess for diuresis in MICU.     Disposition: MICU    Plan discussed with RR team and bedside nurse.     Christopher Reina  Internal Medicine, PGY-3

## 2025-04-13 NOTE — CONSULTS
"Inpatient consult to Infectious Diseases  Consult performed by: Linda Ardon MD PhD  Consult ordered by: Yuliana Sarmiento MD            Reason For Consult  Staph aureus bacteremia.    History Of Present Illness  Ronna Beckham is a 64 y.o. with a PMHx of type 1 PAH managed by macitentan, epoprostenol infusions through a Hendrickson catheter that was placed 3/19/2025 and afib s/p ablation 3/24/2025 which she was then started on eliquis. Patient presented to the ED 4/1 with bleeding, pain and swelling near the hendrickson venotomy site. She was discharged in stable condition at that time.        Patient had the venotomy site sutured along with topical TXA on 4/1 in the ED. Upon interviewing patient, she stated that she started to pick at the \"blood blister\" (referring to the venotomy site) prior to presenting to the ED on 4/1. She continued to pick at it until the site started rebleeding 4/9 and decided to return to the ED.  Vascular surgery and IR were consulted and ruled out pseudoaneurysm and determined no need for evacuation of the hematoma. On 04/12 patient was found to be slightly more lethargic, with persistent anemia without concern for active bleeding. Rapid response called in the mid afternoon for tachycardia to the 120s. EKG obtained showed atrial flutter. Repeated infectious workup was ordered, and patient was started on Zosyn (04/12-).   Blood cultures taken on the 12th show 4 out of 4 bottles with Staph aureus.  ID is consulted for antibiotic recommendations and further workup.    Patient expresses some frustration over her current hospital course.  She states her other Hendrickson line was doing well without any problems and she wishes she did not have it removed.  She states the other leg was not infected but there may have been some drainage around the dressing site.  She does recognize that the line had been in for prolonged period of time.  In addition to the symptoms described with the Hendrickson line, she " reports having neck pain, back pain and joint pain.  This pain is longstanding in nature and has not recently worsened.  She reports that she also has peripheral neuropathy so sometimes has tingling in her feet, but she has not noted any worsening weakness in the lower extremities.  She does not have any bowel or bladder incontinence.  She does not have any other prosthetic material in her body.  She denies any cough, chest pain, nausea, vomiting, or diarrhea.         Past Medical History  She has a past medical history of Anxiety, Depression, Does not refill medications appropriately (09/10/2024), GERD (gastroesophageal reflux disease), Lupus, Personal history of other specified conditions (09/21/2020), Pulmonary hypertension (Multi), and Volume overload (09/10/2024).    Surgical History  She has a past surgical history that includes Other surgical history (09/21/2020); Other surgical history (09/21/2020); Other surgical history (09/21/2020); Other surgical history (09/21/2020); Other surgical history (09/21/2020); Hysterectomy; Cholecystectomy; Cardiac catheterization (N/A, 1/8/2024); Cardiac electrophysiology procedure (N/A, 3/24/2025); Cardiac electrophysiology procedure (N/A, 3/24/2025); and Cardiac electrophysiology procedure (N/A, 3/24/2025).     Social History     Occupational History    Not on file   Tobacco Use    Smoking status: Former     Types: Cigarettes    Smokeless tobacco: Former   Vaping Use    Vaping status: Never Used   Substance and Sexual Activity    Alcohol use: Not Currently    Drug use: Not Currently     Types: Cocaine, Other     Comment: Meth    Sexual activity: Defer     Travel History   Travel since 03/13/25    No documented travel since 03/13/25            Family History  Family History   Problem Relation Name Age of Onset    No Known Problems Mother      No Known Problems Father       Allergies  Levetiracetam     Immunization History   Administered Date(s) Administered    Moderna  COVID-19 vaccine, 12 years and older (50mcg/0.5mL)(Spikevax) 11/01/2024    Moderna SARS-CoV-2 Vaccination 10/19/2021, 12/16/2021     Medications  Home medications:  Facility-Administered Medications Prior to Admission   Medication Dose Route Frequency Provider Last Rate Last Admin    perflutren lipid microspheres (Definity) injection 0.5-10 mL of dilution  0.5-10 mL of dilution intravenous Once Chicho Prescott, DO         Medications Prior to Admission   Medication Sig Dispense Refill Last Dose/Taking    apixaban (Eliquis) 5 mg tablet Take 1 tablet (5 mg) by mouth 2 times a day. 60 tablet 6 4/9/2025    aspirin 81 mg EC tablet Take 1 tablet (81 mg) by mouth once daily. 30 tablet 0 4/9/2025 Morning    calcium carbonate-vitamin D3 500 mg-5 mcg (200 unit) tablet Take 2 tablets by mouth 2 times a day. (Patient taking differently: Take 1 tablet by mouth 2 times a day.) 120 tablet 0 4/9/2025    cholecalciferol (Vitamin D-3) 25 mcg (1,000 units) tablet Take 1 tablet (25 mcg) by mouth 2 times a day.   Taking    dilTIAZem CD (Cardizem CD) 180 mg 24 hr capsule Take 1 capsule (180 mg) by mouth once daily. 30 capsule 0 4/9/2025    ergocalciferol (Vitamin D-2) 1.25 MG (37399 UT) capsule Take 1 capsule (1.25 mg) by mouth 1 (one) time per week. On Wednesday 4/9/2025    gabapentin (Neurontin) 800 mg tablet Take 1 tablet (800 mg) by mouth 3 times a day. (Patient taking differently: Take 1 tablet (800 mg) by mouth 2 times a day.) 90 tablet 0 4/9/2025    hydroxychloroquine (Plaquenil) 200 mg tablet Take 1 tablet (200 mg) by mouth 2 times a day. 60 tablet 0 4/9/2025    loperamide (Imodium) 2 mg capsule Take 1 capsule (2 mg) by mouth 2 times a day.   4/9/2025    magnesium chloride (MagDelay) 64 mg EC tablet Take 1 tablet (64 mg) by mouth 2 times a day. 60 tablet 0 4/9/2025    multivitamin-iron-folic acid  mg-mcg tablet Take 1 tablet by mouth once daily.   4/9/2025    omeprazole (PriLOSEC) 40 mg DR capsule Take 1 capsule (40 mg)  by mouth once daily in the morning. Take before meals. Do not crush or chew. 30 capsule 0 4/9/2025    Opsumit 10 mg tablet tablet Take 1 tablet (10 mg) by mouth once daily. 30 tablet 11 4/9/2025    potassium chloride CR 20 mEq ER tablet Take 1 tablet (20 mEq) by mouth 2 times a day. Do not crush or chew. 60 tablet 0 4/9/2025    venlafaxine XR (Effexor-XR) 150 mg 24 hr capsule Take 1 capsule (150 mg) by mouth once daily. Do not crush or chew. 30 capsule 0 4/9/2025    epoprostenol (Veletri) 1.5 mg recon soln 1.5 mg (1,500 mcg) by central venous line infusion route continuously. Reconstitute contents of vial with 5 ml diluent and mix cassette as directed. Infuse continuously per physician orders. Allow for priming volume. Dose range: 1-150 ng/kg/min. 180 each 11     furosemide (Lasix) 40 mg tablet Take 1 tablet (40 mg) by mouth once daily. 30 tablet 0 4/8/2025    melatonin 5 mg tablet Take 1 tablet (5 mg) by mouth once daily at bedtime. 30 tablet 0 4/8/2025    sotatercept-csrk 60 mg kit Inject one dose under the skin every 21 days .   3/19/2025     Current medications:  Scheduled medications  [Transfer Hold] apixaban, 5 mg, oral, BID  aspirin, 81 mg, oral, Daily  calcium carbonate-vitamin D3, 2 tablet, oral, BID  ceFAZolin, 2 g, intravenous, q8h  [Held by provider] dilTIAZem CD, 180 mg, oral, Daily  ergocalciferol, 1.25 mg, oral, Every Sunday  [Held by provider] furosemide, 40 mg, oral, Daily  gabapentin, 800 mg, oral, TID  heparin, 5,000 Units, subcutaneous, q8h  hydroxychloroquine, 200 mg, oral, BID  ipratropium-albuteroL, 3 mL, nebulization, q4h  loperamide, 2 mg, oral, BID  macitentan, 10 mg, oral, Daily  magnesium chloride, 64 mg, oral, BID  melatonin, 5 mg, oral, Nightly  oxygen, , inhalation, Continuous - Inhalation  pantoprazole, 40 mg, intravenous, Daily  polyethylene glycol, 17 g, oral, Daily  [Held by provider] potassium chloride CR, 20 mEq, oral, BID  vancomycin, 1,000 mg, intravenous, q12h  venlafaxine XR,  150 mg, oral, Daily      Continuous medications  epoprostenol, 67 ng/kg/min (Order-Specific), Last Rate: 67 ng/kg/min (04/13/25 1000)      PRN medications  PRN medications: acetaminophen, albuterol, vancomycin    Review of Systems  As per HPI.     Objective  Range of Vitals (last 24 hours)  Heart Rate:  []   Temp:  [36 °C (96.8 °F)-37.5 °C (99.5 °F)]   Resp:  [20-29]   BP: ()/(52-77)   Weight:  [72.5 kg (159 lb 13.3 oz)-72.7 kg (160 lb 4.4 oz)]   SpO2:  [88 %-99 %]   Daily Weight  04/13/25 : 72.7 kg (160 lb 4.4 oz)    Body mass index is 26.67 kg/m².     Physical Exam  She is in no acute distress.  She has multiple tattoos.  She has an Airvo supporting her ventilation.  Her conjunctiva are pink.  She has no icterus.  Her oropharynx is clear.  Her right Montez line is covered with a dressing.  Her cardiac exam is tachycardic, irregular rhythm, S1-S2, and a 2 out of 6 systolic ejection murmur.  Her lungs have anterior crackles throughout.  Her abdomen is soft, nontender, nondistended, bowel sounds are present.  Her skin is without rashes or edema.  Her joints are normal with no areas of tenderness, erythema, or swelling.     Relevant Results    Labs  Results from last 72 hours   Lab Units 04/13/25  0405 04/12/25  2207 04/12/25  1253   WBC AUTO x10*3/uL 9.6 10.4 9.8   HEMOGLOBIN g/dL 7.2* 7.6* 8.6*   HEMATOCRIT % 23.2* 24.7* 29.5*   PLATELETS AUTO x10*3/uL 60* 55* 66*   NEUTROS PCT AUTO % 77.6 77.6  --    LYMPHO PCT MAN %  --   --  3.4   LYMPHS PCT AUTO % 7.2 8.4  --    MONO PCT MAN %  --   --  5.2   MONOS PCT AUTO % 7.9 7.5  --    EOSINO PCT MAN %  --   --  0.0   EOS PCT AUTO % 0.0 0.0  --      Results from last 72 hours   Lab Units 04/13/25  0405 04/12/25  2207 04/12/25  1253   SODIUM mmol/L 132* 132* 133*   POTASSIUM mmol/L 3.7 3.8 4.4   CHLORIDE mmol/L 98 97* 97*   CO2 mmol/L 23 22 22   BUN mg/dL 14 15 14   CREATININE mg/dL 0.75 0.75 0.69   GLUCOSE mg/dL 115* 119* 84   CALCIUM mg/dL 7.5* 7.5* 8.2*    ANION GAP mmol/L 15 17 18   EGFR mL/min/1.73m*2 90 90 >90   PHOSPHORUS mg/dL 4.6 4.2 3.9     Results from last 72 hours   Lab Units 04/13/25  0405 04/12/25  2207 04/12/25  1253 04/12/25  0545   ALK PHOS U/L 137* 146*  --  149*   BILIRUBIN TOTAL mg/dL 1.4* 1.3*  --  1.2   BILIRUBIN DIRECT mg/dL 0.5* 0.4*  --  0.3   PROTEIN TOTAL g/dL 5.5* 5.8*  --  6.2*   ALT U/L 16 19  --  19   AST U/L 13 15  --  15   ALBUMIN g/dL 2.8* 3.1* 3.4 3.2*     Estimated Creatinine Clearance: 77.7 mL/min (by C-G formula based on SCr of 0.75 mg/dL).  C-Reactive Protein   Date Value Ref Range Status   04/23/2024 0.12 <1.00 mg/dL Final     CRP   Date Value Ref Range Status   03/09/2022 0.46 mg/dL Final     Comment:     REF VALUE  < 1.00     08/14/2020 0.9 0 - 2.0 MG/DL Final     Comment:     Performed at [address] [address]     Sedimentation Rate   Date Value Ref Range Status   04/23/2024 15 0 - 30 mm/h Final   03/09/2022 5 0 - 30 mm/h Final   08/14/2020 7 0 - 20 MM/HR Final     Comment:     Performed at [address] [address]     HIV 1 and 2 Screen   Date Value Ref Range Status   09/21/2020 NONREACTIVE NONREACTIVE Final     Comment:      HIV Ag/Ab screen is performed using the Siemens [employer]   HIV Ag/Ab Combo assay which detects the presence of HIV    p24 antigen as well as antibodies to HIV-1   (Group M and O) and HIV-2.       Hepatitis C Ab   Date Value Ref Range Status   06/02/2023   Final    NONREACTIVE  Reference range: NONREACTIVE     Results from patients taking biotin supplements or receiving   high-dose biotin therapy should be interpreted with caution   due to possible interference with this test. Providers may    contact their local laboratory for further information.  Test performed at:  [facility] [address] [address] [facility] [address]  Performed at [address] [address]       Microbiology  Susceptibility data from last 90 days.  Collected Specimen Info Organism   04/12/25  Blood culture from Peripheral Venipuncture Staphylococcus aureus   04/12/25 Blood culture from Peripheral Venipuncture Staphylococcus aureus       Imaging  CT angio chest for pulmonary embolism    Result Date: 4/13/2025  Interpreted By:  Janice Beasley and Nakamoto Kent STUDY: CT ANGIO CHEST FOR PULMONARY EMBOLISM;  4/13/2025 8:46 am   INDICATION: Signs/Symptoms:Eval for PE.     COMPARISON: CT ANGIO CHEST W AND WO IV CONTRAST 4/9/2025   ACCESSION NUMBER(S): [id number]   ORDERING CLINICIAN: STEPHEN MONTGOMERY   TECHNIQUE: Helical data acquisition of the chest was obtained after intravenous administration of 85 ML Omnipaque 350, as per PE protocol. Images were reformatted in coronal and sagittal planes. Axial and coronal maximum intensity projection (MIP) images were created and reviewed.   FINDINGS: POTENTIAL LIMITATIONS OF THE STUDY: The assessment is limited by respiratory motion and suboptimal contrast opacification of pulmonary arteries.   HEART AND VESSELS: No discrete filling defects within the main pulmonary artery or its branches to segmental level. Please note that, assessment of subsegmental branches is limited and small peripheral emboli are not entirely excluded. Main pulmonary artery is dilated measuring 3.3 cm.   The thoracic aorta normal in course and caliber. Although, the study is not tailored for evaluation of aorta, there is no definite evidence of acute aortic pathology. Moderate to marked coronary artery calcifications are seen. Please note,the study is not optimized for evaluation of coronary arteries.   There is severe dilation of the right atrium and right ventricle. Suggestion of defect in the base of the interventricular septum, best seen in image 166/284.   There is trace pericardial effusion seen.   MEDIASTINUM AND JOANA, LOWER NECK AND AXILLA: The visualized thyroid gland is within normal limits. No evidence of thoracic lymphadenopathy by CT size criteria. There are  multiple prominent mediastinal lymph nodes measuring up to 0.9 cm. For example, an AP window lymph node measure 0.9 cm (series 401, image 93). Another right paratracheal lymph node measures 0.9 cm (series 401, image 58). These lymph nodes are similar in size compared to prior CT PE exam 04/09/2025. Esophagus appears within normal limits as seen.   LUNGS AND AIRWAYS: Trachea and central airways are patent.   There is interval development of small/trace left pleural effusion. New left basilar consolidative opacity. Additional new patchy airspace opacity predominantly in the right lower lobe and posterior right middle lobe.   There is faint centrilobular ground-glass nodular densities surrounding the vasculature which is likely sequela of portal hypertension. There is no pneumothorax.   Right middle lobe measuring up to 0.7 cm (series 403, image 169). The bilateral lungs are without evidence of pneumothorax.   UPPER ABDOMEN: The imaged liver appears enlarged.     CHEST WALL AND OSSEOUS STRUCTURES: Chest wall is within normal limits. No acute osseous pathology.There are no suspicious osseous lesions.Mild multilevel degenerative changes within visualized spine.       1. No evidence of acute pulmonary embolism to segmental level. Please note that, assessment of subsegmental branches is limited and small peripheral emboli are not entirely excluded. 2. Interval development of trace/small left pleural effusion. Left basilar consolidative opacity. Additional new consolidative opacity in the posterior right middle lobe and areas of ground-glass opacity in the right lower lobe. Findings are likely atelectatic, however infectious process should be excluded in appropriate clinical setting. 3. Severe enlargement of the right ventricle and right atrium with dilated main pulmonary artery in keeping with patient's known pulmonary artery hypertension. 4. There is suggestion of a defect in the base of interventricular septum (VSD).  Correlate with echocardiogram and consider cardiac MRI for shunt assessment. Correlate with concern for Eisenmenger syndrome. 5. Peribronchovascular and centrilobular ground-glass halo in bilateral lungs, likely sequela of elevated pulmonary artery pressure/pulmonary artery hypertension. 6. Liver appears enlarged, partially imaged. Findings might be due to hepatic congestion/congestive hepatopathy secondary to right heart dysfunction.     I personally reviewed the images/study and I agree with the findings as stated by Paras Gabriel MD. This study was interpreted at [facility], [place], [place].   MACRO: Critical Finding:  See findings. Notification was initiated on 4/13/2025 at 12:30 pm by  Janice Salvador.  (**-YCF-**) Instructions:   Signed by: Janice Salvador 4/13/2025 12:36 PM Dictation workstation:   [id number]    XR chest 1 view    Result Date: 4/13/2025  Interpreted By:  Janice Beasley and Hofer Lindsay STUDY: XR CHEST 1 VIEW;  4/12/2025 10:28 pm   INDICATION: Signs/Symptoms:Acute on chronic hypoxic respiratory failure.   COMPARISON: Chest radiograph 04/12/2025 at 8:45 p.m..   ACCESSION NUMBER(S): [id number]   ORDERING CLINICIAN: STEPHEN MONTGOMERY   FINDINGS: AP radiograph of the chest was provided.   MEDICAL DEVICES: Right IJ approach central venous catheter with the tip projecting over the lower SVC.   CARDIOMEDIASTINAL SILHOUETTE: Cardiomediastinal silhouette is stable in size and configuration.   LUNGS: Prominent perihilar and interstitial lung markings. Patchy opacities within the bibasilar lungs. Slight blunting of the left costophrenic angle.   ABDOMEN: No remarkable upper abdominal findings.   BONES: No acute osseous changes.       1.  Trace left pleural effusion with adjacent atelectasis. 2. Slight interval increase in bibasilar opacity which might be due to increasing atelectasis and edema. Cannot exclude superimposed or developing  infiltrate and infection   I personally reviewed the images/study and resident's interpretation and I agree with the findings as stated by Krystal Farias MD (resident radiologist). This study was analyzed and interpreted at [place], [place].   MACRO: None   Signed by: Janice Salvador 4/13/2025 7:56 AM Dictation workstation:   [id number]    XR chest 1 view    Result Date: 4/13/2025  Interpreted By:  Janice Beasley  and Dinora Rice STUDY: XR CHEST 1 VIEW;  4/12/2025 8:50 pm   INDICATION: Signs/Symptoms:Hypoxia.   COMPARISON: Chest radiograph 04/12/2025 at 1:02 p.m.   ACCESSION NUMBER(S): [id number]   ORDERING CLINICIAN: STONE WINCHESTER   FINDINGS: AP radiograph of the chest was provided.   MEDICAL DEVICES: Right IJ approach central venous catheter with the tip projecting over the mid SVC.   CARDIOMEDIASTINAL SILHOUETTE: Cardiomediastinal silhouette is stable in size and configuration.   LUNGS: Prominent perihilar and interstitial lung markings. Patchy bibasilar airspace opacities. Slight blunting of the left costophrenic angle. No evidence of pneumothorax.   ABDOMEN: No remarkable upper abdominal findings.   BONES: No acute osseous changes.       1. Trace left pleural effusion with adjacent atelectasis. 2. Similar appearance of pulmonary vascular congestion and mild interstitial pulmonary edema.     I personally reviewed the images/study and resident's interpretation and I agree with the findings as stated by Krystal Farias MD (resident radiologist). This study was analyzed and interpreted at [place], [place].   MACRO: None   Signed by: Janice Salvador 4/13/2025 7:56 AM Dictation workstation:   [id number]    XR chest 1 view    Result Date: 4/12/2025  Interpreted By:  Janice Beasley and Korakavi Nisha STUDY: XR CHEST 1 VIEW;  4/12/2025 1:05 pm   INDICATION: Signs/Symptoms:r/o pneumonia.      COMPARISON: Chest x-ray and CTA chest 04/09/2025.   ACCESSION NUMBER(S): [id number]   ORDERING CLINICIAN: STONE WINCHESTER   FINDINGS: AP radiograph of the chest was provided.   Right IJ central venous catheter with the tip projecting over the lower SVC.   CARDIOMEDIASTINAL SILHOUETTE: Cardiomediastinal silhouette enlarged but similar in size and configuration to prior.   LUNGS: No pneumothorax or pleural effusion. Bibasilar patchy airway opacities. Perihilar prominence and diffuse bilateral interstitial thickening.   ABDOMEN: No remarkable upper abdominal findings.   BONES: No acute osseous changes.       1. Unchanged perihilar congestion and mild perihilar edema. 2. Stable moderate-sized cardiomegaly.   I personally reviewed the images/study and I agree with the findings as stated by Resident Dr. Chyna Dominguez MD. This study was interpreted at [place], [place].   MACRO: None   Signed by: Janice Salvador 4/12/2025 2:49 PM Dictation workstation:   [id number]    CT angio chest w and wo IV contrast    Result Date: 4/9/2025  STUDY: CT Angiogram of the Chest; 04/09/2025 05:29PM INDICATION: Bleeding mass of right upper chest wall.  Additional History:  Recent Montez placement. COMPARISON: CTA Chest 11/20/2024, 10/22/2024. ACCESSION NUMBER(S): [id number] ORDERING CLINICIAN: BAR PÉREZ TECHNIQUE:  CTA of the chest was performed with intravenous contrast. Images are reviewed and processed at a workstation according to the CT angiogram protocol with 3-D and/or MIP post processing imaging generated.  Omnipaque 350 90 mL was administered intravenously.  1590 DICOM images received. Automated mA/kV exposure control was utilized and patient examination was performed in strict accordance with principles of ALARA. FINDINGS: Right-sided percutaneous subclavian central venous line is identified within the right upper anterior chest wall which appears to be in adequate position,  with the distal tip extending to the inferior aspect of the superior vena cava.  Mild to moderate superficial focal soft tissue swelling involving the anterior superior chest wall, which may reflect a soft tissue hematoma.  No evidence of contrast extravasation or abnormal focal adjacent soft tissue fluid collection. Thyroid gland appears mildly enlarged with slightly heterogeneous attenuation.  The heart is globally enlarged with mildly increased prominence of the central pulmonary vasculature.  There is residual asymmetric dilatation of the right ventricle as compared to the left consistent with right heart strain.  There is mild hypodense fluid within the pericardial recess. Mild to moderate generalized dilatation of the central pulmonary arteries suggesting underlying pulmonary hypertension.  The pulmonary arteries are adequately opacified without acute or chronic filling defects.  The thoracic aorta is normal in course and caliber without dissection or aneurysm. The heart is normal in size without pericardial effusion.  Mildly enlarged central mediastinal lymph nodes are present.  These include a right superior thoracic paratracheal lymph node measuring 9 mm in short axis and and inferior inferior pretracheal lymph node measuring 1.1 cm in short axis.  Mildly prominent bilateral axillary lymph nodes measuring up to 8.2 mm in short axis on the left. There is no pleural effusion, pleural thickening, or pneumothorax. The airways are patent. Small irregular nodular pulmonary opacity measuring 5.2 mm is now identified within the posterior superior aspect of the right upper lobe (series 501, 83).  Additional small irregular pulmonary nodule measuring 6.3 mm, anterior and superior aspect of the right lower lobe appears stable (series 501, 149).  Small peripheral pulmonary nodule measuring 5.2 mm, right posterior lung base (501, 229).  Additional peripheral pulmonary nodule measuring 7.3 mm involving the anterior  aspect of the lingula, possibly present on prior examination and appears stable.  Small peripheral nodular opacities measuring 7 mm involving the right anterior aspect the right middle lobe with larger peripheral pulmonary nodule measuring 1.0 cm noted more inferiorly are now identified.  Mild bilateral basilar atelectasis. Liver is partially imaged and appears enlarged.  The spleen is also partially imaged and appears mildly enlarged.  Nodular thickening of the left adrenal gland.  There are no acute fractures.  No suspicious bony lesions.    1.  Right-sided subclavian central venous line is identified with the distal tip extending to the cavoatrial junction.  Mild focal soft tissue swelling along surrounding the distal aspect of the central venous line within the upper chest wall suggestive for small hematoma.  Correlate clinically. 2.  No evidence of central pulmonary embolus. 3.  Multiple small irregular bilateral pulmonary nodules are reidentified with several new additional small pulmonary nodules as described above.  Findings may be infectious, inflammatory or neoplastic in etiology.  Correlate clinically.  Short-term follow-up CT imaging recommended to assess for stability or resolution. 4.  Nodular thickening of the left adrenal gland.  Nonspecific, possibly reflecting underlying adrenal nodule or mass..  Dedicated follow-up CT or MRI of the abdomen utilizing an adrenal mass protocol can be performed for further evaluation. Signed by Satinder Gilbert MD    XR chest 2 views    Result Date: 4/9/2025  Interpreted By:  Kalin Silva, STUDY: XR CHEST 2 VIEWS;  4/9/2025 2:09 pm   INDICATION: Signs/Symptoms:hendrickson.     COMPARISON: 03/15/2020   ACCESSION NUMBER(S): [id number]   ORDERING CLINICIAN: BAR PÉREZ   FINDINGS: Right IJ central venous line with its tip over the SVC   CARDIOMEDIASTINAL SILHOUETTE: Cardiomediastinal silhouette is moderately enlarged. There is pulmonary vascular congestion..   LUNGS:  Lungs are clear. There is no consolidation or effusion   ABDOMEN: No remarkable upper abdominal findings.   BONES: No acute osseous changes.       Moderate enlargement of the cardiac silhouette with pulmonary vascular congestion     MACRO: None   Signed by: Kalin Silva 4/9/2025 2:36 PM Dictation workstation:   [id number]    Electrophysiology procedure    Result Date: 3/24/2025  Table formatting from the original result was not included. Procedure Date: 3/24/2025 Attending:    * Chicho Le - Primary Resident/Fellow/Other Assistant: Surgeons and Role: * No surgeons found with a matching role *  Indications: Pre-op Diagnosis    * SVT (supraventricular tachycardia) (CMS-HCC) [I47.10]  Post-procedure diagnosis: Post-op Diagnosis    * SVT (supraventricular tachycardia) (CMS-HCC) [I47.10]  Procedure(s): EP Study 33341 - [place] COMPRE EP EVAL R ATR VNTRC PACG&REC HIS BNDL REC  Ablation SVT 02822 - [place] COMPRE EP EVAL ABLTJ 3D MAPG TX SVT  Ablation Atrial Flutter 29187 - [place] ABLATION ARRHYTHMOGENIC FOCI/PATHWAY W/O BYPASS    Procedure Findings: See below  Description of the Procedure: After written witnessed informed consent was obtained the procedure from the patient, the patient was transferred to the EP laboratory. The patient was in the fasting state. A grounding pad was placed. The patient was set up for monitoring of surface ECG and intracardiac electrograms. The right and left groins were prepped and draped in the usual sterile fashion. Bupivacaine was administered locally for anesthetic. Right femoral vein access was obtained. The vessel was accessed using the modified Seldinger technique.  The catheters were positioned as follows: Right femoral vein  7 Fr  High RA Duodeca Super Large Curl Right femoral vein 6 Fr coronary sinus EPXT octopolar catheter Right femoral vein  7 Fr Flexibility SJM open irrigation catheter   Arrhythmias induced and treatment: The patient was brought to laboratory while in typical  atrial flutter with a cycle length of 210 milliseconds, Mechanism was confirmed with concealed entrainment from the CTI. Linear ablation was applied to the right cavo tricuspid isthmus utilizing  3 D mapping with the Anne Fogarty system.  The atrial flutter terminated and subsequent bidirectional conduction block was achieved with change of activation sequence and timing of  152 ms. Conduction block across the CTI was confirmed 30 minutes post ablation.  Summary: Successful radiofrequency ablation of typical atrial flutter.  Complications: none  Stents/Implants: Implants     No implant documentation for this case.      Anticoagulation/Antiplatelet Plan: none  Estimated Blood Loss: 10 mL  Anesthesia: Monitor Anesthesia Care            Anesthesia Staff: Anesthesiologist: Dutch Franklin DO C-AA: ROBERTO Michael; ROBERTO Castellanos  Any Specimen(s) Removed: No specimens collected during this procedure.  Disposition: stable      IR CVC removal    Result Date: 3/20/2025  Interpreted By:  Jorje Olmos, STUDY: IR CVC REMOVAL;  3/17/2025 1:30 pm   INDICATION: Signs/Symptoms:c/f hendrickson infection.   COMPARISON: None.   ACCESSION NUMBER(S): [id number]   ORDERING CLINICIAN: JORJE PITTMAN   TECHNIQUE: INTERVENTIONALIST(S): Jorje Olmos CNP   CONSENT: The patient/patient's POA/next of kin was informed of the nature of the proposed procedure. The purposes, alternatives, risks, and benefits were explained and discussed. All questions were answered and consent was obtained.     MEDICATION/CONTRAST: No additional   TIME OUT: A time out was performed immediately prior to procedure start with the interventional team, correctly identifying the patient name, date of birth, MRN, procedure, anatomy (including marking of site and side), patient position, procedure consent form, relevant laboratory and imaging test results, antibiotic administration, safety precautions, and procedure-specific equipment needs.    COMPLICATIONS: No immediate adverse events identified.   FINDINGS: The existing skin site over the tunneled catheter insertion site was prepped and draped with maximal sterile manner.   The skin and subcutaneous tissues at the original catheter site were anesthetized with 1% lidocaine. Using blunt dissection, the catheter cuff was externalized and the catheter leading to the jugular vein was completely removed.  Hemostasis was obtained using manual compression at the internal jugular vein access site.   The incision site was covered with a sterile dressing and tegaderm was subsequently applied.   There were no immediate or post-procedural complications.       Uneventful removal of a left chest central venous catheter.   I was present for and/or performed the critical portions of the procedure and immediately available throughout the entire procedure.   Performed and dictated at [facility].   Signed by: Torey Olmos 3/20/2025 8:25 AM Dictation workstation:   [id number]    IR CVC tunneled    Result Date: 3/19/2025  Interpreted By:  Kartik Araujo and Liu Scott STUDY: IR CVC TUNNELED;  3/19/2025 11:15 am   INDICATION: Signs/Symptoms:Montez line placement in different site than prior..   COMPARISON: None.   ACCESSION NUMBER(S): [id number]   ORDERING CLINICIAN: ROME BAGLEY   TECHNIQUE: INTERVENTIONALIST(S): Kartik Araujo MD     CONSENT: The patient/patient's POA/next of kin was informed of the nature of the proposed procedure. The purposes, alternatives, risks, and benefits were explained and discussed. All questions were answered and consent was obtained.   RADIATION EXPOSURE: Fluoroscopy time: 0.2 min Dose: 1.92 mGy   SEDATION: Moderate conscious IV sedation services (supervision of administration, induction, and maintenance) were provided by the physician performing the procedure with intravenous fentanyl 50mcg and versed 1mg from 11:13 a.m.-11:26 a.m.. The physician  was assisted by an independent trained observer, an interventional radiology nurse, in the continuous monitoring of patient level of consciousness and physiologic status.   MEDICATION: None.   TIME OUT: A time out was performed immediately prior to procedure start with the interventional team, correctly identifying the patient name, date of birth, MRN, procedure, anatomy (including marking of site and side), patient position, procedure consent form, relevant laboratory and imaging test results, antibiotic administration, safety precautions, and procedure-specific equipment needs.   COMPLICATIONS: No immediate adverse events identified.   FINDINGS: In the recumbent position, the patient was positioned on the angiography table. The right supraclavicular and infraclavicular cutaneous tissues were prepared and draped in usual sterile manner.   The supraclavicular access site was screened with gray-scale ultrasound with subsequent subcutaneous instillation of Lidocaine 1% local anesthesia. Ultrasound images demonstrate a patent right internal jugular vein. Under direct ultrasound guidance and Seldinger/micropuncture technique, the right internal jugular vein was accessed. An ultrasound digital spot image was acquired and stored on the  PACS.   After confirmation of location, a 018 [place]-Mandril guidewire was inserted to secure location. The guidewire was advanced into the inferior vena cava utilizing intermittent fluoroscopy. The micro-access needle was removed over the guidewire. Utilizing a 5-on-4 coaxial dilator sheath system, upsize to a 035 guidewire was performed. Subsequent access tract dilation was performed to an eventual peel-away sheath dilator system.   After Lidocaine 1% local anesthesia, a subcutaneous tunnel tract was created from the right infraclavicular chest to the venous access site. After continuity of the tunnel tract and venous access site was obtained, a 9.6 [place] Montez catheter was then  placed with tract continuity and its central catheter tip(s) to reside at the cavoatrial junction. A fluoroscopic spot image of the chest was acquired in the AP projection to confirm optimal location.   The catheter ports were aspirated and flushed without resistance with normal saline. The catheter ports were then charged with high-concentration heparin. The venous access site was closed and sterilely dressed. The external portions of the catheter were secured with a purse-string 2-0 polypropylene suture and sterile dressings.   The patient tolerated the procedure without complication.       1. Uncomplicated placement of a tunneled right internal jugular infraclavicular 9.6 Hendrickson catheter. The catheter is ready for immediate use.   I was present for and/or performed the critical portions of the procedure and immediately available throughout the entire procedure.   I personally reviewed the image(s) / study and resident interpretation. I agree with the findings as stated.   Performed and dictated at [facility].   MACRO: None.   Signed by: Kartik Araujo 3/19/2025 11:56 AM Dictation workstation:   [id number]    XR chest 2 views    Result Date: 3/15/2025  Interpreted By:  Maggy Sam and Liu Scott STUDY: XR CHEST 2 VIEWS;  3/15/2025 1:30 pm   INDICATION: Signs/Symptoms:hendrickson catheter site infection, mild sob.   COMPARISON: Chest x-ray 11/20/2024 CT chest dated 11/20/2024.   ACCESSION NUMBER(S): [id number]   ORDERING CLINICIAN: JORJE PITTMAN   FINDINGS: PA and lateral radiographs of the chest were provided.   Redemonstration of left internal jugular catheter with distal tip projecting over the lower SVC.   CARDIOMEDIASTINAL SILHOUETTE: Cardiomediastinal silhouette is mildly enlarged in size and configuration, similar to prior   LUNGS: Mild prominence of perihilar interstitial markings. No focal consolidation, pleural effusion or pneumothorax.   ABDOMEN: No remarkable  upper abdominal findings.   BONES: No acute osseous changes.       1.  Mild cardiomegaly with mildly prominent perihilar opacities may be seen with mild interstitial edema. 2. Left internal jugular catheter distal tip projects over the lower SVC, similar to prior.   I personally reviewed the images/study and I agree with the findings as stated by resident physician Matteo Mcgee MD. This study was interpreted at [facility], [place], [place].   MACRO: None   Signed by: Maggy Sam 3/15/2025 2:33 PM Dictation workstation:   [id number]        Assessment/Plan     64-year-old female with pulmonary hypertension and long-term indwelling Montez line that was recently changed.  Following the placement of the new Montez, there was hematoma at the site, and patient is now admitted with multiple blood cultures showing Staph aureus.  The staff aureus most likely came from the Montez line, and it will need to be removed.  We will also need to repeat cultures to ensure that they clear and continue to monitor patient for signs of metastatic infection.    Recommendations:  1.  Stop Zosyn.  2.  Continue patient on vancomycin (dosed by pharmacy) and cefazolin.  3.  Once we have susceptibilities on the Staph aureus, we will be able to narrow for antibiotic coverage.  4.  Patient is going to need to have an echocardiogram to rule out endocarditis.  We can start with a transthoracic echo, but if this is unrevealing patient may need LUIS as this will change our management.  5.  Continue to monitor patient's symptoms of back and neck pain and low threshold to image spine if these persist.    Plan discussed with patient and with primary team.  ID will continue to follow.  Please do not hesitate to contact me with any questions or concerns.  I can be reached via epic chat.    This is a complex infectious disease issue and the following was performed today (for more details please see the above note):    Management decisions reflecting the added complexity (e.g., changes in antimicrobial therapy, infection control strategies).     I spent 80 minutes in the professional and overall care of this patient.      Linda Ardon MD PhD

## 2025-04-14 ENCOUNTER — APPOINTMENT (OUTPATIENT)
Dept: CARDIOLOGY | Facility: HOSPITAL | Age: 65
End: 2025-04-14
Payer: COMMERCIAL

## 2025-04-14 ENCOUNTER — APPOINTMENT (OUTPATIENT)
Dept: RADIOLOGY | Facility: HOSPITAL | Age: 65
End: 2025-04-14
Payer: COMMERCIAL

## 2025-04-14 VITALS
RESPIRATION RATE: 17 BRPM | BODY MASS INDEX: 26.7 KG/M2 | OXYGEN SATURATION: 93 % | SYSTOLIC BLOOD PRESSURE: 123 MMHG | TEMPERATURE: 97 F | HEART RATE: 106 BPM | HEIGHT: 65 IN | DIASTOLIC BLOOD PRESSURE: 80 MMHG | WEIGHT: 160.27 LBS

## 2025-04-14 PROBLEM — G89.29 CHRONIC PAIN: Status: ACTIVE | Noted: 2021-08-03

## 2025-04-14 PROBLEM — R10.84 ABDOMINAL DISCOMFORT, GENERALIZED: Status: ACTIVE | Noted: 2024-08-22

## 2025-04-14 LAB
ALBUMIN SERPL BCP-MCNC: 2.8 G/DL (ref 3.4–5)
ALP SERPL-CCNC: 128 U/L (ref 33–136)
ALT SERPL W P-5'-P-CCNC: 11 U/L (ref 7–45)
ANION GAP SERPL CALC-SCNC: 15 MMOL/L (ref 10–20)
APTT PPP: 26 SECONDS (ref 26–36)
AST SERPL W P-5'-P-CCNC: 10 U/L (ref 9–39)
BASOPHILS # BLD MANUAL: 0 X10*3/UL (ref 0–0.1)
BASOPHILS # BLD MANUAL: 0 X10*3/UL (ref 0–0.1)
BASOPHILS NFR BLD MANUAL: 0 %
BASOPHILS NFR BLD MANUAL: 0 %
BILIRUB DIRECT SERPL-MCNC: 0.2 MG/DL (ref 0–0.3)
BILIRUB SERPL-MCNC: 0.6 MG/DL (ref 0–1.2)
BLASTS # BLD MANUAL: 0 X10*3/UL
BLASTS # BLD MANUAL: 0 X10*3/UL
BLASTS NFR BLD MANUAL: 0 %
BLASTS NFR BLD MANUAL: 0 %
BUN SERPL-MCNC: 14 MG/DL (ref 6–23)
CALCIUM SERPL-MCNC: 7.9 MG/DL (ref 8.6–10.6)
CHLORIDE SERPL-SCNC: 99 MMOL/L (ref 98–107)
CO2 SERPL-SCNC: 25 MMOL/L (ref 21–32)
CREAT SERPL-MCNC: 0.85 MG/DL (ref 0.5–1.05)
EGFRCR SERPLBLD CKD-EPI 2021: 78 ML/MIN/1.73M*2
EJECTION FRACTION APICAL 4 CHAMBER: 76
EJECTION FRACTION: 73 %
EOSINOPHIL # BLD MANUAL: 0 X10*3/UL (ref 0–0.7)
EOSINOPHIL # BLD MANUAL: 0 X10*3/UL (ref 0–0.7)
EOSINOPHIL NFR BLD MANUAL: 0 %
EOSINOPHIL NFR BLD MANUAL: 0 %
ERYTHROCYTE [DISTWIDTH] IN BLOOD BY AUTOMATED COUNT: 17.6 % (ref 11.5–14.5)
ERYTHROCYTE [DISTWIDTH] IN BLOOD BY AUTOMATED COUNT: 17.9 % (ref 11.5–14.5)
GLUCOSE BLD MANUAL STRIP-MCNC: 100 MG/DL (ref 74–99)
GLUCOSE BLD MANUAL STRIP-MCNC: 118 MG/DL (ref 74–99)
GLUCOSE BLD MANUAL STRIP-MCNC: 123 MG/DL (ref 74–99)
GLUCOSE BLD MANUAL STRIP-MCNC: 140 MG/DL (ref 74–99)
GLUCOSE BLD MANUAL STRIP-MCNC: 83 MG/DL (ref 74–99)
GLUCOSE BLD MANUAL STRIP-MCNC: 96 MG/DL (ref 74–99)
GLUCOSE BLD MANUAL STRIP-MCNC: 99 MG/DL (ref 74–99)
GLUCOSE SERPL-MCNC: 199 MG/DL (ref 74–99)
HCT VFR BLD AUTO: 25.5 % (ref 36–46)
HCT VFR BLD AUTO: 26.6 % (ref 36–46)
HGB BLD-MCNC: 7.9 G/DL (ref 12–16)
HGB BLD-MCNC: 8.1 G/DL (ref 12–16)
IMM GRANULOCYTES # BLD AUTO: 1.08 X10*3/UL (ref 0–0.7)
IMM GRANULOCYTES # BLD AUTO: 1.31 X10*3/UL (ref 0–0.7)
IMM GRANULOCYTES NFR BLD AUTO: 13.4 % (ref 0–0.9)
IMM GRANULOCYTES NFR BLD AUTO: 14.9 % (ref 0–0.9)
INR PPP: 1.3 (ref 0.9–1.1)
LEFT VENTRICLE INTERNAL DIMENSION DIASTOLE: 4.6 CM (ref 3.5–6)
LYMPHOCYTES # BLD MANUAL: 0.34 X10*3/UL (ref 1.2–4.8)
LYMPHOCYTES # BLD MANUAL: 0.62 X10*3/UL (ref 1.2–4.8)
LYMPHOCYTES NFR BLD MANUAL: 4.2 %
LYMPHOCYTES NFR BLD MANUAL: 7 %
MAGNESIUM SERPL-MCNC: 2.01 MG/DL (ref 1.6–2.4)
MCH RBC QN AUTO: 21.7 PG (ref 26–34)
MCH RBC QN AUTO: 21.9 PG (ref 26–34)
MCHC RBC AUTO-ENTMCNC: 30.5 G/DL (ref 32–36)
MCHC RBC AUTO-ENTMCNC: 31 G/DL (ref 32–36)
MCV RBC AUTO: 71 FL (ref 80–100)
MCV RBC AUTO: 71 FL (ref 80–100)
METAMYELOCYTES # BLD MANUAL: 0.06 X10*3/UL
METAMYELOCYTES # BLD MANUAL: 0.15 X10*3/UL
METAMYELOCYTES NFR BLD MANUAL: 0.8 %
METAMYELOCYTES NFR BLD MANUAL: 1.7 %
MITRAL VALVE E/A RATIO: 0.77
MONOCYTES # BLD MANUAL: 0.08 X10*3/UL (ref 0.1–1)
MONOCYTES # BLD MANUAL: 0.34 X10*3/UL (ref 0.1–1)
MONOCYTES NFR BLD MANUAL: 0.9 %
MONOCYTES NFR BLD MANUAL: 4.2 %
MYELOCYTES # BLD MANUAL: 0.23 X10*3/UL
MYELOCYTES # BLD MANUAL: 0.4 X10*3/UL
MYELOCYTES NFR BLD MANUAL: 2.6 %
MYELOCYTES NFR BLD MANUAL: 5 %
NEUTROPHILS # BLD MANUAL: 6.86 X10*3/UL (ref 1.2–7.7)
NEUTROPHILS # BLD MANUAL: 7.58 X10*3/UL (ref 1.2–7.7)
NEUTS BAND # BLD MANUAL: 0 X10*3/UL (ref 0–0.7)
NEUTS BAND # BLD MANUAL: 0.23 X10*3/UL (ref 0–0.7)
NEUTS BAND NFR BLD MANUAL: 0 %
NEUTS BAND NFR BLD MANUAL: 2.6 %
NEUTS SEG # BLD MANUAL: 6.86 X10*3/UL (ref 1.2–7)
NEUTS SEG # BLD MANUAL: 7.35 X10*3/UL (ref 1.2–7)
NEUTS SEG NFR BLD MANUAL: 83.5 %
NEUTS SEG NFR BLD MANUAL: 85.8 %
NRBC BLD MANUAL-RTO: 0 % (ref 0–0)
NRBC BLD MANUAL-RTO: 0 % (ref 0–0)
NRBC BLD-RTO: 2.5 /100 WBCS (ref 0–0)
NRBC BLD-RTO: 2.6 /100 WBCS (ref 0–0)
OVALOCYTES BLD QL SMEAR: ABNORMAL
PATH REVIEW-CBC DIFFERENTIAL: NORMAL
PHOSPHATE SERPL-MCNC: 4.4 MG/DL (ref 2.5–4.9)
PLASMA CELLS # BLD MANUAL: 0 X10*3/UL
PLASMA CELLS # BLD MANUAL: 0 X10*3/UL
PLASMA CELLS NFR BLD MANUAL: 0 %
PLASMA CELLS NFR BLD MANUAL: 0 %
PLATELET # BLD AUTO: 57 X10*3/UL (ref 150–450)
PLATELET # BLD AUTO: 57 X10*3/UL (ref 150–450)
POLYCHROMASIA BLD QL SMEAR: ABNORMAL
POLYCHROMASIA BLD QL SMEAR: ABNORMAL
POTASSIUM SERPL-SCNC: 3.9 MMOL/L (ref 3.5–5.3)
PROMYELOCYTES # BLD MANUAL: 0 X10*3/UL
PROMYELOCYTES # BLD MANUAL: 0 X10*3/UL
PROMYELOCYTES NFR BLD MANUAL: 0 %
PROMYELOCYTES NFR BLD MANUAL: 0 %
PROT SERPL-MCNC: 5.6 G/DL (ref 6.4–8.2)
PROTHROMBIN TIME: 14.7 SECONDS (ref 9.8–12.4)
RBC # BLD AUTO: 3.6 X10*6/UL (ref 4–5.2)
RBC # BLD AUTO: 3.73 X10*6/UL (ref 4–5.2)
RBC MORPH BLD: ABNORMAL
RBC MORPH BLD: ABNORMAL
RIGHT VENTRICLE FREE WALL PEAK S': 13.3 CM/S
RIGHT VENTRICLE PEAK SYSTOLIC PRESSURE: 103 MMHG
SCHISTOCYTES BLD QL SMEAR: ABNORMAL
SODIUM SERPL-SCNC: 135 MMOL/L (ref 136–145)
TOTAL CELLS COUNTED BLD: 115
TOTAL CELLS COUNTED BLD: 120
TRICUSPID ANNULAR PLANE SYSTOLIC EXCURSION: 2.3 CM
UFH PPP CHRO-ACNC: 0.2 IU/ML
UFH PPP CHRO-ACNC: 0.3 IU/ML
VANCOMYCIN SERPL-MCNC: 27.3 UG/ML (ref 5–20)
VARIANT LYMPHS # BLD MANUAL: 0 X10*3/UL (ref 0–0.5)
VARIANT LYMPHS # BLD MANUAL: 0.15 X10*3/UL (ref 0–0.5)
VARIANT LYMPHS NFR BLD: 0 %
VARIANT LYMPHS NFR BLD: 1.7 %
WBC # BLD AUTO: 8 X10*3/UL (ref 4.4–11.3)
WBC # BLD AUTO: 8.8 X10*3/UL (ref 4.4–11.3)

## 2025-04-14 PROCEDURE — 80076 HEPATIC FUNCTION PANEL: CPT

## 2025-04-14 PROCEDURE — 2500000005 HC RX 250 GENERAL PHARMACY W/O HCPCS: Mod: SE

## 2025-04-14 PROCEDURE — 85007 BL SMEAR W/DIFF WBC COUNT: CPT

## 2025-04-14 PROCEDURE — 82947 ASSAY GLUCOSE BLOOD QUANT: CPT

## 2025-04-14 PROCEDURE — 2500000001 HC RX 250 WO HCPCS SELF ADMINISTERED DRUGS (ALT 637 FOR MEDICARE OP): Mod: SE

## 2025-04-14 PROCEDURE — 36415 COLL VENOUS BLD VENIPUNCTURE: CPT

## 2025-04-14 PROCEDURE — 94640 AIRWAY INHALATION TREATMENT: CPT

## 2025-04-14 PROCEDURE — 80202 ASSAY OF VANCOMYCIN: CPT

## 2025-04-14 PROCEDURE — 93321 DOPPLER ECHO F-UP/LMTD STD: CPT

## 2025-04-14 PROCEDURE — 99231 SBSQ HOSP IP/OBS SF/LOW 25: CPT | Performed by: INTERNAL MEDICINE

## 2025-04-14 PROCEDURE — 85520 HEPARIN ASSAY: CPT

## 2025-04-14 PROCEDURE — 83735 ASSAY OF MAGNESIUM: CPT

## 2025-04-14 PROCEDURE — 85027 COMPLETE CBC AUTOMATED: CPT

## 2025-04-14 PROCEDURE — 2060000001 HC INTERMEDIATE ICU ROOM DAILY

## 2025-04-14 PROCEDURE — 2500000004 HC RX 250 GENERAL PHARMACY W/ HCPCS (ALT 636 FOR OP/ED): Mod: SE

## 2025-04-14 PROCEDURE — 2500000002 HC RX 250 W HCPCS SELF ADMINISTERED DRUGS (ALT 637 FOR MEDICARE OP, ALT 636 FOR OP/ED): Mod: SE

## 2025-04-14 PROCEDURE — 2500000004 HC RX 250 GENERAL PHARMACY W/ HCPCS (ALT 636 FOR OP/ED): Mod: SE | Performed by: INTERNAL MEDICINE

## 2025-04-14 PROCEDURE — 93325 DOPPLER ECHO COLOR FLOW MAPG: CPT

## 2025-04-14 PROCEDURE — 82374 ASSAY BLOOD CARBON DIOXIDE: CPT

## 2025-04-14 PROCEDURE — 86160 COMPLEMENT ANTIGEN: CPT

## 2025-04-14 PROCEDURE — 93308 TTE F-UP OR LMTD: CPT

## 2025-04-14 PROCEDURE — 84100 ASSAY OF PHOSPHORUS: CPT

## 2025-04-14 PROCEDURE — 97162 PT EVAL MOD COMPLEX 30 MIN: CPT | Mod: GP | Performed by: STUDENT IN AN ORGANIZED HEALTH CARE EDUCATION/TRAINING PROGRAM

## 2025-04-14 PROCEDURE — 85610 PROTHROMBIN TIME: CPT

## 2025-04-14 PROCEDURE — 87040 BLOOD CULTURE FOR BACTERIA: CPT

## 2025-04-14 PROCEDURE — 99291 CRITICAL CARE FIRST HOUR: CPT

## 2025-04-14 RX ORDER — LIDOCAINE HYDROCHLORIDE 10 MG/ML
5 INJECTION, SOLUTION INFILTRATION; PERINEURAL ONCE
Status: DISCONTINUED | OUTPATIENT
Start: 2025-04-14 | End: 2025-04-15

## 2025-04-14 RX ORDER — DEXTROSE 50 % IN WATER (D50W) INTRAVENOUS SYRINGE
Status: DISPENSED
Start: 2025-04-14 | End: 2025-04-14

## 2025-04-14 RX ORDER — INDOMETHACIN 25 MG/1
CAPSULE ORAL
Status: DISPENSED
Start: 2025-04-14 | End: 2025-04-14

## 2025-04-14 RX ORDER — HEPARIN SODIUM 10000 [USP'U]/100ML
0-4500 INJECTION, SOLUTION INTRAVENOUS CONTINUOUS
Status: DISCONTINUED | OUTPATIENT
Start: 2025-04-14 | End: 2025-04-19

## 2025-04-14 RX ADMIN — ALBUTEROL SULFATE 2.5 MG: 2.5 SOLUTION RESPIRATORY (INHALATION) at 00:28

## 2025-04-14 RX ADMIN — LOPERAMIDE HYDROCHLORIDE 2 MG: 2 CAPSULE ORAL at 21:00

## 2025-04-14 RX ADMIN — ACETAMINOPHEN 1000 MG: 10 INJECTION INTRAVENOUS at 09:24

## 2025-04-14 RX ADMIN — PERFLUTREN 1.5 ML OF DILUTION: 6.52 INJECTION, SUSPENSION INTRAVENOUS at 11:51

## 2025-04-14 RX ADMIN — PANTOPRAZOLE SODIUM 40 MG: 40 INJECTION, POWDER, FOR SOLUTION INTRAVENOUS at 09:25

## 2025-04-14 RX ADMIN — Medication 40 L/MIN: at 14:26

## 2025-04-14 RX ADMIN — EPOPROSTENOL 67 NG/KG/MIN: 1.5 INJECTION, POWDER, LYOPHILIZED, FOR SOLUTION INTRAVENOUS at 17:22

## 2025-04-14 RX ADMIN — IPRATROPIUM BROMIDE AND ALBUTEROL SULFATE 3 ML: .5; 3 SOLUTION RESPIRATORY (INHALATION) at 11:21

## 2025-04-14 RX ADMIN — EPOPROSTENOL 67 NG/KG/MIN: 1.5 INJECTION, POWDER, LYOPHILIZED, FOR SOLUTION INTRAVENOUS at 13:19

## 2025-04-14 RX ADMIN — IPRATROPIUM BROMIDE AND ALBUTEROL SULFATE 3 ML: .5; 3 SOLUTION RESPIRATORY (INHALATION) at 03:56

## 2025-04-14 RX ADMIN — Medication 40 L/MIN: at 11:21

## 2025-04-14 RX ADMIN — HEPARIN SODIUM 5000 UNITS: 5000 INJECTION, SOLUTION INTRAVENOUS; SUBCUTANEOUS at 09:30

## 2025-04-14 RX ADMIN — VANCOMYCIN HYDROCHLORIDE 1250 MG: 1.25 INJECTION, POWDER, LYOPHILIZED, FOR SOLUTION INTRAVENOUS at 22:07

## 2025-04-14 RX ADMIN — HEPARIN SODIUM 5000 UNITS: 5000 INJECTION, SOLUTION INTRAVENOUS; SUBCUTANEOUS at 02:10

## 2025-04-14 RX ADMIN — Medication 40 L/MIN: at 13:42

## 2025-04-14 RX ADMIN — ACETAMINOPHEN 1000 MG: 10 INJECTION INTRAVENOUS at 17:20

## 2025-04-14 RX ADMIN — Medication 50 L/MIN: at 07:16

## 2025-04-14 RX ADMIN — CEFAZOLIN SODIUM 2 G: 2 INJECTION, SOLUTION INTRAVENOUS at 04:35

## 2025-04-14 RX ADMIN — Medication 2 TABLET: at 21:00

## 2025-04-14 RX ADMIN — Medication 40 L/MIN: at 09:56

## 2025-04-14 RX ADMIN — HEPARIN SODIUM 1300 UNITS/HR: 10000 INJECTION, SOLUTION INTRAVENOUS at 12:03

## 2025-04-14 RX ADMIN — Medication 64 MG: at 21:00

## 2025-04-14 RX ADMIN — IPRATROPIUM BROMIDE AND ALBUTEROL SULFATE 3 ML: .5; 3 SOLUTION RESPIRATORY (INHALATION) at 19:33

## 2025-04-14 RX ADMIN — HYDROXYCHLOROQUINE SULFATE 200 MG: 200 TABLET, FILM COATED ORAL at 21:00

## 2025-04-14 RX ADMIN — HYDROMORPHONE HYDROCHLORIDE 0.2 MG: 1 INJECTION, SOLUTION INTRAMUSCULAR; INTRAVENOUS; SUBCUTANEOUS at 12:18

## 2025-04-14 RX ADMIN — IPRATROPIUM BROMIDE AND ALBUTEROL SULFATE 3 ML: .5; 3 SOLUTION RESPIRATORY (INHALATION) at 14:48

## 2025-04-14 RX ADMIN — GABAPENTIN 800 MG: 400 CAPSULE ORAL at 21:00

## 2025-04-14 RX ADMIN — Medication 40 L/MIN: at 19:33

## 2025-04-14 RX ADMIN — Medication 5 MG: at 21:00

## 2025-04-14 RX ADMIN — IPRATROPIUM BROMIDE AND ALBUTEROL SULFATE 3 ML: .5; 3 SOLUTION RESPIRATORY (INHALATION) at 07:12

## 2025-04-14 ASSESSMENT — PAIN - FUNCTIONAL ASSESSMENT
PAIN_FUNCTIONAL_ASSESSMENT: 0-10

## 2025-04-14 ASSESSMENT — PAIN SCALES - GENERAL
PAINLEVEL_OUTOF10: 5 - MODERATE PAIN
PAINLEVEL_OUTOF10: 0 - NO PAIN
PAINLEVEL_OUTOF10: 3
PAINLEVEL_OUTOF10: 0 - NO PAIN
PAINLEVEL_OUTOF10: 8
PAINLEVEL_OUTOF10: 8
PAINLEVEL_OUTOF10: 0 - NO PAIN
PAINLEVEL_OUTOF10: 8

## 2025-04-14 ASSESSMENT — COGNITIVE AND FUNCTIONAL STATUS - GENERAL
STANDING UP FROM CHAIR USING ARMS: TOTAL
CLIMB 3 TO 5 STEPS WITH RAILING: TOTAL
TURNING FROM BACK TO SIDE WHILE IN FLAT BAD: A LITTLE
MOVING FROM LYING ON BACK TO SITTING ON SIDE OF FLAT BED WITH BEDRAILS: A LITTLE
MOBILITY SCORE: 10
WALKING IN HOSPITAL ROOM: TOTAL
MOVING TO AND FROM BED TO CHAIR: TOTAL

## 2025-04-14 ASSESSMENT — ACTIVITIES OF DAILY LIVING (ADL): ADL_ASSISTANCE: INDEPENDENT

## 2025-04-14 NOTE — PROGRESS NOTES
Physical Therapy    Physical Therapy Evaluation    Patient Name: Ronna Beckham  MRN: [id number]  Room: 28/[facility]  Today's Date: 4/14/2025   Time Calculation  Start Time: 1255  Stop Time: 1320  Time Calculation (min): 25 min    Assessment/Plan   PT Assessment  PT Assessment Results: Decreased endurance, Impaired balance, Decreased mobility, Pain  Rehab Prognosis: Good  Barriers to Discharge Home: Physical needs  Physical Needs: Ambulating household distances limited by function/safety  End of Session Communication: Bedside nurse  End of Session Patient Position: Bed, 3 rail up, Alarm off, not on at start of session  IP OR SWING BED PT PLAN  Inpatient or Swing Bed: Inpatient  Treatment/Interventions: Bed mobility, Gait training, Transfer training, Balance training, Strengthening, Endurance training, Range of motion, Therapeutic exercise, Therapeutic activity  PT Plan: Ongoing PT  PT Frequency: 3 times per week  PT Discharge Recommendations: High intensity level of continued care (vs low pending progression with respiratory status)  PT Recommended Transfer Status: Assist x1    Subjective      General Visit Information:  Subjective: Patient is alert, agreeable to PT.  Dyspnic with extended sentences.    Reason for Referral: hematoma on hendrickson catheter site and subsequent acute on chronic RF  Past Medical History Relevant to Rehab: 64 y.o. year old female with pulmonary hypertension group 1 (hendrickson in place), atrial fibrillation on apixiban s/p ablation 3/2025, lupus(?)  Prior to Session Communication: Bedside nurse  Patient Position Received: Bed, 3 rail up, Alarm off, not on at start of session   Home Living:  Home Living  Type of Home: Apartment  Lives With: Alone  Home Adaptive Equipment: None  Home Layout: One level  Home Access: No concerns  Prior Level of Function:  Prior Function Per Pt/Caregiver Report  Level of [place]: Independent with ADLs and functional transfers, Independent with homemaking with  "ambulation  Receives Help From: Family (daughter)  ADL Assistance: Independent  Homemaking Assistance: Needs assistance (pt reports family completes)  Ambulatory Assistance:  (pt reports \"I don't really walk anywhere and if I do I use the scooter at the store.\" Pt reports she is able to walk short distances)  Vocational: Unemployed  Leisure: TV  Precautions:  Precautions  Medical Precautions: Fall precautions, Oxygen therapy device and L/min  Vital Signs:  Pre Vitals  Vital Signs  Heart Rate: 104  SpO2: 95 %  BP: 103/62   Post Vitals  Vital Signs  Heart Rate: 105  SpO2: 94 %  BP: (!) 109/94   Lines/Tubes/Drains:  External Urinary Catheter Female (Active)   Number of days: 1     Medical Gas Therapy: Supplemental oxygen  Medical Gas Delivery Method: High flow nasal cannula  Oxygen Dose: *40 L/min 50%  IS 10x 1000cc  Continuous Medications/Drips:  epoprostenol, 67 ng/kg/min (Order-Specific), Last Rate: 67 ng/kg/min (04/14/25 1319)  heparin, 0-4,500 Units/hr, Last Rate: 1,300 Units/hr (04/14/25 1203)        Objective   Pain:  Pain Assessment  0-10 (Numeric) Pain Score: 5 - Moderate pain (post 5)  Pain Type: Acute pain  Pain Location: Chest  Pain Orientation: Left  Pain Interventions: Repositioned    Cognition:  Cognition  Overall Cognitive Status: Within Functional Limits  Orientation Level: Oriented X4    General Assessments:  Extremity/Trunk Assessments:  Tone: No abnormalities noted  Sensation  Light Touch: No apparent deficits  Sensation Comment: baseline minimal neuroapthy BLE tingling  Coordination  Movements are Fluid and Coordinated: Yes  Upper Extremity  ROM: WNL  Strength:WFL  Lower Extremity  ROM: WNL  Strength: WFL   Sitting Static Balance Not NormalUE Support Two UE support and Assist Level Supervision  Sitting Dynamic Balance Not NormalUE Support Two UE support and Assist Level Supervision  Standing Static Balance Unable to Complete/Dependent  Standing Dynamic Balance Unable to " Complete/Dependent    Functional Assessments:  Bed Mobility  Bed Mobility: Yes  Bed Mobility 1  Bed Mobility 1: Supine to sitting  Level of Assistance 1: Contact guard  Bed Mobility Comments 1: HOB 30, rail    Transfers  Transfer:  (declined 2/2 ALAMO)       Outcome Measures:  [facility] Basic Mobility  Turning from your back to your side while in a flat bed without using bedrails: A little  Moving from lying on your back to sitting on the side of a flat bed without using bedrails: A little  Moving to and from bed to chair (including a wheelchair): Total  Standing up from a chair using your arms (e.g. wheelchair or bedside chair): Total  To walk in hospital room: Total  Climbing 3-5 steps with railing: Total  Basic Mobility - Total Score: 10           FSS-ICU  Ambulation: Unable to attempt due to weakness  Rolling: Minimal assistance (performs 75% or more of task)  Sitting: Minimal assistance (performs 75% or more of task)  Transfer Sit-to-Stand: Unable to perform  Transfer Supine-to-Sit: Minimal assistance (performs 75% or more of task)  Total Score: 12  ICU Mobility Screen  ICU Mobility Scale: Sitting over edge of bed             Encounter Problems       Encounter Problems (Active)       PT Problem       Patient will complete supine to sit and sit to supine Independent  (Progressing)       Start:  04/14/25    Expected End:  04/28/25            Patient will perform sit<>stand transfer with LRAD, and Modified Independent  (Progressing)       Start:  04/14/25    Expected End:  04/28/25            Patient will ambulate >50' with LRAD and Modified Independent  (Progressing)       Start:  04/14/25    Expected End:  04/28/25               Pain - Adult            Assessment: Patient presents c acute on chronic hypoxic respiratory failure.  Currently CGA for  bed mobility with inability to progress to OOB activities 2/2 dyspnea.  Patient would benefit from further therapy to increase functional independence and safety.  Will  continue to follow during acute stay.      Education Documentation  Handouts, taught by Gómez Zabala PT at 4/14/2025  1:47 PM.  Learner: Patient  Readiness: Acceptance  Method: Explanation  Response: Needs Reinforcement  Comment: POC review, IS    Precautions, taught by Gómez Zabala PT at 4/14/2025  1:47 PM.  Learner: Patient  Readiness: Acceptance  Method: Explanation  Response: Needs Reinforcement  Comment: POC review, IS    Home Exercise Program, taught by Gómez Zabala PT at 4/14/2025  1:47 PM.  Learner: Patient  Readiness: Acceptance  Method: Explanation  Response: Needs Reinforcement  Comment: POC review, IS    Body Mechanics, taught by Gómez Zabala PT at 4/14/2025  1:47 PM.  Learner: Patient  Readiness: Acceptance  Method: Explanation  Response: Needs Reinforcement  Comment: POC review, IS    Mobility Training, taught by Gómez Zabala PT at 4/14/2025  1:47 PM.  Learner: Patient  Readiness: Acceptance  Method: Explanation  Response: Needs Reinforcement  Comment: POC review, IS    Education Comments  No comments found.          04/14/25 at 1:49 PM   Gómez Zabala PT   Rehab Office: [phone]

## 2025-04-14 NOTE — PROGRESS NOTES
Vancomycin Dosing by Pharmacy- FOLLOW UP    Ronna Beckham is a 64 y.o. year old female who Pharmacy has been consulted for vancomycin dosing for pneumonia. Based on the patient's indication and renal status this patient is being dosed based on a goal AUC of 400-600.     Renal function is currently stable.    Current vancomycin dose: 1000 mg given every 12 hours    Estimated vancomycin AUC on current dose: 748 mg/L.hr     Visit Vitals  /63   Pulse 102   Temp 35.8 °C (96.4 °F) (Temporal)   Resp 24        Lab Results   Component Value Date    CREATININE 0.85 2025    CREATININE 0.75 2025    CREATININE 0.75 2025    CREATININE 0.69 2025    CREATININE 0.86 2025        Patient weight is as follows:   Vitals:    25 0600   Weight: 70.3 kg (154 lb 15.7 oz)       Cultures:  Susceptibility data for the encounter in last 14 days.  Collected Specimen Info Organism   25 Blood culture from Peripheral Venipuncture Staphylococcus aureus   25 Blood culture from Peripheral Venipuncture Staphylococcus aureus        I/O last 3 completed shifts:  In: 2573 (35.4 mL/kg) [P.O.:960; I.V.:213 (2.9 mL/kg); IV Piggyback:1400]  Out: 1100 (15.1 mL/kg) [Urine:1100 (0.4 mL/kg/hr)]  Weight: 72.7 kg   I/O during current shift:  I/O this shift:  In: 45.6 [I.V.:45.6]  Out: 1500 [Urine:1500]    Temp (24hrs), Av.3 °C (97.3 °F), Min:35.8 °C (96.4 °F), Max:36.8 °C (98.2 °F)      Assessment/Plan    Above goal AUC. Orders placed for new vancomcyin regimen of 1250 mg every 24 hours to begin at 2215.    This dosing regimen is predicted by [employer] to result in the following pharmacokinetic parameters:  SSG48-97: 533 mg/L.hr  AUC24,ss: 487 mg/L.hr  Probability of AUC24 > 400: 85 %  Ctrough,ss: 13.1 mg/L  Probability of Ctrough,ss > 20: 9 %    The next level will be obtained on  at 0500. May be obtained sooner if clinically indicated.   Will continue to monitor renal function daily while on  vancomycin and order serum creatinine at least every 48 hours if not already ordered.  Follow for continued vancomycin needs, clinical response, and signs/symptoms of toxicity.       VANGIE DAVID, PharmD

## 2025-04-14 NOTE — PROGRESS NOTES
Ronna Beckham is a 64 y.o. female with a PMHx of Pulmonary Arterial Hypertension Group 1 (Montez in place), Atrial Fibrillation s/p Ablation (03/2025 on Eliquis and PO diltiazem 180 mg), Systemic Lupus Erythematosus (questionable diagnosis) who initially presented to the ED on 4/9/2025 for worsening hematoma at the site of her Montez cathter, now transferred to MICU with increasing oxygen requirements necessitating AirVo 50L/70% (baseline oxygen 4L/min) and tachyarrhythmia for which cardiology team is consulted.     Subjective   -this morning AF, , /60   -cont on heparin gtt   -cont on HFNC FiO2 60%, 6L   -Tele atrial tach, HR 100s  -current cardiac meds baby ASA, heparin gtt       Pertinent Studies:  Surface Echo 4/14/2025:   1. Left ventricular ejection fraction is normal, by visual estimate at 70-75%.   2. Right ventricular volume and pressure overload.   3. There is reduced right ventricular systolic function.   4. Severely enlarged right ventricle.   5. The right atrium is severely dilated.   6. There is no evidence of cardiac tamponade.   7. Severe tricuspid regurgitation visualized.   8. Severely elevated right ventricular systolic pressure.    CTPE 4/13/25  1. No evidence of acute pulmonary embolism to segmental level. Please  note that, assessment of subsegmental branches is limited and small  peripheral emboli are not entirely excluded.  2. Interval development of trace/small left pleural effusion. Left  basilar consolidative opacity. Additional new consolidative opacity  in the posterior right middle lobe and areas of ground-glass opacity  in the right lower lobe. Findings are likely atelectatic, however  infectious process should be excluded in appropriate clinical setting.  3. Severe enlargement of the right ventricle and right atrium with  dilated main pulmonary artery in keeping with patient's known  pulmonary artery hypertension.  4. There is suggestion of a defect in the base of  "interventricular  septum (VSD). Correlate with echocardiogram and consider cardiac MRI  for shunt assessment. Correlate with concern for Eisenmenger syndrome.  5. Peribronchovascular and centrilobular ground-glass halo in  bilateral lungs, likely sequela of elevated pulmonary artery  pressure/pulmonary artery hypertension.  6. Liver appears enlarged, partially imaged. Findings might be due to  hepatic congestion/congestive hepatopathy secondary to right heart  dysfunction.    EKGs reviewed:  EKG on 11/25/2024: Normal sinus rhythm with RBBB, left axis deviation   EKG on 3/15/2025: P axis in leads I and II are now negative indicating that this is unlikely a normal sinus rhythm but rather an ectopic atrial rhythm.   EKG 4/11 and 4/12/2025: ectopic atrial rhythm and RBBB. May be consistent with Atach or atypical Aflutter 2:1 conduction.     Echocardiogram 10/24/2024:   1. Left ventricular ejection fraction is hyperdynamic, by visual estimate at 75%.   2. Left ventricular cavity size is decreased.   3. Right ventricular volume and pressure overload.   4. There is moderately reduced right ventricular systolic function.   5. Severely enlarged right ventricle.   6. Severely elevated right ventricular systolic pressure.   7. The right atrium is severely dilated.   8. Severe tricuspid regurgitation visualized.   9. Small to moderate pericardial effusion.  10. There is no evidence of cardiac tamponade.  11. A dilated inferior vena cava demonstrates poor inspiratory collapse, consistent with elevated right atrial pressures.  12. Compared with study dated 4/24/2024, there is no significant difference in RV size and function. There is again severe TR. The RVSP has increased from 89 to 105 mmHg. The pericardial effusion appears unchanged.    Objective   Visit Vitals  /60   Pulse 107   Temp 35.7 °C (96.3 °F)   Resp 18   Ht 1.651 m (5' 5\")   Wt 68.6 kg (151 lb 3.8 oz)   SpO2 94%   BMI 25.17 kg/m²   OB Status Hysterectomy "   Smoking Status Former   BSA 1.77 m²      Physical Exam  General: awake, alert, no acute distress  CV: tachycardic, no MRG  Resp: On AirVo  LE: no edema, no cyanosis  Neuo: grossly normal  Psych: pleasant      Cardiographics  Echocardiogram: Results for orders placed during the hospital encounter of 02/17/25    Transthoracic Echo (TTE) Complete 2/17/2025    PHYSICIAN INTERPRETATION:  Left Ventricle: Left ventricular ejection fraction is normal, by visual estimate at 60-65%. There are no regional left ventricular wall motion abnormalities. The left ventricular cavity size is normal. There is normal septal and mildly increased posterior left ventricular wall thickness. There is left ventricular concentric remodeling. The interventricular septum is flattened in systole and diastole, consistent with right ventricular pressure and volume overload. Spectral Doppler shows a Grade II (pseudonormal pattern) of left ventricular diastolic filling with an elevated left atrial pressure.  Left Atrium: The left atrial size is normal.  Right Ventricle: The right ventricle is severely enlarged. There is severely reduced right ventricular systolic function.  Right Atrium: The right atrium is moderate to severely dilated.  Aortic Valve: The aortic valve is trileaflet. There is trace aortic valve regurgitation. The peak instantaneous gradient of the aortic valve is 7 mmHg.  Mitral Valve: The mitral valve is normal in structure. There is trace mitral valve regurgitation.  Tricuspid Valve: The tricuspid valve is structurally normal. There is severe tricuspid regurgitation. The Doppler estimated RVSP is severely elevated at 91.5 mmHg. Reported right ventricular systolic pressure may be underestimated due to severe tricuspid regurgitation. TR likely severe given shape of TR CW Doppler envelope shape, though no flow reversal in the hepatic veins, so possibly moderate to severe TR.  Pulmonic Valve: The pulmonic valve is not well visualized.  There is physiologic pulmonic valve regurgitation.  Pericardium: Small pericardial effusion. There is no evidence of cardiac tamponade.  Aorta: The aortic root is normal.  Systemic Veins: The inferior vena cava appears dilated, with IVC inspiratory collapse greater than 50%.  In comparison to the previous echocardiogram(s): Compared with the prior exam from 10/24/2024, there was already a severley dilated RV with severely reduced systolic function. The prior RVSP was severely elevated at 105.1mmHg, but appears to be a little lower today(91mmHg) with some inspiratory collapse of IVC. The pericaridal effusion was larger previously, still with no tamponade. There wa already seere TR o the prior study.      CONCLUSIONS:  1. Left ventricular ejection fraction is normal, by visual estimate at 60-65%.  2. Spectral Doppler shows a Grade II (pseudonormal pattern) of left ventricular diastolic filling with an elevated left atrial pressure.  3. Right ventricular volume and pressure overload.  4. There is severely reduced right ventricular systolic function.  5. Severely enlarged right ventricle.  6. The right atrium is moderate to severely dilated.  7. There is no evidence of cardiac tamponade.  8. Severely elevated right ventricular systolic pressure.  9. TR likely severe given shape of TR CW Doppler envelope shape, though no flow reversal in the hepatic veins, so possibly moderate to severe TR.  10. Compared with the prior exam from 10/24/2024, there was already a severley dilated RV with severely reduced systolic function. The prior RVSP was severely elevated at 105.1mmHg, but appears to be a little lower today(91mmHg) with some inspiratory collapse of IVC. The pericaridal effusion was larger previously, still with no tamponade. There wa already seere TR o the prior study.    Lab Review   Lab Results   Component Value Date     04/15/2025    K 3.2 (L) 04/15/2025     04/15/2025    CO2 24 04/15/2025    BUN 12  04/15/2025    CREATININE 0.43 (L) 04/15/2025    GLUCOSE 110 (H) 04/15/2025    CALCIUM 8.1 (L) 04/15/2025     Lab Results   Component Value Date    WBC 9.2 04/15/2025    HGB 7.8 (L) 04/15/2025    HCT 26.6 (L) 04/15/2025    MCV 72 (L) 04/15/2025    PLT 63 (L) 04/15/2025       Troponin I, High Sensitivity   Date/Time Value Ref Range Status   11/25/2024 12:37 PM 5 0 - 13 ng/L Final   11/25/2024 11:45 AM 6 0 - 13 ng/L Final   11/20/2024 12:27 PM 6 0 - 13 ng/L Final     B TYPE NATRIURETIC PEPTIDE (BNP)   Date/Time Value Ref Range Status   02/17/2025 03:58  (H) <100 pg/mL Final     Comment:        BNP levels increase with age in the general  population with the highest values seen in  individuals greater than 75 years of age.  Reference: J. Am. Lizzy. Cardiol. 2002; 40:976-982.          BNP   Date/Time Value Ref Range Status   04/13/2025 04:05  (H) 0 - 99 pg/mL Final   01/02/2025 02:19  (H) 0 - 99 pg/mL Final   11/25/2024 11:45  (H) 0 - 99 pg/mL Final     LDL   Date/Time Value Ref Range Status   09/21/2020 09:58 PM 93 0 - 99 mg/dL Final     Comment:     .                           NEAR      BORD      AGE      DESIRABLE  OPTIMAL    HIGH     HIGH     VERY HIGH     0-19 Y     0 - 109     ---    110-129   >/= 130     ----    20-24 Y     0 - 119     ---    120-159   >/= 160     ----      >24 Y     0 -  99   100-129  130-159   160-189     >/=190  .       Triglycerides   Date/Time Value Ref Range Status   10/03/2024 11:57  0 - 149 mg/dL Final     Comment:        Age         Desirable   Borderline High   High     Very High   0 D-90 D    19 - 174         ----         ----        ----  91 D- 9 Y     0 -  74        75 -  99     >/= 100      ----    10-19 Y     0 -  89        90 - 129     >/= 130      ----    20-24 Y     0 - 114       115 - 149     >/= 150      ----         >24 Y     0 - 149       150 - 199    200- 499    >/= 500    Venipuncture immediately after or during the administration of Metamizole  may lead to falsely low results. Testing should be performed immediately prior to Metamizole dosing.   09/21/2020 09:58  (H) 0 - 149 mg/dL Final     Comment:     .      AGE      DESIRABLE   BORDERLINE HIGH   HIGH     VERY HIGH   0 D-90 D    19 - 174         ----         ----        ----  91 D- 9 Y     0 -  74        75 -  99     >/= 100      ----    10-19 Y     0 -  89        90 - 129     >/= 130      ----    20-24 Y     0 - 114       115 - 149     >/= 150      ----         >24 Y     0 - 149       150 - 199    200- 499    >/= 500  .   Venipuncture immediately after or during the    administration of Metamizole may lead to falsely   low results. Testing should be performed immediately   prior to Metamizole dosing.     07/23/2019 10:40  (H) 40 - 150 MG/DL Final          Assessment/Plan     shiva Beckham is a 64 y.o. female with a PMHx of Pulmonary Arterial Hypertension Group 1 (Montez in place) with severly dilated RV and RV dysfunction, Atrial Fibrillation s/p Ablation (03/2025) on Eliquis, Systemic Lupus Erythematosus (questionable diagnosis) who initially presented to the ED on 4/9/2025 for worsening hematoma at the site of her Montez cathter, now transferred to MICU with increasing oxygen requirements necessitating AirVo 50L/70% (baseline oxygen 4L/min) and tachyarrhythmia for which cardiology team is consulted.      # Tachyarrhythmia likely Atach  or atypical Aflutter 2:1 conduction  EKGs reviewed:  EKG on 11/25/2024: Normal sinus rhythm with RBBB, left axis deviation   EKG on 3/15/2025: P axis in leads I and II are now negative indicating that this is unlikely a normal sinus rhythm but rather an ectopic atrial rhythm.   EKG 4/11 and 4/12/2025: ectopic atrial rhythm and RBBB. May be consistent with Atach  or atypical Aflutter 2:1 conduction.      Recommendations:  - Her Atrial arhythmia is likely driven by her severe pulmonary disease and bacteremia  - Patient with moderately reduced RV function.  Would be very cautious and if able avoid BB or CCB as it may lead to acute RV failure and decompensation. Especially that Atach or flutter is notoriously known to be difficult to control and would not achieve adequate control with BB or CCB  - Would target a HR <120  - IV digoxin may not be ideal in atach and unlikely to help.   - Patient has been off AC due to pocket hematoma and as such would avoid antiarrythmic meds. If patient is hemodynamically unstable, then IV amio may be considered (has AV alexander blocking effects in first 24-48 hrs) with low conversion rate but risk remains there for chemical cardioversion and stroke. As such would not resort to IV amio unless necessary.   ---started back on heparin gtt 4/14   - maintain Mg> 2 and K>4  - would discharge home w/ zio patch and EP follow up. If rates difficult to control during hospitalization then would consider DCCV as long as patient can tolerate AC.     Cardiology will sign off at this time.     Recommendations are preliminary until note is co-signed by an attending.     Annette Land MD  PGY-5 Cardiovascular Medicine Fellow    Thank you for the opportunity to contribute to the care of this patient. Above recommendations discussed with Dr. Gamboa.     If further questions arise, please page the general cardiology [place]ager at [phone] on weekdays 7AM - 6PM and weekends 7AM - 2PM, or at [phone] at all other times.

## 2025-04-14 NOTE — SIGNIFICANT EVENT
RT to pt room for rapid response; pt w/ increased O2 requirement from 4L NC up to 15L HFNC sat 88%, placed onAirVo 50L 70% w/ improvement in sat to 95%.  RT obtained ABG, results handed to MD at bedside.  Pt accepted accepted and transferred to MICU.

## 2025-04-14 NOTE — PROGRESS NOTES
Ronna Beckham is a 64 y.o. female on day 5 of admission presenting with Chest wall mass.  ID is following for Staph aureus bacteremia in the setting of a Montez line infection.    Subjective   Interval History: Line was removed yesterday.  Patient remains on Vanco and cefazolin while awaiting susceptibilities on the Staph aureus.  She did have another blood culture that was positive, but this was prior to the line being removed.  She also is complaining of worsening neck pain.  She is tolerating the antibiotics well otherwise.            Objective   Range of Vitals (last 24 hours)  Heart Rate:  []   Temp:  [35.7 °C (96.3 °F)-36.3 °C (97.3 °F)]   Resp:  [14-26]   BP: ()/(52-86)   Weight:  [70.3 kg (154 lb 15.7 oz)]   SpO2:  [90 %-98 %]   Daily Weight  04/14/25 : 70.3 kg (154 lb 15.7 oz)    Body mass index is 25.79 kg/m².    Physical Exam  She is in no acute distress.  Her conjunctiva are pink.  She has no icterus.  Her oropharynx is clear.  Her line site is dressed.  Her cardiac exam is regular rate and rhythm, S1-S2, and a 2 out of 6 systolic ejection murmur.  Her lungs have anterior crackles.  Her abdomen is soft, nontender nondistended, bowel sounds are present.    Antibiotics  ceFAZolin - 2 gram/100 mL  vancomycin - 1250 mg/250 mL    Relevant Results  Labs  Results from last 72 hours   Lab Units 04/14/25  0458 04/13/25  1831 04/13/25  0405 04/12/25  2207   WBC AUTO x10*3/uL 8.0 9.5 9.6 10.4   HEMOGLOBIN g/dL 7.9* 7.4* 7.2* 7.6*   HEMATOCRIT % 25.5* 23.9* 23.2* 24.7*   PLATELETS AUTO x10*3/uL 57* 56* 60* 55*   NEUTROS PCT AUTO %  --   --  77.6 77.6   LYMPHO PCT MAN % 4.2 8.7  --   --    LYMPHS PCT AUTO %  --   --  7.2 8.4   MONO PCT MAN % 4.2 3.5  --   --    MONOS PCT AUTO %  --   --  7.9 7.5   EOSINO PCT MAN % 0.0 0.0  --   --    EOS PCT AUTO %  --   --  0.0 0.0     Results from last 72 hours   Lab Units 04/14/25  0458 04/13/25  0405 04/12/25  7097   SODIUM mmol/L 135* 132* 132*   POTASSIUM mmol/L  3.9 3.7 3.8   CHLORIDE mmol/L 99 98 97*   CO2 mmol/L 25 23 22   BUN mg/dL 14 14 15   CREATININE mg/dL 0.85 0.75 0.75   GLUCOSE mg/dL 199* 115* 119*   CALCIUM mg/dL 7.9* 7.5* 7.5*   ANION GAP mmol/L 15 15 17   EGFR mL/min/1.73m*2 78 90 90   PHOSPHORUS mg/dL 4.4 4.6 4.2     Results from last 72 hours   Lab Units 04/14/25  0458 04/13/25  0405 04/12/25  2207   ALK PHOS U/L 128 137* 146*   BILIRUBIN TOTAL mg/dL 0.6 1.4* 1.3*   BILIRUBIN DIRECT mg/dL 0.2 0.5* 0.4*   PROTEIN TOTAL g/dL 5.6* 5.5* 5.8*   ALT U/L 11 16 19   AST U/L 10 13 15   ALBUMIN g/dL 2.8* 2.8* 3.1*     Estimated Creatinine Clearance: 67.5 mL/min (by C-G formula based on SCr of 0.85 mg/dL).  C-Reactive Protein   Date Value Ref Range Status   04/23/2024 0.12 <1.00 mg/dL Final     CRP   Date Value Ref Range Status   03/09/2022 0.46 mg/dL Final     Comment:     REF VALUE  < 1.00     08/14/2020 0.9 0 - 2.0 MG/DL Final     Comment:     Performed at [address] [address]     Microbiology  Susceptibility data from last 14 days.  Collected Specimen Info Organism   04/12/25 Blood culture from Peripheral Venipuncture Staphylococcus aureus   04/12/25 Blood culture from Peripheral Venipuncture Staphylococcus aureus       Imaging  CT angio chest for pulmonary embolism    Result Date: 4/13/2025  Interpreted By:  Janice Beasley  and Nery Crain STUDY: CT ANGIO CHEST FOR PULMONARY EMBOLISM;  4/13/2025 8:46 am   INDICATION: Signs/Symptoms:Eval for PE.     COMPARISON: CT ANGIO CHEST W AND WO IV CONTRAST 4/9/2025   ACCESSION NUMBER(S): [id number]   ORDERING CLINICIAN: STEPHEN MONTGOMERY   TECHNIQUE: Helical data acquisition of the chest was obtained after intravenous administration of 85 ML Omnipaque 350, as per PE protocol. Images were reformatted in coronal and sagittal planes. Axial and coronal maximum intensity projection (MIP) images were created and reviewed.   FINDINGS: POTENTIAL LIMITATIONS OF THE STUDY: The assessment is limited  by respiratory motion and suboptimal contrast opacification of pulmonary arteries.   HEART AND VESSELS: No discrete filling defects within the main pulmonary artery or its branches to segmental level. Please note that, assessment of subsegmental branches is limited and small peripheral emboli are not entirely excluded. Main pulmonary artery is dilated measuring 3.3 cm.   The thoracic aorta normal in course and caliber. Although, the study is not tailored for evaluation of aorta, there is no definite evidence of acute aortic pathology. Moderate to marked coronary artery calcifications are seen. Please note,the study is not optimized for evaluation of coronary arteries.   There is severe dilation of the right atrium and right ventricle. Suggestion of defect in the base of the interventricular septum, best seen in image 166/284.   There is trace pericardial effusion seen.   MEDIASTINUM AND JOANA, LOWER NECK AND AXILLA: The visualized thyroid gland is within normal limits. No evidence of thoracic lymphadenopathy by CT size criteria. There are multiple prominent mediastinal lymph nodes measuring up to 0.9 cm. For example, an AP window lymph node measure 0.9 cm (series 401, image 93). Another right paratracheal lymph node measures 0.9 cm (series 401, image 58). These lymph nodes are similar in size compared to prior CT PE exam 04/09/2025. Esophagus appears within normal limits as seen.   LUNGS AND AIRWAYS: Trachea and central airways are patent.   There is interval development of small/trace left pleural effusion. New left basilar consolidative opacity. Additional new patchy airspace opacity predominantly in the right lower lobe and posterior right middle lobe.   There is faint centrilobular ground-glass nodular densities surrounding the vasculature which is likely sequela of portal hypertension. There is no pneumothorax.   Right middle lobe measuring up to 0.7 cm (series 403, image 169). The bilateral lungs are without  evidence of pneumothorax.   UPPER ABDOMEN: The imaged liver appears enlarged.     CHEST WALL AND OSSEOUS STRUCTURES: Chest wall is within normal limits. No acute osseous pathology.There are no suspicious osseous lesions.Mild multilevel degenerative changes within visualized spine.       1. No evidence of acute pulmonary embolism to segmental level. Please note that, assessment of subsegmental branches is limited and small peripheral emboli are not entirely excluded. 2. Interval development of trace/small left pleural effusion. Left basilar consolidative opacity. Additional new consolidative opacity in the posterior right middle lobe and areas of ground-glass opacity in the right lower lobe. Findings are likely atelectatic, however infectious process should be excluded in appropriate clinical setting. 3. Severe enlargement of the right ventricle and right atrium with dilated main pulmonary artery in keeping with patient's known pulmonary artery hypertension. 4. There is suggestion of a defect in the base of interventricular septum (VSD). Correlate with echocardiogram and consider cardiac MRI for shunt assessment. Correlate with concern for Eisenmenger syndrome. 5. Peribronchovascular and centrilobular ground-glass halo in bilateral lungs, likely sequela of elevated pulmonary artery pressure/pulmonary artery hypertension. 6. Liver appears enlarged, partially imaged. Findings might be due to hepatic congestion/congestive hepatopathy secondary to right heart dysfunction.     I personally reviewed the images/study and I agree with the findings as stated by Paras Gabriel MD. This study was interpreted at [facility], [place], [place].   MACRO: Critical Finding:  See findings. Notification was initiated on 4/13/2025 at 12:30 pm by  Janice Salvador.  (**-YCF-**) Instructions:   Signed by: Janice Salvador 4/13/2025 12:36 PM Dictation workstation:   [id number]    XR chest 1  view    Result Date: 4/13/2025  Interpreted By:  Janice Beasley and Hofer Lindsay STUDY: XR CHEST 1 VIEW;  4/12/2025 10:28 pm   INDICATION: Signs/Symptoms:Acute on chronic hypoxic respiratory failure.   COMPARISON: Chest radiograph 04/12/2025 at 8:45 p.m..   ACCESSION NUMBER(S): [id number]   ORDERING CLINICIAN: STEPHEN MONTGOMERY   FINDINGS: AP radiograph of the chest was provided.   MEDICAL DEVICES: Right IJ approach central venous catheter with the tip projecting over the lower SVC.   CARDIOMEDIASTINAL SILHOUETTE: Cardiomediastinal silhouette is stable in size and configuration.   LUNGS: Prominent perihilar and interstitial lung markings. Patchy opacities within the bibasilar lungs. Slight blunting of the left costophrenic angle.   ABDOMEN: No remarkable upper abdominal findings.   BONES: No acute osseous changes.       1.  Trace left pleural effusion with adjacent atelectasis. 2. Slight interval increase in bibasilar opacity which might be due to increasing atelectasis and edema. Cannot exclude superimposed or developing infiltrate and infection   I personally reviewed the images/study and resident's interpretation and I agree with the findings as stated by Krystal Farias MD (resident radiologist). This study was analyzed and interpreted at [place], [place].   MACRO: None   Signed by: Janice Salvador 4/13/2025 7:56 AM Dictation workstation:   [id number]    XR chest 1 view    Result Date: 4/13/2025  Interpreted By:  Janice Beasley and Hofer Lindsay STUDY: XR CHEST 1 VIEW;  4/12/2025 8:50 pm   INDICATION: Signs/Symptoms:Hypoxia.   COMPARISON: Chest radiograph 04/12/2025 at 1:02 p.m.   ACCESSION NUMBER(S): [id number]   ORDERING CLINICIAN: STONE WINCHESTER   FINDINGS: AP radiograph of the chest was provided.   MEDICAL DEVICES: Right IJ approach central venous catheter with the tip projecting over the mid SVC.   CARDIOMEDIASTINAL SILHOUETTE:  Cardiomediastinal silhouette is stable in size and configuration.   LUNGS: Prominent perihilar and interstitial lung markings. Patchy bibasilar airspace opacities. Slight blunting of the left costophrenic angle. No evidence of pneumothorax.   ABDOMEN: No remarkable upper abdominal findings.   BONES: No acute osseous changes.       1. Trace left pleural effusion with adjacent atelectasis. 2. Similar appearance of pulmonary vascular congestion and mild interstitial pulmonary edema.     I personally reviewed the images/study and resident's interpretation and I agree with the findings as stated by Krystal Farias MD (resident radiologist). This study was analyzed and interpreted at [place], [place].   MACRO: None   Signed by: Janice Salvador 4/13/2025 7:56 AM Dictation workstation:   [id number]    XR chest 1 view    Result Date: 4/12/2025  Interpreted By:  Janice Beasley,  and Angelica Clemente STUDY: XR CHEST 1 VIEW;  4/12/2025 1:05 pm   INDICATION: Signs/Symptoms:r/o pneumonia.     COMPARISON: Chest x-ray and CTA chest 04/09/2025.   ACCESSION NUMBER(S): [id number]   ORDERING CLINICIAN: STONE WINCHESTER   FINDINGS: AP radiograph of the chest was provided.   Right IJ central venous catheter with the tip projecting over the lower SVC.   CARDIOMEDIASTINAL SILHOUETTE: Cardiomediastinal silhouette enlarged but similar in size and configuration to prior.   LUNGS: No pneumothorax or pleural effusion. Bibasilar patchy airway opacities. Perihilar prominence and diffuse bilateral interstitial thickening.   ABDOMEN: No remarkable upper abdominal findings.   BONES: No acute osseous changes.       1. Unchanged perihilar congestion and mild perihilar edema. 2. Stable moderate-sized cardiomegaly.   I personally reviewed the images/study and I agree with the findings as stated by Resident Dr. Chyna Dominguez MD. This study was interpreted at [facility]  [place], [place].   MACRO: None   Signed by: Ericsylvie Arredondoautumn Salvador 4/12/2025 2:49 PM Dictation workstation:   [id number]      Assessment/Plan   Staph aureus bacteremia in a patient with an infected Montez line.  Line has been removed, but patient continues to complain of neck pain.    Recommendations:  1.  Continue vancomycin and cefazolin for now.  Once we have the susceptibilities back on the staph we should be able to narrow this antibiotic coverage.  2.  Awaiting TTE to assess for possible endocarditis.  If this is negative then she will need to have a LUIS to assess for endocarditis as this will change our antibiotic duration.  3.  Would consider imaging of neck given her persistent pain and the Montez infection.  4.  Would repeat blood cultures tomorrow to ensure that she is clearing her bacteremia.    Discussed with patient and team.  ID will continue to follow this patient.  Please do not hesitate to reach out to me with any questions or concerns.  I can be reached via epic chat.       I spent 15 minutes in the professional and overall care of this patient.      Linda Ardon MD PhD    Addendum: Blood cultures show MRSA.  Patient will need to continue on vancomycin.  Please stop cefazolin.

## 2025-04-14 NOTE — PROGRESS NOTES
"Medical Intensive Care - Daily Progress Note   Subjective    Ronna Beckham is a 64 y.o. year old female patient admitted on 4/9/2025 with following ICU needs: acute vs subacute on chronic hypoxic respiratory failure requiring airvo.     Interval History:  No acute events overnight and no new concerns this morning. Her hendrickson was removed by IR without issue. Her veletri is running through a peripheral IV. Bcx collected yesterday morning (after abx but before line removed) are growing gram +ve cocci. Repeat cultures from this morning (after abx after line removed) are pending.     Meds    Scheduled medications  acetaminophen, 1,000 mg, intravenous, BID  [Transfer Hold] apixaban, 5 mg, oral, BID  aspirin, 81 mg, oral, Daily  calcium carbonate-vitamin D3, 2 tablet, oral, BID  ceFAZolin, 2 g, intravenous, q8h  dextrose, , ,   [Held by provider] dilTIAZem CD, 180 mg, oral, Daily  ergocalciferol, 1.25 mg, oral, Every Sunday  [Held by provider] furosemide, 40 mg, oral, Daily  gabapentin, 800 mg, oral, TID  heparin, 5,000 Units, subcutaneous, q8h  hydroxychloroquine, 200 mg, oral, BID  insulin regular, , ,   ipratropium-albuteroL, 3 mL, nebulization, q4h  loperamide, 2 mg, oral, BID  macitentan, 10 mg, oral, Daily  magnesium chloride, 64 mg, oral, BID  melatonin, 5 mg, oral, Nightly  pantoprazole, 40 mg, intravenous, Daily  polyethylene glycol, 17 g, oral, Daily  [Held by provider] potassium chloride CR, 20 mEq, oral, BID  sodium bicarbonate, , ,   vancomycin, 1,250 mg, intravenous, q24h  venlafaxine XR, 150 mg, oral, Daily      Continuous medications  epoprostenol, 67 ng/kg/min (Order-Specific)      PRN medications  PRN medications: albuterol, dextrose, HYDROmorphone, insulin regular, oxygen, sodium bicarbonate, vancomycin     Objective    Blood pressure 100/63, pulse 102, temperature 35.8 °C (96.4 °F), temperature source Temporal, resp. rate 24, height 1.651 m (5' 5\"), weight 70.3 kg (154 lb 15.7 oz), SpO2 95%. "     Physical Exam   General: NAD. A&Ox4.   HEENT: NC/AT. Pupils reactive to light.   Cardiac: Tachycardic. No m/r/g.   Pulmonary: Clear. Slightly decreased throughout, more so at bilateral bases. No wheeze, crackles, rhonchi. HFNC 60L/80% satting 98%.   Abdominal: Soft, nontender/nondistended. No rebound tenderness or involuntary guarding.  Ext: No edema bilateral lower ext.  Skin: No rash, lesion, bruising.   Neuro: No focal deficit.  Psych: Appropriate mood and affect.        Intake/Output Summary (Last 24 hours) at 4/14/2025 0658  Last data filed at 4/14/2025 0600  Gross per 24 hour   Intake 384.76 ml   Output 1500 ml   Net -1115.24 ml     Labs:   Results from last 72 hours   Lab Units 04/14/25  0458 04/13/25  0405 04/12/25  2207   SODIUM mmol/L 135* 132* 132*   POTASSIUM mmol/L 3.9 3.7 3.8   CHLORIDE mmol/L 99 98 97*   CO2 mmol/L 25 23 22   BUN mg/dL 14 14 15   CREATININE mg/dL 0.85 0.75 0.75   GLUCOSE mg/dL 199* 115* 119*   CALCIUM mg/dL 7.9* 7.5* 7.5*   ANION GAP mmol/L 15 15 17   EGFR mL/min/1.73m*2 78 90 90   PHOSPHORUS mg/dL 4.4 4.6 4.2      Results from last 72 hours   Lab Units 04/14/25  0458 04/13/25  1831 04/13/25  0405 04/12/25  2207   WBC AUTO x10*3/uL 8.0 9.5 9.6 10.4   HEMOGLOBIN g/dL 7.9* 7.4* 7.2* 7.6*   HEMATOCRIT % 25.5* 23.9* 23.2* 24.7*   PLATELETS AUTO x10*3/uL 57* 56* 60* 55*   NEUTROS PCT AUTO %  --   --  77.6 77.6   LYMPHO PCT MAN % 4.2 8.7  --   --    LYMPHS PCT AUTO %  --   --  7.2 8.4   MONO PCT MAN % 4.2 3.5  --   --    MONOS PCT AUTO %  --   --  7.9 7.5   EOSINO PCT MAN % 0.0 0.0  --   --    EOS PCT AUTO %  --   --  0.0 0.0      Results from last 72 hours   Lab Units 04/12/25 2053   POCT PH, ARTERIAL pH 7.45*   POCT PCO2, ARTERIAL mm Hg 32*   POCT PO2, ARTERIAL mm Hg 83*   POCT SO2, ARTERIAL % 98     Results from last 72 hours   Lab Units 04/12/25  1252   POCT PH, VENOUS pH 7.38   POCT PCO2, VENOUS mm Hg 39*   POCT PO2, VENOUS mm Hg 29*        Micro/ID:     Lab Results   Component  Value Date    BLOODCULT Loaded on Instrument - Culture in progress 04/13/2025    BLOODCULT  04/13/2025     Identification and susceptibility testing to follow       Summary of key imaging results from the last 24 hours  no new imaging      Assessment and Plan     Assessment: Ronna Beckham is a 64 y.o. year old female with pulmonary hypertension group 1 (hendrickson in place), atrial fibrillation on apixiban s/p ablation 3/2025, lupus(?) admitted on 4/9/2025 for evaluation of hematoma at the site of her hendrickson catheter. Transferred to the [facility]u on 4/12/25 for acute vs subacute on chronic hypoxic respiratory failure requiring airvo.     The differential diagnosis for her acute hypoxia is broad and includes pulmonary embolism, pneumonia, pulmonary edema, and shunt. At this point favor an underlying infectious process but will still rule out PE. Shunt is less likely given the improvement in hypoxia with supplemental oxygen.     Mechanical Ventilation: none  Sedation/Analgesia:  none  Restraints: no    Summary for 04/14/25  :  hemodynamically stable and saturating about 95% on high flow 50L/80%  weaning oxygen as able, plan for dobhoff if unable to tolerate PO d/t high flow settings  staph aureus bacteremia, sensitivities pending  bcx collected after initiation of IV abx but before line removal are growing gram +ve cocci  4/13/25 hendrickson removed by IR without issue  pending repeat cultures from this morning    Plan:  NEUROLOGY/PSYCH:  #peripheral neuropathy   : :chronic, stable  management:  -continue gabapentin 800 mg PO TID    #MDD  : :chronic, stable  management:  -continue venlafaxine 150 mg PO daily     CARDIOVASCULAR:  #typical atrial flutter 1:1 conduction s/p ablation   #supraventricular tachycardia, on diltiazem   : :h/o SVT iso significant pulmonary hypertension, narrow QRS up to 200 bpm unresponsive to adenosine and highly suspicious for one to one atrial flutter  : :recently underwent flutter ablation 3/23/25    : :on diltiazem 180 mg PO daily and apixiban  : :cardiology following, appreciate recs   management:  -telemetry   -starting heparin gtt 4/14 for therapeutic ac  -holding apixiban   -holding diltiazem     #coronary artery disease  : :chronic, stable   : :on aspirin at home  management:  -telemetry   -continue aspirin 81 mg PO daily   *holding all PO meds while on HFNC*    PULMONARY:  #acute on chronic hypoxic respiratory failure   : :on 4L NC at baseline; now on airvo 50L/80%  : :ddx includes pneumonia vs PE   : :collected bcx 4/12, 4/4 growing gram +ve cocci in clusters, likely line associated   : :admitted at end of march for SSTI with purulent drainage around hendrickson site (left IJ)- line replaced (right IJ) and started on 10 day course of doxycycline (end date 3/23/25)   management:  -wean HFNC as tolerated  -TTE with bubble study   -CT PE to evaluate for PE  -ID following  -continue abx as per ID recs  pip-tazo 4/12 - 4/13   vancomycin 4/12 -   cefazolin 4/13 -      #pulmonary arterial hypertension, group 1  : :regimen includes macitentan 10 mg PO daily, continuous epoprostenol infusion via right subclavian hendrickson and monthly sotatercept injections  : :follows outpatient with dr. Prescott   : :hendrickson was on her left side until last month when she was admitted for purulent drainage and treated for line associated SSTI with 10 day course of doxycycline  management:  -pending line removal with IR  -continue epoprostenol  -continue macitenan 10 mg PO daily   -will need additional peripheral access solely for administration of epoprostenol     RENAL/GENITOURINARY:  No active issues    GASTROENTEROLOGY:  #GERD  : :chronic, stable  : :on pantoprazole 40 mg PO daily at home   management:  -continue PPI, can do IV while on high flow    ENDOCRINOLOGY:  No active issues    RHEUMATOLOGY:  #systemic lupus erythematosus(?)   : :chronic, stable  : : her lupus is currently managed by her pcp, although can not directly find  records in the chart  : :currently on plaquenil testing  : :denies any clinical symptoms of active flare including joint pain, muscle pain, issues with urination, there doesn't seem to be a history of this subjectively   : :per EMR she had +ve anti-nuclear panel with a 1:1280 titer, however the anti-dsDNA was 4.0 IU/mL which is -ve; never had a +ve anti-dsDNA; unusual to be started on plaquenil for this  : :will repeat those laboratory studies as if this is not lupus, then would consider discontinuing this medication entirely  management:  -continue hydroxychloroquine 200 mg PO BID for now  -will repeat autoimmune workup   -can consider dc'ing lupus meds entirely if workup -ve    HEMATOLOGY:  #acute on chronic anemia, microcytic  #acute on chronic thrombocytopenia  : :baseline hemoglobin 10, 8.8 on admit and now 7  : :worsening thrombocytopenia, baseline >100   management:  -continue to annabel cbc  -goal hb >7   -goal platelet >10, >50 if bleeding   -iron studies  -peripheral smear     SKIN:  no active issues    MUSCULOSKELETAL:  #superficial hematoma, eschar anterior chest wall s/p hendrickson procedure   : :CTA showing small hematoma with no active extravasation   : :low c/f [id number] formation with thrombus per vascular sx and IR  : :recommend conservative mgmt  : :given her h/o substance use disorder consider drug-induced vasculitis 2/2 methamphetamine vs toxic exposure of levamisole or xylazine   : :further studies to rule out vasculitis component warranted  management:  -urine drug screen -ve  -vasc sx consulted  -IR consulted  -pending ANCA     INFECTIOUS DISEASE:  #gram +ve bacteremia, possibly line-associated   #possible pneumonia   : :bcx collected 4/12, 4/4 growing gram +ve cocci in clusters  : :mrsa nares +ve on 4/12   : :viral respiratory panel -ve on 4/12  : :procal 4/12 0.58, pending repeat this morning  : :hendrickson recently replaced during march admission for line associated SSTI- presented with  purulent drainage from hendrickson, briefly on IV abx then completed 10 days of doxycycline  management:  -continue IV abx as per ID recs   pip-tazo 4/12 - 4/13   vancomycin 4/12 -   cefazolin 4/13 -    -pending line removal with IR  -will need repeat bcx after line removed   -pending TTE to evaluate for endocarditis     ICU Check List       ICU Liberation: Intervention:   Assess, Prevent, Manage Pain CPOT - monitor   Both SAT and SBT [] SAT  [] SBT 30-60 min [] Extubate to NC  [] Extubate to NIV  [] Discuss Trach   Choice of analgesia and sedation RASS: 0  none monitor   Delirium: Assess, prevent and manage CAM - monitor   Early Mobility and Exercise  [x] PT /OT consult   Family Engagement and Empowerment [x]Family updated []SW consult     FEN  Fluids: if concern for sepsis then give IVF with caution otherwise hold off d/t PAH  Electrolytes: PRN  Nutrition: npo while on HFNC  Prophylaxis:  DVT ppx: heparin gtt (therapeutic)  GI ppx: PPI   Bowel care: miralax, senna   Hardware:         CVC 03/19/25 Tunneled Right Subclavian (Active)   Placement Date/Time: 03/19/25 1121   CVC Type: Tunneled  Orientation: Right  Location: Subclavian   Number of days: 24       Social:  Code: Full Code    HPOA: Diana JYOTHI Chuy (sister) [phone]   Disposition: [facility] pending improvement in hypoxia    Marychuy Contreras MD   04/14/25 at 6:58 AM     Disclaimer: Documentation completed with the information available at the time of input. The times in the chart may not be reflective of actual patient care times, interventions, or procedures. Documentation occurs after the physical care of the patient.

## 2025-04-14 NOTE — NURSING NOTE
Request received this morning to place USG PIV. Upon assessment, pt with multiple areas of edema from IV infiltrations to bilateral forearms. Pt assessed with US and unable to locate deep vessels at the AC or below. Bedside RN made aware.

## 2025-04-14 NOTE — PROGRESS NOTES
SOCIAL WORK NOTE   Message returned for Cade Oates ([phone]). VM left. Social work to follow.  ANN Hinkle, LISW-S ([id number])

## 2025-04-15 ENCOUNTER — APPOINTMENT (OUTPATIENT)
Dept: RADIOLOGY | Facility: HOSPITAL | Age: 65
End: 2025-04-15
Payer: COMMERCIAL

## 2025-04-15 ENCOUNTER — APPOINTMENT (OUTPATIENT)
Dept: VASCULAR MEDICINE | Facility: HOSPITAL | Age: 65
End: 2025-04-15
Payer: COMMERCIAL

## 2025-04-15 ENCOUNTER — APPOINTMENT (OUTPATIENT)
Dept: CARDIOLOGY | Facility: HOSPITAL | Age: 65
End: 2025-04-15
Payer: COMMERCIAL

## 2025-04-15 LAB
ALBUMIN SERPL BCP-MCNC: 3 G/DL (ref 3.4–5)
ALP SERPL-CCNC: 121 U/L (ref 33–136)
ALT SERPL W P-5'-P-CCNC: 8 U/L (ref 7–45)
ANA PATTERN: ABNORMAL
ANA SER QL HEP2 SUBST: POSITIVE
ANA TITR SER IF: ABNORMAL {TITER}
ANCA AB PATTERN SER IF-IMP: ABNORMAL
ANCA IGG TITR SER IF: ABNORMAL {TITER}
ANION GAP SERPL CALC-SCNC: 17 MMOL/L (ref 10–20)
APTT PPP: 67 SECONDS (ref 26–36)
AST SERPL W P-5'-P-CCNC: 9 U/L (ref 9–39)
ATRIAL RATE: 118 BPM
BACTERIA BLD AEROBE CULT: ABNORMAL
BACTERIA BLD AEROBE CULT: ABNORMAL
BACTERIA BLD CULT: ABNORMAL
BACTERIA BLD CULT: ABNORMAL
BASOPHILS # BLD MANUAL: 0 X10*3/UL (ref 0–0.1)
BASOPHILS NFR BLD MANUAL: 0 %
BILIRUB DIRECT SERPL-MCNC: 0.1 MG/DL (ref 0–0.3)
BILIRUB SERPL-MCNC: 0.6 MG/DL (ref 0–1.2)
BUN SERPL-MCNC: 12 MG/DL (ref 6–23)
CALCIUM SERPL-MCNC: 8.1 MG/DL (ref 8.6–10.6)
CARDIAC TROPONIN I PNL SERPL HS: 49 NG/L (ref 0–34)
CARDIAC TROPONIN I PNL SERPL HS: 50 NG/L (ref 0–34)
CARDIAC TROPONIN I PNL SERPL HS: 50 NG/L (ref 0–34)
CENTROMERE B AB SER-ACNC: <0.2 AI
CHLORIDE SERPL-SCNC: 103 MMOL/L (ref 98–107)
CHROMATIN AB SERPL-ACNC: <0.2 AI
CO2 SERPL-SCNC: 24 MMOL/L (ref 21–32)
CREAT SERPL-MCNC: 0.43 MG/DL (ref 0.5–1.05)
DSDNA AB SER-ACNC: 1 IU/ML
EGFRCR SERPLBLD CKD-EPI 2021: >90 ML/MIN/1.73M*2
ENA JO1 AB SER QL IA: <0.2 AI
ENA RNP AB SER IA-ACNC: <0.2 AI
ENA SCL70 AB SER QL IA: <0.2 AI
ENA SM AB SER IA-ACNC: <0.2 AI
ENA SM+RNP AB SER QL IA: <0.2 AI
ENA SS-A AB SER IA-ACNC: <0.2 AI
ENA SS-B AB SER IA-ACNC: <0.2 AI
EOSINOPHIL # BLD MANUAL: 0 X10*3/UL (ref 0–0.7)
EOSINOPHIL NFR BLD MANUAL: 0 %
ERYTHROCYTE [DISTWIDTH] IN BLOOD BY AUTOMATED COUNT: 17.9 % (ref 11.5–14.5)
GLUCOSE BLD MANUAL STRIP-MCNC: 103 MG/DL (ref 74–99)
GLUCOSE BLD MANUAL STRIP-MCNC: 104 MG/DL (ref 74–99)
GLUCOSE BLD MANUAL STRIP-MCNC: 112 MG/DL (ref 74–99)
GLUCOSE BLD MANUAL STRIP-MCNC: 135 MG/DL (ref 74–99)
GLUCOSE SERPL-MCNC: 110 MG/DL (ref 74–99)
GRAM STN SPEC: ABNORMAL
HCT VFR BLD AUTO: 26.6 % (ref 36–46)
HGB BLD-MCNC: 7.8 G/DL (ref 12–16)
HYPOCHROMIA BLD QL SMEAR: ABNORMAL
IMM GRANULOCYTES # BLD AUTO: 1.66 X10*3/UL (ref 0–0.7)
IMM GRANULOCYTES NFR BLD AUTO: 18.1 % (ref 0–0.9)
INR PPP: 1.2 (ref 0.9–1.1)
LYMPHOCYTES # BLD MANUAL: 0.4 X10*3/UL (ref 1.2–4.8)
LYMPHOCYTES NFR BLD MANUAL: 4.3 %
MAGNESIUM SERPL-MCNC: 1.96 MG/DL (ref 1.6–2.4)
MCH RBC QN AUTO: 21.2 PG (ref 26–34)
MCHC RBC AUTO-ENTMCNC: 29.3 G/DL (ref 32–36)
MCV RBC AUTO: 72 FL (ref 80–100)
METAMYELOCYTES # BLD MANUAL: 0.31 X10*3/UL
METAMYELOCYTES NFR BLD MANUAL: 3.4 %
MONOCYTES # BLD MANUAL: 0.23 X10*3/UL (ref 0.1–1)
MONOCYTES NFR BLD MANUAL: 2.5 %
MYELOCYTES # BLD MANUAL: 0.4 X10*3/UL
MYELOCYTES NFR BLD MANUAL: 4.3 %
MYELOPEROXIDASE AB SER-ACNC: 62 AU/ML (ref 0–19)
NEUTROPHILS # BLD MANUAL: 7.71 X10*3/UL (ref 1.2–7.7)
NEUTS BAND # BLD MANUAL: 0.55 X10*3/UL (ref 0–0.7)
NEUTS BAND NFR BLD MANUAL: 6 %
NEUTS SEG # BLD MANUAL: 7.16 X10*3/UL (ref 1.2–7)
NEUTS SEG NFR BLD MANUAL: 77.8 %
NRBC BLD-RTO: 2 /100 WBCS (ref 0–0)
OVALOCYTES BLD QL SMEAR: ABNORMAL
P OFFSET: 187 MS
P ONSET: 123 MS
PHOSPHATE SERPL-MCNC: 4.4 MG/DL (ref 2.5–4.9)
PLATELET # BLD AUTO: 63 X10*3/UL (ref 150–450)
POLYCHROMASIA BLD QL SMEAR: ABNORMAL
POTASSIUM SERPL-SCNC: 3.2 MMOL/L (ref 3.5–5.3)
PR INTERVAL: 176 MS
PROT SERPL-MCNC: 5.7 G/DL (ref 6.4–8.2)
PROTEINASE3 AB SER-ACNC: 2 AU/ML (ref 0–19)
PROTHROMBIN TIME: 13 SECONDS (ref 9.8–12.4)
Q ONSET: 211 MS
QRS COUNT: 19 BEATS
QRS DURATION: 110 MS
QT INTERVAL: 420 MS
QTC CALCULATION(BAZETT): 588 MS
QTC FREDERICIA: 526 MS
R AXIS: 141 DEGREES
RBC # BLD AUTO: 3.68 X10*6/UL (ref 4–5.2)
RBC MORPH BLD: ABNORMAL
RIBOSOMAL P AB SER-ACNC: <0.2 AI
SODIUM SERPL-SCNC: 141 MMOL/L (ref 136–145)
T AXIS: -57 DEGREES
T OFFSET: 421 MS
TOTAL CELLS COUNTED BLD: 117
UFH PPP CHRO-ACNC: 0.4 IU/ML
VARIANT LYMPHS # BLD MANUAL: 0.16 X10*3/UL (ref 0–0.5)
VARIANT LYMPHS NFR BLD: 1.7 %
VENTRICULAR RATE: 118 BPM
WBC # BLD AUTO: 9.2 X10*3/UL (ref 4.4–11.3)

## 2025-04-15 PROCEDURE — 83735 ASSAY OF MAGNESIUM: CPT

## 2025-04-15 PROCEDURE — 2500000002 HC RX 250 W HCPCS SELF ADMINISTERED DRUGS (ALT 637 FOR MEDICARE OP, ALT 636 FOR OP/ED): Mod: SE

## 2025-04-15 PROCEDURE — 84484 ASSAY OF TROPONIN QUANT: CPT

## 2025-04-15 PROCEDURE — 82947 ASSAY GLUCOSE BLOOD QUANT: CPT

## 2025-04-15 PROCEDURE — 71045 X-RAY EXAM CHEST 1 VIEW: CPT | Performed by: RADIOLOGY

## 2025-04-15 PROCEDURE — 85007 BL SMEAR W/DIFF WBC COUNT: CPT

## 2025-04-15 PROCEDURE — 93005 ELECTROCARDIOGRAM TRACING: CPT

## 2025-04-15 PROCEDURE — 2500000004 HC RX 250 GENERAL PHARMACY W/ HCPCS (ALT 636 FOR OP/ED): Mod: SE

## 2025-04-15 PROCEDURE — 99233 SBSQ HOSP IP/OBS HIGH 50: CPT

## 2025-04-15 PROCEDURE — 1200000002 HC GENERAL ROOM WITH TELEMETRY DAILY

## 2025-04-15 PROCEDURE — 71045 X-RAY EXAM CHEST 1 VIEW: CPT

## 2025-04-15 PROCEDURE — 36415 COLL VENOUS BLD VENIPUNCTURE: CPT

## 2025-04-15 PROCEDURE — 85610 PROTHROMBIN TIME: CPT

## 2025-04-15 PROCEDURE — 97166 OT EVAL MOD COMPLEX 45 MIN: CPT | Mod: GO

## 2025-04-15 PROCEDURE — 2500000001 HC RX 250 WO HCPCS SELF ADMINISTERED DRUGS (ALT 637 FOR MEDICARE OP): Mod: SE

## 2025-04-15 PROCEDURE — 80202 ASSAY OF VANCOMYCIN: CPT | Performed by: PATHOLOGY

## 2025-04-15 PROCEDURE — 2500000005 HC RX 250 GENERAL PHARMACY W/O HCPCS: Mod: SE

## 2025-04-15 PROCEDURE — 82248 BILIRUBIN DIRECT: CPT

## 2025-04-15 PROCEDURE — 99233 SBSQ HOSP IP/OBS HIGH 50: CPT | Performed by: INTERNAL MEDICINE

## 2025-04-15 PROCEDURE — 94640 AIRWAY INHALATION TREATMENT: CPT

## 2025-04-15 PROCEDURE — 93010 ELECTROCARDIOGRAM REPORT: CPT | Performed by: INTERNAL MEDICINE

## 2025-04-15 PROCEDURE — 93970 EXTREMITY STUDY: CPT | Performed by: SURGERY

## 2025-04-15 PROCEDURE — 84100 ASSAY OF PHOSPHORUS: CPT

## 2025-04-15 PROCEDURE — 97535 SELF CARE MNGMENT TRAINING: CPT | Mod: GO

## 2025-04-15 PROCEDURE — 93970 EXTREMITY STUDY: CPT

## 2025-04-15 PROCEDURE — 85027 COMPLETE CBC AUTOMATED: CPT

## 2025-04-15 PROCEDURE — 87040 BLOOD CULTURE FOR BACTERIA: CPT

## 2025-04-15 PROCEDURE — 2500000005 HC RX 250 GENERAL PHARMACY W/O HCPCS: Mod: SE | Performed by: INTERNAL MEDICINE

## 2025-04-15 PROCEDURE — 2500000004 HC RX 250 GENERAL PHARMACY W/ HCPCS (ALT 636 FOR OP/ED): Mod: SE | Performed by: INTERNAL MEDICINE

## 2025-04-15 PROCEDURE — 80048 BASIC METABOLIC PNL TOTAL CA: CPT

## 2025-04-15 PROCEDURE — 99231 SBSQ HOSP IP/OBS SF/LOW 25: CPT | Performed by: INTERNAL MEDICINE

## 2025-04-15 PROCEDURE — 85520 HEPARIN ASSAY: CPT

## 2025-04-15 RX ORDER — LOPERAMIDE HYDROCHLORIDE 2 MG/1
2 CAPSULE ORAL 2 TIMES DAILY PRN
Status: DISCONTINUED | OUTPATIENT
Start: 2025-04-15 | End: 2025-04-20 | Stop reason: HOSPADM

## 2025-04-15 RX ORDER — PANTOPRAZOLE SODIUM 40 MG/1
40 TABLET, DELAYED RELEASE ORAL
Status: DISCONTINUED | OUTPATIENT
Start: 2025-04-16 | End: 2025-04-20 | Stop reason: HOSPADM

## 2025-04-15 RX ORDER — POTASSIUM CHLORIDE 14.9 MG/ML
20 INJECTION INTRAVENOUS
Status: DISCONTINUED | OUTPATIENT
Start: 2025-04-15 | End: 2025-04-15

## 2025-04-15 RX ORDER — POTASSIUM CHLORIDE 20 MEQ/1
40 TABLET, EXTENDED RELEASE ORAL ONCE
Status: COMPLETED | OUTPATIENT
Start: 2025-04-15 | End: 2025-04-15

## 2025-04-15 RX ADMIN — GABAPENTIN 800 MG: 400 CAPSULE ORAL at 20:22

## 2025-04-15 RX ADMIN — ACETAMINOPHEN 1000 MG: 10 INJECTION INTRAVENOUS at 09:45

## 2025-04-15 RX ADMIN — POTASSIUM CHLORIDE 40 MEQ: 1500 TABLET, EXTENDED RELEASE ORAL at 09:45

## 2025-04-15 RX ADMIN — HEPARIN SODIUM 1500 UNITS/HR: 10000 INJECTION, SOLUTION INTRAVENOUS at 04:45

## 2025-04-15 RX ADMIN — HYDROXYCHLOROQUINE SULFATE 200 MG: 200 TABLET, FILM COATED ORAL at 21:35

## 2025-04-15 RX ADMIN — POTASSIUM CHLORIDE 20 MEQ: 1500 TABLET, EXTENDED RELEASE ORAL at 20:22

## 2025-04-15 RX ADMIN — LOPERAMIDE HYDROCHLORIDE 2 MG: 2 CAPSULE ORAL at 08:38

## 2025-04-15 RX ADMIN — GABAPENTIN 800 MG: 400 CAPSULE ORAL at 16:00

## 2025-04-15 RX ADMIN — Medication 2 TABLET: at 08:37

## 2025-04-15 RX ADMIN — VENLAFAXINE HYDROCHLORIDE 150 MG: 150 CAPSULE, EXTENDED RELEASE ORAL at 08:44

## 2025-04-15 RX ADMIN — LOPERAMIDE HYDROCHLORIDE 2 MG: 2 CAPSULE ORAL at 19:38

## 2025-04-15 RX ADMIN — ALBUTEROL SULFATE 2.5 MG: 2.5 SOLUTION RESPIRATORY (INHALATION) at 08:16

## 2025-04-15 RX ADMIN — Medication 5 MG: at 20:21

## 2025-04-15 RX ADMIN — PANTOPRAZOLE SODIUM 40 MG: 40 INJECTION, POWDER, FOR SOLUTION INTRAVENOUS at 08:37

## 2025-04-15 RX ADMIN — Medication 64 MG: at 21:35

## 2025-04-15 RX ADMIN — GABAPENTIN 800 MG: 400 CAPSULE ORAL at 08:38

## 2025-04-15 RX ADMIN — HEPARIN SODIUM 1500 UNITS/HR: 10000 INJECTION, SOLUTION INTRAVENOUS at 19:32

## 2025-04-15 RX ADMIN — Medication 2 TABLET: at 20:21

## 2025-04-15 RX ADMIN — ACETAMINOPHEN 1000 MG: 10 INJECTION INTRAVENOUS at 17:13

## 2025-04-15 RX ADMIN — Medication 64 MG: at 08:40

## 2025-04-15 RX ADMIN — HYDROMORPHONE HYDROCHLORIDE 0.2 MG: 1 INJECTION, SOLUTION INTRAMUSCULAR; INTRAVENOUS; SUBCUTANEOUS at 11:55

## 2025-04-15 RX ADMIN — Medication 6 L/MIN: at 13:55

## 2025-04-15 RX ADMIN — MACITENTAN 10 MG: 10 TABLET, FILM COATED ORAL at 08:39

## 2025-04-15 RX ADMIN — POTASSIUM CHLORIDE 20 MEQ: 14.9 INJECTION, SOLUTION INTRAVENOUS at 08:30

## 2025-04-15 RX ADMIN — VANCOMYCIN HYDROCHLORIDE 1250 MG: 1.25 INJECTION, POWDER, LYOPHILIZED, FOR SOLUTION INTRAVENOUS at 12:45

## 2025-04-15 RX ADMIN — Medication 6 L/MIN: at 08:16

## 2025-04-15 RX ADMIN — EPOPROSTENOL 67 NG/KG/MIN: 1.5 INJECTION, POWDER, LYOPHILIZED, FOR SOLUTION INTRAVENOUS at 08:37

## 2025-04-15 RX ADMIN — VANCOMYCIN HYDROCHLORIDE 1250 MG: 1.25 INJECTION, POWDER, LYOPHILIZED, FOR SOLUTION INTRAVENOUS at 23:11

## 2025-04-15 RX ADMIN — ASPIRIN 81 MG: 81 TABLET, COATED ORAL at 08:39

## 2025-04-15 RX ADMIN — EPOPROSTENOL 67 NG/KG/MIN: 1.5 INJECTION, POWDER, LYOPHILIZED, FOR SOLUTION INTRAVENOUS at 22:01

## 2025-04-15 RX ADMIN — HYDROXYCHLOROQUINE SULFATE 200 MG: 200 TABLET, FILM COATED ORAL at 08:38

## 2025-04-15 ASSESSMENT — COGNITIVE AND FUNCTIONAL STATUS - GENERAL
DRESSING REGULAR LOWER BODY CLOTHING: A LOT
DAILY ACTIVITIY SCORE: 15
TOILETING: A LITTLE
MOBILITY SCORE: 24
DRESSING REGULAR UPPER BODY CLOTHING: A LOT
HELP NEEDED FOR BATHING: A LOT
DAILY ACTIVITIY SCORE: 24
EATING MEALS: A LITTLE
PERSONAL GROOMING: A LITTLE

## 2025-04-15 ASSESSMENT — ACTIVITIES OF DAILY LIVING (ADL)
ADL_ASSISTANCE: INDEPENDENT
HOME_MANAGEMENT_TIME_ENTRY: 15
BATHING_ASSISTANCE: MINIMAL

## 2025-04-15 ASSESSMENT — PAIN - FUNCTIONAL ASSESSMENT
PAIN_FUNCTIONAL_ASSESSMENT: UNABLE TO SELF-REPORT
PAIN_FUNCTIONAL_ASSESSMENT: 0-10
PAIN_FUNCTIONAL_ASSESSMENT: UNABLE TO SELF-REPORT
PAIN_FUNCTIONAL_ASSESSMENT: 0-10

## 2025-04-15 ASSESSMENT — PAIN DESCRIPTION - DESCRIPTORS: DESCRIPTORS: ACHING

## 2025-04-15 ASSESSMENT — PAIN SCALES - GENERAL
PAINLEVEL_OUTOF10: 0 - NO PAIN
PAINLEVEL_OUTOF10: 6
PAINLEVEL_OUTOF10: 8
PAINLEVEL_OUTOF10: 5 - MODERATE PAIN
PAINLEVEL_OUTOF10: 0 - NO PAIN

## 2025-04-15 NOTE — PROGRESS NOTES
Vancomycin Dosing by Pharmacy- FOLLOW UP    Ronna Beckham is a 64 y.o. year old female who Pharmacy has been consulted for vancomycin dosing for MRSA bacteremia. Based on the patient's indication and renal status this patient is being dosed based on a goal AUC of 500-600.     Renal function is currently stable.    Current vancomycin dose: 1250 mg given every 24 hours    Estimated vancomycin AUC on current dose: <400 mg/L.hr     Visit Vitals  /84   Pulse (!) 133   Temp 35.7 °C (96.3 °F)   Resp 24        Lab Results   Component Value Date    CREATININE 0.43 (L) 04/15/2025    CREATININE 0.85 2025    CREATININE 0.75 2025    CREATININE 0.75 2025    CREATININE 0.86 2025        Patient weight is as follows:   Vitals:    04/15/25 0500   Weight: 68.6 kg (151 lb 3.8 oz)       Cultures:  Susceptibility data for the encounter in last 14 days.  Collected Specimen Info Organism Clindamycin Erythromycin Oxacillin Tetracycline Trimethoprim/Sulfamethoxazole Vancomycin   25 Blood culture from Peripheral Venipuncture Staphylococcus aureus         25 Blood culture from Peripheral Venipuncture Staphylococcus aureus         25 Blood culture from Peripheral Venipuncture Methicillin Resistant Staphylococcus aureus (MRSA)  S  I  R  S  S  S        I/O last 3 completed shifts:  In: 389.5 (5.7 mL/kg) [I.V.:389.5 (5.7 mL/kg)]  Out: 1775 (25.9 mL/kg) [Urine:1775 (0.7 mL/kg/hr)]  Weight: 68.6 kg   I/O during current shift:  I/O this shift:  In: 518.5 [I.V.:98.5; IV Piggyback:420]  Out: -     Temp (24hrs), Av.1 °C (97 °F), Min:35.7 °C (96.3 °F), Max:36.5 °C (97.7 °F)      Assessment/Plan    Below goal AUC. Orders placed for new vancomcyin regimen of 1250 every 12 hours to begin at 11:30 AM on 4/15.     This dosing regimen is predicted by [employer] to result in the following pharmacokinetic parameters:  Regimen: 1250 mg IV every 12 hours.  Start time: 22:07 on 04/15/2025  Exposure target: AUC24  (range)400-600 mg/L.hr   HBG90-20: 525 mg/L.hr  AUC24,ss: 556 mg/L.hr  Probability of AUC24 > 400: 97 %  Ctrough,ss: 15.4 mg/L  Probability of Ctrough,ss > 20: 19 %    The next level will be obtained on 4/16 at 10 AM. May be obtained sooner if clinically indicated.   Will continue to monitor renal function daily while on vancomycin and order serum creatinine at least every 48 hours if not already ordered.  Follow for continued vancomycin needs, clinical response, and signs/symptoms of toxicity.       Chinmay Robertson, PharmD

## 2025-04-15 NOTE — PROGRESS NOTES
Ronna Beckham is a 64 y.o. female on day 6 of admission presenting with Chest wall mass.  ID is following for MRSA bacteremia in the setting of an infected Montez line.    Subjective   Interval History: Patient is stable today.  Blood cultures since the line was removed to have remained negative.  She has since been was on vancomycin.  She had a transthoracic echo yesterday which did not show any evidence of vegetations.  She continues to complain of total body pain.            Objective   Range of Vitals (last 24 hours)  Heart Rate:  []   Temp:  [35.7 °C (96.3 °F)-36.5 °C (97.7 °F)]   Resp:  [16-26]   BP: (100-136)/(60-94)   Weight:  [68.6 kg (151 lb 3.8 oz)]   SpO2:  [86 %-99 %]   Daily Weight  04/15/25 : 68.6 kg (151 lb 3.8 oz)    Body mass index is 25.17 kg/m².    Physical Exam  Patient looks more comfortable today.  She is in no acute distress.  Her conjunctiva are pink.  She has no icterus.  Her oropharynx is clear.  Her site of the Montez removal is dressed.  There is no surrounding erythema on the skin.  Her cardiac exam is regular rate and rhythm, S1-S2, and a 1 out of 6 systolic ejection murmur.  Her lungs have diffuse anterior crackles.  Her abdomen is soft, nontender, nondistended, bowel sounds are present.    Antibiotics  vancomycin - 1250 mg/250 mL    Relevant Results  Labs  Results from last 72 hours   Lab Units 04/15/25  0403 04/14/25  1430 04/14/25  0458 04/13/25  1831 04/13/25  0405 04/12/25  2207   WBC AUTO x10*3/uL 9.2 8.8 8.0   < > 9.6 10.4   HEMOGLOBIN g/dL 7.8* 8.1* 7.9*   < > 7.2* 7.6*   HEMATOCRIT % 26.6* 26.6* 25.5*   < > 23.2* 24.7*   PLATELETS AUTO x10*3/uL 63* 57* 57*   < > 60* 55*   NEUTROS PCT AUTO %  --   --   --   --  77.6 77.6   LYMPHO PCT MAN % 4.3 7.0 4.2   < >  --   --    LYMPHS PCT AUTO %  --   --   --   --  7.2 8.4   MONO PCT MAN % 2.5 0.9 4.2   < >  --   --    MONOS PCT AUTO %  --   --   --   --  7.9 7.5   EOSINO PCT MAN % 0.0 0.0 0.0   < >  --   --    EOS PCT AUTO  %  --   --   --   --  0.0 0.0    < > = values in this interval not displayed.     Results from last 72 hours   Lab Units 04/15/25  0403 04/14/25  0458 04/13/25  0405   SODIUM mmol/L 141 135* 132*   POTASSIUM mmol/L 3.2* 3.9 3.7   CHLORIDE mmol/L 103 99 98   CO2 mmol/L 24 25 23   BUN mg/dL 12 14 14   CREATININE mg/dL 0.43* 0.85 0.75   GLUCOSE mg/dL 110* 199* 115*   CALCIUM mg/dL 8.1* 7.9* 7.5*   ANION GAP mmol/L 17 15 15   EGFR mL/min/1.73m*2 >90 78 90   PHOSPHORUS mg/dL 4.4 4.4 4.6     Results from last 72 hours   Lab Units 04/15/25  0403 04/14/25  0458 04/13/25  0405   ALK PHOS U/L 121 128 137*   BILIRUBIN TOTAL mg/dL 0.6 0.6 1.4*   BILIRUBIN DIRECT mg/dL 0.1 0.2 0.5*   PROTEIN TOTAL g/dL 5.7* 5.6* 5.5*   ALT U/L 8 11 16   AST U/L 9 10 13   ALBUMIN g/dL 3.0* 2.8* 2.8*     Estimated Creatinine Clearance: 125 mL/min (A) (by C-G formula based on SCr of 0.43 mg/dL (L)).  C-Reactive Protein   Date Value Ref Range Status   04/23/2024 0.12 <1.00 mg/dL Final     CRP   Date Value Ref Range Status   03/09/2022 0.46 mg/dL Final     Comment:     REF VALUE  < 1.00     08/14/2020 0.9 0 - 2.0 MG/DL Final     Comment:     Performed at [address] [address]     Microbiology  Susceptibility data from last 14 days.  Collected Specimen Info Organism Clindamycin Erythromycin Oxacillin Tetracycline Trimethoprim/Sulfamethoxazole Vancomycin   04/13/25 Blood culture from Peripheral Venipuncture Staphylococcus aureus         04/12/25 Blood culture from Peripheral Venipuncture Staphylococcus aureus         04/12/25 Blood culture from Peripheral Venipuncture Methicillin Resistant Staphylococcus aureus (MRSA)  S  I  R  S  S  S             Assessment/Plan   MRSA bacteremia in the setting of a Montez line infection.  Cultures appear to be clearing since Montez line was removed.  Although ideally we would obtain better imaging of the cardiac valves, patient has a tenuous respiratory status and LUIS is a high risk procedure for  her.  Therefore, discussed with the primary team that as long as cultures remain negative, we can hold off on LUIS.    Recommendations:  1.  Continue vancomycin dosed by pharmacy.  2.  Continue to monitor blood cultures.  3.  Would be reasonable to place a new line after cultures have been negative for 48 hours.  4.  If no additional positive cultures, then would plan for 14 days of IV vancomycin from the time of the catheter removal.  This would put her stop date at April 28.  5.  If any additional cultures become positive, then patient will need to have a LUIS.  ID will continue to follow this patient.  Please do not hesitate to contact me with any questions or concerns.  I can be reached via epic chat.       I spent 15 minutes in the professional and overall care of this patient.      Linda Ardon MD PhD

## 2025-04-15 NOTE — PROGRESS NOTES
Ronna Beckham is a 64 y.o. female on day 6 of admission presenting with Chest wall mass.    Hospital course:   65 yo F with PMHx PAH (on Epo), Afib s/p ablation (03/2025) on Eliquis, SLE (questionable diagnosis) initially presented to ED for hematoma at site of Hendrickson. CTA with small hematoma on chest surface with soft tissue swelling and no active extravasation, scattered pulmonary nodules and intra-thoracic lymphadenopathy and questionable adrenal gland nodule. Hgb 8.8 (baseline ~10). Vascular surgery felt pseudoaneurysm/contained thrombus unlikely. No intervention per IR. On 4/11, tachycardic. On 04/12, noted to be more lethargic. Rapid response called for tachycardia to the 120s, EKG with sinus tachycardia vs. atrial fibrillation/flutter. Repeated infectious workup and started on Zosyn (04/12-) empirically. Sustained tachycardia to the 130s. Rapid response called again due to escalating oxygen requirements from baseline of 4L->Airvo. Started on vancomycin (04/12-) and transferred to MICU. CT negative for PE. Bcx + MRSA, source likely Hendrickson catheter. IR removed hendrickson, Epo now running via PIV. Started on nebs for wheezing and weaned to baseline 4L NC. Now stable for floor transfer.      Subjective   Seen in MICU, feels better overall. Denies fevers, chills, palpitations, dyspnea, wheezing, dysuria. Says that she had episode of moving her arm (with epo infusion) leading to dizziness, has since resolved.        Objective     Last Recorded Vitals  /73   Pulse (!) 116   Temp 36.3 °C (97.3 °F)   Resp 24   Wt 68.6 kg (151 lb 3.8 oz)   SpO2 (!) 88%   Intake/Output last 3 Shifts:    Intake/Output Summary (Last 24 hours) at 4/15/2025 1522  Last data filed at 4/15/2025 1245  Gross per 24 hour   Intake 1421.4 ml   Output 275 ml   Net 1146.4 ml       Admission Weight  Weight: 68 kg (150 lb) (04/09/25 1301)    Daily Weight  04/15/25 : 68.6 kg (151 lb 3.8 oz)    Image Results  Electrocardiogram, 12-lead PRN  ACS symptoms  Sinus tachycardia  Incomplete right bundle branch block  Right ventricular hypertrophy  Inferior infarct , age undetermined  Anteroseptal infarct , age undetermined  ST & T wave abnormality, consider lateral ischemia  Prolonged QT  Abnormal ECG  When compared with ECG of 15-MAR-2025 15:39,  Sinus rhythm has replaced Ectopic atrial rhythm  T wave inversion more evident in Lateral leads  Upper extremity venous duplex bilateral  Preliminary Cardiology Report              [place] [address]    Tel [phone] and Fax [phone]               Preliminary Vascular Lab Report     [facility] US UPPER EXTREMITY VENOUS DUPLEX BILATERAL       Patient Name:      CARMEN Singer Physician: [id number] [address] MADRIGAL MD  Study Date:        4/15/2025       Ordering           [id number] [address] [place]                                     Physician:  MRN/PID:           [id number]        Technologist:      Isela Romo RVT  Accession#:        [id number]    Technologist 2:    Xochitl Hall RVT, [facility]  Date of Birth/Age: 1960      Encounter#:        [id number]  Gender:            F  Admission Status:  Inpatient       Location           [facility]                                     Performed:       Diagnosis/ICD: Localized (leg) edema-R60.0  Procedure/CPT: 23516 Peripheral venous duplex scan for DVT complete       PRELIMINARY CONCLUSIONS:  Right Upper Venous: Negative for acute DVT of the visualized vessels. The brachial vein is only visuailzed in segments. The right proximal subclavian is not visualized.  Left Upper Venous: No evidence of acute deep vein thrombus visualized in the left upper extremity. There are chronic changes visualized in the mid cephalic and distal cephalic veins.     Imaging & Doppler Findings:     Left                Compress Thrombus        Flow  Internal Jugular      Yes      None    Spontaneous/Phasic  Subclavian            Yes      None  Subclavian Proximal   Yes      None   Spontaneous/Phasic  Subclavian Mid        Yes      None  Subclavian Distal     Yes      None  Axillary              Yes      None  Brachial              Yes      None  Cephalic              Yes      None  Basilic               Yes      None    VASCULAR PRELIMINARY REPORT  completed by Xochitl Hall RVT, RDMS on 4/15/2025 at 9:34:01 AM       ** Final **      PE:  General: awake, alert, NAD  HENT: NCAT  Skin: bandage on chest wall, no appreciable hematoma  Cardiac: tachycardic  Pulm: wheezing throughout, on 4L NC  Abdomen: soft, ND, NT, no involuntary guarding or rebound tenderness  : external catheter  EXT: no peripheral edema  Neuro: AOx3, able to follow commands    Relevant Results  Scheduled medications  acetaminophen, 1,000 mg, intravenous, BID  aspirin, 81 mg, oral, Daily  calcium carbonate-vitamin D3, 2 tablet, oral, BID  ergocalciferol, 1.25 mg, oral, Every Sunday  [Held by provider] furosemide, 40 mg, oral, Daily  gabapentin, 800 mg, oral, TID  hydroxychloroquine, 200 mg, oral, BID  lidocaine, 5 mL, infiltration, Once  loperamide, 2 mg, oral, BID  macitentan, 10 mg, oral, Daily  magnesium chloride, 64 mg, oral, BID  melatonin, 5 mg, oral, Nightly  [START ON 4/16/2025] pantoprazole, 40 mg, oral, Daily before breakfast  polyethylene glycol, 17 g, oral, Daily  potassium chloride CR, 20 mEq, oral, BID  vancomycin, 1,250 mg, intravenous, q12h  venlafaxine XR, 150 mg, oral, Daily      Continuous medications  epoprostenol, 67 ng/kg/min (Order-Specific), Last Rate: 67 ng/kg/min (04/15/25 1200)  heparin, 0-4,500 Units/hr, Last Rate: 1,500 Units/hr (04/15/25 1200)      PRN medications  PRN medications: albuterol, heparin, oxygen, vancomycin     Results for orders placed or performed during the hospital encounter of 04/09/25 (from the past 24 hours)   Heparin Assay, UFH   Result Value Ref Range    Heparin Unfractionated  0.2 See Comment Below for Therapeutic Ranges IU/mL   POCT GLUCOSE   Result Value Ref Range    POCT Glucose 96 74 - 99 mg/dL   POCT GLUCOSE   Result Value Ref Range    POCT Glucose 83 74 - 99 mg/dL   Heparin Assay, UFH   Result Value Ref Range    Heparin Unfractionated 0.3 See Comment Below for Therapeutic Ranges IU/mL   POCT GLUCOSE   Result Value Ref Range    POCT Glucose 99 74 - 99 mg/dL   POCT GLUCOSE   Result Value Ref Range    POCT Glucose 112 (H) 74 - 99 mg/dL   CBC and Auto Differential   Result Value Ref Range    WBC 9.2 4.4 - 11.3 x10*3/uL    nRBC 2.0 (H) 0.0 - 0.0 /100 WBCs    RBC 3.68 (L) 4.00 - 5.20 x10*6/uL    Hemoglobin 7.8 (L) 12.0 - 16.0 g/dL    Hematocrit 26.6 (L) 36.0 - 46.0 %    MCV 72 (L) 80 - 100 fL    MCH 21.2 (L) 26.0 - 34.0 pg    MCHC 29.3 (L) 32.0 - 36.0 g/dL    RDW 17.9 (H) 11.5 - 14.5 %    Platelets 63 (L) 150 - 450 x10*3/uL    Immature Granulocytes %, Automated 18.1 (H) 0.0 - 0.9 %    Immature Granulocytes Absolute, Automated 1.66 (H) 0.00 - 0.70 x10*3/uL   Coagulation Screen   Result Value Ref Range    Protime 13.0 (H) 9.8 - 12.4 seconds    INR 1.2 (H) 0.9 - 1.1    aPTT 67 (H) 26 - 36 seconds   Hepatic Function Panel   Result Value Ref Range    Albumin 3.0 (L) 3.4 - 5.0 g/dL    Bilirubin, Total 0.6 0.0 - 1.2 mg/dL    Bilirubin, Direct 0.1 0.0 - 0.3 mg/dL    Alkaline Phosphatase 121 33 - 136 U/L    ALT 8 7 - 45 U/L    AST 9 9 - 39 U/L    Total Protein 5.7 (L) 6.4 - 8.2 g/dL   Magnesium   Result Value Ref Range    Magnesium 1.96 1.60 - 2.40 mg/dL   Phosphorus   Result Value Ref Range    Phosphorus 4.4 2.5 - 4.9 mg/dL   Basic Metabolic Panel   Result Value Ref Range    Glucose 110 (H) 74 - 99 mg/dL    Sodium 141 136 - 145 mmol/L    Potassium 3.2 (L) 3.5 - 5.3 mmol/L    Chloride 103 98 - 107 mmol/L    Bicarbonate 24 21 - 32 mmol/L    Anion Gap 17 10 - 20 mmol/L    Urea Nitrogen 12 6 - 23 mg/dL    Creatinine 0.43 (L) 0.50 - 1.05 mg/dL    eGFR >90 >60 mL/min/1.73m*2    Calcium 8.1 (L) 8.6 -  10.6 mg/dL   Heparin Assay, UFH   Result Value Ref Range    Heparin Unfractionated 0.4 See Comment Below for Therapeutic Ranges IU/mL   Manual Differential   Result Value Ref Range    Neutrophils %, Manual 77.8 40.0 - 80.0 %    Bands %, Manual 6.0 0.0 - 5.0 %    Lymphocytes %, Manual 4.3 13.0 - 44.0 %    Monocytes %, Manual 2.5 2.0 - 10.0 %    Eosinophils %, Manual 0.0 0.0 - 6.0 %    Basophils %, Manual 0.0 0.0 - 2.0 %    Atypical Lymphocytes %, Manual 1.7 0.0 - 2.0 %    Metamyelocytes %, Manual 3.4 0.0 - 0.0 %    Myelocytes %, Manual 4.3 0.0 - 0.0 %    Seg Neutrophils Absolute, Manual 7.16 (H) 1.20 - 7.00 x10*3/uL    Bands Absolute, Manual 0.55 0.00 - 0.70 x10*3/uL    Lymphocytes Absolute, Manual 0.40 (L) 1.20 - 4.80 x10*3/uL    Monocytes Absolute, Manual 0.23 0.10 - 1.00 x10*3/uL    Eosinophils Absolute, Manual 0.00 0.00 - 0.70 x10*3/uL    Basophils Absolute, Manual 0.00 0.00 - 0.10 x10*3/uL    Atypical Lymphs Absolute, Manual 0.16 0.00 - 0.50 x10*3/uL    Metamyelocytes Absolute, Manual 0.31 0.00 - 0.00 x10*3/uL    Myelocytes Absolute, Manual 0.40 0.00 - 0.00 x10*3/uL    Total Cells Counted 117     Neutrophils Absolute, Manual 7.71 (H) 1.20 - 7.70 x10*3/uL    RBC Morphology See Below     Polychromasia Mild     Hypochromia Mild     Ovalocytes Few    POCT GLUCOSE   Result Value Ref Range    POCT Glucose 103 (H) 74 - 99 mg/dL   Upper extremity venous duplex bilateral   Result Value Ref Range    BSA 1.77 m2   Blood Culture    Specimen: Peripheral Venipuncture; Blood culture   Result Value Ref Range    Blood Culture Loaded on Instrument - Culture in progress    Blood Culture    Specimen: Peripheral Venipuncture; Blood culture   Result Value Ref Range    Blood Culture Loaded on Instrument - Culture in progress    POCT GLUCOSE   Result Value Ref Range    POCT Glucose 135 (H) 74 - 99 mg/dL   Troponin I, High Sensitivity   Result Value Ref Range    Troponin I, High Sensitivity (CMC) 49 (H) 0 - 34 ng/L     *Note: Due to a  large number of results and/or encounters for the requested time period, some results have not been displayed. A complete set of results can be found in Results Review.           Assessment/Plan        Assessment & Plan  Chest wall mass          63 yo F with PMHx PAH (on Epo), Afib s/p ablation (03/2025) on Eliquis, SLE (questionable diagnosis) initially presented to ED for hematoma at site of Hendrickson. Vascular surgery felt pseudoaneurysm/contained thrombus unlikely. No acute intervention per IR. Course c/b ongoing tachyarrhythmias (atach or aflutter), ultimately attributed to severe pulmonary disease, bacteremia. She developed acute on chronic hypoxic respiratory failure (4L->Airvo) requiring transfer to MICU. Found to have MRSA bacteremia 2/2 Hendrickson, line removed by IR. Currently on Vancomycin, monitoring bcx for clearance, TTE with no vegetations and would be high risk for LUIS. Epo currently running via PIV.     #PAH, group I  #Acute on chronic hypoxic respiratory failure, resolved  :Home meds: macitentan 10 mg PO daily, continuous epoprostenol infusion via right subclavian hendrickson, monthly sotatercept injections  :Follows outpatient with Dr. Prescott   :on 4L NC at baseline; was requiring airvo 50L/80%, back on 4L NC   :CT 4/12 -ve for PE  :Negative bubble study on TTE 4/14   -Continue epoprostenol via PIV (infusion should NOT be stopped for any reason), formulation was changed to be administered at increased rate and mitigate venous backflow   -Continue macitenan 10 mg PO daily   -Ok to place new line 48 hrs from first -ve bcx per ID, will need IR consult  -In the meantime maintain additional peripheral access solely for administration of epoprostenol   -Continuous pulse ox    #MRSA bacteremia iso Hendrickson line infection   :Ccx 4/12 + MRSA , bcx 4/13 1/2 staph, 4/14 no growth so far, 4/15 no growth so far  :RVP -ve on 4/12  :Procal 4/12 0.58, 4/13 0.74  :Hendrickson recently replaced 3/2025 for line associated SSTI-  presented with purulent drainage from hendrickson, briefly on IV abx then completed 10 days of doxycycline  :Current hendrickson removed 4/13 by IR  :TTE 4/14 showed no evidence of valvular vegetations  -Continue IV abx as per ID recs   pip-tazo 4/12 - 4/13      vancomycin 4/12 -   cefazolin 4/13 - 4/14  -F/up post-hendrickson removal bcx   -If additional bcx become +ve, would need additional imaging to evaluate for endocarditis; would discuss with cards and ID about LUIS vs cardiac mri vs PET, she is high risk intubation which may preclude LUIS     #Superficial hematoma, eschar anterior chest wall s/p hendrickson procedure   :CTA showing small hematoma with no active extravasation   :Low c/f pseudoaneurysm formation with thrombus per vascular sx and IR recommended conservative mgmt  :Given her h/o substance use disorder consider drug-induced vasculitis 2/2 methamphetamine vs toxic exposure of levamisole or xylazine   :Further studies to rule out vasculitis component warranted  :Urine drug screen -ve  :UE DVT US 4/15: negative for acute DVT, chronic changes in mid/distal cephalic veins   -ANCA pending     #Afib s/p ablation 3/2025  #Tachyarrhythmia (Atach or atypical Aflutter 2:1)  :h/o SVT iso significant pulmonary hypertension, narrow QRS up to 200 bpm unresponsive to adenosine and highly suspicious for 1:1 atrial flutter  :on diltiazem 180 mg PO daily and apixaban   -Telemetry   -Target HR <120   -Cardiology recs - avoid BB/CCB, dig unlikely to help, if necessary then consider IV amio (although risk of chemical cardioversion), if difficulty controlling rates consider DCCV if she can tolerate AC  -Heparin gtt started on 4/14  -Holding apixiban   -Holding diltiazem   -At discharge, will need ziopatch and EP follow up    #Chest pain  :Per MICU team, endorsing CP on 4/15. ECG stable from prior (in media tab)  -Troponin 49, continue to trend    #Systemic lupus erythematosus(?)   :Currently managed by her pcp, although can not directly  find records in the chart  :Currently on plaquenil testing  :Denies any clinical symptoms of active flare including joint pain, muscle pain  :Per EMR she had +ve anti-nuclear panel with a 1:1280 titer, however the anti-dsDNA was 4.0 IU/mL which is -ve; never had a +ve anti-dsDNA; unusual to be started on plaquenil for this  -F/up repeat autoimmune workup, if this is not lupus, then would consider discontinuing plaquenil  -Continue hydroxychloroquine 200 mg PO BID for now    #Acute on chronic anemia, microcytic  #Acute on chronic thrombocytopenia  :Baseline hemoglobin 10, 8.8 on admit  :Worsening thrombocytopenia, baseline >100   :Smear: microcytosis, suggestive of component of iron deficiency, schistocytes not increased   :, fibrinogen 461, haptoglobin 193, ALCIRA neg, tbili 1.1, direct bili 0.2, ferritin 63  -Continue to trend CBC  -Goal hgb >7, platelets >10, >50 if bleeding   -Iron studies     #CAD  -Aspirin 81 mg daily     #GERD  -Pantoprazole 40 mg daily    #Peripheral neuropathy   -Gabapentin 800 mg PO TID     #MDD  -Venlafaxine 150 mg PO daily     Misc:   -C/w calcium, vit D  -Mg, Kcl 20 meq bid  -Nightly melatonin   -Loperamide for chronic diarrhea (monitor qtc)   -IV tylenol 1g bid    Outpatient:   -PCP for imaging of nodules  -Lymph node biopsy f/up     F: cautious iso PAH  E: replete PRN (on standing Mg, Kcl repletion)   N: 2-3g Na  A: PIVs     DVT ppx: heparin gtt  GI ppx: PPI    Code: full code (confirmed 4/15)   Surrogate decision maker: Diana Vera (sister) [phone]             Christopher Reina MD  IM PGY-3    To be discussed with attending in AM.

## 2025-04-15 NOTE — PROGRESS NOTES
ICU to Tee Transfer Summary     I:  ICU Admission Reason & Brief ICU Course:    Briefly, this is a 64 y.o. year old female with pulmonary hypertension group 1 (hendrickson in place), atrial flutter on apixiban s/p ablation 3/2025, lupus(?) admitted on 4/9/2025 for evaluation of hematoma at the venotomy site of her hendrickson catheter. Transferred to the micu on 4/12/25 for acute on chronic hypoxic hypercapnic respiratory failure requiring airvo. CT -ve for acute PE. Shunt unlikely given the improvement in hypoxia with supplemental oxygen. Bcx collected 4/12 during rapid, 4/4 growing MRSA. Suspect bacteremia in setting of line infection. She's on vanc and her hendrickson was removed by IR 4/13. Her veletri is running through a dedicated peripheral line, the formulation was changed so that it could be administered at an increased rate to mitigate venous backflow. She was also started on nebs prn for wheeze on exam. Her respiratory status improved significantly, she is now on her baseline 4L nc.        C: Code Status/DPOA Info/Goals of Care/ACP Note    Full Code  DPOA/Contact Number:  Diana Vera (sister) [phone]     U: Unprescribing & Pertinent High-Risk Medications    Changes to home meds: holding apixiban otherwise no changes (although PO meds were briefly held while she was on airvo)     Anticoagulation: Yes - Therapeutic heparin drip    Antibiotics:   [] N/A - no current planned antimicrobioals  [x]  vancomycin   indication MRSA bacteremia   start date 4/12/25   planned duration pending repeat cultures; per ID If no additional positive cultures, then would plan for 14 days of IV vancomycin from the time of the catheter removal (this would put her stop date at April 28)    P: Pending Tests at the Time of Transfer   None      A: Active consultants, including Rehab:   []  Subspecialty Consultants: infectious disease  [x]  PT  [x]  OT  []  SLP  []  Wound Care    U: Uncertainty Measure/Diagnostic Pause:    Working  diagnosis at the time of transfer: sepsis due to MRSA bacteremia      Diagnosis Degree of Certainty: 1. High degree of certainty about the clinical diagnosis.     S: Summary of Major Problems and To-Dos:     Floor to do's   do NOT stop veletri for any reason, ensure all team members and nursing aware  veletri is running through peripheral IV until she can get new line, the IV should only be used for veletri and nothing else  follow up repeat bcx  consult IR for new line placement, per ID reasonable to place 48 hrs after -ve bcx   follow up rheum workup for questionable lupus dx  continue anemia workup  follow up any new cards recs re- atrial tachycardia   if additional bcx become +ve then pt would need additional imaging to evaluate for endocarditis; would discuss with cards and ID about LUIS vs cardiac mri vs PET, she is high risk intubation which may preclude LUIS    Summary for 04/15/25  :  comfortable on 10L bubbler overnight w/ sats >93%; now on baseline 4L nc   TTE -ve for valvular vegetation, no VSD  stable for floor transfer     Plan:  NEUROLOGY/PSYCH:  #peripheral neuropathy   : :chronic, stable  management:  -continue gabapentin 800 mg PO TID     #MDD  : :chronic, stable  management:  -continue venlafaxine 150 mg PO daily      CARDIOVASCULAR:  #typical atrial flutter 1:1 conduction s/p ablation   #supraventricular tachycardia, on diltiazem   : :h/o SVT iso significant pulmonary hypertension, narrow QRS up to 200 bpm unresponsive to adenosine and highly suspicious for one to one atrial flutter  : :recently underwent flutter ablation 3/23/25   : :on diltiazem 180 mg PO daily and apixiban  : :cardiology following, appreciate recs   management:  -telemetry   -cardiology recs  -starting heparin gtt 4/14 for therapeutic ac  -holding apixiban   -holding diltiazem      #coronary artery disease  : :chronic, stable   : :on aspirin at home  management:  -telemetry   -continue aspirin 81 mg PO daily       PULMONARY:  #acute on chronic hypoxic respiratory failure   : :on 4L NC at baseline; was requiring airvo 50L/80%   : :collected bcx 4/12, 4/4 growing gram +ve cocci in clusters, likely line associated   : :TTE 4/14 showed no evidence of valvular vegetations  : :admitted at end of march for SSTI with purulent drainage around hendrickson site (left IJ)- line replaced (right IJ) and started on 10 day course of doxycycline (end date 3/23/25)   : :ddx includes infection vs PE vs shunt  -most likely due to developing sepsis 2/2 MRSA bacteremia  -CT 4/12 -ve for PE  -shunt unlikely given improvement with supplemental oxygen, confirmed no shunt with -ve bubble study on TTE 4/14   management:  -ID following  -continue abx as per ID recs  pip-tazo 4/12 - 4/13      vancomycin 4/12 -   cefazolin 4/13 -  4/14     #pulmonary arterial hypertension, group 1  : :regimen includes macitentan 10 mg PO daily, continuous epoprostenol infusion via right subclavian hendrickson and monthly sotatercept injections  : :follows outpatient with dr. Prescott   : :hendrickson was on her left side until last month when she was admitted for purulent drainage and treated for line associated SSTI with 10 day course of doxycycline  management:  -continue epoprostenol (infusion should NOT be stopped for any reason)   -continue macitenan 10 mg PO daily   -ok to place new line 48 hrs from first -ve bcx per ID  -in the meantime maintain additional peripheral access solely for administration of epoprostenol      RENAL/GENITOURINARY:  No active issues     GASTROENTEROLOGY:  #GERD  : :chronic, stable  : :on pantoprazole 40 mg PO daily at home   management:  -continue PPI, can do IV while on airvo  [  ]switch back to PO now that she's off airvo     ENDOCRINOLOGY:  No active issues     RHEUMATOLOGY:  #systemic lupus erythematosus(?)   : :chronic, stable  : : her lupus is currently managed by her pcp, although can not directly find records in the chart  : :currently on  plaquenil testing  : :denies any clinical symptoms of active flare including joint pain, muscle pain, issues with urination, there doesn't seem to be a history of this subjectively   : :per EMR she had +ve anti-nuclear panel with a 1:1280 titer, however the anti-dsDNA was 4.0 IU/mL which is -ve; never had a +ve anti-dsDNA; unusual to be started on plaquenil for this  : :will repeat those laboratory studies as if this is not lupus, then would consider discontinuing this medication entirely  management:  -continue hydroxychloroquine 200 mg PO BID for now  -will repeat autoimmune workup   -can consider dc'ing lupus meds entirely if workup -ve     HEMATOLOGY:  #acute on chronic anemia, microcytic  #acute on chronic thrombocytopenia  : :baseline hemoglobin 10, 8.8 on admit and now 7  : :worsening thrombocytopenia, baseline >100   management:  -continue to annabel cbc  -goal hb >7   -goal platelet >10, >50 if bleeding   -iron studies  -peripheral smear      SKIN:  no active issues     MUSCULOSKELETAL:  #superficial hematoma, eschar anterior chest wall s/p hendrickson procedure   : :CTA showing small hematoma with no active extravasation   : :low c/f [id number] formation with thrombus per vascular sx and IR  : :recommend conservative mgmt  : :given her h/o substance use disorder consider drug-induced vasculitis 2/2 methamphetamine vs toxic exposure of levamisole or xylazine   : :further studies to rule out vasculitis component warranted  management:  -urine drug screen -ve  -vasc sx consulted  -IR consulted  -pending ANCA      INFECTIOUS DISEASE:  #gram +ve bacteremia, possibly line-associated   #possible pneumonia   : :bcx collected 4/12, 4/4 growing gram +ve cocci in clusters  : :mrsa nares +ve on 4/12   : :viral respiratory panel -ve on 4/12  : :procal 4/12 0.58, pending repeat this morning  : :hendrickson recently replaced during march admission for line associated SSTI- presented with purulent drainage from hendrickson,  "briefly on IV abx then completed 10 days of doxycycline  : :current hendrickson removed 4/13 by IR  : :TTE 4/14 showed no evidence of valvular vegetations  management:  -continue IV abx as per ID recs   pip-tazo 4/12 - 4/13      vancomycin 4/12 -   cefazolin 4/13 -  4/14  -post-hendrickson removal bcx pending     To-do list prior to transfer:  []change PPI to PO     E: Exam, including Lines/Drains/Airways & Data Review:     Blood pressure 118/73, pulse (!) 113, temperature 36.1 °C (97 °F), temperature source Temporal, resp. rate 19, height 1.651 m (5' 5\"), weight 68.6 kg (151 lb 3.8 oz), SpO2 94%.     Physical Exam   General: NAD. A&Ox4.   HEENT: NC/AT. Pupils reactive to light.   Cardiac: Tachycardic. No m/r/g.   Pulmonary: Normal respiratory effort on 4L nc. Slightly decreased at bilateral bases. End expiratory wheeze appreciated throughout. No crackles, rhonchi.   Abdominal: Soft, nontender/nondistended. No rebound tenderness or involuntary guarding.  Ext: No edema bilateral lower ext.  Skin: No rash, lesion, bruising.   Neuro: No focal deficit.  Psych: Appropriate mood and affect.    Difficult airway? N/A  Lines/drains assessed for removal? Yes, describe: no central lines    Within 30 minutes of the patient physically leaving the floor, a Floor Readiness Note needs to be placed with updated vitals.         "

## 2025-04-15 NOTE — PROGRESS NOTES
SOCIAL WORK NOTE   -ICU Treatment Plan: SW met with team for interdisciplinary rounds.  Patient with Pulmonary HTN, in MICU requiring diuresing, pending floor   -Support System: sister, daughter  listed   -Planned Disposition:  TBD, Patient is currently recommended for high intensity therapy.    -Additional information:  Social work to follow.   ANN Hinkle, LISW-S ([id number])

## 2025-04-15 NOTE — CARE PLAN
The patient's goals for the shift include      The clinical goals for the shift include Pt to have therapeutic heparin assay by 4/15/2025 0600    Over the shift, the patient did make progress toward the following goals. Barriers to progression include decreasing O2 requirements. Recommendations to address these barriers include potential to transfer to [place] [facility].

## 2025-04-15 NOTE — PROGRESS NOTES
Occupational Therapy    Evaluation and Treatment    Patient Name: Ronna Beckham  MRN: [id number]  Today's Date: 4/15/2025  Room: 28/28-A  Time Calculation  Start Time: 0939  Stop Time: 1017  Time Calculation (min): 38 min    Assessment  IP OT Assessment  OT Assessment: Pt is a  65yo  female presenting with deficits in ADLs, IADLs, functional activity tolerance, transfers, bed mobility and functional mobility. Pt would benefit from skilled OT intervention to return to PLOF. Pt demonstrates ability to tolerate x15 minutes EOB with a few bouts of desats to 89% cues for PLB but is limited this date by funcitonal activity tolerance and safety awareness. Pt is appropriate for HIGH intensity OT intervention at this time to return to highest PLOF safely.     Prognosis: Good  Barriers to Discharge Home: Caregiver assistance, Cognition needs, Physical needs  Caregiver Assistance: Patient lives alone and/or does not have reliable caregiver assistance  Cognition Needs: Insight of patient limited regarding functional ability/needs, Cognition-related high falls risk  Physical Needs: Intermittent mobility assistance needed, Intermittent ADL assistance needed  Evaluation/Treatment Tolerance: Patient limited by fatigue  Medical Staff Made Aware: Yes  End of Session Communication: Bedside nurse  End of Session Patient Position: Bed, 3 rail up, Alarm off, not on at start of session  Plan:  Inpatient Plan  Treatment Interventions: ADL retraining, Functional transfer training, UE strengthening/ROM, Endurance training, Patient/family training, Compensatory technique education  OT Frequency: 4 times per week  OT Discharge Recommendations: High intensity level of continued care  OT Recommended Transfer Status: Assist of 1  OT - OK to Discharge: Yes  OT Assessment  OT Assessment Results: Decreased ADL status, Decreased upper extremity strength, Decreased endurance, Decreased safe judgment during ADL, Decreased functional mobility,  Decreased IADLs  Prognosis: Good  Evaluation/Treatment Tolerance: Patient limited by fatigue  Medical Staff Made Aware: Yes  Strengths: Ability to acquire knowledge, Capable of completing ADLs semi/independent, Insight into problems, Housing layout, Living arrangement secure, Premorbid level of function, Support of Caregivers    Subjective   Current Problem:  1. Chest wall mass        2. NSTEMI (non-ST elevated myocardial infarction) (Multi)  DISCONTINUED: perflutren lipid microspheres (Definity) injection 0.5-10 mL of dilution      3. Shortness of breath  Transthoracic Echo (TTE) Limited    Transthoracic Echo (TTE) Limited      4. Pulmonary hypertension (Multi)  Transthoracic Echo (TTE) Limited    Transthoracic Echo (TTE) Limited      5. Other hypervolemia  Upper extremity venous duplex bilateral    Upper extremity venous duplex bilateral      6. Anasarca  Upper extremity venous duplex bilateral    Upper extremity venous duplex bilateral        General:  Reason for Referral: admitted on 4/9/2025 for evaluation of hematoma at the site of her hendrickson catheter. Transferred to the micu on 4/12/25 for acute vs subacute on chronic hypoxic respiratory failure requiring airvo.  Past Medical History Relevant to Rehab: pulmonary hypertension group 1 (hendrickson in place), atrial fibrillation on apixiban s/p ablation 3/2025, lupus(?)  Prior to Session Communication: Bedside nurse  Patient Position Received: Bed, 3 rail up, Alarm off, not on at start of session  General Comment: Pt supine in bed upon entry to room. Pt perseverating on IV, appearing anxious throughout session. Initially on 4L NC, satting at 88%, increased to 6L by RN prior to activity. On valetri   Precautions:  Medical Precautions: Fall precautions, Oxygen therapy device and L/min  Vital Signs:   Date/Time Vitals Session Patient Position Pulse Resp SpO2 BP MAP (mmHg)    04/15/25 1017 Post OT  --  119  16  93 %  --  --     04/15/25 1100 --  --  122  21  86 %   116/71  82           Pain:  Pain Assessment  Pain Assessment: 0-10  0-10 (Numeric) Pain Score: 6  Pain Type: Acute pain  Pain Location: Head  Lines/Tubes/Drains:  External Urinary Catheter Female (Active)   Number of days: 2         Objective   Cognition:  Overall Cognitive Status: Impaired  Orientation Level: Oriented X4  Insight: Mild  Impulsive: Mildly  Processing Speed: Within funtional limits           Home Living:  Type of Home: Apartment  Lives With: Alone  Home Adaptive Equipment: None  Home Layout: One level  Home Access: Level entry  Bathroom Shower/Tub: Tub/shower unit  Bathroom Toilet: Standard  Bathroom Equipment: Grab bars in shower   Prior Function:  Level of [place]: Independent with ADLs and functional transfers, Independent with homemaking with ambulation  Receives Help From:  (granddaughter assists with pet care)  ADL Assistance: Independent  Homemaking Assistance:  (pt reports grandaughter assists with pet care only)  Ambulatory Assistance: Independent  Vocational: Unemployed  Leisure: TV  Hand Dominance: Right  Prior Function Comments: denies falls. pt quesitonable historian, initially reporting family assists with IADls, then reporting completing all home making independently  IADL History:     ADL:  Eating Assistance: Independent  Grooming Assistance: Stand by  Bathing Assistance: Minimal (anticipated  pulmonary hypertension group 1 (hendrickson in place), atrial fibrillation on apixiban s/p ablation 3/2025, lupus(?))  UE Dressing Assistance: Minimal (pulmonary hypertension group 1 (hendrickson in place), atrial fibrillation on apixiban s/p ablation 3/2025, lupus(?))  LE Dressing Assistance: Moderate (pulmonary hypertension group 1 (hendrickson in place), atrial fibrillation on apixiban s/p ablation 3/2025, lupus(?))  Toileting Assistance with Device: Minimal  ADL Comments: see treatment for breakdown  Activity Tolerance:  Endurance: Tolerates 10 - 20 min exercise with multiple rests  Early  Mobility/Exercise Safety Screen: Proceed with mobilization - No exclusion criteria met  Balance:     Bed Mobility/Transfers: Bed Mobility/Transfers: Bed Mobility  Bed Mobility: Yes  Bed Mobility 1  Bed Mobility 1: Supine to sitting  Level of Assistance 1: Minimum assistance  Bed Mobility Comments 1: HOB elevated, slight assist with trunk, cues for sequencing  Bed Mobility 2  Bed Mobility  2: Sitting to supine  Level of Assistance 2: Contact guard   and Transfers  Transfer: Yes  Transfer 1  Transfer From 1: Bed to  Transfer to 1: Stand  Technique 1: Sit to stand  Transfer Level of Assistance 1: Minimum assistance  Transfers 2  Transfer From 2: Stand to  Transfer to 2: Toilet  Technique 2: Stand to sit  Transfer Level of Assistance 2: Minimum assistance  Transfers 3  Transfer From 3: Toilet to  Transfer to 3: Stand  Technique 3: Sit to stand  Transfer Level of Assistance 3: Minimum assistance  Transfers 4  Transfer From 4: Stand to  Transfer to 4: Bed  Technique 4: Stand to sit  Transfer Level of Assistance 4: Contact guard  IADL's:      Vision: Vision - Basic Assessment  Current Vision: Wears glasses only for reading   and    Sensation:  Light Touch: No apparent deficits  Sensation Comment: reports baseline neuropathy in BLE  Strength:  Strength Comments: BUE >/= 3+/5  Perception:     Coordination:  Movements are Fluid and Coordinated: Yes   Hand Function:  Hand Function  Gross Grasp: Functional  Extremities:   RUE   RUE : Within Functional Limits, LUE   LUE: Within Functional Limits, RLE   RLE : Within Functional Limits, and LLE   LLE : Within Functional Limits    Treatment Completed on Evaluation  Activities of Daily Living: Feeding  Feeding Level of Assistance: Close supervision  Feeding Where Assessed: Edge of bed    Toileting  Toileting Level of Assistance: Minimum assistance  Where Assessed: Toilet  Toileting Comments: cues for sequencing, assist for safety    Therapy/Activity:     Therapeutic  Activity  Therapeutic Activity Performed: Yes  Therapeutic Activity 1: Tolerates sitting EOB x15 minutes with CGA for ADL completion, cues for PLB with desatting.  Therapeutic Activity 2: Functional mobility and transfers <> bathroom for toileting and ADLs with Mod cueing and Min A       Outcome Measures: [facility] Daily Activity  Putting on and taking off regular lower body clothing: A lot  Bathing (including washing, rinsing, drying): A lot  Putting on and taking off regular upper body clothing: A lot  Toileting, which includes using toilet, bedpan or urinal: A little  Taking care of personal grooming such as brushing teeth: A little  Eating Meals: A little  Daily Activity - Total Score: 15    Confusion Assessment Method-ICU (CAM-ICU)  Feature 1: Acute Onset or Fluctuating Course: Negative  Feature 2: Inattention: Negative  Feature 4: Disorganized Thinking: Negative  Overall CAM-ICU: Negative   ICU Mobility Screen  Early Mobility/Exercise Safety Screen: Proceed with mobilization - No exclusion criteria met  ICU Mobility Scale: Walking with assistance of 1 person,          Education Documentation  Body Mechanics, taught by Elizabeth Niño OT at 4/15/2025 11:58 AM.  Learner: Patient  Readiness: Acceptance  Method: Explanation  Response: Needs Reinforcement  Comment: OT POC, activity pacing, PLB    Precautions, taught by Elizabeth Niño OT at 4/15/2025 11:58 AM.  Learner: Patient  Readiness: Acceptance  Method: Explanation  Response: Needs Reinforcement  Comment: OT POC, activity pacing, PLB    ADL Training, taught by Elizabeth Niño OT at 4/15/2025 11:58 AM.  Learner: Patient  Readiness: Acceptance  Method: Explanation  Response: Needs Reinforcement  Comment: OT POC, activity pacing, PLB    Education Comments  No comments found.        Goals:   Encounter Problems       Encounter Problems (Active)       ADLs       Patient with complete lower body dressing with independent level of assistance donning and doffing all LE  clothes  with PRN adaptive equipment while edge of bed  (Progressing)       Start:  04/15/25    Expected End:  05/06/25            Patient will complete toileting including hygiene clothing management/hygiene with independent level of assistance. (Progressing)       Start:  04/15/25    Expected End:  05/06/25               BALANCE       Pt will maintain dynamic standing balance during ADL task with independent level of assistance in order to demonstrate decreased risk of falling and improved postural control. (Progressing)       Start:  04/15/25    Expected End:  05/06/25               COGNITION/SAFETY       Patient will score WFL on standardized cognitive assessment with no external cues and within reasonable time frame (Progressing)       Start:  04/15/25    Expected End:  05/06/25               EXERCISE/STRENGTHENING       Pt will demonstrate I with energy conservation techniques to increase functional activity tolerance to x15+ min with <2 rest breaks/while maintaining O2 sats.  (Progressing)       Start:  04/15/25    Expected End:  05/06/25               TRANSFERS       Patient will complete all  functional transfers  with least restrictive device with independent level of assistance. (Progressing)       Start:  04/15/25    Expected End:  05/06/25                        04/15/25 at 12:01 PM   Elizabeth Niño OT   Rehab Office: [phone]

## 2025-04-15 NOTE — SIGNIFICANT EVENT
Floor Readiness Note       I, personally, evaluated Ronna Beckham prior to transfer to the floor, including reviewing all current laboratory and imaging studies. The patient remains appropriate for transfer to the floor. Bedside nurse and respiratory therapy are also in agreement of patient's readiness for the floor.     Brief summary:  Briefly, this is a 64 y.o. year old female with pulmonary hypertension group 1 (hendrickson in place), atrial flutter on apixiban s/p ablation 3/2025, lupus(?) admitted on 4/9/2025 for evaluation of hematoma at the venotomy site of her hendrickson catheter. Transferred to the micu on 4/12/25 for acute on chronic hypoxic hypercapnic respiratory failure requiring airvo. CT -ve for acute PE. Shunt unlikely given the improvement in hypoxia with supplemental oxygen. Bcx collected 4/12 during rapid, 4/4 growing MRSA. Suspect bacteremia in setting of line infection. She's on vanc and her hendrickson was removed by IR 4/13. Her veletri is running through a dedicated peripheral line, the formulation was changed so that it could be administered at an increased rate to mitigate venous backflow. She was also started on nebs prn for wheeze on exam. Her respiratory status improved significantly, she is now on her baseline 4L nc.      Updated focused Physical Exam:  General: NAD. A&Ox4.  Cardiac: Tachycardic.   Pulmonary: Comfortable on 4L nc.     Current Vital Signs:  Heart Rate: (!) 116 (04/15/25 1500 : User, System Default)  BP: 121/73 (04/15/25 1500 : User, System Default)  Temp: 36.3 °C (97.3 °F) (04/15/25 1127 : Jannette Dominguez)  Resp: 24 (04/15/25 1500 : User, System Default)  SpO2: (!) 88 % (04/15/25 1500 : User, System Default)    Relevant updates since rounds:  Trending trop due to chest pain (EKG stbl from prior)    Accepting team,  flex to [place] , received verbal sign out and the Provider Care team/Attending has been updated. Bedside nurse will now call accepting nurse for  report and patient will be transferred to [place] [phone].    Marychuy Contreras MD

## 2025-04-15 NOTE — PROGRESS NOTES
"Medical Intensive Care - Daily Progress Note   Subjective    Ronna Beckham is a 64 y.o. year old female patient admitted on 4/9/2025 with following ICU needs: acute vs subacute on chronic hypoxic respiratory failure requiring airvo.     Interval History:  No acute events overnight and no new concerns this morning. She is comfortable with sats >93% on home 4L nasal cannula.     Meds    Scheduled medications  acetaminophen, 1,000 mg, intravenous, BID  aspirin, 81 mg, oral, Daily  calcium carbonate-vitamin D3, 2 tablet, oral, BID  ergocalciferol, 1.25 mg, oral, Every Sunday  [Held by provider] furosemide, 40 mg, oral, Daily  gabapentin, 800 mg, oral, TID  hydroxychloroquine, 200 mg, oral, BID  lidocaine, 5 mL, infiltration, Once  loperamide, 2 mg, oral, BID  macitentan, 10 mg, oral, Daily  magnesium chloride, 64 mg, oral, BID  melatonin, 5 mg, oral, Nightly  pantoprazole, 40 mg, intravenous, Daily  polyethylene glycol, 17 g, oral, Daily  potassium chloride, 20 mEq, intravenous, q2h  [Held by provider] potassium chloride CR, 20 mEq, oral, BID  vancomycin, 1,250 mg, intravenous, q24h  venlafaxine XR, 150 mg, oral, Daily      Continuous medications  epoprostenol, 67 ng/kg/min (Order-Specific), Last Rate: 67 ng/kg/min (04/15/25 0600)  heparin, 0-4,500 Units/hr, Last Rate: 1,500 Units/hr (04/15/25 0600)      PRN medications  PRN medications: albuterol, heparin, HYDROmorphone, oxygen, vancomycin     Objective    Blood pressure 118/73, pulse (!) 113, temperature 36.1 °C (97 °F), temperature source Temporal, resp. rate 19, height 1.651 m (5' 5\"), weight 68.6 kg (151 lb 3.8 oz), SpO2 94%.     Physical Exam   General: NAD. A&Ox4.   HEENT: NC/AT. Pupils reactive to light.   Cardiac: Tachycardic. No m/r/g.   Pulmonary: Normal respiratory effort on 4L nc. Slightly decreased at bilateral bases. End expiratory wheeze appreciated throughout. No crackles, rhonchi.   Abdominal: Soft, nontender/nondistended. No rebound tenderness or " involuntary guarding.  Ext: No edema bilateral lower ext.  Skin: No rash, lesion, bruising.   Neuro: No focal deficit.  Psych: Appropriate mood and affect.        Intake/Output Summary (Last 24 hours) at 4/15/2025 0709  Last data filed at 4/15/2025 0600  Gross per 24 hour   Intake 343.9 ml   Output 275 ml   Net 68.9 ml     Labs:   Results from last 72 hours   Lab Units 04/15/25  0403 04/14/25  0458 04/13/25  0405   SODIUM mmol/L 141 135* 132*   POTASSIUM mmol/L 3.2* 3.9 3.7   CHLORIDE mmol/L 103 99 98   CO2 mmol/L 24 25 23   BUN mg/dL 12 14 14   CREATININE mg/dL 0.43* 0.85 0.75   GLUCOSE mg/dL 110* 199* 115*   CALCIUM mg/dL 8.1* 7.9* 7.5*   ANION GAP mmol/L 17 15 15   EGFR mL/min/1.73m*2 >90 78 90   PHOSPHORUS mg/dL 4.4 4.4 4.6      Results from last 72 hours   Lab Units 04/15/25  0403 04/14/25  1430 04/14/25  0458 04/13/25  1831 04/13/25  0405 04/12/25  2207   WBC AUTO x10*3/uL 9.2 8.8 8.0   < > 9.6 10.4   HEMOGLOBIN g/dL 7.8* 8.1* 7.9*   < > 7.2* 7.6*   HEMATOCRIT % 26.6* 26.6* 25.5*   < > 23.2* 24.7*   PLATELETS AUTO x10*3/uL 63* 57* 57*   < > 60* 55*   NEUTROS PCT AUTO %  --   --   --   --  77.6 77.6   LYMPHO PCT MAN % 4.3 7.0 4.2   < >  --   --    LYMPHS PCT AUTO %  --   --   --   --  7.2 8.4   MONO PCT MAN % 2.5 0.9 4.2   < >  --   --    MONOS PCT AUTO %  --   --   --   --  7.9 7.5   EOSINO PCT MAN % 0.0 0.0 0.0   < >  --   --    EOS PCT AUTO %  --   --   --   --  0.0 0.0    < > = values in this interval not displayed.      Results from last 72 hours   Lab Units 04/12/25 2053   POCT PH, ARTERIAL pH 7.45*   POCT PCO2, ARTERIAL mm Hg 32*   POCT PO2, ARTERIAL mm Hg 83*   POCT SO2, ARTERIAL % 98     Results from last 72 hours   Lab Units 04/12/25  1252   POCT PH, VENOUS pH 7.38   POCT PCO2, VENOUS mm Hg 39*   POCT PO2, VENOUS mm Hg 29*        Micro/ID:     Lab Results   Component Value Date    BLOODCULT Loaded on Instrument - Culture in progress 04/14/2025    BLOODCULT Loaded on Instrument - Culture in progress  04/14/2025       Summary of key imaging results from the last 24 hours    === 04/14/25 ===  TTE - LIMITED    Left Ventricle: Left ventricular ejection fraction is normal, by visual estimate at 70-75%. There are no regional left ventricular wall motion abnormalities. The left ventricular cavity size is normal. There is normal septal and mildly increased posterior left ventricular wall thickness. There is left ventricular concentric remodeling. The interventricular septum is flattened in systole and diastole, consistent with right ventricular pressure and volume overload. Left ventricular diastolic filling cannot be determined due to atrial fibrillation/flutter.  Left Atrium: The left atrial size is normal. A bubble study using agitated saline was performed. Bubble study is negative.  Right Ventricle: The right ventricle is severely enlarged. There is reduced right ventricular systolic function. Despite normal TAPSE and RV s', there are signs of systolic dysfunction based on contrast-enhanced imaging.  Right Atrium: The right atrium is severely dilated.  Aortic Valve: The aortic valve is trileaflet. There is no evidence of aortic valve regurgitation.  Mitral Valve: The mitral valve is normal in structure. There is no evidence of mitral valve regurgitation.  Tricuspid Valve: The tricuspid valve is abnormal. There is severe tricuspid regurgitation. The Doppler estimated RVSP is severely elevated at 103.0 mmHg. TR mechanism is likely functional/secondary TR in the setting of severely dilated RA and RV.  Pulmonic Valve: The pulmonic valve is structurally normal. There is mild pulmonic valve regurgitation.  Pericardium: Small pericardial effusion. There is no evidence of cardiac tamponade.  Pleural: There is left pleural effusion.  Aorta: The aortic root is normal.  Systemic Veins: The inferior vena cava appears dilated, with IVC inspiratory collapse greater than 50%.  In comparison to the previous echocardiogram(s):  Compared with study dated 2/17/2025, no significant change.        CONCLUSIONS:   1. Left ventricular ejection fraction is normal, by visual estimate at 70-75%.   2. Right ventricular volume and pressure overload.   3. There is reduced right ventricular systolic function.   4. Severely enlarged right ventricle.   5. The right atrium is severely dilated.   6. There is no evidence of cardiac tamponade.   7. Severe tricuspid regurgitation visualized.   8. Severely elevated right ventricular systolic pressure.      Assessment and Plan     Assessment: Ronna Beckham is a 64 y.o. year old female with pulmonary hypertension group 1 (hendrickson in place), atrial fibrillation on apixiban s/p ablation 3/2025, lupus(?) admitted on 4/9/2025 for evaluation of hematoma at the site of her hendrickson catheter. Transferred to the [facility] on 4/12/25 for acute vs subacute on chronic hypoxic respiratory failure requiring airvo.     Differential for the acute hypoxia includes infection, PE, shunt CT PE -ve for acute PE and shunt is highly unlikely given improvement in hypoxia with supplemental oxygen. Bcx collected 4/12 during rapid, 4/4 growing MRSA. Suspect bacteremia in setting of line infection. She's on vanc and her hendrickson was removed by IR 4/13. Her respiratory status improved significantly, she is now on her baseline 4L nc.     Mechanical Ventilation: none  Sedation/Analgesia:  none  Restraints: no    Summary for 04/15/25  :  comfortable on 10L bubbler overnight w/ sats >93%; now on baseline 4L nc   TTE -ve for valvular vegetation, no VSD  stable for floor transfer    Plan:  NEUROLOGY/PSYCH:  #peripheral neuropathy   : :chronic, stable  management:  -continue gabapentin 800 mg PO TID    #MDD  : :chronic, stable  management:  -continue venlafaxine 150 mg PO daily     CARDIOVASCULAR:  #typical atrial flutter 1:1 conduction s/p ablation   #supraventricular tachycardia, on diltiazem   : :h/o SVT iso significant pulmonary hypertension,  narrow QRS up to 200 bpm unresponsive to adenosine and highly suspicious for one to one atrial flutter  : :recently underwent flutter ablation 3/23/25   : :on diltiazem 180 mg PO daily and apixiban  : :cardiology following, appreciate recs   management:  -telemetry   -cardiology recs  -starting heparin gtt 4/14 for therapeutic ac  -holding apixiban   -holding diltiazem     #coronary artery disease  : :chronic, stable   : :on aspirin at home  management:  -telemetry   -continue aspirin 81 mg PO daily     PULMONARY:  #acute on chronic hypoxic respiratory failure   : :on 4L NC at baseline; was requiring airvo 50L/80%   : :collected bcx 4/12, 4/4 growing gram +ve cocci in clusters, likely line associated   : :TTE 4/14 showed no evidence of valvular vegetations  : :admitted at end of march for SSTI with purulent drainage around hendrickson site (left IJ)- line replaced (right IJ) and started on 10 day course of doxycycline (end date 3/23/25)   : :ddx includes infection vs PE vs shunt  -most likely due to developing sepsis 2/2 MRSA bacteremia  -CT 4/12 -ve for PE  -shunt unlikely given improvement with supplemental oxygen, confirmed no shunt with -ve bubble study on TTE 4/14   management:  -ID following  -continue abx as per ID recs  pip-tazo 4/12 - 4/13   vancomycin 4/12 -   cefazolin 4/13 -  4/14    #pulmonary arterial hypertension, group 1  : :regimen includes macitentan 10 mg PO daily, continuous epoprostenol infusion via right subclavian hendrickson and monthly sotatercept injections  : :follows outpatient with dr. Prescott   : :hendrickson was on her left side until last month when she was admitted for purulent drainage and treated for line associated SSTI with 10 day course of doxycycline  management:  -continue epoprostenol (infusion should NOT be stopped for any reason)   -continue macitenan 10 mg PO daily   -ok to place new line 48 hrs from first -ve bcx per ID  -in the meantime maintain additional peripheral access solely  for administration of epoprostenol     RENAL/GENITOURINARY:  No active issues    GASTROENTEROLOGY:  #GERD  : :chronic, stable  : :on pantoprazole 40 mg PO daily at home   management:  -continue PPI, can do IV while on airvo  [  ]switch back to PO now that she's off airvo    ENDOCRINOLOGY:  No active issues    RHEUMATOLOGY:  #systemic lupus erythematosus(?)   : :chronic, stable  : : her lupus is currently managed by her pcp, although can not directly find records in the chart  : :currently on plaquenil testing  : :denies any clinical symptoms of active flare including joint pain, muscle pain, issues with urination, there doesn't seem to be a history of this subjectively   : :per EMR she had +ve anti-nuclear panel with a 1:1280 titer, however the anti-dsDNA was 4.0 IU/mL which is -ve; never had a +ve anti-dsDNA; unusual to be started on plaquenil for this  : :will repeat those laboratory studies as if this is not lupus, then would consider discontinuing this medication entirely  management:  -continue hydroxychloroquine 200 mg PO BID for now  -will repeat autoimmune workup   -can consider dc'ing lupus meds entirely if workup -ve    HEMATOLOGY:  #acute on chronic anemia, microcytic  #acute on chronic thrombocytopenia  : :baseline hemoglobin 10, 8.8 on admit and now 7  : :worsening thrombocytopenia, baseline >100   management:  -continue to annabel cbc  -goal hb >7   -goal platelet >10, >50 if bleeding   -iron studies  -peripheral smear     SKIN:  no active issues    MUSCULOSKELETAL:  #superficial hematoma, eschar anterior chest wall s/p hendrickson procedure   : :CTA showing small hematoma with no active extravasation   : :low c/f [id number] formation with thrombus per vascular sx and IR  : :recommend conservative mgmt  : :given her h/o substance use disorder consider drug-induced vasculitis 2/2 methamphetamine vs toxic exposure of levamisole or xylazine   : :further studies to rule out vasculitis component  warranted  management:  -urine drug screen -ve  -vasc sx consulted  -IR consulted  -pending ANCA     INFECTIOUS DISEASE:  #gram +ve bacteremia, possibly line-associated   #possible pneumonia   : :bcx collected 4/12, 4/4 growing gram +ve cocci in clusters  : :mrsa nares +ve on 4/12   : :viral respiratory panel -ve on 4/12  : :procal 4/12 0.58, pending repeat this morning  : :hendrickson recently replaced during march admission for line associated SSTI- presented with purulent drainage from hendrickson, briefly on IV abx then completed 10 days of doxycycline  : :current hendrickson removed 4/13 by IR  : :TTE 4/14 showed no evidence of valvular vegetations  management:  -continue IV abx as per ID recs   pip-tazo 4/12 - 4/13   vancomycin 4/12 -   cefazolin 4/13 -  4/14  -post-hendrickson removal bcx pending     ICU Check List       ICU Liberation: Intervention:   Assess, Prevent, Manage Pain CPOT - monitor   Both SAT and SBT [] SAT  [] SBT 30-60 min [] Extubate to NC  [] Extubate to NIV  [] Discuss Trach   Choice of analgesia and sedation RASS: 0  none monitor   Delirium: Assess, prevent and manage CAM - monitor   Early Mobility and Exercise  [x] PT /OT consult   Family Engagement and Empowerment [x]Family updated []SW consult     FEN  Fluids: avoid d/t pulmonary hypertension, ok if suspect developing sepsis   Electrolytes: PRN  Nutrition: salt restricted   Prophylaxis:  DVT ppx: heparin gtt (therapeutic)  GI ppx: PPI   Bowel care: miralax, senna   Hardware:         CVC 03/19/25 Tunneled Right Subclavian (Active)   Placement Date/Time: 03/19/25 1121   CVC Type: Tunneled  Orientation: Right  Location: Subclavian   Number of days: 24       Social:  Code: Full Code    HPOA: Diana Vera (sister) [phone]   Disposition: medicine floor (tower 5)    Marychuy Contreras MD   04/15/25 at 7:09 AM     Disclaimer: Documentation completed with the information available at the time of input. The times in the chart may not be reflective  of actual patient care times, interventions, or procedures. Documentation occurs after the physical care of the patient.

## 2025-04-16 ENCOUNTER — APPOINTMENT (OUTPATIENT)
Dept: CARDIOLOGY | Facility: HOSPITAL | Age: 65
End: 2025-04-16
Payer: COMMERCIAL

## 2025-04-16 ENCOUNTER — APPOINTMENT (OUTPATIENT)
Dept: PULMONOLOGY | Facility: HOSPITAL | Age: 65
End: 2025-04-16
Payer: COMMERCIAL

## 2025-04-16 ENCOUNTER — APPOINTMENT (OUTPATIENT)
Dept: RESPIRATORY THERAPY | Facility: HOSPITAL | Age: 65
End: 2025-04-16
Payer: COMMERCIAL

## 2025-04-16 DIAGNOSIS — R06.02 SOB (SHORTNESS OF BREATH): ICD-10-CM

## 2025-04-16 DIAGNOSIS — Z79.899 LONG-TERM USE OF HIGH-RISK MEDICATION: ICD-10-CM

## 2025-04-16 DIAGNOSIS — I27.21 PAH (PULMONARY ARTERY HYPERTENSION) (MULTI): ICD-10-CM

## 2025-04-16 PROBLEM — T82.514A HICKMAN CATHETER DYSFUNCTION: Status: ACTIVE | Noted: 2025-04-16

## 2025-04-16 PROBLEM — T50.905A: Chronic | Status: ACTIVE | Noted: 2025-04-16

## 2025-04-16 PROBLEM — S20.219A HEMATOMA OF CHEST WALL: Status: ACTIVE | Noted: 2025-04-09

## 2025-04-16 PROBLEM — I48.92 ATRIAL FLUTTER WITH CONTROLLED RESPONSE (MULTI): Chronic | Status: ACTIVE | Noted: 2025-04-16

## 2025-04-16 LAB
ALBUMIN SERPL BCP-MCNC: 2.5 G/DL (ref 3.4–5)
ALP SERPL-CCNC: 100 U/L (ref 33–136)
ALT SERPL W P-5'-P-CCNC: 6 U/L (ref 7–45)
ANION GAP SERPL CALC-SCNC: 16 MMOL/L (ref 10–20)
APTT PPP: 39 SECONDS (ref 26–36)
AST SERPL W P-5'-P-CCNC: 10 U/L (ref 9–39)
BASOPHILS # BLD MANUAL: 0 X10*3/UL (ref 0–0.1)
BASOPHILS # BLD MANUAL: 0 X10*3/UL (ref 0–0.1)
BASOPHILS NFR BLD MANUAL: 0 %
BASOPHILS NFR BLD MANUAL: 0 %
BILIRUB DIRECT SERPL-MCNC: 0.1 MG/DL (ref 0–0.3)
BILIRUB SERPL-MCNC: 0.4 MG/DL (ref 0–1.2)
BLASTS # BLD MANUAL: 0 X10*3/UL
BLASTS # BLD MANUAL: 0 X10*3/UL
BLASTS NFR BLD MANUAL: 0 %
BLASTS NFR BLD MANUAL: 0 %
BUN SERPL-MCNC: 11 MG/DL (ref 6–23)
C3 SERPL-MCNC: 122 MG/DL (ref 87–200)
C4 SERPL-MCNC: 35 MG/DL (ref 10–50)
CALCIUM SERPL-MCNC: 7.1 MG/DL (ref 8.6–10.6)
CHLORIDE SERPL-SCNC: 106 MMOL/L (ref 98–107)
CO2 SERPL-SCNC: 18 MMOL/L (ref 21–32)
CREAT SERPL-MCNC: 0.64 MG/DL (ref 0.5–1.05)
CREAT UR-MCNC: 81.8 MG/DL (ref 20–320)
EGFRCR SERPLBLD CKD-EPI 2021: >90 ML/MIN/1.73M*2
EOSINOPHIL # BLD MANUAL: 0 X10*3/UL (ref 0–0.7)
EOSINOPHIL # BLD MANUAL: 0 X10*3/UL (ref 0–0.7)
EOSINOPHIL NFR BLD MANUAL: 0 %
EOSINOPHIL NFR BLD MANUAL: 0 %
ERYTHROCYTE [DISTWIDTH] IN BLOOD BY AUTOMATED COUNT: 18.2 % (ref 11.5–14.5)
ERYTHROCYTE [DISTWIDTH] IN BLOOD BY AUTOMATED COUNT: 18.2 % (ref 11.5–14.5)
ERYTHROCYTE [DISTWIDTH] IN BLOOD BY AUTOMATED COUNT: 18.5 % (ref 11.5–14.5)
FIBRINOGEN PPP-MCNC: 474 MG/DL (ref 200–400)
GLUCOSE SERPL-MCNC: 271 MG/DL (ref 74–99)
HCT VFR BLD AUTO: 26 % (ref 36–46)
HCT VFR BLD AUTO: 26.5 % (ref 36–46)
HCT VFR BLD AUTO: 27.7 % (ref 36–46)
HEMOCCULT SP1 STL QL: POSITIVE
HGB BLD-MCNC: 7 G/DL (ref 12–16)
HGB BLD-MCNC: 7.5 G/DL (ref 12–16)
HGB BLD-MCNC: 7.7 G/DL (ref 12–16)
IMM GRANULOCYTES # BLD AUTO: 1.82 X10*3/UL (ref 0–0.7)
IMM GRANULOCYTES # BLD AUTO: 2.27 X10*3/UL (ref 0–0.7)
IMM GRANULOCYTES NFR BLD AUTO: 20.4 % (ref 0–0.9)
IMM GRANULOCYTES NFR BLD AUTO: 24.2 % (ref 0–0.9)
INR PPP: 1.1 (ref 0.9–1.1)
INTERPRETATION FOR ANTI-PLATELET FACTOR 4 ANTIBODY: NEGATIVE
IRON SATN MFR SERPL: 8 % (ref 25–45)
IRON SERPL-MCNC: 17 UG/DL (ref 35–150)
LYMPHOCYTES # BLD MANUAL: 0.4 X10*3/UL (ref 1.2–4.8)
LYMPHOCYTES # BLD MANUAL: 0.76 X10*3/UL (ref 1.2–4.8)
LYMPHOCYTES NFR BLD MANUAL: 4.3 %
LYMPHOCYTES NFR BLD MANUAL: 8.5 %
MAGNESIUM SERPL-MCNC: 1.46 MG/DL (ref 1.6–2.4)
MCH RBC QN AUTO: 21.5 PG (ref 26–34)
MCH RBC QN AUTO: 21.6 PG (ref 26–34)
MCH RBC QN AUTO: 21.6 PG (ref 26–34)
MCHC RBC AUTO-ENTMCNC: 26.9 G/DL (ref 32–36)
MCHC RBC AUTO-ENTMCNC: 27.8 G/DL (ref 32–36)
MCHC RBC AUTO-ENTMCNC: 28.3 G/DL (ref 32–36)
MCV RBC AUTO: 76 FL (ref 80–100)
MCV RBC AUTO: 78 FL (ref 80–100)
MCV RBC AUTO: 80 FL (ref 80–100)
METAMYELOCYTES # BLD MANUAL: 0.08 X10*3/UL
METAMYELOCYTES # BLD MANUAL: 0.37 X10*3/UL
METAMYELOCYTES NFR BLD MANUAL: 0.9 %
METAMYELOCYTES NFR BLD MANUAL: 4.2 %
MONOCYTES # BLD MANUAL: 0.23 X10*3/UL (ref 0.1–1)
MONOCYTES # BLD MANUAL: 0.4 X10*3/UL (ref 0.1–1)
MONOCYTES NFR BLD MANUAL: 2.6 %
MONOCYTES NFR BLD MANUAL: 4.3 %
MYELOCYTES # BLD MANUAL: 0.64 X10*3/UL
MYELOCYTES # BLD MANUAL: 0.68 X10*3/UL
MYELOCYTES NFR BLD MANUAL: 6.8 %
MYELOCYTES NFR BLD MANUAL: 7.6 %
NEUTROPHILS # BLD MANUAL: 6.86 X10*3/UL (ref 1.2–7.7)
NEUTROPHILS # BLD MANUAL: 7.87 X10*3/UL (ref 1.2–7.7)
NEUTS BAND # BLD MANUAL: 0.32 X10*3/UL (ref 0–0.7)
NEUTS BAND # BLD MANUAL: 0.37 X10*3/UL (ref 0–0.7)
NEUTS BAND NFR BLD MANUAL: 3.4 %
NEUTS BAND NFR BLD MANUAL: 4.2 %
NEUTS SEG # BLD MANUAL: 6.49 X10*3/UL (ref 1.2–7)
NEUTS SEG # BLD MANUAL: 7.55 X10*3/UL (ref 1.2–7)
NEUTS SEG NFR BLD MANUAL: 72.9 %
NEUTS SEG NFR BLD MANUAL: 80.3 %
NRBC BLD MANUAL-RTO: 0 % (ref 0–0)
NRBC BLD MANUAL-RTO: 0 % (ref 0–0)
NRBC BLD-RTO: 2.9 /100 WBCS (ref 0–0)
NRBC BLD-RTO: 2.9 /100 WBCS (ref 0–0)
NRBC BLD-RTO: 3.4 /100 WBCS (ref 0–0)
OVALOCYTES BLD QL SMEAR: ABNORMAL
PHOSPHATE SERPL-MCNC: 2.7 MG/DL (ref 2.5–4.9)
PLASMA CELLS # BLD MANUAL: 0 X10*3/UL
PLASMA CELLS # BLD MANUAL: 0 X10*3/UL
PLASMA CELLS NFR BLD MANUAL: 0 %
PLASMA CELLS NFR BLD MANUAL: 0 %
PLATELET # BLD AUTO: 73 X10*3/UL (ref 150–450)
PLATELET # BLD AUTO: 77 X10*3/UL (ref 150–450)
PLATELET # BLD AUTO: 79 X10*3/UL (ref 150–450)
POTASSIUM SERPL-SCNC: 4.4 MMOL/L (ref 3.5–5.3)
PROMYELOCYTES # BLD MANUAL: 0 X10*3/UL
PROMYELOCYTES # BLD MANUAL: 0 X10*3/UL
PROMYELOCYTES NFR BLD MANUAL: 0 %
PROMYELOCYTES NFR BLD MANUAL: 0 %
PROT SERPL-MCNC: 5.1 G/DL (ref 6.4–8.2)
PROT UR-ACNC: 14 MG/DL (ref 5–24)
PROT/CREAT UR: 0.17 MG/MG CREAT (ref 0–0.17)
PROTHROMBIN TIME: 12.3 SECONDS (ref 9.8–12.4)
RBC # BLD AUTO: 3.25 X10*6/UL (ref 4–5.2)
RBC # BLD AUTO: 3.47 X10*6/UL (ref 4–5.2)
RBC # BLD AUTO: 3.56 X10*6/UL (ref 4–5.2)
RBC MORPH BLD: ABNORMAL
RBC MORPH BLD: ABNORMAL
SERUM AND PLATELET FACTOR 4: 0.21 OD UNITS
SODIUM SERPL-SCNC: 136 MMOL/L (ref 136–145)
TIBC SERPL-MCNC: 220 UG/DL (ref 240–445)
TOTAL CELLS COUNTED BLD: 117
TOTAL CELLS COUNTED BLD: 118
UFH PPP CHRO-ACNC: 0.3 IU/ML (ref ?–1.1)
UIBC SERPL-MCNC: 203 UG/DL (ref 110–370)
VANCOMYCIN SERPL-MCNC: 14.9 UG/ML (ref 5–20)
VANCOMYCIN SERPL-MCNC: >150 UG/ML (ref 5–20)
VARIANT LYMPHS # BLD MANUAL: 0 X10*3/UL (ref 0–0.5)
VARIANT LYMPHS # BLD MANUAL: 0 X10*3/UL (ref 0–0.5)
VARIANT LYMPHS NFR BLD: 0 %
VARIANT LYMPHS NFR BLD: 0 %
WBC # BLD AUTO: 8.9 X10*3/UL (ref 4.4–11.3)
WBC # BLD AUTO: 9.4 X10*3/UL (ref 4.4–11.3)
WBC # BLD AUTO: 9.9 X10*3/UL (ref 4.4–11.3)

## 2025-04-16 PROCEDURE — 83540 ASSAY OF IRON: CPT

## 2025-04-16 PROCEDURE — 85007 BL SMEAR W/DIFF WBC COUNT: CPT

## 2025-04-16 PROCEDURE — 2500000002 HC RX 250 W HCPCS SELF ADMINISTERED DRUGS (ALT 637 FOR MEDICARE OP, ALT 636 FOR OP/ED): Mod: SE

## 2025-04-16 PROCEDURE — 86022 PLATELET ANTIBODIES: CPT

## 2025-04-16 PROCEDURE — 80202 ASSAY OF VANCOMYCIN: CPT

## 2025-04-16 PROCEDURE — 2500000004 HC RX 250 GENERAL PHARMACY W/ HCPCS (ALT 636 FOR OP/ED): Mod: SE

## 2025-04-16 PROCEDURE — 2500000001 HC RX 250 WO HCPCS SELF ADMINISTERED DRUGS (ALT 637 FOR MEDICARE OP): Mod: SE

## 2025-04-16 PROCEDURE — 85520 HEPARIN ASSAY: CPT

## 2025-04-16 PROCEDURE — 85610 PROTHROMBIN TIME: CPT

## 2025-04-16 PROCEDURE — 84100 ASSAY OF PHOSPHORUS: CPT

## 2025-04-16 PROCEDURE — 85027 COMPLETE CBC AUTOMATED: CPT

## 2025-04-16 PROCEDURE — 85384 FIBRINOGEN ACTIVITY: CPT

## 2025-04-16 PROCEDURE — 84075 ASSAY ALKALINE PHOSPHATASE: CPT

## 2025-04-16 PROCEDURE — 93010 ELECTROCARDIOGRAM REPORT: CPT | Performed by: INTERNAL MEDICINE

## 2025-04-16 PROCEDURE — 99231 SBSQ HOSP IP/OBS SF/LOW 25: CPT | Performed by: INTERNAL MEDICINE

## 2025-04-16 PROCEDURE — 1200000002 HC GENERAL ROOM WITH TELEMETRY DAILY

## 2025-04-16 PROCEDURE — 84238 ASSAY NONENDOCRINE RECEPTOR: CPT

## 2025-04-16 PROCEDURE — 2500000004 HC RX 250 GENERAL PHARMACY W/ HCPCS (ALT 636 FOR OP/ED): Mod: JZ,SE

## 2025-04-16 PROCEDURE — 82270 OCCULT BLOOD FECES: CPT

## 2025-04-16 PROCEDURE — 36415 COLL VENOUS BLD VENIPUNCTURE: CPT

## 2025-04-16 PROCEDURE — 82570 ASSAY OF URINE CREATININE: CPT

## 2025-04-16 PROCEDURE — 83735 ASSAY OF MAGNESIUM: CPT

## 2025-04-16 RX ORDER — MAGNESIUM SULFATE HEPTAHYDRATE 40 MG/ML
2 INJECTION, SOLUTION INTRAVENOUS ONCE
Status: COMPLETED | OUTPATIENT
Start: 2025-04-16 | End: 2025-04-16

## 2025-04-16 RX ADMIN — Medication 64 MG: at 20:01

## 2025-04-16 RX ADMIN — Medication 5 MG: at 20:00

## 2025-04-16 RX ADMIN — VANCOMYCIN HYDROCHLORIDE 1250 MG: 1.25 INJECTION, POWDER, LYOPHILIZED, FOR SOLUTION INTRAVENOUS at 17:26

## 2025-04-16 RX ADMIN — HEPARIN SODIUM 1500 UNITS/HR: 10000 INJECTION, SOLUTION INTRAVENOUS at 10:25

## 2025-04-16 RX ADMIN — GABAPENTIN 800 MG: 400 CAPSULE ORAL at 10:06

## 2025-04-16 RX ADMIN — Medication 64 MG: at 10:07

## 2025-04-16 RX ADMIN — PANTOPRAZOLE SODIUM 40 MG: 40 TABLET, DELAYED RELEASE ORAL at 06:15

## 2025-04-16 RX ADMIN — Medication 2 TABLET: at 20:01

## 2025-04-16 RX ADMIN — ACETAMINOPHEN 1000 MG: 10 INJECTION INTRAVENOUS at 17:26

## 2025-04-16 RX ADMIN — LOPERAMIDE HYDROCHLORIDE 2 MG: 2 CAPSULE ORAL at 10:35

## 2025-04-16 RX ADMIN — MACITENTAN 10 MG: 10 TABLET, FILM COATED ORAL at 10:06

## 2025-04-16 RX ADMIN — GABAPENTIN 800 MG: 400 CAPSULE ORAL at 13:35

## 2025-04-16 RX ADMIN — GABAPENTIN 800 MG: 400 CAPSULE ORAL at 20:00

## 2025-04-16 RX ADMIN — VENLAFAXINE HYDROCHLORIDE 150 MG: 150 CAPSULE, EXTENDED RELEASE ORAL at 10:07

## 2025-04-16 RX ADMIN — POTASSIUM CHLORIDE 20 MEQ: 1500 TABLET, EXTENDED RELEASE ORAL at 20:00

## 2025-04-16 RX ADMIN — HYDROXYCHLOROQUINE SULFATE 200 MG: 200 TABLET, FILM COATED ORAL at 10:06

## 2025-04-16 RX ADMIN — EPOPROSTENOL 67 NG/KG/MIN: 1.5 INJECTION, POWDER, LYOPHILIZED, FOR SOLUTION INTRAVENOUS at 22:34

## 2025-04-16 RX ADMIN — HYDROXYCHLOROQUINE SULFATE 200 MG: 200 TABLET, FILM COATED ORAL at 20:01

## 2025-04-16 RX ADMIN — EPOPROSTENOL 67 NG/KG/MIN: 1.5 INJECTION, POWDER, LYOPHILIZED, FOR SOLUTION INTRAVENOUS at 10:10

## 2025-04-16 RX ADMIN — Medication 2 TABLET: at 10:06

## 2025-04-16 RX ADMIN — ASPIRIN 81 MG: 81 TABLET, COATED ORAL at 10:06

## 2025-04-16 RX ADMIN — VANCOMYCIN HYDROCHLORIDE 1250 MG: 1.25 INJECTION, POWDER, LYOPHILIZED, FOR SOLUTION INTRAVENOUS at 12:45

## 2025-04-16 RX ADMIN — POTASSIUM CHLORIDE 20 MEQ: 1500 TABLET, EXTENDED RELEASE ORAL at 10:07

## 2025-04-16 RX ADMIN — ACETAMINOPHEN 1000 MG: 10 INJECTION INTRAVENOUS at 10:25

## 2025-04-16 RX ADMIN — MAGNESIUM SULFATE HEPTAHYDRATE 2 G: 40 INJECTION, SOLUTION INTRAVENOUS at 10:10

## 2025-04-16 ASSESSMENT — COGNITIVE AND FUNCTIONAL STATUS - GENERAL
MOBILITY SCORE: 24
MOBILITY SCORE: 24
DAILY ACTIVITIY SCORE: 24
DAILY ACTIVITIY SCORE: 24

## 2025-04-16 ASSESSMENT — PAIN SCALES - GENERAL
PAINLEVEL_OUTOF10: 0 - NO PAIN
PAINLEVEL_OUTOF10: 3

## 2025-04-16 NOTE — PROGRESS NOTES
Physical Therapy                 Therapy Communication Note    Patient Name: Ronna Beckham  MRN: [id number]  Department:   Room: [facility]5027-A  Today's Date: 4/16/2025     Discipline: Physical Therapy    PT Missed Visit: Yes     Missed Visit Reason: Missed Visit Reason:  (pt off the floor)    Missed Time: Attempt

## 2025-04-16 NOTE — PROGRESS NOTES
"  Internal Medicine Progress Note     Ronna Bekcham is a 64 y.o. female with a past medical history of Pulmonary Arterial Hypertension Group 1, Atrial Fibrillation s/p Ablation (03/2025), Systemic Lupus Erythematosus admitted on 4/9/2025 initially presented to ED for hematoma at site of Montez. Course c/b ongoing tachyarrhythmias (atach or aflutter), ultimately attributed to severe pulmonary disease, bacteremia. She developed acute on chronic hypoxic respiratory failure (4L->Airvo) requiring transfer to MICU. Found to have MRSA bacteremia 2/2 Montez, line removed by IR. Currently on Vancomycin, monitoring bcx for clearance, TTE with no vegetations and would be high risk for LUIS. Epo currently running via PIV.     Subjective     Pt seen and evaluated at bedside.    At of eval, pt denies any new sx or worsening SOB. Notes she only occasionally experiences ALAMO. Otherwise denies fever/chills, dizziness/lightheadedness, worsening LE edema.         Medications     Medications:   Scheduled medications  Scheduled Medications[1]  Continuous medications  Continuous Medications[2]  PRN medications  PRN Medications[3]     Objective     Vitals  Visit Vitals  /62   Pulse 98   Temp 36 °C (96.8 °F)   Resp 18   Ht 1.651 m (5' 5\")   Wt 75.3 kg (166 lb 0.1 oz)   SpO2 92%   BMI 27.62 kg/m²   OB Status Hysterectomy   Smoking Status Former   BSA 1.86 m²       Intake/Output Summary (Last 24 hours) at 4/16/2025 1351  Last data filed at 4/16/2025 0834  Gross per 24 hour   Intake 662.18 ml   Output 500 ml   Net 162.18 ml         Physical Exam     Labs  Lab Results   Component Value Date    WBC 9.4 04/16/2025    HGB 7.7 (L) 04/16/2025    HCT 27.7 (L) 04/16/2025    MCV 78 (L) 04/16/2025    PLT 73 (L) 04/16/2025      Lab Results   Component Value Date    GLUCOSE 271 (H) 04/16/2025    CALCIUM 7.1 (L) 04/16/2025     04/16/2025    K 4.4 04/16/2025    CO2 18 (L) 04/16/2025     04/16/2025    BUN 11 04/16/2025    CREATININE " 0.64 04/16/2025      Lab Results   Component Value Date    ALT 6 (L) 04/16/2025    AST 10 04/16/2025     (H) 07/23/2019    ALKPHOS 100 04/16/2025    BILITOT 0.4 04/16/2025        Imaging  === 04/09/25 ===    XR CHEST 1 VIEW    - Impression -  Slight improvement of edematous changes. There remains trace left  pleural effusion with bibasilar atelectasis.    I personally reviewed the images/study and I agree with the findings  as stated by Bret Canales MD. This study was interpreted at  [facility], [place], [place].    MACRO:  None    Signed by: Brett Cox 4/16/2025 8:30 AM  Dictation workstation:   [id number]   === 04/09/25 ===    CT ANGIO CHEST FOR PULMONARY EMBOLISM    - Impression -  1. No evidence of acute pulmonary embolism to segmental level. Please  note that, assessment of subsegmental branches is limited and small  peripheral emboli are not entirely excluded.  2. Interval development of trace/small left pleural effusion. Left  basilar consolidative opacity. Additional new consolidative opacity  in the posterior right middle lobe and areas of ground-glass opacity  in the right lower lobe. Findings are likely atelectatic, however  infectious process should be excluded in appropriate clinical setting.  3. Severe enlargement of the right ventricle and right atrium with  dilated main pulmonary artery in keeping with patient's known  pulmonary artery hypertension.  4. There is suggestion of a defect in the base of interventricular  septum (VSD). Correlate with echocardiogram and consider cardiac MRI  for shunt assessment. Correlate with concern for Eisenmenger syndrome.  5. Peribronchovascular and centrilobular ground-glass halo in  bilateral lungs, likely sequela of elevated pulmonary artery  pressure/pulmonary artery hypertension.  6. Liver appears enlarged, partially imaged. Findings might be due to  hepatic congestion/congestive hepatopathy secondary to right  heart  dysfunction.      I personally reviewed the images/study and I agree with the findings  as stated by Paras Gabriel MD. This study was interpreted at  [facility], [place], [place].    MACRO:  Critical Finding:  See findings. Notification was initiated on  4/13/2025 at 12:30 pm by  Janice Salvador.  (**-YCF-**)  Instructions:    Signed by: Janice Salvador 4/13/2025 12:36 PM  Dictation workstation:   [id number]         Assessment/Plan     Ronna Beckham is a 64 y.o. female with hx of PAH (on Epo), A-fib s/p ablation (03/2025) on Eliquis, SLE (questionable diagnosis) initially presented to ED for hematoma at site of Hendrickson.   :: Vascular surgery felt pseudoaneurysm/contained thrombus unlikely. No acute intervention per IR.   :: Course c/b ongoing tachyarrhythmias (A-tach or A-flutter), ultimately attributed to severe pulmonary disease, bacteremia. She developed acute on chronic hypoxic respiratory failure (4L->Airvo) requiring transfer to MICU. Found to have MRSA bacteremia 2/2 Hendrickson, line removed by IR. Currently on Vancomycin, monitoring bcx for clearance, TTE with no vegetations and would be high risk for LUIS. Epo currently running via PIV.       Updates 04/16/25:   -Rheum c/s placed to eval SLE dx, appreciate recs   -Per ID can c/w Vanc til 4/28. Bcx considered clear from 4/14, and can place Hendrickson when indicated     #PAH, group I  #Acute on chronic hypoxic respiratory failure, resolving  :: Home meds: macitentan 10 mg PO daily, continuous epoprostenol infusion via right subclavian hendrickson, monthly sotatercept injections  :: Follows outpatient with Dr. Prescott   :: On 4L NC at baseline at rest and 6-7L with activity; was requiring airvo 50L/80%, back on 4L NC   :: CT 4/12 (-) for PE  :: Negative bubble study on TTE 4/14   Plan:   -Continue epoprostenol via PIV (infusion should NOT be stopped for any reason), formulation was changed to be administered at  increased rate and mitigate venous backflow   -Continue macitenan 10 mg PO daily   -Ok to place new line 48 hrs from first -ve bcx per ID, will need IR consult  -In the meantime maintain additional peripheral access solely for administration of epoprostenol   -Continuous pulse ox monitoring   -Eventually will need replacement of Hendrickson dedicated for      #MRSA bacteremia I/s/o Hendrickson line infection   ::Cx 4/12 + MRSA , bcx 4/13 1/2 staph, 4/14 no growth so far, 4/15 no growth so far  :: RVP -ve on 4/12  :: Procal 4/12 0.58, 4/13 0.74  :: Hendrickson recently replaced 3/2025 for line associated SSTI- presented with purulent drainage from hendrickson, briefly on IV abx then completed 10 days of doxycycline  :Current hendrickson removed 4/13 by IR  :TTE 4/14 showed no evidence of valvular vegetations  -Continue IV abx as per ID recs   pip-tazo 4/12 - 4/13      C/w vancomycin 4/12 -   cefazolin 4/13 - 4/14  -F/up post-hendrickson removal bcx   -Per ID, c/w Vanc til 4/28/25  -No LUIS given risk of procedure and undergoing anesthesia given pt's RV function   -Pt likely will need PICC line separate from Hendrickson/line for remodulin      #Superficial hematoma, eschar anterior chest wall s/p hendrickson procedure   :CTA showing small hematoma with no active extravasation   :Low c/f pseudoaneurysm formation with thrombus per vascular sx and IR recommended conservative mgmt  :Given her h/o substance use disorder consider drug-induced vasculitis 2/2 methamphetamine vs toxic exposure of levamisole or xylazine   ::Further studies to rule out vasculitis component warranted  ::Urine drug screen -ve  ::UE DVT US 4/15: negative for acute DVT, chronic changes in mid/distal cephalic veins   Plan:   -Maintain overlying dressing   -Daily CBCs to eval worsening bleed     #A-fib s/p ablation 3/2025  #Tachyarrhythmia (Atach or atypical Aflutter 2:1)  :h/o SVT iso significant pulmonary hypertension, narrow QRS up to 200 bpm unresponsive to adenosine and highly  suspicious for 1:1 atrial flutter  :on diltiazem 180 mg PO daily and apixaban   -Telemetry   -Target HR <120   -Cardiology recs - avoid BB/CCB, dig unlikely to help, if necessary then consider IV amio (although risk of chemical cardioversion), if difficulty controlling rates consider DCCV if she can tolerate AC  -Heparin gtt started on 4/14  -Holding apixiban   -Holding diltiazem   -At discharge, will need ziopatch and EP follow up  -Given pt's intolerance of chemical AC and risk associated with LUIS cardioversion can consider CT watchman at later time as less invasive option     #Chest pain  :Per MICU team, endorsing CP on 4/15. ECG stable from prior (in media tab)  -Troponin 49, continue to trend     #Systemic lupus erythematosus(?)   :Currently managed by her pcp, although can not directly find records in the chart  :Currently on plaquenil testing  :Denies any clinical symptoms of active flare including joint pain, muscle pain  :Per EMR she had +ve anti-nuclear panel with a 1:1280 titer, however the anti-dsDNA was 4.0 IU/mL which is -ve; never had a +ve anti-dsDNA; unusual to be started on plaquenil for this  -F/up repeat autoimmune workup, if this is not lupus, then would consider discontinuing plaquenil  -Continue hydroxychloroquine 200 mg PO BID for now  -Rheum c/s placed, appreciate recs     #Acute on chronic anemia, microcytic  #Acute on chronic thrombocytopenia  :Baseline hemoglobin 10, 8.8 on admit  :Worsening thrombocytopenia, baseline >100   :Smear: microcytosis, suggestive of component of iron deficiency, schistocytes not increased   :, fibrinogen 461, haptoglobin 193, ALCIRA neg, tbili 1.1, direct bili 0.2, ferritin 63  -Continue to trend CBC  -Goal hgb >7, platelets >10, >50 if bleeding   -Iron studies     #CAD  -Aspirin 81 mg daily      #GERD  -Pantoprazole 40 mg daily     #Peripheral neuropathy   -Gabapentin 800 mg PO TID     #MDD  -Venlafaxine 150 mg PO daily      Misc:   -C/w calcium, vit  D  -Mg, Kcl 20 meq bid  -Nightly melatonin   -Loperamide for chronic diarrhea (monitor qtc)   -IV tylenol 1g bid     Outpatient:   -PCP for imaging of nodules  -Lymph node biopsy f/up       Fluids: prn  Electrolytes: prn, k>4 mg>2 phos>3  Nutrition: regular   Access: pIV    GI ppx: pantoprazole 40 mg   DVT ppx: lovenox   Bowel care: miralax  Abx: Vanc  O2: 4L O2 NC     Code Status: Full  NOK: Diana Vera (Sister)[phone]  ---  Marianne Mcdowell MD  PGY1, Internal Medicine          [1] acetaminophen, 1,000 mg, intravenous, BID  aspirin, 81 mg, oral, Daily  calcium carbonate-vitamin D3, 2 tablet, oral, BID  ergocalciferol, 1.25 mg, oral, Every Sunday  [Held by provider] furosemide, 40 mg, oral, Daily  gabapentin, 800 mg, oral, TID  hydroxychloroquine, 200 mg, oral, BID  macitentan, 10 mg, oral, Daily  magnesium chloride, 64 mg, oral, BID  melatonin, 5 mg, oral, Nightly  pantoprazole, 40 mg, oral, Daily before breakfast  potassium chloride CR, 20 mEq, oral, BID  vancomycin, 1,250 mg, intravenous, q12h  venlafaxine XR, 150 mg, oral, Daily  [2] epoprostenol, 67 ng/kg/min (Order-Specific), Last Rate: 67 ng/kg/min (04/16/25 1010)  heparin, 0-4,500 Units/hr, Last Rate: 1,500 Units/hr (04/16/25 1025)  [3] PRN medications: albuterol, heparin, loperamide, oxygen, vancomycin

## 2025-04-16 NOTE — SIGNIFICANT EVENT
ID update and sign off    The bacteria in the patient's blood culture has speciated as a bacillus species.  This is a skin contaminant.  Therefore, we recommend that the patient be considered as having a negative culture for this date.  She can proceed with placement of new line as per the primary team.    Her blood cultures will be considered to be negative as of April 14.  She will thus need to be continued on IV vancomycin until April 28.    We have arranged for ID follow-up for her with Isabelle Weber on May 19.  Well on IV vancomycin, please check a weekly CBC, CRP, creatinine, and vancomycin trough and have them faxed to Isabelle at [phone].    ID is signing off this patient.  Please do not hesitate to contact me with any questions or concerns.  I am available via epic chat.

## 2025-04-16 NOTE — PROGRESS NOTES
Ronna Beckham is a 64 y.o. female on day 7 of admission presenting with Chest wall mass.  ID is following for MRSA bacteremia in the setting of a line infection.    Subjective   Interval History: Patient feels and looks much better today.  She is complaining of some itching and irritation around the site where her Montez line was removed.  I do note she has 1 blood culture that is showing gram variable bacteria.  I suspect this will be corynebacterium and a likely skin contaminant.  However, we will follow-up on these.  Patient is on vancomycin.            Objective   Range of Vitals (last 24 hours)  Heart Rate:  []   Temp:  [36 °C (96.8 °F)-36.4 °C (97.5 °F)]   Resp:  [18-24]   BP: (101-132)/(62-83)   Weight:  [75.3 kg (166 lb 0.1 oz)]   SpO2:  [88 %-93 %]   Daily Weight  04/16/25 : 75.3 kg (166 lb 0.1 oz)    Body mass index is 27.62 kg/m².    Physical Exam  Conjunctiva are pink.  She has no icterus.  Her oropharynx is clear.  Cardiac exam is regular rate and rhythm, S1-S2, and 1 out of 6 systolic ejection murmur.  She has diffuse crackles and wheezes in her lungs throughout.  Her abdomen is soft, nontender, nondistended, bowel sounds are present.    Antibiotics  vancomycin - 1250 mg/250 mL    Relevant Results  Labs  Results from last 72 hours   Lab Units 04/16/25  1101 04/16/25  0419 04/16/25  0039 04/15/25  0403 04/14/25  1430   WBC AUTO x10*3/uL 9.4 8.9 9.9 9.2 8.8   HEMOGLOBIN g/dL 7.7* 7.0* 7.5* 7.8* 8.1*   HEMATOCRIT % 27.7* 26.0* 26.5* 26.6* 26.6*   PLATELETS AUTO x10*3/uL 73* 79* 77* 63* 57*   LYMPHO PCT MAN %  --  8.5  --  4.3 7.0   MONO PCT MAN %  --  2.6  --  2.5 0.9   EOSINO PCT MAN %  --  0.0  --  0.0 0.0     Results from last 72 hours   Lab Units 04/16/25  0419 04/15/25  0403 04/14/25  0458   SODIUM mmol/L 136 141 135*   POTASSIUM mmol/L 4.4 3.2* 3.9   CHLORIDE mmol/L 106 103 99   CO2 mmol/L 18* 24 25   BUN mg/dL 11 12 14   CREATININE mg/dL 0.64 0.43* 0.85   GLUCOSE mg/dL 271* 110* 199*    CALCIUM mg/dL 7.1* 8.1* 7.9*   ANION GAP mmol/L 16 17 15   EGFR mL/min/1.73m*2 >90 >90 78   PHOSPHORUS mg/dL 2.7 4.4 4.4     Results from last 72 hours   Lab Units 04/16/25  0419 04/15/25  0403 04/14/25  0458   ALK PHOS U/L 100 121 128   BILIRUBIN TOTAL mg/dL 0.4 0.6 0.6   BILIRUBIN DIRECT mg/dL 0.1 0.1 0.2   PROTEIN TOTAL g/dL 5.1* 5.7* 5.6*   ALT U/L 6* 8 11   AST U/L 10 9 10   ALBUMIN g/dL 2.5* 3.0* 2.8*     Estimated Creatinine Clearance: 92.5 mL/min (by C-G formula based on SCr of 0.64 mg/dL).  C-Reactive Protein   Date Value Ref Range Status   04/23/2024 0.12 <1.00 mg/dL Final     CRP   Date Value Ref Range Status   03/09/2022 0.46 mg/dL Final     Comment:     REF VALUE  < 1.00     08/14/2020 0.9 0 - 2.0 MG/DL Final     Comment:     Performed at [address] [address]     Microbiology  Susceptibility data from last 14 days.  Collected Specimen Info Organism Clindamycin Erythromycin Oxacillin Tetracycline Trimethoprim/Sulfamethoxazole Vancomycin   04/13/25 Blood culture from Peripheral Venipuncture Staphylococcus aureus         04/12/25 Blood culture from Peripheral Venipuncture Staphylococcus aureus         04/12/25 Blood culture from Peripheral Venipuncture Methicillin Resistant Staphylococcus aureus (MRSA)  S  I  R  S  S  S          Assessment/Plan   64-year-old female with MRSA bacteremia in the setting of a Montez line infection.  Patient is improved.  New positive blood culture represents a likely contaminant, although we are awaiting final identification of this bacteria.  Would not  at this point based on that blood culture.    Recommendations:  1.  Continue vancomycin.  2.  We will follow-up on the blood culture with further recommendations to be made based on its identification.     I spent 15 minutes in the professional and overall care of this patient.      Linda Ardon MD PhD

## 2025-04-17 ENCOUNTER — APPOINTMENT (OUTPATIENT)
Dept: CARDIOLOGY | Facility: HOSPITAL | Age: 65
End: 2025-04-17
Payer: COMMERCIAL

## 2025-04-17 DIAGNOSIS — R78.81 BACTEREMIA: ICD-10-CM

## 2025-04-17 DIAGNOSIS — R78.81 MRSA BACTEREMIA: ICD-10-CM

## 2025-04-17 DIAGNOSIS — B95.62 MRSA BACTEREMIA: ICD-10-CM

## 2025-04-17 DIAGNOSIS — T82.514D HICKMAN CATHETER DYSFUNCTION, SUBSEQUENT ENCOUNTER: ICD-10-CM

## 2025-04-17 PROBLEM — T80.211A BLOODSTREAM INFECTION ASSOCIATED WITH CENTRAL VENOUS CATHETER: Status: ACTIVE | Noted: 2025-04-16

## 2025-04-17 PROBLEM — R76.8: Status: ACTIVE | Noted: 2025-04-17

## 2025-04-17 PROBLEM — Z87.898 HISTORY OF SUBSTANCE USE DISORDER: Chronic | Status: ACTIVE | Noted: 2025-04-17

## 2025-04-17 PROBLEM — D50.9 CHRONIC IRON DEFICIENCY ANEMIA: Status: ACTIVE | Noted: 2025-04-17

## 2025-04-17 LAB
ABO GROUP (TYPE) IN BLOOD: NORMAL
ALBUMIN SERPL BCP-MCNC: 2.8 G/DL (ref 3.4–5)
ALP SERPL-CCNC: 125 U/L (ref 33–136)
ALT SERPL W P-5'-P-CCNC: 8 U/L (ref 7–45)
ANION GAP SERPL CALC-SCNC: 13 MMOL/L (ref 10–20)
ANTIBODY SCREEN: NORMAL
APTT PPP: 47 SECONDS (ref 26–36)
AST SERPL W P-5'-P-CCNC: 12 U/L (ref 9–39)
ATRIAL RATE: 119 BPM
BACTERIA BLD AEROBE CULT: ABNORMAL
BACTERIA BLD CULT: ABNORMAL
BACTERIA BLD CULT: NORMAL
BASOPHILS # BLD MANUAL: 0 X10*3/UL (ref 0–0.1)
BASOPHILS NFR BLD MANUAL: 0 %
BILIRUB DIRECT SERPL-MCNC: 0.1 MG/DL (ref 0–0.3)
BILIRUB SERPL-MCNC: 0.5 MG/DL (ref 0–1.2)
BLOOD EXPIRATION DATE: NORMAL
BUN SERPL-MCNC: 11 MG/DL (ref 6–23)
C DIF TOX TCDA+TCDB STL QL NAA+PROBE: NOT DETECTED
CALCIUM SERPL-MCNC: 8.2 MG/DL (ref 8.6–10.6)
CHLORIDE SERPL-SCNC: 106 MMOL/L (ref 98–107)
CO2 SERPL-SCNC: 25 MMOL/L (ref 21–32)
CREAT SERPL-MCNC: 0.49 MG/DL (ref 0.5–1.05)
DISPENSE STATUS: NORMAL
EGFRCR SERPLBLD CKD-EPI 2021: >90 ML/MIN/1.73M*2
EOSINOPHIL # BLD MANUAL: 0.08 X10*3/UL (ref 0–0.7)
EOSINOPHIL NFR BLD MANUAL: 0.8 %
ERYTHROCYTE [DISTWIDTH] IN BLOOD BY AUTOMATED COUNT: 18.4 % (ref 11.5–14.5)
GLUCOSE SERPL-MCNC: 91 MG/DL (ref 74–99)
GRAM STN SPEC: ABNORMAL
HCT VFR BLD AUTO: 26.5 % (ref 36–46)
HGB BLD-MCNC: 7.4 G/DL (ref 12–16)
IMM GRANULOCYTES # BLD AUTO: 2.51 X10*3/UL (ref 0–0.7)
IMM GRANULOCYTES NFR BLD AUTO: 25.3 % (ref 0–0.9)
INR PPP: 1.1 (ref 0.9–1.1)
LYMPHOCYTES # BLD MANUAL: 0.8 X10*3/UL (ref 1.2–4.8)
LYMPHOCYTES NFR BLD MANUAL: 8.1 %
MAGNESIUM SERPL-MCNC: 1.77 MG/DL (ref 1.6–2.4)
MCH RBC QN AUTO: 22.1 PG (ref 26–34)
MCHC RBC AUTO-ENTMCNC: 27.9 G/DL (ref 32–36)
MCV RBC AUTO: 79 FL (ref 80–100)
METAMYELOCYTES # BLD MANUAL: 0.16 X10*3/UL
METAMYELOCYTES NFR BLD MANUAL: 1.6 %
MONOCYTES # BLD MANUAL: 0.25 X10*3/UL (ref 0.1–1)
MONOCYTES NFR BLD MANUAL: 2.5 %
MYELOCYTES # BLD MANUAL: 1.13 X10*3/UL
MYELOCYTES NFR BLD MANUAL: 11.4 %
NEUTS SEG # BLD MANUAL: 7 X10*3/UL (ref 1.2–7)
NEUTS SEG NFR BLD MANUAL: 70.7 %
NRBC BLD-RTO: 3.1 /100 WBCS (ref 0–0)
P OFFSET: 175 MS
P ONSET: 117 MS
PHOSPHATE SERPL-MCNC: 3.5 MG/DL (ref 2.5–4.9)
PLATELET # BLD AUTO: 80 X10*3/UL (ref 150–450)
POTASSIUM SERPL-SCNC: 5 MMOL/L (ref 3.5–5.3)
PR INTERVAL: 176 MS
PRODUCT BLOOD TYPE: 6200
PRODUCT CODE: NORMAL
PROMYELOCYTES # BLD MANUAL: 0.24 X10*3/UL
PROMYELOCYTES NFR BLD MANUAL: 2.4 %
PROT SERPL-MCNC: 5.7 G/DL (ref 6.4–8.2)
PROTHROMBIN TIME: 12.5 SECONDS (ref 9.8–12.4)
Q ONSET: 205 MS
QRS COUNT: 19 BEATS
QRS DURATION: 112 MS
QT INTERVAL: 410 MS
QTC CALCULATION(BAZETT): 576 MS
QTC FREDERICIA: 515 MS
R AXIS: 149 DEGREES
RBC # BLD AUTO: 3.35 X10*6/UL (ref 4–5.2)
RBC MORPH BLD: ABNORMAL
RH FACTOR (ANTIGEN D): NORMAL
SODIUM SERPL-SCNC: 139 MMOL/L (ref 136–145)
T AXIS: -51 DEGREES
T OFFSET: 410 MS
TOTAL CELLS COUNTED BLD: 123
UFH PPP CHRO-ACNC: 0.3 IU/ML (ref ?–1.1)
UNIT ABO: NORMAL
UNIT NUMBER: NORMAL
UNIT RH: NORMAL
UNIT VOLUME: 350
VANCOMYCIN SERPL-MCNC: 15.8 UG/ML (ref 5–20)
VARIANT LYMPHS # BLD MANUAL: 0.25 X10*3/UL (ref 0–0.5)
VARIANT LYMPHS NFR BLD: 2.5 %
VENTRICULAR RATE: 119 BPM
WBC # BLD AUTO: 9.9 X10*3/UL (ref 4.4–11.3)
XM INTEP: NORMAL

## 2025-04-17 PROCEDURE — 80202 ASSAY OF VANCOMYCIN: CPT | Performed by: INTERNAL MEDICINE

## 2025-04-17 PROCEDURE — 2500000004 HC RX 250 GENERAL PHARMACY W/ HCPCS (ALT 636 FOR OP/ED): Mod: JZ,SE

## 2025-04-17 PROCEDURE — 2500000001 HC RX 250 WO HCPCS SELF ADMINISTERED DRUGS (ALT 637 FOR MEDICARE OP): Mod: SE

## 2025-04-17 PROCEDURE — 83735 ASSAY OF MAGNESIUM: CPT

## 2025-04-17 PROCEDURE — 36415 COLL VENOUS BLD VENIPUNCTURE: CPT

## 2025-04-17 PROCEDURE — 93010 ELECTROCARDIOGRAM REPORT: CPT | Performed by: INTERNAL MEDICINE

## 2025-04-17 PROCEDURE — 86901 BLOOD TYPING SEROLOGIC RH(D): CPT

## 2025-04-17 PROCEDURE — 2500000004 HC RX 250 GENERAL PHARMACY W/ HCPCS (ALT 636 FOR OP/ED): Mod: SE | Performed by: STUDENT IN AN ORGANIZED HEALTH CARE EDUCATION/TRAINING PROGRAM

## 2025-04-17 PROCEDURE — 84100 ASSAY OF PHOSPHORUS: CPT

## 2025-04-17 PROCEDURE — 97530 THERAPEUTIC ACTIVITIES: CPT | Mod: GP,CQ

## 2025-04-17 PROCEDURE — 93005 ELECTROCARDIOGRAM TRACING: CPT

## 2025-04-17 PROCEDURE — 87493 C DIFF AMPLIFIED PROBE: CPT

## 2025-04-17 PROCEDURE — 99233 SBSQ HOSP IP/OBS HIGH 50: CPT

## 2025-04-17 PROCEDURE — 02HV33Z INSERTION OF INFUSION DEVICE INTO SUPERIOR VENA CAVA, PERCUTANEOUS APPROACH: ICD-10-PCS | Performed by: STUDENT IN AN ORGANIZED HEALTH CARE EDUCATION/TRAINING PROGRAM

## 2025-04-17 PROCEDURE — 86923 COMPATIBILITY TEST ELECTRIC: CPT

## 2025-04-17 PROCEDURE — 85520 HEPARIN ASSAY: CPT

## 2025-04-17 PROCEDURE — 85007 BL SMEAR W/DIFF WBC COUNT: CPT

## 2025-04-17 PROCEDURE — 36430 TRANSFUSION BLD/BLD COMPNT: CPT

## 2025-04-17 PROCEDURE — P9016 RBC LEUKOCYTES REDUCED: HCPCS

## 2025-04-17 PROCEDURE — 85027 COMPLETE CBC AUTOMATED: CPT

## 2025-04-17 PROCEDURE — 80048 BASIC METABOLIC PNL TOTAL CA: CPT

## 2025-04-17 PROCEDURE — 85610 PROTHROMBIN TIME: CPT

## 2025-04-17 PROCEDURE — 84075 ASSAY ALKALINE PHOSPHATASE: CPT

## 2025-04-17 PROCEDURE — 2500000004 HC RX 250 GENERAL PHARMACY W/ HCPCS (ALT 636 FOR OP/ED): Mod: SE

## 2025-04-17 PROCEDURE — 99254 IP/OBS CNSLTJ NEW/EST MOD 60: CPT

## 2025-04-17 PROCEDURE — 1200000002 HC GENERAL ROOM WITH TELEMETRY DAILY

## 2025-04-17 RX ORDER — MAGNESIUM SULFATE HEPTAHYDRATE 40 MG/ML
2 INJECTION, SOLUTION INTRAVENOUS ONCE
Status: COMPLETED | OUTPATIENT
Start: 2025-04-17 | End: 2025-04-17

## 2025-04-17 RX ORDER — FUROSEMIDE 10 MG/ML
40 INJECTION INTRAMUSCULAR; INTRAVENOUS ONCE
Status: COMPLETED | OUTPATIENT
Start: 2025-04-17 | End: 2025-04-17

## 2025-04-17 RX ORDER — LIDOCAINE HYDROCHLORIDE 10 MG/ML
5 INJECTION, SOLUTION INFILTRATION; PERINEURAL ONCE
Status: CANCELLED | OUTPATIENT
Start: 2025-04-17 | End: 2025-04-17

## 2025-04-17 RX ORDER — LINEZOLID 600 MG/1
600 TABLET, FILM COATED ORAL EVERY 12 HOURS SCHEDULED
Status: DISCONTINUED | OUTPATIENT
Start: 2025-04-17 | End: 2025-04-18

## 2025-04-17 RX ORDER — LINEZOLID 600 MG/1
600 TABLET, FILM COATED ORAL EVERY 12 HOURS SCHEDULED
Status: CANCELLED | OUTPATIENT
Start: 2025-04-17

## 2025-04-17 RX ADMIN — HYDROXYCHLOROQUINE SULFATE 200 MG: 200 TABLET, FILM COATED ORAL at 08:07

## 2025-04-17 RX ADMIN — GABAPENTIN 800 MG: 400 CAPSULE ORAL at 08:07

## 2025-04-17 RX ADMIN — EPOPROSTENOL 67 NG/KG/MIN: 1.5 INJECTION, POWDER, LYOPHILIZED, FOR SOLUTION INTRAVENOUS at 09:42

## 2025-04-17 RX ADMIN — LINEZOLID 600 MG: 600 TABLET, FILM COATED ORAL at 15:56

## 2025-04-17 RX ADMIN — HEPARIN SODIUM 1500 UNITS/HR: 10000 INJECTION, SOLUTION INTRAVENOUS at 17:43

## 2025-04-17 RX ADMIN — ACETAMINOPHEN 1000 MG: 10 INJECTION INTRAVENOUS at 08:08

## 2025-04-17 RX ADMIN — Medication 64 MG: at 08:07

## 2025-04-17 RX ADMIN — GABAPENTIN 800 MG: 400 CAPSULE ORAL at 20:36

## 2025-04-17 RX ADMIN — LOPERAMIDE HYDROCHLORIDE 2 MG: 2 CAPSULE ORAL at 08:25

## 2025-04-17 RX ADMIN — VANCOMYCIN HYDROCHLORIDE 1250 MG: 1.25 INJECTION, POWDER, LYOPHILIZED, FOR SOLUTION INTRAVENOUS at 04:55

## 2025-04-17 RX ADMIN — FUROSEMIDE 40 MG: 10 INJECTION, SOLUTION INTRAMUSCULAR; INTRAVENOUS at 19:16

## 2025-04-17 RX ADMIN — Medication 5 MG: at 20:37

## 2025-04-17 RX ADMIN — TRIMETHOBENZAMIDE HYDROCHLORIDE 200 MG: 100 INJECTION INTRAMUSCULAR at 20:23

## 2025-04-17 RX ADMIN — Medication 2 TABLET: at 08:06

## 2025-04-17 RX ADMIN — HEPARIN SODIUM 1500 UNITS/HR: 10000 INJECTION, SOLUTION INTRAVENOUS at 00:28

## 2025-04-17 RX ADMIN — MACITENTAN 10 MG: 10 TABLET, FILM COATED ORAL at 08:07

## 2025-04-17 RX ADMIN — MAGNESIUM SULFATE HEPTAHYDRATE 2 G: 40 INJECTION, SOLUTION INTRAVENOUS at 08:24

## 2025-04-17 RX ADMIN — ACETAMINOPHEN 1000 MG: 10 INJECTION INTRAVENOUS at 20:28

## 2025-04-17 RX ADMIN — LOPERAMIDE HYDROCHLORIDE 2 MG: 2 CAPSULE ORAL at 17:36

## 2025-04-17 RX ADMIN — Medication 2 TABLET: at 20:36

## 2025-04-17 RX ADMIN — PANTOPRAZOLE SODIUM 40 MG: 40 TABLET, DELAYED RELEASE ORAL at 06:15

## 2025-04-17 RX ADMIN — Medication 64 MG: at 20:36

## 2025-04-17 RX ADMIN — EPOPROSTENOL 67 NG/KG/MIN: 1.5 INJECTION, POWDER, LYOPHILIZED, FOR SOLUTION INTRAVENOUS at 22:06

## 2025-04-17 RX ADMIN — GABAPENTIN 800 MG: 400 CAPSULE ORAL at 15:55

## 2025-04-17 RX ADMIN — VENLAFAXINE HYDROCHLORIDE 150 MG: 150 CAPSULE, EXTENDED RELEASE ORAL at 08:08

## 2025-04-17 RX ADMIN — ASPIRIN 81 MG: 81 TABLET, COATED ORAL at 08:06

## 2025-04-17 ASSESSMENT — COGNITIVE AND FUNCTIONAL STATUS - GENERAL
CLIMB 3 TO 5 STEPS WITH RAILING: A LITTLE
CLIMB 3 TO 5 STEPS WITH RAILING: A LITTLE
MOVING TO AND FROM BED TO CHAIR: A LITTLE
STANDING UP FROM CHAIR USING ARMS: A LITTLE
MOVING FROM LYING ON BACK TO SITTING ON SIDE OF FLAT BED WITH BEDRAILS: A LITTLE
MOBILITY SCORE: 23
WALKING IN HOSPITAL ROOM: A LITTLE
CLIMB 3 TO 5 STEPS WITH RAILING: A LITTLE
MOBILITY SCORE: 18
MOBILITY SCORE: 23
DAILY ACTIVITIY SCORE: 24
TURNING FROM BACK TO SIDE WHILE IN FLAT BAD: A LITTLE
DAILY ACTIVITIY SCORE: 24

## 2025-04-17 ASSESSMENT — PAIN - FUNCTIONAL ASSESSMENT: PAIN_FUNCTIONAL_ASSESSMENT: 0-10

## 2025-04-17 ASSESSMENT — PAIN SCALES - GENERAL
PAINLEVEL_OUTOF10: 6
PAINLEVEL_OUTOF10: 0 - NO PAIN

## 2025-04-17 NOTE — PROGRESS NOTES
"Occupational Therapy                 Therapy Communication Note    Patient Name: Ronna Beckham  MRN: [id number]  Department: [facility] [address]  Room: [address] [place], [place] [address]  Today's Date: 4/17/2025     Discipline: Occupational Therapy    OT Missed Visit: Yes     Missed Visit Reason: Missed Visit Reason:  (1417 - Pt refuses 2/2 fatigue and reporting \"I have had a busy day\" Will follow up as able)    Missed Time: Attempt    Comment:  "

## 2025-04-17 NOTE — SIGNIFICANT EVENT
Rapid Response Nurse Note: RADAR alert: 8    Pager time: [phone]  Arrival time: 1300  Event end time: 1315  Location: T5  [] Triage by phone or secure messaging    Rapid response initiated by:  [] Rapid response RN [] Family [] Nursing Supervisor [] Physician   [x] RADAR auto page [] Sepsis auto-page [] RN [] RT   [] NP/PA [] Other:     Primary reason for call:   [] BAT [] New CPAP/BiPAP [] Bleeding [] Change in mental status   [] Chest pain [] Code blue [] FiO2 >/= 50% [] HR </= 40 bpm   [] HR >/= 130 bpm [] Hyperglycemia [] Hypoglycemia [x] RADAR    [] RR </= 8 bpm [] RR >/= 30 bpm [] SBP </= 90 mmHg [] SpO2 < 90%   [] Seizure [] Sepsis [] Shortness of breath  [] Staff concern: see comments     Initial VS and/or RADAR VS: T 36.4 °C; HR 98; RR 23; /68; SPO2 92%.    Providers present at bedside (if applicable):     Name of ICU Provider contacted (if applicable):     Interventions:  [x] None [] ABG/VBG [] Assist w/ICU transfer [] BAT paged    [] Bag mask [] Blood [] Cardioversion [] Code Blue   [] Code blue for intubation [] Code status changed [] Chest x-ray [] EKG   [] IV fluid/bolus [] KUB x-ray [] Labs/cultures [] Medication   [] Nebulizer treatment [] NIPPV (CPAP/BiPAP) [] Oxygen [] Oral airway   [] Peripheral IV [] Palliative care consult [] CT/MRI [] Sepsis protocol    [] Suctioned [] Other:     Outcome:  [] Coded and  [] Code blue for intubation [] Coded and transferred to ICU []  on division   [x] Remained on division (no change)tele [] Remained on division + additional monitoring [] Remained in ED [] Transferred to ED   [] Transferred to ICU [] Transferred to inpatient status [] Transferred for interventions (procedure) [] Transferred to ICU stepdown    [] Transferred to surgery [] Transferred to telemetry [] Sepsis protocol [] STEMI protocol   [] Stroke protocol [x] Bedside nurse instructed to page rapid response for any concerns or acute change in condition/VS     Additional Comments:  RADAR alert- score of 8. Upon arrival pt resting with no complaints. Reviewed the VS with her bedside nurse and no immediate concerns. Pt on 6L HF NC and pulse ox 94% at this time. RR even and non labored.  No interventions needed at this time with RRT.

## 2025-04-17 NOTE — PROGRESS NOTES
Vancomycin Dosing by Pharmacy- Cessation of Therapy    Consult to pharmacy for vancomycin dosing has been discontinued by the prescriber, pharmacy will sign off at this time.    Please call pharmacy if there are further questions or re-enter a consult if vancomycin is resumed.     Rupinder Solis, [facility]

## 2025-04-17 NOTE — SIGNIFICANT EVENT
Infectious disease update    Contacted by primary team to see if the use of an oral medication would be possible given the patient's need for continuous infusion of medication for her pulmonary hypertension.    Patient had MRSA bacteremia and we had originally recommended vancomycin until April 28.  Given the complexities with access, it would be reasonable to complete her course of therapy with oral linezolid.    The dose of oral linezolid to be 600 mg by mouth twice a day.  While on this medication the patient will need to have a weekly CBC checked those results can be faxed to Isabelle Weber at [phone].

## 2025-04-17 NOTE — CONSULTS
Rheumatology New Consult Note      Patient Ronna Beckham PCP Jordyn Schrader, MICHEL-CNP    MRN [id number]  Admission Date 4/9/2025      Reason for Consult:  Evaluation of prior SLE diagnosis     Subjective    Subjective   History of Presenting Illness  Ms. Ronna Beckham is a 64 y.o. female with a past medical history of group 1 PAH, chronic respiratory failure on home oxygen, atrial fibrillation s/p ablation 3/2025, substance use disorder, previously diagnosed SLE who presented to the ED on 4/9 with bleeding and admitted for hematoma at site of Montez. Her course has been complicated by tachyarrhythmias, acute hypoxic respiratory failure requiring MICU transfer, and MRSA bacteremia secondary to Montez s/p line removal. Rheumatology consulted for to re-evaluate patient's prior SLE diagnosis.     Patient reports that she was diagnosed with lupus in early 2021. She does not remember exactly why it was tested and states she was told it was found incidentally.  She was started on hydroxychloroquine 200 mg twice daily at that time and she has continued to get it through her PCP. Does not note subjective changes since being on plaquenil. Has never seen rheumatology.  Regarding review of symptoms, patient denies any patchy hair loss, dry eyes, redness of the eyes, pain in the eyes, blurry vision, sores in the mouth, chest pain, abdominal pain, diarrhea, photosensitivity, rashes, Raynaud's, history of miscarriages, sinus issues.  Denies any joint swelling or morning stiffness. Does have chronic neuropathy. Notes prior issues with nosebleeds, occurs rarely <1x per month but lasts 1-2 days. Quit smoking 10 years ago. Quit alcohol use, cocaine use, methamphetamine use about 1 year ago.    ROS:  Per HPI    Medical History[1]     RX Allergies[2]     Objective   Objective     Patient Vitals for the past 24 hrs:   BP Temp Temp src Pulse Resp SpO2 Weight   04/16/25 2022 98/56 36.2 °C (97.2 °F) Temporal 97 18 92 % --   04/16/25  1546 119/70 36.2 °C (97.2 °F) Temporal 103 18 92 % --   04/16/25 1057 101/62 36 °C (96.8 °F) -- 98 18 92 % --   04/16/25 0747 119/68 36.2 °C (97.2 °F) -- 94 18 92 % --   04/16/25 0410 116/68 36.1 °C (97 °F) -- 103 20 91 % 75.3 kg (166 lb 0.1 oz)   04/15/25 2325 132/83 36.3 °C (97.3 °F) -- 98 22 91 % --        PHYSICAL EXAM:  General - NAD, pleasant, AAOx3  Head: Normocephalic, atraumatic  Eyes - PERRLA, EOMI. No conjunctiva injection.   Mouth/ENT - Moist oral and nasal mucosa. No facial rash.   Skin - No rashes or ulcers. No erythema on bilateral cheeks.  Extremities - No edema  Neurological - Alert and oriented x3,  grossly intact. No focal deficit.    Musculoskeletal  Shoulders: Full ROM, without pain, no swelling, warmth or tenderness.  Elbows: Full ROM, without pain, no swelling, warmth or tenderness.  Wrists/Hands: Full ROM, without pain, no swelling, warmth or tenderness.  Hips: Full ROM.    Knees:  Full ROM, without pain, no swelling, warmth or point tenderness.   Ankles: Full ROM, without pain, swelling, warmth or tenderness.  Toes: No swelling, warmth or tenderness. Metatarsal squeeze negative     LABS:  Results from last 7 days   Lab Units 04/16/25  1101 04/16/25  0419 04/16/25  0039 04/15/25  0403 04/14/25  1430   WBC AUTO x10*3/uL 9.4 8.9 9.9 9.2 8.8   RBC AUTO x10*6/uL 3.56* 3.25* 3.47* 3.68* 3.73*   HEMOGLOBIN g/dL 7.7* 7.0* 7.5* 7.8* 8.1*   MCV fL 78* 80 76* 72* 71*     Results from last 7 days   Lab Units 04/16/25  0419 04/15/25  0403 04/14/25  0458 04/13/25  0405 04/12/25  2207   SODIUM mmol/L 136 141 135* 132* 132*   POTASSIUM mmol/L 4.4 3.2* 3.9 3.7 3.8   CHLORIDE mmol/L 106 103 99 98 97*   CO2 mmol/L 18* 24 25 23 22   BUN mg/dL 11 12 14 14 15   CREATININE mg/dL 0.64 0.43* 0.85 0.75 0.75   EGFR mL/min/1.73m*2 >90 >90 78 90 90   CALCIUM mg/dL 7.1* 8.1* 7.9* 7.5* 7.5*   PHOSPHORUS mg/dL 2.7 4.4 4.4 4.6 4.2   MAGNESIUM mg/dL 1.46* 1.96 2.01 2.01 2.13   ALBUMIN g/dL 2.5* 3.0* 2.8* 2.8* 3.1*  "  BILIRUBIN TOTAL mg/dL 0.4 0.6 0.6 1.4* 1.3*   ALT U/L 6* 8 11 16 19   AST U/L 10 9 10 13 15   ALK PHOS U/L 100 121 128 137* 146*   PROTEIN TOTAL g/dL 5.1* 5.7* 5.6* 5.5* 5.8*     Lab Results   Component Value Date    CRP 0.12 04/23/2024    CRP 0.46 03/09/2022    CRP 0.9 08/14/2020     Lab Results   Component Value Date    SEDRATE 15 04/23/2024    SEDRATE 5 03/09/2022    SEDRATE 7 08/14/2020     Lab Results   Component Value Date    C3 122 04/14/2025    C4 35 04/14/2025     No results found for: \"RF\", \"CITAB\"  Lab Results   Component Value Date    ANATITER 1:1280 04/10/2025    ARNP <0.2 04/12/2025    ASMRN <0.2 04/12/2025    ASSA 0.2 04/12/2025    ASSB <0.2 04/12/2025    ASCL <0.2 04/12/2025    JO1 <0.2 04/12/2025    ACHR <0.2 04/12/2025    ACEN <0.2 04/12/2025    RIBPP <0.2 09/21/2020    DNADS 1.0 04/12/2025    ANCPA p-ANCA (A) 04/12/2025    ANCTI 1:160 (H) 04/12/2025    PR3 2 04/12/2025    MPO 62 (H) 04/12/2025     Lab Results   Component Value Date    URICACID 7.3 (H) 09/29/2020       Lab Results   Component Value Date    COLORFL Yellow 03/10/2022    CLARITYFLUID Clear 03/10/2022    WBCFL 814 03/10/2022    NEUTROBFREL 4 03/10/2022    LYMPHSBFREL 46 03/10/2022    RBCFL 1,000 03/10/2022       IMAGING:  CXR 4/15/2025:  Slight improvement of edematous changes. There remains trace left pleural effusion with bibasilar atelectasis    Bilateral UE duplex 4/15/2025:  Right Upper Venous: Negative for acute DVT of the visualized vessels. The brachial vein is only visuailzed in segments. The right proximal subclavian is not visualized.  Left Upper Venous: No evidence of acute deep vein thrombus visualized in the left upper extremity. There are chronic changes visualized in the mid cephalic and distal cephalic veins.    CT angio chest for PE 4/13/2025:  1. No evidence of acute pulmonary embolism to segmental level. Please note that, assessment of subsegmental branches is limited and small peripheral emboli are not entirely " excluded.  2. Interval development of trace/small left pleural effusion. Left  basilar consolidative opacity. Additional new consolidative opacity in the posterior right middle lobe and areas of ground-glass opacity in the right lower lobe. Findings are likely atelectatic, however infectious process should be excluded in appropriate clinical setting.  3. Severe enlargement of the right ventricle and right atrium with dilated main pulmonary artery in keeping with patient's known  pulmonary artery hypertension.  4. There is suggestion of a defect in the base of interventricular  septum (VSD). Correlate with echocardiogram and consider cardiac MRI for shunt assessment. Correlate with concern for Eisenmenger syndrome.  5. Peribronchovascular and centrilobular ground-glass halo in  bilateral lungs, likely sequela of elevated pulmonary artery  pressure/pulmonary artery hypertension.  6. Liver appears enlarged, partially imaged. Findings might be due to hepatic congestion/congestive hepatopathy secondary to right heart dysfunction.    CT angio chest 4/9/2025:  1.  Right-sided subclavian central venous line is identified with the distal tip extending to the cavoatrial junction.  Mild focal soft tissue swelling along surrounding the distal aspect of the central venous line within the upper chest wall suggestive for small hematoma. Correlate clinically.  2.  No evidence of central pulmonary embolus.  3.  Multiple small irregular bilateral pulmonary nodules are  reidentified with several new additional small pulmonary nodules as described above.  Findings may be infectious, inflammatory or neoplastic in etiology.  Correlate clinically.  Short-term follow-up. CT imaging recommended to assess for stability or resolution.  4.  Nodular thickening of the left adrenal gland.  Nonspecific,  possibly reflecting underlying adrenal nodule or mass..  Dedicated follow-up CT or MRI of the abdomen utilizing an adrenal mass protocol can be  performed for further evaluation.    Assessment    Assessment & Plan   Ms. Ronna Beckham is a 64 y.o. female with a past medical history of group 1 PAH (suspected 2/2 methamphetamine use), chronic respiratory failure on home oxygen, atrial fibrillation s/p ablation 3/2025, substance use disorder, previously diagnosed SLE who presented to the ED on 4/9 with bleeding and admitted for hematoma at site of Montez. Her course has been complicated by tachyarrhythmias, acute hypoxic respiratory failure requiring MICU transfer, and MRSA bacteremia secondary to Montez s/p line removal. Rheumatology consulted for to re-evaluate patient's prior SLE diagnosis.     Was diagnosed with SLE in 2021 and has been on hydroxychloroquine 200 mg BID since. Unclear symptoms that led to the diagnosis and unable to see notes from that time. Per various notes did seem to have joint pain but patient does not recall this. Does not remember any changes subjectively after starting plaquenil. Reviewed detailed ROS with the patient, which is largely negative. Per chart review, has had +JAREK since 2020 with negative dsDNA and RIOS panel. No prior testing of complements in system.    Lab review:   - Normal CMP, longstanding anemia and thrombocytopenia which has worsened over the past 2 months. Autoimmune labs with positive JAREK since 2020, last titer 1:1280 with negative RIOS panel. Normal complements. ANCA panel checked this admission with p-ANCA 1:160, MPO 62, normal PR3. Normal UPCR this admission.    #SLE diagnosis  #Positive JAREK  - Recommend discontinuing hydroxychloroquine as prior testing only with +JAREK with negative rheumatologic ROS. Unable to see prior notes to see how diagnosis was originally made. Based on chart review, does not fulfill clinical criteria and suspicion is low at this time  - Follow up scheduled with patient in 3 months to re-evaluate symptoms while off of plaquenil    #Positive p-ANCA 1:160  #Positive MPO 62  - No  vasculitic rashes, proteinuria, hemoptysis or concern for DAH  - Normal complements  - Abnormal lab findings likely in setting of prior cocaine use, low clinical suspicion for active vasculitis    The rest per the primary team.    Thank you for the consult. Rheumatology will sign off    Case seen and discussed with Dr. Willson  Signature: Liza Juarez,   Date: April 16, 2025          [1]   Past Medical History:  Diagnosis Date    Anxiety     Depression     Does not refill medications appropriately 09/10/2024    GERD (gastroesophageal reflux disease)     Lupus     Personal history of other specified conditions 09/21/2020    History of seizure    Pulmonary hypertension (Multi)     Volume overload 09/10/2024   [2]   Allergies  Allergen Reactions    Levetiracetam Other     Weakness, unable to walk

## 2025-04-17 NOTE — PROGRESS NOTES
"  Internal Medicine Progress Note     Ronna Beckham is a 64 y.o. female with a past medical history of Pulmonary Arterial Hypertension Group 1, Atrial Fibrillation s/p Ablation (03/2025), Systemic Lupus Erythematosus admitted on 4/9/2025 initially presented to ED for hematoma at site of Montez. Course c/b ongoing tachyarrhythmias (atach or aflutter), ultimately attributed to severe pulmonary disease, bacteremia. She developed acute on chronic hypoxic respiratory failure (4L->Airvo) requiring transfer to MICU. Found to have MRSA bacteremia 2/2 Montez, line removed by IR. Currently on Vancomycin, monitoring bcx for clearance, TTE with no vegetations and would be high risk for LUIS. Epo currently running via dedicated pIV.     Subjective     Pt seen and evaluated at bedside.    At time of eval, pt notes abdominal swelling and ALAMO and overall feels fatigued.     Otherwise denies any new sx or worsening SOB at rest. Notes she only occasionally experiences ALAMO. Otherwise denies fever/chills, dizziness/lightheadedness, worsening LE edema.         Medications     Medications:   Scheduled medications  Scheduled Medications[1]  Continuous medications  Continuous Medications[2]  PRN medications  PRN Medications[3]     Objective     Vitals  Visit Vitals  /71 (BP Location: Left arm, Patient Position: Lying)   Pulse 90   Temp 36.6 °C (97.9 °F) (Temporal)   Resp 18   Ht 1.651 m (5' 5\")   Wt 72.1 kg (158 lb 15.2 oz)   SpO2 93%   BMI 26.45 kg/m²   OB Status Hysterectomy   Smoking Status Former   BSA 1.82 m²       Intake/Output Summary (Last 24 hours) at 4/17/2025 0824  Last data filed at 4/17/2025 0625  Gross per 24 hour   Intake 572.19 ml   Output 600 ml   Net -27.81 ml         Physical Exam  Vitals and nursing note reviewed.   Constitutional:       General: She is awake. She is not in acute distress.     Appearance: She is ill-appearing. She is not toxic-appearing.      Interventions: Nasal cannula in place.      " Comments: On 5-6L O2 via NC, satting 92-94%  Appears fatigued    HENT:      Head: Normocephalic and atraumatic.   Cardiovascular:      Rate and Rhythm: Tachycardia present. Rhythm irregular.      Pulses: Normal pulses.           Radial pulses are 2+ on the right side and 2+ on the left side.      Heart sounds: Murmur heard.      Systolic murmur is present.      Comments: Borderline tachycardic, HR high 90s-low 100s  Pulmonary:      Effort: Pulmonary effort is normal. No respiratory distress or retractions.      Breath sounds: Normal breath sounds. No wheezing.   Musculoskeletal:      Right lower leg: No edema.      Left lower leg: No edema.   Skin:     General: Skin is warm and dry.   Neurological:      Mental Status: She is alert.   Psychiatric:         Behavior: Behavior is cooperative.          Labs  Lab Results   Component Value Date    WBC 9.9 04/17/2025    HGB 7.4 (L) 04/17/2025    HCT 26.5 (L) 04/17/2025    MCV 79 (L) 04/17/2025    PLT 80 (L) 04/17/2025      Lab Results   Component Value Date    GLUCOSE 91 04/17/2025    CALCIUM 8.2 (L) 04/17/2025     04/17/2025    K 5.0 04/17/2025    CO2 25 04/17/2025     04/17/2025    BUN 11 04/17/2025    CREATININE 0.49 (L) 04/17/2025      Lab Results   Component Value Date    ALT 8 04/17/2025    AST 12 04/17/2025     (H) 07/23/2019    ALKPHOS 125 04/17/2025    BILITOT 0.5 04/17/2025        Imaging  === 04/09/25 ===    XR CHEST 1 VIEW    - Impression -  Slight improvement of edematous changes. There remains trace left  pleural effusion with bibasilar atelectasis.    I personally reviewed the images/study and I agree with the findings  as stated by Bret Canales MD. This study was interpreted at  [facility], [place], [place].    MACRO:  None    Signed by: Brett Cox 4/16/2025 8:30 AM  Dictation workstation:   [id number]   === 04/09/25 ===    CT ANGIO CHEST FOR PULMONARY EMBOLISM    - Impression -  1. No evidence of  acute pulmonary embolism to segmental level. Please  note that, assessment of subsegmental branches is limited and small  peripheral emboli are not entirely excluded.  2. Interval development of trace/small left pleural effusion. Left  basilar consolidative opacity. Additional new consolidative opacity  in the posterior right middle lobe and areas of ground-glass opacity  in the right lower lobe. Findings are likely atelectatic, however  infectious process should be excluded in appropriate clinical setting.  3. Severe enlargement of the right ventricle and right atrium with  dilated main pulmonary artery in keeping with patient's known  pulmonary artery hypertension.  4. There is suggestion of a defect in the base of interventricular  septum (VSD). Correlate with echocardiogram and consider cardiac MRI  for shunt assessment. Correlate with concern for Eisenmenger syndrome.  5. Peribronchovascular and centrilobular ground-glass halo in  bilateral lungs, likely sequela of elevated pulmonary artery  pressure/pulmonary artery hypertension.  6. Liver appears enlarged, partially imaged. Findings might be due to  hepatic congestion/congestive hepatopathy secondary to right heart  dysfunction.      I personally reviewed the images/study and I agree with the findings  as stated by Paras Gabriel MD. This study was interpreted at  [facility], [place], [place].    MACRO:  Critical Finding:  See findings. Notification was initiated on  4/13/2025 at 12:30 pm by  Janice Salvador.  (**-YCF-**)  Instructions:    Signed by: Janice Salvador 4/13/2025 12:36 PM  Dictation workstation:   [id number]         Assessment/Plan     Ronna Beckham is a 64 y.o. female with hx of PAH (on Epo), A-fib s/p ablation (03/2025) on Eliquis, SLE (questionable diagnosis) initially presented to ED for hematoma at site of Montez.   :: Vascular surgery felt pseudoaneurysm/contained thrombus unlikely. No acute  intervention per IR.   :: Course c/b ongoing tachyarrhythmias (A-tach or A-flutter), ultimately attributed to severe pulmonary disease, bacteremia. She developed acute on chronic hypoxic respiratory failure (4L->Airvo) requiring transfer to MICU. Found to have MRSA bacteremia 2/2 Hendrickson, line removed by IR. Currently on Vancomycin > switching to Linezolid monitoring bcx for clearance, TTE with no vegetations and would be high risk for LUIS (deferred LUIS). Epo currently running via PIV.       Updates 04/17/25:   -Rheum c/s placed to eval SLE dx, appreciate recs-dc'd Plaquenil d/t low suspicion for   -IR c/s placed for Hendrickson replacement  -Per ID switched Vanc -> Linezolid to 600mg BID to complete course til 4/28. Bcx considered clear from 4/14, and can place Hendrickson when indicated   -1U pRBC ordered -> will diurese with 40mg IV lasix     #PAH, group I  #Acute on chronic hypoxic respiratory failure, resolving  :: Home meds: macitentan 10 mg PO daily, continuous epoprostenol infusion via right subclavian hendrickson, monthly sotatercept injections  :: Follows outpatient with Dr. Prescott   :: On 4L NC at baseline at rest and 6-7L with activity; was requiring airvo 50L/80%, back on 4L NC   :: CT 4/12 (-) for PE  :: Negative bubble study on TTE 4/14   Plan:   -Continue epoprostenol via PIV (infusion should NOT be stopped for any reason), formulation was changed to be administered at increased rate and mitigate venous backflow   -Continue macitenan 10 mg PO daily   -Ok to place new line 48 hrs from first -ve bcx per ID, will need IR consult  -In the meantime maintain additional peripheral access solely for administration of epoprostenol   -Continuous pulse ox monitoring   -IR c/s placed for replacement of Hendrickson dedicated for Epoprostanil       #MRSA bacteremia I/s/o Hendrickson line infection   ::Cx 4/12 + MRSA , bcx 4/13 1/2 staph, 4/14 no growth so far, 4/15 no growth so far  :: RVP -ve on 4/12  :: Procal 4/12 0.58, 4/13  0.74  :: Hendrickson recently replaced 3/2025 for line associated SSTI- presented with purulent drainage from hendrickson, briefly on IV abx then completed 10 days of doxycycline  :Current hendrickson removed 4/13 by IR  :TTE 4/14 showed no evidence of valvular vegetations  -Continue IV abx as per ID recs   pip-tazo 4/12 - 4/13      vancomycin 4/12 - 4/17  cefazolin 4/13 - 4/14  Starting Linezolid 600mg BID 4/17-** (to be completed 4/28 from (-) Bcx 4/14)  -No LUIS done given risk of procedure and undergoing anesthesia given pt's RV function   -Will defer PICC line as pt already to be getting Hendrickson line for for   -Weekly CBCs on discharge      #Superficial hematoma, eschar R anterior chest wall s/p hendrickson placement   :CTA showing small hematoma with no active extravasation   :Low c/f pseudoaneurysm formation with thrombus per vascular sx and IR recommended conservative mgmt  :Given her h/o substance use disorder consider drug-induced vasculitis 2/2 methamphetamine vs toxic exposure of levamisole or xylazine   ::Further studies to rule out vasculitis component warranted  ::Urine drug screen -ve  ::UE DVT US 4/15: negative for acute DVT, chronic changes in mid/distal cephalic veins   Plan:   -Maintain overlying dressing   -Daily CBCs to eval worsening bleed     #A-fib s/p ablation 3/2025  #Tachyarrhythmia (Atach or atypical Aflutter 2:1)  :h/o SVT iso significant pulmonary hypertension, narrow QRS up to 200 bpm unresponsive to adenosine and highly suspicious for 1:1 atrial flutter  :on diltiazem 180 mg PO daily and apixaban   -Telemetry   -Target HR <120   -Cardiology recs - avoid BB/CCB, dig unlikely to help, if necessary then consider IV amio (although risk of chemical cardioversion), if difficulty controlling rates consider DCCV if she can tolerate AC  -Heparin gtt started on 4/14  -Holding apixiban   -Holding diltiazem   -At discharge, will need ziopatch and EP follow up  -Given pt's intolerance of chemical AC and risk  associated with LUIS cardioversion can consider CT watchman at later time as less invasive option     #Chest pain  :Per MICU team, endorsing CP on 4/15. ECG stable from prior (in media tab)  -Troponin 49, continue to trend     #Systemic lupus erythematosus(?)   :Currently managed by her pcp, although can not directly find records in the chart  :Currently on plaquenil testing  :Denies any clinical symptoms of active flare including joint pain, muscle pain  :Per EMR she had +ve anti-nuclear panel with a 1:1280 titer, however the anti-dsDNA was 4.0 IU/mL which is -ve; never had a +ve anti-dsDNA; unusual to be started on plaquenil for this  -F/up repeat autoimmune workup, if this is not lupus, then would consider discontinuing plaquenil  -Continue hydroxychloroquine 200 mg PO BID for now  -Rheum c/s placed, appreciate recs     #Acute on chronic anemia, microcytic  #Acute on chronic thrombocytopenia  :Baseline hemoglobin 10, 8.8 on admit  :Worsening thrombocytopenia, baseline >100   :Smear: microcytosis, suggestive of component of iron deficiency, schistocytes not increased   :, fibrinogen 461, haptoglobin 193, ALCIRA neg, tbili 1.1, direct bili 0.2, ferritin 63  -Continue to trend CBC  -Goal hgb >7, platelets >10, >50 if bleeding   -Iron studies     #CAD  -Aspirin 81 mg daily      #GERD  -Pantoprazole 40 mg daily     #Peripheral neuropathy   -Gabapentin 800 mg PO TID     #MDD  -Venlafaxine 150 mg PO daily      Misc:   -C/w calcium, vit D  -Mg, Kcl 20 meq bid  -Nightly melatonin   -Loperamide for chronic diarrhea (monitor qtc)   -IV tylenol 1g bid     Outpatient:   -PCP for imaging of nodules  -Lymph node biopsy f/up       Fluids: prn  Electrolytes: prn, k>4 mg>2 phos>3  Nutrition: regular   Access: pIV    GI ppx: pantoprazole 40 mg   DVT ppx: lovenox   Bowel care: miralax  Abx: Vanc  O2: 4L O2 NC at rest (5-6L)     Code Status: Full  NOK: Diana JYOTHI Vera (Sister)[phone]  ---  Marianne Mcdowell MD  PGY1, Internal  Medicine          [1] acetaminophen, 1,000 mg, intravenous, BID  aspirin, 81 mg, oral, Daily  calcium carbonate-vitamin D3, 2 tablet, oral, BID  ergocalciferol, 1.25 mg, oral, Every Sunday  [Held by provider] furosemide, 40 mg, oral, Daily  gabapentin, 800 mg, oral, TID  hydroxychloroquine, 200 mg, oral, BID  macitentan, 10 mg, oral, Daily  magnesium chloride, 64 mg, oral, BID  magnesium sulfate, 2 g, intravenous, Once  melatonin, 5 mg, oral, Nightly  pantoprazole, 40 mg, oral, Daily before breakfast  potassium chloride CR, 20 mEq, oral, BID  vancomycin, 1,250 mg, intravenous, q12h  venlafaxine XR, 150 mg, oral, Daily     [2] epoprostenol, 67 ng/kg/min (Order-Specific), Last Rate: 67 ng/kg/min (04/16/25 2234)  heparin, 0-4,500 Units/hr, Last Rate: 1,500 Units/hr (04/17/25 0028)     [3] PRN medications: albuterol, heparin, loperamide, oxygen, vancomycin

## 2025-04-17 NOTE — CARE PLAN
The patient's goals for the shift include  verbalizng adequate comfort level throughout shift.    The clinical goals for the shift include Patient to sat > 90% on HFNC throughout shift.    Over the shift, the patient did make progress toward the following goals.       Problem: Skin  Goal: Participates in plan/prevention/treatment measures  Outcome: Progressing  Goal: Prevent/manage excess moisture  Outcome: Progressing     Problem: Fall/Injury  Goal: Not fall by end of shift  Outcome: Progressing  Goal: Be free from injury by end of the shift  Outcome: Progressing  Goal: Verbalize understanding of personal risk factors for fall in the hospital  Outcome: Progressing     Problem: Pain - Adult  Goal: Verbalizes/displays adequate comfort level or baseline comfort level  Outcome: Progressing

## 2025-04-17 NOTE — PROGRESS NOTES
"Physical Therapy    Physical Therapy Treatment    Patient Name: Ronna Beckham  MRN: [id number]  Department: [facility] [id number]  Room: [address] [place] [address]  Today's Date: 4/17/2025  Time Calculation  Start Time: 1213  Stop Time: 1232  Time Calculation (min): 19 min         Assessment/Plan   PT Assessment  PT Assessment Results: Decreased strength, Decreased endurance, Impaired balance, Decreased mobility  Rehab Prognosis: Excellent  Barriers to Discharge Home: No anticipated barriers  Evaluation/Treatment Tolerance: Patient tolerated treatment well (limited by generally wanting to rest but agreeable to demo functional mobility)  Medical Staff Made Aware: Yes  End of Session Communication: Bedside nurse, Care Coordinator  Assessment Comment: Pt generally wanting to rest, but demos good functional mobility with ability to amb apt distances without AD. Discussed pt progress with supervising PT, in agreement to change D/C rec to LOW  End of Session Patient Position: Bed, 3 rail up, Alarm off, not on at start of session     PT Plan  Treatment/Interventions: Bed mobility, Gait training, Transfer training, Balance training, Strengthening, Endurance training, Range of motion, Therapeutic exercise, Therapeutic activity  PT Plan: Ongoing PT  PT Frequency: 3 times per week  PT Discharge Recommendations: Low intensity level of continued care  PT Recommended Transfer Status: Assist x1  PT - OK to Discharge: Yes      General Visit Information:   PT  Visit  PT Received On: 04/17/25  Response to Previous Treatment: Patient with no complaints from previous session.  General  Prior to Session Communication: Bedside nurse  Patient Position Received: Bed, 3 rail up, Alarm off, not on at start of session  General Comment: Pt supine in bed, pleasant. Initially declining therapy due to having \"a rough morning\" but agreeable with min encouragement  Per handoff with RN, pt is appropriate for therapy, vitals are stable and pain is controlled. Other concerns " prior to tx are: none    Subjective   Precautions:  Precautions  Medical Precautions: Fall precautions, Oxygen therapy device and L/min (8L O2 via HF)     Date/Time Vitals Session Patient Position Pulse Resp SpO2 BP MAP (mmHg)    04/17/25 1213 During PT  --  102  --  92 %  --  --     04/17/25 1233 --  --  98  23  92 %  110/68  82      Objective   Pain:  Pain Assessment  Pain Assessment:  (Pt reports headache, does not rate)  Cognition:  Cognition  Overall Cognitive Status: Within Functional Limits  Orientation Level: Oriented X4    Treatments:     Therapeutic Activity  Therapeutic Activity Performed: Yes  Therapeutic Activity 1: Time required for skilled line management    Bed Mobility  Bed Mobility: Yes  Bed Mobility 1  Bed Mobility 1: Supine to sitting, Sitting to supine  Level of Assistance 1: Distant supervision    Ambulation/Gait Training  Ambulation/Gait Training Performed: Yes  Ambulation/Gait Training 1  Surface 1: Level tile  Device 1: No device  Assistance 1: Close supervision  Comments/Distance (ft) 1: 30'x1  Transfers  Transfer: Yes  Transfer 1  Transfer From 1: Sit to, Stand to  Transfer to 1: Sit  Technique 1: Sit to stand, Stand to sit  Transfer Level of Assistance 1: Close supervision    Outcome Measures:     [facility] Basic Mobility  Turning from your back to your side while in a flat bed without using bedrails: A little  Moving from lying on your back to sitting on the side of a flat bed without using bedrails: A little  Moving to and from bed to chair (including a wheelchair): A little  Standing up from a chair using your arms (e.g. wheelchair or bedside chair): A little  To walk in hospital room: A little  Climbing 3-5 steps with railing: A little  Basic Mobility - Total Score: 18    Education Documentation  Precautions, taught by Keila Guerrero PTA at 4/17/2025  1:19 PM.  Learner: Patient  Readiness: Acceptance  Method: Explanation, Demonstration  Response: Verbalizes Understanding, Demonstrated  Understanding  Comment: precautions, safe mobility    Body Mechanics, taught by Keila Guerrero PTA at 4/17/2025  1:19 PM.  Learner: Patient  Readiness: Acceptance  Method: Explanation, Demonstration  Response: Verbalizes Understanding, Demonstrated Understanding  Comment: precautions, safe mobility    Mobility Training, taught by Keila Guerrero PTA at 4/17/2025  1:19 PM.  Learner: Patient  Readiness: Acceptance  Method: Explanation, Demonstration  Response: Verbalizes Understanding, Demonstrated Understanding  Comment: precautions, safe mobility    Education Comments  No comments found.        OP EDUCATION:       Encounter Problems       Encounter Problems (Active)       PT Problem       Patient will complete supine to sit and sit to supine Independent  (Progressing)       Start:  04/14/25    Expected End:  04/28/25            Patient will perform sit<>stand transfer with LRAD, and Modified Independent  (Progressing)       Start:  04/14/25    Expected End:  04/28/25            Patient will ambulate >50' with LRAD and Modified Independent  (Progressing)       Start:  04/14/25    Expected End:  04/28/25               Pain - Adult            EDA Degroot

## 2025-04-17 NOTE — DOCUMENTATION CLARIFICATION NOTE
"    PATIENT:               CARMEN MADRIGAL  ACCT #:                  [id number]  MRN:                       [id number]  :                       1960  ADMIT DATE:       2025 1:20 PM  DISCH DATE:  RESPONDING PROVIDER #:        [id number]          PROVIDER RESPONSE TEXT:    Montez pump site bleed due to or enhanced by Eliquis and Epoprostenol    CDI QUERY TEXT:    Clarification        Instruction:    Based on your assessment of the patient and the clinical information, please provide the requested documentation by clicking on the appropriate radio button and enter any additional information if prompted.    Question: Please clarify if a relationship exists between    When answering this query, please exercise your independent professional judgment. The fact that a question is being asked, does not imply that any particular answer is desired or expected.    The patient's clinical indicators include:  Clinical Information: PT is a 65 y/o female with pmh of Afib on Eliquis. Came to the ED with Hx of Bleeding from wound above the insertion site of Montez line.    Clinical Indicators:    ED TRIAGE : \"presents to ED for bleeding on her R upper chest. Pt has a Montez pump, that appears to be successfully delivering medication, but pt states that she had a blood blister form above her insertion site for her Montez that popped this morning after she scratched it and pt has not stopped bleeding since. PT states she is on Elaquis S/p an ablation for afib.\"    ED PN : \"She developed a \"blood blister\" above the insertion site that she has been picking at.  She states that she has had intermittent oozing from the blister for the past week. She was seen at [facility] ED on 3/31/2025 and had a figure-of-eight stitch placed.  Patient states that the site continued to ooze in spite of being stitched. ... Coagulation screening is elevated as anticipated given patient's anticoagulation with Eliquis.\"    PULM CONSULT 4/10: \" " "secondary group 1 pulmonary hypertension (on Macitentan, continuous Epoprostenol infusion via L internal jugular Montez) ... Oozing from suture line while on Apixaban and prostacyclin analogue. - She had stable HGB and bleeding already stopped by local measures. - Last Apix dose was 9AM 4/9/2025. - She received IV Tranexamic Acid and thrombin in the ED. Plan: - STAT CBC - Send for hemolysis work up and send for Fibrinogen. - Hold Apixaban as discussed with Cards attending by the NP, See notes.  - CTM the site - Local measures for bleeding. ( Topical TNX acid and Thrombin)\"    Treatment: CBC, IV Tranexamic Acid and thrombin, Hemolysis work up and Fibrinogen.    Risk Factors: Afib, Taking Eliquis, Prostacyclin analogue, Montez Pump  Options provided:  -- Montez pump site bleed due to or enhanced by Eliquis and Epoprostenol  -- Montez pump site bleed not related to Eliquis and Epoprostenol  -- Other - I will add my own diagnosis  -- Refer to Clinical Documentation Reviewer    Query created by: Gemini Ferreira on 4/17/2025 12:17 PM      Electronically signed by:  PACHECO CA MD 4/17/2025 12:25 PM          "

## 2025-04-17 NOTE — PROGRESS NOTES
Vancomycin Dosing by Pharmacy- FOLLOW UP    Ronna Beckham is a 64 y.o. year old female who Pharmacy has been consulted for vancomycin dosing for other Bacteremia . Based on the patient's indication and renal status this patient is being dosed based on a goal AUC of 500-600.     Renal function is currently stable.    Current vancomycin dose: 1250 mg given every 24 hours    Estimated vancomycin AUC on current dose: 527 mg/L.hr     Visit Vitals  /68 (BP Location: Left arm, Patient Position: Lying)   Pulse 92   Temp 36 °C (96.8 °F) (Temporal)   Resp 17        Lab Results   Component Value Date    CREATININE 0.49 (L) 2025    CREATININE 0.64 2025    CREATININE 0.43 (L) 04/15/2025    CREATININE 0.85 2025    CREATININE 0.86 2025        Patient weight is as follows:   Vitals:    25 0300   Weight: 72.1 kg (158 lb 15.2 oz)       Cultures:  Susceptibility data for the encounter in last 14 days.  Collected Specimen Info Organism Clindamycin Erythromycin Oxacillin Tetracycline Trimethoprim/Sulfamethoxazole Vancomycin   04/15/25 Blood culture from Peripheral Venipuncture Bacillus species, not Bacillus anthracis         25 Blood culture from Peripheral Venipuncture Staphylococcus aureus         25 Blood culture from Peripheral Venipuncture Staphylococcus aureus         25 Blood culture from Peripheral Venipuncture Methicillin Resistant Staphylococcus aureus (MRSA)  S  I  R  S  S  S        I/O last 3 completed shifts:  In: 1769.6 (23.5 mL/kg) [P.O.:396.3; I.V.:453.3 (6 mL/kg); IV Piggyback:920]  Out: 500 (6.6 mL/kg) [Urine:500 (0.2 mL/kg/hr)]  Weight: 75.3 kg   I/O during current shift:  I/O this shift:  In: 542.6 [I.V.:542.6]  Out: 200 [Urine:200]    Temp (24hrs), Av.2 °C (97.1 °F), Min:36 °C (96.8 °F), Max:36.2 °C (97.2 °F)      Assessment/Plan    Within goal AUC range. Continue current vancomycin regimen.    This dosing regimen is predicted by [employer] to result in the  following pharmacokinetic parameters:    Regimen: 1250 mg IV every 12 hours.  Start time: 16:55 on 04/17/2025  Exposure target: AUC24 (range)400-600 mg/L.hr   KPD85-00: 529 mg/L.hr  AUC24,ss: 527 mg/L.hr  Probability of AUC24 > 400: 100 %  Ctrough,ss: 14.1 mg/L  Probability of Ctrough,ss > 20: 0 %      The next level will be obtained on 4/20 at am lab draw. May be obtained sooner if clinically indicated.   Will continue to monitor renal function daily while on vancomycin and order serum creatinine at least every 48 hours if not already ordered.  Follow for continued vancomycin needs, clinical response, and signs/symptoms of toxicity.       Kathleen SnyderD

## 2025-04-17 NOTE — CARE PLAN
Problem: Skin  Goal: Decreased wound size/increased tissue granulation at next dressing change  Outcome: Progressing     Problem: Fall/Injury  Goal: Be free from injury by end of the shift  Outcome: Progressing     Pt was safe and stable this shift. Reports having diarrhea, C.diff sample sent, negative. Imodium given. Heparin gtt therapeutic at 15mL/hr, nursing communication order to turn off at 0300 for IR. NPO at midnight. 1uPRBC given for hgb of 7.4. 40mg IVP lasix ordered to be given after blood completion.

## 2025-04-18 ENCOUNTER — APPOINTMENT (OUTPATIENT)
Dept: RADIOLOGY | Facility: HOSPITAL | Age: 65
End: 2025-04-18
Payer: COMMERCIAL

## 2025-04-18 ENCOUNTER — APPOINTMENT (OUTPATIENT)
Dept: CARDIOLOGY | Facility: HOSPITAL | Age: 65
End: 2025-04-18
Payer: COMMERCIAL

## 2025-04-18 DIAGNOSIS — R78.81 BACTEREMIA: ICD-10-CM

## 2025-04-18 DIAGNOSIS — B95.62 MRSA BACTEREMIA: ICD-10-CM

## 2025-04-18 DIAGNOSIS — R78.81 MRSA BACTEREMIA: ICD-10-CM

## 2025-04-18 DIAGNOSIS — T82.514D HICKMAN CATHETER DYSFUNCTION, SUBSEQUENT ENCOUNTER: ICD-10-CM

## 2025-04-18 LAB
ALBUMIN SERPL BCP-MCNC: 3 G/DL (ref 3.4–5)
ALP SERPL-CCNC: 121 U/L (ref 33–136)
ALT SERPL W P-5'-P-CCNC: 7 U/L (ref 7–45)
ANION GAP SERPL CALC-SCNC: 12 MMOL/L (ref 10–20)
APTT PPP: 28 SECONDS (ref 26–36)
AST SERPL W P-5'-P-CCNC: 12 U/L (ref 9–39)
ATRIAL RATE: 100 BPM
ATRIAL RATE: 117 BPM
ATRIAL RATE: 123 BPM
BACTERIA BLD CULT: NORMAL
BACTERIA BLD CULT: NORMAL
BASOPHILS # BLD MANUAL: 0 X10*3/UL (ref 0–0.1)
BASOPHILS NFR BLD MANUAL: 0 %
BILIRUB DIRECT SERPL-MCNC: 0 MG/DL (ref 0–0.3)
BILIRUB SERPL-MCNC: 0.6 MG/DL (ref 0–1.2)
BUN SERPL-MCNC: 10 MG/DL (ref 6–23)
CALCIUM SERPL-MCNC: 8.4 MG/DL (ref 8.6–10.6)
CHLORIDE SERPL-SCNC: 105 MMOL/L (ref 98–107)
CO2 SERPL-SCNC: 25 MMOL/L (ref 21–32)
CREAT SERPL-MCNC: 0.54 MG/DL (ref 0.5–1.05)
EGFRCR SERPLBLD CKD-EPI 2021: >90 ML/MIN/1.73M*2
EOSINOPHIL # BLD MANUAL: 0 X10*3/UL (ref 0–0.7)
EOSINOPHIL NFR BLD MANUAL: 0 %
ERYTHROCYTE [DISTWIDTH] IN BLOOD BY AUTOMATED COUNT: 18.8 % (ref 11.5–14.5)
ERYTHROCYTE [DISTWIDTH] IN BLOOD BY AUTOMATED COUNT: 18.9 % (ref 11.5–14.5)
GLUCOSE SERPL-MCNC: 94 MG/DL (ref 74–99)
HCT VFR BLD AUTO: 30.8 % (ref 36–46)
HCT VFR BLD AUTO: 30.9 % (ref 36–46)
HGB BLD-MCNC: 8.7 G/DL (ref 12–16)
HGB BLD-MCNC: 8.7 G/DL (ref 12–16)
HOLD SPECIMEN: NORMAL
IMM GRANULOCYTES # BLD AUTO: 2.91 X10*3/UL (ref 0–0.7)
IMM GRANULOCYTES NFR BLD AUTO: 25.1 % (ref 0–0.9)
INR PPP: 1.1 (ref 0.9–1.1)
LYMPHOCYTES # BLD MANUAL: 1.08 X10*3/UL (ref 1.2–4.8)
LYMPHOCYTES NFR BLD MANUAL: 9.3 %
MAGNESIUM SERPL-MCNC: 1.74 MG/DL (ref 1.6–2.4)
MCH RBC QN AUTO: 22.4 PG (ref 26–34)
MCH RBC QN AUTO: 22.4 PG (ref 26–34)
MCHC RBC AUTO-ENTMCNC: 28.2 G/DL (ref 32–36)
MCHC RBC AUTO-ENTMCNC: 28.2 G/DL (ref 32–36)
MCV RBC AUTO: 79 FL (ref 80–100)
MCV RBC AUTO: 80 FL (ref 80–100)
METAMYELOCYTES # BLD MANUAL: 0.49 X10*3/UL
METAMYELOCYTES NFR BLD MANUAL: 4.2 %
MONOCYTES # BLD MANUAL: 0.2 X10*3/UL (ref 0.1–1)
MONOCYTES NFR BLD MANUAL: 1.7 %
MYELOCYTES # BLD MANUAL: 1.08 X10*3/UL
MYELOCYTES NFR BLD MANUAL: 9.3 %
NEUTROPHILS # BLD MANUAL: 8.65 X10*3/UL (ref 1.2–7.7)
NEUTS BAND # BLD MANUAL: 0.39 X10*3/UL (ref 0–0.7)
NEUTS BAND NFR BLD MANUAL: 3.4 %
NEUTS SEG # BLD MANUAL: 8.26 X10*3/UL (ref 1.2–7)
NEUTS SEG NFR BLD MANUAL: 71.2 %
NRBC BLD-RTO: 2.5 /100 WBCS (ref 0–0)
NRBC BLD-RTO: 3.9 /100 WBCS (ref 0–0)
P OFFSET: 181 MS
P OFFSET: 184 MS
P OFFSET: 186 MS
P ONSET: 119 MS
P ONSET: 120 MS
P ONSET: 126 MS
PHOSPHATE SERPL-MCNC: 4.9 MG/DL (ref 2.5–4.9)
PLATELET # BLD AUTO: 66 X10*3/UL (ref 150–450)
PLATELET # BLD AUTO: 67 X10*3/UL (ref 150–450)
POLYCHROMASIA BLD QL SMEAR: ABNORMAL
POTASSIUM SERPL-SCNC: 4.3 MMOL/L (ref 3.5–5.3)
PR INTERVAL: 194 MS
PR INTERVAL: 198 MS
PR INTERVAL: 210 MS
PROMYELOCYTES # BLD MANUAL: 0.1 X10*3/UL
PROMYELOCYTES NFR BLD MANUAL: 0.9 %
PROT SERPL-MCNC: 6.1 G/DL (ref 6.4–8.2)
PROTHROMBIN TIME: 12.6 SECONDS (ref 9.8–12.4)
Q ONSET: 219 MS
Q ONSET: 223 MS
Q ONSET: 224 MS
QRS COUNT: 17 BEATS
QRS COUNT: 19 BEATS
QRS COUNT: 20 BEATS
QRS DURATION: 80 MS
QRS DURATION: 80 MS
QRS DURATION: 98 MS
QT INTERVAL: 290 MS
QT INTERVAL: 346 MS
QT INTERVAL: 374 MS
QTC CALCULATION(BAZETT): 404 MS
QTC CALCULATION(BAZETT): 446 MS
QTC CALCULATION(BAZETT): 535 MS
QTC FREDERICIA: 362 MS
QTC FREDERICIA: 410 MS
QTC FREDERICIA: 474 MS
R AXIS: 141 DEGREES
R AXIS: 145 DEGREES
R AXIS: 154 DEGREES
RBC # BLD AUTO: 3.88 X10*6/UL (ref 4–5.2)
RBC # BLD AUTO: 3.89 X10*6/UL (ref 4–5.2)
RBC MORPH BLD: ABNORMAL
SODIUM SERPL-SCNC: 138 MMOL/L (ref 136–145)
STFR SERPL-MCNC: 8.2 MG/L (ref 1.9–4.4)
T AXIS: 101 DEGREES
T AXIS: 36 DEGREES
T AXIS: 64 DEGREES
T OFFSET: 368 MS
T OFFSET: 397 MS
T OFFSET: 406 MS
TOTAL CELLS COUNTED BLD: 118
UFH PPP CHRO-ACNC: 0.6 IU/ML (ref ?–1.1)
VENTRICULAR RATE: 100 BPM
VENTRICULAR RATE: 117 BPM
VENTRICULAR RATE: 123 BPM
WBC # BLD AUTO: 11.6 X10*3/UL (ref 4.4–11.3)
WBC # BLD AUTO: 13 X10*3/UL (ref 4.4–11.3)

## 2025-04-18 PROCEDURE — 2500000001 HC RX 250 WO HCPCS SELF ADMINISTERED DRUGS (ALT 637 FOR MEDICARE OP): Mod: SE

## 2025-04-18 PROCEDURE — 2500000004 HC RX 250 GENERAL PHARMACY W/ HCPCS (ALT 636 FOR OP/ED): Mod: JZ,SE | Performed by: INTERNAL MEDICINE

## 2025-04-18 PROCEDURE — 85027 COMPLETE CBC AUTOMATED: CPT

## 2025-04-18 PROCEDURE — 77001 FLUOROGUIDE FOR VEIN DEVICE: CPT | Performed by: RADIOLOGY

## 2025-04-18 PROCEDURE — 36558 INSERT TUNNELED CV CATH: CPT | Performed by: RADIOLOGY

## 2025-04-18 PROCEDURE — 2720000007 HC OR 272 NO HCPCS

## 2025-04-18 PROCEDURE — 76937 US GUIDE VASCULAR ACCESS: CPT | Performed by: RADIOLOGY

## 2025-04-18 PROCEDURE — 84100 ASSAY OF PHOSPHORUS: CPT

## 2025-04-18 PROCEDURE — 7100000009 HC PHASE TWO TIME - INITIAL BASE CHARGE

## 2025-04-18 PROCEDURE — 2500000002 HC RX 250 W HCPCS SELF ADMINISTERED DRUGS (ALT 637 FOR MEDICARE OP, ALT 636 FOR OP/ED): Mod: SE

## 2025-04-18 PROCEDURE — 2500000004 HC RX 250 GENERAL PHARMACY W/ HCPCS (ALT 636 FOR OP/ED): Mod: JZ,SE

## 2025-04-18 PROCEDURE — 85007 BL SMEAR W/DIFF WBC COUNT: CPT

## 2025-04-18 PROCEDURE — 99232 SBSQ HOSP IP/OBS MODERATE 35: CPT

## 2025-04-18 PROCEDURE — 1200000002 HC GENERAL ROOM WITH TELEMETRY DAILY

## 2025-04-18 PROCEDURE — 93005 ELECTROCARDIOGRAM TRACING: CPT

## 2025-04-18 PROCEDURE — 71045 X-RAY EXAM CHEST 1 VIEW: CPT | Performed by: RADIOLOGY

## 2025-04-18 PROCEDURE — 71045 X-RAY EXAM CHEST 1 VIEW: CPT

## 2025-04-18 PROCEDURE — C1751 CATH, INF, PER/CENT/MIDLINE: HCPCS

## 2025-04-18 PROCEDURE — 85610 PROTHROMBIN TIME: CPT

## 2025-04-18 PROCEDURE — 7100000010 HC PHASE TWO TIME - EACH INCREMENTAL 1 MINUTE

## 2025-04-18 PROCEDURE — 80048 BASIC METABOLIC PNL TOTAL CA: CPT

## 2025-04-18 PROCEDURE — 85520 HEPARIN ASSAY: CPT

## 2025-04-18 PROCEDURE — 2500000004 HC RX 250 GENERAL PHARMACY W/ HCPCS (ALT 636 FOR OP/ED): Mod: SE | Performed by: STUDENT IN AN ORGANIZED HEALTH CARE EDUCATION/TRAINING PROGRAM

## 2025-04-18 PROCEDURE — 83735 ASSAY OF MAGNESIUM: CPT

## 2025-04-18 PROCEDURE — 36415 COLL VENOUS BLD VENIPUNCTURE: CPT

## 2025-04-18 RX ORDER — POTASSIUM CHLORIDE 20 MEQ/1
20 TABLET, EXTENDED RELEASE ORAL 2 TIMES DAILY
Status: CANCELLED | OUTPATIENT
Start: 2025-04-18

## 2025-04-18 RX ORDER — POTASSIUM CHLORIDE 20 MEQ/1
20 TABLET, EXTENDED RELEASE ORAL DAILY
Status: DISCONTINUED | OUTPATIENT
Start: 2025-04-19 | End: 2025-04-20 | Stop reason: HOSPADM

## 2025-04-18 RX ADMIN — Medication 64 MG: at 20:16

## 2025-04-18 RX ADMIN — MACITENTAN 10 MG: 10 TABLET, FILM COATED ORAL at 08:25

## 2025-04-18 RX ADMIN — VENLAFAXINE HYDROCHLORIDE 150 MG: 150 CAPSULE, EXTENDED RELEASE ORAL at 08:26

## 2025-04-18 RX ADMIN — ACETAMINOPHEN 1000 MG: 10 INJECTION INTRAVENOUS at 16:08

## 2025-04-18 RX ADMIN — DALBAVANCIN 1500 MG: 500 INJECTION, POWDER, FOR SOLUTION INTRAVENOUS at 14:09

## 2025-04-18 RX ADMIN — ACETAMINOPHEN 1000 MG: 10 INJECTION INTRAVENOUS at 08:25

## 2025-04-18 RX ADMIN — EPOPROSTENOL 67 NG/KG/MIN: 1.5 INJECTION, POWDER, LYOPHILIZED, FOR SOLUTION INTRAVENOUS at 23:11

## 2025-04-18 RX ADMIN — GABAPENTIN 800 MG: 400 CAPSULE ORAL at 20:16

## 2025-04-18 RX ADMIN — GABAPENTIN 800 MG: 400 CAPSULE ORAL at 08:26

## 2025-04-18 RX ADMIN — Medication 2 TABLET: at 08:26

## 2025-04-18 RX ADMIN — Medication 2 TABLET: at 20:16

## 2025-04-18 RX ADMIN — GABAPENTIN 800 MG: 400 CAPSULE ORAL at 14:48

## 2025-04-18 RX ADMIN — Medication 5 MG: at 20:16

## 2025-04-18 RX ADMIN — POTASSIUM CHLORIDE 20 MEQ: 1500 TABLET, EXTENDED RELEASE ORAL at 08:26

## 2025-04-18 RX ADMIN — Medication 64 MG: at 08:26

## 2025-04-18 RX ADMIN — ASPIRIN 81 MG: 81 TABLET, COATED ORAL at 08:27

## 2025-04-18 RX ADMIN — EPOPROSTENOL 67 NG/KG/MIN: 1.5 INJECTION, POWDER, LYOPHILIZED, FOR SOLUTION INTRAVENOUS at 10:47

## 2025-04-18 RX ADMIN — LOPERAMIDE HYDROCHLORIDE 2 MG: 2 CAPSULE ORAL at 08:35

## 2025-04-18 RX ADMIN — LINEZOLID 600 MG: 600 TABLET, FILM COATED ORAL at 08:27

## 2025-04-18 ASSESSMENT — PAIN - FUNCTIONAL ASSESSMENT
PAIN_FUNCTIONAL_ASSESSMENT: 0-10

## 2025-04-18 ASSESSMENT — COGNITIVE AND FUNCTIONAL STATUS - GENERAL
MOBILITY SCORE: 23
CLIMB 3 TO 5 STEPS WITH RAILING: A LITTLE
DAILY ACTIVITIY SCORE: 24

## 2025-04-18 ASSESSMENT — PAIN SCALES - GENERAL
PAINLEVEL_OUTOF10: 0 - NO PAIN

## 2025-04-18 NOTE — SIGNIFICANT EVENT
Rapid Response Nurse Note: RADAR alert: 7    Pager time: [phone]  Arrival time: 442  Event end time: 459  Location: T5-27  [x] Triage by phone or secure messaging    Rapid response initiated by:  [] Rapid response RN [] Family [] Nursing Supervisor [] Physician   [] RADAR auto page [] Sepsis auto-page [x] RN [] RT   [] NP/PA [] Other:     Primary reason for call:   [] BAT [] New CPAP/BiPAP [] Bleeding [] Change in mental status   [] Chest pain [] Code blue [] FiO2 >/= 50% [] HR </= 40 bpm   [] HR >/= 130 bpm [] Hyperglycemia [] Hypoglycemia [x] RADAR    [] RR </= 8 bpm [] RR >/= 30 bpm [] SBP </= 90 mmHg [] SpO2 < 90%   [] Seizure [] Sepsis [] Shortness of breath  [] Staff concern: see comments     Initial VS and/or RADAR VS: T 36 °C; HR 95; RR 20; /73; SPO2 90% on 6 lpm    Providers present at bedside (if applicable):     Name of ICU Provider contacted (if applicable):     Interventions:  [] None [] ABG/VBG [] Assist w/ICU transfer [] BAT paged    [] Bag mask [] Blood [] Cardioversion [] Code Blue   [] Code blue for intubation [] Code status changed [] Chest x-ray [] EKG   [] IV fluid/bolus [] KUB x-ray [] Labs/cultures [] Medication   [] Nebulizer treatment [] NIPPV (CPAP/BiPAP) [x] Oxygen [] Oral airway   [] Peripheral IV [] Palliative care consult [] CT/MRI [] Sepsis protocol    [] Suctioned [x] Other: RRT increased 02 to 8 lpm NC     Outcome:  [] Coded and  [] Code blue for intubation [] Coded and transferred to ICU []  on division   [x] Remained on division (no change) [] Remained on division + additional monitoring [] Remained in ED [] Transferred to ED   [] Transferred to ICU [] Transferred to inpatient status [] Transferred for interventions (procedure) [] Transferred to ICU stepdown    [] Transferred to surgery [] Transferred to telemetry [] Sepsis protocol [] STEMI protocol   [] Stroke protocol [x] Bedside nurse instructed to page rapid response for any concerns or acute change in  condition/VS     Additional Comments: Reviewed above RADAR VS with bedside RN. On recent CXR patient has increased congestion with increase in WBC.   Patient is on Linezolid.  Team will diurese during the day.  RRT at bedside increased O2 to 8 lpm NC with spo2 of 92%.  Per bedside RN the patient is in no distress.  No interventions by rapid response team indicated at this time.  Staff to page rapid response for any concerns or acute change in condition/VS.

## 2025-04-18 NOTE — PROCEDURES
Interventional Radiology Brief Postprocedure Note    Attending: Margie Montenegro MD    Assistant:   Staff Role   No Staff Documented       Diagnosis:   1. Chest wall mass        2. NSTEMI (non-ST elevated myocardial infarction) (Multi)  DISCONTINUED: perflutren lipid microspheres (Definity) injection 0.5-10 mL of dilution      3. Shortness of breath  Transthoracic Echo (TTE) Limited    Transthoracic Echo (TTE) Limited      4. Pulmonary hypertension (Multi)  Transthoracic Echo (TTE) Limited    Transthoracic Echo (TTE) Limited      5. Other hypervolemia  Upper extremity venous duplex bilateral    Upper extremity venous duplex bilateral      6. Anasarca  Upper extremity venous duplex bilateral    Upper extremity venous duplex bilateral      7. Bacteremia  CBC and Auto Differential    CANCELED: Transesophageal Echo (LUIS)    CANCELED: Transesophageal Echo (LUIS)    CANCELED: Transesophageal Echo (LUIS)    CANCELED: Transesophageal Echo (LUIS)    CANCELED: C-reactive protein    CANCELED: Vancomycin level, trough    CANCELED: Creatinine      8. Montez catheter dysfunction, subsequent encounter  CBC and Auto Differential    CANCELED: C-reactive protein    CANCELED: Vancomycin level, trough    CANCELED: Creatinine      9. MRSA bacteremia  CBC and Auto Differential    CANCELED: C-reactive protein    CANCELED: Vancomycin level, trough    CANCELED: Creatinine          Description of procedure: left internal jugular tunneled montez single lumen    Timeout:  Yes    Procedure Area: Procedure Area     Anesthesia:   Local/Topical    Complications: None    Estimated Blood Loss: minimal    Medications  As of 04/18/25 1314      thrombin-bovine (JMI) 5,000 unit topical solution Total dose:  Cannot be calculated* Dosing weight:  68   *Administration dose not documented     Date/Time Rate/Dose/Volume Action       04/09/25  1828 *Not included in total Missed               thrombin-bovine (JMI) 5,000 unit topical solution Total dose:   Cannot be calculated* Dosing weight:  68   *Administration does not have dose documented     Date/Time Rate/Dose/Volume Action       04/10/25  0126  Given               tranexamic acid (Cyklokapron) injection 500 mg (mg) Total dose:  Cannot be calculated* Dosing weight:  68   *Total user-documented volume 0 mL may contain volume from other administrations     Date/Time Rate/Dose/Volume Action       04/09/25  1511 500 mg Given               tranexamic acid (Cyklokapron) injection 1,000 mg/10 mL (100 mg/mL)  - Omnicell Override Pull (mg) Total dose:  Cannot be calculated*   *Total user-documented volume 0 mL may contain volume from other administrations     Date/Time Rate/Dose/Volume Action       04/09/25  1511 500 mg Given               tranexamic acid (Cyklokapron) injection 500 mg (mg) Total dose:  Cannot be calculated* Dosing weight:  68   *Total user-documented volume 0 mL may contain volume from other administrations     Date/Time Rate/Dose/Volume Action       04/10/25  0455 *500 mg Missed               tranexamic acid (Cyklokapron) injection 1,000 mg/10 mL (100 mg/mL)  - Omnicell Override Pull Total dose:  Cannot be calculated*   *Total user-documented volume 0 mL may contain volume from other administrations     Date/Time Rate/Dose/Volume Action       04/10/25  0455 *500 mg Missed               iohexol (OMNIPaque) 350 mg iodine/mL solution 90 mL (mL) Total volume:  90 mL Dosing weight:  68      Date/Time Rate/Dose/Volume Action       04/09/25  1742 90 mL Given               iohexol (OMNIPaque) 350 mg iodine/mL solution 85 mL (mL) Total volume:  85 mL Dosing weight:  72.7      Date/Time Rate/Dose/Volume Action       04/13/25  0843 85 mL Given               epoprostenol (Veletri) 0.3 mg/mL 9,000 mcg in sodium chloride 0.9% 100 mL (90 mcg/mL) bag (ng/kg/min) Total dose:  Cannot be calculated* Dosing weight:  73   *Total user-documented volume 727.15 mL may contain volume from other administrations     Date/Time  Rate/Dose/Volume Action       04/10/25  0243 67 ng/kg/min - 3.26 mL/hr New Bag      0453 67 ng/kg/min - 3.26 mL/hr Rate Verify      0707 67 ng/kg/min - 3.26 mL/hr Rate Verify      0940 67 ng/kg/min - 3.26 mL/hr Rate Verify      1256 67 ng/kg/min - 3.26 mL/hr Rate Verify      1526 67 ng/kg/min - 3.26 mL/hr Rate Verify      1828 67 ng/kg/min - 3.26 mL/hr Rate Verify      1942 67 ng/kg/min - 3.26 mL/hr New Bag     04/11/25  0744 *67 ng/kg/min - 3.26 mL/hr Handoff      1716 67 ng/kg/min - 3.26 mL/hr New Bag      1918 *67 ng/kg/min - 3.26 mL/hr Handoff      2136 67 ng/kg/min - 3.26 mL/hr Rate Verify     04/12/25  0056 67 ng/kg/min - 3.26 mL/hr Rate Verify      0521 67 ng/kg/min - 3.26 mL/hr Rate Verify      0712 *67 ng/kg/min - 3.26 mL/hr Handoff      1647 67 ng/kg/min - 3.26 mL/hr New Bag      1859 *67 ng/kg/min - 3.26 mL/hr Handoff      2143 67 ng/kg/min - 3.26 mL/hr Rate Verify      2144 67 ng/kg/min - 3.26 mL/hr Rate Verify     04/13/25  1000 67 ng/kg/min - 3.26 mL/hr Rate Verify      1637 67 ng/kg/min - 3.26 mL/hr New Bag      2000 67 ng/kg/min - 3.26 mL/hr Rate Verify               epoprostenol (Veletri) 0.3 mg/mL 9,000 mcg in sodium chloride 0.9% 100 mL (90 mcg/mL) bag (ng/kg/min) Total dose:  Cannot be calculated* Dosing weight:  73   *Total user-documented volume 727.15 mL may contain volume from other administrations     Date/Time Rate/Dose/Volume Action       04/14/25  0914 67 ng/kg/min - 3.26 mL/hr Rate Verify      1200 67 ng/kg/min - 3.26 mL/hr Rate Verify      1319 67 ng/kg/min - 3.26 mL/hr New Bag               epoprostenol (Veletri) 0.3 mg/mL 5,001 mcg in sodium chloride 0.9% 100 mL (50.01 mcg/mL) bag (ng/kg/min) Total dose:  Cannot be calculated* Dosing weight:  73   *Total user-documented volume 727.15 mL may contain volume from other administrations     Date/Time Rate/Dose/Volume Action       04/14/25  1722 67 ng/kg/min - 5.87 mL/hr New Bag      1900 67 ng/kg/min - 5.87 mL/hr Rate Verify      2000 67  ng/kg/min - 5.87 mL/hr Rate Verify      2100 67 ng/kg/min - 5.87 mL/hr Rate Verify      2200 67 ng/kg/min - 5.87 mL/hr Rate Verify      2300 67 ng/kg/min - 5.87 mL/hr Rate Verify     04/15/25  0000 67 ng/kg/min - 5.87 mL/hr Rate Verify      0100 67 ng/kg/min - 5.87 mL/hr Rate Verify      0200 67 ng/kg/min - 5.87 mL/hr Rate Verify      0300 67 ng/kg/min - 5.87 mL/hr Rate Verify      0400 67 ng/kg/min - 5.87 mL/hr Rate Verify      0500 67 ng/kg/min - 5.87 mL/hr Rate Verify      0600 67 ng/kg/min - 5.87 mL/hr Rate Verify      0700 67 ng/kg/min - 5.87 mL/hr Rate Verify      0837 67 ng/kg/min - 5.87 mL/hr New Bag      1200 67 ng/kg/min - 5.87 mL/hr Rate Verify      1600 67 ng/kg/min - 5.87 mL/hr Rate Verify      1934 *67 ng/kg/min - 5.87 mL/hr Handoff      2201 67 ng/kg/min - 5.87 mL/hr New Bag     04/16/25  0708 *67 ng/kg/min - 5.87 mL/hr Handoff      1010 67 ng/kg/min - 5.87 mL/hr New Bag      1901 *67 ng/kg/min - 5.87 mL/hr Handoff      2234 67 ng/kg/min - 5.87 mL/hr New Bag     04/17/25  0716 *67 ng/kg/min - 5.87 mL/hr Handoff      0808 67 ng/kg/min - 5.87 mL/hr Rate Verify      0935 67 ng/kg/min - 5.87 mL/hr Rate Verify      0942 67 ng/kg/min - 5.87 mL/hr New Bag      1533  Stopped               epoprostenol (Veletri) 0.3 mg/mL 5,001 mcg in sodium chloride 0.9% 100 mL (50.01 mcg/mL) bag (ng/kg/min) Total dose:  Cannot be calculated* Dosing weight:  73   *Total user-documented volume 727.15 mL may contain volume from other administrations     Date/Time Rate/Dose/Volume Action       04/17/25  1708 67 ng/kg/min - 5.87 mL/hr Rate Verify      1937 *67 ng/kg/min - 5.87 mL/hr Handoff      2206 67 ng/kg/min - 5.87 mL/hr New Bag     04/18/25  0724 *67 ng/kg/min - 5.87 mL/hr Handoff      1047 67 ng/kg/min - 5.87 mL/hr New Bag               aspirin EC tablet 81 mg (mg) Total dose:  567 mg* Dosing weight:  68   *Administration not included in total     Date/Time Rate/Dose/Volume Action       04/10/25  0834 81 mg Given      04/11/25  0833 81 mg Given     04/12/25  0838 81 mg Given      2139 *Not included in total MAR Hold      2236 *Not included in total MAR Unhold     04/13/25  0606 *81 mg Missed     04/14/25  0815 *81 mg Missed     04/15/25  0839 81 mg Given     04/16/25  1006 81 mg Given     04/17/25  0806 81 mg Given     04/18/25  0827 81 mg Given               calcium carbonate-vitamin D3 500 mg-5 mcg (200 unit) per tablet 2 tablet (tablet) Total dose:  28 tablet* Dosing weight:  68   *Administration not included in total     Date/Time Rate/Dose/Volume Action       04/10/25  0124 2 tablet Given      0835 2 tablet Given      2102 2 tablet Given     04/11/25  0833 2 tablet Given      2015 2 tablet Given     04/12/25  0839 2 tablet Given      2139 *Not included in total MAR Hold      2236 *Not included in total MAR Unhold      2254 *2 tablet Missed     04/13/25  0606 *2 tablet Missed      2006 *2 tablet Missed     04/14/25  0815 *2 tablet Missed      2100 2 tablet Given     04/15/25  0837 2 tablet Given      2021 2 tablet Given     04/16/25  1006 2 tablet Given      2001 2 tablet Given     04/17/25  0806 2 tablet Given      2036 2 tablet Given     04/18/25  0826 2 tablet Given               dilTIAZem CD (Cardizem CD) 24 hr capsule 180 mg (mg) Total dose:  540 mg* Dosing weight:  68   *Administration not included in total     Date/Time Rate/Dose/Volume Action       04/10/25  0852 180 mg Given     04/11/25  0836 180 mg Given     04/12/25  0838 180 mg Given      2139 *Not included in total MAR Hold      2236 *Not included in total MAR Unhold      2238 *Not included in total Held by provider     04/13/25  Canceled Entry     04/14/25  0900 *180 mg Missed      0948 *Not included in total Unheld by provider               epoprostenol (Veletri) IV solution 1,500 mcg (mg) Total dose:  Cannot be calculated* Dosing weight:  68   *Administration dose not documented     Date/Time Rate/Dose/Volume Action       04/10/25  Canceled Entry     04/11/25   0001 *Not included in total Held by provider               ergocalciferol (Vitamin D-2) capsule 1.25 mg (mg) Total dose:  Cannot be calculated* Dosing weight:  68   *Administration dose not documented     Date/Time Rate/Dose/Volume Action       04/12/25 2139 *Not included in total MAR Hold      2236 *Not included in total MAR Unhold     04/13/25  0607 *1.25 mg Missed               furosemide (Lasix) tablet 40 mg (mg) Total dose:  80 mg*   *Administration not included in total     Date/Time Rate/Dose/Volume Action       04/10/25  0839 *40 mg Missed     04/11/25  0951 40 mg Given     04/12/25  0839 40 mg Given      1411 *Not included in total Held by provider     04/13/25  Canceled Entry     04/14/25  0900 *40 mg Missed     04/15/25  0900 *40 mg Missed     04/16/25  Canceled Entry     04/17/25  0900 *40 mg Missed     04/18/25  0900 *40 mg Missed      0944 *Not included in total Unheld by provider               gabapentin (Neurontin) capsule 800 mg (mg) Total volume:  Not documented* Dosing weight:  68   *Total volume has not been documented. View each administration to see the amount administered.     Date/Time Rate/Dose/Volume Action       04/10/25  0835 800 mg Given      1432 800 mg Given      2102 800 mg Given     04/11/25  0835 800 mg Given      1508 800 mg Given      2015 800 mg Given     04/12/25  0839 800 mg Given      1440 800 mg Given      2139 *Not included in total MAR Hold      2236 *Not included in total MAR Unhold      2254 *800 mg Missed     04/13/25  0607 *800 mg Missed      1426 *800 mg Missed      2006 *800 mg Missed     04/14/25  0815 *800 mg Missed      1427 *800 mg Missed      2100 800 mg Given     04/15/25  0838 800 mg Given      1600 800 mg Given      2022 800 mg Given     04/16/25  1006 800 mg Given      1335 800 mg Given      2000 800 mg Given     04/17/25  0807 800 mg Given      1555 800 mg Given      2036 800 mg Given     04/18/25  0826 800 mg Given               hydroxychloroquine  (Plaquenil) tablet 200 mg (mg) Total dose:  2,400 mg* Dosing weight:  68   *Administration not included in total     Date/Time Rate/Dose/Volume Action       04/10/25  0125 200 mg Given      0835 200 mg Given      2101 200 mg Given     04/11/25  0835 200 mg Given      2016 200 mg Given     04/12/25  0838 200 mg Given      2139 *Not included in total MAR Hold      2236 *Not included in total MAR Unhold      2254 *200 mg Missed     04/13/25 0607 *200 mg Missed      2006 *200 mg Missed     04/14/25 0814 *200 mg Missed      2100 200 mg Given     04/15/25  0838 200 mg Given      2135 200 mg Given     04/16/25 1006 200 mg Given      2001 200 mg Given     04/17/25  0807 200 mg Given               loperamide (Imodium) capsule 2 mg (mg) Total dose:  16 mg* Dosing weight:  68   *Administration not included in total     Date/Time Rate/Dose/Volume Action       04/10/25  0125 2 mg Given      0835 2 mg Given      2102 2 mg Given     04/11/25  0835 2 mg Given      2015 2 mg Given     04/12/25  0839 2 mg Given      2139 *Not included in total MAR Hold      2236 *Not included in total MAR Unhold      2254 *2 mg Missed     04/13/25 0607 *2 mg Missed      2006 *2 mg Missed     04/14/25 0814 *2 mg Missed      2100 2 mg Given     04/15/25  0838 2 mg Given               loperamide (Imodium) capsule 2 mg (mg) Total dose:  10 mg Dosing weight:  68.6      Date/Time Rate/Dose/Volume Action       04/15/25  1938 2 mg Given     04/16/25  Canceled Entry      1035 2 mg Given     04/17/25  0825 2 mg Given      1736 2 mg Given     04/18/25  0835 2 mg Given               magnesium chloride (MagDelay) EC tablet 64 mg (mg) Total dose:  896 mg* Dosing weight:  68   *Administration not included in total     Date/Time Rate/Dose/Volume Action       04/10/25  0228 64 mg Given      0852 64 mg Given      2101 64 mg Given     04/11/25  0835 64 mg Given      2016 64 mg Given     04/12/25  0838 64 mg Given      2139 *Not included in total MAR Hold      2236  *Not included in total MAR Unhold      2254 *64 mg Missed     04/13/25  0607 *64 mg Missed      2006 *64 mg Missed     04/14/25  0814 *64 mg Missed      2100 64 mg Given     04/15/25  0840 64 mg Given      2135 64 mg Given     04/16/25 1007 64 mg Given      2001 64 mg Given     04/17/25  0807 64 mg Given      2036 64 mg Given     04/18/25  0826 64 mg Given               melatonin tablet 5 mg (mg) Total dose:  35 mg* Dosing weight:  68   *Administration not included in total     Date/Time Rate/Dose/Volume Action       04/10/25  0125 5 mg Given      2102 5 mg Given     04/11/25 2015 5 mg Given     04/12/25 2139 *Not included in total MAR Hold      2236 *Not included in total MAR Unhold      2254 *5 mg Missed     04/13/25 2006 *5 mg Missed     04/14/25 2100 5 mg Given     04/15/25  2021 5 mg Given     04/16/25 2000 5 mg Given     04/17/25  2037 5 mg Given               pantoprazole (ProtoNix) EC tablet 40 mg (mg) Total dose:  120 mg* Dosing weight:  68   *Administration not included in total     Date/Time Rate/Dose/Volume Action       04/10/25  0619 40 mg Given     04/11/25  0833 40 mg Given     04/12/25  0609 40 mg Given      2139 *Not included in total MAR Hold      2236 *Not included in total MAR Unhold     04/13/25  0539 *40 mg Missed               pantoprazole (ProtoNix) EC tablet 40 mg (mg) Total dose:  80 mg* Dosing weight:  68.6   *Administration not included in total     Date/Time Rate/Dose/Volume Action       04/16/25  0615 40 mg Given     04/17/25  0615 40 mg Given     04/18/25  0601 *40 mg Missed               macitentan (Opsumit) tablet tablet 10 mg (mg) Total dose:  70 mg* Dosing weight:  68   *Administration not included in total     Date/Time Rate/Dose/Volume Action       04/10/25  0852 10 mg Given     04/11/25  0835 10 mg Given     04/12/25  0839 10 mg Given      2139 *Not included in total MAR Hold      2236 *Not included in total MAR Unhold     04/13/25  0607 *10 mg Missed     04/14/25  0814  *10 mg Missed     04/15/25  0839 10 mg Given     04/16/25  1006 10 mg Given     04/17/25  0807 10 mg Given     04/18/25  0825 10 mg Given               perflutren lipid microspheres (Definity) injection 0.5-10 mL of dilution (mL of dilution) Total dose:  Cannot be calculated* Dosing weight:  68   *Administration dose not documented     Date/Time Rate/Dose/Volume Action       04/10/25  0118 *Not included in total Missed               perflutren lipid microspheres (Definity) injection 0.5-10 mL of dilution (mL of dilution) Total dose:  1.5 mL of dilution Dosing weight:  73.5      Date/Time Rate/Dose/Volume Action       04/14/25  1151 1.5 mL of dilution Given               potassium chloride CR (Klor-Con M20) ER tablet 20 mEq (mEq) Total dose:  180 mEq* Dosing weight:  68   *Administration not included in total     Date/Time Rate/Dose/Volume Action       04/10/25  0125 20 mEq Given      0835 20 mEq Given      2102 20 mEq Given     04/11/25  0835 20 mEq Given      2018 20 mEq Given     04/12/25  0842 *Not included in total Held by provider      0900 *20 mEq Missed      2100 *20 mEq Missed     04/13/25  Canceled Entry      2100 *20 mEq Missed     04/14/25  0900 *20 mEq Missed      2100 *Not included in total Automatically Held     04/15/25  0900 *20 mEq Missed      0921 *Not included in total Unheld by provider      2022 20 mEq Given     04/16/25  1007 20 mEq Given      2000 20 mEq Given     04/17/25  0825 *20 mEq Missed      2107 *Not included in total Held by provider      2217 *20 mEq Missed     04/18/25  0106 *Not included in total Unheld by provider      0826 20 mEq Given               potassium chloride CR (Klor-Con M20) ER tablet 40 mEq (mEq) Total dose:  40 mEq Dosing weight:  68.6      Date/Time Rate/Dose/Volume Action       04/15/25  0945 40 mEq Given               venlafaxine XR (Effexor-XR) 24 hr capsule 150 mg (mg) Total dose:  1,050 mg* Dosing weight:  68   *Administration not included in total      Date/Time Rate/Dose/Volume Action       04/10/25  0852 150 mg Given     04/11/25  0835 150 mg Given     04/12/25  0839 150 mg Given      2139 *Not included in total MAR Hold      2236 *Not included in total MAR Unhold     04/13/25  0608 *150 mg Missed     04/14/25  0815 *150 mg Missed     04/15/25  0844 150 mg Given     04/16/25  1007 150 mg Given     04/17/25  0808 150 mg Given     04/18/25  0826 150 mg Given               apixaban (Eliquis) tablet 5 mg (mg) Total dose:  Cannot be calculated* Dosing weight:  68   *Administration dose not documented     Date/Time Rate/Dose/Volume Action       04/10/25  0046 *Not included in total Held by provider      0050 *Not included in total Automatically Held      0900 *Not included in total Automatically Held      2100 *Not included in total Automatically Held     04/11/25  0900 *5 mg Missed      0928 *Not included in total Unheld by provider      0933 *Not included in total Held by provider      2100 *5 mg Missed     04/12/25  0900 *5 mg Missed      1857 *Not included in total Unheld by provider      2139 *Not included in total MAR Hold      2251 *5 mg Missed     04/13/25  Canceled Entry      2100 *5 mg Missed     04/14/25  0900 *5 mg Missed      0947 *Not included in total MAR Unhold               polyethylene glycol (Glycolax, Miralax) packet 17 g (g) Total dose:  Cannot be calculated* Dosing weight:  68   *Administration dose not documented     Date/Time Rate/Dose/Volume Action       04/10/25  0834 *17 g Missed     04/11/25  0823 *17 g Missed     04/12/25  0807 *17 g Missed      2139 *Not included in total MAR Hold      2236 *Not included in total MAR Unhold     04/13/25  0607 *17 g Missed     04/14/25  0814 *17 g Missed     04/15/25  0810 *17 g Missed               HYDROmorphone (Dilaudid) injection 0.4 mg (mg) Total dose:  0.4 mg Dosing weight:  68      Date/Time Rate/Dose/Volume Action       04/10/25  0159 0.4 mg Given               HYDROmorphone (Dilaudid) injection  0.1 mg (mg) Total dose:  0.1 mg Dosing weight:  68      Date/Time Rate/Dose/Volume Action       04/10/25  1712 0.1 mg Given               acetaminophen (Tylenol) tablet 650 mg (mg) Total dose:  3,900 mg Dosing weight:  68      Date/Time Rate/Dose/Volume Action       04/10/25  1054 650 mg Given      2107 650 mg Given     04/11/25  0836 650 mg Given      2015 650 mg Given     04/12/25  0621 650 mg Given      1714 650 mg Given      2139 *Not included in total MAR Hold      2236 *Not included in total MAR Unhold               magnesium sulfate 2 g in sterile water for injection 50 mL (mL/hr) Total volume:  Cannot be calculated* Dosing weight:  73.2   *Total user-documented volume 230.41 mL may contain volume from other administrations     Date/Time Rate/Dose/Volume Action       04/11/25  0951 2 g - 25 mL/hr (over 120 min) New Bag      1156  (over 120 min) Stopped               magnesium sulfate 2 g in sterile water for injection 50 mL (mL/hr) Total volume:  Cannot be calculated* Dosing weight:  75.3   *Total user-documented volume 230.41 mL may contain volume from other administrations     Date/Time Rate/Dose/Volume Action       04/16/25  1010 2 g - 25 mL/hr (over 120 min) New Bag      1212  (over 120 min) Stopped               magnesium sulfate 2 g in sterile water for injection 50 mL (mL/hr) Total volume:  Cannot be calculated* Dosing weight:  72.1   *Total user-documented volume 230.41 mL may contain volume from other administrations     Date/Time Rate/Dose/Volume Action       04/17/25  0824 2 g - 25 mL/hr (over 120 min) New Bag      0935 25 mL/hr Rate Verify      1110  (over 120 min) Stopped               magnesium sulfate 4 g in sterile water for injection 100 mL (mL/hr) Total volume:  Cannot be calculated* Dosing weight:  73.5   *Total user-documented volume 230.41 mL may contain volume from other administrations     Date/Time Rate/Dose/Volume Action       04/12/25  0921 4 g - 25 mL/hr (over 240 min) New Bag       1325  (over 240 min) Stopped               piperacillin-tazobactam (Zosyn) 4.5 g in dextrose (iso)  mL (g) Total dose:  4.5 g* Dosing weight:  73.5   *From user-documented volume     Date/Time Rate/Dose/Volume Action       04/12/25  1314 4.5 g (over 30 min) New Bag      1435 100 mL Stopped      2149 4.5 g (over 30 min) New Bag      2252  (over 30 min) Stopped     04/13/25  0235 4.5 g (over 30 min) New Bag      0404  (over 30 min) Stopped      0633 4.5 g (over 30 min) New Bag      0707  (over 30 min) Stopped               lactated Ringer's bolus 500 mL (mL/hr) Total volume:  500 mL* Dosing weight:  73.5   *From user-documented volume     Date/Time Rate/Dose/Volume Action       04/12/25  1440 500 mL - 250 mL/hr (over 120 min) New Bag      1644 500 mL Stopped               albuterol 2.5 mg /3 mL (0.083 %) nebulizer solution 2.5 mg (mg) Total dose:  7.5 mg Dosing weight:  73.5      Date/Time Rate/Dose/Volume Action       04/12/25 2005 2.5 mg Given      2139 *Not included in total MAR Hold      2236 *Not included in total MAR Unhold     04/14/25  0028 2.5 mg Given     04/15/25  0816 2.5 mg Given               vancomycin (Vancocin) 2,000 mg in sodium chloride 0.9%  mL (mL/hr) Total dose:  2,000 mg* Dosing weight:  73.5   *From user-documented volume     Date/Time Rate/Dose/Volume Action       04/13/25  0030 2,000 mg - 250 mL/hr (over 120 min) New Bag      0404 500 mL Stopped               vancomycin (Vancocin) 1,000 mg in dextrose 5%  mL (mL/hr) Total dose:  1,000 mg* Dosing weight:  73.5   *From user-documented volume     Date/Time Rate/Dose/Volume Action       04/13/25  1054 1,000 mg - 200 mL/hr (over 60 min) New Bag      1211 200 mL Stopped      2226 1,000 mg - 200 mL/hr (over 60 min) New Bag      2325  (over 60 min) Stopped               oxygen (O2) therapy (L/min) Total volume:  Not documented* Dosing weight:  73.5   *Total volume has not been documented. View each administration to see the amount  administered.     Date/Time Rate/Dose/Volume Action       04/12/25  2143 50 L/min Start     04/13/25  0255 40 L/min Rate Change Medical Gas      0903 *84 percent Start               oxygen (O2) therapy (L/min) Total volume:  Not documented* Dosing weight:  72.7   *Total volume has not been documented. View each administration to see the amount administered.     Date/Time Rate/Dose/Volume Action       04/13/25  1648 40 L/min Rate Verify Medical Gas     04/14/25  0716 50 L/min Rate Verify Medical Gas      0956 40 L/min Rate Verify Medical Gas      1121 40 L/min Start      1342 40 L/min Rate Verify Medical Gas      1426 40 L/min Rate Verify Medical Gas      1933 40 L/min Rate Verify Medical Gas      2056 10 L/min Rate Change Medical Gas     04/15/25  0816 6 L/min Rate Verify Medical Gas               oxygen (O2) therapy (L/min) Total volume:  Not documented* Dosing weight:  68.6   *Total volume has not been documented. View each administration to see the amount administered.     Date/Time Rate/Dose/Volume Action       04/15/25  1355 6 L/min Rate Verify Medical Gas               ipratropium-albuteroL (Duo-Neb) 0.5-2.5 mg/3 mL nebulizer solution 3 mL (mL) Total volume:  27 mL Dosing weight:  72.7      Date/Time Rate/Dose/Volume Action       04/13/25  0900 3 mL Given      1207 3 mL Given      1648 3 mL Given      2051 3 mL Given     04/14/25  0030 *3 mL Missed      0356 3 mL Given      0712 3 mL Given      1121 3 mL Given      1448 3 mL Given      1933 3 mL Given               heparin (porcine) injection 5,000 Units (Units) Total dose:  20,000 Units Dosing weight:  72.7      Date/Time Rate/Dose/Volume Action       04/13/25  1054 5,000 Units Given      1756 5,000 Units Given     04/14/25  0210 5,000 Units Given      0930 5,000 Units Given               ceFAZolin (Ancef) 2 g in dextrose (iso)  mL (g) Total dose:  2 g* Dosing weight:  72.7   *From user-documented volume     Date/Time Rate/Dose/Volume Action        04/13/25  1251 2 g (over 30 min) New Bag      1331 100 mL Stopped      2009 2 g (over 30 min) New Bag     04/14/25  0112  (over 30 min) Stopped      0435 2 g (over 30 min) New Bag      0551  (over 30 min) Stopped               pantoprazole (Protonix) injection 40 mg (mg) Total dose:  120 mg Dosing weight:  72.7      Date/Time Rate/Dose/Volume Action       04/13/25  1402 40 mg Given     04/14/25  0925 40 mg Given     04/15/25  0837 40 mg Given               lidocaine (Xylocaine) 10 mg/mL (1 %) injection 10 mL (mL) Total volume:  10 mL Dosing weight:  72.7      Date/Time Rate/Dose/Volume Action       04/13/25  1809 10 mL Given               acetaminophen (Ofirmev) injection 1,000 mg (mL/hr) Total dose:  4,000 mg* Dosing weight:  72.7   *From user-documented volume     Date/Time Rate/Dose/Volume Action       04/13/25  1756 1,000 mg - 400 mL/hr (over 15 min) New Bag      1815  (over 15 min) Stopped     04/14/25  0924 1,000 mg - 400 mL/hr (over 15 min) New Bag      0945  (over 15 min) Stopped      1720 1,000 mg - 400 mL/hr (over 15 min) New Bag      1730  (over 15 min) Stopped     04/15/25  0945 1,000 mg - 400 mL/hr (over 15 min) - 400 mL New Bag      1000  (over 15 min) Stopped      1713 1,000 mg - 400 mL/hr (over 15 min) New Bag      1730  (over 15 min) Stopped     04/16/25  1025 1,000 mg - 400 mL/hr (over 15 min) New Bag      1043  (over 15 min) Stopped      1726 1,000 mg - 400 mL/hr (over 15 min) New Bag      1805  (over 15 min) Stopped     04/17/25  0808 1,000 mg - 400 mL/hr (over 15 min) New Bag      0826  (over 15 min) Stopped      1846 *1,000 mg - 400 mL/hr (over 15 min) Missed      2028 1,000 mg - 400 mL/hr (over 15 min) New Bag      2051  (over 15 min) Stopped     04/18/25  0825 1,000 mg - 400 mL/hr (over 15 min) New Bag      0954  (over 15 min) Stopped               HYDROmorphone (Dilaudid) injection 0.2 mg (mg) Total dose:  0.6 mg Dosing weight:  72.7      Date/Time Rate/Dose/Volume Action       04/13/25   1757 0.2 mg Given     04/14/25  1218 0.2 mg Given     04/15/25  1155 0.2 mg Given               sodium bicarbonate injection 1 mEq/mL (8.4 %)  - Omnicell Override Pull Total dose:  Cannot be calculated*   *Administration dose not documented     Date/Time Rate/Dose/Volume Action       04/14/25  Canceled Entry               dextrose injection 50 %  - Omnicell Override Pull Total dose:  Cannot be calculated*   *Administration dose not documented     Date/Time Rate/Dose/Volume Action       04/14/25  Canceled Entry               insulin regular (HumuLIN R,NovoLIN R) injection 100 unit/mL  - Omnicell Override Pull Total dose:  Cannot be calculated*   *Administration dose not documented     Date/Time Rate/Dose/Volume Action       04/14/25  Canceled Entry               vancomycin (Vancocin) 1,250 mg in sodium chloride 0.9%  mL (mL/hr) Total volume:  Not documented* Dosing weight:  70.3   *Total volume has not been documented. View each administration to see the amount administered.     Date/Time Rate/Dose/Volume Action       04/14/25  2207 1,250 mg - 200 mL/hr (over 75 min) New Bag      2322  (over 75 min) Stopped               vancomycin (Vancocin) 1,250 mg in sodium chloride 0.9%  mL (mL/hr) Total dose:  2,500 mg* Dosing weight:  68.6   *From user-documented volume     Date/Time Rate/Dose/Volume Action       04/15/25  1245 1,250 mg - 200 mL/hr (over 75 min) - 250 mL New Bag      1400  (over 75 min) Stopped      2311 1,250 mg - 200 mL/hr (over 75 min) - 250 mL New Bag     04/16/25  0040  (over 75 min) Stopped      1245 1,250 mg - 200 mL/hr (over 75 min) New Bag      1304  (over 75 min) Stopped      1726 1,250 mg - 200 mL/hr (over 75 min) New Bag      1853  (over 75 min) Stopped     04/17/25  0455 1,250 mg - 200 mL/hr (over 75 min) New Bag      0625  (over 75 min) Stopped               heparin 25,000 Units in dextrose 5% 250 mL (100 Units/mL) infusion (premix) (Units/hr) Total dose:  Cannot be calculated* Dosing  weight:  70.3   *Total user-documented volume 1031.97 mL may contain volume from other administrations     Date/Time Rate/Dose/Volume Action       04/14/25  1203 1,300 Units/hr - 13 mL/hr New Bag      Canceled Entry      1734 1,500 Units/hr - 15 mL/hr Rate Change      1900 1,500 Units/hr - 15 mL/hr Rate Verify      2000 1,500 Units/hr - 15 mL/hr Rate Verify      2100 1,500 Units/hr - 15 mL/hr Rate Verify      2200 1,500 Units/hr - 15 mL/hr Rate Verify      2300 1,500 Units/hr - 15 mL/hr Rate Verify     04/15/25  0000 1,500 Units/hr - 15 mL/hr Rate Verify      0100 1,500 Units/hr - 15 mL/hr Rate Verify      0200 1,500 Units/hr - 15 mL/hr Rate Verify      0300 1,500 Units/hr - 15 mL/hr Rate Verify      0400 1,500 Units/hr - 15 mL/hr Rate Verify      0445 1,500 Units/hr - 15 mL/hr New Bag      0500 1,500 Units/hr - 15 mL/hr Rate Verify      0600 1,500 Units/hr - 15 mL/hr Rate Verify      0700 1,500 Units/hr - 15 mL/hr Rate Verify      1200 1,500 Units/hr - 15 mL/hr Rate Verify      1600 1,500 Units/hr - 15 mL/hr Rate Verify      1932 1,500 Units/hr - 15 mL/hr New Bag     04/16/25  0707 *1,500 Units/hr - 15 mL/hr Handoff      1025 1,500 Units/hr - 15 mL/hr New Bag      1900 *1,500 Units/hr - 15 mL/hr Handoff     04/17/25  0028 1,500 Units/hr - 15 mL/hr New Bag      0719 *1,500 Units/hr - 15 mL/hr Handoff      0808 1,500 Units/hr - 15 mL/hr Rate Verify      1743 1,500 Units/hr - 15 mL/hr New Bag      1936 *1,500 Units/hr - 15 mL/hr Handoff     04/18/25  0254  Stopped      0300 *Not included in total Held by provider               heparin bolus from bag 3,000-6,000 Units (Units) Total dose:  Cannot be calculated* Dosing weight:  70.3   *Total user-documented volume 1031.97 mL may contain volume from other administrations     Date/Time Rate/Dose/Volume Action       04/14/25  1734 3,000 Units Bolus from Bag               potassium chloride 20 mEq in sterile water for injection 100 mL (mL/hr) Total dose:  4 mEq* Dosing  weight:  68.6   *From user-documented volume     Date/Time Rate/Dose/Volume Action       04/15/25  0830 20 mEq - 50 mL/hr (over 120 min) - 20 mL New Bag      0900  (over 120 min) Stopped      0922 *20 mEq - 50 mL/hr (over 120 min) Missed               furosemide (Lasix) injection 40 mg (mg) Total dose:  40 mg Dosing weight:  72.1      Date/Time Rate/Dose/Volume Action       04/17/25  1916 40 mg Given               linezolid (Zyvox) tablet 600 mg (mg) Total dose:  1,200 mg Dosing weight:  72.1      Date/Time Rate/Dose/Volume Action       04/17/25  1556 600 mg Given     04/18/25  0827 600 mg Given               trimethobenzamide (Tigan) injection 200 mg (mg) Total dose:  200 mg Dosing weight:  72.1      Date/Time Rate/Dose/Volume Action       04/17/25 2023 200 mg Given                   * Cannot find log *      See detailed result report with images in PACS.    The patient tolerated the procedure well without incident or complication and is in stable condition.

## 2025-04-18 NOTE — SIGNIFICANT EVENT
Post-transfusion CBC collected at approx 2300. HGB had incremented appropriately, however, noted new leukocytosis (WBC 9->13). Went to see patient, she was, sleeping comfortably on 6L of O2 w/ SpO2 in the low 90's. On auscultation, equal BL air entry, no wheezing, mildly coarse breath sounds. Reviewed chart, and noted increase in O2 requirement today and transition from IV -> PO antibiotics. Patient had also received IV Lasix today for diuresis. Given new leukocytosis and O2 requirement, will plan to obtain CXR to evaluate for infectious vs volume issue.     UPDATE 0355:  CXR reviewed, per my read, appears slightly more congested than previous CXR. Could consider additional diuresis during the day.

## 2025-04-18 NOTE — PROGRESS NOTES
"  Internal Medicine Progress Note     Ronna Beckham is a 64 y.o. female with a past medical history of Pulmonary Arterial Hypertension Group 1, Atrial Fibrillation s/p Ablation (03/2025), Systemic Lupus Erythematosus admitted on 4/9/2025 initially presented to ED for hematoma at site of Montez. Course c/b ongoing tachyarrhythmias (atach or aflutter), ultimately attributed to severe pulmonary disease, bacteremia. She developed acute on chronic hypoxic respiratory failure (4L->Airvo) requiring transfer to MICU. Found to have MRSA bacteremia 2/2 Montez, line removed by IR. Currently on Vancomycin, monitoring bcx for clearance, TTE with no vegetations and would be high risk for LUIS. Epo currently running via dedicated pIV.     Subjective   Interval hx: Post-transfusion CBC collected at approx 2300. HGB had incremented appropriately, however, noted new leukocytosis (WBC 9->13). ON had been eval'd by NF intern & pt was sleeping comfortably on 6L of O2 w/ SpO2 in the low 90's     Pt seen and evaluated at bedside.    At time of eval, pt notes abdominal swelling and ALAMO and overall feels fatigued.     Otherwise denies any new sx or worsening SOB at rest. Notes she only occasionally experiences ALAMO. Otherwise denies fever/chills, dizziness/lightheadedness, worsening LE edema.     Medications     Medications:   Scheduled medications  Scheduled Medications[1]  Continuous medications  Continuous Medications[2]  PRN medications  PRN Medications[3]     Objective     Vitals  Visit Vitals  /73 (Patient Position: Lying)   Pulse 95   Temp 36 °C (96.8 °F)   Resp 20   Ht 1.651 m (5' 5\")   Wt 72.1 kg (158 lb 15.2 oz)   SpO2 92%   BMI 26.45 kg/m²   OB Status Hysterectomy   Smoking Status Former   BSA 1.82 m²       Intake/Output Summary (Last 24 hours) at 4/18/2025 0651  Last data filed at 4/17/2025 2028  Gross per 24 hour   Intake 1029 ml   Output 1300 ml   Net -271 ml         Physical Exam  Vitals and nursing note reviewed. "   Constitutional:       General: She is awake. She is not in acute distress.     Appearance: She is ill-appearing. She is not toxic-appearing.      Interventions: Nasal cannula in place.      Comments: On 5-6L O2 via NC, satting 92-94%  Appears fatigued    HENT:      Head: Normocephalic and atraumatic.   Cardiovascular:      Rate and Rhythm: Tachycardia present. Rhythm irregular.      Pulses: Normal pulses.           Radial pulses are 2+ on the right side and 2+ on the left side.      Heart sounds: Murmur heard.      Systolic murmur is present.      Comments: Borderline tachycardic, HR high 90s-low 100s  Pulmonary:      Effort: Pulmonary effort is normal. No respiratory distress or retractions.      Breath sounds: Normal breath sounds. No wheezing.   Abdominal:      General: Abdomen is flat.      Palpations: Abdomen is soft.   Musculoskeletal:      Right lower leg: No edema.      Left lower leg: No edema.   Skin:     General: Skin is warm and dry.   Neurological:      General: No focal deficit present.      Mental Status: She is alert and oriented to person, place, and time.   Psychiatric:         Behavior: Behavior is cooperative.          Labs  Lab Results   Component Value Date    WBC 13.0 (H) 04/17/2025    HGB 8.7 (L) 04/17/2025    HCT 30.8 (L) 04/17/2025    MCV 79 (L) 04/17/2025    PLT 66 (L) 04/17/2025      Lab Results   Component Value Date    GLUCOSE 91 04/17/2025    CALCIUM 8.2 (L) 04/17/2025     04/17/2025    K 5.0 04/17/2025    CO2 25 04/17/2025     04/17/2025    BUN 11 04/17/2025    CREATININE 0.49 (L) 04/17/2025      Lab Results   Component Value Date    ALT 8 04/17/2025    AST 12 04/17/2025     (H) 07/23/2019    ALKPHOS 125 04/17/2025    BILITOT 0.5 04/17/2025        Imaging  === 04/09/25 ===    XR CHEST 1 VIEW    - Impression -  Slight improvement of edematous changes. There remains trace left  pleural effusion with bibasilar atelectasis.    I personally reviewed the images/study  and I agree with the findings  as stated by Bret Canales MD. This study was interpreted at  [facility], [place], [place].    MACRO:  None    Signed by: Brett Cox 4/16/2025 8:30 AM  Dictation workstation:   [id number]   === 04/09/25 ===    CT ANGIO CHEST FOR PULMONARY EMBOLISM    - Impression -  1. No evidence of acute pulmonary embolism to segmental level. Please  note that, assessment of subsegmental branches is limited and small  peripheral emboli are not entirely excluded.  2. Interval development of trace/small left pleural effusion. Left  basilar consolidative opacity. Additional new consolidative opacity  in the posterior right middle lobe and areas of ground-glass opacity  in the right lower lobe. Findings are likely atelectatic, however  infectious process should be excluded in appropriate clinical setting.  3. Severe enlargement of the right ventricle and right atrium with  dilated main pulmonary artery in keeping with patient's known  pulmonary artery hypertension.  4. There is suggestion of a defect in the base of interventricular  septum (VSD). Correlate with echocardiogram and consider cardiac MRI  for shunt assessment. Correlate with concern for Eisenmenger syndrome.  5. Peribronchovascular and centrilobular ground-glass halo in  bilateral lungs, likely sequela of elevated pulmonary artery  pressure/pulmonary artery hypertension.  6. Liver appears enlarged, partially imaged. Findings might be due to  hepatic congestion/congestive hepatopathy secondary to right heart  dysfunction.      I personally reviewed the images/study and I agree with the findings  as stated by Paras Gabriel MD. This study was interpreted at  [facility], [place], [place].    MACRO:  Critical Finding:  See findings. Notification was initiated on  4/13/2025 at 12:30 pm by  Janice Salvador.  (**-YCF-**)  Instructions:    Signed by: Janice Salvador  4/13/2025 12:36 PM  Dictation workstation:   [id number]         Assessment/Plan     Ronna Beckham is a 64 y.o. female with hx of PAH (on Epo), A-fib s/p ablation (03/2025) on Eliquis, SLE (questionable diagnosis) initially presented to ED for hematoma at site of Hendrickson.   :: Vascular surgery felt pseudoaneurysm/contained thrombus unlikely. No acute intervention per IR.   :: Course c/b ongoing tachyarrhythmias (A-tach or A-flutter), ultimately attributed to severe pulmonary disease, bacteremia. She developed acute on chronic hypoxic respiratory failure (4L->Airvo) requiring transfer to MICU. Found to have MRSA bacteremia 2/2 Hendrickson, line removed by IR. Currently on Vancomycin > switching to Linezolid monitoring bcx for clearance, TTE with no vegetations and would be high risk for LUIS (deferred LUIS).   -S/p Hendrickson line replacement by IR       Updates 04/18/25:   -Linezolid d/c'd & pt given 1-time dose of dalbavancin 1500mg for MRSA bacteremia coverage given thrombocytopenia and risk of additional line infection if PICC placed in addition to Hendrickson   -Hendrickson placed by IR today -> resumed Velitri thru Hendrickson today   -Weaning O2 as able to home level 4L at rest, 6-7L with activity   -Resumed daily Lasix 40mg PO   -Resumed heparin gtt-will need to consider switching to PO AC for home-going AC       #PAH, group I  #Acute on chronic hypoxic respiratory failure, resolving  :: Home meds: macitentan 10 mg PO daily, continuous epoprostenol infusion via right subclavian hendrickson, monthly sotatercept injections  :: Follows outpatient with Dr. Prescott   :: On 4L NC at baseline at rest and 6-7L with activity; was requiring airvo 50L/80%, back on 4L NC   :: CT 4/12 (-) for PE  :: Negative bubble study on TTE 4/14   Plan:   -Continue epoprostenol via PIV (infusion should NOT be stopped for any reason), formulation was changed to be administered at increased rate and mitigate venous backflow   -Continue macitenan 10 mg PO daily    -Ok to place new line 48 hrs from first -ve bcx per ID, will need IR consult  -In the meantime maintain additional peripheral access solely for administration of epoprostenol   -Continuous pulse ox monitoring   -IR c/s placed for replacement of Hendrickson dedicated for Epoprostanil       #MRSA bacteremia I/s/o Hendrickson line infection   ::Cx 4/12 + MRSA , bcx 4/13 1/2 staph, 4/14 no growth so far, 4/15 no growth so far  :: RVP -ve on 4/12  :: Procal 4/12 0.58, 4/13 0.74  :: Hendrickson recently replaced 3/2025 for line associated SSTI- presented with purulent drainage from hendrickson, briefly on IV abx then completed 10 days of doxycycline  :Current hendrickson removed 4/13 by IR  :TTE 4/14 showed no evidence of valvular vegetations  -Continue IV abx as per ID recs   Pip-tazo 4/12 - 4/13      vancomycin 4/12 - 4/17  cefazolin 4/13 - 4/14  Linezolid 600mg BID 4/17-4/18  -Linezolid d/c'd & pt given 1-time dose of dalbavancin 1500mg (4/18) for MRSA bacteremia coverage given thrombocytopenia   -No LUIS done given risk of procedure and undergoing anesthesia given pt's RV function   -Will defer PICC line as pt already to be getting Hendrickson line for for   -Weekly CBCs on discharge & ID clinic f/u      #Superficial hematoma, eschar R anterior chest wall s/p hendrickson placement   :CTA showing small hematoma with no active extravasation   :Low c/f pseudoaneurysm formation with thrombus per vascular sx and IR recommended conservative mgmt  :Given her h/o substance use disorder consider drug-induced vasculitis 2/2 methamphetamine vs toxic exposure of levamisole or xylazine   ::Further studies to rule out vasculitis component warranted  ::Urine drug screen -ve  ::UE DVT US 4/15: negative for acute DVT, chronic changes in mid/distal cephalic veins   Plan:   -Maintain overlying dressing   -Daily CBCs to eval worsening bleed     #A-fib s/p ablation 3/2025  #Tachyarrhythmia (Atach or atypical Aflutter 2:1)  :h/o SVT iso significant pulmonary  hypertension, narrow QRS up to 200 bpm unresponsive to adenosine and highly suspicious for 1:1 atrial flutter  :on diltiazem 180 mg PO daily and apixaban   -Telemetry   -Target HR <120   -Cardiology recs - avoid BB/CCB, dig unlikely to help, if necessary then consider IV amio (although risk of chemical cardioversion), if difficulty controlling rates consider DCCV if she can tolerate AC  -Heparin gtt started on 4/14  -Holding apixiban   -Holding diltiazem   -At discharge, will need ziopatch and EP follow up  -Given pt's intolerance of chemical AC and risk associated with LUIS cardioversion can consider CT watchman at later time as less invasive option     #Chest pain (resolved)   :Per MICU team, endorsing CP on 4/15. ECG stable from prior (in media tab)  -Troponin 49     #Systemic lupus erythematosus(?)   :Currently managed by her pcp, although can not directly find records in the chart  :Currently on plaquenil testing  :Denies any clinical symptoms of active flare including joint pain, muscle pain  :Per EMR she had +ve anti-nuclear panel with a 1:1280 titer, however the anti-dsDNA was 4.0 IU/mL which is -ve; never had a +ve anti-dsDNA; unusual to be started on plaquenil for this  -F/up repeat autoimmune workup, if this is not lupus, then would consider discontinuing plaquenil  -Dc'd hydroxychloroquine per Rheum  -Pt to have fu with Rheum outpatient in 3 mos to re-eval sx while off plaquenil        #Acute on chronic anemia, microcytic  #Acute on chronic thrombocytopenia  :Baseline hemoglobin 10, 8.8 on admit  :Worsening thrombocytopenia, baseline >100   :Smear: microcytosis, suggestive of component of iron deficiency, schistocytes not increased   :, fibrinogen 461, haptoglobin 193, ALCIRA neg, tbili 1.1, direct bili 0.2, ferritin 63  :: May be 2/2 acute illness vs drug rxn from plaquenil   Plan:   -Continue to trend CBC daily   -Goal hgb >7, platelets >10, >50 if bleeding      #CAD  -Aspirin 81 mg daily       #GERD  -Pantoprazole 40 mg daily     #Peripheral neuropathy   -Gabapentin 800 mg PO TID     #MDD  -Venlafaxine 150 mg PO daily      Misc:   -C/w calcium, vit D  -Mg, Kcl 20 meq bid  -Nightly melatonin   -Loperamide for chronic diarrhea (monitor qtc)   -IV tylenol 1g bid     Outpatient:   -PCP for imaging of nodules  -Lymph node biopsy f/up       Fluids: prn  Electrolytes: prn, k>4 mg>2 phos>3  Nutrition: regular   Access: pIV    GI ppx: pantoprazole 40 mg   DVT ppx: lovenox   Bowel care: miralax  Abx: Vanc  O2: 4L O2 NC at rest (5-6L)     Code Status: Full  NOK: Diana Vera (Sister)[phone]  ---  Marianne Mcdowell MD  PGY1, Internal Medicine          [1] acetaminophen, 1,000 mg, intravenous, BID  aspirin, 81 mg, oral, Daily  calcium carbonate-vitamin D3, 2 tablet, oral, BID  ergocalciferol, 1.25 mg, oral, Every Sunday  [Held by provider] furosemide, 40 mg, oral, Daily  gabapentin, 800 mg, oral, TID  linezolid, 600 mg, oral, q12h MI  macitentan, 10 mg, oral, Daily  magnesium chloride, 64 mg, oral, BID  melatonin, 5 mg, oral, Nightly  pantoprazole, 40 mg, oral, Daily before breakfast  potassium chloride CR, 20 mEq, oral, BID  venlafaxine XR, 150 mg, oral, Daily     [2] epoprostenol, 67 ng/kg/min (Order-Specific), Last Rate: 67 ng/kg/min (04/17/25 2206)  [Held by provider] heparin, 0-4,500 Units/hr, Last Rate: Stopped (04/18/25 0254)     [3] PRN medications: albuterol, heparin, loperamide, oxygen

## 2025-04-18 NOTE — SIGNIFICANT EVENT
ID follow-up and updated plan    Contacted by primary team as patient had a slight leukocytosis yesterday.  It is improving.  Team feels this is secondary to phlebitis due to her infusions.  We are asked for commentary on placement of Montez line.  Of note, the patient is on linezolid for her MRSA bacteremia and her platelet count is dropping.    ID sees no infectious contraindication to the placement of line at this point.  While we understand ideally we would have negative blood cultures for 48 hours, the bacillus species found in her blood was a contaminant.  Patient is at increased risk from infections due to her current infusions, and after discussion with primary team it would appear reasonable to proceed with placement of Montez line    Given her thrombocytopenia, we feel the linezolid needs to be discontinued at this point.  Although not ideal, I believe this patient is a candidate for dalbavancin to cover her for the MRSA bacteremia.  We had originally planned on 2 weeks of treatment.  Thus a single dose of 1500 mg of dalbavancin should cover her for that period of time.  I will write for the dalbavancin.      Discussed with primary team.

## 2025-04-18 NOTE — PROGRESS NOTES
Occupational Therapy                 Therapy Communication Note    Patient Name: Ronna Beckham  MRN: [id number]  Department:   Room: [facility]5027-A  Today's Date: 4/18/2025     Discipline: Occupational Therapy    OT Missed Visit: Yes     Missed Visit Reason: Missed Visit Reason:  (1304 - Off the floor at radiology - will follow up as able)    Missed Time: Attempt    Comment:

## 2025-04-18 NOTE — CARE PLAN
Problem: Skin  Goal: Decreased wound size/increased tissue granulation at next dressing change  Outcome: Progressing  Goal: Participates in plan/prevention/treatment measures  Outcome: Progressing  Goal: Prevent/manage excess moisture  Outcome: Progressing  Goal: Prevent/minimize sheer/friction injuries  Outcome: Progressing  Goal: Promote/optimize nutrition  Outcome: Progressing  Goal: Promote skin healing  Outcome: Progressing     Problem: Fall/Injury  Goal: Not fall by end of shift  Outcome: Progressing  Goal: Be free from injury by end of the shift  Outcome: Progressing  Goal: Verbalize understanding of personal risk factors for fall in the hospital  Outcome: Progressing  Goal: Verbalize understanding of risk factor reduction measures to prevent injury from fall in the home  Outcome: Progressing  Goal: Use assistive devices by end of the shift  Outcome: Progressing  Goal: Pace activities to prevent fatigue by end of the shift  Outcome: Progressing     Problem: Pain - Adult  Goal: Verbalizes/displays adequate comfort level or baseline comfort level  Outcome: Progressing     Problem: Safety - Adult  Goal: Free from fall injury  Outcome: Progressing     Problem: Discharge Planning  Goal: Discharge to home or other facility with appropriate resources  Outcome: Progressing     Problem: Chronic Conditions and Co-morbidities  Goal: Patient's chronic conditions and co-morbidity symptoms are monitored and maintained or improved  Outcome: Progressing     Problem: Nutrition  Goal: Nutrient intake appropriate for maintaining nutritional needs  Outcome: Progressing   The patient's goals for the shift include  : rest and stable health.     The clinical goals for the shift include Patient will remain HDS during shift.

## 2025-04-18 NOTE — CARE PLAN
The patient's goals for the shift include pt will have montez line placed     The clinical goals for the shift include pt will remain HDS throughout thid shift    Over the shift, Pt was stable this shift. Heparin was turned off @ 0300 for montez line placement and restarted @ 1500 after procedure. Montez procedure completed. Pt had episode of bleeding from montez site Provider notified, came to bed side, and ordered to hold heparin gtt, dressing changed with surgicel applied to control bleeding, SCD initiated. Veletri gtt remains in place. Fall and safety precaution in place. Call light within reach. Will continue to monitor pt.

## 2025-04-18 NOTE — SIGNIFICANT EVENT
Rapid Response Respiratory Therapy Note: RADAR    Start Time: 0440  End Time: 0500  Location:     Initial Vital Signs and O2: HR: 98    RR: 19     Type of O2: Mod. HFNC    SpO2: 92%  Breath Sounds: Cl diminished     Respiratory Concerns:  []  None []  Increased WOB []  Shallow respirations []  Irregular Respirations   []  Tachypnea []  Bradypnea [x]  Oxygen desaturation SpO2 90% []  Cyanosis   []  Poor Secretion Clearance []  Impaired/Weak Cough []  Copious Secretions []  Thick Secretions   []  Inability to Protect Airway []  Apnea []  Respiratory Arrest []  Shortness of Breath   [] Hemodynamic Instability []  Asymmetric Chest Rise []  Adventitious Breath Sounds []  Abnormal CXR   []  Aspiration []  Other:       Interventions:  []  None []  ABG/VBG  []  Assist w/ICU transfer []  Bag-mask ventilation   []  Bronchial Hygiene []  Trach care/Suction []  ETCO2 []  CPR   []  Assist Intubation []  Venti Mask/NRB []  Chest x-ray []  CT/MRI transport    []  High Flow Therapy []  Igel  []  Nasal Airway []  NT Suctioning   []  Nebulizer treatment []  NIPPV (CPAP/BiPAP) [x]  Oxygen  Type: Mod. HFNC  FiO2:  Liter Flow: 8L  SpO2: 92% []  Oral Airway   []  Oral Suctioning []  Other:      Outcome:  [x]  Maintain on Division []  Transferred to ICU []  Transferred to ED [x]  Bedside nurse instructed to page Rapid Response for any concerns or acute change in condition/VS     Final Vital Signs and O2: HR: 99   RR: 20   Type of O2: Mod. HFNC    SpO2: 92%   Breath Sounds:     Additional Comments:  Pt with pulmonary hypertension and needing diuresis during day. SpO2 was in the low 90s on 6Ls and resting comfortably. Increased pt to 8L to maintain SpO2 at 92% and greater. Pt to receive diuresis again today.

## 2025-04-19 ENCOUNTER — HOME HEALTH ADMISSION (OUTPATIENT)
Dept: HOME HEALTH SERVICES | Facility: HOME HEALTH | Age: 65
End: 2025-04-19
Payer: COMMERCIAL

## 2025-04-19 ENCOUNTER — DOCUMENTATION (OUTPATIENT)
Dept: HOME HEALTH SERVICES | Facility: HOME HEALTH | Age: 65
End: 2025-04-19
Payer: COMMERCIAL

## 2025-04-19 LAB
ALBUMIN SERPL BCP-MCNC: 3 G/DL (ref 3.4–5)
ALP SERPL-CCNC: 130 U/L (ref 33–136)
ALT SERPL W P-5'-P-CCNC: 8 U/L (ref 7–45)
ANION GAP SERPL CALC-SCNC: 14 MMOL/L (ref 10–20)
APTT PPP: 28 SECONDS (ref 26–36)
APTT PPP: 34 SECONDS (ref 26–36)
AST SERPL W P-5'-P-CCNC: 9 U/L (ref 9–39)
BACTERIA BLD AEROBE CULT: ABNORMAL
BACTERIA BLD CULT: ABNORMAL
BACTERIA BLD CULT: NORMAL
BASOPHILS # BLD MANUAL: 0 X10*3/UL (ref 0–0.1)
BASOPHILS # BLD MANUAL: 0 X10*3/UL (ref 0–0.1)
BASOPHILS NFR BLD MANUAL: 0 %
BASOPHILS NFR BLD MANUAL: 0 %
BILIRUB DIRECT SERPL-MCNC: 0.1 MG/DL (ref 0–0.3)
BILIRUB SERPL-MCNC: 0.6 MG/DL (ref 0–1.2)
BLASTS # BLD MANUAL: 0 X10*3/UL
BLASTS NFR BLD MANUAL: 0 %
BUN SERPL-MCNC: 9 MG/DL (ref 6–23)
CALCIUM SERPL-MCNC: 8.3 MG/DL (ref 8.6–10.6)
CHLORIDE SERPL-SCNC: 107 MMOL/L (ref 98–107)
CO2 SERPL-SCNC: 22 MMOL/L (ref 21–32)
CREAT SERPL-MCNC: 0.51 MG/DL (ref 0.5–1.05)
DACRYOCYTES BLD QL SMEAR: ABNORMAL
EGFRCR SERPLBLD CKD-EPI 2021: >90 ML/MIN/1.73M*2
EOSINOPHIL # BLD MANUAL: 0 X10*3/UL (ref 0–0.7)
EOSINOPHIL # BLD MANUAL: 0 X10*3/UL (ref 0–0.7)
EOSINOPHIL NFR BLD MANUAL: 0 %
EOSINOPHIL NFR BLD MANUAL: 0 %
ERYTHROCYTE [DISTWIDTH] IN BLOOD BY AUTOMATED COUNT: 19.7 % (ref 11.5–14.5)
ERYTHROCYTE [DISTWIDTH] IN BLOOD BY AUTOMATED COUNT: 20 % (ref 11.5–14.5)
GLUCOSE SERPL-MCNC: 86 MG/DL (ref 74–99)
GRAM STN SPEC: ABNORMAL
HCT VFR BLD AUTO: 32 % (ref 36–46)
HCT VFR BLD AUTO: 33.5 % (ref 36–46)
HGB BLD-MCNC: 8.9 G/DL (ref 12–16)
HGB BLD-MCNC: 9.4 G/DL (ref 12–16)
HYPOCHROMIA BLD QL SMEAR: ABNORMAL
IMM GRANULOCYTES # BLD AUTO: 2.27 X10*3/UL (ref 0–0.7)
IMM GRANULOCYTES # BLD AUTO: 2.34 X10*3/UL (ref 0–0.7)
IMM GRANULOCYTES NFR BLD AUTO: 19.4 % (ref 0–0.9)
IMM GRANULOCYTES NFR BLD AUTO: 20.5 % (ref 0–0.9)
INR PPP: 1 (ref 0.9–1.1)
INR PPP: 1.2 (ref 0.9–1.1)
LYMPHOCYTES # BLD MANUAL: 0.39 X10*3/UL (ref 1.2–4.8)
LYMPHOCYTES # BLD MANUAL: 0.8 X10*3/UL (ref 1.2–4.8)
LYMPHOCYTES NFR BLD MANUAL: 3.4 %
LYMPHOCYTES NFR BLD MANUAL: 6.8 %
MAGNESIUM SERPL-MCNC: 1.64 MG/DL (ref 1.6–2.4)
MCH RBC QN AUTO: 22.3 PG (ref 26–34)
MCH RBC QN AUTO: 22.3 PG (ref 26–34)
MCHC RBC AUTO-ENTMCNC: 27.8 G/DL (ref 32–36)
MCHC RBC AUTO-ENTMCNC: 28.1 G/DL (ref 32–36)
MCV RBC AUTO: 79 FL (ref 80–100)
MCV RBC AUTO: 80 FL (ref 80–100)
METAMYELOCYTES # BLD MANUAL: 0.4 X10*3/UL
METAMYELOCYTES # BLD MANUAL: 0.48 X10*3/UL
METAMYELOCYTES NFR BLD MANUAL: 3.4 %
METAMYELOCYTES NFR BLD MANUAL: 4.2 %
MONOCYTES # BLD MANUAL: 0 X10*3/UL (ref 0.1–1)
MONOCYTES # BLD MANUAL: 0.6 X10*3/UL (ref 0.1–1)
MONOCYTES NFR BLD MANUAL: 0 %
MONOCYTES NFR BLD MANUAL: 5.1 %
MYELOCYTES # BLD MANUAL: 0.39 X10*3/UL
MYELOCYTES # BLD MANUAL: 0.6 X10*3/UL
MYELOCYTES NFR BLD MANUAL: 3.4 %
MYELOCYTES NFR BLD MANUAL: 5.1 %
NEUTROPHILS # BLD MANUAL: 10.14 X10*3/UL (ref 1.2–7.7)
NEUTROPHILS # BLD MANUAL: 9.31 X10*3/UL (ref 1.2–7.7)
NEUTS BAND # BLD MANUAL: 0.19 X10*3/UL (ref 0–0.7)
NEUTS BAND # BLD MANUAL: 0.29 X10*3/UL (ref 0–0.7)
NEUTS BAND NFR BLD MANUAL: 1.7 %
NEUTS BAND NFR BLD MANUAL: 2.5 %
NEUTS SEG # BLD MANUAL: 9.02 X10*3/UL (ref 1.2–7)
NEUTS SEG # BLD MANUAL: 9.95 X10*3/UL (ref 1.2–7)
NEUTS SEG NFR BLD MANUAL: 77.1 %
NEUTS SEG NFR BLD MANUAL: 87.3 %
NRBC BLD MANUAL-RTO: 0 % (ref 0–0)
NRBC BLD-RTO: 2.2 /100 WBCS (ref 0–0)
NRBC BLD-RTO: 2.7 /100 WBCS (ref 0–0)
OVALOCYTES BLD QL SMEAR: ABNORMAL
OVALOCYTES BLD QL SMEAR: ABNORMAL
PHOSPHATE SERPL-MCNC: 4.4 MG/DL (ref 2.5–4.9)
PLASMA CELLS # BLD MANUAL: 0 X10*3/UL
PLASMA CELLS NFR BLD MANUAL: 0 %
PLATELET # BLD AUTO: 63 X10*3/UL (ref 150–450)
PLATELET # BLD AUTO: 70 X10*3/UL (ref 150–450)
POLYCHROMASIA BLD QL SMEAR: ABNORMAL
POTASSIUM SERPL-SCNC: 4.2 MMOL/L (ref 3.5–5.3)
PROMYELOCYTES # BLD MANUAL: 0 X10*3/UL
PROMYELOCYTES NFR BLD MANUAL: 0 %
PROT SERPL-MCNC: 6.5 G/DL (ref 6.4–8.2)
PROTHROMBIN TIME: 11.3 SECONDS (ref 9.8–12.4)
PROTHROMBIN TIME: 13.3 SECONDS (ref 9.8–12.4)
RBC # BLD AUTO: 3.99 X10*6/UL (ref 4–5.2)
RBC # BLD AUTO: 4.22 X10*6/UL (ref 4–5.2)
RBC MORPH BLD: ABNORMAL
RBC MORPH BLD: ABNORMAL
SODIUM SERPL-SCNC: 139 MMOL/L (ref 136–145)
TOTAL CELLS COUNTED BLD: 118
TOTAL CELLS COUNTED BLD: 118
UFH PPP CHRO-ACNC: 0.2 IU/ML (ref ?–1.1)
VARIANT LYMPHS # BLD MANUAL: 0 X10*3/UL (ref 0–0.5)
VARIANT LYMPHS NFR BLD: 0 %
WBC # BLD AUTO: 11.4 X10*3/UL (ref 4.4–11.3)
WBC # BLD AUTO: 11.7 X10*3/UL (ref 4.4–11.3)

## 2025-04-19 PROCEDURE — 83735 ASSAY OF MAGNESIUM: CPT

## 2025-04-19 PROCEDURE — 2500000004 HC RX 250 GENERAL PHARMACY W/ HCPCS (ALT 636 FOR OP/ED): Mod: JZ,SE

## 2025-04-19 PROCEDURE — 99233 SBSQ HOSP IP/OBS HIGH 50: CPT

## 2025-04-19 PROCEDURE — 85610 PROTHROMBIN TIME: CPT

## 2025-04-19 PROCEDURE — 84100 ASSAY OF PHOSPHORUS: CPT

## 2025-04-19 PROCEDURE — 85027 COMPLETE CBC AUTOMATED: CPT

## 2025-04-19 PROCEDURE — 85007 BL SMEAR W/DIFF WBC COUNT: CPT

## 2025-04-19 PROCEDURE — 2500000001 HC RX 250 WO HCPCS SELF ADMINISTERED DRUGS (ALT 637 FOR MEDICARE OP): Mod: SE

## 2025-04-19 PROCEDURE — 85520 HEPARIN ASSAY: CPT

## 2025-04-19 PROCEDURE — 80048 BASIC METABOLIC PNL TOTAL CA: CPT

## 2025-04-19 PROCEDURE — 82248 BILIRUBIN DIRECT: CPT

## 2025-04-19 PROCEDURE — 2500000002 HC RX 250 W HCPCS SELF ADMINISTERED DRUGS (ALT 637 FOR MEDICARE OP, ALT 636 FOR OP/ED): Mod: SE

## 2025-04-19 PROCEDURE — 2500000004 HC RX 250 GENERAL PHARMACY W/ HCPCS (ALT 636 FOR OP/ED): Mod: SE

## 2025-04-19 PROCEDURE — 2500000004 HC RX 250 GENERAL PHARMACY W/ HCPCS (ALT 636 FOR OP/ED): Mod: SE | Performed by: STUDENT IN AN ORGANIZED HEALTH CARE EDUCATION/TRAINING PROGRAM

## 2025-04-19 PROCEDURE — 1200000002 HC GENERAL ROOM WITH TELEMETRY DAILY

## 2025-04-19 RX ORDER — LANOLIN ALCOHOL/MO/W.PET/CERES
400 CREAM (GRAM) TOPICAL ONCE
Status: COMPLETED | OUTPATIENT
Start: 2025-04-19 | End: 2025-04-19

## 2025-04-19 RX ORDER — LOPERAMIDE HYDROCHLORIDE 2 MG/1
2 CAPSULE ORAL 2 TIMES DAILY PRN
Qty: 30 CAPSULE | Refills: 0 | Status: SHIPPED | OUTPATIENT
Start: 2025-04-19 | End: 2025-04-19 | Stop reason: HOSPADM

## 2025-04-19 RX ORDER — EPOPROSTENOL 1.5 MG/10ML
1.5 INJECTION, POWDER, LYOPHILIZED, FOR SOLUTION INTRAVENOUS CONTINUOUS
Qty: 30 EACH | Refills: 0 | Status: SHIPPED | OUTPATIENT
Start: 2025-04-19 | End: 2025-05-19

## 2025-04-19 RX ADMIN — Medication 2 TABLET: at 20:49

## 2025-04-19 RX ADMIN — LOPERAMIDE HYDROCHLORIDE 2 MG: 2 CAPSULE ORAL at 20:52

## 2025-04-19 RX ADMIN — MACITENTAN 10 MG: 10 TABLET, FILM COATED ORAL at 10:13

## 2025-04-19 RX ADMIN — PANTOPRAZOLE SODIUM 40 MG: 40 TABLET, DELAYED RELEASE ORAL at 07:04

## 2025-04-19 RX ADMIN — EPOPROSTENOL 67 NG/KG/MIN: 1.5 INJECTION, POWDER, LYOPHILIZED, FOR SOLUTION INTRAVENOUS at 22:57

## 2025-04-19 RX ADMIN — VENLAFAXINE HYDROCHLORIDE 150 MG: 150 CAPSULE, EXTENDED RELEASE ORAL at 10:13

## 2025-04-19 RX ADMIN — MAGNESIUM OXIDE TAB 400 MG (241.3 MG ELEMENTAL MG) 400 MG: 400 (241.3 MG) TAB at 12:10

## 2025-04-19 RX ADMIN — ASPIRIN 81 MG: 81 TABLET, COATED ORAL at 10:13

## 2025-04-19 RX ADMIN — ACETAMINOPHEN 1000 MG: 10 INJECTION INTRAVENOUS at 10:12

## 2025-04-19 RX ADMIN — EPOPROSTENOL 67 NG/KG/MIN: 1.5 INJECTION, POWDER, LYOPHILIZED, FOR SOLUTION INTRAVENOUS at 11:33

## 2025-04-19 RX ADMIN — GABAPENTIN 800 MG: 400 CAPSULE ORAL at 10:13

## 2025-04-19 RX ADMIN — LOPERAMIDE HYDROCHLORIDE 2 MG: 2 CAPSULE ORAL at 10:13

## 2025-04-19 RX ADMIN — Medication 5 MG: at 20:49

## 2025-04-19 RX ADMIN — APIXABAN 5 MG: 5 TABLET, FILM COATED ORAL at 10:13

## 2025-04-19 RX ADMIN — FUROSEMIDE 40 MG: 40 TABLET ORAL at 10:13

## 2025-04-19 RX ADMIN — GABAPENTIN 800 MG: 400 CAPSULE ORAL at 15:02

## 2025-04-19 RX ADMIN — ACETAMINOPHEN 1000 MG: 10 INJECTION INTRAVENOUS at 17:17

## 2025-04-19 RX ADMIN — POTASSIUM CHLORIDE 20 MEQ: 1500 TABLET, EXTENDED RELEASE ORAL at 10:13

## 2025-04-19 RX ADMIN — Medication 2 TABLET: at 10:13

## 2025-04-19 RX ADMIN — TRIMETHOBENZAMIDE HYDROCHLORIDE 200 MG: 100 INJECTION INTRAMUSCULAR at 11:13

## 2025-04-19 RX ADMIN — Medication 64 MG: at 10:14

## 2025-04-19 RX ADMIN — GABAPENTIN 800 MG: 400 CAPSULE ORAL at 20:49

## 2025-04-19 RX ADMIN — Medication 64 MG: at 20:49

## 2025-04-19 ASSESSMENT — COGNITIVE AND FUNCTIONAL STATUS - GENERAL
MOBILITY SCORE: 24
MOBILITY SCORE: 24
DAILY ACTIVITIY SCORE: 24
MOBILITY SCORE: 24
DAILY ACTIVITIY SCORE: 24
DAILY ACTIVITIY SCORE: 24

## 2025-04-19 ASSESSMENT — PAIN SCALES - GENERAL
PAINLEVEL_OUTOF10: 0 - NO PAIN

## 2025-04-19 ASSESSMENT — PAIN - FUNCTIONAL ASSESSMENT: PAIN_FUNCTIONAL_ASSESSMENT: 0-10

## 2025-04-19 NOTE — CARE PLAN
Problem: Skin  Goal: Decreased wound size/increased tissue granulation at next dressing change  Outcome: Progressing  Goal: Participates in plan/prevention/treatment measures  Outcome: Progressing  Goal: Prevent/manage excess moisture  Outcome: Progressing  Goal: Prevent/minimize sheer/friction injuries  Outcome: Progressing  Goal: Promote/optimize nutrition  Outcome: Progressing  Goal: Promote skin healing  Outcome: Progressing     Problem: Fall/Injury  Goal: Not fall by end of shift  Outcome: Progressing  Goal: Be free from injury by end of the shift  Outcome: Progressing  Goal: Verbalize understanding of personal risk factors for fall in the hospital  Outcome: Progressing  Goal: Verbalize understanding of risk factor reduction measures to prevent injury from fall in the home  Outcome: Progressing  Goal: Use assistive devices by end of the shift  Outcome: Progressing  Goal: Pace activities to prevent fatigue by end of the shift  Outcome: Progressing     Problem: Pain - Adult  Goal: Verbalizes/displays adequate comfort level or baseline comfort level  Outcome: Progressing     Problem: Safety - Adult  Goal: Free from fall injury  Outcome: Progressing     Problem: Discharge Planning  Goal: Discharge to home or other facility with appropriate resources  Outcome: Progressing   The patient's goals for the shift include      The clinical goals for the shift include Patient will remain HD stable.    Over the shift, the patient did not make progress toward the following goals. Barriers to progression include

## 2025-04-19 NOTE — CARE PLAN
The patient's goals for the shift include      The clinical goals for the shift include pt will remian HDS throughout this shift    Over the shift, the patient was supposed to discharge but was bleeding after changing her hendrickson's access dressing. Bleeding now controlled with surgicel applied. Staying overnight for observation. Fall and safety precaution in place. Call light within reach. Will continue to monitor pt.

## 2025-04-19 NOTE — DISCHARGE INSTRUCTIONS
Dear Ms. Bhavani,      You were admitted to [facility] ([facility]) from 4/9/25-4/19/25 for evaluation and treatment of the hematoma of your right chest, a bloodstream infection, and an abnormal heart rhythm.  You initially presented due to a wound and some bleeding over your right chest.  You had a CT scan of your chest which showed that it was likely a hematoma, or a small collection of blood that had likely clotted off, so it was monitored.  We also monitored you off of your Eliquis temporarily due to your low blood counts and initial concerns for bleeding, though we later realized you were likely not bleeding.    While you were here, you developed an abnormal rhythm of your heart, with your oxygen requirement went up.  You were then sent to the ICU for further treatment and management of your condition.  They found that you had a bloodstream infection due to a bacteria called MRSA, likely from an infection of your Montez line.  The line was removed and we started you on antibiotics with guidance from the Infectious Disease team.  Once you were more stable you were transferred back to the floor for further management of your care.  After your blood cultures cleared, we then replaced the line and     You were also seen by the Rheumatology team, they determined that you likely do not have lupus, so your Plaquenil was stopped.    To Do After Discharge:   Please see your After Visit Summary regarding any changes to your medications or any upcoming appointments after discharge.     Regarding the oozing from your Montez site: leave the dressing in place and hold your Eliquis dose tonight (4/19) and tomorrow morning (4/20). If you do not have any further oozing or bleeding from the site. We recommend calling the doctor who prescribed it on Monday morning for more guidance on taking the medication.     You are ordered a holter monitor to check for arrhythmia. Please ask your cardiologist  tomorrow about it, when and from  where you can get it attached for monitoring.    You will also have weekly blood tests to monitor your blood counts after discharge.  These will be followed by the Infectious Disease team and you will see them for a follow-up appointment in clinic in May.  They will arrange your visit.    You will also have an outpatient follow-up appointment with Rheumatology around 3 months after discharge, as they would like to monitor you off of the Plaquenil.  They will also arrange the appointment with you.    We will call you to schedule your appointments. If you do not hear from us in 3 business days, please call [facility] appointment line: [phone] or [phone] to make the appointment yourself.    Please go to the Emergency Room if you experience any worsening symptoms.     Thank you for the opportunity to participate in your care.      Sincerely,  Your  Care Team

## 2025-04-19 NOTE — HH CARE COORDINATION
Home Care received a Referral for Physical Therapy and Occupational Therapy. We have processed the referral for a Start of Care on 4/21-4/22/25..     If you have any questions or concerns, please feel free to contact us at [phone]. Follow the prompts, enter your five digit zip code, and you will be directed to your care team on [facility] 1.

## 2025-04-20 VITALS
DIASTOLIC BLOOD PRESSURE: 67 MMHG | TEMPERATURE: 96.1 F | WEIGHT: 154.1 LBS | RESPIRATION RATE: 22 BRPM | SYSTOLIC BLOOD PRESSURE: 103 MMHG | BODY MASS INDEX: 25.67 KG/M2 | HEART RATE: 115 BPM | HEIGHT: 65 IN | OXYGEN SATURATION: 90 %

## 2025-04-20 LAB
ABO GROUP (TYPE) IN BLOOD: NORMAL
ALBUMIN SERPL BCP-MCNC: 3.3 G/DL (ref 3.4–5)
ALP SERPL-CCNC: 119 U/L (ref 33–136)
ALT SERPL W P-5'-P-CCNC: 6 U/L (ref 7–45)
ANION GAP SERPL CALC-SCNC: 13 MMOL/L (ref 10–20)
ANTIBODY SCREEN: NORMAL
APTT PPP: 30 SECONDS (ref 26–36)
AST SERPL W P-5'-P-CCNC: 10 U/L (ref 9–39)
BASOPHILS # BLD MANUAL: 0 X10*3/UL (ref 0–0.1)
BASOPHILS NFR BLD MANUAL: 0 %
BILIRUB DIRECT SERPL-MCNC: 0.1 MG/DL (ref 0–0.3)
BILIRUB SERPL-MCNC: 0.6 MG/DL (ref 0–1.2)
BUN SERPL-MCNC: 9 MG/DL (ref 6–23)
CALCIUM SERPL-MCNC: 8 MG/DL (ref 8.6–10.6)
CHLORIDE SERPL-SCNC: 105 MMOL/L (ref 98–107)
CO2 SERPL-SCNC: 23 MMOL/L (ref 21–32)
CREAT SERPL-MCNC: 0.59 MG/DL (ref 0.5–1.05)
DACRYOCYTES BLD QL SMEAR: ABNORMAL
EGFRCR SERPLBLD CKD-EPI 2021: >90 ML/MIN/1.73M*2
EOSINOPHIL # BLD MANUAL: 0 X10*3/UL (ref 0–0.7)
EOSINOPHIL NFR BLD MANUAL: 0 %
ERYTHROCYTE [DISTWIDTH] IN BLOOD BY AUTOMATED COUNT: 20.1 % (ref 11.5–14.5)
GLUCOSE SERPL-MCNC: 103 MG/DL (ref 74–99)
HCT VFR BLD AUTO: 33.2 % (ref 36–46)
HGB BLD-MCNC: 9.1 G/DL (ref 12–16)
HYPOCHROMIA BLD QL SMEAR: ABNORMAL
IMM GRANULOCYTES # BLD AUTO: 2.01 X10*3/UL (ref 0–0.7)
IMM GRANULOCYTES NFR BLD AUTO: 18.6 % (ref 0–0.9)
INR PPP: 1.1 (ref 0.9–1.1)
LYMPHOCYTES # BLD MANUAL: 0.99 X10*3/UL (ref 1.2–4.8)
LYMPHOCYTES NFR BLD MANUAL: 9.2 %
MAGNESIUM SERPL-MCNC: 1.61 MG/DL (ref 1.6–2.4)
MCH RBC QN AUTO: 22.2 PG (ref 26–34)
MCHC RBC AUTO-ENTMCNC: 27.4 G/DL (ref 32–36)
MCV RBC AUTO: 81 FL (ref 80–100)
METAMYELOCYTES # BLD MANUAL: 0.64 X10*3/UL
METAMYELOCYTES NFR BLD MANUAL: 5.9 %
MONOCYTES # BLD MANUAL: 0.37 X10*3/UL (ref 0.1–1)
MONOCYTES NFR BLD MANUAL: 3.4 %
MYELOCYTES # BLD MANUAL: 0.91 X10*3/UL
MYELOCYTES NFR BLD MANUAL: 8.4 %
NEUTROPHILS # BLD MANUAL: 7.62 X10*3/UL (ref 1.2–7.7)
NEUTS BAND # BLD MANUAL: 0.45 X10*3/UL (ref 0–0.7)
NEUTS BAND NFR BLD MANUAL: 4.2 %
NEUTS SEG # BLD MANUAL: 7.17 X10*3/UL (ref 1.2–7)
NEUTS SEG NFR BLD MANUAL: 66.4 %
NRBC BLD-RTO: 2.5 /100 WBCS (ref 0–0)
OVALOCYTES BLD QL SMEAR: ABNORMAL
PHOSPHATE SERPL-MCNC: 4.7 MG/DL (ref 2.5–4.9)
PLATELET # BLD AUTO: 59 X10*3/UL (ref 150–450)
POLYCHROMASIA BLD QL SMEAR: ABNORMAL
POTASSIUM SERPL-SCNC: 4.1 MMOL/L (ref 3.5–5.3)
PROT SERPL-MCNC: 6.6 G/DL (ref 6.4–8.2)
PROTHROMBIN TIME: 12.1 SECONDS (ref 9.8–12.4)
RBC # BLD AUTO: 4.1 X10*6/UL (ref 4–5.2)
RBC MORPH BLD: ABNORMAL
RH FACTOR (ANTIGEN D): NORMAL
SCHISTOCYTES BLD QL SMEAR: ABNORMAL
SODIUM SERPL-SCNC: 137 MMOL/L (ref 136–145)
TOTAL CELLS COUNTED BLD: 119
VARIANT LYMPHS # BLD MANUAL: 0.27 X10*3/UL (ref 0–0.5)
VARIANT LYMPHS NFR BLD: 2.5 %
WBC # BLD AUTO: 10.8 X10*3/UL (ref 4.4–11.3)

## 2025-04-20 PROCEDURE — 2500000002 HC RX 250 W HCPCS SELF ADMINISTERED DRUGS (ALT 637 FOR MEDICARE OP, ALT 636 FOR OP/ED): Mod: SE

## 2025-04-20 PROCEDURE — 85610 PROTHROMBIN TIME: CPT

## 2025-04-20 PROCEDURE — 2500000001 HC RX 250 WO HCPCS SELF ADMINISTERED DRUGS (ALT 637 FOR MEDICARE OP): Mod: SE

## 2025-04-20 PROCEDURE — 85007 BL SMEAR W/DIFF WBC COUNT: CPT

## 2025-04-20 PROCEDURE — 2500000004 HC RX 250 GENERAL PHARMACY W/ HCPCS (ALT 636 FOR OP/ED): Mod: JZ,SE

## 2025-04-20 PROCEDURE — 99232 SBSQ HOSP IP/OBS MODERATE 35: CPT

## 2025-04-20 PROCEDURE — 86901 BLOOD TYPING SEROLOGIC RH(D): CPT

## 2025-04-20 PROCEDURE — 84100 ASSAY OF PHOSPHORUS: CPT

## 2025-04-20 PROCEDURE — 83735 ASSAY OF MAGNESIUM: CPT

## 2025-04-20 PROCEDURE — 82248 BILIRUBIN DIRECT: CPT

## 2025-04-20 PROCEDURE — 80048 BASIC METABOLIC PNL TOTAL CA: CPT

## 2025-04-20 PROCEDURE — 85027 COMPLETE CBC AUTOMATED: CPT

## 2025-04-20 RX ADMIN — ACETAMINOPHEN 1000 MG: 10 INJECTION INTRAVENOUS at 08:23

## 2025-04-20 RX ADMIN — PANTOPRAZOLE SODIUM 40 MG: 40 TABLET, DELAYED RELEASE ORAL at 06:51

## 2025-04-20 RX ADMIN — POTASSIUM CHLORIDE 20 MEQ: 1500 TABLET, EXTENDED RELEASE ORAL at 08:57

## 2025-04-20 RX ADMIN — LOPERAMIDE HYDROCHLORIDE 2 MG: 2 CAPSULE ORAL at 08:58

## 2025-04-20 RX ADMIN — MACITENTAN 10 MG: 10 TABLET, FILM COATED ORAL at 08:57

## 2025-04-20 RX ADMIN — Medication 2 TABLET: at 08:58

## 2025-04-20 RX ADMIN — Medication 64 MG: at 08:58

## 2025-04-20 RX ADMIN — GABAPENTIN 800 MG: 400 CAPSULE ORAL at 08:58

## 2025-04-20 RX ADMIN — VENLAFAXINE HYDROCHLORIDE 150 MG: 150 CAPSULE, EXTENDED RELEASE ORAL at 08:58

## 2025-04-20 RX ADMIN — ERGOCALCIFEROL 1.25 MG: 1.25 CAPSULE ORAL at 08:58

## 2025-04-20 RX ADMIN — TRIMETHOBENZAMIDE HYDROCHLORIDE 200 MG: 100 INJECTION INTRAMUSCULAR at 13:09

## 2025-04-20 RX ADMIN — ASPIRIN 81 MG: 81 TABLET, COATED ORAL at 08:58

## 2025-04-20 ASSESSMENT — COGNITIVE AND FUNCTIONAL STATUS - GENERAL
CLIMB 3 TO 5 STEPS WITH RAILING: A LITTLE
MOBILITY SCORE: 23
DAILY ACTIVITIY SCORE: 24

## 2025-04-20 ASSESSMENT — PAIN - FUNCTIONAL ASSESSMENT
PAIN_FUNCTIONAL_ASSESSMENT: 0-10
PAIN_FUNCTIONAL_ASSESSMENT: 0-10

## 2025-04-20 ASSESSMENT — PAIN SCALES - GENERAL
PAINLEVEL_OUTOF10: 0 - NO PAIN
PAINLEVEL_OUTOF10: 0 - NO PAIN

## 2025-04-20 NOTE — PROGRESS NOTES
Ronna Beckham is a 64 y.o. female on day 11 of admission presenting with Bacteremia due to methicillin resistant Staphylococcus aureus.      Subjective   Patient had some bleed from the hendrickson line site yesterday, kept here for observation, eliquis put on hold. No bleed since yesterday night. Hemodynamically stable.       Objective     Last Recorded Vitals  /68 (BP Location: Right arm, Patient Position: Lying)   Pulse 100   Temp 36.3 °C (97.3 °F) (Temporal)   Resp 20   Wt 69.9 kg (154 lb 1.6 oz)   SpO2 94%   Intake/Output last 3 Shifts:    Intake/Output Summary (Last 24 hours) at 4/20/2025 1159  Last data filed at 4/20/2025 0658  Gross per 24 hour   Intake 246.47 ml   Output 1050 ml   Net -803.53 ml       Admission Weight  Weight: 68 kg (150 lb) (04/09/25 1301)    Daily Weight  04/20/25 : 69.9 kg (154 lb 1.6 oz)          Physical Exam  Constitutional:       General: She is awake. She is not in acute distress.     Appearance: She iswel-looking, comfortable. She is not toxic-appearing.      Interventions: Nasal cannula in place.      Comments: On 5-6L O2 via NC, satting 92-94%  Appears fatigued    HENT:      Head: Normocephalic and atraumatic.   Cardiovascular:      Rate and Rhythm: Tachycardia present. 110/min Rhythm irregular.      Pulses: Normal pulses.           Radial pulses are 2+ on the right side and 2+ on the left side.      Heart sounds: Murmur heard.      Systolic murmur is present.      Pulmonary:      Effort: Pulmonary effort is normal. No respiratory distress or retractions.      Breath sounds: Normal breath sounds. No wheezing.   Abdominal:      General: Abdomen is flat.      Palpations: Abdomen is soft.   Musculoskeletal:      Right lower leg: No edema.      Left lower leg: No edema.   Skin:     General: Skin is warm and dry.   Neurological:      General: No focal deficit present.      Mental Status: She is alert and oriented to person, place, and time.   Psychiatric:         Behavior:  Behavior is cooperative.     Relevant Results  Results for orders placed or performed during the hospital encounter of 04/09/25 (from the past 24 hours)   Coagulation Screen   Result Value Ref Range    Protime 13.3 (H) 9.8 - 12.4 seconds    INR 1.2 (H) 0.9 - 1.1    aPTT 28 26 - 36 seconds   CBC and Auto Differential   Result Value Ref Range    WBC 11.7 (H) 4.4 - 11.3 x10*3/uL    nRBC 2.7 (H) 0.0 - 0.0 /100 WBCs    RBC 4.22 4.00 - 5.20 x10*6/uL    Hemoglobin 9.4 (L) 12.0 - 16.0 g/dL    Hematocrit 33.5 (L) 36.0 - 46.0 %    MCV 79 (L) 80 - 100 fL    MCH 22.3 (L) 26.0 - 34.0 pg    MCHC 28.1 (L) 32.0 - 36.0 g/dL    RDW 20.0 (H) 11.5 - 14.5 %    Platelets 70 (L) 150 - 450 x10*3/uL    Immature Granulocytes %, Automated 19.4 (H) 0.0 - 0.9 %    Immature Granulocytes Absolute, Automated 2.27 (H) 0.00 - 0.70 x10*3/uL   Manual Differential   Result Value Ref Range    Neutrophils %, Manual 77.1 40.0 - 80.0 %    Bands %, Manual 2.5 0.0 - 5.0 %    Lymphocytes %, Manual 6.8 13.0 - 44.0 %    Monocytes %, Manual 5.1 2.0 - 10.0 %    Eosinophils %, Manual 0.0 0.0 - 6.0 %    Basophils %, Manual 0.0 0.0 - 2.0 %    Metamyelocytes %, Manual 3.4 0.0 - 0.0 %    Myelocytes %, Manual 5.1 0.0 - 0.0 %    Seg Neutrophils Absolute, Manual 9.02 (H) 1.20 - 7.00 x10*3/uL    Bands Absolute, Manual 0.29 0.00 - 0.70 x10*3/uL    Lymphocytes Absolute, Manual 0.80 (L) 1.20 - 4.80 x10*3/uL    Monocytes Absolute, Manual 0.60 0.10 - 1.00 x10*3/uL    Eosinophils Absolute, Manual 0.00 0.00 - 0.70 x10*3/uL    Basophils Absolute, Manual 0.00 0.00 - 0.10 x10*3/uL    Metamyelocytes Absolute, Manual 0.40 0.00 - 0.00 x10*3/uL    Myelocytes Absolute, Manual 0.60 0.00 - 0.00 x10*3/uL    Total Cells Counted 118     Neutrophils Absolute, Manual 9.31 (H) 1.20 - 7.70 x10*3/uL    RBC Morphology See Below     Polychromasia Mild     Hypochromia Mild     Ovalocytes Few    CBC and Auto Differential   Result Value Ref Range    WBC 10.8 4.4 - 11.3 x10*3/uL    nRBC 2.5 (H) 0.0 -  0.0 /100 WBCs    RBC 4.10 4.00 - 5.20 x10*6/uL    Hemoglobin 9.1 (L) 12.0 - 16.0 g/dL    Hematocrit 33.2 (L) 36.0 - 46.0 %    MCV 81 80 - 100 fL    MCH 22.2 (L) 26.0 - 34.0 pg    MCHC 27.4 (L) 32.0 - 36.0 g/dL    RDW 20.1 (H) 11.5 - 14.5 %    Platelets 59 (L) 150 - 450 x10*3/uL    Immature Granulocytes %, Automated 18.6 (H) 0.0 - 0.9 %    Immature Granulocytes Absolute, Automated 2.01 (H) 0.00 - 0.70 x10*3/uL   Coagulation Screen   Result Value Ref Range    Protime 12.1 9.8 - 12.4 seconds    INR 1.1 0.9 - 1.1    aPTT 30 26 - 36 seconds   Hepatic Function Panel   Result Value Ref Range    Albumin 3.3 (L) 3.4 - 5.0 g/dL    Bilirubin, Total 0.6 0.0 - 1.2 mg/dL    Bilirubin, Direct 0.1 0.0 - 0.3 mg/dL    Alkaline Phosphatase 119 33 - 136 U/L    ALT 6 (L) 7 - 45 U/L    AST 10 9 - 39 U/L    Total Protein 6.6 6.4 - 8.2 g/dL   Magnesium   Result Value Ref Range    Magnesium 1.61 1.60 - 2.40 mg/dL   Type And Screen   Result Value Ref Range    ABO TYPE A     Rh TYPE POS     ANTIBODY SCREEN NEG    Phosphorus   Result Value Ref Range    Phosphorus 4.7 2.5 - 4.9 mg/dL   Basic Metabolic Panel   Result Value Ref Range    Glucose 103 (H) 74 - 99 mg/dL    Sodium 137 136 - 145 mmol/L    Potassium 4.1 3.5 - 5.3 mmol/L    Chloride 105 98 - 107 mmol/L    Bicarbonate 23 21 - 32 mmol/L    Anion Gap 13 10 - 20 mmol/L    Urea Nitrogen 9 6 - 23 mg/dL    Creatinine 0.59 0.50 - 1.05 mg/dL    eGFR >90 >60 mL/min/1.73m*2    Calcium 8.0 (L) 8.6 - 10.6 mg/dL   Manual Differential   Result Value Ref Range    Neutrophils %, Manual 66.4 40.0 - 80.0 %    Bands %, Manual 4.2 0.0 - 5.0 %    Lymphocytes %, Manual 9.2 13.0 - 44.0 %    Monocytes %, Manual 3.4 2.0 - 10.0 %    Eosinophils %, Manual 0.0 0.0 - 6.0 %    Basophils %, Manual 0.0 0.0 - 2.0 %    Atypical Lymphocytes %, Manual 2.5 0.0 - 2.0 %    Metamyelocytes %, Manual 5.9 0.0 - 0.0 %    Myelocytes %, Manual 8.4 0.0 - 0.0 %    Seg Neutrophils Absolute, Manual 7.17 (H) 1.20 - 7.00 x10*3/uL    Bands  Absolute, Manual 0.45 0.00 - 0.70 x10*3/uL    Lymphocytes Absolute, Manual 0.99 (L) 1.20 - 4.80 x10*3/uL    Monocytes Absolute, Manual 0.37 0.10 - 1.00 x10*3/uL    Eosinophils Absolute, Manual 0.00 0.00 - 0.70 x10*3/uL    Basophils Absolute, Manual 0.00 0.00 - 0.10 x10*3/uL    Atypical Lymphs Absolute, Manual 0.27 0.00 - 0.50 x10*3/uL    Metamyelocytes Absolute, Manual 0.64 0.00 - 0.00 x10*3/uL    Myelocytes Absolute, Manual 0.91 0.00 - 0.00 x10*3/uL    Total Cells Counted 119     Neutrophils Absolute, Manual 7.62 1.20 - 7.70 x10*3/uL    RBC Morphology See Below     Polychromasia Mild     Hypochromia Mild     RBC Fragments Few     Ovalocytes Few     Teardrop Cells Few      *Note: Due to a large number of results and/or encounters for the requested time period, some results have not been displayed. A complete set of results can be found in Results Review.                    Assessment & Plan  Hematoma of chest wall          Chronic respiratory failure with hypoxia, on home O2 therapy    Atrial flutter with controlled response (Multi)    Bloodstream infection associated with central venous catheter    Drug-induced pulmonary hypertension (Multi)    Abnormal antineutrophil cytoplasmic antibody (ANCA) test    History of substance use disorder    Chronic iron deficiency anemia    Bacteremia due to methicillin resistant Staphylococcus aureus    Ronna Beckham is a 64 y.o. female with hx of PAH (on Epo), A-fib s/p ablation (03/2025) on Eliquis, SLE (questionable diagnosis) initially presented to ED for hematoma at site of Montez.   :: Vascular surgery felt pseudoaneurysm/contained thrombus unlikely. No acute intervention per IR.   :: Course c/b ongoing tachyarrhythmias (A-tach or A-flutter), ultimately attributed to severe pulmonary disease, bacteremia. She developed acute on chronic hypoxic respiratory failure (4L->Airvo) requiring transfer to MICU. Found to have MRSA bacteremia 2/2 Montez, line removed by IR.  Currently on Vancomycin > switching to Linezolid monitoring bcx for clearance, TTE with no vegetations and would be high risk for LUIS (deferred LUIS).   -S/p Hendrickson line replacement by IR 4/19/25        Updates 04/20/25:   -on 4 l/min oxygen, her home baseline requirement.  -hold eliquis until she contacts Dr Le tomorrow. (Her cardiologist)  -hb 9.1, stable today.  -discharge today  -provided with veletri to go with and patient has her own portable oxygen        #PAH, group I  #Acute on chronic hypoxic respiratory failure, resolving  :: Home meds: macitentan 10 mg PO daily, continuous epoprostenol infusion via right subclavian hendrickson, monthly sotatercept injections  :: Follows outpatient with Dr. Prescott   :: On 4L NC at baseline at rest and 6-7L with activity; was requiring airvo 50L/80%, back on 4L NC   :: CT 4/12 (-) for PE  :: Negative bubble study on TTE 4/14   Plan:   -Continue epoprostenol via PIV (infusion should NOT be stopped for any reason), formulation was changed to be administered at increased rate and mitigate venous backflow   -Continue macitenan 10 mg PO daily   -Ok to place new line 48 hrs from first -ve bcx per ID, will need IR consult  -In the meantime maintain additional peripheral access solely for administration of epoprostenol   -Continuous pulse ox monitoring   -IR c/s placed for replacement of Hendrickson dedicated for Epoprostanil       #MRSA bacteremia I/s/o Hendrickson line infection   ::Cx 4/12 + MRSA , bcx 4/13 1/2 staph, 4/14 no growth so far, 4/15 no growth so far  :: RVP -ve on 4/12  :: Procal 4/12 0.58, 4/13 0.74  :: Hendrickson recently replaced 3/2025 for line associated SSTI- presented with purulent drainage from hendrickson, briefly on IV abx then completed 10 days of doxycycline  :Current hendrickson removed 4/13 by IR  :TTE 4/14 showed no evidence of valvular vegetations  -Continue IV abx as per ID recs   Pip-tazo 4/12 - 4/13      vancomycin 4/12 - 4/17  cefazolin 4/13 - 4/14  Linezolid  600mg BID 4/17-4/18  -Linezolid d/c'd & pt given 1-time dose of dalbavancin 1500mg (4/18) for MRSA bacteremia coverage given thrombocytopenia   -No LUIS done given risk of procedure and undergoing anesthesia given pt's RV function   -Will defer PICC line as pt already to be getting Hendrickson line for for   -Weekly CBCs on discharge & ID clinic f/u      #Superficial hematoma, eschar R anterior chest wall s/p hendrickson placement   :CTA showing small hematoma with no active extravasation   :Low c/f pseudoaneurysm formation with thrombus per vascular sx and IR recommended conservative mgmt  :Given her h/o substance use disorder consider drug-induced vasculitis 2/2 methamphetamine vs toxic exposure of levamisole or xylazine   ::Further studies to rule out vasculitis component warranted  ::Urine drug screen -ve  ::UE DVT US 4/15: negative for acute DVT, chronic changes in mid/distal cephalic veins   Plan:   -Maintain overlying dressing   -Daily CBCs to eval worsening bleed     #A-fib s/p ablation 3/2025  #Tachyarrhythmia (Atach or atypical Aflutter 2:1)  :h/o SVT iso significant pulmonary hypertension, narrow QRS up to 200 bpm unresponsive to adenosine and highly suspicious for 1:1 atrial flutter  :on diltiazem 180 mg PO daily and apixaban   -Telemetry   -Target HR <120   -Cardiology recs - avoid BB/CCB, dig unlikely to help, if necessary then consider IV amio (although risk of chemical cardioversion), if difficulty controlling rates consider DCCV if she can tolerate AC  -Heparin gtt started on 4/14  -Holding apixiban   -Holding diltiazem   -At discharge, will need ziopatch and EP follow up  -Given pt's intolerance of chemical AC and risk associated with LUIS cardioversion can consider CT watchman at later time as less invasive option     #Chest pain (resolved)   :Per MICU team, endorsing CP on 4/15. ECG stable from prior (in media tab)  -Troponin 49     #Systemic lupus erythematosus(?)   :Currently managed by her pcp, although  can not directly find records in the chart  :Currently on plaquenil testing  :Denies any clinical symptoms of active flare including joint pain, muscle pain  :Per EMR she had +ve anti-nuclear panel with a 1:1280 titer, however the anti-dsDNA was 4.0 IU/mL which is -ve; never had a +ve anti-dsDNA; unusual to be started on plaquenil for this  -F/up repeat autoimmune workup, if this is not lupus, then would consider discontinuing plaquenil  -Dc'd hydroxychloroquine per Rheum  -Pt to have fu with Rheum outpatient in 3 mos to re-eval sx while off plaquenil         #Acute on chronic anemia, microcytic  #Acute on chronic thrombocytopenia  :Baseline hemoglobin 10, 8.8 on admit  :Worsening thrombocytopenia, baseline >100   :Smear: microcytosis, suggestive of component of iron deficiency, schistocytes not increased   :, fibrinogen 461, haptoglobin 193, ALCIRA neg, tbili 1.1, direct bili 0.2, ferritin 63  :: May be 2/2 acute illness vs drug rxn from plaquenil   Plan:   -Continue to trend CBC daily   -Goal hgb >7, platelets >10, >50 if bleeding      #CAD  -Aspirin 81 mg daily      #GERD  -Pantoprazole 40 mg daily     #Peripheral neuropathy   -Gabapentin 800 mg PO TID     #MDD  -Venlafaxine 150 mg PO daily      Misc:   -C/w calcium, vit D  -Mg, Kcl 20 meq bid  -Nightly melatonin   -Loperamide for chronic diarrhea (monitor qtc)   -IV tylenol 1g bid     Outpatient:   -PCP for imaging of nodules  -Lymph node biopsy f/up         Fluids: prn  Electrolytes: prn, k>4 mg>2 phos>3  Nutrition: regular   Access: pIV     GI ppx: pantoprazole 40 mg   DVT ppx: lovenox   Bowel care: miralax  Abx: Vanc  O2: 4L O2 NC at rest (5-6L)      Code Status: Full  NOK: Diana Vera (Sister)[phone]           Azam Reyna MD

## 2025-04-20 NOTE — PROGRESS NOTES
"Ronna Beckham is a 64 y.o. female on day 11 of admission presenting with Bacteremia due to methicillin resistant Staphylococcus aureus.    Per sign out from [facility] on Sat, pt was waiting for insurance approval for home Veletri for pulm HTN.  This [facility] does not see verification of this and informed pt's nurse and MAURICIO Spicer.  Nurse Julia said she did not have any information stating that they were waiting on insurance.      \"Per Julia, she was originally set for DC last night but had some bleeding at the hendrickson site. She has been cleared to DC today otherwise\".       1355  Called [facility] pharmacy for information, LVMM.     1430  Per pt's nurse, pt has already dc'd.  Jennifer was unsure of medication status.    Pt's nurse Julia stated that the pharmacy was up to assist with priming the medication cassette.    Chani Sheppard RN [facility] weekend  epic messenger      "

## 2025-04-20 NOTE — CARE PLAN
Patient remained free of falls, therapeutic throughout shift. No complaints of pain or bleeding.   Problem: Skin  Goal: Decreased wound size/increased tissue granulation at next dressing change  Outcome: Progressing     Problem: Skin  Goal: Prevent/manage excess moisture  Outcome: Progressing     Problem: Skin  Goal: Prevent/minimize sheer/friction injuries  Outcome: Progressing     Problem: Skin  Goal: Promote/optimize nutrition  Outcome: Progressing     Problem: Skin  Goal: Promote skin healing  Outcome: Progressing     Problem: Fall/Injury  Goal: Not fall by end of shift  Outcome: Progressing     Problem: Fall/Injury  Goal: Verbalize understanding of risk factor reduction measures to prevent injury from fall in the home  Outcome: Progressing     Problem: Fall/Injury  Goal: Use assistive devices by end of the shift  Outcome: Progressing     Problem: Fall/Injury  Goal: Pace activities to prevent fatigue by end of the shift  Outcome: Progressing     Problem: Nutrition  Goal: Nutrient intake appropriate for maintaining nutritional needs  Outcome: Progressing     Problem: Chronic Conditions and Co-morbidities  Goal: Patient's chronic conditions and co-morbidity symptoms are monitored and maintained or improved  Outcome: Progressing   The patient's goals for the shift include      The clinical goals for the shift include Patient will remain therapeutic throughout shift.

## 2025-04-20 NOTE — PROGRESS NOTES
"  Internal Medicine Progress Note     Ronna Beckham is a 64 y.o. female with a past medical history of Pulmonary Arterial Hypertension Group 1, Atrial Fibrillation s/p Ablation (03/2025), Systemic Lupus Erythematosus admitted on 4/9/2025 initially presented to ED for hematoma at site of Montez. Course c/b ongoing tachyarrhythmias (atach or aflutter), ultimately attributed to severe pulmonary disease, bacteremia. She developed acute on chronic hypoxic respiratory failure (4L->Airvo) requiring transfer to MICU. Found to have MRSA bacteremia 2/2 Montez, line removed by IR. Currently on Vancomycin, monitoring bcx for clearance, TTE with no vegetations and would be high risk for LUSI. Epo currently running via Montez Line placed 4/18    Subjective   Interval hx: Pt noted to have some oozing from Montez line site ON, but resolved with application of pressure and     Pt seen and evaluated at bedside.    At time of eval, pt notes breathing much improved from prior and that she overall feels better     Otherwise denies any new sx or worsening SOB at rest. Notes she only occasionally experiences ALAMO. Otherwise denies fever/chills, dizziness/lightheadedness, worsening LE edema.     Medications     Medications:   Scheduled medications  Scheduled Medications[1]  Continuous medications  Continuous Medications[2]  PRN medications  PRN Medications[3]     Objective     Vitals  Visit Vitals  /76 (BP Location: Right arm, Patient Position: Lying)   Pulse 103   Temp 36.9 °C (98.4 °F) (Temporal)   Resp 20   Ht 1.651 m (5' 5\")   Wt 71.9 kg (158 lb 8.2 oz)   SpO2 95%   BMI 26.38 kg/m²   OB Status Hysterectomy   Smoking Status Former   BSA 1.82 m²       Intake/Output Summary (Last 24 hours) at 4/19/2025 2013  Last data filed at 4/19/2025 1824  Gross per 24 hour   Intake 692.05 ml   Output 1300 ml   Net -607.95 ml         Physical Exam  Vitals and nursing note reviewed.   Constitutional:       General: She is awake. She is not in " acute distress.     Appearance: She is ill-appearing. She is not toxic-appearing.      Interventions: Nasal cannula in place.      Comments: On 5-6L O2 via NC, satting 92-94%  Appears fatigued    HENT:      Head: Normocephalic and atraumatic.   Cardiovascular:      Rate and Rhythm: Tachycardia present. Rhythm irregular.      Pulses: Normal pulses.           Radial pulses are 2+ on the right side and 2+ on the left side.      Heart sounds: Murmur heard.      Systolic murmur is present.      Comments: Borderline tachycardic, HR high 90s-low 100s  Pulmonary:      Effort: Pulmonary effort is normal. No respiratory distress or retractions.      Breath sounds: Normal breath sounds. No wheezing.   Abdominal:      General: Abdomen is flat.      Palpations: Abdomen is soft.   Musculoskeletal:      Right lower leg: No edema.      Left lower leg: No edema.   Skin:     General: Skin is warm and dry.   Neurological:      General: No focal deficit present.      Mental Status: She is alert and oriented to person, place, and time.   Psychiatric:         Behavior: Behavior is cooperative.          Labs  Lab Results   Component Value Date    WBC 11.7 (H) 04/19/2025    HGB 9.4 (L) 04/19/2025    HCT 33.5 (L) 04/19/2025    MCV 79 (L) 04/19/2025    PLT 70 (L) 04/19/2025      Lab Results   Component Value Date    GLUCOSE 86 04/19/2025    CALCIUM 8.3 (L) 04/19/2025     04/19/2025    K 4.2 04/19/2025    CO2 22 04/19/2025     04/19/2025    BUN 9 04/19/2025    CREATININE 0.51 04/19/2025      Lab Results   Component Value Date    ALT 8 04/19/2025    AST 9 04/19/2025     (H) 07/23/2019    ALKPHOS 130 04/19/2025    BILITOT 0.6 04/19/2025        Imaging  === 04/09/25 ===    XR CHEST 1 VIEW    - Impression -  Slight improvement of edematous changes. There remains trace left  pleural effusion with bibasilar atelectasis.    I personally reviewed the images/study and I agree with the findings  as stated by Bret Canales MD. This  study was interpreted at  [facility], [place], [place].    MACRO:  None    Signed by: Brett Cox 4/16/2025 8:30 AM  Dictation workstation:   [id number]   === 04/09/25 ===    CT ANGIO CHEST FOR PULMONARY EMBOLISM    - Impression -  1. No evidence of acute pulmonary embolism to segmental level. Please  note that, assessment of subsegmental branches is limited and small  peripheral emboli are not entirely excluded.  2. Interval development of trace/small left pleural effusion. Left  basilar consolidative opacity. Additional new consolidative opacity  in the posterior right middle lobe and areas of ground-glass opacity  in the right lower lobe. Findings are likely atelectatic, however  infectious process should be excluded in appropriate clinical setting.  3. Severe enlargement of the right ventricle and right atrium with  dilated main pulmonary artery in keeping with patient's known  pulmonary artery hypertension.  4. There is suggestion of a defect in the base of interventricular  septum (VSD). Correlate with echocardiogram and consider cardiac MRI  for shunt assessment. Correlate with concern for Eisenmenger syndrome.  5. Peribronchovascular and centrilobular ground-glass halo in  bilateral lungs, likely sequela of elevated pulmonary artery  pressure/pulmonary artery hypertension.  6. Liver appears enlarged, partially imaged. Findings might be due to  hepatic congestion/congestive hepatopathy secondary to right heart  dysfunction.      I personally reviewed the images/study and I agree with the findings  as stated by Paras Gabriel MD. This study was interpreted at  [facility], [place], [place].    MACRO:  Critical Finding:  See findings. Notification was initiated on  4/13/2025 at 12:30 pm by  Janice Salvador.  (**-YCF-**)  Instructions:    Signed by: Janice Salvador 4/13/2025 12:36 PM  Dictation workstation:   [id number]          Assessment/Plan     Ronna Beckham is a 64 y.o. female with hx of PAH (on Epo), A-fib s/p ablation (03/2025) on Eliquis, SLE (questionable diagnosis) initially presented to ED for hematoma at site of Hendrickson.   :: Vascular surgery felt pseudoaneurysm/contained thrombus unlikely. No acute intervention per IR.   :: Course c/b ongoing tachyarrhythmias (A-tach or A-flutter), ultimately attributed to severe pulmonary disease, bacteremia. She developed acute on chronic hypoxic respiratory failure (4L->Airvo) requiring transfer to MICU. Found to have MRSA bacteremia 2/2 Hendrickson, line removed by IR. Currently on Vancomycin > switching to Linezolid monitoring bcx for clearance, TTE with no vegetations and would be high risk for LUIS (deferred LUIS).   -S/p Hendrickson line replacement by IR 4/19/25      Updates 04/19/25:   -Weaned O2 as able to home level 4L at rest, 6-7L with activity   -Resumed Eliquis this AM and stopped heparin gtt      #PAH, group I  #Acute on chronic hypoxic respiratory failure, resolving  :: Home meds: macitentan 10 mg PO daily, continuous epoprostenol infusion via right subclavian hendrickson, monthly sotatercept injections  :: Follows outpatient with Dr. Prescott   :: On 4L NC at baseline at rest and 6-7L with activity; was requiring airvo 50L/80%, back on 4L NC   :: CT 4/12 (-) for PE  :: Negative bubble study on TTE 4/14   Plan:   -Continue epoprostenol via PIV (infusion should NOT be stopped for any reason), formulation was changed to be administered at increased rate and mitigate venous backflow   -Continue macitenan 10 mg PO daily   -Ok to place new line 48 hrs from first -ve bcx per ID, will need IR consult  -In the meantime maintain additional peripheral access solely for administration of epoprostenol   -Continuous pulse ox monitoring   -IR c/s placed for replacement of Hendrickson dedicated for Epoprostanil       #MRSA bacteremia I/s/o Hendrickson line infection   ::Cx 4/12 + MRSA , bcx 4/13 1/2 staph,  4/14 no growth so far, 4/15 no growth so far  :: RVP -ve on 4/12  :: Procal 4/12 0.58, 4/13 0.74  :: Hendrickson recently replaced 3/2025 for line associated SSTI- presented with purulent drainage from hendrickson, briefly on IV abx then completed 10 days of doxycycline  :Current hendrickson removed 4/13 by IR  :TTE 4/14 showed no evidence of valvular vegetations  -Continue IV abx as per ID recs   Pip-tazo 4/12 - 4/13      vancomycin 4/12 - 4/17  cefazolin 4/13 - 4/14  Linezolid 600mg BID 4/17-4/18  -Linezolid d/c'd & pt given 1-time dose of dalbavancin 1500mg (4/18) for MRSA bacteremia coverage given thrombocytopenia   -No LUIS done given risk of procedure and undergoing anesthesia given pt's RV function   -Will defer PICC line as pt already to be getting Hendrickson line for for   -Weekly CBCs on discharge & ID clinic f/u      #Superficial hematoma, eschar R anterior chest wall s/p hendrickson placement   :CTA showing small hematoma with no active extravasation   :Low c/f pseudoaneurysm formation with thrombus per vascular sx and IR recommended conservative mgmt  :Given her h/o substance use disorder consider drug-induced vasculitis 2/2 methamphetamine vs toxic exposure of levamisole or xylazine   ::Further studies to rule out vasculitis component warranted  ::Urine drug screen -ve  ::UE DVT US 4/15: negative for acute DVT, chronic changes in mid/distal cephalic veins   Plan:   -Maintain overlying dressing   -Daily CBCs to eval worsening bleed     #A-fib s/p ablation 3/2025  #Tachyarrhythmia (Atach or atypical Aflutter 2:1)  :h/o SVT iso significant pulmonary hypertension, narrow QRS up to 200 bpm unresponsive to adenosine and highly suspicious for 1:1 atrial flutter  :on diltiazem 180 mg PO daily and apixaban   -Telemetry   -Target HR <120   -Cardiology recs - avoid BB/CCB, dig unlikely to help, if necessary then consider IV amio (although risk of chemical cardioversion), if difficulty controlling rates consider DCCV if she can  tolerate AC  -Heparin gtt started on 4/14  -Holding apixiban   -Holding diltiazem   -At discharge, will need ziopatch and EP follow up  -Given pt's intolerance of chemical AC and risk associated with LUIS cardioversion can consider CT watchman at later time as less invasive option     #Chest pain (resolved)   :Per MICU team, endorsing CP on 4/15. ECG stable from prior (in media tab)  -Troponin 49     #Systemic lupus erythematosus(?)   :Currently managed by her pcp, although can not directly find records in the chart  :Currently on plaquenil testing  :Denies any clinical symptoms of active flare including joint pain, muscle pain  :Per EMR she had +ve anti-nuclear panel with a 1:1280 titer, however the anti-dsDNA was 4.0 IU/mL which is -ve; never had a +ve anti-dsDNA; unusual to be started on plaquenil for this  -F/up repeat autoimmune workup, if this is not lupus, then would consider discontinuing plaquenil  -Dc'd hydroxychloroquine per Rheum  -Pt to have fu with Rheum outpatient in 3 mos to re-eval sx while off plaquenil        #Acute on chronic anemia, microcytic  #Acute on chronic thrombocytopenia  :Baseline hemoglobin 10, 8.8 on admit  :Worsening thrombocytopenia, baseline >100   :Smear: microcytosis, suggestive of component of iron deficiency, schistocytes not increased   :, fibrinogen 461, haptoglobin 193, ALCIRA neg, tbili 1.1, direct bili 0.2, ferritin 63  :: May be 2/2 acute illness vs drug rxn from plaquenil   Plan:   -Continue to trend CBC daily   -Goal hgb >7, platelets >10, >50 if bleeding      #CAD  -Aspirin 81 mg daily      #GERD  -Pantoprazole 40 mg daily     #Peripheral neuropathy   -Gabapentin 800 mg PO TID     #MDD  -Venlafaxine 150 mg PO daily      Misc:   -C/w calcium, vit D  -Mg, Kcl 20 meq bid  -Nightly melatonin   -Loperamide for chronic diarrhea (monitor qtc)   -IV tylenol 1g bid     Outpatient:   -PCP for imaging of nodules  -Lymph node biopsy f/up       Fluids: prn  Electrolytes: prn,  k>4 mg>2 phos>3  Nutrition: regular   Access: pIV    GI ppx: pantoprazole 40 mg   DVT ppx: lovenox   Bowel care: miralax  Abx: Vanc  O2: 4L O2 NC at rest (5-6L)     Code Status: Full  NOK: Diana Vera (Sister)[phone]  ---  Marianne Mcdowell MD  PGY1, Internal Medicine        [1] acetaminophen, 1,000 mg, intravenous, BID  [Held by provider] apixaban, 5 mg, oral, q12h  aspirin, 81 mg, oral, Daily  calcium carbonate-vitamin D3, 2 tablet, oral, BID  ergocalciferol, 1.25 mg, oral, Every Sunday  furosemide, 40 mg, oral, Daily  gabapentin, 800 mg, oral, TID  macitentan, 10 mg, oral, Daily  magnesium chloride, 64 mg, oral, BID  melatonin, 5 mg, oral, Nightly  pantoprazole, 40 mg, oral, Daily before breakfast  potassium chloride CR, 20 mEq, oral, Daily  venlafaxine XR, 150 mg, oral, Daily     [2] epoprostenol, 67 ng/kg/min (Order-Specific), Last Rate: 67 ng/kg/min (04/19/25 1824)  [Held by provider] epoprostenol (Veletri) 9,000 mcg in sodium chloride 0.9% 100 mL cassette for pump, 67 ng/kg/min (Order-Specific)     [3] PRN medications: albuterol, loperamide, oxygen

## 2025-04-20 NOTE — NURSING NOTE
Rapid Response Nurse Note: RADAR alert: 11    Pager time: [phone]  Arrival time: 1245  Event end time: 1300  Location: [place] [address]  [] Triage by phone or secure messaging    Rapid response initiated by:  [] Rapid response RN [] Family [] Nursing Supervisor [] Physician   [x] RADAR auto page [] Sepsis auto-page [] RN [] RT   [] NP/PA [] Other:     Primary reason for call:   [] BAT [] New CPAP/BiPAP [] Bleeding [] Change in mental status   [] Chest pain [] Code blue [] FiO2 >/= 50% [] HR </= 40 bpm   [] HR >/= 130 bpm [] Hyperglycemia [] Hypoglycemia [x] RADAR    [] RR </= 8 bpm [] RR >/= 30 bpm [] SBP </= 90 mmHg [] SpO2 < 90%   [] Seizure [] Sepsis [] Shortness of breath  [] Staff concern: see comments     Initial VS and/or RADAR VS: T 35.6 °C; ; RR 22; /67; SPO2 90%.        Interventions:  [x] None [] ABG/VBG [] Assist w/ICU transfer [] BAT paged    [] Bag mask [] Blood [] Cardioversion [] Code Blue   [] Code blue for intubation [] Code status changed [] Chest x-ray [] EKG   [] IV fluid/bolus [] KUB x-ray [] Labs/cultures [] Medication   [] Nebulizer treatment [] NIPPV (CPAP/BiPAP) [] Oxygen [] Oral airway   [] Peripheral IV [] Palliative care consult [] CT/MRI [] Sepsis protocol    [] Suctioned [] Other:     Outcome:  [] Coded and  [] Code blue for intubation [] Coded and transferred to ICU []  on division   [x] Remained on division (no change) [] Remained on division + additional monitoring [] Remained in ED [] Transferred to ED   [] Transferred to ICU [] Transferred to inpatient status [] Transferred for interventions (procedure) [] Transferred to ICU stepdown    [] Transferred to surgery [] Transferred to telemetry [] Sepsis protocol [] STEMI protocol   [] Stroke protocol [x] Bedside nurse instructed to page rapid response for any concerns or acute change in condition/VS     Additional Comments: Radar autopage of 11 for above VS.  Pt seen, she is alert, voices no complaints, states she  feels well and is going home today. Reviewed VS with bedside nurse, they are consistent with her trends. She has PHTN and  is on veletry. No concerns voiced.

## 2025-04-20 NOTE — NURSING NOTE
RN reviewed AVS in detail with patient. Patient verbalized understanding of calling cardiologist tomorrow regarding eliquis restart and holter monitor along with understanding of weekly blood draws for ID to continue following. RN highlighted ID PA fax number and informed patient to make sure her outpatient labs are aware to fax results to number highlighted. Patient belongings accounted for, RN helped patient pack phone , clothes, and AVS. Patient reconnected to home veletri cassette with RN and pharmacist at bedside without issues. RN removed PIV x2 and disconnected pt for continuous monitor. Patient being picked up by granddaughter.

## 2025-04-21 LAB
ATRIAL RATE: 118 BPM
ATRIAL RATE: 119 BPM
P OFFSET: 175 MS
P OFFSET: 187 MS
P ONSET: 117 MS
P ONSET: 123 MS
PR INTERVAL: 176 MS
PR INTERVAL: 176 MS
Q ONSET: 205 MS
Q ONSET: 211 MS
QRS COUNT: 19 BEATS
QRS COUNT: 19 BEATS
QRS DURATION: 110 MS
QRS DURATION: 112 MS
QT INTERVAL: 410 MS
QT INTERVAL: 420 MS
QTC CALCULATION(BAZETT): 576 MS
QTC CALCULATION(BAZETT): 588 MS
QTC FREDERICIA: 515 MS
QTC FREDERICIA: 526 MS
R AXIS: 141 DEGREES
R AXIS: 149 DEGREES
T AXIS: -51 DEGREES
T AXIS: -57 DEGREES
T OFFSET: 410 MS
T OFFSET: 421 MS
VENTRICULAR RATE: 118 BPM
VENTRICULAR RATE: 119 BPM

## 2025-04-22 ENCOUNTER — DOCUMENTATION (OUTPATIENT)
Dept: PULMONOLOGY | Facility: HOSPITAL | Age: 65
End: 2025-04-22
Payer: COMMERCIAL

## 2025-04-22 ENCOUNTER — PATIENT OUTREACH (OUTPATIENT)
Dept: CARE COORDINATION | Age: 65
End: 2025-04-22
Payer: COMMERCIAL

## 2025-04-22 PROBLEM — R19.5 FECAL OCCULT BLOOD TEST POSITIVE: Status: ACTIVE | Noted: 2025-04-22

## 2025-04-22 NOTE — PROGRESS NOTES
Sotatercept Monitoring    Dosing History:  7th  Sotatercept dose given on 4/22/2025.   Starting Dose: 45 mg kit, 0.3 mg/kg. Inject 0.4 ml.   Original Target Dose: 60 mg kit, 0.7mg/kg. Inject 1.0 ml.      Holding History:  Held on 11/20/2024 to 12/13/2024 for multiple ED visits and hospitalizations.   Dose #7 held 4/11/2025-4/22/2025 for inpatient admission    Date: 10/26/2024      Baseline HGB 8.3    Lab Results   Component Value Date    WBC 10.8 04/20/2025    HGB 9.1 (L) 04/20/2025    HCT 33.2 (L) 04/20/2025    MCV 81 04/20/2025    PLT 59 (L) 04/20/2025       Per Dr. Prescott, ok for next dose: yes    Symptoms: black tarry stools/nosebleeds/heavy menstrual bleeding/petechia: Denies    Plan:  Continue labs before each dose

## 2025-04-22 NOTE — DISCHARGE SUMMARY
Discharge Diagnosis  Principal Problem:    Bacteremia due to methicillin resistant Staphylococcus aureus (MRSA)  Secondary Problems:    Bloodstream infection associated with central venous catheter    Chronic respiratory failure with hypoxia, on home O2 therapy    Hematoma of chest wall    Atrial flutter with controlled response (Multi)    Drug-induced pulmonary hypertension (Multi)    Abnormal antineutrophil cytoplasmic antibody (ANCA) test    History of substance use disorder    Chronic iron deficiency anemia    Fecal occult blood test positive    Issues Requiring Follow-Up  [] Follow-up with cardiology Dr Le in clinic, April 22. The main active question would be when to resume eliquis, after the event of bleed that happened the night before discharge, and that stopped completely.  [] Follow-up with Dr Prescott in clinic for pulmonary hypertension.  [] Follow-up with rheumatology in clinic for the follow-up on the previous diagnosis of lupus, that did not seem to be a solid diagnosis, plaquenil stooped. For further evaluation.  [] Follow-up with ID in clinic.    Discharge Meds     Medication List      CHANGE how you take these medications     calcium carbonate-vitamin D3 500 mg-5 mcg (200 unit) tablet; Take 2   tablets by mouth 2 times a day.; What changed: how much to take   gabapentin 800 mg tablet; Commonly known as: Neurontin; Take 1 tablet   (800 mg) by mouth 3 times a day.; What changed: when to take this     CONTINUE taking these medications     aspirin 81 mg EC tablet; Take 1 tablet (81 mg) by mouth once daily.   cholecalciferol 25 mcg (1,000 units) tablet; Commonly known as: Vitamin   D-3   Eliquis 5 mg tablet; Generic drug: apixaban; Take 1 tablet (5 mg) by   mouth 2 times a day.   epoprostenol 1.5 mg recon soln; Commonly known as: Veletri; 1.5 mg   (1,500 mcg) by central venous line infusion route continuously.   Reconstitute contents of vial with 5 ml diluent and mix cassette as   directed. Infuse  continuously per physician orders. Allow for priming   volume. Dose range: 1-150 ng/kg/min.   ergocalciferol 1250 mcg (50,000 units) capsule; Commonly known as:   Vitamin D-2   furosemide 40 mg tablet; Commonly known as: Lasix; Take 1 tablet (40 mg)   by mouth once daily.   Mag 64 64 mg EC tablet; Generic drug: magnesium chloride; Take 1 tablet   (64 mg) by mouth 2 times a day.   melatonin 5 mg tablet; Take 1 tablet (5 mg) by mouth once daily at   bedtime.   multivitamin with minerals tablet   omeprazole 40 mg DR capsule; Commonly known as: PriLOSEC; Take 1 capsule   (40 mg) by mouth once daily in the morning. Take before meals. Do not   crush or chew.   Opsumit 10 mg tablet tablet; Generic drug: macitentan; Take 1 tablet (10   mg) by mouth once daily.   potassium chloride CR 20 mEq ER tablet; Commonly known as: Klor-Con M20;   Take 1 tablet (20 mEq) by mouth 2 times a day. Do not crush or chew.   sotatercept-csrk 60 mg kit   venlafaxine  mg 24 hr capsule; Commonly known as: Effexor-XR; Take   1 capsule (150 mg) by mouth once daily. Do not crush or chew.     STOP taking these medications     dilTIAZem  mg 24 hr capsule; Commonly known as: Cardizem CD   hydroxychloroquine 200 mg tablet; Commonly known as: Plaquenil   loperamide 2 mg capsule; Commonly known as: Imodium       Relevant Diagnostic Tests  Blood Culture   Date Value Ref Range Status   04/15/2025 Bacillus species, not Bacillus anthracis (A)  Final     Comment:     A single positive blood culture with this organism may indicate contamination at collection. Notify the microbiology laboratory at [phone] if clinical features indicate the need for further work-up.   04/15/2025 No growth at 4 days -  FINAL REPORT  Final   04/14/2025 No growth at 4 days -  FINAL REPORT  Final   04/14/2025 No growth at 4 days -  FINAL REPORT  Final   04/13/2025 No growth at 4 days -  FINAL REPORT  Final   04/13/2025 Staphylococcus aureus (A)  Final     Comment:      For antibiotic susceptibility results see Specimen # - [id number] 4/12/25     Anemia Work-Up  Lab Results   Component Value Date    RBC 4.10 04/20/2025    HGB 9.1 (L) 04/20/2025    HCT 33.2 (L) 04/20/2025    MCV 81 04/20/2025    [id number] 304 08/26/2024    IRON 17 (L) 04/16/2025    TIBC 220 (L) 04/16/2025    IRONSAT 8 (L) 04/16/2025    FERRITIN 63 04/10/2025    SOLTRFREC 8.2 (H) 04/16/2025    RBCFRAGS Few 04/20/2025       Autoimmune Testing     JAREK Titer   Date/Time Value Ref Range Status   04/10/2025 01:13 AM 1:1280  Final     JAREK Pattern   Date/Time Value Ref Range Status   04/10/2025 01:13 AM Homogeneous  Final     Anti-RNP   Date/Time Value Ref Range Status   04/12/2025 12:53 PM <0.2 <1.0 AI Final     Comment:     < 1.0 = NEGATIVE  >=1.0 = POSITIVE     Anti-SCL-70   Date/Time Value Ref Range Status   04/12/2025 12:53 PM <0.2 <1.0 AI Final     Comment:     < 1.0 = NEGATIVE  >=1.0 = POSITIVE     ANCA IFA Pattern   Date/Time Value Ref Range Status   04/12/2025 12:53 PM p-ANCA (A) None Detected Final     Comment:     INTERPRETIVE INFORMATION: ANCA IFA Pattern     Neutrophil Cytoplasmic Antibodies (C-ANCA = granular cytoplasmic   staining, P-ANCA = perinuclear staining) are found in the serum of   over 90 percent of patients with certain necrotizing systemic   vasculitides, and usually in less than 5 percent of patients with   collagen vascular disease or arthritis.  Performed By: [facility]  [address] [place] [id number]  : Tato Ocampo MD, PhD  CLIA Number: [id number]     ANCA IFA Titer   Date/Time Value Ref Range Status   04/12/2025 12:53 PM 1:160 (H) <1:20 Final     Anti-SM   Date Value Ref Range Status   04/12/2025 <0.2 <1.0 AI Final     Comment:     < 1.0 = NEGATIVE  >=1.0 = POSITIVE   04/10/2025 <0.2 <1.0 AI Final     Comment:     < 1.0 = NEGATIVE  >=1.0 = POSITIVE     Anti-RNP   Date Value Ref Range Status   04/12/2025 <0.2 <1.0 AI Final     Comment:     <  1.0 = NEGATIVE  >=1.0 = POSITIVE   04/10/2025 <0.2 <1.0 AI Final     Comment:     < 1.0 = NEGATIVE  >=1.0 = POSITIVE     Anti-SM/RNP   Date Value Ref Range Status   04/12/2025 <0.2 <1.0 AI Final     Comment:     < 1.0 = NEGATIVE  >=1.0 = POSITIVE   04/10/2025 <0.2 <1.0 AI Final     Comment:     < 1.0 = NEGATIVE  >=1.0 = POSITIVE     Anti-SSA   Date Value Ref Range Status   04/12/2025 0.2 <1.0 AI Final     Comment:     < 1.0 = NEGATIVE  >=1.0 = POSITIVE   04/10/2025 <0.2 <1.0 AI Final     Comment:     < 1.0 = NEGATIVE  >=1.0 = POSITIVE     Anti-SSB   Date Value Ref Range Status   04/12/2025 <0.2 <1.0 AI Final     Comment:     < 1.0 = NEGATIVE  >=1.0 = POSITIVE   04/10/2025 <0.2 <1.0 AI Final     Comment:     < 1.0 = NEGATIVE  >=1.0 = POSITIVE     Anti-SCL-70   Date Value Ref Range Status   04/12/2025 <0.2 <1.0 AI Final     Comment:     < 1.0 = NEGATIVE  >=1.0 = POSITIVE   04/10/2025 <0.2 <1.0 AI Final     Comment:     < 1.0 = NEGATIVE  >=1.0 = POSITIVE     Anti-ALEXEI-1 IgG   Date Value Ref Range Status   04/12/2025 <0.2 <1.0 AI Final     Comment:     < 1.0 = NEGATIVE  >=1.0 = POSITIVE   04/10/2025 <0.2 <1.0 AI Final     Comment:     < 1.0 = NEGATIVE  >=1.0 = POSITIVE     Anti-Chromatin   Date Value Ref Range Status   04/12/2025 <0.2 <1.0 AI Final     Comment:     < 1.0 = NEGATIVE  >=1.0 = POSITIVE   04/10/2025 <0.2 <1.0 AI Final     Comment:     < 1.0 = NEGATIVE  >=1.0 = POSITIVE     Anti-Centromere   Date Value Ref Range Status   04/12/2025 <0.2 <1.0 AI Final     Comment:     < 1.0 = NEGATIVE  >=1.0 = POSITIVE   04/10/2025 <0.2 <1.0 AI Final     Comment:     < 1.0 = NEGATIVE  >=1.0 = POSITIVE     ANTI-RIBOSOMAL P   Date Value Ref Range Status   04/12/2025 <0.2 <1.0 AI Final     Comment:     < 1.0 = NEGATIVE  >=1.0 = POSITIVE   04/10/2025 <0.2 <1.0 AI Final     Comment:     < 1.0 = NEGATIVE  >=1.0 = POSITIVE     Anti-DNA (DS)   Date Value Ref Range Status   04/12/2025 1.0 <5.0 IU/mL Final     Comment:     NEGATIVE: <= 4  IU/ML  EQUIVOCAL: 5- 9 IU/ML  POSITIVE: >=10 IU/ML   04/10/2025 1.0 <5.0 IU/mL Final     Comment:     NEGATIVE: <= 4 IU/ML  EQUIVOCAL: 5- 9 IU/ML  POSITIVE: >=10 IU/ML     Hospital Course  Patient is a 64-year old female with a past medical history of Pulmonary Arterial Hypertension Group 1 (Montez in place), Atrial Fibrillation s/p Ablation (03/2025) on Eliquis, Systemic Lupus Erythematosus (questionable diagnosis) that presents to the emergency department for worsening findings of contained bleed on the anterior chest wall. In the emergency department, patient was hemodynamically stable with borderline sinus tachycardia noted. She was found to have a grape sized hematoma on the anterior chest wall. She was informed to hold her Eliquis prior to arrival by the cardiology clinic. A CT-angiogram of chest was obtained showing small hematoma on chest surface per radiology report with soft tissue swelling and no active extravasation. There is no concern for pulmonary embolism. Also with findings of scattered pulmonary nodules and intra-thoracic lymphadenopathy and questionable adrenal gland nodule. Laboratory studies notable for anemia at 8.8 g/dL (baseline ~10 g/dL), and thrombocytopenia.     Patient was then admitted on 04/10 with concern for hematoma with contained bleed in the setting of dropping hemoglobin. Vascular surgery was consulted and evaluated the patient and stated findings of hematoma with no concern for pseudoaneurysm formation, or contained thrombus in a pseudoaneurysm. The interventional radiology team was consulted and evaluated the patient and recommended no evacuation of the hematoma, continuing on conservative management. Wound care consulted and provide recommendations, however patient declined. On 04/11 evening patient was found to be tachycardic, with an EKG showing sinus tachycardia but questionable findings of atrial fibrillation/flutter; stable oxygen requirements. On 04/12 patient was found  to be slightly more lethargic, with persistent anemia without concern for active bleeding. Rapid response called in the mid afternoon for tachycardia to the 120s. EKG obtained then also showing sinus tachycardia with questionable findings of atrial fibrillation and atrial flutter.     Notified the cardiology team for concern of recurrence of atrial fibrillation/atrial flutter, recommending formal consultation next day. However in the evening, patient had continued tachycardia to the 130s. The CICU evaluated at bedside and recommended no further escalation unless tachycardic greater than 130 bpms. Rapid response then called again due to escalating oxygen requirements from baseline of 4 L/min. Unable to tolerate HFNC. Infectious workup was repeated and pt started empirically on Vanc/Zosyn, and eventually found to have MRSA bacteremia. On 4/12, pt admitted to MICU d/t increased O2 requirement requiring AirVo. CT (-) for PE. Montez Line removed 4/13/25 by IR. Was eventually weaned to NC and transferred to floor. Pt also seen and evaluated by cardiology regarding tachyarrhythmias, and home diltiazem was dc'd.    For her MRSA bacteremia, pt was narrowed from Vanc/Zosyn -> Vanc, then switched to Linezolid d/t concerns about infections from placing PICC line in addition to later Montez placement. However due to ongoing cytopenias (possibly drug induced vs bone marrow suppression), linezolid was d/c'd and pt was given 1x dose dalbavancin (long-acting) cover MRSA bacteremia for recommended duration of abx (til 4/28).     Pt also seen by Rheum for eval of prior dx of SLE and pt determined to likely not truly have SLE, so Plaquenil was dc'd.    Montez replaced by IR 4/18 and Velitri was restarted thru Montez line. On 4/19, pt was transitioned from heparin gtt to home Eliquis. Pt was initially meant to be discharged but began oozing from Montez line site, which was releived by application of pressure and subsequent surgicel  dressing application. Pt remained overnight for observation.no bleed in the morning. HB stable, patient hemodynamically stable on 4/20. On home baseline oxygen, 4 l/min.  Discharged home off eliquis, the plan was to call Dr Le on Monday to resume it back.    Pertinent Physical Exam At Time of Discharge  Physical Exam  Constitutional:       General: She is awake. She is not in acute distress.     Appearance: She iswel-looking, comfortable. She is not toxic-appearing.      Interventions: Nasal cannula in place.      Comments: On 4L O2 via NC, satting 92-94%   Looks comfortable  HENT:      Head: Normocephalic and atraumatic.   Cardiovascular:      Rate and Rhythm: Tachycardia present. 110/min Rhythm irregular.      Pulses: Normal pulses.           Radial pulses are 2+ on the right side and 2+ on the left side.      Heart sounds: Murmur heard.      Systolic murmur is present.      Pulmonary:      Effort: Pulmonary effort is normal. No respiratory distress or retractions.      Breath sounds: Normal breath sounds. No wheezing.   Abdominal:      General: Abdomen is flat.      Palpations: Abdomen is soft.   Musculoskeletal:      Right lower leg: No edema.      Left lower leg: No edema.   Skin:     General: Skin is warm and dry.   Neurological:      General: No focal deficit present.      Mental Status: She is alert and oriented to person, place, and time.   Psychiatric:         Behavior: Behavior is cooperative.       Outpatient Follow-Up  Future Appointments   Date Time Provider Department Center   4/22/2025  9:30 AM Chicho Le MD [id number] [facility]   5/19/2025  2:20 PM Isabelle Weber PA-C [id number] [facility]   7/14/2025  9:00 AM Liza Juarez DO [id number] [facility]         Azam Reyna MD

## 2025-04-23 ENCOUNTER — PATIENT OUTREACH (OUTPATIENT)
Dept: CARE COORDINATION | Age: 65
End: 2025-04-23
Payer: COMMERCIAL

## 2025-04-23 LAB
ATRIAL RATE: 102 BPM
P OFFSET: 173 MS
P ONSET: 113 MS
PR INTERVAL: 194 MS
Q ONSET: 210 MS
QRS COUNT: 16 BEATS
QRS DURATION: 104 MS
QT INTERVAL: 378 MS
QTC CALCULATION(BAZETT): 492 MS
QTC FREDERICIA: 451 MS
R AXIS: 147 DEGREES
T AXIS: -34 DEGREES
T OFFSET: 399 MS
VENTRICULAR RATE: 102 BPM

## 2025-04-24 ENCOUNTER — PATIENT OUTREACH (OUTPATIENT)
Dept: CARE COORDINATION | Age: 65
End: 2025-04-24
Payer: COMMERCIAL

## 2025-04-24 ENCOUNTER — APPOINTMENT (OUTPATIENT)
Dept: RESPIRATORY THERAPY | Facility: HOSPITAL | Age: 65
End: 2025-04-24
Payer: COMMERCIAL

## 2025-04-24 ENCOUNTER — APPOINTMENT (OUTPATIENT)
Dept: PULMONOLOGY | Facility: HOSPITAL | Age: 65
End: 2025-04-24
Payer: COMMERCIAL

## 2025-04-24 ENCOUNTER — APPOINTMENT (OUTPATIENT)
Dept: CARDIOLOGY | Facility: HOSPITAL | Age: 65
End: 2025-04-24
Payer: COMMERCIAL

## 2025-04-24 ENCOUNTER — HOME CARE VISIT (OUTPATIENT)
Dept: HOME HEALTH SERVICES | Facility: HOME HEALTH | Age: 65
End: 2025-04-24
Payer: COMMERCIAL

## 2025-04-24 VITALS
HEART RATE: 105 BPM | DIASTOLIC BLOOD PRESSURE: 72 MMHG | OXYGEN SATURATION: 95 % | RESPIRATION RATE: 18 BRPM | SYSTOLIC BLOOD PRESSURE: 116 MMHG | TEMPERATURE: 98.2 F

## 2025-04-24 PROCEDURE — G0151 HHCP-SERV OF PT,EA 15 MIN: HCPCS

## 2025-04-24 SDOH — HEALTH STABILITY: PHYSICAL HEALTH: EXERCISE ACTIVITIES SETS: 1

## 2025-04-24 SDOH — HEALTH STABILITY: PHYSICAL HEALTH: PHYSICAL EXERCISE: 10

## 2025-04-24 SDOH — HEALTH STABILITY: PHYSICAL HEALTH

## 2025-04-24 SDOH — HEALTH STABILITY: PHYSICAL HEALTH: EXERCISE ACTIVITY: APS, MARCHING, LAQ, HIP ABD/ADD

## 2025-04-24 SDOH — HEALTH STABILITY: PHYSICAL HEALTH: PHYSICAL EXERCISE: SITTING

## 2025-04-24 ASSESSMENT — ENCOUNTER SYMPTOMS
OCCASIONAL FEELINGS OF UNSTEADINESS: 1
SHORTNESS OF BREATH: T
DENIES PAIN: 1
MUSCLE WEAKNESS: 1

## 2025-04-24 ASSESSMENT — ACTIVITIES OF DAILY LIVING (ADL)
PHYSICAL TRANSFERS ASSESSED: 1
AMBULATION_DISTANCE/DURATION_TOLERATED: 50
AMBULATION ASSISTANCE ON FLAT SURFACES: 1
AMBULATION ASSISTANCE: ONE PERSON
AMBULATION ASSISTANCE: 1
CURRENT_FUNCTION: ONE PERSON
OASIS_M1830: 03
AMBULATION ASSISTANCE: CONTACT GUARD ASSIST
ENTERING_EXITING_HOME: STAND BY ASSIST
CURRENT_FUNCTION: STAND BY ASSIST

## 2025-04-25 ENCOUNTER — HOME CARE VISIT (OUTPATIENT)
Dept: HOME HEALTH SERVICES | Facility: HOME HEALTH | Age: 65
End: 2025-04-25
Payer: COMMERCIAL

## 2025-04-25 DIAGNOSIS — R78.81 MRSA BACTEREMIA: ICD-10-CM

## 2025-04-25 DIAGNOSIS — R78.81 BACTEREMIA: ICD-10-CM

## 2025-04-25 DIAGNOSIS — B95.62 MRSA BACTEREMIA: ICD-10-CM

## 2025-04-25 DIAGNOSIS — T82.514D HICKMAN CATHETER DYSFUNCTION, SUBSEQUENT ENCOUNTER: ICD-10-CM

## 2025-04-30 ENCOUNTER — HOME CARE VISIT (OUTPATIENT)
Dept: HOME HEALTH SERVICES | Facility: HOME HEALTH | Age: 65
End: 2025-04-30
Payer: COMMERCIAL

## 2025-05-02 DIAGNOSIS — B95.62 MRSA BACTEREMIA: ICD-10-CM

## 2025-05-02 DIAGNOSIS — T82.514D HICKMAN CATHETER DYSFUNCTION, SUBSEQUENT ENCOUNTER: ICD-10-CM

## 2025-05-02 DIAGNOSIS — R78.81 MRSA BACTEREMIA: ICD-10-CM

## 2025-05-02 DIAGNOSIS — R78.81 BACTEREMIA: ICD-10-CM

## 2025-05-03 ENCOUNTER — HOME CARE VISIT (OUTPATIENT)
Dept: HOME HEALTH SERVICES | Facility: HOME HEALTH | Age: 65
End: 2025-05-03
Payer: COMMERCIAL

## 2025-05-05 ENCOUNTER — APPOINTMENT (OUTPATIENT)
Dept: CARDIOLOGY | Facility: CLINIC | Age: 65
End: 2025-05-05
Payer: COMMERCIAL

## 2025-05-05 ENCOUNTER — DOCUMENTATION (OUTPATIENT)
Dept: PULMONOLOGY | Facility: HOSPITAL | Age: 65
End: 2025-05-05
Payer: COMMERCIAL

## 2025-05-07 ENCOUNTER — HOME CARE VISIT (OUTPATIENT)
Dept: HOME HEALTH SERVICES | Facility: HOME HEALTH | Age: 65
End: 2025-05-07
Payer: COMMERCIAL

## 2025-05-08 NOTE — PROGRESS NOTES
"Positive Covid Test:  Patient called office with update, testing positive for covid over the weekend. States she had been feeling symptoms for 5 days. Symptoms include runny/ stuffed nose, increased phlegm and feeling hot. Not prescribed any medications. Taking OTC Robitussin.    Nose bleed:  Also reports a bloody nose, \"a lot of blood with clots.\" Last nosebleed one month ago. RN advised patient if there is either a lot of blood or bleeding for a long time, she needs to go to Emergency Department. Pt verbalized understanding.    Home care/ Wound care:  RN inquired if the home care nurse was able to provide wound care instructions. Patient states she never had home because she refused it. States the wound is healed and she had been covering with a sterile dressing.  "

## 2025-05-09 DIAGNOSIS — R78.81 MRSA BACTEREMIA: ICD-10-CM

## 2025-05-09 DIAGNOSIS — T82.514D HICKMAN CATHETER DYSFUNCTION, SUBSEQUENT ENCOUNTER: ICD-10-CM

## 2025-05-09 DIAGNOSIS — B95.62 MRSA BACTEREMIA: ICD-10-CM

## 2025-05-09 DIAGNOSIS — R78.81 BACTEREMIA: ICD-10-CM

## 2025-05-13 ENCOUNTER — DOCUMENTATION (OUTPATIENT)
Dept: PULMONOLOGY | Facility: HOSPITAL | Age: 65
End: 2025-05-13
Payer: COMMERCIAL

## 2025-05-13 DIAGNOSIS — D64.9 ANEMIA, UNSPECIFIED TYPE: ICD-10-CM

## 2025-05-13 DIAGNOSIS — D64.9 LOW HEMOGLOBIN: ICD-10-CM

## 2025-05-13 LAB
BASOPHILS # BLD AUTO: 0 CELLS/UL (ref 0–200)
BASOPHILS NFR BLD AUTO: 0 %
EOSINOPHIL # BLD AUTO: 0 CELLS/UL (ref 15–500)
EOSINOPHIL NFR BLD AUTO: 0 %
ERYTHROCYTE [DISTWIDTH] IN BLOOD BY AUTOMATED COUNT: 19.1 % (ref 11–15)
HCT VFR BLD AUTO: 35.2 % (ref 35–45)
HGB BLD-MCNC: 9.8 G/DL (ref 11.7–15.5)
LYMPHOCYTES # BLD AUTO: 1260 CELLS/UL (ref 850–3900)
LYMPHOCYTES NFR BLD AUTO: 20 %
MCH RBC QN AUTO: 21.5 PG (ref 27–33)
MCHC RBC AUTO-ENTMCNC: 27.8 G/DL (ref 32–36)
MCV RBC AUTO: 77.2 FL (ref 80–100)
MONOCYTES # BLD AUTO: 189 CELLS/UL (ref 200–950)
MONOCYTES NFR BLD AUTO: 3 %
MYELOCYTES # BLD: 504 CELLS/UL
MYELOCYTES NFR BLD MANUAL: 8 %
NEUTROPHILS # BLD AUTO: 4347 CELLS/UL (ref 1500–7800)
NEUTROPHILS NFR BLD AUTO: 69 %
PLATELET # BLD AUTO: 74 THOUSAND/UL (ref 140–400)
PMV BLD REES-ECKER: 9.6 FL (ref 7.5–12.5)
RBC # BLD AUTO: 4.56 MILLION/UL (ref 3.8–5.1)
SERVICE CMNT-IMP: ABNORMAL
WBC # BLD AUTO: 6.3 THOUSAND/UL (ref 3.8–10.8)

## 2025-05-13 NOTE — PROGRESS NOTES
Sotatercept Monitoring    Dosing History:  8th Sotatercept HOLDING  Starting Dose: 45 mg kit, 0.3 mg/kg. Inject 0.4 ml.   Original Target Dose: 60 mg kit, 0.7mg/kg. Inject 1.0 ml.      Holding History:  Dose #2 - held on 11/20/2024 to 12/13/2024 for multiple ED visits and hospitalizations.   Dose #7 - held 4/11/2025-4/22/2025 for inpatient admission  Dose #8 - held 5/13/2025-  for low hemoglobin    Date: 10/26/2024      Baseline HGB 8.3    Lab Results   Component Value Date    WBC 6.3 05/12/2025    HGB 9.8 (L) 05/12/2025    HCT 35.2 05/12/2025    MCV 77.2 (L) 05/12/2025    PLT 74 (L) 05/12/2025       Per Dr. Prescott, ok for next dose: no    Symptoms: black tarry stools/nosebleeds/heavy menstrual bleeding/petechia: Reports after blowing nose, finding a large clot in the tissue. No bleeding. Has happened twice. Asked patient to monitor. Last heavy nosebleed reported in January.      Plan: Repeat labs on Friday, 5/16 for dropping Hb vs lab error. Call to check, also add iron levels, TIBC and ferritin.

## 2025-05-16 ENCOUNTER — HOME CARE VISIT (OUTPATIENT)
Dept: HOME HEALTH SERVICES | Facility: HOME HEALTH | Age: 65
End: 2025-05-16
Payer: COMMERCIAL

## 2025-05-16 DIAGNOSIS — R78.81 MRSA BACTEREMIA: ICD-10-CM

## 2025-05-16 DIAGNOSIS — T82.514D HICKMAN CATHETER DYSFUNCTION, SUBSEQUENT ENCOUNTER: ICD-10-CM

## 2025-05-16 DIAGNOSIS — R78.81 BACTEREMIA: ICD-10-CM

## 2025-05-16 DIAGNOSIS — B95.62 MRSA BACTEREMIA: ICD-10-CM

## 2025-05-16 PROCEDURE — G0151 HHCP-SERV OF PT,EA 15 MIN: HCPCS

## 2025-05-16 SDOH — HEALTH STABILITY: MENTAL HEALTH: SMOKING IN HOME: 0

## 2025-05-16 SDOH — HEALTH STABILITY: PHYSICAL HEALTH: PHYSICAL EXERCISE: 10

## 2025-05-16 SDOH — ECONOMIC STABILITY: HOUSING INSECURITY: EVIDENCE OF SMOKING MATERIAL: 0

## 2025-05-16 SDOH — HEALTH STABILITY: PHYSICAL HEALTH

## 2025-05-16 SDOH — HEALTH STABILITY: PHYSICAL HEALTH: PHYSICAL EXERCISE: SITTING

## 2025-05-16 SDOH — HEALTH STABILITY: PHYSICAL HEALTH: EXERCISE ACTIVITY: APS, MARCHING, LAQ, HIP ABD/ADD

## 2025-05-16 SDOH — HEALTH STABILITY: PHYSICAL HEALTH: EXERCISE ACTIVITIES SETS: 1

## 2025-05-16 ASSESSMENT — ENCOUNTER SYMPTOMS: DENIES PAIN: 1

## 2025-05-16 ASSESSMENT — ACTIVITIES OF DAILY LIVING (ADL)
AMBULATION ASSISTANCE ON FLAT SURFACES: 1
OASIS_M1830: 00
AMBULATION_DISTANCE/DURATION_TOLERATED: 150
HOME_HEALTH_OASIS: 00

## 2025-05-19 ENCOUNTER — APPOINTMENT (OUTPATIENT)
Dept: INFECTIOUS DISEASES | Facility: CLINIC | Age: 65
End: 2025-05-19
Payer: COMMERCIAL

## 2025-05-19 ENCOUNTER — DOCUMENTATION (OUTPATIENT)
Dept: PULMONOLOGY | Facility: HOSPITAL | Age: 65
End: 2025-05-19

## 2025-05-19 LAB
ERYTHROCYTE [DISTWIDTH] IN BLOOD BY AUTOMATED COUNT: 20 % (ref 11–15)
FERRITIN SERPL-MCNC: 10 NG/ML (ref 16–288)
HCT VFR BLD AUTO: 34.5 % (ref 35–45)
HGB BLD-MCNC: 9.7 G/DL (ref 11.7–15.5)
IRON SATN MFR SERPL: 5 % (CALC) (ref 16–45)
IRON SERPL-MCNC: 22 MCG/DL (ref 45–160)
MCH RBC QN AUTO: 21.8 PG (ref 27–33)
MCHC RBC AUTO-ENTMCNC: 28.1 G/DL (ref 32–36)
MCV RBC AUTO: 77.7 FL (ref 80–100)
PLATELET # BLD AUTO: 76 THOUSAND/UL (ref 140–400)
PMV BLD REES-ECKER: 9.6 FL (ref 7.5–12.5)
RBC # BLD AUTO: 4.44 MILLION/UL (ref 3.8–5.1)
TIBC SERPL-MCNC: 406 MCG/DL (CALC) (ref 250–450)
TRANSFERRIN SERPL-MCNC: 315 MG/DL (ref 188–341)
WBC # BLD AUTO: 5.4 THOUSAND/UL (ref 3.8–10.8)

## 2025-05-19 NOTE — PROGRESS NOTES
Sotatercept Monitoring  Dosing History:  8th Sotatercept HOLDING  Starting Dose: 45 mg kit, 0.3 mg/kg. Inject 0.4 ml.   Original Target Dose: 60 mg kit, 0.7mg/kg. Inject 1.0 ml.        Holding History:  Dose #2 - held on 11/20/2024 to 12/13/2024 for multiple ED visits and hospitalizations.   Dose #7 - held 4/11/2025-4/22/2025 for inpatient admission  Dose #8 - held 5/13/2025-  for low hemoglobin  Dose #8- cont. To hold 5/19/2025 for low plt.    Date: 10/26/2024      Baseline HGB 8.3    Lab Results   Component Value Date    WBC 5.4 05/16/2025    HGB 9.7 (L) 05/16/2025    HCT 34.5 (L) 05/16/2025    MCV 77.7 (L) 05/16/2025    PLT 76 (L) 05/16/2025       Per Dr. Prescott, ok for next dose: no    Symptoms: black tarry stools/nosebleeds/heavy menstrual bleeding/petechia:  Denies.    Plan:  Continue to HOLD dose # 8. Repeat labs in 3 weeks and follow up with PCP for iron deficiency anemia. Start iron supplement.

## 2025-05-22 ENCOUNTER — HOSPITAL ENCOUNTER (OUTPATIENT)
Dept: CARDIOLOGY | Facility: CLINIC | Age: 65
Discharge: HOME | End: 2025-05-22
Payer: COMMERCIAL

## 2025-05-22 ENCOUNTER — DOCUMENTATION (OUTPATIENT)
Dept: PULMONOLOGY | Facility: HOSPITAL | Age: 65
End: 2025-05-22
Payer: COMMERCIAL

## 2025-05-22 DIAGNOSIS — R00.0 TACHYARRHYTHMIA: ICD-10-CM

## 2025-05-22 PROCEDURE — 93246 EXT ECG>7D<15D RECORDING: CPT

## 2025-05-22 NOTE — PROGRESS NOTES
Patient called office.   Medication: Veletri    Current dose:     66 ng/kg/min    70 mls/hr    concentration of 90,000ng/ml ([facility])  67 ng/kg/min    2.9 mls/hr    concentration of 90,000ng/ml (Pedra[facility])    Weight 66 kg    Titration plan: Holding    Side Effects (flushing/diarrhea/increased SOB/headache/nausea/1st bite jaw pain):  Jaw pain. Patient reporting she stopped taking her 2 tablets of imodium due to starting iron tablets. Instructed patient to call office if diarrhea returns    Plan:   Holding at   67 ng/kg/min    2.9 mls/hr    concentration of 90,000ng/ml (Pedraza)

## 2025-05-23 DIAGNOSIS — R78.81 BACTEREMIA: ICD-10-CM

## 2025-05-23 DIAGNOSIS — B95.62 MRSA BACTEREMIA: ICD-10-CM

## 2025-05-23 DIAGNOSIS — R78.81 MRSA BACTEREMIA: ICD-10-CM

## 2025-05-23 DIAGNOSIS — T82.514D HICKMAN CATHETER DYSFUNCTION, SUBSEQUENT ENCOUNTER: ICD-10-CM

## 2025-06-03 DIAGNOSIS — R06.02 SOB (SHORTNESS OF BREATH): ICD-10-CM

## 2025-06-03 DIAGNOSIS — I27.20 PULMONARY HYPERTENSION (MULTI): ICD-10-CM

## 2025-06-03 ASSESSMENT — ENCOUNTER SYMPTOMS
ENDOCRINE NEGATIVE: 1
EYES NEGATIVE: 1
PALPITATIONS: 1
CONSTITUTIONAL NEGATIVE: 1
ALLERGIC/IMMUNOLOGIC NEGATIVE: 1
HEMATOLOGIC/LYMPHATIC NEGATIVE: 1
MUSCULOSKELETAL NEGATIVE: 1
DIARRHEA: 1
NEUROLOGICAL NEGATIVE: 1
PSYCHIATRIC NEGATIVE: 1

## 2025-06-03 NOTE — PROGRESS NOTES
"History Of Present Illness  Ronna Beckham is a 64 y.o. female presenting with pulmonary arterial hypertension follow up. Patient is NYHA Functional Class 3+ and WHO Group 1. Last office visit was on 2/17/2025. Originally referred by Dr. Calvillo on 9/11/2020 following abnormal echo.       PAH Treatment:  Veletri, 67 ng/kg/min, 2.9ml/hr, concentration 90,000 ng/ml (9/23/2020)  Opsumit (4/12/2021)  Winrevair  (11/1/2024)  Oxygen 4 LPM continuously  Infusion site: [facility]  (Replaced 4/18/2025)  Treatment history:   Tadalafil (5/4/2021-5/6/2021) headache    Today's testing includes 6 MWT and Labs    Interval History   Diarrhea sometimes relieved with Lomotil. C/o heart palpitations and chest pain that's annoying. Occurs more than once a day, lasting a few seconds, nonradiating and feels like a finger is pressing on her chest. On oxygen at 4 L NC continuous. Interval replacement of infected [facility]. Feels \"worse\" since [facility] removal and replacement but no specific localization. No F/C/S, no local reaction. (+) palpitations, Holter placed on [place] and collected in follow up. Verbal report of preliminary data , 3 episode VT , longest 3 beat. 69 occurrences SVT, longest 14 beats. Follow up Dr. Le.       Past Medical History  Patient Active Problem List   Diagnosis    Shortness of breath    NSTEMI (non-ST elevated myocardial infarction) (Multi)    Toothache    Systemic lupus erythematosus (Multi)    Syncope and collapse    Sore throat    Seizure disorder (Multi)    Right ventricular systolic dysfunction    Acute on chronic right-sided heart failure    Pulmonary hypertension (Multi)    Paresthesia    Hand pain    PAH (pulmonary artery hypertension) (Multi)    Olecranon bursitis, right elbow    Obesity    Neuropathy    Nasal congestion    Methamphetamine abuse    Major depressive disorder, single episode, moderate (Multi)    Liver disease    Insomnia    Increased oxygen demand    Hypomagnesemia    Hypocalcemia    " Hyperlipidemia    History of non-ST elevation myocardial infarction (NSTEMI)    History of alcohol abuse    Fatigue    Electrolyte and fluid disorder    Prolonged Q-T interval on ECG    Lower leg edema    Edema of eyelid    Ear pain, bilateral    Drug abuse    Dizziness    Diverticulosis    Depressive disorder    Dental infection    Decreased GFR    Cough    Chronic obstructive pulmonary disease (Multi)    Central line complication    Cellulitis    Blepharitis of both eyes    Blepharitis    Primary hypertension    Anxiety    Alcohol dependence, uncomplicated (Multi)    Acute otitis externa of left ear    Acute on chronic cholecystitis    Acute cor pulmonale (Multi)    Gastroesophageal reflux disease    Chronic respiratory failure with hypoxia, on home O2 therapy    Acquired thrombocytopenia    Acquired absence of cervix    Acidosis, unspecified    Abnormal findings on diagnostic imaging of breast    Pulmonary HTN (Multi)    Long-term use of high-risk medication    SVT (supraventricular tachycardia)    Acute on chronic hypoxic respiratory failure    Pneumonia of left lower lobe due to infectious organism    Volume overload    Does not refill medications appropriately    Repeated falls    Difficulty walking    Acute on chronic respiratory failure with hypoxia    Pericardial effusion (HHS-HCC)    Acute on chronic respiratory failure    Anasarca    Supraventricular tachycardia    Catheter-related bloodstream infection, initial encounter    Bleeding from wound    Hematoma of chest wall    Abdominal discomfort, generalized    Chronic pain    Atrial flutter with controlled response (Multi)    Bloodstream infection associated with central venous catheter    Drug-induced pulmonary hypertension (Multi)    Abnormal antineutrophil cytoplasmic antibody (ANCA) test    History of substance use disorder    Chronic iron deficiency anemia    Bacteremia due to methicillin resistant Staphylococcus aureus    Fecal occult blood test  positive        Surgical History  She has a past surgical history that includes Other surgical history (09/21/2020); Other surgical history (09/21/2020); Other surgical history (09/21/2020); Other surgical history (09/21/2020); Other surgical history (09/21/2020); Hysterectomy; Cholecystectomy; Cardiac catheterization (N/A, 1/8/2024); Cardiac electrophysiology procedure (N/A, 3/24/2025); Cardiac electrophysiology procedure (N/A, 3/24/2025); and Cardiac electrophysiology procedure (N/A, 3/24/2025).     Social History  She reports that she has quit smoking. Her smoking use included cigarettes. She has quit using smokeless tobacco. She reports that she does not currently use alcohol. She reports that she does not currently use drugs after having used the following drugs: Cocaine and Other.    Family History  Family History   Problem Relation Name Age of Onset    No Known Problems Mother      No Known Problems Father          Medications  Current Outpatient Medications:     apixaban (Eliquis) 5 mg tablet, Take 1 tablet (5 mg) by mouth 2 times a day. (Patient not taking: Reported on 4/24/2025), Disp: 60 tablet, Rfl: 6    aspirin 81 mg EC tablet, Take 1 tablet (81 mg) by mouth once daily., Disp: 30 tablet, Rfl: 0    calcium carbonate-vitamin D3 500 mg-5 mcg (200 unit) tablet, Take 2 tablets by mouth 2 times a day., Disp: 120 tablet, Rfl: 0    cholecalciferol (Vitamin D-3) 25 mcg (1,000 units) tablet, Take 1 tablet (25 mcg) by mouth 2 times a day., Disp: , Rfl:     epoprostenol (Veletri) 1.5 mg recon soln, 1.5 mg (1,500 mcg) by central venous line infusion route continuously. Reconstitute contents of vial with 5 ml diluent and mix cassette as directed. Infuse continuously per physician orders. Allow for priming volume. Dose range: 1-150 ng/kg/min., Disp: 30 each, Rfl: 0    ergocalciferol (Vitamin D-2) 1.25 MG (69856 UT) capsule, Take 1 capsule (1.25 mg) by mouth 1 (one) time per week. On Wednesday, Disp: , Rfl:      furosemide (Lasix) 40 mg tablet, Take 1 tablet (40 mg) by mouth once daily., Disp: 30 tablet, Rfl: 0    gabapentin (Neurontin) 800 mg tablet, Take 1 tablet (800 mg) by mouth 3 times a day., Disp: 90 tablet, Rfl: 0    magnesium chloride (MagDelay) 64 mg EC tablet, Take 1 tablet (64 mg) by mouth 2 times a day., Disp: 60 tablet, Rfl: 0    melatonin 5 mg tablet, Take 1 tablet (5 mg) by mouth once daily at bedtime., Disp: 30 tablet, Rfl: 0    multivitamin-iron-folic acid  mg-mcg tablet, Take 1 tablet by mouth once daily., Disp: , Rfl:     omeprazole (PriLOSEC) 40 mg DR capsule, Take 1 capsule (40 mg) by mouth once daily in the morning. Take before meals. Do not crush or chew., Disp: 30 capsule, Rfl: 0    Opsumit 10 mg tablet tablet, Take 1 tablet (10 mg) by mouth once daily., Disp: 30 tablet, Rfl: 11    oxygen (O2) gas therapy, Inhale 4 L/min continuously. Indications: pulm HTN, Disp: , Rfl:     potassium chloride CR 20 mEq ER tablet, Take 1 tablet (20 mEq) by mouth 2 times a day. Do not crush or chew., Disp: 60 tablet, Rfl: 0    sotatercept-csrk 60 mg kit, Inject 60 mg under the skin. Inject one dose under the skin every 21 days . , Disp: , Rfl:     venlafaxine XR (Effexor-XR) 150 mg 24 hr capsule, Take 1 capsule (150 mg) by mouth once daily. Do not crush or chew., Disp: 30 capsule, Rfl: 0     Allergies  Levetiracetam    Review of Systems   Constitutional: Negative.    HENT: Negative.     Eyes: Negative.    Cardiovascular:  Positive for chest pain and palpitations. Negative for leg swelling.   Gastrointestinal:  Positive for diarrhea.   Endocrine: Negative.    Genitourinary: Negative.    Musculoskeletal: Negative.    Skin: Negative.    Allergic/Immunologic: Negative.    Neurological: Negative.    Hematological: Negative.    Psychiatric/Behavioral: Negative.         Last Recorded Vitals  Vitals:    06/11/25 1321   BP: 91/59   Pulse: 90   SpO2: 92%            Physical Exam  Constitutional:       Appearance: Normal  appearance.      Comments: Wheelchair , oxygen, flushed. Thin   HENT:      Head: Normocephalic.   Cardiovascular:      Rate and Rhythm: Normal rate and regular rhythm.      Heart sounds: Murmur heard.      Comments: Increased second heart sound.   Pulmonary:      Effort: Pulmonary effort is normal.      Breath sounds: Normal breath sounds.      Comments: New hendrickson with suture, no erythema.   Abdominal:      General: Bowel sounds are normal.      Palpations: Abdomen is soft.   Musculoskeletal:      Right lower leg: No edema.      Left lower leg: No edema.   Skin:     Findings: Rash present.   Psychiatric:         Mood and Affect: Mood normal.         Judgment: Judgment normal.       Relevant Results    6MWT (6/3/2025)  SP02- 95-94% 4 L NC  HR-   CHEIKH- 1-4  Actual Meters-  305 m    Echo (4/14/2025)  PHYSICIAN INTERPRETATION:  Left Ventricle: Left ventricular ejection fraction is normal, by visual estimate at 70-75%. There are no regional left ventricular wall motion abnormalities. The left ventricular cavity size is normal. There is normal septal and mildly increased posterior left ventricular wall thickness. There is left ventricular concentric remodeling. The interventricular septum is flattened in systole and diastole, consistent with right ventricular pressure and volume overload. Left ventricular diastolic filling cannot be determined due to atrial fibrillation/flutter.  Left Atrium: The left atrial size is normal. A bubble study using agitated saline was performed. Bubble study is negative.  Right Ventricle: The right ventricle is severely enlarged. There is reduced right ventricular systolic function. Despite normal TAPSE and RV s', there are signs of systolic dysfunction based on contrast-enhanced imaging.  Right Atrium: The right atrium is severely dilated.  Aortic Valve: The aortic valve is trileaflet. There is no evidence of aortic valve regurgitation.  Mitral Valve: The mitral valve is normal in  structure. There is no evidence of mitral valve regurgitation.  Tricuspid Valve: The tricuspid valve is abnormal. There is severe tricuspid regurgitation. The Doppler estimated RVSP is severely elevated at 103.0 mmHg. TR mechanism is likely functional/secondary TR in the setting of severely dilated RA and RV.  Pulmonic Valve: The pulmonic valve is structurally normal. There is mild pulmonic valve regurgitation.  Pericardium: Small pericardial effusion. There is no evidence of cardiac tamponade.  Pleural: There is left pleural effusion.  Aorta: The aortic root is normal.  Systemic Veins: The inferior vena cava appears dilated, with IVC inspiratory collapse greater than 50%.  In comparison to the previous echocardiogram(s): Compared with study dated 2/17/2025, no significant change.        CONCLUSIONS:   1. Left ventricular ejection fraction is normal, by visual estimate at 70-75%.   2. Right ventricular volume and pressure overload.   3. There is reduced right ventricular systolic function.   4. Severely enlarged right ventricle.   5. The right atrium is severely dilated.   6. There is no evidence of cardiac tamponade.   7. Severe tricuspid regurgitation visualized.   8. Severely elevated right ventricular systolic pressure.     6MWT (2/17/2025)  SP02-99%-97% on 4L  HR-  CHEIKH- 0-4  Actual Meters- 305m    Echo (2/17/2025) pending - still with severe RV dilation although RA appears smaller with slightly better LV filling.  PHYSICIAN INTERPRETATION:  Left Ventricle: Left ventricular ejection fraction is normal, by visual estimate at 60-65%. There are no regional left ventricular wall motion abnormalities. The left ventricular cavity size is normal. There is normal septal and mildly increased posterior left ventricular wall thickness. There is left ventricular concentric remodeling. The interventricular septum is flattened in systole and diastole, consistent with right ventricular pressure and volume overload.  Spectral Doppler shows a Grade II (pseudonormal pattern) of left ventricular diastolic filling with an elevated left atrial pressure.  Left Atrium: The left atrial size is normal.  Right Ventricle: The right ventricle is severely enlarged. There is severely reduced right ventricular systolic function.  Right Atrium: The right atrium is moderate to severely dilated.  Aortic Valve: The aortic valve is trileaflet. There is trace aortic valve regurgitation. The peak instantaneous gradient of the aortic valve is 7 mmHg.  Mitral Valve: The mitral valve is normal in structure. There is trace mitral valve regurgitation.  Tricuspid Valve: The tricuspid valve is structurally normal. There is severe tricuspid regurgitation. The Doppler estimated RVSP is severely elevated at 91.5 mmHg. Reported right ventricular systolic pressure may be underestimated due to severe tricuspid regurgitation. TR likely severe given shape of TR CW Doppler envelope shape, though no flow reversal in the hepatic veins, so possibly moderate to severe TR.  Pulmonic Valve: The pulmonic valve is not well visualized. There is physiologic pulmonic valve regurgitation.  Pericardium: Small pericardial effusion. There is no evidence of cardiac tamponade.  Aorta: The aortic root is normal.  Systemic Veins: The inferior vena cava appears dilated, with IVC inspiratory collapse greater than 50%.  In comparison to the previous echocardiogram(s): Compared with the prior exam from 10/24/2024, there was already a severley dilated RV with severely reduced systolic function. The prior RVSP was severely elevated at 105.1mmHg, but appears to be a little lower today(91mmHg) with some inspiratory collapse of IVC. The pericaridal effusion was larger previously, still with no tamponade. There wa already seere TR o the prior study.        CONCLUSIONS:   1. Left ventricular ejection fraction is normal, by visual estimate at 60-65%.   2. Spectral Doppler shows a Grade II  (pseudonormal pattern) of left ventricular diastolic filling with an elevated left atrial pressure.   3. Right ventricular volume and pressure overload.   4. There is severely reduced right ventricular systolic function.   5. Severely enlarged right ventricle.   6. The right atrium is moderate to severely dilated.   7. There is no evidence of cardiac tamponade.   8. Severely elevated right ventricular systolic pressure.   9. TR likely severe given shape of TR CW Doppler envelope shape, though no flow reversal in the hepatic veins, so possibly moderate to severe TR.  10. Compared with the prior exam from 10/24/2024, there was already a severley dilated RV with severely reduced systolic function. The prior RVSP was severely elevated at 105.1mmHg, but appears to be a little lower today(91mmHg) with some inspiratory collapse of IVC. The pericaridal effusion was larger previously, still with no tamponade. There wa already seere TR o the prior study.     Patient with code blue running out of oxygen, now baseline. (12/9/2024)    Echo (10/24/2024)  Left Ventricle: Left ventricular ejection fraction is hyperdynamic, by visual estimate at 75%. The left ventricular cavity size is decreased. The interventricular septum is flattened in systole and diastole, consistent with right ventricular pressure and volume overload. Left ventricular diastolic filling was not assessed. The left ventricle appears underfilled.  Left Atrium: The left atrium was not assessed.  Right Ventricle: The right ventricle is severely enlarged. There is moderately reduced right ventricular systolic function.  Right Atrium: The right atrium is severely dilated. A dilated inferior vena cava demonstrates poor inspiratory collapse, consistent with elevated right atrial pressures.  Aortic Valve: The aortic valve is trileaflet. There is no evidence of aortic valve regurgitation.  Mitral Valve: The mitral valve is normal in structure. There is trace mitral valve  regurgitation.  Tricuspid Valve: The tricuspid valve is abnormal. There is severe tricuspid regurgitation. The Doppler estimated RVSP is severely elevated at 105.1 mmHg.  Pulmonic Valve: The pulmonic valve is structurally normal. There is trace pulmonic valve regurgitation.  Pericardium: Small to moderate pericardial effusion. There is no evidence of cardiac tamponade.  Aorta: The aortic root was not assessed.  In comparison to the previous echocardiogram(s): Compared with study dated 4/24/2024, there is no significant difference in RV size and function. There is again severe TR. The RVSP has increased from 89 to 105 mmHg. The pericardial effusion appears unchanged.     CONCLUSIONS:   1. Left ventricular ejection fraction is hyperdynamic, by visual estimate at 75%.   2. Left ventricular cavity size is decreased.   3. Right ventricular volume and pressure overload.   4. There is moderately reduced right ventricular systolic function.   5. Severely enlarged right ventricle.   6. Severely elevated right ventricular systolic pressure.   7. The right atrium is severely dilated.   8. Severe tricuspid regurgitation visualized.   9. Small to moderate pericardial effusion.  10. There is no evidence of cardiac tamponade.  11. A dilated inferior vena cava demonstrates poor inspiratory collapse, consistent with elevated right atrial pressures.  12. Compared with study dated 4/24/2024, there is no significant difference in RV size and function. There is again severe TR. The RVSP has increased from 89 to 105 mmHg. The pericardial effusion appears unchanged.     CT angio chest for pulmonary embolism (10/22/2024)  1. No evidence of acute pulmonary embolism.  2. There is a moderate size pericardial effusion noted.    6MWT (7/22/2024)  SP02-98%/96% on 4L  HR-106/114  CHEIKH-2/2  Actual Meters- 183 meters     6MWT (5/13/2024)  SP02- 96-96% /4 L NC  HR-   CHEIKH-0-4  Actual Meters 216 meters     CT angio chest for PE (5/30/2024)  1.  No findings of acute pulmonary embolism.  2. Persistent marked cardiac enlargement. Hepatic heterogeneity and splenomegaly suggesting right heart dysfunction with associated not make liver and portal hypertension.  3. Bronchial thickening. There are couple of other stable pulmonary nodules when compared to most recent examination, however there are areas of ground-glass opacity seen in the lungs possibly reflecting inflammatory etiologies. Findings of mild pulmonary edema and volume  Overload     Echo (4/24/2024) [facility]  Left Ventricle: The left ventricular systolic function is normal, with an estimated ejection fraction of 60-65%. There are no regional wall motion abnormalities. The left ventricular cavity size is normal. The interventricular septum is flattened in systole and diastole, consistent with right ventricular pressure and volume overload. Left ventricular diastolic filling was not assessed.  Left Atrium: The left atrium is normal in size. A bubble study using agitated saline was performed. Bubble study is positive. A small PFO (= 10 bubbles) was demonstrated. Agitated saline contrast study was positive for small intracardiac shunt.  Right Ventricle: The right ventricle is severely enlarged. There is severely reduced right ventricular systolic function. Although the TAPSE and RVA' are normal, the fractional area change is markedly reduced consistent wtih severe RV systolic dysfunction. Still able to generate high RVSP.  Right Atrium: The right atrium is severely dilated.  Aortic Valve: The aortic valve is trileaflet. There is minimal aortic valve cusp calcification. Aortic valve regurgitation was not assessed.  Mitral Valve: The mitral valve is normal in structure. Mitral valve regurgitation was not assessed.  Tricuspid Valve: The tricuspid valve is structurally normal. There is severe tricuspid regurgitation. The Doppler estimated RVSP is severely elevated at 88.6 mmHg.  Pulmonic Valve: The pulmonic  valve is not well visualized. Pulmonic valve regurgitation was not assessed.  Pericardium: There is a small to moderate pericardial effusion. Small circumferential pericardial effusin though is small to moderate adjacent to the RA. No RV or RA collapse. The IVC is dilated, though likely due to the presence of severe pulmonary hypertension. No significant respiratory variation of the MV or TV inflows. Not diagnostic for tamponade though difficult to dx tamponade in setting of severe pulmonary hypertension.  Aorta: The aortic root is normal.  Systemic Veins: The inferior vena cava appears dilated. There is IVC inspiratory collapse greater than 50%.  In comparison to the previous echocardiogram(s): Compared with study from 3/12/2022,. Compared with the prior exam from 3/12/2022 the RV had a similar appearance with severe dilatation and reduced function. Note the TR was not asessed on that exam to allow estimation of the RVSP at that time. The pericardial effusion appears similar in size.  CONCLUSIONS:   1. Left ventricular systolic function is normal with a 60-65% estimated ejection fraction.   2. Right ventricular volume and pressure overload.   3. Although the TAPSE and RVA' are normal, the fractional area change is markedly reduced consistent wtih severe RV systolic dysfunction. Still able to generate high RVSP.   4. Severely enlarged right ventricle.   5. There is severely reduced right ventricular systolic function.   6. Agitated saline contrast study was positive for small intracardiac shunt.   7. The right atrium is severely dilated.   8. There is a small to moderate pericardial effusion.   9. Small circumferential pericardial effusin though is small to moderate adjacent to the RA. No RV or RA collapse. The IVC is dilated, though likely due to the presence of severe pulmonary hypertension. No significant respiratory variation of the MV or TV inflows. Not diagnostic for tamponade though difficult to dx tamponade  in setting of severe pulmonary hypertension.  10. Severe tricuspid regurgitation visualized.  11. Severely elevated right ventricular systolic pressure.  12. Compared with the prior exam from 3/12/2022 the RV had a similar appearance with severe dilatation and reduced function. Note the TR was not asessed on that exam to allow estimation of the RVSP at that time. The pericardial effusion appears similar in size.  13. A bubble study using agitated saline was performed. Bubble study is positive. A small PFO (= 10 bubbles) was demonstrated.     6MWT (2023)  SpO2: 98% - 95% on 4L  HR: 97 - 116  Valentin: 1 - 7  Actual 314m     6MWT (2022)  SpO2: 94 - 90 on RA   HR: 78 - 111  Valentin: 0 - 1  Meters: 366      6MWT (2021)   SpO2: 92% - 87% on RA  HR: 81 - 94  Valentin: 3 - 4  Actual 311m     6MWT (2021)  HR 91 -102  Spo2 97 - 95 on RA  Valentin 1- 4  Meters 287/predicted 477     RHC (2020)   PAP- 73/31 (47), 83/26 (54)  PCWP- 8  CO/CI- 3.8/2.0      Echo (2020) from OSH   moderately to severely dilated RV with moderately reduced function and a moderately dilated RA.      Assessment/Plan     1) Pulmonary  a. PAH associated with methamphetamine use, presented FCIV, V/Q (-) , depressed CI, now on infusion therapy with dramatic subjective improvement, still high risk. Still with hectic follow up at best, (+) cocaine during hospitalization 2024. (+) Cannabinoid and amphetamine 2024.     2024 Did not finish Keflex ordered for possible site infection. Better today.     2024- In past, adjust dosing for weight loss and follow resolution of epo rash and significant diarrhea. Patient went up without authorization. Seems stable from PAH standpoint.      Intolerant to PDE5     On macitentan     2) Neuro - pre-existing neuropathy, alcohol vs ?     2024 complaining of classic sciatic pain onset after riding horse.      3) Cardiac  A. Hypertension  B. Ablated afib  C. Rare SVT's     4) Interval  diagnosis of lupus on basis of ?       Plan    1) Continue current meds, Veletri and macitentan.   2) Follow up in clinic. 8 weeks with 6 minute walk and routine labs.  3) 2+ cans boost per day. Will consider EPO->DESMOND if unsuccessful in halting weight loss.   4) Had bleeding complications from anticoagulation.

## 2025-06-04 ENCOUNTER — DOCUMENTATION (OUTPATIENT)
Dept: PULMONOLOGY | Facility: HOSPITAL | Age: 65
End: 2025-06-04
Payer: COMMERCIAL

## 2025-06-04 LAB
ALBUMIN SERPL-MCNC: 4.1 G/DL (ref 3.6–5.1)
ALP SERPL-CCNC: 120 U/L (ref 37–153)
ALT SERPL-CCNC: 17 U/L (ref 6–29)
ANION GAP SERPL CALCULATED.4IONS-SCNC: 11 MMOL/L (CALC) (ref 7–17)
AST SERPL-CCNC: 19 U/L (ref 10–35)
BILIRUB SERPL-MCNC: 0.8 MG/DL (ref 0.2–1.2)
BNP SERPL-MCNC: 270 PG/ML
BUN SERPL-MCNC: 18 MG/DL (ref 7–25)
CALCIUM SERPL-MCNC: 8.8 MG/DL (ref 8.6–10.4)
CHLORIDE SERPL-SCNC: 106 MMOL/L (ref 98–110)
CO2 SERPL-SCNC: 22 MMOL/L (ref 20–32)
CREAT SERPL-MCNC: 0.8 MG/DL (ref 0.5–1.05)
EGFRCR SERPLBLD CKD-EPI 2021: 83 ML/MIN/1.73M2
ERYTHROCYTE [DISTWIDTH] IN BLOOD BY AUTOMATED COUNT: 21.1 % (ref 11–15)
GLUCOSE SERPL-MCNC: 93 MG/DL (ref 65–99)
HCT VFR BLD AUTO: 36.7 % (ref 35–45)
HGB BLD-MCNC: 10.4 G/DL (ref 11.7–15.5)
MAGNESIUM SERPL-MCNC: 2.4 MG/DL (ref 1.5–2.5)
MCH RBC QN AUTO: 22.3 PG (ref 27–33)
MCHC RBC AUTO-ENTMCNC: 28.3 G/DL (ref 32–36)
MCV RBC AUTO: 78.8 FL (ref 80–100)
PLATELET # BLD AUTO: 140 THOUSAND/UL (ref 140–400)
PMV BLD REES-ECKER: 8.9 FL (ref 7.5–12.5)
POTASSIUM SERPL-SCNC: 4.9 MMOL/L (ref 3.5–5.3)
PROT SERPL-MCNC: 6.7 G/DL (ref 6.1–8.1)
RBC # BLD AUTO: 4.66 MILLION/UL (ref 3.8–5.1)
SODIUM SERPL-SCNC: 139 MMOL/L (ref 135–146)
WBC # BLD AUTO: 4.4 THOUSAND/UL (ref 3.8–10.8)

## 2025-06-04 NOTE — PROGRESS NOTES
Sotatercept Monitoring    Dosing History:  #8 Sotatercept dose to be given on 6/6/2025.   Starting Dose: 45 mg kit, 0.3 mg/kg. Inject 0.4 ml.   Original Target Dose: 60 mg kit, 0.7mg/kg. Inject 1 ml.    Dosing Weight:   74 kg (confirmed with [facility] on 6/4/25)    Holding History:  Dose #2 - held on 11/20/2024 to 12/13/2024 for multiple ED visits and hospitalizations.   Dose #7 - held 4/11/2025-4/22/2025 for inpatient admission  Dose #8 - held 5/13/2025-  for low hemoglobin  Dose #8- cont. To hold 5/19/2025 for low plt.    Date: 10/26/2024  Baseline HGB 8.3    Lab Results   Component Value Date    WBC 4.4 06/03/2025    HGB 10.4 (L) 06/03/2025    HCT 36.7 06/03/2025    MCV 78.8 (L) 06/03/2025     06/03/2025       Per Dr. Prescott, ok for next dose: yes    Symptoms: black tarry stools/nosebleeds/heavy menstrual bleeding/petechia:  Denies.  Plan: Continue labs before each dose. Next dose is #9 due on 6/27/25.

## 2025-06-11 ENCOUNTER — HOSPITAL ENCOUNTER (OUTPATIENT)
Dept: RESPIRATORY THERAPY | Facility: HOSPITAL | Age: 65
Discharge: HOME | End: 2025-06-11
Payer: COMMERCIAL

## 2025-06-11 ENCOUNTER — OFFICE VISIT (OUTPATIENT)
Dept: PULMONOLOGY | Facility: HOSPITAL | Age: 65
End: 2025-06-11
Payer: COMMERCIAL

## 2025-06-11 VITALS
WEIGHT: 158.8 LBS | SYSTOLIC BLOOD PRESSURE: 91 MMHG | HEART RATE: 90 BPM | OXYGEN SATURATION: 92 % | BODY MASS INDEX: 26.46 KG/M2 | HEIGHT: 65 IN | DIASTOLIC BLOOD PRESSURE: 59 MMHG

## 2025-06-11 DIAGNOSIS — I27.20 PULMONARY HTN (MULTI): ICD-10-CM

## 2025-06-11 DIAGNOSIS — I27.21 PAH (PULMONARY ARTERY HYPERTENSION) (MULTI): Primary | ICD-10-CM

## 2025-06-11 DIAGNOSIS — Z79.899 LONG-TERM USE OF HIGH-RISK MEDICATION: ICD-10-CM

## 2025-06-11 DIAGNOSIS — I27.20 PULMONARY HYPERTENSION (MULTI): ICD-10-CM

## 2025-06-11 PROCEDURE — 3078F DIAST BP <80 MM HG: CPT | Performed by: INTERNAL MEDICINE

## 2025-06-11 PROCEDURE — 99215 OFFICE O/P EST HI 40 MIN: CPT | Performed by: INTERNAL MEDICINE

## 2025-06-11 PROCEDURE — 94618 PULMONARY STRESS TESTING: CPT

## 2025-06-11 PROCEDURE — 3074F SYST BP LT 130 MM HG: CPT | Performed by: INTERNAL MEDICINE

## 2025-06-11 PROCEDURE — 99212 OFFICE O/P EST SF 10 MIN: CPT

## 2025-06-11 PROCEDURE — 3008F BODY MASS INDEX DOCD: CPT | Performed by: INTERNAL MEDICINE

## 2025-06-11 RX ORDER — ASCORBIC ACID 250 MG
250 TABLET ORAL DAILY
COMMUNITY

## 2025-06-11 RX ORDER — FERROUS SULFATE 325(65) MG
325 TABLET ORAL
COMMUNITY

## 2025-06-11 ASSESSMENT — PAIN SCALES - GENERAL: PAINLEVEL_OUTOF10: 0-NO PAIN

## 2025-06-20 LAB
BASOPHILS # BLD AUTO: 0 CELLS/UL (ref 0–200)
BASOPHILS NFR BLD AUTO: 0 %
EOSINOPHIL # BLD AUTO: 0 CELLS/UL (ref 15–500)
EOSINOPHIL NFR BLD AUTO: 0 %
ERYTHROCYTE [DISTWIDTH] IN BLOOD BY AUTOMATED COUNT: 21.8 % (ref 11–15)
HCT VFR BLD AUTO: 41.1 % (ref 35–45)
HGB BLD-MCNC: 11.9 G/DL (ref 11.7–15.5)
LYMPHOCYTES # BLD AUTO: 1075 CELLS/UL (ref 850–3900)
LYMPHOCYTES NFR BLD AUTO: 25 %
MCH RBC QN AUTO: 23.2 PG (ref 27–33)
MCHC RBC AUTO-ENTMCNC: 29 G/DL (ref 32–36)
MCV RBC AUTO: 80.1 FL (ref 80–100)
MONOCYTES # BLD AUTO: 258 CELLS/UL (ref 200–950)
MONOCYTES NFR BLD AUTO: 6 %
MYELOCYTES # BLD: 258 CELLS/UL
MYELOCYTES NFR BLD MANUAL: 6 %
NEUTROPHILS # BLD AUTO: 2709 CELLS/UL (ref 1500–7800)
NEUTROPHILS NFR BLD AUTO: 63 %
PLATELET # BLD AUTO: 144 THOUSAND/UL (ref 140–400)
PMV BLD REES-ECKER: 9.5 FL (ref 7.5–12.5)
RBC # BLD AUTO: 5.13 MILLION/UL (ref 3.8–5.1)
SERVICE CMNT-IMP: ABNORMAL
WBC # BLD AUTO: 4.3 THOUSAND/UL (ref 3.8–10.8)

## 2025-06-23 ENCOUNTER — DOCUMENTATION (OUTPATIENT)
Dept: PULMONOLOGY | Facility: HOSPITAL | Age: 65
End: 2025-06-23
Payer: COMMERCIAL

## 2025-06-23 NOTE — PROGRESS NOTES
Sotatercept Monitoring    Dosing History:  #9 Sotatercept dose to be given on 6/27/25.   Starting Dose: 45 mg kit, 0.3 mg/kg. Inject 0.4 ml.   Original Target Dose: 60 mg kit, 0.7mg/kg. Inject 1 ml.    Dosing Weight:  74  kg    Holding History:  Dose #2 - held on 11/20/2024 to 12/13/2024 for multiple ED visits and hospitalizations.   Dose #7 - held 4/11/2025-4/22/2025 for inpatient admission  Dose #8 - held 5/13/2025-  for low hemoglobin  Dose #8- cont. To hold 5/19/2025 for low plt.     Date: 10/26/24   Baseline HGB 8.3    Lab Results   Component Value Date    WBC 4.3 06/19/2025    HGB 11.9 06/19/2025    HCT 41.1 06/19/2025    MCV 80.1 06/19/2025     06/19/2025       Per Dr. Prescott, ok for next dose: yes    Symptoms: black tarry stools/nosebleeds/heavy menstrual bleeding/petechia:  Pt denies.    Plan: Labs every other dose. Next dose #10 due on 7/18/25. No labs needed.

## 2025-06-27 ENCOUNTER — DOCUMENTATION (OUTPATIENT)
Dept: PULMONOLOGY | Facility: HOSPITAL | Age: 65
End: 2025-06-27
Payer: COMMERCIAL

## 2025-07-07 NOTE — PROGRESS NOTES
Cardiac Electrophysiology Office Visit     Chief Complaint   Patient presents with    Atrial Flutter     S/p  radiofrequency ablation of typical atrial flutter 03/24/25    Hospital Follow-up     04/09-04-20 HPI     Previous note:    The patient is a 64-year-old female with a history of severe pulmonary arterial hypertension (likely secondary to methamphetamine use).  This is in the setting of preserved LV systolic function but dilated RV and RA.    She underwent catheter-based therapy for typical atrial flutter last March.  She also had a hospitalization in April with MRSA bacteremia and therefore did not follow-up until today.  She also wore a 2-week long-term Holter monitor because of concerns regarding palpitations.  This showed only PVCs but no atrial arrhythmias.  She describes her PVCs very accurately is an irregular beat that travels up to her neck.  She is uncomfortable with this but otherwise feels well.  She also has a new rash and there is concern regarding lupus although she has not had a formal diagnosis.  She has been off her Eliquis since her surgeries for her [place] port that was infected.  There is no known history of atrial fibrillation.    Patient Active Problem List    Diagnosis Date Noted    Bacteremia due to methicillin resistant Staphylococcus aureus 04/17/2025    Fecal occult blood test positive 04/22/2025    Abnormal antineutrophil cytoplasmic antibody (ANCA) test 04/17/2025    History of substance use disorder 04/17/2025    Chronic iron deficiency anemia 04/17/2025    Atrial flutter with controlled response (Multi) 04/16/2025    Bloodstream infection associated with central venous catheter 04/16/2025    Drug-induced pulmonary hypertension (Multi) 04/16/2025    Hematoma of chest wall 04/09/2025    Bleeding from wound 04/01/2025    Catheter-related bloodstream infection, initial encounter 03/15/2025    Supraventricular tachycardia 11/21/2024    Anasarca 11/20/2024    Acute on  chronic respiratory failure with hypoxia 10/23/2024    Pericardial effusion ([facility]-HCC) 10/23/2024    Acute on chronic respiratory failure 10/23/2024    Difficulty walking 09/25/2024    Repeated falls 09/24/2024    Volume overload 09/10/2024    Does not refill medications appropriately 09/10/2024    Acute on chronic hypoxic respiratory failure 08/26/2024    Pneumonia of left lower lobe due to infectious organism 08/26/2024    Abdominal discomfort, generalized 08/22/2024    SVT (supraventricular tachycardia) 07/05/2024    Long-term use of high-risk medication 05/13/2024    Toothache 04/23/2024    Syncope and collapse 04/23/2024    Sore throat 04/23/2024    Seizure disorder (Multi) 04/23/2024    Right ventricular systolic dysfunction 04/23/2024    Acute on chronic right-sided heart failure 04/23/2024    Hand pain 04/23/2024    PAH (pulmonary artery hypertension) (Multi) 04/23/2024    Obesity 04/23/2024    Nasal congestion 04/23/2024    Methamphetamine abuse 04/23/2024    Liver disease 04/23/2024    Hyperlipidemia 04/23/2024    Fatigue 04/23/2024    Lower leg edema 04/23/2024    Edema of eyelid 04/23/2024    Drug abuse 04/23/2024    Diverticulosis 04/23/2024    Cellulitis 04/23/2024    Blepharitis of both eyes 04/23/2024    Blepharitis 04/23/2024    Acute on chronic cholecystitis 04/23/2024    Acute cor pulmonale (Multi) 04/23/2024    Abnormal findings on diagnostic imaging of breast 04/23/2024    Pulmonary HTN (Multi) 04/23/2024    Dental infection 04/04/2024    Central line complication 03/26/2024    Neuropathy 03/21/2024    Increased oxygen demand 03/21/2024    Hypomagnesemia 03/21/2024    Hypocalcemia 03/21/2024    History of non-ST elevation myocardial infarction (NSTEMI) 03/21/2024    History of alcohol abuse 03/21/2024    Ear pain, bilateral 03/21/2024    Dizziness 03/21/2024    Depressive disorder 03/21/2024    Decreased GFR 03/21/2024    Cough 03/21/2024    Acute otitis externa of left ear 03/21/2024     Shortness of breath 01/07/2024    Primary hypertension 07/13/2023    Anxiety 07/13/2023    Acidosis, unspecified 07/13/2023    Chronic obstructive pulmonary disease (Multi) 06/01/2023    Acquired thrombocytopenia 06/01/2023    Paresthesia 05/31/2023    Electrolyte and fluid disorder 05/31/2023    Prolonged Q-T interval on ECG 05/31/2023    Chronic respiratory failure with hypoxia, on home O2 therapy 11/11/2022    Acquired absence of cervix 11/04/2022    Systemic lupus erythematosus (Multi) 08/03/2021    Pulmonary hypertension (Multi) 08/03/2021    Olecranon bursitis, right elbow 08/03/2021    Major depressive disorder, single episode, moderate (Multi) 08/03/2021    Insomnia 08/03/2021    Alcohol dependence, uncomplicated (Multi) 08/03/2021    Chronic pain 08/03/2021    Gastroesophageal reflux disease 11/25/2020    NSTEMI (non-ST elevated myocardial infarction) (Multi) 01/07/2024        ROS       Current Outpatient Medications   Medication Instructions    ascorbic acid (VITAMIN C) 250 mg, Daily    aspirin 81 mg, oral, Daily    calcium carbonate-vitamin D3 500 mg-5 mcg (200 unit) tablet 2 tablets, oral, 2 times daily    cholecalciferol (VITAMIN D-3) 25 mcg, 2 times daily    Eliquis 5 mg, oral, 2 times daily    epoprostenol (VELETRI) 1,500 mcg, central venous line infusion, Continuous, Reconstitute contents of vial with 5 ml diluent and mix cassette as directed. Infuse continuously per physician orders. Allow for priming volume. Dose range: 1-150 ng/kg/min.    ergocalciferol (Vitamin D-2) 1.25 MG (37387 UT) capsule 1 capsule, Once Weekly    ferrous sulfate 325 mg (65 mg elemental) tablet 325 mg of ferrous sulfate, Daily with breakfast    furosemide (LASIX) 40 mg, oral, Daily    gabapentin (NEURONTIN) 800 mg, oral, 3 times daily    Mag 64 64 mg, oral, 2 times daily    melatonin 5 mg, oral, Nightly    multivitamin-iron-folic acid  mg-mcg tablet 1 tablet, Daily    omeprazole (PRILOSEC) 40 mg, oral, Daily before  breakfast, Do not crush or chew.    Opsumit 10 mg, oral, Daily    oxygen (O2) 4 L/min, Continuous    potassium chloride CR 20 mEq ER tablet 20 mEq, oral, 2 times daily, Do not crush or chew.    sotatercept-csrk 60 mg    venlafaxine XR (EFFEXOR-XR) 150 mg, oral, Daily, Do not crush or chew.       Objective     There were no vitals filed for this visit.     Vitals and nursing note reviewed.   Constitutional:       Appearance: Healthy appearance.   HENT:    Mouth/Throat:      Pharynx: Oropharynx is clear.   Pulmonary:      Effort: Pulmonary effort is normal.      Breath sounds: Normal breath sounds. Decreased air movement present.   Cardiovascular:      PMI at left midclavicular line. Normal rate. Regular rhythm. Normal S1. Normal S2.       Murmurs: There is a grade 1/6 mid frequency blowing holosystolic murmur.      No gallop.  No click. No rub.   Pulses:     Intact distal pulses.   Edema:     Peripheral edema absent.   Abdominal:      General: Bowel sounds are normal.   Musculoskeletal:      Cervical back: Normal range of motion. Skin:     General: Skin is warm and dry.      Findings: Rash present.   Neurological:      General: No focal deficit present.      Mental Status: Alert and oriented to person, place and time.          RELEVANT TESTING:    EVENT MONITOR 05/22/2025:  * The predominant rhythm was sinus . * The Maximum Heart Rate recorded was 198 bpm, 05/ 29 07: 25: 35, the Minimum Heart Rate recorded was 72 bpm, 05/ 29 05: 35: 28, and the Average Heart Rate was 92 bpm. * There were 1, 967 VE beats with a burden of < 1 % . There were 3 occurrences of Ventricular Tachycardia with the Fastest episode 177 bpm, 05/ 25 20: 25: 24, and the Longest episode 3 beats, 05/ 23 19: 48: 22. * There were 2, 502 SVE beats with a burden of < 1 % . There were 69 occurrences of Supraventricular Tachycardia with the Fastest episode 198 bpm, 05/ 29 07: 25: 35, and the Longest episode 14 beats, 05/ 27 07: 18: 51. * There were 0  Patient Triggers.    Transthoracic Echo (TTE) Limited 04/14/2025   CONCLUSIONS:   1. Left ventricular ejection fraction is normal, by visual estimate at 70-75%.   2. Right ventricular volume and pressure overload.   3. There is reduced right ventricular systolic function.   4. Severely enlarged right ventricle.   5. The right atrium is severely dilated.   6. There is no evidence of cardiac tamponade.   7. Severe tricuspid regurgitation visualized.   8. Severely elevated right ventricular systolic pressure.       Lab Review:   Lab Results   Component Value Date    WBC 4.3 06/19/2025    HGB 11.9 06/19/2025    HCT 41.1 06/19/2025     06/19/2025    CHOL 125 10/03/2024    TRIG 117 10/03/2024    HDL 39.3 10/03/2024    LDLDIRECT 172 (H) 07/23/2019    ALT 17 06/03/2025    AST 19 06/03/2025     06/03/2025    K 4.9 06/03/2025     06/03/2025    CREATININE 0.80 06/03/2025    BUN 18 06/03/2025    CO2 22 06/03/2025    TSH 3.50 11/25/2024    INR 1.1 04/20/2025    HGBA1C 5.7 (H) 10/03/2024       [id number] Score  Age <65: 0   Sex Female: 1   CHF History Yes: 1   HTN Yes: 1   Stroke/TIA/Thromboembolism No: 0   Vascular Dz: CAD/PAD/Aortic Plaque No: 0   DM No: 0   Total Score 3        ECG:  NSR at 78 bpm right bundle branch block QRS duration 120 ms QTc 490 ms    Assessment/plan:  Atrial flutter  Post catheter-based therapy just over 3 months ago.  No recurrence of atrial arrhythmias.  Follow-up as needed.  Rheumatology referral.    Problem List Items Addressed This Visit    None    Chicho Le MD, RS  Cardiac Electrophysiology

## 2025-07-08 ENCOUNTER — OFFICE VISIT (OUTPATIENT)
Facility: CLINIC | Age: 65
End: 2025-07-08
Payer: COMMERCIAL

## 2025-07-08 VITALS — SYSTOLIC BLOOD PRESSURE: 100 MMHG | HEART RATE: 78 BPM | DIASTOLIC BLOOD PRESSURE: 62 MMHG

## 2025-07-08 DIAGNOSIS — R78.81 MRSA BACTEREMIA: ICD-10-CM

## 2025-07-08 DIAGNOSIS — I48.3 TYPICAL ATRIAL FLUTTER (MULTI): Primary | ICD-10-CM

## 2025-07-08 DIAGNOSIS — I47.10 SVT (SUPRAVENTRICULAR TACHYCARDIA): ICD-10-CM

## 2025-07-08 DIAGNOSIS — B95.62 MRSA BACTEREMIA: ICD-10-CM

## 2025-07-08 DIAGNOSIS — R00.0 TACHYARRHYTHMIA: ICD-10-CM

## 2025-07-08 DIAGNOSIS — I48.92 ATRIAL FLUTTER WITH CONTROLLED RESPONSE (MULTI): Chronic | ICD-10-CM

## 2025-07-08 DIAGNOSIS — M32.8 OTHER FORMS OF SYSTEMIC LUPUS ERYTHEMATOSUS, UNSPECIFIED ORGAN INVOLVEMENT STATUS (MULTI): ICD-10-CM

## 2025-07-08 PROCEDURE — 93010 ELECTROCARDIOGRAM REPORT: CPT | Performed by: INTERNAL MEDICINE

## 2025-07-08 PROCEDURE — 93005 ELECTROCARDIOGRAM TRACING: CPT | Performed by: INTERNAL MEDICINE

## 2025-07-08 PROCEDURE — 99213 OFFICE O/P EST LOW 20 MIN: CPT | Performed by: INTERNAL MEDICINE

## 2025-07-08 PROCEDURE — 3074F SYST BP LT 130 MM HG: CPT | Performed by: INTERNAL MEDICINE

## 2025-07-08 PROCEDURE — 3078F DIAST BP <80 MM HG: CPT | Performed by: INTERNAL MEDICINE

## 2025-07-08 PROCEDURE — 99212 OFFICE O/P EST SF 10 MIN: CPT | Mod: 25

## 2025-07-08 PROCEDURE — 1036F TOBACCO NON-USER: CPT | Performed by: INTERNAL MEDICINE

## 2025-07-08 ASSESSMENT — PATIENT HEALTH QUESTIONNAIRE - PHQ9
SUM OF ALL RESPONSES TO PHQ9 QUESTIONS 1 AND 2: 0
2. FEELING DOWN, DEPRESSED OR HOPELESS: NOT AT ALL
1. LITTLE INTEREST OR PLEASURE IN DOING THINGS: NOT AT ALL

## 2025-07-08 ASSESSMENT — PAIN SCALES - GENERAL: PAINLEVEL_OUTOF10: 0-NO PAIN

## 2025-07-08 ASSESSMENT — LIFESTYLE VARIABLES: TOTAL SCORE: 0

## 2025-07-08 ASSESSMENT — ENCOUNTER SYMPTOMS
LOSS OF SENSATION IN FEET: 0
DEPRESSION: 0
OCCASIONAL FEELINGS OF UNSTEADINESS: 0

## 2025-07-08 NOTE — ASSESSMENT & PLAN NOTE
Atrial flutter  Post catheter-based therapy just over 3 months ago.  No recurrence of atrial arrhythmias.  Follow-up as needed.  Rheumatology referral.

## 2025-07-10 DIAGNOSIS — Z79.899 LONG-TERM USE OF HIGH-RISK MEDICATION: ICD-10-CM

## 2025-07-10 DIAGNOSIS — R06.02 SOB (SHORTNESS OF BREATH): ICD-10-CM

## 2025-07-10 DIAGNOSIS — I27.21 PAH (PULMONARY ARTERY HYPERTENSION) (MULTI): ICD-10-CM

## 2025-07-10 LAB
ATRIAL RATE: 78 BPM
P AXIS: 68 DEGREES
P OFFSET: 178 MS
P ONSET: 120 MS
PR INTERVAL: 170 MS
Q ONSET: 205 MS
QRS COUNT: 13 BEATS
QRS DURATION: 110 MS
QT INTERVAL: 438 MS
QTC CALCULATION(BAZETT): 499 MS
QTC FREDERICIA: 478 MS
R AXIS: 206 DEGREES
T AXIS: 50 DEGREES
T OFFSET: 424 MS
VENTRICULAR RATE: 78 BPM

## 2025-07-10 ASSESSMENT — ENCOUNTER SYMPTOMS
PSYCHIATRIC NEGATIVE: 1
DIARRHEA: 1
ALLERGIC/IMMUNOLOGIC NEGATIVE: 1
ENDOCRINE NEGATIVE: 1
EYES NEGATIVE: 1
MUSCULOSKELETAL NEGATIVE: 1
HEMATOLOGIC/LYMPHATIC NEGATIVE: 1
PALPITATIONS: 1

## 2025-07-10 NOTE — PROGRESS NOTES
History Of Present Illness  Ronna Beckham is a 64 y.o. female presenting with pulmonary arterial hypertension follow up. Patient is NYHA Functional Class 3+ and WHO Group 1. Last office visit was on 6/11/2025. Originally referred by Dr. Calvillo on 9/11/2020 following abnormal echo.     PAH Treatment:  Veletri, 67 ng/kg/min, 2.9ml/hr, concentration 90,000 ng/ml (9/23/2020)  Opsumit (4/12/2021)  Winrevair  (11/1/2024)  Oxygen 4 LPM continuously  Infusion site: [facility] (Replaced 4/18/2025)  Treatment history:   Tadalafil (5/4/2021-5/6/2021) headache    Today's testing includes 6 MWT and Labs    Interval History   Patient would like to discuss lung transplant. Followed up with Dr. Le on 7/8 for Holter monitor, no recurrence of atrial arrhythmias per clinic note. Referred to rheumatology, restarted plaquneil in the past week.     Reports pulse ox has been at 89% at recent doctor's appointments. Wearing 4 LPM continuously. Feeling more fatigued lately. Diarrhea worse over the past 2 weeks, taking 2-3 tabs of imodium daily, 2 bowel movements daily. No edema noted.    Past Medical History  Patient Active Problem List   Diagnosis    Shortness of breath    NSTEMI (non-ST elevated myocardial infarction) (Multi)    Toothache    Systemic lupus erythematosus (Multi)    Syncope and collapse    Sore throat    Seizure disorder (Multi)    Right ventricular systolic dysfunction    Acute on chronic right-sided heart failure    Pulmonary hypertension (Multi)    Paresthesia    Hand pain    PAH (pulmonary artery hypertension) (Multi)    Olecranon bursitis, right elbow    Obesity    Neuropathy    Nasal congestion    Methamphetamine abuse    Major depressive disorder, single episode, moderate (Multi)    Liver disease    Insomnia    Increased oxygen demand    Hypomagnesemia    Hypocalcemia    Hyperlipidemia    History of non-ST elevation myocardial infarction (NSTEMI)    History of alcohol abuse    Fatigue    Electrolyte and fluid  disorder    Prolonged Q-T interval on ECG    Lower leg edema    Edema of eyelid    Ear pain, bilateral    Drug abuse    Dizziness    Diverticulosis    Depressive disorder    Dental infection    Decreased GFR    Cough    Chronic obstructive pulmonary disease (Multi)    Central line complication    Cellulitis    Blepharitis of both eyes    Blepharitis    Primary hypertension    Anxiety    Alcohol dependence, uncomplicated (Multi)    Acute otitis externa of left ear    Acute on chronic cholecystitis    Acute cor pulmonale (Multi)    Gastroesophageal reflux disease    Chronic respiratory failure with hypoxia, on home O2 therapy    Acquired thrombocytopenia    Acquired absence of cervix    Acidosis, unspecified    Abnormal findings on diagnostic imaging of breast    Pulmonary HTN (Multi)    Long-term use of high-risk medication    SVT (supraventricular tachycardia)    Acute on chronic hypoxic respiratory failure    Pneumonia of left lower lobe due to infectious organism    Volume overload    Does not refill medications appropriately    Repeated falls    Difficulty walking    Acute on chronic respiratory failure with hypoxia    Pericardial effusion (HHS-HCC)    Acute on chronic respiratory failure    Anasarca    Supraventricular tachycardia    Catheter-related bloodstream infection, initial encounter    Bleeding from wound    Hematoma of chest wall    Abdominal discomfort, generalized    Chronic pain    Atrial flutter with controlled response (Multi)    Bloodstream infection associated with central venous catheter    Drug-induced pulmonary hypertension (Multi)    Abnormal antineutrophil cytoplasmic antibody (ANCA) test    History of substance use disorder    Chronic iron deficiency anemia    Bacteremia due to methicillin resistant Staphylococcus aureus    Fecal occult blood test positive        Surgical History  She has a past surgical history that includes Other surgical history (09/21/2020); Other surgical history  (09/21/2020); Other surgical history (09/21/2020); Other surgical history (09/21/2020); Other surgical history (09/21/2020); Hysterectomy; Cholecystectomy; Cardiac catheterization (N/A, 1/8/2024); Cardiac electrophysiology procedure (N/A, 3/24/2025); Cardiac electrophysiology procedure (N/A, 3/24/2025); and Cardiac electrophysiology procedure (N/A, 3/24/2025).     Social History  She reports that she has quit smoking. Her smoking use included cigarettes. She has quit using smokeless tobacco. She reports that she does not currently use alcohol. She reports that she does not currently use drugs after having used the following drugs: Cocaine and Other.    Family History  Family History   Problem Relation Name Age of Onset    No Known Problems Mother      No Known Problems Father          Medications  Current Outpatient Medications:     ascorbic acid (Vitamin C) 250 mg tablet, Take 1 tablet (250 mg) by mouth once daily., Disp: , Rfl:     aspirin 81 mg EC tablet, Take 1 tablet (81 mg) by mouth once daily., Disp: 30 tablet, Rfl: 0    calcium carbonate-vitamin D3 500 mg-5 mcg (200 unit) tablet, Take 2 tablets by mouth 2 times a day., Disp: 120 tablet, Rfl: 0    cholecalciferol (Vitamin D-3) 25 mcg (1,000 units) tablet, Take 1 tablet (25 mcg) by mouth 2 times a day., Disp: , Rfl:     epoprostenol (Veletri) 1.5 mg recon soln, 1.5 mg (1,500 mcg) by central venous line infusion route continuously. Reconstitute contents of vial with 5 ml diluent and mix cassette as directed. Infuse continuously per physician orders. Allow for priming volume. Dose range: 1-150 ng/kg/min., Disp: 30 each, Rfl: 0    ergocalciferol (Vitamin D-2) 1.25 MG (27095 UT) capsule, Take 1 capsule (1.25 mg) by mouth 1 (one) time per week. On Wednesday, Disp: , Rfl:     ferrous sulfate 325 mg (65 mg elemental) tablet, Take 1 tablet by mouth once daily with breakfast., Disp: , Rfl:     furosemide (Lasix) 40 mg tablet, Take 1 tablet (40 mg) by mouth once daily.,  Disp: 30 tablet, Rfl: 0    gabapentin (Neurontin) 800 mg tablet, Take 1 tablet (800 mg) by mouth 3 times a day. (Patient taking differently: Take 1 tablet (800 mg) by mouth 2 times a day.), Disp: 90 tablet, Rfl: 0    hydroxychloroquine (Plaquenil) 200 mg tablet, Take 400 mg (2 tablets) by mouth every day, except Wednesdays. On Wednesdays, take 200 mg (1 tablet) daily., Disp: 180 tablet, Rfl: 1    magnesium chloride (MagDelay) 64 mg EC tablet, Take 1 tablet (64 mg) by mouth 2 times a day. (Patient taking differently: Take 1 tablet by mouth 2 times a day. Taking OTC Mag, 1000 mg in AM and 500 mg in PM), Disp: 60 tablet, Rfl: 0    melatonin 5 mg tablet, Take 1 tablet (5 mg) by mouth once daily at bedtime., Disp: 30 tablet, Rfl: 0    multivitamin-iron-folic acid  mg-mcg tablet, Take 1 tablet by mouth once daily., Disp: , Rfl:     omeprazole (PriLOSEC) 40 mg DR capsule, Take 1 capsule (40 mg) by mouth once daily in the morning. Take before meals. Do not crush or chew., Disp: 30 capsule, Rfl: 0    Opsumit 10 mg tablet tablet, Take 1 tablet (10 mg) by mouth once daily., Disp: 30 tablet, Rfl: 11    oxygen (O2) gas therapy, Inhale 4 L/min continuously. Indications: pulm HTN, Disp: , Rfl:     potassium chloride CR 20 mEq ER tablet, Take 1 tablet (20 mEq) by mouth 2 times a day. Do not crush or chew. (Patient taking differently: Take 1 tablet (20 mEq) by mouth 2 times a day. Do not crush or chew. Reports taking OTC Potassium, unclear which type. 500 in AM and 95 in PM), Disp: 60 tablet, Rfl: 0    sotatercept-csrk 60 mg kit, Inject 60 mg under the skin. Inject one dose under the skin every 21 days . , Disp: , Rfl:     venlafaxine XR (Effexor-XR) 150 mg 24 hr capsule, Take 1 capsule (150 mg) by mouth once daily. Do not crush or chew., Disp: 30 capsule, Rfl: 0    apixaban (Eliquis) 5 mg tablet, Take 1 tablet (5 mg) by mouth 2 times a day. (Patient not taking: Reported on 4/24/2025), Disp: 60 tablet, Rfl: 6      Allergies  Levetiracetam    Review of Systems   Constitutional:  Positive for fatigue.   HENT: Negative.     Eyes: Negative.    Respiratory:  Positive for shortness of breath.    Cardiovascular:  Positive for palpitations. Negative for leg swelling.   Gastrointestinal:  Positive for diarrhea. Negative for constipation, nausea and vomiting.   Endocrine: Negative.    Genitourinary: Negative.    Musculoskeletal: Negative.    Skin: Negative.    Allergic/Immunologic: Negative.    Neurological:  Negative for dizziness, syncope, light-headedness and headaches.   Hematological: Negative.    Psychiatric/Behavioral: Negative.         Last Recorded Vitals  Vitals:    07/17/25 1357   BP: 115/66   SpO2: 94%          Physical Exam  Constitutional:       Appearance: Normal appearance.      Comments: Wheelchair , oxygen, flushed. Thin   HENT:      Head: Normocephalic.     Cardiovascular:      Rate and Rhythm: Normal rate and regular rhythm.      Heart sounds: Murmur heard.      Comments: Increased second heart sound.   Pulmonary:      Effort: Pulmonary effort is normal.      Breath sounds: Normal breath sounds.      Comments: New hendrickson with suture, no erythema.   Abdominal:      General: Bowel sounds are normal.      Palpations: Abdomen is soft.     Musculoskeletal:      Right lower leg: No edema.      Left lower leg: No edema.     Skin:     Findings: Rash present.     Psychiatric:         Mood and Affect: Mood normal.         Judgment: Judgment normal.       Relevant Results    6MWT (7/17/2025) to be done after visit  SP02-  HR-  CHEIKH-  Actual Meters-     6MWT (6/3/2025)  SP02- 95-94% 4 L NC  HR-   CHEIKH- 1-4  Actual Meters-  305 m    Echo (4/14/2025)  PHYSICIAN INTERPRETATION:  Left Ventricle: Left ventricular ejection fraction is normal, by visual estimate at 70-75%. There are no regional left ventricular wall motion abnormalities. The left ventricular cavity size is normal. There is normal septal and mildly increased  posterior left ventricular wall thickness. There is left ventricular concentric remodeling. The interventricular septum is flattened in systole and diastole, consistent with right ventricular pressure and volume overload. Left ventricular diastolic filling cannot be determined due to atrial fibrillation/flutter.  Left Atrium: The left atrial size is normal. A bubble study using agitated saline was performed. Bubble study is negative.  Right Ventricle: The right ventricle is severely enlarged. There is reduced right ventricular systolic function. Despite normal TAPSE and RV s', there are signs of systolic dysfunction based on contrast-enhanced imaging.  Right Atrium: The right atrium is severely dilated.  Aortic Valve: The aortic valve is trileaflet. There is no evidence of aortic valve regurgitation.  Mitral Valve: The mitral valve is normal in structure. There is no evidence of mitral valve regurgitation.  Tricuspid Valve: The tricuspid valve is abnormal. There is severe tricuspid regurgitation. The Doppler estimated RVSP is severely elevated at 103.0 mmHg. TR mechanism is likely functional/secondary TR in the setting of severely dilated RA and RV.  Pulmonic Valve: The pulmonic valve is structurally normal. There is mild pulmonic valve regurgitation.  Pericardium: Small pericardial effusion. There is no evidence of cardiac tamponade.  Pleural: There is left pleural effusion.  Aorta: The aortic root is normal.  Systemic Veins: The inferior vena cava appears dilated, with IVC inspiratory collapse greater than 50%.  In comparison to the previous echocardiogram(s): Compared with study dated 2/17/2025, no significant change.        CONCLUSIONS:   1. Left ventricular ejection fraction is normal, by visual estimate at 70-75%.   2. Right ventricular volume and pressure overload.   3. There is reduced right ventricular systolic function.   4. Severely enlarged right ventricle.   5. The right atrium is severely dilated.   6.  There is no evidence of cardiac tamponade.   7. Severe tricuspid regurgitation visualized.   8. Severely elevated right ventricular systolic pressure.     6MWT (2/17/2025)  SP02-99%-97% on 4L  HR-  CHEIKH- 0-4  Actual Meters- 305m    Echo (2/17/2025) - still with severe RV dilation although RA appears smaller with slightly better LV filling.  PHYSICIAN INTERPRETATION:  Left Ventricle: Left ventricular ejection fraction is normal, by visual estimate at 60-65%. There are no regional left ventricular wall motion abnormalities. The left ventricular cavity size is normal. There is normal septal and mildly increased posterior left ventricular wall thickness. There is left ventricular concentric remodeling. The interventricular septum is flattened in systole and diastole, consistent with right ventricular pressure and volume overload. Spectral Doppler shows a Grade II (pseudonormal pattern) of left ventricular diastolic filling with an elevated left atrial pressure.  Left Atrium: The left atrial size is normal.  Right Ventricle: The right ventricle is severely enlarged. There is severely reduced right ventricular systolic function.  Right Atrium: The right atrium is moderate to severely dilated.  Aortic Valve: The aortic valve is trileaflet. There is trace aortic valve regurgitation. The peak instantaneous gradient of the aortic valve is 7 mmHg.  Mitral Valve: The mitral valve is normal in structure. There is trace mitral valve regurgitation.  Tricuspid Valve: The tricuspid valve is structurally normal. There is severe tricuspid regurgitation. The Doppler estimated RVSP is severely elevated at 91.5 mmHg. Reported right ventricular systolic pressure may be underestimated due to severe tricuspid regurgitation. TR likely severe given shape of TR CW Doppler envelope shape, though no flow reversal in the hepatic veins, so possibly moderate to severe TR.  Pulmonic Valve: The pulmonic valve is not well visualized. There is  physiologic pulmonic valve regurgitation.  Pericardium: Small pericardial effusion. There is no evidence of cardiac tamponade.  Aorta: The aortic root is normal.  Systemic Veins: The inferior vena cava appears dilated, with IVC inspiratory collapse greater than 50%.  In comparison to the previous echocardiogram(s): Compared with the prior exam from 10/24/2024, there was already a severley dilated RV with severely reduced systolic function. The prior RVSP was severely elevated at 105.1mmHg, but appears to be a little lower today(91mmHg) with some inspiratory collapse of IVC. The pericaridal effusion was larger previously, still with no tamponade. There wa already seere TR o the prior study.        CONCLUSIONS:   1. Left ventricular ejection fraction is normal, by visual estimate at 60-65%.   2. Spectral Doppler shows a Grade II (pseudonormal pattern) of left ventricular diastolic filling with an elevated left atrial pressure.   3. Right ventricular volume and pressure overload.   4. There is severely reduced right ventricular systolic function.   5. Severely enlarged right ventricle.   6. The right atrium is moderate to severely dilated.   7. There is no evidence of cardiac tamponade.   8. Severely elevated right ventricular systolic pressure.   9. TR likely severe given shape of TR CW Doppler envelope shape, though no flow reversal in the hepatic veins, so possibly moderate to severe TR.  10. Compared with the prior exam from 10/24/2024, there was already a severley dilated RV with severely reduced systolic function. The prior RVSP was severely elevated at 105.1mmHg, but appears to be a little lower today(91mmHg) with some inspiratory collapse of IVC. The pericaridal effusion was larger previously, still with no tamponade. There wa already seere TR o the prior study.     Patient with code blue running out of oxygen, now baseline. (12/9/2024)    Echo (10/24/2024)  Left Ventricle: Left ventricular ejection fraction is  hyperdynamic, by visual estimate at 75%. The left ventricular cavity size is decreased. The interventricular septum is flattened in systole and diastole, consistent with right ventricular pressure and volume overload. Left ventricular diastolic filling was not assessed. The left ventricle appears underfilled.  Left Atrium: The left atrium was not assessed.  Right Ventricle: The right ventricle is severely enlarged. There is moderately reduced right ventricular systolic function.  Right Atrium: The right atrium is severely dilated. A dilated inferior vena cava demonstrates poor inspiratory collapse, consistent with elevated right atrial pressures.  Aortic Valve: The aortic valve is trileaflet. There is no evidence of aortic valve regurgitation.  Mitral Valve: The mitral valve is normal in structure. There is trace mitral valve regurgitation.  Tricuspid Valve: The tricuspid valve is abnormal. There is severe tricuspid regurgitation. The Doppler estimated RVSP is severely elevated at 105.1 mmHg.  Pulmonic Valve: The pulmonic valve is structurally normal. There is trace pulmonic valve regurgitation.  Pericardium: Small to moderate pericardial effusion. There is no evidence of cardiac tamponade.  Aorta: The aortic root was not assessed.  In comparison to the previous echocardiogram(s): Compared with study dated 4/24/2024, there is no significant difference in RV size and function. There is again severe TR. The RVSP has increased from 89 to 105 mmHg. The pericardial effusion appears unchanged.     CONCLUSIONS:   1. Left ventricular ejection fraction is hyperdynamic, by visual estimate at 75%.   2. Left ventricular cavity size is decreased.   3. Right ventricular volume and pressure overload.   4. There is moderately reduced right ventricular systolic function.   5. Severely enlarged right ventricle.   6. Severely elevated right ventricular systolic pressure.   7. The right atrium is severely dilated.   8. Severe tricuspid  regurgitation visualized.   9. Small to moderate pericardial effusion.  10. There is no evidence of cardiac tamponade.  11. A dilated inferior vena cava demonstrates poor inspiratory collapse, consistent with elevated right atrial pressures.  12. Compared with study dated 4/24/2024, there is no significant difference in RV size and function. There is again severe TR. The RVSP has increased from 89 to 105 mmHg. The pericardial effusion appears unchanged.     CT angio chest for pulmonary embolism (10/22/2024)  1. No evidence of acute pulmonary embolism.  2. There is a moderate size pericardial effusion noted.    6MWT (7/22/2024)  SP02-98%/96% on 4L  HR-106/114  CHEIKH-2/2  Actual Meters- 183 meters     6MWT (5/13/2024)  SP02- 96-96% /4 L NC  HR-   CHEIKH-0-4  Actual Meters 216 meters     CT angio chest for PE (5/30/2024)  1. No findings of acute pulmonary embolism.  2. Persistent marked cardiac enlargement. Hepatic heterogeneity and splenomegaly suggesting right heart dysfunction with associated not make liver and portal hypertension.  3. Bronchial thickening. There are couple of other stable pulmonary nodules when compared to most recent examination, however there are areas of ground-glass opacity seen in the lungs possibly reflecting inflammatory etiologies. Findings of mild pulmonary edema and volume  Overload     Echo (4/24/2024) [facility]  Left Ventricle: The left ventricular systolic function is normal, with an estimated ejection fraction of 60-65%. There are no regional wall motion abnormalities. The left ventricular cavity size is normal. The interventricular septum is flattened in systole and diastole, consistent with right ventricular pressure and volume overload. Left ventricular diastolic filling was not assessed.  Left Atrium: The left atrium is normal in size. A bubble study using agitated saline was performed. Bubble study is positive. A small PFO (= 10 bubbles) was demonstrated. Agitated saline contrast  study was positive for small intracardiac shunt.  Right Ventricle: The right ventricle is severely enlarged. There is severely reduced right ventricular systolic function. Although the TAPSE and RVA' are normal, the fractional area change is markedly reduced consistent wtih severe RV systolic dysfunction. Still able to generate high RVSP.  Right Atrium: The right atrium is severely dilated.  Aortic Valve: The aortic valve is trileaflet. There is minimal aortic valve cusp calcification. Aortic valve regurgitation was not assessed.  Mitral Valve: The mitral valve is normal in structure. Mitral valve regurgitation was not assessed.  Tricuspid Valve: The tricuspid valve is structurally normal. There is severe tricuspid regurgitation. The Doppler estimated RVSP is severely elevated at 88.6 mmHg.  Pulmonic Valve: The pulmonic valve is not well visualized. Pulmonic valve regurgitation was not assessed.  Pericardium: There is a small to moderate pericardial effusion. Small circumferential pericardial effusin though is small to moderate adjacent to the RA. No RV or RA collapse. The IVC is dilated, though likely due to the presence of severe pulmonary hypertension. No significant respiratory variation of the MV or TV inflows. Not diagnostic for tamponade though difficult to dx tamponade in setting of severe pulmonary hypertension.  Aorta: The aortic root is normal.  Systemic Veins: The inferior vena cava appears dilated. There is IVC inspiratory collapse greater than 50%.  In comparison to the previous echocardiogram(s): Compared with study from 3/12/2022,. Compared with the prior exam from 3/12/2022 the RV had a similar appearance with severe dilatation and reduced function. Note the TR was not asessed on that exam to allow estimation of the RVSP at that time. The pericardial effusion appears similar in size.  CONCLUSIONS:   1. Left ventricular systolic function is normal with a 60-65% estimated ejection fraction.   2. Right  ventricular volume and pressure overload.   3. Although the TAPSE and RVA' are normal, the fractional area change is markedly reduced consistent wtih severe RV systolic dysfunction. Still able to generate high RVSP.   4. Severely enlarged right ventricle.   5. There is severely reduced right ventricular systolic function.   6. Agitated saline contrast study was positive for small intracardiac shunt.   7. The right atrium is severely dilated.   8. There is a small to moderate pericardial effusion.   9. Small circumferential pericardial effusin though is small to moderate adjacent to the RA. No RV or RA collapse. The IVC is dilated, though likely due to the presence of severe pulmonary hypertension. No significant respiratory variation of the MV or TV inflows. Not diagnostic for tamponade though difficult to dx tamponade in setting of severe pulmonary hypertension.  10. Severe tricuspid regurgitation visualized.  11. Severely elevated right ventricular systolic pressure.  12. Compared with the prior exam from 3/12/2022 the RV had a similar appearance with severe dilatation and reduced function. Note the TR was not asessed on that exam to allow estimation of the RVSP at that time. The pericardial effusion appears similar in size.  13. A bubble study using agitated saline was performed. Bubble study is positive. A small PFO (= 10 bubbles) was demonstrated.    6MWT (2023)  SpO2: 98% - 95% on 4L  HR: 97 - 116  Valentin: 1 - 7  Actual 314m     6MWT (2022)  SpO2: 94 - 90 on RA   HR: 78 - 111  Valentin: 0 - 1  Meters: 366      6MWT (2021)   SpO2: 92% - 87% on RA  HR: 81 - 94  Valentin: 3 - 4  Actual 311m     6MWT (2021)  HR 91 -102  Spo2 97 - 95 on RA  Valentin 1- 4  Meters 287/predicted 477     RHC (2020)   PAP- 73/31 (47), 83/26 (54)  PCWP- 8  CO/CI- 3.8/2.0      Echo (2020) from OSH   moderately to severely dilated RV with moderately reduced function and a moderately dilated RA.      Assessment/Plan     1)  Pulmonary  a. PAH associated with methamphetamine use, presented FCIV, V/Q (-) , depressed CI, now on infusion therapy with dramatic subjective improvement, still high risk. Still with hectic follow up at best, (+) cocaine during hospitalization 4/24/2024. (+) Cannabinoid and amphetamine 7/5/2024.     7/22/2024 Did not finish Keflex ordered for possible site infection. Better today.     12/9/2024- In past, adjust dosing for weight loss and follow resolution of epo rash and significant diarrhea. Patient went up without authorization. Seems stable from PAH standpoint.     6/11/2025 - Holter placed on [place] and collected in follow up. Verbal report of preliminary data , 3 episode VT , longest 3 beat. 69 occurrences SVT, longest 14 beats. Follow up Dr. Le.      Intolerant to PDE5     On macitentan     2) Neuro - pre-existing neuropathy, alcohol vs ?     7/22/2024 complaining of classic sciatic pain onset after riding horse.      3) Cardiac  A. Hypertension  B. Ablated afib  C. Rare SVT's     4) Interval diagnosis of lupus on basis of ?       Plan    1) Continue current meds, Veletri and macitentan.   2) Follow up in clinic. 12 weeks with echo 6 MW  3) 2+ cans boost per day.

## 2025-07-14 ENCOUNTER — APPOINTMENT (OUTPATIENT)
Dept: RHEUMATOLOGY | Facility: CLINIC | Age: 65
End: 2025-07-14
Payer: COMMERCIAL

## 2025-07-14 VITALS
DIASTOLIC BLOOD PRESSURE: 76 MMHG | HEIGHT: 65 IN | HEART RATE: 85 BPM | SYSTOLIC BLOOD PRESSURE: 114 MMHG | WEIGHT: 161 LBS | BODY MASS INDEX: 26.82 KG/M2 | TEMPERATURE: 98.6 F

## 2025-07-14 DIAGNOSIS — R76.8 P-ANCA AND MPO ANTIBODIES POSITIVE: ICD-10-CM

## 2025-07-14 DIAGNOSIS — M25.50 ARTHRALGIA, UNSPECIFIED JOINT: ICD-10-CM

## 2025-07-14 DIAGNOSIS — R76.8 POSITIVE ANA (ANTINUCLEAR ANTIBODY): Primary | ICD-10-CM

## 2025-07-14 DIAGNOSIS — M35.9 UNDIFFERENTIATED CONNECTIVE TISSUE DISEASE (MULTI): ICD-10-CM

## 2025-07-14 DIAGNOSIS — R21 RASH: ICD-10-CM

## 2025-07-14 PROCEDURE — 3078F DIAST BP <80 MM HG: CPT

## 2025-07-14 PROCEDURE — 3074F SYST BP LT 130 MM HG: CPT

## 2025-07-14 PROCEDURE — 99214 OFFICE O/P EST MOD 30 MIN: CPT

## 2025-07-14 PROCEDURE — 3008F BODY MASS INDEX DOCD: CPT

## 2025-07-14 RX ORDER — HYDROXYCHLOROQUINE SULFATE 200 MG/1
TABLET, FILM COATED ORAL
Qty: 180 TABLET | Refills: 1 | Status: SHIPPED | OUTPATIENT
Start: 2025-07-14

## 2025-07-14 ASSESSMENT — PAIN SCALES - GENERAL: PAINLEVEL_OUTOF10: 6

## 2025-07-14 NOTE — PATIENT INSTRUCTIONS
- Please get blood work today  - Restart plaquenil at the following dose - Take 400 mg (2 tablets) by mouth every day, except Wednesdays. On Wednesdays, take 200 mg (1 tablet) daily  - To schedule appointment with dermatology.  appointment line [phone]

## 2025-07-14 NOTE — PROGRESS NOTES
"Rheumatology Note      Patient Ronna Beckham  MRN [id number]     CC: Ms. Ronna Beckham is a 64 y.o. 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    Subjective    Subjective   History of Presenting Illness  - Patient was admitted 4/2025 for  bleeding and admitted for hematoma at site of Montez. Her course has been complicated by tachyarrhythmias, acute hypoxic respiratory failure requiring MICU transfer, and MRSA bacteremia secondary to Montez s/p line removal. Was seen by rheumatology inpatient to re-evaluate patient's prior SLE diagnosis. Recommended discontinuing hydroxychloroquine as prior testing only with +JAREK with negative rheumatologic ROS. Unable to see prior notes to see how diagnosis was originally made. Based on chart review and patient history, does not fulfill clinical criteria and suspicion low.    - Patient reports rash in both of her legs, arms, face. Rash started about 2-3 months ago and spreading. Rash not painful. Doesn't go into the sun. \"Allergic to sun from medication\". Rash has happened in the past but not to this extent  - Joint pains - knees, neck clicking, back, hands, arms. Hasn't noticed swelling. Painful every day all day. No stiffness. Achy pain. Pain came back after she stopped hydroxychloroquine. Unsure if activity improves it, not feeling well enough to be active.     Rheumatologic history:  Patient reports that she was diagnosed with lupus in early 2021. She does not remember exactly why it was tested and states she was told it was found incidentally.  She was started on hydroxychloroquine 200 mg twice daily at that time and she has continued to get it through her PCP. Does not note subjective changes since being on plaquenil. Has never seen rheumatology.  " "Regarding review of symptoms, patient denies any patchy hair loss, dry eyes, redness of the eyes, pain in the eyes, blurry vision, sores in the mouth, chest pain, abdominal pain, diarrhea, photosensitivity, rashes, Raynaud's, history of miscarriages, sinus issues.  Denies any joint swelling or morning stiffness. Does have chronic neuropathy. Notes prior issues with nosebleeds, occurs rarely <1x per month but lasts 1-2 days. Quit smoking 10 years ago. Quit alcohol use, cocaine use, methamphetamine use about 1 year ago.    Prior rheumatologic meds:  - HCQ (2021-4/2025)    ROS:  Constitutional: no fevers, fatigue  Head: Denies patchy hair loss  Eyes: Denies dry eyes, redness of the eyes, pain in the eyes or H/O uveitis  ENT: Denies sores in the mouth, or difficulty swallowing. +dry mouth  Cardiovascular: Denies chest pain  Respiratory: Stable shortness of breath on 4L of O2  Gastrointestinal:  Denies blood in the stool, has had diarrhea for a while due to medications  Integumentary: Denies photosensitivity, Raynaud's or psoriasis  Neurological: + generalized weakness  Hematologic/Lymphatic: No history of blood clots, miscarriages  MSK: As per HPI    No family history of autoimmune conditions  No smoking    Medical History[1]     RX Allergies[2]     Objective   Objective     /76   Pulse 85   Temp 37 °C (98.6 °F)   Ht 1.651 m (5' 5\")   Wt 73 kg (161 lb)   BMI 26.79 kg/m²      PHYSICAL EXAM:  General - NAD, pleasant, AAOx3  Head: Normocephalic, atraumatic  Eyes - PERRLA, EOMI. No conjunctiva injection.   Mouth/ENT - Moist oral and nasal mucosa.   Skin - blanching, macular rash on legs as noted below. Lesser extent noted on forearms          Extremities - No edema    Musculoskeletal  Shoulders: TTP of AC joint with full ROM  Elbows: Full ROM, without pain, no swelling, warmth or tenderness.  Wrists/Hands: Full ROM, with tenderness of right wrist and several DIPs, PIPs. Subtle fullness of right index and middle " "finger PIP  Hips: Full ROM.    Knees:  Full ROM, without pain, no swelling, warmth or point tenderness.   Ankles: Full ROM, without pain, swelling, warmth or tenderness.  Cervical spine: Tenderness to palpation all along spine  Lumbar spine: Tenderness to palpation with tenderness of right paraspinal musculature     LABS:  Lab Results   Component Value Date    WBC 4.3 06/19/2025    HGB 11.9 06/19/2025    HCT 41.1 06/19/2025    MCV 80.1 06/19/2025     06/19/2025      Lab Results   Component Value Date    GLUCOSE 93 06/03/2025    CO2 22 06/03/2025    BUN 18 06/03/2025    EGFR 83 06/03/2025    CALCIUM 8.8 06/03/2025    ALBUMIN 4.1 06/03/2025      Lab Results   Component Value Date    CRP 0.12 04/23/2024    CRP 0.46 03/09/2022    CRP 0.9 08/14/2020     Lab Results   Component Value Date    SEDRATE 15 04/23/2024    SEDRATE 5 03/09/2022    SEDRATE 7 08/14/2020     Lab Results   Component Value Date    C3 122 04/14/2025    C4 35 04/14/2025     No results found for: \"RF\", \"CITAB\"  Lab Results   Component Value Date    ANATITER 1:1280 04/10/2025    ARNP <0.2 04/12/2025    ASMRN <0.2 04/12/2025    ASSA 0.2 04/12/2025    ASSB <0.2 04/12/2025    ASCL <0.2 04/12/2025    JO1 <0.2 04/12/2025    ACHR <0.2 04/12/2025    ACEN <0.2 04/12/2025    RIBPP <0.2 09/21/2020    DNADS 1.0 04/12/2025    ANCPA p-ANCA (A) 04/12/2025    ANCTI 1:160 (H) 04/12/2025    PR3 2 04/12/2025    MPO 62 (H) 04/12/2025     Lab Results   Component Value Date    PROTUR 30 (1+) (A) 04/12/2025    GLUCOSEU Normal 04/12/2025    BLOODU 0.03 (TRACE) (A) 04/12/2025    KETONESU NEGATIVE 04/12/2025    NITRITEU NEGATIVE 04/12/2025    LEUKOCYTESU NEGATIVE 04/12/2025     Lab Results   Component Value Date    UTPCR 0.17 04/16/2025        Lab Results   Component Value Date    URICACID 7.3 (H) 09/29/2020     Lab Results   Component Value Date    COLORFL Yellow 03/10/2022    CLARITYFLUID Clear 03/10/2022    WBCFL 814 03/10/2022    NEUTROBFREL 4 03/10/2022    LYMPHSBFREL " 46 03/10/2022    RBCFL 1,000 03/10/2022       IMAGING:  CXR 4/15/2025:  Slight improvement of edematous changes. There remains trace left pleural effusion with bibasilar atelectasis     Bilateral UE duplex 4/15/2025:  Right Upper Venous: Negative for acute DVT of the visualized vessels. The brachial vein is only visuailzed in segments. The right proximal subclavian is not visualized.  Left Upper Venous: No evidence of acute deep vein thrombus visualized in the left upper extremity. There are chronic changes visualized in the mid cephalic and distal cephalic veins.     CT angio chest for PE 4/13/2025:  1. No evidence of acute pulmonary embolism to segmental level. Please note that, assessment of subsegmental branches is limited and small peripheral emboli are not entirely excluded.  2. Interval development of trace/small left pleural effusion. Left  basilar consolidative opacity. Additional new consolidative opacity in the posterior right middle lobe and areas of ground-glass opacity in the right lower lobe. Findings are likely atelectatic, however infectious process should be excluded in appropriate clinical setting.  3. Severe enlargement of the right ventricle and right atrium with dilated main pulmonary artery in keeping with patient's known  pulmonary artery hypertension.  4. There is suggestion of a defect in the base of interventricular  septum (VSD). Correlate with echocardiogram and consider cardiac MRI for shunt assessment. Correlate with concern for Eisenmenger syndrome.  5. Peribronchovascular and centrilobular ground-glass halo in  bilateral lungs, likely sequela of elevated pulmonary artery  pressure/pulmonary artery hypertension.  6. Liver appears enlarged, partially imaged. Findings might be due to hepatic congestion/congestive hepatopathy secondary to right heart dysfunction.     CT angio chest 4/9/2025:  1.  Right-sided subclavian central venous line is identified with the distal tip extending to  the cavoatrial junction.  Mild focal soft tissue swelling along surrounding the distal aspect of the central venous line within the upper chest wall suggestive for small hematoma. Correlate clinically.  2.  No evidence of central pulmonary embolus.  3.  Multiple small irregular bilateral pulmonary nodules are  reidentified with several new additional small pulmonary nodules as described above.  Findings may be infectious, inflammatory or neoplastic in etiology.  Correlate clinically.  Short-term follow-up. CT imaging recommended to assess for stability or resolution.  4.  Nodular thickening of the left adrenal gland.  Nonspecific,  possibly reflecting underlying adrenal nodule or mass..  Dedicated follow-up CT or MRI of the abdomen utilizing an adrenal mass protocol can be performed for further evaluation.    Assessment/Plan   Assessment & Plan   Ms. Ronna Beckham is a 64 y.o. female with a past medical history of group 1 PAH (suspected 2/2 methamphetamine use), chronic respiratory failure on home oxygen, atrial fibrillation s/p ablation 3/2025, substance use disorder, previously diagnosed SLE who presents to establish care.    Patient was evaluated by rheumatology inpatient during admission 4/2025 with bleeding and admitted for hematoma at site of Montez. Her course has been complicated by tachyarrhythmias, acute hypoxic respiratory failure requiring MICU transfer, and MRSA bacteremia secondary to Montez s/p line removal. Rheumatology consulted for to re-evaluate patient's prior SLE diagnosis. Was diagnosed with SLE in 2021 and had been on hydroxychloroquine 200 mg BID since. Unclear symptoms that led to the diagnosis and unable to see notes from that time. Per various notes did seem to have joint pain but patient does not recall this. Does not remember any changes subjectively after starting plaquenil. Reviewed detailed ROS with the patient, which is largely negative. Per chart review, has had +JAREK since 2020  with negative dsDNA and RIOS panel. No prior testing of complements in system. Recommended discontinuing HCQ at this time    Since discontinuing hydroxychloroquine, patient with worsening rash of her lower extremities and involves arm and face (although unable to see on face today).  Also notes worsening joint pain involving neck, back, hands, knees, essentially everywhere that is constant without morning stiffness or subjective joint swelling, however there is subtle swelling in the right hand index and middle finger PIPs today.     Lab review:   - 6/2025: normal WBC, Hb, platelets. Cr 0.8, normal LFTs  - Autoimmune labs with positive JAREK since 2020, last titer 1:1280 with negative RIOS panel. Normal complements. ANCA panel checked 4/2025 with p-ANCA 1:160, MPO 62, normal PR3. Normal UPCR      #Rash  #Undifferentiated CTD, prior SLE diagnosis  #Positive JAREK  -Previously diagnosed with SLE, patient reports diagnosed based off of labs and does not remember having symptoms at time of diagnosis. Was on Plaquenil for 4 years. Recommended stopping plaquenil with clinical monitoring. When Plaquenil was stopped, she developed worsening rash and joint pains although unclear if inflammatory in nature. No other signs/symptoms of SLE  -Will resume hydroxychloroquine 5 mg/kg dosing (400 mg daily except Wednesdays.  On Wednesdays take 200 mg) and see if she has clinical improvement  - Referral to dermatology for consideration of biopsy  - Repeat labs including JAREK + RIOS panel, complements, inflammatory markers, UA, UPCR. Will check RF, CCP. Recently had CBC and CMP so will not recheck  - Yearly eye exams recommended for plaquenil use    #Positive p-ANCA 1:160  #Positive MPO 62  - No proteinuria, hemoptysis or concern for DAH. Rash atypical for vasculitis rash  - Normal complements  - Abnormal lab findings likely in setting of bacteremia or prior cocaine use, low clinical suspicion for active vasculitis  - Repeat ANCA    RTC in 3  months (or after derm appointment)    Case seen and discussed with Dr. Crain  Signature: Liza Juarez,   Date: July 13, 2025          [1]   Past Medical History:  Diagnosis Date    Abnormal antineutrophil cytoplasmic antibody (ANCA) test 04/17/2025    Anxiety     Depression     Does not refill medications appropriately 09/10/2024    GERD (gastroesophageal reflux disease)     Lupus     Personal history of other specified conditions 09/21/2020    History of seizure    Pulmonary hypertension (Multi)     Volume overload 09/10/2024   [2]   Allergies  Allergen Reactions    Levetiracetam Other     Weakness, unable to walk

## 2025-07-17 ENCOUNTER — HOSPITAL ENCOUNTER (OUTPATIENT)
Dept: RESPIRATORY THERAPY | Facility: HOSPITAL | Age: 65
Discharge: HOME | End: 2025-07-17
Payer: COMMERCIAL

## 2025-07-17 ENCOUNTER — OFFICE VISIT (OUTPATIENT)
Dept: PULMONOLOGY | Facility: HOSPITAL | Age: 65
End: 2025-07-17
Payer: COMMERCIAL

## 2025-07-17 VITALS
WEIGHT: 164.4 LBS | OXYGEN SATURATION: 94 % | DIASTOLIC BLOOD PRESSURE: 66 MMHG | BODY MASS INDEX: 27.36 KG/M2 | SYSTOLIC BLOOD PRESSURE: 115 MMHG

## 2025-07-17 DIAGNOSIS — T82.9XXA CENTRAL LINE COMPLICATION, INITIAL ENCOUNTER: Primary | ICD-10-CM

## 2025-07-17 DIAGNOSIS — I27.21 PAH (PULMONARY ARTERY HYPERTENSION) (MULTI): ICD-10-CM

## 2025-07-17 DIAGNOSIS — R06.02 SHORTNESS OF BREATH: ICD-10-CM

## 2025-07-17 DIAGNOSIS — Z79.899 LONG-TERM USE OF HIGH-RISK MEDICATION: ICD-10-CM

## 2025-07-17 DIAGNOSIS — I27.21 PULMONARY ARTERIAL HYPERTENSION (MULTI): ICD-10-CM

## 2025-07-17 DIAGNOSIS — R06.02 SOB (SHORTNESS OF BREATH): ICD-10-CM

## 2025-07-17 DIAGNOSIS — I27.20 PULMONARY HYPERTENSION (MULTI): ICD-10-CM

## 2025-07-17 PROCEDURE — 3074F SYST BP LT 130 MM HG: CPT | Performed by: INTERNAL MEDICINE

## 2025-07-17 PROCEDURE — 3078F DIAST BP <80 MM HG: CPT | Performed by: INTERNAL MEDICINE

## 2025-07-17 PROCEDURE — 94618 PULMONARY STRESS TESTING: CPT

## 2025-07-17 PROCEDURE — 99212 OFFICE O/P EST SF 10 MIN: CPT

## 2025-07-17 PROCEDURE — 99215 OFFICE O/P EST HI 40 MIN: CPT | Performed by: INTERNAL MEDICINE

## 2025-07-17 ASSESSMENT — ENCOUNTER SYMPTOMS
LOSS OF SENSATION IN FEET: 1
SHORTNESS OF BREATH: 1
OCCASIONAL FEELINGS OF UNSTEADINESS: 0
DIZZINESS: 0
FATIGUE: 1
NAUSEA: 0
HEADACHES: 0
DEPRESSION: 0
LIGHT-HEADEDNESS: 0
CONSTIPATION: 0
VOMITING: 0

## 2025-07-18 LAB
ANA PAT SER IF-IMP: ABNORMAL
ANA SER QL IF: POSITIVE
ANA TITR SER IF: ABNORMAL TITER
ANCA AB SER QL: ABNORMAL
C3 SERPL-MCNC: 129 MG/DL (ref 83–193)
C4 SERPL-MCNC: 23 MG/DL (ref 15–57)
CCP IGG SERPL-ACNC: 28 UNITS
CENTROMERE B AB SER-ACNC: ABNORMAL AI
CRP SERPL-MCNC: <3 MG/L
DSDNA AB SER-ACNC: 1 IU/ML
ENA JO1 AB SER IA-ACNC: ABNORMAL AI
ENA RNP AB SER-ACNC: ABNORMAL AI
ENA SCL70 AB SER IA-ACNC: ABNORMAL AI
ENA SM AB SER IA-ACNC: ABNORMAL AI
ENA SM+RNP AB SER IA-ACNC: ABNORMAL AI
ENA SS-A AB SER IA-ACNC: ABNORMAL AI
ENA SS-B AB SER IA-ACNC: ABNORMAL AI
ERYTHROCYTE [SEDIMENTATION RATE] IN BLOOD BY WESTERGREN METHOD: 6 MM/H
LABORATORY COMMENT REPORT: ABNORMAL
MYELOPEROXIDASE AB SER IA-ACNC: 16.9 AI
NUCLEOSOME AB SER IA-ACNC: ABNORMAL AI
PROTEINASE3 AB SER IA-ACNC: <1 AI
RHEUMATOID FACT SERPL-ACNC: 11 IU/ML
RIBOSOMAL P AB SER-ACNC: ABNORMAL AI

## 2025-07-21 ENCOUNTER — RESULTS FOLLOW-UP (OUTPATIENT)
Dept: RHEUMATOLOGY | Facility: CLINIC | Age: 65
End: 2025-07-21
Payer: COMMERCIAL

## 2025-07-21 DIAGNOSIS — Z79.899 LONG-TERM USE OF PLAQUENIL: Primary | ICD-10-CM

## 2025-07-21 NOTE — TELEPHONE ENCOUNTER
Called patient to discuss test results.    - Positive JAREK with negative RIOS panel, complements as seen previously  - Weakly positive CCP so will keep RA on differential, unclear if joint pain inflammatory in nature or how this would tie into her rash  - ANCA with PR3 decreasing 16.9 from 62. Positive antibodies more likely to be from bacteremia    Will continue with resumption of plaquenil and dermatology evaluation. Biopsy may be helpful in formulating diagnosis, as currently remains undifferentiated.

## 2025-07-23 LAB
ANA PAT SER IF-IMP: ABNORMAL
ANA SER QL IF: POSITIVE
ANA TITR SER IF: ABNORMAL TITER
ANCA AB SER QL: ABNORMAL
C3 SERPL-MCNC: 129 MG/DL (ref 83–193)
C4 SERPL-MCNC: 23 MG/DL (ref 15–57)
CCP IGG SERPL-ACNC: 28 UNITS
CENTROMERE B AB SER-ACNC: ABNORMAL AI
CRP SERPL-MCNC: <3 MG/L
DSDNA AB SER-ACNC: 1 IU/ML
ENA JO1 AB SER IA-ACNC: ABNORMAL AI
ENA RNP AB SER-ACNC: ABNORMAL AI
ENA SCL70 AB SER IA-ACNC: ABNORMAL AI
ENA SM AB SER IA-ACNC: ABNORMAL AI
ENA SM+RNP AB SER IA-ACNC: ABNORMAL AI
ENA SS-A AB SER IA-ACNC: ABNORMAL AI
ENA SS-B AB SER IA-ACNC: ABNORMAL AI
ERYTHROCYTE [SEDIMENTATION RATE] IN BLOOD BY WESTERGREN METHOD: 6 MM/H
LABORATORY COMMENT REPORT: ABNORMAL
MYELOPEROXIDASE AB SER IA-ACNC: 16.9 AI
NUCLEOSOME AB SER IA-ACNC: ABNORMAL AI
P-ANCA TITR SER IF: ABNORMAL TITER
PROTEINASE3 AB SER IA-ACNC: <1 AI
RHEUMATOID FACT SERPL-ACNC: 11 IU/ML
RIBOSOMAL P AB SER-ACNC: ABNORMAL AI

## 2025-07-31 ENCOUNTER — APPOINTMENT (OUTPATIENT)
Dept: PULMONOLOGY | Facility: HOSPITAL | Age: 65
End: 2025-07-31
Payer: COMMERCIAL

## 2025-07-31 ENCOUNTER — APPOINTMENT (OUTPATIENT)
Dept: RESPIRATORY THERAPY | Facility: HOSPITAL | Age: 65
End: 2025-07-31
Payer: COMMERCIAL

## 2025-08-06 ENCOUNTER — HOSPITAL ENCOUNTER (OUTPATIENT)
Dept: RADIOLOGY | Facility: HOSPITAL | Age: 65
Discharge: HOME | End: 2025-08-06
Payer: COMMERCIAL

## 2025-08-06 VITALS
TEMPERATURE: 97 F | RESPIRATION RATE: 22 BRPM | DIASTOLIC BLOOD PRESSURE: 62 MMHG | HEART RATE: 79 BPM | SYSTOLIC BLOOD PRESSURE: 100 MMHG | OXYGEN SATURATION: 97 %

## 2025-08-06 DIAGNOSIS — T82.9XXA CENTRAL LINE COMPLICATION, INITIAL ENCOUNTER: ICD-10-CM

## 2025-08-06 ASSESSMENT — PAIN - FUNCTIONAL ASSESSMENT: PAIN_FUNCTIONAL_ASSESSMENT: 0-10

## 2025-08-06 ASSESSMENT — PAIN SCALES - GENERAL: PAINLEVEL_OUTOF10: 0 - NO PAIN

## 2025-08-11 DIAGNOSIS — R06.02 SOB (SHORTNESS OF BREATH): ICD-10-CM

## 2025-08-11 DIAGNOSIS — I27.21 PAH (PULMONARY ARTERY HYPERTENSION) (MULTI): ICD-10-CM

## 2025-08-11 DIAGNOSIS — Z79.899 LONG-TERM USE OF HIGH-RISK MEDICATION: ICD-10-CM

## 2025-08-12 ENCOUNTER — DOCUMENTATION (OUTPATIENT)
Dept: PULMONOLOGY | Facility: HOSPITAL | Age: 65
End: 2025-08-12
Payer: COMMERCIAL

## 2025-08-12 LAB
ANION GAP SERPL CALCULATED.4IONS-SCNC: 10 MMOL/L (CALC) (ref 7–17)
BASOPHILS # BLD AUTO: 11 CELLS/UL (ref 0–200)
BASOPHILS NFR BLD AUTO: 0.2 %
BNP SERPL-MCNC: 41 PG/ML
BUN SERPL-MCNC: 23 MG/DL (ref 7–25)
BUN/CREAT SERPL: NORMAL (CALC) (ref 6–22)
CALCIUM SERPL-MCNC: 9 MG/DL (ref 8.6–10.4)
CHLORIDE SERPL-SCNC: 103 MMOL/L (ref 98–110)
CO2 SERPL-SCNC: 25 MMOL/L (ref 20–32)
CREAT SERPL-MCNC: 0.86 MG/DL (ref 0.5–1.05)
EGFRCR SERPLBLD CKD-EPI 2021: 76 ML/MIN/1.73M2
EOSINOPHIL # BLD AUTO: 0 CELLS/UL (ref 15–500)
EOSINOPHIL NFR BLD AUTO: 0 %
ERYTHROCYTE [DISTWIDTH] IN BLOOD BY AUTOMATED COUNT: 20.2 % (ref 11–15)
GLUCOSE SERPL-MCNC: 83 MG/DL (ref 65–99)
HCT VFR BLD AUTO: 49 % (ref 35–45)
HGB BLD-MCNC: 15.3 G/DL (ref 11.7–15.5)
LYMPHOCYTES # BLD AUTO: 1453 CELLS/UL (ref 850–3900)
LYMPHOCYTES NFR BLD AUTO: 26.9 %
MAGNESIUM SERPL-MCNC: 1.9 MG/DL (ref 1.5–2.5)
MCH RBC QN AUTO: 25.9 PG (ref 27–33)
MCHC RBC AUTO-ENTMCNC: 31.2 G/DL (ref 32–36)
MCV RBC AUTO: 82.9 FL (ref 80–100)
MONOCYTES # BLD AUTO: 302 CELLS/UL (ref 200–950)
MONOCYTES NFR BLD AUTO: 5.6 %
NEUTROPHILS # BLD AUTO: 3634 CELLS/UL (ref 1500–7800)
NEUTROPHILS NFR BLD AUTO: 67.3 %
PLATELET # BLD AUTO: 133 THOUSAND/UL (ref 140–400)
PMV BLD REES-ECKER: 9.8 FL (ref 7.5–12.5)
POTASSIUM SERPL-SCNC: 4.5 MMOL/L (ref 3.5–5.3)
RBC # BLD AUTO: 5.91 MILLION/UL (ref 3.8–5.1)
SERVICE CMNT-IMP: ABNORMAL
SODIUM SERPL-SCNC: 138 MMOL/L (ref 135–146)
WBC # BLD AUTO: 5.4 THOUSAND/UL (ref 3.8–10.8)

## 2025-08-20 DIAGNOSIS — I27.20 PULMONARY HYPERTENSION (MULTI): ICD-10-CM

## 2025-08-20 DIAGNOSIS — R06.02 SOB (SHORTNESS OF BREATH): ICD-10-CM

## 2025-08-28 ENCOUNTER — TELEPHONE (OUTPATIENT)
Dept: PULMONOLOGY | Facility: HOSPITAL | Age: 65
End: 2025-08-28
Payer: COMMERCIAL

## 2025-08-29 DIAGNOSIS — R06.02 SOB (SHORTNESS OF BREATH): ICD-10-CM

## 2025-08-29 DIAGNOSIS — Z79.899 LONG-TERM USE OF HIGH-RISK MEDICATION: ICD-10-CM

## 2025-08-29 DIAGNOSIS — I27.21 PAH (PULMONARY ARTERY HYPERTENSION) (MULTI): ICD-10-CM

## 2025-09-03 ENCOUNTER — DOCUMENTATION (OUTPATIENT)
Dept: PULMONOLOGY | Facility: HOSPITAL | Age: 65
End: 2025-09-03
Payer: COMMERCIAL

## 2025-10-20 ENCOUNTER — APPOINTMENT (OUTPATIENT)
Dept: RHEUMATOLOGY | Facility: CLINIC | Age: 65
End: 2025-10-20
Payer: COMMERCIAL

## 2025-11-24 ENCOUNTER — APPOINTMENT (OUTPATIENT)
Dept: OPHTHALMOLOGY | Facility: CLINIC | Age: 65
End: 2025-11-24
Payer: COMMERCIAL

## 2026-01-15 ENCOUNTER — APPOINTMENT (OUTPATIENT)
Dept: DERMATOLOGY | Facility: CLINIC | Age: 66
End: 2026-01-15
Payer: COMMERCIAL

## (undated) DEVICE — GLIDEWIRE, ANGLE, STANDARD, .035 X 180CM, 1.5MM J-TIP

## (undated) DEVICE — PACK, ANGIO P2, CUSTOM, LAKE

## (undated) DEVICE — KIT, NAMIC STANDARD LEFT HEART, CUSTOM, LWMC

## (undated) DEVICE — CABLE, SHIELDED PIN, 10 QUIK

## (undated) DEVICE — INTRODUCER, SHEATH, FAST-CATH, 7FR X 12CM, C-LOCK

## (undated) DEVICE — CATHETER, ELECTROPHYSIOLOGY, QUADRIPOLAR,  6FR X 120CM, JSN CURVE (REPROCESSED)

## (undated) DEVICE — TR BAND, RADIAL COMPRESSION, STANDARD, 24CM

## (undated) DEVICE — CATHETER, EP DIAG, LIVEWIRE, DOU-DECA, SUPER LG CURL, 7FR 115CM (REPROCESS)

## (undated) DEVICE — ELECTRODE KIT, ENSITE X EP SYSTEM SURFACE

## (undated) DEVICE — TUBING SET, COOL POINT, 2.6M, INDIVIDUAL

## (undated) DEVICE — INTRODUCER, PERCUTANEOUS, SHEATH AVANTI PLUS, W/MINI GUIDEWIRE, STANDARD LENGTH, 8 FR, 11 CM

## (undated) DEVICE — GLIDESHEATH, SLENDER 6FR, 10CM, NITINOL KIT

## (undated) DEVICE — CATHETER, EXPO, MODEL-D, 6FR FL3.5

## (undated) DEVICE — Device

## (undated) DEVICE — CATHETER, TACTIFLEX, BI-D ABLATION, 8FR 2-2-2MM F-J CURVE

## (undated) DEVICE — GUIDEWIRE, J TIP, 3 MM, 0.035 IN X 260 CM, PTFE

## (undated) DEVICE — INTRODUCER, SHEATH, FAST-CATH, 6 FR X 23 CM

## (undated) DEVICE — CATHETER, ELECTRODE, STEERABLE, EP-XT, LARGE CURVE, 6 FR X 110 CM

## (undated) DEVICE — PAD, ELECTRODE DEFIB PADPRO ADULT STRL W/ADAPTER

## (undated) DEVICE — CATHETER, DIAGNOSTIC, 6 FR-JR 5

## (undated) DEVICE — COVER, EQUIPMENT, ZERO GRAVITY

## (undated) DEVICE — CABLE, OCTAPOLAR, EASYMATE 8 125CML

## (undated) DEVICE — CLOSURE SYSTEM, VASCULAR, MVP 6-12FR, VENOUS